# Patient Record
Sex: MALE | Race: WHITE | NOT HISPANIC OR LATINO
[De-identification: names, ages, dates, MRNs, and addresses within clinical notes are randomized per-mention and may not be internally consistent; named-entity substitution may affect disease eponyms.]

---

## 2017-01-11 ENCOUNTER — RX RENEWAL (OUTPATIENT)
Age: 67
End: 2017-01-11

## 2017-02-27 ENCOUNTER — RX RENEWAL (OUTPATIENT)
Age: 67
End: 2017-02-27

## 2017-03-13 ENCOUNTER — RX RENEWAL (OUTPATIENT)
Age: 67
End: 2017-03-13

## 2017-03-16 ENCOUNTER — RX RENEWAL (OUTPATIENT)
Age: 67
End: 2017-03-16

## 2017-03-17 ENCOUNTER — RX RENEWAL (OUTPATIENT)
Age: 67
End: 2017-03-17

## 2017-04-18 ENCOUNTER — RX RENEWAL (OUTPATIENT)
Age: 67
End: 2017-04-18

## 2017-04-21 ENCOUNTER — RX RENEWAL (OUTPATIENT)
Age: 67
End: 2017-04-21

## 2017-05-04 ENCOUNTER — RX RENEWAL (OUTPATIENT)
Age: 67
End: 2017-05-04

## 2017-05-23 ENCOUNTER — RX RENEWAL (OUTPATIENT)
Age: 67
End: 2017-05-23

## 2017-06-02 ENCOUNTER — RX RENEWAL (OUTPATIENT)
Age: 67
End: 2017-06-02

## 2017-06-05 ENCOUNTER — MEDICATION RENEWAL (OUTPATIENT)
Age: 67
End: 2017-06-05

## 2017-06-05 ENCOUNTER — RX RENEWAL (OUTPATIENT)
Age: 67
End: 2017-06-05

## 2017-06-26 ENCOUNTER — APPOINTMENT (OUTPATIENT)
Dept: FAMILY MEDICINE | Facility: CLINIC | Age: 67
End: 2017-06-26

## 2017-06-26 VITALS
HEIGHT: 72 IN | SYSTOLIC BLOOD PRESSURE: 160 MMHG | WEIGHT: 224 LBS | BODY MASS INDEX: 30.34 KG/M2 | DIASTOLIC BLOOD PRESSURE: 90 MMHG

## 2017-06-27 LAB
ALBUMIN SERPL ELPH-MCNC: 4.1 G/DL
ALP BLD-CCNC: 85 U/L
ALT SERPL-CCNC: 14 U/L
ANION GAP SERPL CALC-SCNC: 17 MMOL/L
AST SERPL-CCNC: 15 U/L
BASOPHILS # BLD AUTO: 0.02 K/UL
BASOPHILS NFR BLD AUTO: 0.2 %
BILIRUB SERPL-MCNC: 0.4 MG/DL
BUN SERPL-MCNC: 48 MG/DL
CALCIUM SERPL-MCNC: 9.4 MG/DL
CHLORIDE SERPL-SCNC: 99 MMOL/L
CHOLEST SERPL-MCNC: 231 MG/DL
CHOLEST/HDLC SERPL: 5.4 RATIO
CO2 SERPL-SCNC: 24 MMOL/L
CREAT SERPL-MCNC: 1.61 MG/DL
EOSINOPHIL # BLD AUTO: 0.34 K/UL
EOSINOPHIL NFR BLD AUTO: 3.7 %
GLUCOSE SERPL-MCNC: 217 MG/DL
HBA1C MFR BLD HPLC: 8.5 %
HCT VFR BLD CALC: 33.3 %
HDLC SERPL-MCNC: 43 MG/DL
HGB BLD-MCNC: 11 G/DL
IMM GRANULOCYTES NFR BLD AUTO: 0.3 %
LDLC SERPL CALC-MCNC: 161 MG/DL
LYMPHOCYTES # BLD AUTO: 1.37 K/UL
LYMPHOCYTES NFR BLD AUTO: 14.8 %
MAN DIFF?: NORMAL
MCHC RBC-ENTMCNC: 26.6 PG
MCHC RBC-ENTMCNC: 33 GM/DL
MCV RBC AUTO: 80.6 FL
MONOCYTES # BLD AUTO: 0.77 K/UL
MONOCYTES NFR BLD AUTO: 8.3 %
NEUTROPHILS # BLD AUTO: 6.72 K/UL
NEUTROPHILS NFR BLD AUTO: 72.7 %
PLATELET # BLD AUTO: 257 K/UL
POTASSIUM SERPL-SCNC: 4.4 MMOL/L
PROT SERPL-MCNC: 6.9 G/DL
RBC # BLD: 4.13 M/UL
RBC # FLD: 14 %
SODIUM SERPL-SCNC: 140 MMOL/L
TRIGL SERPL-MCNC: 136 MG/DL
TSH SERPL-ACNC: 3.76 UIU/ML
WBC # FLD AUTO: 9.25 K/UL

## 2017-08-28 ENCOUNTER — TRANSCRIPTION ENCOUNTER (OUTPATIENT)
Age: 67
End: 2017-08-28

## 2017-09-03 ENCOUNTER — MEDICATION RENEWAL (OUTPATIENT)
Age: 67
End: 2017-09-03

## 2017-09-07 ENCOUNTER — APPOINTMENT (OUTPATIENT)
Dept: FAMILY MEDICINE | Facility: CLINIC | Age: 67
End: 2017-09-07
Payer: MEDICARE

## 2017-09-07 VITALS
BODY MASS INDEX: 28.17 KG/M2 | WEIGHT: 208 LBS | DIASTOLIC BLOOD PRESSURE: 88 MMHG | SYSTOLIC BLOOD PRESSURE: 138 MMHG | HEIGHT: 72 IN

## 2017-09-07 PROCEDURE — 99215 OFFICE O/P EST HI 40 MIN: CPT

## 2018-05-30 ENCOUNTER — APPOINTMENT (OUTPATIENT)
Dept: FAMILY MEDICINE | Facility: CLINIC | Age: 68
End: 2018-05-30
Payer: MEDICARE

## 2018-05-30 VITALS
HEART RATE: 101 BPM | HEIGHT: 72 IN | BODY MASS INDEX: 30.48 KG/M2 | SYSTOLIC BLOOD PRESSURE: 140 MMHG | WEIGHT: 225 LBS | TEMPERATURE: 98 F | OXYGEN SATURATION: 97 % | DIASTOLIC BLOOD PRESSURE: 92 MMHG

## 2018-05-30 DIAGNOSIS — Z23 ENCOUNTER FOR IMMUNIZATION: ICD-10-CM

## 2018-05-30 PROCEDURE — 99214 OFFICE O/P EST MOD 30 MIN: CPT | Mod: 25

## 2018-05-30 PROCEDURE — 90670 PCV13 VACCINE IM: CPT | Mod: GY

## 2018-05-30 PROCEDURE — 36415 COLL VENOUS BLD VENIPUNCTURE: CPT

## 2018-05-30 PROCEDURE — G0009: CPT

## 2018-05-30 NOTE — HISTORY OF PRESENT ILLNESS
[de-identified] : 67 year old male is here for a followup visit. Patient is here for medication renewals and for blood work discussion. Medications and allergies were reviewed and assessed.  There has been no new medications since the last visit. Patient is feeling well with no active changes or issues since His last visit.\par

## 2018-05-30 NOTE — ASSESSMENT
[FreeTextEntry1] : The patient has a diagnosis of hypertension. Blood work was drawn in office and will be followed.  The diagnosis was discussed with patient and need for medication compliance and possible side affects and risks of noncompliance. Patient was told to adhere to a low salt diet and try to incorporate exercise daily.\par \par The diagnosis of diabetes is established with this patient.  Blood work was drawn in office and results will be reviewed and followed. The patient was counseled on using a low sugar and carbohydrate diet.  Patient was advised to eat small meals and exercise regularly. Patient as advised to take all medications as prescribed and to follow up with yearly podiatry and opthalmology visits. Patient was advised to call office  or go to the ER immediately if experiences any nausea, lightheadedness or for any other issues.\par usually follows with endo, however has not been going\par \par The diagnosis of high cholesterol is established and patient is currently taking medications. Blood work was drawn in office and results will be reviewed and followed. The patient was counseled on a low cholesterol diet and on medication compliance. Patient was advised to generally limit foods fried in oil, high in saturated fat, cheese, eggs and red meat. Patient was advised to continue on current medications and to followup in office for necessary blood work in three months.\par

## 2018-05-30 NOTE — HEALTH RISK ASSESSMENT
[] : No [No falls in past year] : Patient reported no falls in the past year [0] : 2) Feeling down, depressed, or hopeless: Not at all (0) [[id number]] : 0

## 2018-05-30 NOTE — PHYSICAL EXAM
[Well Nourished] : well nourished [Clear to Auscultation] : lungs were clear to auscultation bilaterally [Regular Rhythm] : with a regular rhythm [Normal S1, S2] : normal S1 and S2 [Soft] : abdomen soft [No Joint Swelling] : no joint swelling [Normal Insight/Judgement] : insight and judgment were intact [de-identified] : uses straight cane

## 2018-05-31 LAB
ALBUMIN SERPL ELPH-MCNC: 4.2 G/DL
ALP BLD-CCNC: 63 U/L
ALT SERPL-CCNC: 14 U/L
ANION GAP SERPL CALC-SCNC: 13 MMOL/L
AST SERPL-CCNC: 18 U/L
BASOPHILS # BLD AUTO: 0.04 K/UL
BASOPHILS NFR BLD AUTO: 0.4 %
BILIRUB SERPL-MCNC: 0.5 MG/DL
BUN SERPL-MCNC: 53 MG/DL
CALCIUM SERPL-MCNC: 9.8 MG/DL
CHLORIDE SERPL-SCNC: 101 MMOL/L
CHOLEST SERPL-MCNC: 351 MG/DL
CHOLEST/HDLC SERPL: 8.4 RATIO
CO2 SERPL-SCNC: 24 MMOL/L
CREAT SERPL-MCNC: 1.78 MG/DL
EOSINOPHIL # BLD AUTO: 0.31 K/UL
EOSINOPHIL NFR BLD AUTO: 3 %
GLUCOSE SERPL-MCNC: 188 MG/DL
HBA1C MFR BLD HPLC: 8.8 %
HCT VFR BLD CALC: 39.9 %
HDLC SERPL-MCNC: 42 MG/DL
HGB BLD-MCNC: 12.8 G/DL
IMM GRANULOCYTES NFR BLD AUTO: 0.3 %
LDLC SERPL CALC-MCNC: 261 MG/DL
LYMPHOCYTES # BLD AUTO: 1.5 K/UL
LYMPHOCYTES NFR BLD AUTO: 14.4 %
MAN DIFF?: NORMAL
MCHC RBC-ENTMCNC: 27.5 PG
MCHC RBC-ENTMCNC: 32.1 GM/DL
MCV RBC AUTO: 85.8 FL
MONOCYTES # BLD AUTO: 0.9 K/UL
MONOCYTES NFR BLD AUTO: 8.6 %
NEUTROPHILS # BLD AUTO: 7.65 K/UL
NEUTROPHILS NFR BLD AUTO: 73.3 %
PLATELET # BLD AUTO: 233 K/UL
POTASSIUM SERPL-SCNC: 4.3 MMOL/L
PROT SERPL-MCNC: 7.3 G/DL
RBC # BLD: 4.65 M/UL
RBC # FLD: 14.7 %
SODIUM SERPL-SCNC: 138 MMOL/L
TRIGL SERPL-MCNC: 242 MG/DL
WBC # FLD AUTO: 10.43 K/UL

## 2018-08-06 ENCOUNTER — MEDICATION RENEWAL (OUTPATIENT)
Age: 68
End: 2018-08-06

## 2018-08-06 ENCOUNTER — RX RENEWAL (OUTPATIENT)
Age: 68
End: 2018-08-06

## 2018-10-03 ENCOUNTER — RX RENEWAL (OUTPATIENT)
Age: 68
End: 2018-10-03

## 2018-10-24 ENCOUNTER — APPOINTMENT (OUTPATIENT)
Dept: FAMILY MEDICINE | Facility: CLINIC | Age: 68
End: 2018-10-24
Payer: MEDICARE

## 2018-10-24 ENCOUNTER — NON-APPOINTMENT (OUTPATIENT)
Age: 68
End: 2018-10-24

## 2018-10-24 VITALS
OXYGEN SATURATION: 96 % | SYSTOLIC BLOOD PRESSURE: 164 MMHG | HEIGHT: 72 IN | WEIGHT: 235 LBS | RESPIRATION RATE: 18 BRPM | HEART RATE: 99 BPM | DIASTOLIC BLOOD PRESSURE: 84 MMHG | BODY MASS INDEX: 31.83 KG/M2

## 2018-10-24 PROCEDURE — 99215 OFFICE O/P EST HI 40 MIN: CPT | Mod: 25

## 2018-10-24 PROCEDURE — 36415 COLL VENOUS BLD VENIPUNCTURE: CPT

## 2018-10-24 PROCEDURE — 93000 ELECTROCARDIOGRAM COMPLETE: CPT

## 2018-10-24 RX ORDER — DAPAGLIFLOZIN 10 MG/1
10 TABLET, FILM COATED ORAL DAILY
Qty: 90 | Refills: 0 | Status: DISCONTINUED | COMMUNITY
Start: 2018-05-31 | End: 2018-10-24

## 2018-10-24 NOTE — COUNSELING
[Behavioral health counseling provided] : behavioral health  [Patient Non-adherent to care plan] : Patient non-adherent to care plan [Needs reinforcement, provided] : Patient needs reinforcement on understanding lifestyle changes and  the steps needed to achieve self management goals and reinforcement was provided [de-identified] : p

## 2018-10-24 NOTE — ASSESSMENT
[Patient NOT optimized for Surgery at this time] : Patient not optimized for surgery at this time [As per surgery] : as per surgery [FreeTextEntry4] : patient with uncontrolled blood pressure and diabetes\par will add on third agent and reassess in 2 weeks\par uncontrolled diabetic - never took medications - will assess blood work\par patient will return for blood pressure check

## 2018-10-24 NOTE — PHYSICAL EXAM
[Well Nourished] : well nourished [Normal Rate] : normal rate  [Soft] : abdomen soft [No Rash] : no rash [Normal Gait] : normal gait [Normal Insight/Judgement] : insight and judgment were intact

## 2018-10-24 NOTE — HISTORY OF PRESENT ILLNESS
[Sleep Apnea] : no sleep apnea [Smoker] : not a smoker [No Adverse Anesthesia Reaction] : no adverse anesthesia reaction in self or family member [Chronic Kidney Disease] : chronic kidney disease [Diabetes] : diabetes [(Patient denies any chest pain, claudication, dyspnea on exertion, orthopnea, palpitations or syncope)] : Patient denies any chest pain, claudication, dyspnea on exertion, orthopnea, palpitations or syncope [FreeTextEntry1] : left cataract [FreeTextEntry2] : 11/20/2018 [FreeTextEntry4] : 68 year old male  is here for medical clearance for an upcoming surgery in left cataracts.  All medical problems and medicines were documented and reviewed with the patient. \par Patient was counseled to stop any advil/alieve/aspirin 7 days prior to surgery and was advised to have nothing by mouth from 11 pm the night prior to surgery. \par Medications were reviewed with patient and patient was directed on which medications to be taken and not to be taken prior to surgery.\par Labs were reviewed with the patient.\par

## 2018-10-25 LAB
ALBUMIN SERPL ELPH-MCNC: 4.2 G/DL
ALP BLD-CCNC: 67 U/L
ALT SERPL-CCNC: 24 U/L
ANION GAP SERPL CALC-SCNC: 15 MMOL/L
AST SERPL-CCNC: 24 U/L
BASOPHILS # BLD AUTO: 0.02 K/UL
BASOPHILS NFR BLD AUTO: 0.2 %
BILIRUB SERPL-MCNC: 0.3 MG/DL
BUN SERPL-MCNC: 56 MG/DL
CALCIUM SERPL-MCNC: 9.4 MG/DL
CHLORIDE SERPL-SCNC: 101 MMOL/L
CHOLEST SERPL-MCNC: 251 MG/DL
CHOLEST/HDLC SERPL: 6 RATIO
CO2 SERPL-SCNC: 24 MMOL/L
CREAT SERPL-MCNC: 2.02 MG/DL
EOSINOPHIL # BLD AUTO: 0.26 K/UL
EOSINOPHIL NFR BLD AUTO: 2.6 %
GLUCOSE SERPL-MCNC: 198 MG/DL
HBA1C MFR BLD HPLC: 9.5 %
HCT VFR BLD CALC: 33.9 %
HDLC SERPL-MCNC: 42 MG/DL
HGB BLD-MCNC: 11.4 G/DL
IMM GRANULOCYTES NFR BLD AUTO: 0.4 %
LDLC SERPL CALC-MCNC: 166 MG/DL
LYMPHOCYTES # BLD AUTO: 1.41 K/UL
LYMPHOCYTES NFR BLD AUTO: 14 %
MAN DIFF?: NORMAL
MCHC RBC-ENTMCNC: 28.4 PG
MCHC RBC-ENTMCNC: 33.6 GM/DL
MCV RBC AUTO: 84.5 FL
MONOCYTES # BLD AUTO: 0.62 K/UL
MONOCYTES NFR BLD AUTO: 6.2 %
NEUTROPHILS # BLD AUTO: 7.7 K/UL
NEUTROPHILS NFR BLD AUTO: 76.6 %
PLATELET # BLD AUTO: 221 K/UL
POTASSIUM SERPL-SCNC: 4.5 MMOL/L
PROT SERPL-MCNC: 6.9 G/DL
RBC # BLD: 4.01 M/UL
RBC # FLD: 13.7 %
SODIUM SERPL-SCNC: 140 MMOL/L
TRIGL SERPL-MCNC: 217 MG/DL
WBC # FLD AUTO: 10.05 K/UL

## 2018-11-05 ENCOUNTER — MEDICATION RENEWAL (OUTPATIENT)
Age: 68
End: 2018-11-05

## 2018-11-05 ENCOUNTER — APPOINTMENT (OUTPATIENT)
Dept: FAMILY MEDICINE | Facility: CLINIC | Age: 68
End: 2018-11-05
Payer: MEDICARE

## 2018-11-05 VITALS
DIASTOLIC BLOOD PRESSURE: 80 MMHG | HEIGHT: 72 IN | OXYGEN SATURATION: 96 % | RESPIRATION RATE: 18 BRPM | WEIGHT: 229 LBS | SYSTOLIC BLOOD PRESSURE: 146 MMHG | HEART RATE: 62 BPM | BODY MASS INDEX: 31.02 KG/M2

## 2018-11-05 DIAGNOSIS — Z23 ENCOUNTER FOR IMMUNIZATION: ICD-10-CM

## 2018-11-05 DIAGNOSIS — Z01.818 ENCOUNTER FOR OTHER PREPROCEDURAL EXAMINATION: ICD-10-CM

## 2018-11-05 PROCEDURE — 99214 OFFICE O/P EST MOD 30 MIN: CPT | Mod: 25

## 2018-11-05 PROCEDURE — 90686 IIV4 VACC NO PRSV 0.5 ML IM: CPT

## 2018-11-05 PROCEDURE — G0008: CPT

## 2018-11-05 RX ORDER — HYDROCHLOROTHIAZIDE 25 MG/1
25 TABLET ORAL
Qty: 90 | Refills: 1 | Status: ACTIVE | COMMUNITY
Start: 2018-08-06 | End: 1900-01-01

## 2018-11-05 NOTE — COUNSELING
[Behavioral health counseling provided] : behavioral health  [Patient Non-adherent to care plan] : Patient non-adherent to care plan [Needs reinforcement, provided] : Patient needs reinforcement on understanding lifestyle changes and  the steps needed to achieve self management goals and reinforcement was provided

## 2018-11-05 NOTE — ASSESSMENT
[Patient Optimized for Surgery] : Patient optimized for surgery [As per surgery] : as per surgery [FreeTextEntry4] : patient better controlled with blood pressure\par uncontrolled diabetic - never took medications - patient now taking medications for diabetes\par will check in 2 months

## 2018-11-08 ENCOUNTER — MEDICATION RENEWAL (OUTPATIENT)
Age: 68
End: 2018-11-08

## 2019-01-07 ENCOUNTER — APPOINTMENT (OUTPATIENT)
Dept: FAMILY MEDICINE | Facility: CLINIC | Age: 69
End: 2019-01-07

## 2019-03-12 ENCOUNTER — MEDICATION RENEWAL (OUTPATIENT)
Age: 69
End: 2019-03-12

## 2019-05-06 ENCOUNTER — APPOINTMENT (OUTPATIENT)
Dept: FAMILY MEDICINE | Facility: CLINIC | Age: 69
End: 2019-05-06
Payer: MEDICARE

## 2019-05-06 VITALS
HEIGHT: 72 IN | BODY MASS INDEX: 31.29 KG/M2 | WEIGHT: 231 LBS | DIASTOLIC BLOOD PRESSURE: 80 MMHG | SYSTOLIC BLOOD PRESSURE: 140 MMHG

## 2019-05-06 LAB
ALBUMIN SERPL ELPH-MCNC: 4.1 G/DL
ALP BLD-CCNC: 66 U/L
ALT SERPL-CCNC: 22 U/L
ANION GAP SERPL CALC-SCNC: 14 MMOL/L
AST SERPL-CCNC: 19 U/L
BASOPHILS # BLD AUTO: 0.06 K/UL
BASOPHILS NFR BLD AUTO: 0.6 %
BILIRUB SERPL-MCNC: 0.3 MG/DL
BUN SERPL-MCNC: 53 MG/DL
CALCIUM SERPL-MCNC: 9.6 MG/DL
CHLORIDE SERPL-SCNC: 102 MMOL/L
CHOLEST SERPL-MCNC: 296 MG/DL
CHOLEST/HDLC SERPL: 7.2 RATIO
CO2 SERPL-SCNC: 23 MMOL/L
CREAT SERPL-MCNC: 1.81 MG/DL
EOSINOPHIL # BLD AUTO: 0.31 K/UL
EOSINOPHIL NFR BLD AUTO: 3 %
ESTIMATED AVERAGE GLUCOSE: 255 MG/DL
GLUCOSE SERPL-MCNC: 225 MG/DL
HBA1C MFR BLD HPLC: 10.5 %
HCT VFR BLD CALC: 35.4 %
HDLC SERPL-MCNC: 41 MG/DL
HGB BLD-MCNC: 11.6 G/DL
IMM GRANULOCYTES NFR BLD AUTO: 0.6 %
LDLC SERPL CALC-MCNC: 222 MG/DL
LYMPHOCYTES # BLD AUTO: 1.44 K/UL
LYMPHOCYTES NFR BLD AUTO: 14 %
MAN DIFF?: NORMAL
MCHC RBC-ENTMCNC: 27.5 PG
MCHC RBC-ENTMCNC: 32.8 GM/DL
MCV RBC AUTO: 83.9 FL
MONOCYTES # BLD AUTO: 0.72 K/UL
MONOCYTES NFR BLD AUTO: 7 %
NEUTROPHILS # BLD AUTO: 7.69 K/UL
NEUTROPHILS NFR BLD AUTO: 74.8 %
PLATELET # BLD AUTO: 257 K/UL
POTASSIUM SERPL-SCNC: 4.5 MMOL/L
PROT SERPL-MCNC: 6.9 G/DL
RBC # BLD: 4.22 M/UL
RBC # FLD: 13.5 %
SODIUM SERPL-SCNC: 139 MMOL/L
TRIGL SERPL-MCNC: 165 MG/DL
WBC # FLD AUTO: 10.28 K/UL

## 2019-05-06 PROCEDURE — 99214 OFFICE O/P EST MOD 30 MIN: CPT | Mod: 25

## 2019-05-06 PROCEDURE — 36415 COLL VENOUS BLD VENIPUNCTURE: CPT

## 2019-05-06 RX ORDER — PREDNISOLONE ACETATE 10 MG/ML
1 SUSPENSION/ DROPS OPHTHALMIC
Qty: 5 | Refills: 0 | Status: COMPLETED | COMMUNITY
Start: 2018-11-15

## 2019-05-06 RX ORDER — BROMFENAC 1.03 MG/ML
0.09 SOLUTION/ DROPS OPHTHALMIC
Qty: 2 | Refills: 0 | Status: COMPLETED | COMMUNITY
Start: 2018-11-15

## 2019-05-06 RX ORDER — DILTIAZEM HYDROCHLORIDE 240 MG/1
240 CAPSULE, EXTENDED RELEASE ORAL
Qty: 180 | Refills: 0 | Status: COMPLETED | COMMUNITY
Start: 2018-10-30

## 2019-05-06 RX ORDER — ERGOCALCIFEROL 1.25 MG/1
1.25 MG CAPSULE, LIQUID FILLED ORAL
Qty: 12 | Refills: 0 | Status: COMPLETED | COMMUNITY
Start: 2019-01-29

## 2019-05-06 RX ORDER — MOXIFLOXACIN OPHTHALMIC 5 MG/ML
0.5 SOLUTION/ DROPS OPHTHALMIC
Qty: 3 | Refills: 0 | Status: COMPLETED | COMMUNITY
Start: 2018-11-15

## 2019-05-06 RX ORDER — CANAGLIFLOZIN 300 MG/1
300 TABLET, FILM COATED ORAL DAILY
Qty: 90 | Refills: 1 | Status: ACTIVE | COMMUNITY
Start: 2018-10-24 | End: 1900-01-01

## 2019-05-06 RX ORDER — CHLORTHALIDONE 25 MG/1
25 TABLET ORAL
Qty: 90 | Refills: 0 | Status: COMPLETED | COMMUNITY
Start: 2019-01-29

## 2019-05-06 NOTE — HEALTH RISK ASSESSMENT
[] : No [No falls in past year] : Patient reported no falls in the past year [0] : 2) Feeling down, depressed, or hopeless: Not at all (0) [[id number]] : 0

## 2019-05-06 NOTE — HISTORY OF PRESENT ILLNESS
[de-identified] : 67 year old male is here for a followup visit. Patient is here for medication renewals and for blood work discussion. Medications and allergies were reviewed and assessed.  There has been no new medications since the last visit. \par \par patient finally wants to lose weight and increase his endurance\par

## 2019-05-06 NOTE — PHYSICAL EXAM
[Well Nourished] : well nourished [Clear to Auscultation] : lungs were clear to auscultation bilaterally [Regular Rhythm] : with a regular rhythm [Normal S1, S2] : normal S1 and S2 [No Joint Swelling] : no joint swelling [Soft] : abdomen soft [Normal Insight/Judgement] : insight and judgment were intact [de-identified] : uses straight cane

## 2019-05-06 NOTE — ASSESSMENT
[FreeTextEntry1] : The patient has a diagnosis of hypertension. Blood work was drawn in office and will be followed.  The diagnosis was discussed with patient and need for medication compliance and possible side affects and risks of noncompliance. Patient was told to adhere to a low salt diet and try to incorporate exercise daily.\par \par The diagnosis of diabetes is established with this patient.  Blood work was drawn in office and results will be reviewed and followed. The patient was counseled on using a low sugar and carbohydrate diet.  Patient was advised to eat small meals and exercise regularly. Patient as advised to take all medications as prescribed and to follow up with yearly podiatry and opthalmology visits. Patient was advised to call office  or go to the ER immediately if experiences any nausea, lightheadedness or for any other issues.\par usually follows with endo, however has not been going\par \par The diagnosis of high cholesterol is established and patient is currently taking medications. Blood work was drawn in office and results will be reviewed and followed. The patient was counseled on a low cholesterol diet and on medication compliance. Patient was advised to generally limit foods fried in oil, high in saturated fat, cheese, eggs and red meat. Patient was advised to continue on current medications and to followup in office for necessary blood work in three months.\par \par will return for bw and full PE and pneumonia shot in 3 months\par

## 2019-05-19 ENCOUNTER — RX RENEWAL (OUTPATIENT)
Age: 69
End: 2019-05-19

## 2019-08-08 ENCOUNTER — APPOINTMENT (OUTPATIENT)
Dept: FAMILY MEDICINE | Facility: CLINIC | Age: 69
End: 2019-08-08
Payer: MEDICARE

## 2019-08-08 VITALS
BODY MASS INDEX: 31.15 KG/M2 | SYSTOLIC BLOOD PRESSURE: 130 MMHG | DIASTOLIC BLOOD PRESSURE: 82 MMHG | WEIGHT: 230 LBS | HEIGHT: 72 IN

## 2019-08-08 DIAGNOSIS — M54.9 DORSALGIA, UNSPECIFIED: ICD-10-CM

## 2019-08-08 DIAGNOSIS — E11.9 TYPE 2 DIABETES MELLITUS W/OUT COMPLICATIONS: ICD-10-CM

## 2019-08-08 DIAGNOSIS — Z00.00 ENCOUNTER FOR GENERAL ADULT MEDICAL EXAMINATION W/OUT ABNORMAL FINDINGS: ICD-10-CM

## 2019-08-08 DIAGNOSIS — I48.91 UNSPECIFIED ATRIAL FIBRILLATION: ICD-10-CM

## 2019-08-08 DIAGNOSIS — E78.00 PURE HYPERCHOLESTEROLEMIA, UNSPECIFIED: ICD-10-CM

## 2019-08-08 PROCEDURE — G0438: CPT

## 2019-11-12 ENCOUNTER — RX RENEWAL (OUTPATIENT)
Age: 69
End: 2019-11-12

## 2019-11-12 RX ORDER — GLIMEPIRIDE 4 MG/1
4 TABLET ORAL DAILY
Qty: 90 | Refills: 0 | Status: ACTIVE | COMMUNITY
Start: 2019-05-06 | End: 1900-01-01

## 2019-12-30 ENCOUNTER — RX RENEWAL (OUTPATIENT)
Age: 69
End: 2019-12-30

## 2019-12-30 RX ORDER — AMLODIPINE BESYLATE AND BENAZEPRIL HYDROCHLORIDE 5; 40 MG/1; MG/1
5-40 CAPSULE ORAL
Qty: 90 | Refills: 0 | Status: ACTIVE | COMMUNITY
Start: 2018-10-24 | End: 1900-01-01

## 2020-01-07 ENCOUNTER — TRANSCRIPTION ENCOUNTER (OUTPATIENT)
Age: 70
End: 2020-01-07

## 2020-01-08 ENCOUNTER — INPATIENT (INPATIENT)
Facility: HOSPITAL | Age: 70
LOS: 13 days | Discharge: NURSING, REHAB OR LONG-TERM CARE | End: 2020-01-22
Attending: INTERNAL MEDICINE | Admitting: INTERNAL MEDICINE
Payer: MEDICARE

## 2020-01-08 ENCOUNTER — RESULT REVIEW (OUTPATIENT)
Age: 70
End: 2020-01-08

## 2020-01-08 VITALS
RESPIRATION RATE: 19 BRPM | OXYGEN SATURATION: 96 % | TEMPERATURE: 99 F | HEART RATE: 100 BPM | WEIGHT: 250 LBS | SYSTOLIC BLOOD PRESSURE: 155 MMHG | DIASTOLIC BLOOD PRESSURE: 78 MMHG

## 2020-01-08 DIAGNOSIS — A48.0 GAS GANGRENE: ICD-10-CM

## 2020-01-08 DIAGNOSIS — I10 ESSENTIAL (PRIMARY) HYPERTENSION: ICD-10-CM

## 2020-01-08 DIAGNOSIS — E11.49 TYPE 2 DIABETES MELLITUS WITH OTHER DIABETIC NEUROLOGICAL COMPLICATION: ICD-10-CM

## 2020-01-08 DIAGNOSIS — N18.4 CHRONIC KIDNEY DISEASE, STAGE 4 (SEVERE): ICD-10-CM

## 2020-01-08 DIAGNOSIS — B97.4 RESPIRATORY SYNCYTIAL VIRUS AS THE CAUSE OF DISEASES CLASSIFIED ELSEWHERE: ICD-10-CM

## 2020-01-08 DIAGNOSIS — I48.91 UNSPECIFIED ATRIAL FIBRILLATION: ICD-10-CM

## 2020-01-08 DIAGNOSIS — M72.6 NECROTIZING FASCIITIS: ICD-10-CM

## 2020-01-08 LAB
ALBUMIN SERPL ELPH-MCNC: 2.2 G/DL — LOW (ref 3.3–5)
ALP SERPL-CCNC: 104 U/L — SIGNIFICANT CHANGE UP (ref 40–120)
ALT FLD-CCNC: 43 U/L — SIGNIFICANT CHANGE UP (ref 12–78)
ANION GAP SERPL CALC-SCNC: 10 MMOL/L — SIGNIFICANT CHANGE UP (ref 5–17)
ANION GAP SERPL CALC-SCNC: 14 MMOL/L — SIGNIFICANT CHANGE UP (ref 5–17)
APPEARANCE UR: ABNORMAL
APTT BLD: 35.7 SEC — SIGNIFICANT CHANGE UP (ref 28.5–37)
AST SERPL-CCNC: 26 U/L — SIGNIFICANT CHANGE UP (ref 15–37)
BASOPHILS # BLD AUTO: 0 K/UL — SIGNIFICANT CHANGE UP (ref 0–0.2)
BASOPHILS NFR BLD AUTO: 0 % — SIGNIFICANT CHANGE UP (ref 0–2)
BILIRUB SERPL-MCNC: 0.6 MG/DL — SIGNIFICANT CHANGE UP (ref 0.2–1.2)
BILIRUB UR-MCNC: NEGATIVE — SIGNIFICANT CHANGE UP
BUN SERPL-MCNC: 61 MG/DL — HIGH (ref 7–23)
BUN SERPL-MCNC: 62 MG/DL — HIGH (ref 7–23)
CALCIUM SERPL-MCNC: 8.7 MG/DL — SIGNIFICANT CHANGE UP (ref 8.5–10.1)
CALCIUM SERPL-MCNC: 9 MG/DL — SIGNIFICANT CHANGE UP (ref 8.5–10.1)
CHLORIDE SERPL-SCNC: 92 MMOL/L — LOW (ref 96–108)
CHLORIDE SERPL-SCNC: 96 MMOL/L — SIGNIFICANT CHANGE UP (ref 96–108)
CO2 SERPL-SCNC: 21 MMOL/L — LOW (ref 22–31)
CO2 SERPL-SCNC: 23 MMOL/L — SIGNIFICANT CHANGE UP (ref 22–31)
COLOR SPEC: YELLOW — SIGNIFICANT CHANGE UP
CREAT SERPL-MCNC: 2.53 MG/DL — HIGH (ref 0.5–1.3)
CREAT SERPL-MCNC: 2.87 MG/DL — HIGH (ref 0.5–1.3)
DIFF PNL FLD: ABNORMAL
EOSINOPHIL # BLD AUTO: 0 K/UL — SIGNIFICANT CHANGE UP (ref 0–0.5)
EOSINOPHIL NFR BLD AUTO: 0 % — SIGNIFICANT CHANGE UP (ref 0–6)
ERYTHROCYTE [SEDIMENTATION RATE] IN BLOOD: 101 MM/HR — HIGH (ref 0–20)
FLU A RESULT: SIGNIFICANT CHANGE UP
FLU A RESULT: SIGNIFICANT CHANGE UP
FLUAV AG NPH QL: SIGNIFICANT CHANGE UP
FLUBV AG NPH QL: SIGNIFICANT CHANGE UP
GLUCOSE SERPL-MCNC: 201 MG/DL — HIGH (ref 70–99)
GLUCOSE SERPL-MCNC: 261 MG/DL — HIGH (ref 70–99)
GLUCOSE UR QL: 1000 MG/DL
HCT VFR BLD CALC: 27.7 % — LOW (ref 39–50)
HCT VFR BLD CALC: 29.6 % — LOW (ref 39–50)
HGB BLD-MCNC: 9.3 G/DL — LOW (ref 13–17)
HGB BLD-MCNC: 9.8 G/DL — LOW (ref 13–17)
INR BLD: 1.57 RATIO — HIGH (ref 0.88–1.16)
KETONES UR-MCNC: ABNORMAL
LACTATE SERPL-SCNC: 1.4 MMOL/L — SIGNIFICANT CHANGE UP (ref 0.7–2)
LEUKOCYTE ESTERASE UR-ACNC: NEGATIVE — SIGNIFICANT CHANGE UP
LG PLATELETS BLD QL AUTO: SLIGHT — SIGNIFICANT CHANGE UP
LYMPHOCYTES # BLD AUTO: 0.45 K/UL — LOW (ref 1–3.3)
LYMPHOCYTES # BLD AUTO: 1 % — LOW (ref 13–44)
MANUAL SMEAR VERIFICATION: SIGNIFICANT CHANGE UP
MCHC RBC-ENTMCNC: 27.9 PG — SIGNIFICANT CHANGE UP (ref 27–34)
MCHC RBC-ENTMCNC: 28.4 PG — SIGNIFICANT CHANGE UP (ref 27–34)
MCHC RBC-ENTMCNC: 33.1 GM/DL — SIGNIFICANT CHANGE UP (ref 32–36)
MCHC RBC-ENTMCNC: 33.6 GM/DL — SIGNIFICANT CHANGE UP (ref 32–36)
MCV RBC AUTO: 84.3 FL — SIGNIFICANT CHANGE UP (ref 80–100)
MCV RBC AUTO: 84.5 FL — SIGNIFICANT CHANGE UP (ref 80–100)
MICROCYTES BLD QL: SLIGHT — SIGNIFICANT CHANGE UP
MONOCYTES # BLD AUTO: 2.71 K/UL — HIGH (ref 0–0.9)
MONOCYTES NFR BLD AUTO: 6 % — SIGNIFICANT CHANGE UP (ref 2–14)
NEUTROPHILS # BLD AUTO: 42.05 K/UL — HIGH (ref 1.8–7.4)
NEUTROPHILS NFR BLD AUTO: 93 % — HIGH (ref 43–77)
NITRITE UR-MCNC: NEGATIVE — SIGNIFICANT CHANGE UP
NRBC # BLD: 0 /100 WBCS — SIGNIFICANT CHANGE UP (ref 0–0)
NRBC # BLD: 0 /100 — SIGNIFICANT CHANGE UP (ref 0–0)
NRBC # BLD: SIGNIFICANT CHANGE UP /100 WBCS (ref 0–0)
PH UR: 5 — SIGNIFICANT CHANGE UP (ref 5–8)
PLAT MORPH BLD: ABNORMAL
PLATELET # BLD AUTO: 436 K/UL — HIGH (ref 150–400)
PLATELET # BLD AUTO: 455 K/UL — HIGH (ref 150–400)
PLATELET COUNT - ESTIMATE: NORMAL — SIGNIFICANT CHANGE UP
POLYCHROMASIA BLD QL SMEAR: SLIGHT — SIGNIFICANT CHANGE UP
POTASSIUM SERPL-MCNC: 3.9 MMOL/L — SIGNIFICANT CHANGE UP (ref 3.5–5.3)
POTASSIUM SERPL-MCNC: 4.1 MMOL/L — SIGNIFICANT CHANGE UP (ref 3.5–5.3)
POTASSIUM SERPL-SCNC: 3.9 MMOL/L — SIGNIFICANT CHANGE UP (ref 3.5–5.3)
POTASSIUM SERPL-SCNC: 4.1 MMOL/L — SIGNIFICANT CHANGE UP (ref 3.5–5.3)
PROT SERPL-MCNC: 8.1 GM/DL — SIGNIFICANT CHANGE UP (ref 6–8.3)
PROT UR-MCNC: 100 MG/DL
PROTHROM AB SERPL-ACNC: 17.8 SEC — HIGH (ref 10–12.9)
RBC # BLD: 3.28 M/UL — LOW (ref 4.2–5.8)
RBC # BLD: 3.51 M/UL — LOW (ref 4.2–5.8)
RBC # FLD: 13.7 % — SIGNIFICANT CHANGE UP (ref 10.3–14.5)
RBC # FLD: 13.8 % — SIGNIFICANT CHANGE UP (ref 10.3–14.5)
RBC BLD AUTO: ABNORMAL
RSV RESULT: DETECTED
RSV RNA RESP QL NAA+PROBE: DETECTED
SMUDGE CELLS # BLD: PRESENT — SIGNIFICANT CHANGE UP
SODIUM SERPL-SCNC: 127 MMOL/L — LOW (ref 135–145)
SODIUM SERPL-SCNC: 129 MMOL/L — LOW (ref 135–145)
SP GR SPEC: 1.01 — SIGNIFICANT CHANGE UP (ref 1.01–1.02)
UROBILINOGEN FLD QL: NEGATIVE MG/DL — SIGNIFICANT CHANGE UP
WBC # BLD: 43.15 K/UL — CRITICAL HIGH (ref 3.8–10.5)
WBC # BLD: 45.21 K/UL — CRITICAL HIGH (ref 3.8–10.5)
WBC # FLD AUTO: 43.15 K/UL — CRITICAL HIGH (ref 3.8–10.5)
WBC # FLD AUTO: 45.21 K/UL — CRITICAL HIGH (ref 3.8–10.5)

## 2020-01-08 PROCEDURE — 88305 TISSUE EXAM BY PATHOLOGIST: CPT | Mod: 26

## 2020-01-08 PROCEDURE — 73600 X-RAY EXAM OF ANKLE: CPT | Mod: 26,LT

## 2020-01-08 PROCEDURE — 93010 ELECTROCARDIOGRAM REPORT: CPT

## 2020-01-08 PROCEDURE — 99285 EMERGENCY DEPT VISIT HI MDM: CPT

## 2020-01-08 PROCEDURE — 99223 1ST HOSP IP/OBS HIGH 75: CPT | Mod: AI

## 2020-01-08 PROCEDURE — 88307 TISSUE EXAM BY PATHOLOGIST: CPT | Mod: 26

## 2020-01-08 PROCEDURE — 88311 DECALCIFY TISSUE: CPT | Mod: 26

## 2020-01-08 PROCEDURE — 73700 CT LOWER EXTREMITY W/O DYE: CPT | Mod: 26,LT

## 2020-01-08 PROCEDURE — 71045 X-RAY EXAM CHEST 1 VIEW: CPT | Mod: 26

## 2020-01-08 RX ORDER — SIMVASTATIN 20 MG/1
10 TABLET, FILM COATED ORAL AT BEDTIME
Refills: 0 | Status: DISCONTINUED | OUTPATIENT
Start: 2020-01-08 | End: 2020-01-08

## 2020-01-08 RX ORDER — SODIUM CHLORIDE 9 MG/ML
1000 INJECTION, SOLUTION INTRAVENOUS
Refills: 0 | Status: DISCONTINUED | OUTPATIENT
Start: 2020-01-08 | End: 2020-01-08

## 2020-01-08 RX ORDER — OXYCODONE AND ACETAMINOPHEN 5; 325 MG/1; MG/1
1 TABLET ORAL EVERY 4 HOURS
Refills: 0 | Status: DISCONTINUED | OUTPATIENT
Start: 2020-01-08 | End: 2020-01-10

## 2020-01-08 RX ORDER — PIPERACILLIN AND TAZOBACTAM 4; .5 G/20ML; G/20ML
3.38 INJECTION, POWDER, LYOPHILIZED, FOR SOLUTION INTRAVENOUS ONCE
Refills: 0 | Status: COMPLETED | OUTPATIENT
Start: 2020-01-08 | End: 2020-01-08

## 2020-01-08 RX ORDER — SODIUM CHLORIDE 9 MG/ML
3500 INJECTION INTRAMUSCULAR; INTRAVENOUS; SUBCUTANEOUS ONCE
Refills: 0 | Status: COMPLETED | OUTPATIENT
Start: 2020-01-08 | End: 2020-01-08

## 2020-01-08 RX ORDER — HYDROMORPHONE HYDROCHLORIDE 2 MG/ML
0.5 INJECTION INTRAMUSCULAR; INTRAVENOUS; SUBCUTANEOUS EVERY 4 HOURS
Refills: 0 | Status: DISCONTINUED | OUTPATIENT
Start: 2020-01-08 | End: 2020-01-10

## 2020-01-08 RX ORDER — PIPERACILLIN AND TAZOBACTAM 4; .5 G/20ML; G/20ML
3.38 INJECTION, POWDER, LYOPHILIZED, FOR SOLUTION INTRAVENOUS ONCE
Refills: 0 | Status: DISCONTINUED | OUTPATIENT
Start: 2020-01-08 | End: 2020-01-08

## 2020-01-08 RX ORDER — DILTIAZEM HCL 120 MG
240 CAPSULE, EXT RELEASE 24 HR ORAL DAILY
Refills: 0 | Status: DISCONTINUED | OUTPATIENT
Start: 2020-01-08 | End: 2020-01-10

## 2020-01-08 RX ORDER — HYDROMORPHONE HYDROCHLORIDE 2 MG/ML
0.5 INJECTION INTRAMUSCULAR; INTRAVENOUS; SUBCUTANEOUS
Refills: 0 | Status: DISCONTINUED | OUTPATIENT
Start: 2020-01-08 | End: 2020-01-08

## 2020-01-08 RX ORDER — LISINOPRIL 2.5 MG/1
40 TABLET ORAL DAILY
Refills: 0 | Status: DISCONTINUED | OUTPATIENT
Start: 2020-01-08 | End: 2020-01-08

## 2020-01-08 RX ORDER — DILTIAZEM HCL 120 MG
240 CAPSULE, EXT RELEASE 24 HR ORAL DAILY
Refills: 0 | Status: DISCONTINUED | OUTPATIENT
Start: 2020-01-08 | End: 2020-01-08

## 2020-01-08 RX ORDER — VANCOMYCIN HCL 1 G
1000 VIAL (EA) INTRAVENOUS ONCE
Refills: 0 | Status: DISCONTINUED | OUTPATIENT
Start: 2020-01-08 | End: 2020-01-08

## 2020-01-08 RX ORDER — ACETAMINOPHEN 500 MG
650 TABLET ORAL EVERY 6 HOURS
Refills: 0 | Status: DISCONTINUED | OUTPATIENT
Start: 2020-01-08 | End: 2020-01-10

## 2020-01-08 RX ORDER — VANCOMYCIN HCL 1 G
1000 VIAL (EA) INTRAVENOUS ONCE
Refills: 0 | Status: COMPLETED | OUTPATIENT
Start: 2020-01-08 | End: 2020-01-08

## 2020-01-08 RX ORDER — SODIUM CHLORIDE 9 MG/ML
1000 INJECTION, SOLUTION INTRAVENOUS
Refills: 0 | Status: DISCONTINUED | OUTPATIENT
Start: 2020-01-08 | End: 2020-01-10

## 2020-01-08 RX ORDER — ACETAMINOPHEN 500 MG
1000 TABLET ORAL ONCE
Refills: 0 | Status: DISCONTINUED | OUTPATIENT
Start: 2020-01-08 | End: 2020-01-08

## 2020-01-08 RX ORDER — SIMVASTATIN 20 MG/1
10 TABLET, FILM COATED ORAL AT BEDTIME
Refills: 0 | Status: DISCONTINUED | OUTPATIENT
Start: 2020-01-08 | End: 2020-01-10

## 2020-01-08 RX ADMIN — SIMVASTATIN 10 MILLIGRAM(S): 20 TABLET, FILM COATED ORAL at 23:45

## 2020-01-08 RX ADMIN — PIPERACILLIN AND TAZOBACTAM 200 GRAM(S): 4; .5 INJECTION, POWDER, LYOPHILIZED, FOR SOLUTION INTRAVENOUS at 14:36

## 2020-01-08 RX ADMIN — Medication 100 MILLIGRAM(S): at 21:10

## 2020-01-08 RX ADMIN — SODIUM CHLORIDE 3500 MILLILITER(S): 9 INJECTION INTRAMUSCULAR; INTRAVENOUS; SUBCUTANEOUS at 12:40

## 2020-01-08 RX ADMIN — Medication 250 MILLIGRAM(S): at 23:46

## 2020-01-08 RX ADMIN — SODIUM CHLORIDE 75 MILLILITER(S): 9 INJECTION, SOLUTION INTRAVENOUS at 20:47

## 2020-01-08 NOTE — H&P ADULT - NSHPREVIEWOFSYSTEMS_GEN_ALL_CORE

## 2020-01-08 NOTE — ED ADULT NURSE NOTE - INTERVENTIONS DEFINITIONS
Stretcher in lowest position, wheels locked, appropriate side rails in place/Monitor gait and stability/Instruct patient to call for assistance/Non-slip footwear when patient is off stretcher/Physically safe environment: no spills, clutter or unnecessary equipment

## 2020-01-08 NOTE — BRIEF OPERATIVE NOTE - OPERATION/FINDINGS
Soft medial malleolar bone density with necrotic appearance  10cc dishwater pus  Necrotic soft tissue, ligaments, skin and bone  Strong malodor  No tracking towards lateral of ankle

## 2020-01-08 NOTE — ED ADULT NURSE NOTE - OBJECTIVE STATEMENT
pt from home with c/o productive cough and "flu like symptoms." pt reports slip and fall at home yesterday with neg loc

## 2020-01-08 NOTE — H&P ADULT - ASSESSMENT
this is a 69 years old male  with possible necrotizing fascitis and ckd with very elevated wbc       IMPROVE VTE Individual Risk Assessment        RISK                                                          Points  [  ] Previous VTE                                                3  [  ] Thrombophilia                                             2  [  ] Lower limb paralysis                                   2        (unable to hold up >15 seconds)    [  ] Current Cancer                                            2         (within 6 months)  [  ] Immobilization > 24 hrs                              1  [  ] ICU/CCU stay > 24 hours                            1  [  ] Age > 60                                                    1  IMPROVE VTE Score _____2____

## 2020-01-08 NOTE — H&P ADULT - NSHPPHYSICALEXAM_GEN_ALL_CORE
ICU Vital Signs Last 24 Hrs  T(C): 38 (08 Jan 2020 16:26), Max: 38 (08 Jan 2020 16:26)  T(F): 100.4 (08 Jan 2020 16:26), Max: 100.4 (08 Jan 2020 16:26)  HR: 91 (08 Jan 2020 16:26) (91 - 100)  BP: 144/76 (08 Jan 2020 16:26) (144/76 - 155/78)  BP(mean): --  ABP: --  ABP(mean): --  RR: 20 (08 Jan 2020 16:26) (19 - 20)  SpO2: 95% (08 Jan 2020 16:26) (95% - 96%)  GENERAL: NAD well-developed  HEAD:  Atraumatic, Normocephalic  EYES: EOMI, PERRLA, conjunctiva and sclera clear  ENMT: No tonsillar erythema, exudates, or enlargement; Moist mucous membranes, Good dentition, No lesions  NECK: Supple, No JVD, Normal thyroid  NERVOUS SYSTEM:  Alert & Oriented X3, Good concentration; Motor Strength 5/5 B/L upper and lower extremities; DTRs 2+ intact and symmetric  CHEST/LUNG: Clear to percussion bilaterally; No rales, rhonchi, wheezing, or rubs  HEART: Regular rate and rhythm; No murmurs, rubs, or gallops  ABDOMEN: Soft, Nontender, Nondistended; Bowel sounds present  EXTREMITIES:  2+ Peripheral Pulses, No clubbing, cyanosis, or edema  LYMPH: No lymphadenopathy   SKIN: No rashes or lesions ICU Vital Signs Last 24 Hrs  T(C): 38 (08 Jan 2020 16:26), Max: 38 (08 Jan 2020 16:26)  T(F): 100.4 (08 Jan 2020 16:26), Max: 100.4 (08 Jan 2020 16:26)  HR: 91 (08 Jan 2020 16:26) (91 - 100)  BP: 144/76 (08 Jan 2020 16:26) (144/76 - 155/78)  BP(mean): --  ABP: --  ABP(mean): --  RR: 20 (08 Jan 2020 16:26) (19 - 20)  SpO2: 95% (08 Jan 2020 16:26) (95% - 96%)  GENERAL: NAD well-developed  HEAD:  Atraumatic, Normocephalic  EYES: EOMI, PERRLA, conjunctiva and sclera clear  ENMT: No tonsillar erythema, exudates, or enlargement; Moist mucous membranes, Good dentition, No lesions  NECK: Supple, No JVD, Normal thyroid  NERVOUS SYSTEM:  Alert & Oriented X3, Good concentration; Motor Strength 5/5 B/L upper and lower extremities; DTRs 2+ intact and symmetric  CHEST/LUNG: Clear to percussion bilaterally; No rales, rhonchi, wheezing, or rubs  HEART: Regular rate and rhythm; No murmurs, rubs, or gallops  ABDOMEN: Soft, Nontender, Nondistended; Bowel sounds present  EXTREMITIES: right   LYMPH: No lymphadenopathy   SKIN: No rashes or lesions

## 2020-01-08 NOTE — H&P ADULT - HISTORY OF PRESENT ILLNESS
Pt is a 70 yo gentleman with a pmhx of HTN, HL, NIDM, Afib on eliquis who presents to the ED with leg redness and fever. Has had chronic ulcer on L. medial leg for months, but over past couple days has gotten worse, and yesterday wife noted that his L. leg was red. Pt has not been on antibiotics, no chest pain. Has had a cough and uri symptoms last couple days as well. No abdominal pain, no n/v/d.    PAST MEDICAL/SURGICAL/FAMILY/SOCIAL HISTORY: Pt is a 68 yo gentleman with a pmhx of HTN, HL, NIDM, Afib on eliquis who presents to the ED with leg redness and fever. Has had chronic ulcer on L. medial leg for months, but over past couple days has gotten worse, and yesterday wife noted that his L. leg was red. Pt has not been on antibiotics, no chest pain. Has had a cough and uri symptoms last couple days as well. No abdominal pain, no nausea vomiting or diarrhea.

## 2020-01-08 NOTE — CONSULT NOTE ADULT - SUBJECTIVE AND OBJECTIVE BOX
[facility] NEPHROLOGY SERVICES, [facility]  NEPHROLOGY AND HYPERTENSION  [address]  [address]  [place] [address]  [phone]    MD YISEL WEEKS MD ANDREY GONCHARUK, MD MADHU KORRAPATI, MD YELENA ROSENBERG, MD BINNY KOSHY, MD CHRISTOPHER CAPUTO, MD EDWARD BOVER, MD      Information from chart:  "Patient is a 69y old  Male who presents with a chief complaint of left  foot ulcer (2020 18:25)    HPI:  Pt is a 70 yo gentleman with a pmhx of HTN, HL, NIDM, Afib on eliquis who presents to the ED with leg redness and fever. Has had chronic ulcer on L. medial leg for months, but over past couple days has gotten worse, and yesterday wife noted that his L. leg was red. Pt has not been on antibiotics, no chest pain. Has had a cough and uri symptoms last couple days as well. No abdominal pain, no nausea vomiting or diarrhea. (2020 17:04)   "    Patient with hxof CKD 3; Cr 1.8 in May 2019  He is on Metformin;       PAST MEDICAL & SURGICAL HISTORY:  Type 2 diabetes mellitus with other neurologic complication, without long-term current use of insulin  Hypertension, unspecified type    FAMILY HISTORY:    Allergies    No Known Allergies    Intolerances      Home Medications:  amLODIPine 5 mg oral tablet: 1 tab(s) orally once a day (2020 17:41)  benazepril 40 mg oral tablet: 1 tab(s) orally once a day (2020 17:41)  chlorthalidone 25 mg oral tablet: 1 tab(s) orally once a day (2020 17:40)  dilTIAZem 240 mg/24 hours oral capsule, extended release: 1 cap(s) orally once a day (2020 17:45)  Eliquis 5 mg oral tablet: 1 tab(s) orally 2 times a day (2020 17:42)  glimepiride 4 mg oral tablet: 1 tab(s) orally once a day (2020 17:46)  Invokana 300 mg oral tablet: 1 tab(s) orally once a day (2020 17:42)  metFORMIN 500 mg oral tablet: 1 tab(s) orally 2 times a day (2020 17:40)  simvastatin 20 mg oral tablet: 1 tab(s) orally once a day (at bedtime) (2020 17:41)    MEDICATIONS  (STANDING):  clindamycin IVPB      clindamycin IVPB 600 milliGRAM(s) IV Intermittent once  diltiazem    milliGRAM(s) Oral daily  lisinopril 40 milliGRAM(s) Oral daily  simvastatin 10 milliGRAM(s) Oral at bedtime  vancomycin  IVPB 1000 milliGRAM(s) IV Intermittent once    MEDICATIONS  (PRN):    Vital Signs Last 24 Hrs  T(C): 38 (2020 16:), Max: 38 (2020 16:)  T(F): 100.4 (:), Max: 100.4 (:)  HR: 91 (:) (91 - 100)  BP: 144/76 (2020 16:26) (144/76 - 155/78)  BP(mean): --  RR: 20 (2020 16:) (19 - 20)  SpO2: 95% (2020 16:) (95% - 96%)    Daily     Daily     CAPILLARY BLOOD GLUCOSE        PHYSICAL EXAM:      T(C): 38 (20 @ 16:26), Max: 38 (20 @ 16:26)  HR: 91 (20 @ 16:26) (91 - 100)  BP: 144/76 (20 @ 16:26) (144/76 - 155/78)  RR: 20 (20 @ 16:26) (19 - 20)  SpO2: 95% (20 @ 16:26) (95% - 96%)  Wt(kg): --  Respiratory: clear anteriorly, decreased BS at bases  Cardiovascular: S1 S2  Gastrointestinal: soft NT ND +BS  Extremities:  LLE erythema 1 + edema                  127<L>  |  92<L>  |  62<H>  ----------------------------<  261<H>  4.1   |  21<L>  |  2.87<H>    Ca    9.0      2020 13:30    TPro  8.1  /  Alb  2.2<L>  /  TBili  0.6  /  DBili  x   /  AST  26  /  ALT  43  /  AlkPhos  104                            9.8    45.21 )-----------( 455      ( 2020 13:30 )             29.6     Creatinine Trend: 2.87<--  Urinalysis Basic - ( 2020 13:22 )    Color: Yellow / Appearance: Slightly Turbid / S.010 / pH: x  Gluc: x / Ketone: Trace  / Bili: Negative / Urobili: Negative mg/dL   Blood: x / Protein: 100 mg/dL / Nitrite: Negative   Leuk Esterase: Negative / RBC: x / WBC x   Sq Epi: x / Non Sq Epi: x / Bacteria: x            Assessment   ELMER CKD 3; HTN; DM risk;   Pre renal azotemia; infectious GN;     Plan  IV abx, IVF;   Hold Metformin and ACE;   Discussed with patient and wife; agree to follow with me at discharge.    Augusto Santoyo MD

## 2020-01-08 NOTE — ED PROVIDER NOTE - CARE PLAN
Principal Discharge DX:	Cellulitis of left lower extremity Principal Discharge DX:	Cellulitis of left lower extremity  Secondary Diagnosis:	RSV infection

## 2020-01-08 NOTE — CHART NOTE - NSCHARTNOTEFT_GEN_A_CORE
Pt s/p foot debridement by podiatry service who ordered Stat MRI of extremity to further evaluate pt for necrotizing fasciitis. On-call MRI tech was paged who stated per protocol stat MRI id only obtained to r/o spinal cord compression or acute AP. RN supervisor spoke with  in radiology who also stated that pt's diagnosis is not deemed emergent for on-call tech to come in. MRI was ordered urgent by Podiatry, if test is deferred until AM discussion should be had with ordering provider. I discussed with RN who will page podiatry resident.

## 2020-01-08 NOTE — ED PROVIDER NOTE - CLINICAL SUMMARY MEDICAL DECISION MAKING FREE TEXT BOX
Ddx: cellulitis/ sepsis/ Viral uri/ flu/ PNA/ unlikely osteomyelitis given superficial appearance of wound  Plan: Cbc, cmp, lactate, blood cx, fluids, abx, flu swab, cxr, ankle xray, admit

## 2020-01-08 NOTE — ED PROVIDER NOTE - OBJECTIVE STATEMENT
Pt is a 70 yo gentleman with a pmhx of HTN, HL, NIDM, Afib on eliquis who presents to the ED with leg redness and fever. Has had chronic ulcer on L. medial leg for months, but over past couple days has gotten worse, and yesterday wife noted that his L. leg was red. Pt has not been on antibiotics, no chest pain. Has had a cough and uri symptoms last couple days as well. No abdominal pain, no n/v/d.

## 2020-01-08 NOTE — ED ADULT TRIAGE NOTE - CHIEF COMPLAINT QUOTE
patient BIBA c/o of difficulty breathing , fever cough started 3 weeks ago , patient  has an open wound L foot , denied chest pain denied abdominal pain at the time of triage

## 2020-01-08 NOTE — CONSULT NOTE ADULT - ASSESSMENT
70 yo male patient with left lower extremity necrotizing fasciitis   - Pt seen and evaluate in ED   - Febrile 100.4, Tachy HR 91, WBC 45  - Left medial malleolar wound to bone with hyperpigmented lesion and purulent drainage from wound with distal tracking along tarsal tunnel 5cm.  - Xrays of foot showing tracking gas localized to medial mall/ankle/rearfoot  - Ordered tibfib Xray for post op  - NPO since 6:30am  - Pt going to OR for emergent I&D and washout for necrotizing fasciitis with Dr. Collins Bustamante in the next hour  - Consent to be taken  - Discussed with attending 70 yo male patient with left lower extremity necrotizing fasciitis   - Pt seen and evaluate in ED   - Febrile 100.4, Tachy HR 91, WBC 45  - Left medial malleolar wound to bone with hyperpigmented lesion and purulent drainage from wound with distal tracking along tarsal tunnel 5cm.  - Xrays of foot showing tracking gas localized to medial mall/ankle/rearfoot  - Ordered tibfib Xray for post op  - NPO since 6:30am  - Pt added on to OR for emergent I&D and washout for necrotizing fasciitis with Dr. Collins Bustamante in the next hour  - Consented for procedure  - Type and screen ordered STAT  - Discussed with attending

## 2020-01-08 NOTE — BRIEF OPERATIVE NOTE - SPECIMENS
1. Left medial malleolus bone biopsy for micro and pathology 2. Left foot soft tissue culture for micro (3 total specimen)

## 2020-01-08 NOTE — CONSULT NOTE ADULT - SUBJECTIVE AND OBJECTIVE BOX
Podiatry pager #: [phone]/ [phone]    Patient is a 69y old  Male who presents with a chief complaint of left  foot ulcer (08 Jan 2020 17:04)      HPI:  Pt is a 68 yo gentleman with a pmhx of HTN, HL, NIDM, Afib on eliquis who presents to the ED with leg redness and fever. Has had chronic ulcer on L. medial leg for months, but over past couple days has gotten worse, and yesterday wife noted that his L. leg was red. Pt has not been on antibiotics, no chest pain. Has had a cough and uri symptoms last couple days as well. No abdominal pain, no nausea vomiting or diarrhea. (08 Jan 2020 17:04)    Pt last ate breakfast at 6:30am and states having drank small bottle of water in ED.      PAST MEDICAL & SURGICAL HISTORY:  Type 2 diabetes mellitus with other neurologic complication, without long-term current use of insulin  Hypertension, unspecified type      MEDICATIONS  (STANDING):  clindamycin IVPB      clindamycin IVPB 600 milliGRAM(s) IV Intermittent once  diltiazem    milliGRAM(s) Oral daily  lisinopril 40 milliGRAM(s) Oral daily  simvastatin 10 milliGRAM(s) Oral at bedtime  vancomycin  IVPB 1000 milliGRAM(s) IV Intermittent once    MEDICATIONS  (PRN):      Allergies    No Known Allergies    Intolerances        VITALS:    Vital Signs Last 24 Hrs  T(C): 38 (08 Jan 2020 16:26), Max: 38 (08 Jan 2020 16:26)  T(F): 100.4 (08 Jan 2020 16:26), Max: 100.4 (08 Jan 2020 16:26)  HR: 91 (08 Jan 2020 16:26) (91 - 100)  BP: 144/76 (08 Jan 2020 16:26) (144/76 - 155/78)  BP(mean): --  RR: 20 (08 Jan 2020 16:26) (19 - 20)  SpO2: 95% (08 Jan 2020 16:26) (95% - 96%)    LABS:                          9.8    45.21 )-----------( 455      ( 08 Jan 2020 13:30 )             29.6       01-08    127<L>  |  92<L>  |  62<H>  ----------------------------<  261<H>  4.1   |  21<L>  |  2.87<H>    Ca    9.0      08 Jan 2020 13:30    TPro  8.1  /  Alb  2.2<L>  /  TBili  0.6  /  DBili  x   /  AST  26  /  ALT  43  /  AlkPhos  104  01-08      CAPILLARY BLOOD GLUCOSE          PT/INR - ( 08 Jan 2020 13:30 )   PT: 17.8 sec;   INR: 1.57 ratio         PTT - ( 08 Jan 2020 13:30 )  PTT:35.7 sec    LOWER EXTREMITY PHYSICAL EXAM:    Vasular: DP/PT non palpable 2/2 edema, B/L, CFT delayed seconds B/L, Temperature gradient warm to hot LLE.   Neuro: Epicritic sensation absent to the level of ankle B/L.  Musculoskeletal/Ortho: edema of LLE to midleg  Skin: LLE cellulitis from foot to midtibia   Left medial malleolar ulcer with purulent drainage and hyperpigmented lesion suggesting necrotizing fasciitis. Wound tracking distally 5cm along tarsal tunnel      RADIOLOGY & ADDITIONAL STUDIES:    < from: Xray Ankle 2 Views, Left (01.08.20 @ 15:07) >    EXAM:  ANKLE AP _ LAT LT                            PROCEDURE DATE:  01/08/2020          INTERPRETATION:  LEFT ANKLE: AP, lateral, and oblique views    CLINICAL HISTORY: Left ankle swelling/infection.    COMPARISON: None Available    FINDINGS:    There is air/gas tracking within the medial soft tissues of the hindfoot.  Recommend further clinical correlation for penetrating ulcer.    No focal osteolysis is noted.    The ankle mortise appears intact.    There is plantar calcaneal enthesopathy.    Impression:    Findings as discussed above.  If there is a clinical suspicion for an active osteomyelitis, recommend further evaluation with MRI if there are no clinical contraindications.                MICKY SALDAÑA M.D., ATTENDING RADIOLOGIST  This document has been electronically signed. Jan 8 2020  3:24PM        < end of copied text >

## 2020-01-09 ENCOUNTER — TRANSCRIPTION ENCOUNTER (OUTPATIENT)
Age: 70
End: 2020-01-09

## 2020-01-09 DIAGNOSIS — L03.116 CELLULITIS OF LEFT LOWER LIMB: ICD-10-CM

## 2020-01-09 LAB
ANION GAP SERPL CALC-SCNC: 12 MMOL/L — SIGNIFICANT CHANGE UP (ref 5–17)
APPEARANCE UR: CLEAR — SIGNIFICANT CHANGE UP
ASO AB SER QL: 206 IU/ML — HIGH (ref 0–199)
BACTERIA # UR AUTO: ABNORMAL
BASOPHILS # BLD AUTO: 0.08 K/UL — SIGNIFICANT CHANGE UP (ref 0–0.2)
BASOPHILS NFR BLD AUTO: 0.2 % — SIGNIFICANT CHANGE UP (ref 0–2)
BILIRUB UR-MCNC: NEGATIVE — SIGNIFICANT CHANGE UP
BUN SERPL-MCNC: 61 MG/DL — HIGH (ref 7–23)
C3 SERPL-MCNC: 104 MG/DL — SIGNIFICANT CHANGE UP (ref 81–157)
C4 SERPL-MCNC: 17 MG/DL — SIGNIFICANT CHANGE UP (ref 13–39)
CALCIUM SERPL-MCNC: 8.7 MG/DL — SIGNIFICANT CHANGE UP (ref 8.5–10.1)
CHLORIDE SERPL-SCNC: 98 MMOL/L — SIGNIFICANT CHANGE UP (ref 96–108)
CK SERPL-CCNC: 240 U/L — SIGNIFICANT CHANGE UP (ref 26–308)
CO2 SERPL-SCNC: 22 MMOL/L — SIGNIFICANT CHANGE UP (ref 22–31)
COLOR SPEC: YELLOW — SIGNIFICANT CHANGE UP
CREAT SERPL-MCNC: 2.5 MG/DL — HIGH (ref 0.5–1.3)
CRP SERPL-MCNC: 48.59 MG/DL — HIGH (ref 0–0.4)
CULTURE RESULTS: NO GROWTH — SIGNIFICANT CHANGE UP
DIFF PNL FLD: ABNORMAL
EOSINOPHIL # BLD AUTO: 0.03 K/UL — SIGNIFICANT CHANGE UP (ref 0–0.5)
EOSINOPHIL NFR BLD AUTO: 0.1 % — SIGNIFICANT CHANGE UP (ref 0–6)
EPI CELLS # UR: SIGNIFICANT CHANGE UP
GLUCOSE BLDC GLUCOMTR-MCNC: 261 MG/DL — HIGH (ref 70–99)
GLUCOSE SERPL-MCNC: 250 MG/DL — HIGH (ref 70–99)
GLUCOSE UR QL: 1000 MG/DL
GRAM STN FLD: SIGNIFICANT CHANGE UP
GRAM STN FLD: SIGNIFICANT CHANGE UP
GRAN CASTS # UR COMP ASSIST: ABNORMAL /LPF
HCT VFR BLD CALC: 27.2 % — LOW (ref 39–50)
HCV AB S/CO SERPL IA: 0.17 S/CO — SIGNIFICANT CHANGE UP (ref 0–0.99)
HCV AB SERPL-IMP: SIGNIFICANT CHANGE UP
HGB BLD-MCNC: 8.8 G/DL — LOW (ref 13–17)
IMM GRANULOCYTES NFR BLD AUTO: 3.1 % — HIGH (ref 0–1.5)
KETONES UR-MCNC: NEGATIVE — SIGNIFICANT CHANGE UP
LEUKOCYTE ESTERASE UR-ACNC: NEGATIVE — SIGNIFICANT CHANGE UP
LYMPHOCYTES # BLD AUTO: 0.79 K/UL — LOW (ref 1–3.3)
LYMPHOCYTES # BLD AUTO: 2.1 % — LOW (ref 13–44)
MCHC RBC-ENTMCNC: 27.5 PG — SIGNIFICANT CHANGE UP (ref 27–34)
MCHC RBC-ENTMCNC: 32.4 GM/DL — SIGNIFICANT CHANGE UP (ref 32–36)
MCV RBC AUTO: 85 FL — SIGNIFICANT CHANGE UP (ref 80–100)
MONOCYTES # BLD AUTO: 2.2 K/UL — HIGH (ref 0–0.9)
MONOCYTES NFR BLD AUTO: 5.7 % — SIGNIFICANT CHANGE UP (ref 2–14)
NEUTROPHILS # BLD AUTO: 34 K/UL — HIGH (ref 1.8–7.4)
NEUTROPHILS NFR BLD AUTO: 88.8 % — HIGH (ref 43–77)
NITRITE UR-MCNC: NEGATIVE — SIGNIFICANT CHANGE UP
NRBC # BLD: 0 /100 WBCS — SIGNIFICANT CHANGE UP (ref 0–0)
PH UR: 5 — SIGNIFICANT CHANGE UP (ref 5–8)
PLATELET # BLD AUTO: 439 K/UL — HIGH (ref 150–400)
POTASSIUM SERPL-MCNC: 3.6 MMOL/L — SIGNIFICANT CHANGE UP (ref 3.5–5.3)
POTASSIUM SERPL-SCNC: 3.6 MMOL/L — SIGNIFICANT CHANGE UP (ref 3.5–5.3)
PROT UR-MCNC: 100 MG/DL
RBC # BLD: 3.2 M/UL — LOW (ref 4.2–5.8)
RBC # FLD: 13.8 % — SIGNIFICANT CHANGE UP (ref 10.3–14.5)
RBC CASTS # UR COMP ASSIST: ABNORMAL /HPF (ref 0–4)
SODIUM SERPL-SCNC: 132 MMOL/L — LOW (ref 135–145)
SP GR SPEC: 1.01 — SIGNIFICANT CHANGE UP (ref 1.01–1.02)
SPECIMEN SOURCE: SIGNIFICANT CHANGE UP
UROBILINOGEN FLD QL: NEGATIVE MG/DL — SIGNIFICANT CHANGE UP
WBC # BLD: 38.3 K/UL — HIGH (ref 3.8–10.5)
WBC # FLD AUTO: 38.3 K/UL — HIGH (ref 3.8–10.5)
WBC UR QL: SIGNIFICANT CHANGE UP

## 2020-01-09 PROCEDURE — 99221 1ST HOSP IP/OBS SF/LOW 40: CPT

## 2020-01-09 PROCEDURE — 76770 US EXAM ABDO BACK WALL COMP: CPT | Mod: 26

## 2020-01-09 PROCEDURE — 99232 SBSQ HOSP IP/OBS MODERATE 35: CPT

## 2020-01-09 PROCEDURE — 73721 MRI JNT OF LWR EXTRE W/O DYE: CPT | Mod: 26,LT

## 2020-01-09 PROCEDURE — 76775 US EXAM ABDO BACK WALL LIM: CPT | Mod: 26

## 2020-01-09 RX ORDER — INSULIN LISPRO 100/ML
VIAL (ML) SUBCUTANEOUS AT BEDTIME
Refills: 0 | Status: DISCONTINUED | OUTPATIENT
Start: 2020-01-09 | End: 2020-01-10

## 2020-01-09 RX ORDER — DEXTROSE 50 % IN WATER 50 %
15 SYRINGE (ML) INTRAVENOUS ONCE
Refills: 0 | Status: DISCONTINUED | OUTPATIENT
Start: 2020-01-09 | End: 2020-01-10

## 2020-01-09 RX ORDER — GLUCAGON INJECTION, SOLUTION 0.5 MG/.1ML
1 INJECTION, SOLUTION SUBCUTANEOUS ONCE
Refills: 0 | Status: DISCONTINUED | OUTPATIENT
Start: 2020-01-09 | End: 2020-01-10

## 2020-01-09 RX ORDER — INSULIN LISPRO 100/ML
VIAL (ML) SUBCUTANEOUS
Refills: 0 | Status: DISCONTINUED | OUTPATIENT
Start: 2020-01-09 | End: 2020-01-10

## 2020-01-09 RX ORDER — MEROPENEM 1 G/30ML
1000 INJECTION INTRAVENOUS EVERY 12 HOURS
Refills: 0 | Status: DISCONTINUED | OUTPATIENT
Start: 2020-01-09 | End: 2020-01-10

## 2020-01-09 RX ORDER — SODIUM CHLORIDE 9 MG/ML
1000 INJECTION, SOLUTION INTRAVENOUS
Refills: 0 | Status: DISCONTINUED | OUTPATIENT
Start: 2020-01-09 | End: 2020-01-10

## 2020-01-09 RX ORDER — PIPERACILLIN AND TAZOBACTAM 4; .5 G/20ML; G/20ML
3.38 INJECTION, POWDER, LYOPHILIZED, FOR SOLUTION INTRAVENOUS ONCE
Refills: 0 | Status: COMPLETED | OUTPATIENT
Start: 2020-01-09 | End: 2020-01-09

## 2020-01-09 RX ORDER — VANCOMYCIN HCL 1 G
1000 VIAL (EA) INTRAVENOUS ONCE
Refills: 0 | Status: DISCONTINUED | OUTPATIENT
Start: 2020-01-09 | End: 2020-01-09

## 2020-01-09 RX ORDER — DEXTROSE 50 % IN WATER 50 %
25 SYRINGE (ML) INTRAVENOUS ONCE
Refills: 0 | Status: DISCONTINUED | OUTPATIENT
Start: 2020-01-09 | End: 2020-01-10

## 2020-01-09 RX ORDER — DEXTROSE 50 % IN WATER 50 %
12.5 SYRINGE (ML) INTRAVENOUS ONCE
Refills: 0 | Status: DISCONTINUED | OUTPATIENT
Start: 2020-01-09 | End: 2020-01-10

## 2020-01-09 RX ORDER — ACETAMINOPHEN 500 MG
650 TABLET ORAL EVERY 6 HOURS
Refills: 0 | Status: DISCONTINUED | OUTPATIENT
Start: 2020-01-09 | End: 2020-01-10

## 2020-01-09 RX ORDER — INSULIN GLARGINE 100 [IU]/ML
10 INJECTION, SOLUTION SUBCUTANEOUS AT BEDTIME
Refills: 0 | Status: DISCONTINUED | OUTPATIENT
Start: 2020-01-09 | End: 2020-01-10

## 2020-01-09 RX ORDER — HEPARIN SODIUM 5000 [USP'U]/ML
5000 INJECTION INTRAVENOUS; SUBCUTANEOUS EVERY 12 HOURS
Refills: 0 | Status: DISCONTINUED | OUTPATIENT
Start: 2020-01-09 | End: 2020-01-10

## 2020-01-09 RX ADMIN — MEROPENEM 100 MILLIGRAM(S): 1 INJECTION INTRAVENOUS at 17:48

## 2020-01-09 RX ADMIN — INSULIN GLARGINE 10 UNIT(S): 100 INJECTION, SOLUTION SUBCUTANEOUS at 21:19

## 2020-01-09 RX ADMIN — Medication 6: at 09:11

## 2020-01-09 RX ADMIN — OXYCODONE AND ACETAMINOPHEN 1 TABLET(S): 5; 325 TABLET ORAL at 06:44

## 2020-01-09 RX ADMIN — Medication 650 MILLIGRAM(S): at 01:51

## 2020-01-09 RX ADMIN — Medication 650 MILLIGRAM(S): at 02:21

## 2020-01-09 RX ADMIN — Medication 650 MILLIGRAM(S): at 13:00

## 2020-01-09 RX ADMIN — Medication 100 MILLIGRAM(S): at 21:19

## 2020-01-09 RX ADMIN — OXYCODONE AND ACETAMINOPHEN 1 TABLET(S): 5; 325 TABLET ORAL at 07:20

## 2020-01-09 RX ADMIN — Medication 650 MILLIGRAM(S): at 12:20

## 2020-01-09 RX ADMIN — HEPARIN SODIUM 5000 UNIT(S): 5000 INJECTION INTRAVENOUS; SUBCUTANEOUS at 17:47

## 2020-01-09 RX ADMIN — Medication 8: at 16:56

## 2020-01-09 RX ADMIN — Medication 240 MILLIGRAM(S): at 05:24

## 2020-01-09 RX ADMIN — Medication 2: at 21:20

## 2020-01-09 RX ADMIN — Medication 100 MILLIGRAM(S): at 15:12

## 2020-01-09 RX ADMIN — SODIUM CHLORIDE 75 MILLILITER(S): 9 INJECTION, SOLUTION INTRAVENOUS at 06:44

## 2020-01-09 RX ADMIN — SIMVASTATIN 10 MILLIGRAM(S): 20 TABLET, FILM COATED ORAL at 21:19

## 2020-01-09 RX ADMIN — PIPERACILLIN AND TAZOBACTAM 200 GRAM(S): 4; .5 INJECTION, POWDER, LYOPHILIZED, FOR SOLUTION INTRAVENOUS at 09:11

## 2020-01-09 RX ADMIN — Medication 6: at 12:23

## 2020-01-09 NOTE — CONSULT NOTE ADULT - SUBJECTIVE AND OBJECTIVE BOX
69 YEAR OLD MALE POD 1 LEFT LOWER EXTREMITY DEBRIDMENT FOR GAS GANGRENE  HEMODYNAMICALLY STABLE    DIABETES  C K D  PAROXYSMAL ATRIAL FIBRILLATION  NORMAL LVEF   NO ASHD  DJD  STATUS  POST LEFT THR  CHRONIC SCIATICA  OBESITY  HTN    ALL LABS/RADIOLOGY/MEDICATIONS REVIEWED;  ECG NORMAL SINUS RHYTHM RBBB OLD INFERIOR Q WAVES ; NO ISCHEMIC CHANGES NO NEW CHANGES    ALERT   NO JVD  BOUNDING RADIAL PULSE, REGULAR  REGULAR RATE_&_RHYTHM;NORMAL_S1&S2_NO_SIGNIFICANT_MURMURS,RUBS,OR_GALLOPS.   ABDOMEN SOFT, NONTENDER, NO DISTENSION   LEFT FOOT DRESSED    IMPRESSION:    GAS GANGRENE  ELMER   C K D  PAROXYSMAL ATRIAL FIBRILLATION   ON DILTIAZEM ; HOLDING ACE INHIBITOR   STABLE CARDIOVASCULAR STATUS; CONTINUING WITH PRESENT MANAGEMENT.   NO CV CONTRAINDICATION FOR RECURRENT NECCESSART SURGERY WITH ANESTHESIA AS INDICATED. PATIENT'S CARDIAC STATUS APPEARS STABLE/OPTIMAL.   DR ESPINOZA'S INPUT GREATLY APPRECIATED  FAMILY PRESENT AT BEDSIDE     LIBAN ROBERTS MD, FACC

## 2020-01-09 NOTE — CONSULT NOTE ADULT - SUBJECTIVE AND OBJECTIVE BOX
Chief Complaint:  Patient is a 69y old Male who presents with a chief complaint of left foot ulcer (2020 09:06)      HPI:  Pt is a 70 yo gentleman with a pmhx of HTN, HL, NIDM, Afib on eliquis who presents to the ED with leg redness and fever. Has had chronic ulcer on L. medial leg for months, but over past couple days has gotten worse, and yesterday wife noted that his L. leg was red. Pt has not been on antibiotics, no chest pain. Has had a cough and uri symptoms last couple days as well. No abdominal pain, no nausea vomiting or diarrhea. (2020 17:04)    Pt seen and examined with Dr. Quintero.  Pt is s/p I&D of left foot abscess yesterday with podiatry.    Pt reports first noticing left foot ulcer "a few months ago."  Pt believes he was seen at [facility] wound care before.  Denies pain to the site.      Denies numbness, tingling, fever, nausea, vomiting.      Temp of 100.6 recorded at 01:00 this AM.          PMH/PSH:PAST MEDICAL & SURGICAL HISTORY:  Type 2 diabetes mellitus with other neurologic complication, without long-term current use of insulin  Hypertension, unspecified type      Allergies:  No Known Allergies      Medications:  acetaminophen   Tablet .. 650 milliGRAM(s) Oral every 6 hours PRN  acetaminophen   Tablet .. 650 milliGRAM(s) Oral every 6 hours PRN  dextrose 40% Gel 15 Gram(s) Oral once PRN  dextrose 5% + sodium chloride 0.45%. 1000 milliLiter(s) IV Continuous <Continuous>  dextrose 5%. 1000 milliLiter(s) IV Continuous <Continuous>  dextrose 50% Injectable 12.5 Gram(s) IV Push once  dextrose 50% Injectable 25 Gram(s) IV Push once  dextrose 50% Injectable 25 Gram(s) IV Push once  diltiazem    milliGRAM(s) Oral daily  glucagon  Injectable 1 milliGRAM(s) IntraMuscular once PRN  HYDROmorphone  Injectable 0.5 milliGRAM(s) IV Push every 4 hours PRN  insulin lispro (HumaLOG) corrective regimen sliding scale   SubCutaneous three times a day before meals  insulin lispro (HumaLOG) corrective regimen sliding scale   SubCutaneous at bedtime  oxycodone    5 mG/acetaminophen 325 mG 1 Tablet(s) Oral every 4 hours PRN  simvastatin 10 milliGRAM(s) Oral at bedtime  vancomycin  IVPB 1000 milliGRAM(s) IV Intermittent once      REVIEW OF SYSTEMS:  All other review of systems is negative unless indicated above.    Relevant Family History:   FAMILY HISTORY:      Relevant Social History:  Denies ETOH or tobacco history    Physical Exam:    Vital Signs:  Vital Signs Last 24 Hrs  T(C): 37.2 (2020 09:00), Max: 38.1 (2020 01:30)  T(F): 98.9 (2020 09:00), Max: 100.6 (2020 01:30)  HR: 83 (2020 09:00) (83 - 100)  BP: 140/77 (2020 09:00) (135/75 - 168/70)  RR: 18 (2020 09:00) (18 - 20)  SpO2: 95% (2020 09:00) (95% - 96%)  Daily Height in cm: 182.88 (2020 22:00)    Daily Weight in k.5 (2020 05:00)    Constitutional: WDWN resting comfortably in bed; NAD  HEENT: NC/AT  Neck: supple; no JVD or thyromegaly  Respiratory: nonlabored respirations   Cardiac: RRR  Gastrointestinal: soft, NT/ND; no rebound or guarding; +BS   Extremities: no clubbing or cyanosis; no peripheral edema  MSK/Vascular: 10 cm x 6 cm ulceration over left medial malleolus with exposed ankle joint, +erythema from midtibia extending down to the foot  Skin: warm, dry and intact; no rashes, wounds, or scars  Neurologic: AAOx3; CNS grossly intact; no focal deficits no asterixis, no tremor, no encephalopathy    Laboratory:                          8.8    38.30 )-----------( 439      ( 2020 08:26 )             27.2     01-09    132<L>  |  98  |  61<H>  ----------------------------<  250<H>  3.6   |  22  |  2.50<H>    Ca    8.7      2020 08:26    TPro  8.1  /  Alb  2.2<L>  /  TBili  0.6  /  DBili  x   /  AST  26  /  ALT  43  /  AlkPhos  104  08    LIVER FUNCTIONS - ( 2020 13:30 )  Alb: 2.2 g/dL / Pro: 8.1 gm/dL / ALK PHOS: 104 U/L / ALT: 43 U/L / AST: 26 U/L / GGT: x           PT/INR - ( 2020 13:30 )   PT: 17.8 sec;   INR: 1.57 ratio         PTT - ( 2020 13:30 )  PTT:35.7 sec  Urinalysis Basic - ( 2020 13:22 )    Color: Yellow / Appearance: Slightly Turbid / S.010 / pH: x  Gluc: x / Ketone: Trace  / Bili: Negative / Urobili: Negative mg/dL   Blood: x / Protein: 100 mg/dL / Nitrite: Negative   Leuk Esterase: Negative / RBC: x / WBC x   Sq Epi: x / Non Sq Epi: x / Bacteria: x        Intake and Output    20 @ 07:01  -  20 @ 07:00  --------------------------------------------------------  IN: 125 mL / OUT: 350 mL / NET: -225 mL        Imaging:    < from: CT Lower Extremity No Cont, Left (20 @ 22:48) >  EXAM:  CT LWR EXT LT                            PROCEDURE DATE:  2020          INTERPRETATION:  CT LOWER EXTREMITY LEFT dated 2020 10:48 PM     INDICATION: Pain and swelling. Recent I&D for necrotizing fasciitis.    COMPARISON: Ankle radiographs of earlier the same date    TECHNIQUE: CT imaging of the left lower extremity from the distal femur through the foot was performed. The data was reformatted in the axial, coronal, and sagittal planes. Additionally, 3-D reformatted imaging was created at a separate workstation.    FINDINGS: Motion artifact degrades this exam.    OSSEOUS STRUCTURES: Detailed evaluation of the osseous structures is limited by moderate motion artifact at the level of the tibia.: There is no fracture. Mild erosive changes seen along the medial malleolus superficially. 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 Mild spurring at the first TMT. Spurring at the first MTP. No displaced fracture is noted. There are degenerative changes of the knee including moderate lateral and mild to moderate medial knee joint space narrowing with chondrocalcinosis moderate marginal spurring noted.  SYNOVIUM/ JOINT FLUID: No large joint effusion. There is a small ankle joint effusion containing several foci of gas. No knee joint effusion. Small popliteal cyst.  TENDONS: Tendons appear intact. Small amount of gas is seen at the insertion of the posterior tibialis tendon. Small gas tracking within the flexor hallucis tendon sheath.  MUSCLES: Moderate atrophy of the calf musculature. Mild to moderate atrophy of intrinsic musculature the foot with edema.  NEUROVASCULAR STRUCTURES: Scattered vascular calcifications.  SUBCUTANEOUS SOFT TISSUES: There is a skin defect with packing material over the medial malleolus measuring approximately 4 x 5.6 cm. Deep to the skin defect, there are several foci of gas tracking towards the forefoot and proximally into the ankle. Several foci of gas are seen plantar to the first metatarsal head There is no drainable fluid collection.    3-D reformatted imaging confirms these findings.    IMPRESSION:    1.  Skin wound over the medial malleolus with underlying soft tissue edema and gas some of which tracks to the medial forefoot and some of which tracks into the proximal ankle. Findings are concerning for cellulitis. Given recent I&D, the several foci of gas may be related to infection and or surgical change.  2.  Several foci of gas deep to the posterior tibialis tendon insertion may represent infection or recent surgical change.  3.  Several foci of gas and fluid within the tibiotalar joint may be of infectious or postsurgical change. If there is concern for septic arthritis, correlate with aspiration.  4.  Erosive change of the medial malleolus concerning for osteomyelitis given the overlying skin wound. 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  5.  Moderate degenerative changes of the knee with chondrocalcinosis.    Findings were discussed with Dr. Mendoza on 2020 9:19 AM  with read back.          VENESSA KUO M.D., ATTENDING RADIOLOGIST  This document has been electronically signed. 2020  9:20AM    < from: MR Ankle No Cont, Left (20 @ 01:22) >  EXAM:  MR ANKLE LT                            PROCEDURE DATE:  2020          INTERPRETATION:  MR ANKLE LEFT dated 2020 1:22 AM     INDICATION: Status post left ankle I&D (for necrotizing fasciitis/gas gangrene. Pain and swelling.    COMPARISON: Ankle radiographs dated 2020    TECHNIQUE: Multi-sequential, multiplanar MRI imaging of the left ankle and hindfoot was performed per standard protocol. Please note partial short axis imaging of the midfoot and forefoot was performed and STIR and T1 imaging.    FINDINGS:    BONE MARROW: There is hypointense T1 marrow signal with associated marrow edema within the medial malleolus and along the medial aspect of the talus. There is linear T1 signal hypointensity with edema within the anterior process of the calcaneus. Suspect a nonosseous coalition at the anterior process of the calcaneus with the navicula. There is a small plantar calcaneal spur.  SYNOVIUM/ JOINT FLUID: There is no joint effusion.  TENDONS: The tendons are intact. Nofull-thickness tendon tear or retraction is appreciated.  MUSCLES: There is moderate atrophy of the intrinsic musculature the foot with associated edema.  PLANTAR FASCIA: Mild chronic appearing thickening of the proximal central cord of the plantar fascia.  NEUROVASCULAR STRUCTURES: Preserved  SUBCUTANEOUS SOFT TISSUES: There is mild soft tissue edema surrounding the ankle and hindfoot. There is a moderate size skin defect along the medial ankle with overlying bandaging. The skin defect measures approximately 4.2 x 4.8 cm. There is no drainable fluid collection.    IMPRESSION:    1.  Skin defect along the medial ankle with associated circumferential ankle soft tissue swelling of findings are concerning for cellulitis. No drainable fluid collection.  2.  Patchy marrow edema and decreased T1 signal in the medial malleolus and along the medial talus. Given the overlying skin wound, findings are concerning for osteomyelitis.  3.  Mild edema within the anterior process of the calcaneus with somewhat linear hypointense T1 signal associated concerning for small nondisplaced fracture. Differential considerations include an additional focus of infection versus contusion and reactive change from nonosseous coalition at the anterior process of the calcaneus with the navicula.    < end of copied text >

## 2020-01-09 NOTE — CONSULT NOTE ADULT - ASSESSMENT
Impression: Pt is a 68 y/o male with a pmhx of HTN, HL, NIDM, Afib on eliquis presented to ED with c/o left lower leg redness and worsening ulcer.  CT and MRI revealing evidence concerning for cellulitis, osteomyelitis, with foci of gas either from infection vs post surgical changes.   No clinical signs concerning for necrotizing fascitis.  Ulceration with an open left ankle joint.     Plan  -Left ankle disarticulation tomorrow.  Pt will also most likely require BKA at a further date.  Risks and benefits of surgical intervention discussed at length with both patient and patient's wife at bedside  Both are in agreement.    -OR booked.  F/u preop labs.    -NPO after midnight.    -Obtain consent.   -Continue abx. Recommend ID consult.    -Continue close monitoring.  If patient clinically worsens, will require more emergent OR.    -Discussed with Dr. Quintero.

## 2020-01-09 NOTE — PROGRESS NOTE ADULT - SUBJECTIVE AND OBJECTIVE BOX
Subjective: complaining of fatigue.       MEDICATIONS  (STANDING):  dextrose 5% + sodium chloride 0.45%. 1000 milliLiter(s) (75 mL/Hr) IV Continuous <Continuous>  dextrose 5%. 1000 milliLiter(s) (50 mL/Hr) IV Continuous <Continuous>  dextrose 50% Injectable 12.5 Gram(s) IV Push once  dextrose 50% Injectable 25 Gram(s) IV Push once  dextrose 50% Injectable 25 Gram(s) IV Push once  diltiazem    milliGRAM(s) Oral daily  insulin lispro (HumaLOG) corrective regimen sliding scale   SubCutaneous three times a day before meals  insulin lispro (HumaLOG) corrective regimen sliding scale   SubCutaneous at bedtime  piperacillin/tazobactam IVPB. 3.375 Gram(s) IV Intermittent once  simvastatin 10 milliGRAM(s) Oral at bedtime  vancomycin  IVPB 1000 milliGRAM(s) IV Intermittent once    MEDICATIONS  (PRN):  acetaminophen   Tablet .. 650 milliGRAM(s) Oral every 6 hours PRN Mild Pain (1 - 3)  acetaminophen   Tablet .. 650 milliGRAM(s) Oral every 6 hours PRN Temp greater or equal to 38C (100.4F)  dextrose 40% Gel 15 Gram(s) Oral once PRN Blood Glucose LESS THAN 70 milliGRAM(s)/deciliter  glucagon  Injectable 1 milliGRAM(s) IntraMuscular once PRN Glucose LESS THAN 70 milligrams/deciliter  HYDROmorphone  Injectable 0.5 milliGRAM(s) IV Push every 4 hours PRN Severe Pain (7 - 10)  oxycodone    5 mG/acetaminophen 325 mG 1 Tablet(s) Oral every 4 hours PRN Moderate Pain (4 - 6)          T(C): 37.2 (20 @ 09:00), Max: 38.1 (20 @ 01:30)  HR: 83 (20 @ 09:00) (83 - 100)  BP: 140/77 (20 @ 09:00) (135/75 - 168/70)  RR: 18 (20 @ 09:00) (18 - 20)  SpO2: 95% (20 @ 09:00) (95% - 96%)  Wt(kg): --        I&O's Detail    2020 07: 07:00  --------------------------------------------------------  IN:    lactated ringers.: 75 mL    Solution: 50 mL  Total IN: 125 mL    OUT:    Voided: 350 mL  Total OUT: 350 mL    Total NET: -225 mL               PHYSICAL EXAM:    GENERAL: NAD  EYES: EOMI, PERRLA, conjunctiva and sclera clear  NECK: Supple, no inc in JVP  CHEST/LUNG: Clear  HEART: S1S2  ABDOMEN: Soft, Nontender, Nondistended; Bowel sounds present  EXTREMITIES:  no edema. L foot dressed.   NEURO: no asterixis      LABS:  CBC Full  -  ( 2020 08:26 )  WBC Count : 38.30 K/uL  RBC Count : 3.20 M/uL  Hemoglobin : 8.8 g/dL  Hematocrit : 27.2 %  Platelet Count - Automated : 439 K/uL  Mean Cell Volume : 85.0 fl  Mean Cell Hemoglobin : 27.5 pg  Mean Cell Hemoglobin Concentration : 32.4 gm/dL  Auto Neutrophil # : 34.00 K/uL  Auto Lymphocyte # : 0.79 K/uL  Auto Monocyte # : 2.20 K/uL  Auto Eosinophil # : 0.03 K/uL  Auto Basophil # : 0.08 K/uL  Auto Neutrophil % : 88.8 %  Auto Lymphocyte % : 2.1 %  Auto Monocyte % : 5.7 %  Auto Eosinophil % : 0.1 %  Auto Basophil % : 0.2 %        132<L>  |  98  |  61<H>  ----------------------------<  250<H>  3.6   |  22  |  2.50<H>    Ca    8.7      2020 08:26    TPro  8.1  /  Alb  2.2<L>  /  TBili  0.6  /  DBili  x   /  AST  26  /  ALT  43  /  AlkPhos  104  01-08    PT/INR - ( 2020 13:30 )   PT: 17.8 sec;   INR: 1.57 ratio         PTT - ( 2020 13:30 )  PTT:35.7 sec  Urinalysis Basic - ( 2020 13:22 )    Color: Yellow / Appearance: Slightly Turbid / S.010 / pH: x  Gluc: x / Ketone: Trace  / Bili: Negative / Urobili: Negative mg/dL   Blood: x / Protein: 100 mg/dL / Nitrite: Negative   Leuk Esterase: Negative / RBC: x / WBC x   Sq Epi: x / Non Sq Epi: x / Bacteria: x          Impression:  ELMER -- septic ATN vs infectious GN, AIN   L foot gas ganrene, s/p I&D  CKD 3; HTN; DM       Recommendations:  Gentle hypotonic IVFs   Hold Metformin and ACE-I  UA, CPK, Renal US

## 2020-01-09 NOTE — PROGRESS NOTE ADULT - PROBLEM SELECTOR PLAN 1
podiatry following, s/p left medial malleolus biopsy  vascular surgery following, plan for left ankle disarticulation tomorrow.   ID eval noted, IV merrem and clindamycin podiatry following, s/p left medial malleolus biopsy  vascular surgery following, plan for left ankle disarticulation tomorrow.   cardiac clearance noted.   ID eval noted, IV merrem and clindamycin

## 2020-01-09 NOTE — PROGRESS NOTE ADULT - PROBLEM SELECTOR PLAN 6
Eliquis ON HOLD for OR  cardizem for rate control.  d/w Cardiology > okay to hold AC   will start sq heparin for dvt ppx, will resume eliquis and d/c sq heparin once okay per vascular surgery after OR.

## 2020-01-09 NOTE — CONSULT NOTE ADULT - ATTENDING COMMENTS
I have seen and examined the patient and have discussed with the patient and the wife at the bedside. The viability and the salvage of the left foot and the leg is doubtful as the left ankle joint is open and has pus and the soft tissue does not appear to be viable around the left medial malleolus.   plan-- brian keep the pt NPO and pre op for tomorrow for possible left ankle disarticulation and guillotine amp of the left leg if needed urgently, if pt or the leg deteriorates and pt becomes septic. I believe eventually pt will need BKA
Agree with above  Extensive necrotizing soft tissue infection extending from medial aspect of left midfoot to posterior aspect of the medial malleolus with exposed necrotic medial malleolar bone along the course of posterior tibial tendon and medial aspect of calcaneus.  Subcutaneous gas noted on Xray correlating with clinical finding  Pt septic, high risk for limb loss  To OR for emergent I&D of LLE for source control  Plan discussed with pt and wife

## 2020-01-09 NOTE — CONSULT NOTE ADULT - SUBJECTIVE AND OBJECTIVE BOX
Infectious Diseases - Attending at Dr. Rodriguez    HPI:  Pt is a 70 yo gentleman with a pmhx of HTN, HL, NIDM, Afib on eliquis who presents to the ED with leg redness and fever. Has had chronic ulcer on L. medial leg for months, but over past couple days has gotten worse, and yesterday wife noted that his L. Leg was red. Pt has not been on antibiotics, no chest pain. Has had a cough and uri symptoms last couple days as well. No abdominal pain, no nausea vomiting or diarrhea. (2020 17:04)  Pt was found to have necrotizing fascitis /gas gangrene of foot and underwent debridement.   He is also positive for RSV .    PAST MEDICAL & SURGICAL HISTORY:  Type 2 diabetes mellitus with other neurologic complication, without long-term current use of insulin  Hypertension, unspecified type      Allergies    No Known Allergies    Intolerances        FAMILY HISTORY: no DM,HTN in mother ,father      SOCIAL HISTORY: non smoker    REVIEW OF SYSTEMS:    Constitutional: + fever,  fatigue  Eyes: No eye pain, visual disturbances, or discharge  ENT:  No difficulty hearing, tinnitus, vertigo; No sinus or throat pain  Neck: No pain or stiffness  Respiratory: + cough, no wheezing, chills or hemoptysis  Cardiovascular: No chest pain, palpitations, shortness of breath, dizziness or leg swelling  Gastrointestinal: No abdominal or epigastric pain. No nausea, vomiting or hematemesis; No diarrhea or constipation. No melena or hematochezia.  Genitourinary: No dysuria, frequency, hematuria or incontinence  Neurological: No headaches, memory loss, loss of strength, numbness or tremors  Skin: left foot wound +    MEDICATIONS  (STANDING):  clindamycin IVPB      clindamycin IVPB 900 milliGRAM(s) IV Intermittent every 8 hours  dextrose 5% + sodium chloride 0.45%. 1000 milliLiter(s) (75 mL/Hr) IV Continuous <Continuous>  dextrose 5%. 1000 milliLiter(s) (50 mL/Hr) IV Continuous <Continuous>  dextrose 50% Injectable 12.5 Gram(s) IV Push once  dextrose 50% Injectable 25 Gram(s) IV Push once  dextrose 50% Injectable 25 Gram(s) IV Push once  diltiazem    milliGRAM(s) Oral daily  heparin  Injectable 5000 Unit(s) SubCutaneous every 12 hours  insulin glargine Injectable (LANTUS) 10 Unit(s) SubCutaneous at bedtime  insulin lispro (HumaLOG) corrective regimen sliding scale   SubCutaneous three times a day before meals  insulin lispro (HumaLOG) corrective regimen sliding scale   SubCutaneous at bedtime  meropenem  IVPB 1000 milliGRAM(s) IV Intermittent every 12 hours  simvastatin 10 milliGRAM(s) Oral at bedtime    MEDICATIONS  (PRN):  acetaminophen   Tablet .. 650 milliGRAM(s) Oral every 6 hours PRN Mild Pain (1 - 3)  acetaminophen   Tablet .. 650 milliGRAM(s) Oral every 6 hours PRN Temp greater or equal to 38C (100.4F)  dextrose 40% Gel 15 Gram(s) Oral once PRN Blood Glucose LESS THAN 70 milliGRAM(s)/deciliter  glucagon  Injectable 1 milliGRAM(s) IntraMuscular once PRN Glucose LESS THAN 70 milligrams/deciliter  HYDROmorphone  Injectable 0.5 milliGRAM(s) IV Push every 4 hours PRN Severe Pain (7 - 10)  oxycodone    5 mG/acetaminophen 325 mG 1 Tablet(s) Oral every 4 hours PRN Moderate Pain (4 - 6)      Vital Signs Last 24 Hrs:  T(C): 37.9 (2020 13:00), Max: 38.3 (2020 11:14)  T(F): 100.3 (2020 13:00), Max: 100.9 (2020 11:14)  HR: 83 (2020 13:00) (83 - 95)  BP: 139/75 (2020 13:00) (135/75 - 168/70)  BP(mean): --  ABP: --  ABP(mean): --  RR: 18 (2020 13:00) (18 - 20)  SpO2: 95% (2020 13:00) (95% - 96%)  PHYSICAL EXAM:    Constitutional: NAD, well-groomed, well-developed  HEENT: PERRLA, EOMI, Normal Hearing, MMM  Neck: No LAD, No JVD  Back: Normal spine flexure, No CVA tenderness  Respiratory: CTAB/L  Cardiovascular: S1 and S2, RRR, no M/G/R  Gastrointestinal: BS+, soft, NT/ND  Extremities: No peripheral edema  Vascular: 2+ peripheral pulses  Neurological: A/O x 3, no focal deficits  Skin: left foot surgical dressing +      LABS:                          8.8    38.30 )-----------( 439      ( 2020 08:26 )             27.2         132<L>  |  98  |  61<H>  ----------------------------<  250<H>  3.6   |  22  |  2.50<H>    Ca    8.7      2020 08:26    TPro  8.1  /  Alb  2.2<L>  /  TBili  0.6  /  DBili  x   /  AST  26  /  ALT  43  /  AlkPhos  104  08    PT/INR - ( 2020 13:30 )   PT: 17.8 sec;   INR: 1.57 ratio         PTT - ( 2020 13:30 )  PTT:35.7 sec         Urinalysis Basic - ( 2020 13:04 )    Color: Yellow / Appearance: Clear / S.010 / pH: x  Gluc: x / Ketone: Negative  / Bili: Negative / Urobili: Negative mg/dL   Blood: x / Protein: 100 mg/dL / Nitrite: Negative   Leuk Esterase: Negative / RBC: 3-5 /HPF / WBC 3-5   Sq Epi: x / Non Sq Epi: Few / Bacteria: Moderate    RVP + RSV    MICROBIOLOGY:  RECENT CULTURES:   .Tissue left medial maleollus bone XXXX   No polymorphonuclear leukocytes seen per low power field  Few Gram Positive Cocci in Pairs and Chains seen per oil power field XXXX     .Tissue left foot soft tissue XXXX   No polymorphonuclear leukocytes seen per low power field  Numerous Gram Positive Cocci in Pairs and Chains seen per oil power field XXXX     .Urine Clean Catch (Midstream) XXXX XXXX   No growth     .Blood Blood-Peripheral XXXX XXXX   No growth to date.     .Blood Blood-Peripheral XXXX XXXX   No growth to date.          RADIOLOGY & ADDITIONAL STUDIES:    CT foot     IMPRESSION:    1.  Skin wound over the medial malleolus with underlying soft tissue edema and gas some of which tracks to the medial forefoot and some of which tracks into the proximal ankle. Findings are concerning for cellulitis. Given recent I&D, the several foci of gas may be related to infection and or surgical change.  2.  Several foci of gas deep to the posterior tibialis tendon insertion may represent infection or recent surgical change.  3.  Several foci of gas and fluid within the tibiotalar joint may be of infectious or postsurgical change. If there is concern for septic arthritis, correlate with aspiration.  4.  Erosive change of the medial malleolus concerning for osteomyelitis given the overlying skin wound. 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  5.  Moderate degenerative changes of the knee with chondrocalcinosis.      MRI    IMPRESSION:    1.  Skin defect along the medial ankle with associated circumferential ankle soft tissue swelling of findings are concerning for cellulitis. No drainable fluid collection.  2.  Patchy marrow edema and decreased T1 signal in the medial malleolus and along the medial talus. Given the overlying skin wound, findings are concerning for osteomyelitis.  3.  Mild edema within the anterior process of the calcaneus with somewhat linear hypointense T1 signal associated concerning for small nondisplaced fracture. Differential considerations include an additional focus of infection versus contusion and reactive change from nonosseous coalition at the anterior process of the calcaneus with the navicula.

## 2020-01-09 NOTE — CONSULT NOTE ADULT - ASSESSMENT
Pt is a 70 yo gentleman with a pmhx of HTN, HL, NIDM, Afib on eliquis who presents to the ED with leg redness and fever and cough . Has had chronic ulcer on L. medial leg for months, but over past couple days has gotten worse, and yesterday wife noted that his L. Leg was red. Seen  with   1.Acute gas gangrene of left foot with probable necrotizing fascitis and  osteomyelitis  of ankle : s/p Incision and drainage of abscess of left foot on 1/8. Planned for left ankle disarticulation tomorrow   seen by Vascular.  change antibiotics to vanco (by level ) received one dose today   meropenem ,add clindamycin for two days   f/u on OR c/s   f/u on blood c/s   wound care  2.Sepsis: sec to above   3.RSV+ URI : supportive care  4.Uncontrolled DM :check Hb A I C   tight glucose control  5.ELMER vs ELMER on CKD  antibiotics renally dosed  monitor cr

## 2020-01-09 NOTE — PROGRESS NOTE ADULT - SUBJECTIVE AND OBJECTIVE BOX
CHIEF COMPLAINT/INTERVAL HISTORY:    Patient is a 69y old  Male who presents with a chief complaint of left  foot ulcer (2020 12:17)      HPI:  Pt is a 68 yo gentleman with a pmhx of HTN, HL, NIDM, Afib on eliquis who presents to the ED with leg redness and fever. Has had chronic ulcer on L. medial leg for months, but over past couple days has gotten worse, and yesterday wife noted that his L. leg was red. Pt has not been on antibiotics, no chest pain. Has had a cough and uri symptoms last couple days as well. No abdominal pain, no nausea vomiting or diarrhea. (2020 17:04)      SUBJECTIVE & OBJECTIVE: Pt seen and examined at bedside.   offers no complaints.  Denies chest pain/SOB, nausea/vomiting/diarrhea, No cough, dizziness, HA or blurry vision, all other ROS negative.  + fever    Vital Signs Last 24 Hrs  T(C): 37.9 (2020 13:00), Max: 38.3 (2020 11:14)  T(F): 100.3 (2020 13:00), Max: 100.9 (2020 11:14)  HR: 83 (2020 13:00) (83 - 95)  BP: 139/75 (2020 13:00) (135/75 - 168/70)  BP(mean): --  ABP: --  ABP(mean): --  RR: 18 (2020 13:00) (18 - 20)  SpO2: 95% (2020 13:00) (95% - 96%)        MEDICATIONS  (STANDING):  clindamycin IVPB      clindamycin IVPB 900 milliGRAM(s) IV Intermittent every 8 hours  dextrose 5% + sodium chloride 0.45%. 1000 milliLiter(s) (75 mL/Hr) IV Continuous <Continuous>  dextrose 5%. 1000 milliLiter(s) (50 mL/Hr) IV Continuous <Continuous>  dextrose 50% Injectable 12.5 Gram(s) IV Push once  dextrose 50% Injectable 25 Gram(s) IV Push once  dextrose 50% Injectable 25 Gram(s) IV Push once  diltiazem    milliGRAM(s) Oral daily  insulin lispro (HumaLOG) corrective regimen sliding scale   SubCutaneous three times a day before meals  insulin lispro (HumaLOG) corrective regimen sliding scale   SubCutaneous at bedtime  meropenem  IVPB 1000 milliGRAM(s) IV Intermittent every 12 hours  simvastatin 10 milliGRAM(s) Oral at bedtime    MEDICATIONS  (PRN):  acetaminophen   Tablet .. 650 milliGRAM(s) Oral every 6 hours PRN Mild Pain (1 - 3)  acetaminophen   Tablet .. 650 milliGRAM(s) Oral every 6 hours PRN Temp greater or equal to 38C (100.4F)  dextrose 40% Gel 15 Gram(s) Oral once PRN Blood Glucose LESS THAN 70 milliGRAM(s)/deciliter  glucagon  Injectable 1 milliGRAM(s) IntraMuscular once PRN Glucose LESS THAN 70 milligrams/deciliter  HYDROmorphone  Injectable 0.5 milliGRAM(s) IV Push every 4 hours PRN Severe Pain (7 - 10)  oxycodone    5 mG/acetaminophen 325 mG 1 Tablet(s) Oral every 4 hours PRN Moderate Pain (4 - 6)        PHYSICAL EXAM:    GENERAL: NAD,  well-developed, obese  HEAD:  Atraumatic, Normocephalic  EYES: EOMI, PERRLA, conjunctiva and sclera clear  ENMT: Moist mucous membranes  NECK: Supple, No JVD  NERVOUS SYSTEM:  Alert & Oriented X3, normal speech  CHEST/LUNG: Clear to auscultation bilaterally; No rales, rhonchi, wheezing, or rubs  HEART: S1, S2  ABDOMEN: Soft, Nontender, Nondistended; Bowel sounds present  EXTREMITIES:  left foot dressing c/d/i      LABS:                        8.8    38.30 )-----------( 439      ( 2020 08:26 )             27.2     01-    132<L>  |  98  |  61<H>  ----------------------------<  250<H>  3.6   |  22  |  2.50<H>    Ca    8.7      2020 08:26    TPro  8.1  /  Alb  2.2<L>  /  TBili  0.6  /  DBili  x   /  AST  26  /  ALT  43  /  AlkPhos  104  01-08    PT/INR - ( 2020 13:30 )   PT: 17.8 sec;   INR: 1.57 ratio         PTT - ( 2020 13:30 )  PTT:35.7 sec  Urinalysis Basic - ( 2020 13:04 )    Color: Yellow / Appearance: Clear / S.010 / pH: x  Gluc: x / Ketone: Negative  / Bili: Negative / Urobili: Negative mg/dL   Blood: x / Protein: 100 mg/dL / Nitrite: Negative   Leuk Esterase: Negative / RBC: 3-5 /HPF / WBC 3-5   Sq Epi: x / Non Sq Epi: Few / Bacteria: Moderate        CAPILLARY BLOOD GLUCOSE      POCT Blood Glucose.: 300 mg/dL (2020 12:13)  POCT Blood Glucose.: 261 mg/dL (2020 08:37)

## 2020-01-09 NOTE — PROGRESS NOTE ADULT - ASSESSMENT
68 y/o M with PMH of HTN, HLD, a. fib on eliquis p/w LLE redness, fever, had chronic ulcer on LLE, pt admitted for cellulitis , possible OM, also with Ckd and leukemoid reaction.

## 2020-01-10 ENCOUNTER — RESULT REVIEW (OUTPATIENT)
Age: 70
End: 2020-01-10

## 2020-01-10 LAB
ANION GAP SERPL CALC-SCNC: 12 MMOL/L — SIGNIFICANT CHANGE UP (ref 5–17)
ANION GAP SERPL CALC-SCNC: 9 MMOL/L — SIGNIFICANT CHANGE UP (ref 5–17)
APTT BLD: 35.4 SEC — SIGNIFICANT CHANGE UP (ref 28.5–37)
BLD GP AB SCN SERPL QL: SIGNIFICANT CHANGE UP
BUN SERPL-MCNC: 63 MG/DL — HIGH (ref 7–23)
BUN SERPL-MCNC: 69 MG/DL — HIGH (ref 7–23)
CALCIUM SERPL-MCNC: 8.3 MG/DL — LOW (ref 8.5–10.1)
CALCIUM SERPL-MCNC: 8.7 MG/DL — SIGNIFICANT CHANGE UP (ref 8.5–10.1)
CHLORIDE SERPL-SCNC: 97 MMOL/L — SIGNIFICANT CHANGE UP (ref 96–108)
CHLORIDE SERPL-SCNC: 98 MMOL/L — SIGNIFICANT CHANGE UP (ref 96–108)
CO2 SERPL-SCNC: 23 MMOL/L — SIGNIFICANT CHANGE UP (ref 22–31)
CO2 SERPL-SCNC: 24 MMOL/L — SIGNIFICANT CHANGE UP (ref 22–31)
CREAT SERPL-MCNC: 2.7 MG/DL — HIGH (ref 0.5–1.3)
CREAT SERPL-MCNC: 2.83 MG/DL — HIGH (ref 0.5–1.3)
GLUCOSE SERPL-MCNC: 271 MG/DL — HIGH (ref 70–99)
GLUCOSE SERPL-MCNC: 309 MG/DL — HIGH (ref 70–99)
HBA1C BLD-MCNC: 8 % — HIGH (ref 4–5.6)
HCT VFR BLD CALC: 23.2 % — LOW (ref 39–50)
HCT VFR BLD CALC: 24.5 % — LOW (ref 39–50)
HGB BLD-MCNC: 7.7 G/DL — LOW (ref 13–17)
HGB BLD-MCNC: 8.1 G/DL — LOW (ref 13–17)
INR BLD: 1.31 RATIO — HIGH (ref 0.88–1.16)
MCHC RBC-ENTMCNC: 28.1 PG — SIGNIFICANT CHANGE UP (ref 27–34)
MCHC RBC-ENTMCNC: 28.2 PG — SIGNIFICANT CHANGE UP (ref 27–34)
MCHC RBC-ENTMCNC: 33.1 GM/DL — SIGNIFICANT CHANGE UP (ref 32–36)
MCHC RBC-ENTMCNC: 33.2 GM/DL — SIGNIFICANT CHANGE UP (ref 32–36)
MCV RBC AUTO: 85 FL — SIGNIFICANT CHANGE UP (ref 80–100)
MCV RBC AUTO: 85.1 FL — SIGNIFICANT CHANGE UP (ref 80–100)
NRBC # BLD: 0 /100 WBCS — SIGNIFICANT CHANGE UP (ref 0–0)
NRBC # BLD: 0 /100 WBCS — SIGNIFICANT CHANGE UP (ref 0–0)
PLATELET # BLD AUTO: 413 K/UL — HIGH (ref 150–400)
PLATELET # BLD AUTO: 415 K/UL — HIGH (ref 150–400)
POTASSIUM SERPL-MCNC: 3.5 MMOL/L — SIGNIFICANT CHANGE UP (ref 3.5–5.3)
POTASSIUM SERPL-MCNC: 3.6 MMOL/L — SIGNIFICANT CHANGE UP (ref 3.5–5.3)
POTASSIUM SERPL-SCNC: 3.5 MMOL/L — SIGNIFICANT CHANGE UP (ref 3.5–5.3)
POTASSIUM SERPL-SCNC: 3.6 MMOL/L — SIGNIFICANT CHANGE UP (ref 3.5–5.3)
PROTHROM AB SERPL-ACNC: 14.8 SEC — HIGH (ref 10–12.9)
RBC # BLD: 2.73 M/UL — LOW (ref 4.2–5.8)
RBC # BLD: 2.88 M/UL — LOW (ref 4.2–5.8)
RBC # FLD: 14.1 % — SIGNIFICANT CHANGE UP (ref 10.3–14.5)
RBC # FLD: 14.4 % — SIGNIFICANT CHANGE UP (ref 10.3–14.5)
SODIUM SERPL-SCNC: 130 MMOL/L — LOW (ref 135–145)
SODIUM SERPL-SCNC: 133 MMOL/L — LOW (ref 135–145)
VANCOMYCIN FLD-MCNC: 4.4 UG/ML — SIGNIFICANT CHANGE UP
WBC # BLD: 38.52 K/UL — HIGH (ref 3.8–10.5)
WBC # BLD: 41.18 K/UL — CRITICAL HIGH (ref 3.8–10.5)
WBC # FLD AUTO: 38.52 K/UL — HIGH (ref 3.8–10.5)
WBC # FLD AUTO: 41.18 K/UL — CRITICAL HIGH (ref 3.8–10.5)

## 2020-01-10 PROCEDURE — 99232 SBSQ HOSP IP/OBS MODERATE 35: CPT

## 2020-01-10 PROCEDURE — 99222 1ST HOSP IP/OBS MODERATE 55: CPT

## 2020-01-10 PROCEDURE — 99233 SBSQ HOSP IP/OBS HIGH 50: CPT

## 2020-01-10 RX ORDER — OXYCODONE AND ACETAMINOPHEN 5; 325 MG/1; MG/1
1 TABLET ORAL EVERY 4 HOURS
Refills: 0 | Status: DISCONTINUED | OUTPATIENT
Start: 2020-01-10 | End: 2020-01-17

## 2020-01-10 RX ORDER — HEPARIN SODIUM 5000 [USP'U]/ML
5000 INJECTION INTRAVENOUS; SUBCUTANEOUS EVERY 12 HOURS
Refills: 0 | Status: DISCONTINUED | OUTPATIENT
Start: 2020-01-10 | End: 2020-01-13

## 2020-01-10 RX ORDER — MORPHINE SULFATE 50 MG/1
2 CAPSULE, EXTENDED RELEASE ORAL EVERY 4 HOURS
Refills: 0 | Status: DISCONTINUED | OUTPATIENT
Start: 2020-01-10 | End: 2020-01-17

## 2020-01-10 RX ORDER — SODIUM CHLORIDE 9 MG/ML
1000 INJECTION INTRAMUSCULAR; INTRAVENOUS; SUBCUTANEOUS
Refills: 0 | Status: DISCONTINUED | OUTPATIENT
Start: 2020-01-10 | End: 2020-01-14

## 2020-01-10 RX ORDER — INSULIN LISPRO 100/ML
VIAL (ML) SUBCUTANEOUS
Refills: 0 | Status: DISCONTINUED | OUTPATIENT
Start: 2020-01-10 | End: 2020-01-17

## 2020-01-10 RX ORDER — VANCOMYCIN HCL 1 G
1000 VIAL (EA) INTRAVENOUS ONCE
Refills: 0 | Status: COMPLETED | OUTPATIENT
Start: 2020-01-10 | End: 2020-01-10

## 2020-01-10 RX ORDER — INSULIN GLARGINE 100 [IU]/ML
10 INJECTION, SOLUTION SUBCUTANEOUS AT BEDTIME
Refills: 0 | Status: DISCONTINUED | OUTPATIENT
Start: 2020-01-10 | End: 2020-01-14

## 2020-01-10 RX ORDER — INSULIN LISPRO 100/ML
VIAL (ML) SUBCUTANEOUS AT BEDTIME
Refills: 0 | Status: DISCONTINUED | OUTPATIENT
Start: 2020-01-10 | End: 2020-01-17

## 2020-01-10 RX ORDER — GLUCAGON INJECTION, SOLUTION 0.5 MG/.1ML
1 INJECTION, SOLUTION SUBCUTANEOUS ONCE
Refills: 0 | Status: DISCONTINUED | OUTPATIENT
Start: 2020-01-10 | End: 2020-01-17

## 2020-01-10 RX ORDER — ACETAMINOPHEN 500 MG
1000 TABLET ORAL ONCE
Refills: 0 | Status: COMPLETED | OUTPATIENT
Start: 2020-01-10 | End: 2020-01-10

## 2020-01-10 RX ORDER — MORPHINE SULFATE 50 MG/1
2 CAPSULE, EXTENDED RELEASE ORAL EVERY 4 HOURS
Refills: 0 | Status: DISCONTINUED | OUTPATIENT
Start: 2020-01-10 | End: 2020-01-10

## 2020-01-10 RX ORDER — DEXTROSE 50 % IN WATER 50 %
25 SYRINGE (ML) INTRAVENOUS ONCE
Refills: 0 | Status: DISCONTINUED | OUTPATIENT
Start: 2020-01-10 | End: 2020-01-17

## 2020-01-10 RX ORDER — VANCOMYCIN HCL 1 G
1000 VIAL (EA) INTRAVENOUS ONCE
Refills: 0 | Status: DISCONTINUED | OUTPATIENT
Start: 2020-01-10 | End: 2020-01-10

## 2020-01-10 RX ORDER — ACETAMINOPHEN 500 MG
650 TABLET ORAL EVERY 6 HOURS
Refills: 0 | Status: DISCONTINUED | OUTPATIENT
Start: 2020-01-10 | End: 2020-01-17

## 2020-01-10 RX ORDER — SODIUM CHLORIDE 9 MG/ML
1000 INJECTION INTRAMUSCULAR; INTRAVENOUS; SUBCUTANEOUS
Refills: 0 | Status: DISCONTINUED | OUTPATIENT
Start: 2020-01-10 | End: 2020-01-10

## 2020-01-10 RX ORDER — HYDROMORPHONE HYDROCHLORIDE 2 MG/ML
1 INJECTION INTRAMUSCULAR; INTRAVENOUS; SUBCUTANEOUS EVERY 4 HOURS
Refills: 0 | Status: DISCONTINUED | OUTPATIENT
Start: 2020-01-10 | End: 2020-01-10

## 2020-01-10 RX ORDER — FENTANYL CITRATE 50 UG/ML
25 INJECTION INTRAVENOUS
Refills: 0 | Status: DISCONTINUED | OUTPATIENT
Start: 2020-01-10 | End: 2020-01-10

## 2020-01-10 RX ORDER — DEXTROSE 50 % IN WATER 50 %
12.5 SYRINGE (ML) INTRAVENOUS ONCE
Refills: 0 | Status: DISCONTINUED | OUTPATIENT
Start: 2020-01-10 | End: 2020-01-17

## 2020-01-10 RX ORDER — DEXTROSE 50 % IN WATER 50 %
15 SYRINGE (ML) INTRAVENOUS ONCE
Refills: 0 | Status: DISCONTINUED | OUTPATIENT
Start: 2020-01-10 | End: 2020-01-17

## 2020-01-10 RX ORDER — FENTANYL CITRATE 50 UG/ML
50 INJECTION INTRAVENOUS
Refills: 0 | Status: DISCONTINUED | OUTPATIENT
Start: 2020-01-10 | End: 2020-01-10

## 2020-01-10 RX ORDER — SODIUM CHLORIDE 9 MG/ML
1000 INJECTION, SOLUTION INTRAVENOUS
Refills: 0 | Status: DISCONTINUED | OUTPATIENT
Start: 2020-01-10 | End: 2020-01-10

## 2020-01-10 RX ORDER — MEROPENEM 1 G/30ML
500 INJECTION INTRAVENOUS EVERY 12 HOURS
Refills: 0 | Status: DISCONTINUED | OUTPATIENT
Start: 2020-01-10 | End: 2020-01-13

## 2020-01-10 RX ADMIN — Medication 100 MILLIGRAM(S): at 05:59

## 2020-01-10 RX ADMIN — SODIUM CHLORIDE 100 MILLILITER(S): 9 INJECTION, SOLUTION INTRAVENOUS at 17:38

## 2020-01-10 RX ADMIN — INSULIN GLARGINE 10 UNIT(S): 100 INJECTION, SOLUTION SUBCUTANEOUS at 21:42

## 2020-01-10 RX ADMIN — FENTANYL CITRATE 50 MICROGRAM(S): 50 INJECTION INTRAVENOUS at 17:58

## 2020-01-10 RX ADMIN — OXYCODONE AND ACETAMINOPHEN 1 TABLET(S): 5; 325 TABLET ORAL at 01:31

## 2020-01-10 RX ADMIN — OXYCODONE AND ACETAMINOPHEN 1 TABLET(S): 5; 325 TABLET ORAL at 23:35

## 2020-01-10 RX ADMIN — Medication 240 MILLIGRAM(S): at 06:00

## 2020-01-10 RX ADMIN — HEPARIN SODIUM 5000 UNIT(S): 5000 INJECTION INTRAVENOUS; SUBCUTANEOUS at 06:00

## 2020-01-10 RX ADMIN — OXYCODONE AND ACETAMINOPHEN 1 TABLET(S): 5; 325 TABLET ORAL at 02:31

## 2020-01-10 RX ADMIN — Medication 4: at 11:08

## 2020-01-10 RX ADMIN — MEROPENEM 100 MILLIGRAM(S): 1 INJECTION INTRAVENOUS at 21:40

## 2020-01-10 RX ADMIN — SODIUM CHLORIDE 100 MILLILITER(S): 9 INJECTION INTRAMUSCULAR; INTRAVENOUS; SUBCUTANEOUS at 15:36

## 2020-01-10 RX ADMIN — Medication 400 MILLIGRAM(S): at 17:20

## 2020-01-10 RX ADMIN — Medication 100 MILLIGRAM(S): at 22:36

## 2020-01-10 RX ADMIN — MORPHINE SULFATE 2 MILLIGRAM(S): 50 CAPSULE, EXTENDED RELEASE ORAL at 19:43

## 2020-01-10 RX ADMIN — Medication 100 MILLIGRAM(S): at 21:40

## 2020-01-10 RX ADMIN — HYDROMORPHONE HYDROCHLORIDE 0.5 MILLIGRAM(S): 2 INJECTION INTRAMUSCULAR; INTRAVENOUS; SUBCUTANEOUS at 03:40

## 2020-01-10 RX ADMIN — Medication 100 MILLIGRAM(S): at 13:53

## 2020-01-10 RX ADMIN — FENTANYL CITRATE 50 MICROGRAM(S): 50 INJECTION INTRAVENOUS at 17:20

## 2020-01-10 RX ADMIN — Medication 8: at 07:37

## 2020-01-10 RX ADMIN — HYDROMORPHONE HYDROCHLORIDE 0.5 MILLIGRAM(S): 2 INJECTION INTRAMUSCULAR; INTRAVENOUS; SUBCUTANEOUS at 03:25

## 2020-01-10 RX ADMIN — Medication 4: at 21:42

## 2020-01-10 RX ADMIN — MORPHINE SULFATE 2 MILLIGRAM(S): 50 CAPSULE, EXTENDED RELEASE ORAL at 20:00

## 2020-01-10 RX ADMIN — MEROPENEM 100 MILLIGRAM(S): 1 INJECTION INTRAVENOUS at 05:59

## 2020-01-10 RX ADMIN — Medication 1000 MILLIGRAM(S): at 17:47

## 2020-01-10 RX ADMIN — OXYCODONE AND ACETAMINOPHEN 1 TABLET(S): 5; 325 TABLET ORAL at 22:35

## 2020-01-10 RX ADMIN — Medication 250 MILLIGRAM(S): at 17:47

## 2020-01-10 NOTE — BRIEF OPERATIVE NOTE - COMMENTS
Stage procedure for source control  High concern for residual soft tissue infection, left medial malleolar osteomyelitis and viability  Guarded prognosis for LLE viability - requiring and requesting Vasc and ID consult
Evaluated by ICU in PACU.  Per Dr. Dickerson, patient is hemodynamically stable and not a candidate for ICU.

## 2020-01-10 NOTE — PROGRESS NOTE ADULT - SUBJECTIVE AND OBJECTIVE BOX
CHIEF COMPLAINT/INTERVAL HISTORY:    Patient is a 69y old  Male who presents with a chief complaint of left  foot ulcer (10 Yann 2020 08:53)      HPI:  Pt is a 70 yo gentleman with a pmhx of HTN, HL, NIDM, Afib on eliquis who presents to the ED with leg redness and fever. Has had chronic ulcer on L. medial leg for months, but over past couple days has gotten worse, and yesterday wife noted that his L. leg was red. Pt has not been on antibiotics, no chest pain. Has had a cough and uri symptoms last couple days as well. No abdominal pain, no nausea vomiting or diarrhea. (2020 17:04)      SUBJECTIVE & OBJECTIVE: Pt seen and examined at bedside.   denies any pain.  Denies chest pain/SOB, nausea/vomiting/diarrhea, No cough, dizziness, HA or blurry vision, all other ROS negative.  NPO for OR today    Vital Signs Last 24 Hrs  T(C): 36.7 (10 Yann 2020 05:12), Max: 38.3 (2020 11:14)  T(F): 98 (10 Yann 2020 05:12), Max: 100.9 (2020 11:14)  HR: 82 (10 Yann 2020 05:12) (82 - 87)  BP: 129/66 (10 Yann 2020 05:12) (129/66 - 180/84)  BP(mean): --  ABP: --  ABP(mean): --  RR: 20 (10 Yann 2020 05:12) (18 - 20)  SpO2: 95% (10 Yann 2020 05:12) (95% - 98%)        MEDICATIONS  (STANDING):  clindamycin IVPB      clindamycin IVPB 900 milliGRAM(s) IV Intermittent every 8 hours  dextrose 5% + sodium chloride 0.45%. 1000 milliLiter(s) (75 mL/Hr) IV Continuous <Continuous>  dextrose 5%. 1000 milliLiter(s) (50 mL/Hr) IV Continuous <Continuous>  dextrose 50% Injectable 12.5 Gram(s) IV Push once  dextrose 50% Injectable 25 Gram(s) IV Push once  dextrose 50% Injectable 25 Gram(s) IV Push once  diltiazem    milliGRAM(s) Oral daily  heparin  Injectable 5000 Unit(s) SubCutaneous every 12 hours  insulin glargine Injectable (LANTUS) 10 Unit(s) SubCutaneous at bedtime  insulin lispro (HumaLOG) corrective regimen sliding scale   SubCutaneous three times a day before meals  insulin lispro (HumaLOG) corrective regimen sliding scale   SubCutaneous at bedtime  meropenem  IVPB 1000 milliGRAM(s) IV Intermittent every 12 hours  simvastatin 10 milliGRAM(s) Oral at bedtime    MEDICATIONS  (PRN):  acetaminophen   Tablet .. 650 milliGRAM(s) Oral every 6 hours PRN Mild Pain (1 - 3)  acetaminophen   Tablet .. 650 milliGRAM(s) Oral every 6 hours PRN Temp greater or equal to 38C (100.4F)  dextrose 40% Gel 15 Gram(s) Oral once PRN Blood Glucose LESS THAN 70 milliGRAM(s)/deciliter  glucagon  Injectable 1 milliGRAM(s) IntraMuscular once PRN Glucose LESS THAN 70 milligrams/deciliter  HYDROmorphone  Injectable 0.5 milliGRAM(s) IV Push every 4 hours PRN Severe Pain (7 - 10)  oxycodone    5 mG/acetaminophen 325 mG 1 Tablet(s) Oral every 4 hours PRN Moderate Pain (4 - 6)        PHYSICAL EXAM:      GENERAL: NAD,  well-developed, obese  HEAD:  Atraumatic, Normocephalic  EYES: EOMI, PERRLA, conjunctiva and sclera clear  ENMT: Moist mucous membranes  NECK: Supple, No JVD  NERVOUS SYSTEM:  Alert & Oriented X3, normal speech  CHEST/LUNG: Clear to auscultation bilaterally; No rales, rhonchi, wheezing, or rubs  HEART: S1, S2  ABDOMEN: Soft, Nontender, Nondistended; Bowel sounds present  EXTREMITIES:  left ankle dressing c/d/i      LABS:                        8.1    41.18 )-----------( 415      ( 10 Yann 2020 08:35 )             24.5     01-10    130<L>  |  97  |  63<H>  ----------------------------<  271<H>  3.5   |  24  |  2.70<H>    Ca    8.7      10 Yann 2020 08:35    TPro  8.1  /  Alb  2.2<L>  /  TBili  0.6  /  DBili  x   /  AST  26  /  ALT  43  /  AlkPhos  104  01-08    PT/INR - ( 10 Yann 2020 08:35 )   PT: 14.8 sec;   INR: 1.31 ratio         PTT - ( 10 Yann 2020 08:35 )  PTT:35.4 sec  Urinalysis Basic - ( 2020 13:04 )    Color: Yellow / Appearance: Clear / S.010 / pH: x  Gluc: x / Ketone: Negative  / Bili: Negative / Urobili: Negative mg/dL   Blood: x / Protein: 100 mg/dL / Nitrite: Negative   Leuk Esterase: Negative / RBC: 3-5 /HPF / WBC 3-5   Sq Epi: x / Non Sq Epi: Few / Bacteria: Moderate        CAPILLARY BLOOD GLUCOSE      POCT Blood Glucose.: 301 mg/dL (10 Yann 2020 07:32)  POCT Blood Glucose.: 291 mg/dL (2020 21:18)  POCT Blood Glucose.: 306 mg/dL (2020 16:32)  POCT Blood Glucose.: 300 mg/dL (2020 12:13)    < from: MR Ankle No Cont, Left (20 @ 01:22) >  IMPRESSION:    1.  Skin defect along the medial ankle with associated circumferential ankle soft tissue swelling of findings are concerning for cellulitis. No drainable fluid collection.  2.  Patchy marrow edema and decreased T1 signal in the medial malleolus and along the medial talus. Given the overlying skin wound, findings are concerning for osteomyelitis.  3.  Mild edema within the anterior process of the calcaneus with somewhat linear hypointense T1 signal associated concerning for small nondisplaced fracture. Differential considerations include an additional focus of infection versus contusion and reactive change from nonosseous coalition at the anterior process of the calcaneus with the navicula.                VENESSA KUO M.D., ATTENDING RADIOLOGIST  This document has been electronically signed. 2020  8:59AM    < end of copied text >      < from: CT Lower Extremity No Cont, Left (20 @ 22:48) >  IMPRESSION:  1.  Skin wound over the medial malleolus with underlying soft tissue edema and gas some of which tracks to the medial forefoot and some of which tracks into the proximal ankle. Findings are concerning for cellulitis. Given recent I&D, the several foci of gas may be related to infection and or surgical change.  2.  Several foci of gas deep to the posterior tibialis tendon insertion may represent infection or recent surgical change.  3.  Several foci of gas and fluid within the tibiotalar joint may be of infectious or postsurgical change. If there is concern for septic arthritis, correlate with aspiration.  4.  Erosive change of the medial malleolus concerning for osteomyelitis given the overlying skin wound. 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  5.  Moderate degenerative changes of the knee with chondrocalcinosis.    Findings were discussed with Dr. Mendoza on 2020 9:19 AM  with read back.            VENESSA KUO M.D., ATTENDING RADIOLOGIST  This document has been electronically signed. 2020  9:20AM        < end of copied text >

## 2020-01-10 NOTE — CONSULT NOTE ADULT - ASSESSMENT
68 y/o M w/DM admitted for LLE cellulitis and osteomyelitis taken to OR today for amputation of L foot. Patient now post-op, tolerated surgery well. Extubated without issue. No intraoperative or postoperative hypotension.    - No need for ICU level of care at this time. Please recall with questions/concerns  - Postoperative care as per surgical team  - Continue antibiotics

## 2020-01-10 NOTE — PROGRESS NOTE ADULT - ASSESSMENT
70 y/o M with PMH of HTN, HLD, a. fib on eliquis p/w LLE redness, fever, had chronic ulcer on LLE, pt admitted for cellulitis , possible OM, also with Ckd and leukemoid reaction/sepsis.

## 2020-01-10 NOTE — PROGRESS NOTE ADULT - SUBJECTIVE AND OBJECTIVE BOX
Pre-operative Note    - Pre-operative Diagnosis:  left foot gangrene    - Procedure: left ankle disarticulation     - Labs:                        8.1    x     )-----------( 415      ( 10 Yann 2020 08:35 )             24.5     01-09    132<L>  |  98  |  61<H>  ----------------------------<  250<H>  3.6   |  22  |  2.50<H>    Ca    8.7      09 Jan 2020 08:26    TPro  8.1  /  Alb  2.2<L>  /  TBili  0.6  /  DBili  x   /  AST  26  /  ALT  43  /  AlkPhos  104  01-08    PT/INR - ( 10 Yann 2020 08:35 )   PT: 14.8 sec;   INR: 1.31 ratio         PTT - ( 10 Yann 2020 08:35 )  PTT:35.4 sec  Type & Screen #1:  ordered    - EKG: completed 1/8/20    - Blood: 2uprbc on hold for OR.      - Orders:  > NPO at midnight  > Perioperative antibiotics (Clindamycin, Meropenem).  > Morning Labs: CBC, BMP, coags, type & screen    - Permits:  > Consent to be obtained.    > Case scheduled with OR.

## 2020-01-10 NOTE — PROGRESS NOTE ADULT - ASSESSMENT
1-10-20 i saw th ept today and the foot is worse, more blisters, odor, prt was confused in night, The foot is not viable and the pt is getting septic, WBC is 44k.  patient should have urgent left leg guillotine amputation. i have discussed with the patient and the wife at the bedside. This can be followed by BKA and may need AKA depending on the clinical course.

## 2020-01-10 NOTE — PROGRESS NOTE ADULT - SUBJECTIVE AND OBJECTIVE BOX
[facility] NEPHROLOGY SERVICES, [facility]  NEPHROLOGY AND HYPERTENSION  [address]  [address]  [place] [address]  [phone]    MD YISEL WEEKS MD ANDREY GONCHARUK, MD MADHU KORRAPATI, MD YELENA ROSENBERG, MD BINNY KOSHY, MD CHRISTOPHER CAPUTO, MD EDWARD BOVER, MD          Patient events noted  For OR today;     MEDICATIONS  (STANDING):  clindamycin IVPB      clindamycin IVPB 900 milliGRAM(s) IV Intermittent every 8 hours  dextrose 5% + sodium chloride 0.45%. 1000 milliLiter(s) (75 mL/Hr) IV Continuous <Continuous>  dextrose 5%. 1000 milliLiter(s) (50 mL/Hr) IV Continuous <Continuous>  dextrose 50% Injectable 12.5 Gram(s) IV Push once  dextrose 50% Injectable 25 Gram(s) IV Push once  dextrose 50% Injectable 25 Gram(s) IV Push once  diltiazem    milliGRAM(s) Oral daily  heparin  Injectable 5000 Unit(s) SubCutaneous every 12 hours  insulin glargine Injectable (LANTUS) 10 Unit(s) SubCutaneous at bedtime  insulin lispro (HumaLOG) corrective regimen sliding scale   SubCutaneous three times a day before meals  insulin lispro (HumaLOG) corrective regimen sliding scale   SubCutaneous at bedtime  meropenem  IVPB 1000 milliGRAM(s) IV Intermittent every 12 hours  simvastatin 10 milliGRAM(s) Oral at bedtime  vancomycin  IVPB 1000 milliGRAM(s) IV Intermittent once    MEDICATIONS  (PRN):  acetaminophen   Tablet .. 650 milliGRAM(s) Oral every 6 hours PRN Mild Pain (1 - 3)  acetaminophen   Tablet .. 650 milliGRAM(s) Oral every 6 hours PRN Temp greater or equal to 38C (100.4F)  dextrose 40% Gel 15 Gram(s) Oral once PRN Blood Glucose LESS THAN 70 milliGRAM(s)/deciliter  glucagon  Injectable 1 milliGRAM(s) IntraMuscular once PRN Glucose LESS THAN 70 milligrams/deciliter  HYDROmorphone  Injectable 0.5 milliGRAM(s) IV Push every 4 hours PRN Severe Pain (7 - 10)  oxycodone    5 mG/acetaminophen 325 mG 1 Tablet(s) Oral every 4 hours PRN Moderate Pain (4 - 6)      01-09-20 @ 07:01  -  01-10-20 @ 07:00  --------------------------------------------------------  IN: 1290 mL / OUT: 300 mL / NET: 990 mL      PHYSICAL EXAM:      T(C): 37.6 (01-10-20 @ 12:47), Max: 37.7 (01-10-20 @ 00:40)  HR: 83 (01-10-20 @ 12:47) (82 - 87)  BP: 152/78 (01-10-20 @ 12:47) (129/66 - 180/84)  RR: 18 (01-10-20 @ 12:47) (18 - 20)  SpO2: 93% (01-10-20 @ 12:47) (93% - 98%)  Wt(kg): --  Respiratory: clear anteriorly, decreased BS at bases  Cardiovascular: S1 S2  Gastrointestinal: soft NT ND +BS  Extremities:  LLE dressed edema                                    8.1    41.18 )-----------( 415      ( 10 Yann 2020 08:35 )             24.5     01-10    130<L>  |  97  |  63<H>  ----------------------------<  271<H>  3.5   |  24  |  2.70<H>    Ca    8.7      10 Yann 2020 08:35      Antistreptolysin O Screen: 206 IU/mL (01.09.20 @ 10:18)    C3 Complement, Serum: 104 mg/dL (01.09.20 @ 10:49)        Creatinine Trend: 2.70<--, 2.50<--, 2.53<--, 2.87<--    Assessment   ELMER CKD 3; HTN; DM risk;   Pre renal azotemia; infectious GN;   Hyponatremia; NPO, hypotonic IVF    Plan  IV abx, IVF NS;   Hold Metformin and ACE;   Discussed with patient and wife;   Will follow course.    Augusto Santoyo MD

## 2020-01-10 NOTE — PROGRESS NOTE ADULT - SUBJECTIVE AND OBJECTIVE BOX
IN PACU  STATUS  POST AMPUTATION  HEMODYNAMICALLY STABLE  NORMAL SINUS RHYTHM; NO DYSRHYTHMIA  SUPPORTIVE CARE

## 2020-01-10 NOTE — PROGRESS NOTE ADULT - ASSESSMENT
Pt is a 68 yo gentleman with a pmhx of HTN, HL, NIDM, Afib on eliquis who presents to the ED with leg redness and fever and cough . Has had chronic ulcer on L. medial leg for months, but over past couple days has gotten worse, and yesterday wife noted that his L. Leg was red. Seen  with   1.Acute gas gangrene of left foot with probable necrotizing fascitis and  osteomyelitis  of ankle : s/p Incision and drainage of abscess of left foot on 1/8. Planned for left ankle disarticulation today  seen by Vascular.  vanco /clinda/meropem  f/u on OR c/s so far grp B strept  f/u on blood c/s so far neg  wound care  2.Sepsis: sec to above   leukocytosis worseing  awaiintg surgery  3.RSV+ URI : supportive care  4.Uncontrolled DM :check Hb A I C   tight glucose control  5.ELMER vs ELMER on CKD  antibiotics renally dosed  monitor cr

## 2020-01-10 NOTE — CONSULT NOTE ADULT - SUBJECTIVE AND OBJECTIVE BOX
CHIEF COMPLAINT: L foot ulcer    HPI: 70 y/o M w/DM admitted for LLE cellulits and osteomyelitis taken to OR today for amputation of L foot. Patient now post-op, tolerated surgery well. Extubated without issue. No intraoperative or postoperative hypotension. Currently reports sever back pain, otherwise feels ok. No chest pain or dyspnea. No dizziness, nausea, emesis, or diarrhea.     PAST MEDICAL & SURGICAL HISTORY:  Type 2 diabetes mellitus with other neurologic complication, without long-term current use of insulin  Hypertension, unspecified type      FAMILY HISTORY:  No significant family history.    SOCIAL HISTORY:  Unknown    Allergies    No Known Allergies    Intolerances        HOME MEDICATIONS:  Home Medications:  amLODIPine 5 mg oral tablet: 1 tab(s) orally once a day (2020 17:41)  benazepril 40 mg oral tablet: 1 tab(s) orally once a day (2020 17:41)  chlorthalidone 25 mg oral tablet: 1 tab(s) orally once a day (2020 17:40)  dilTIAZem 240 mg/24 hours oral capsule, extended release: 1 cap(s) orally once a day (2020 17:45)  Eliquis 5 mg oral tablet: 1 tab(s) orally 2 times a day (2020 17:42)  glimepiride 4 mg oral tablet: 1 tab(s) orally once a day (2020 17:46)  Invokana 300 mg oral tablet: 1 tab(s) orally once a day (2020 17:42)  metFORMIN 500 mg oral tablet: 1 tab(s) orally 2 times a day (2020 17:40)  simvastatin 20 mg oral tablet: 1 tab(s) orally once a day (at bedtime) (2020 17:41)      REVIEW OF SYSTEMS:  See above. ROS otherwise negative.    OBJECTIVE:  ICU Vital Signs Last 24 Hrs  T(C): 36.9 (10 Yann 2020 17:53), Max: 37.7 (10 Yann 2020 00:40)  T(F): 98.4 (10 Yann 2020 17:53), Max: 99.9 (10 Yann 2020 00:40)  HR: 79 (10 Yann 2020 17:53) (74 - 87)  BP: 129/57 (10 Yann 2020 17:53) (128/74 - 158/78)  BP(mean): --  ABP: --  ABP(mean): --  RR: 19 (10 Yann 2020 17:53) (15 - 22)  SpO2: 99% (10 Yann 2020 17:53) (93% - 99%)         @ 07:01  -  01-10 @ 07:00  --------------------------------------------------------  IN: 1290 mL / OUT: 300 mL / NET: 990 mL    01-10 @ 07:01  -  01-10 @ 18:23  --------------------------------------------------------  IN: 150 mL / OUT: 0 mL / NET: 150 mL      CAPILLARY BLOOD GLUCOSE      POCT Blood Glucose.: 246 mg/dL (10 Yann 2020 17:16)      PHYSICAL EXAM:  General: Adult male lying in bed, uncomfortable, NAD  HEENT: NC/AT sclerae anicteric  Neck: Supple  Respiratory: No increased WOB, CTAB  Cardiovascular: S1, S2  Abdomen: Soft, + BS  Extremities: WWP, L post operative wound dressed  Neurological: Awake, alert, follows commands    HOSPITAL MEDICATIONS:  Standing Meds:  clindamycin IVPB 900 milliGRAM(s) IV Intermittent every 8 hours  dextrose 5% + sodium chloride 0.45%. 1000 milliLiter(s) IV Continuous <Continuous>  dextrose 50% Injectable 12.5 Gram(s) IV Push once  dextrose 50% Injectable 25 Gram(s) IV Push once  dextrose 50% Injectable 25 Gram(s) IV Push once  heparin  Injectable 5000 Unit(s) SubCutaneous every 12 hours  insulin glargine Injectable (LANTUS) 10 Unit(s) SubCutaneous at bedtime  insulin lispro (HumaLOG) corrective regimen sliding scale   SubCutaneous three times a day before meals  insulin lispro (HumaLOG) corrective regimen sliding scale   SubCutaneous at bedtime  lactated ringers. 1000 milliLiter(s) IV Continuous <Continuous>  meropenem  IVPB 500 milliGRAM(s) IV Intermittent every 12 hours      PRN Meds:  acetaminophen   Tablet .. 650 milliGRAM(s) Oral every 6 hours PRN  dextrose 40% Gel 15 Gram(s) Oral once PRN  fentaNYL    Injectable 25 MICROGram(s) IV Push every 5 minutes PRN  fentaNYL    Injectable 50 MICROGram(s) IV Push every 5 minutes PRN  glucagon  Injectable 1 milliGRAM(s) IntraMuscular once PRN  morphine  - Injectable 2 milliGRAM(s) IV Push every 4 hours PRN  oxycodone    5 mG/acetaminophen 325 mG 1 Tablet(s) Oral every 4 hours PRN      LABS:                        8.1    41.18 )-----------( 415      ( 10 Yann 2020 08:35 )             24.5     Hgb Trend: 8.1<--, 8.8<--, 9.3<--, 9.8<--  01-10    130<L>  |  97  |  63<H>  ----------------------------<  271<H>  3.5   |  24  |  2.70<H>    Ca    8.7      10 Yann 2020 08:35      Creatinine Trend: 2.70<--, 2.50<--, 2.53<--, 2.87<--  PT/INR - ( 10 Yann 2020 08:35 )   PT: 14.8 sec;   INR: 1.31 ratio         PTT - ( 10 Yann 2020 08:35 )  PTT:35.4 sec  Urinalysis Basic - ( 2020 13:04 )    Color: Yellow / Appearance: Clear / S.010 / pH: x  Gluc: x / Ketone: Negative  / Bili: Negative / Urobili: Negative mg/dL   Blood: x / Protein: 100 mg/dL / Nitrite: Negative   Leuk Esterase: Negative / RBC: 3-5 /HPF / WBC 3-5   Sq Epi: x / Non Sq Epi: Few / Bacteria: Moderate            MICROBIOLOGY:     Culture - Tissue with Gram Stain (collected 2020 10:13)  Source: .Tissue left medial maledus bone  Gram Stain (prelim) (10 Yann 2020 12:16):    No polymorphonuclear leukocytes seen per low power field    Few Gram Positive Cocci in Pairs and Chains seen per oil power field  Preliminary Report (10 Yann 2020 12:16):    Numerous Streptococcus agalactiae (Group B)    Streptococcus agalactiae (Group B) isolated    Group B streptococci are susceptible to ampicillin,    penicillin and cefazolin, but may be resistant to    erythromycin and clindamycin.    Recommendations for intrapartum prophylaxis for Group B    streptococci are penicillin or ampicillin.    Culture - Tissue with Gram Stain (collected 2020 10:12)  Source: .Tissue left foot soft tissue  Gram Stain (prelim) (10 Yann 2020 12:17):    No polymorphonuclear leukocytes seen per low power field    Numerous Gram Positive Cocci in Pairs and Chains seen per oil power field  Preliminary Report (10 Yann 2020 12:17):    Numerous Streptococcus agalactiae (Group B)    Streptococcus agalactiae (Group B) isolated    Group B streptococci are susceptible to ampicillin,    penicillin and cefazolin, but may be resistant to    erythromycin and clindamycin.    Recommendations for intrapartum prophylaxis for Group B    streptococci are penicillin or ampicillin.    Culture - Urine (collected 2020 18:07)  Source: .Urine Clean Catch (Midstream)  Final Report (2020 16:24):    No growth    Culture - Blood (collected 2020 18:05)  Source: .Blood Blood-Peripheral  Preliminary Report (2020 19:02):    No growth to date.    Culture - Blood (collected 2020 17:59)  Source: .Blood Blood-Peripheral  Preliminary Report (2020 18:02):    No growth to date.        RADIOLOGY:  [ ] Reviewed and interpreted by me    PULMONARY FUNCTION TESTS:    EKG:

## 2020-01-10 NOTE — PROGRESS NOTE ADULT - PROBLEM SELECTOR PLAN 1
podiatry following, s/p left medial malleolus biopsy on 1/8  wbc uptrending.  vascular surgery following, plan for left ankle disarticulation today as pt need to have foot amputated to control infection.   cardiac clearance noted.   Pt. optimized for ankle disarticulation with moderate to elevated risk, per vascular pt need to have foot amputated to control infection and to prevent further worsening of clinical condition.   ID following,  IV merrem and clindamycin podiatry following, s/p left medial malleolus biopsy on 1/8  wbc uptrending.  vascular surgery following, plan for left ankle disarticulation today as pt need to have foot amputated to control infection.   cardiac clearance noted.   Pt. optimized for ankle disarticulation with moderate to elevated risk, per vascular pt need to have foot amputated to control infection and to prevent further worsening of clinical condition.   ID following,  IV merrem and clindamycin  spoke to icu attending as pt need post op icu care per Dr. Quintero. per ICU they don;'t have any bed at this time and will evaluate the pt in PACU post operatively for need for icu care.

## 2020-01-10 NOTE — PROGRESS NOTE ADULT - SUBJECTIVE AND OBJECTIVE BOX
Patient is a 69y old  Male who presents with a chief complaint of left  foot ulcer (12 Jan 2020 11:17)      INTERVAL HPI / OVERNIGHT EVENTS: doing ok ,fever+,cough +     MEDICATIONS  (STANDING):  clindamycin IVPB 900 milliGRAM(s) IV Intermittent every 8 hours  dextrose 50% Injectable 12.5 Gram(s) IV Push once  dextrose 50% Injectable 25 Gram(s) IV Push once  dextrose 50% Injectable 25 Gram(s) IV Push once  diltiazem    milliGRAM(s) Oral daily  heparin  Injectable 5000 Unit(s) SubCutaneous every 12 hours  insulin glargine Injectable (LANTUS) 10 Unit(s) SubCutaneous at bedtime  insulin lispro (HumaLOG) corrective regimen sliding scale   SubCutaneous three times a day before meals  insulin lispro (HumaLOG) corrective regimen sliding scale   SubCutaneous at bedtime  lidocaine   Patch 1 Patch Transdermal every 24 hours  meropenem  IVPB 500 milliGRAM(s) IV Intermittent every 12 hours  sodium chloride 0.9%. 1000 milliLiter(s) (100 mL/Hr) IV Continuous <Continuous>    MEDICATIONS  (PRN):  acetaminophen   Tablet .. 650 milliGRAM(s) Oral every 6 hours PRN Temp greater or equal to 38C (100.4F), Mild Pain (1 - 3)  ALBUTerol   0.042% 1.25 milliGRAM(s) Nebulizer four times a day PRN Wheezing  dextrose 40% Gel 15 Gram(s) Oral once PRN Blood Glucose LESS THAN 70 milliGRAM(s)/deciliter  glucagon  Injectable 1 milliGRAM(s) IntraMuscular once PRN Glucose LESS THAN 70 milligrams/deciliter  guaiFENesin   Syrup  (Sugar-Free) 100 milliGRAM(s) Oral every 6 hours PRN Cough  morphine  - Injectable 2 milliGRAM(s) IV Push every 4 hours PRN Severe Pain (7 - 10)  oxycodone    5 mG/acetaminophen 325 mG 1 Tablet(s) Oral every 4 hours PRN Moderate Pain (4 - 6)  sodium chloride 0.65% Nasal 1 Spray(s) Both Nostrils three times a day PRN Nasal Congestion      Vital Signs Last 24 Hrs  Tmax 100.2    Review of systems:  General :+ fever /chills,fatigue  CVS : no chest pain, palpitations  Lungs : no shortness of breath,+ cough  GI : no abdominal pain,vomiting, diarrhea   : no dysuria,hematuria        PHYSICAL EXAM:  General :NAD  Constitutional:  well-groomed, well-developed  Respiratory: CTAB/L  Cardiovascular: S1 S2 +  GI : NT,ND ,BS+  ext : left /ankleleg wound +      WBC 44    ASO 21) elevated        MICROBIOLOGY:  RECENT CULTURES:    01-09 .Tissue left medial maledus bone XXXX   No polymorphonuclear leukocytes seen per low power field  Few Gram Positive Cocci in Pairs and Chains seen per oil power field   Numerous Streptococcus agalactiae (Group B)  Streptococcus agalactiae (Group B) isolated  Group B streptococci are susceptible to ampicillin,  penicillin and cefazolin, but may be resistant to  erythromycin and clindamycin.  Recommendations for intrapartum prophylaxis for Group B  streptococci are penicillin or ampicillin.    01-09 .Tissue left foot soft tissue XXXX   No polymorphonuclear leukocytes seen per low power field  Numerous Gram Positive Cocci in Pairs and Chains seen per oil power field   Numerous Streptococcus agalactiae (Group B)  Streptococcus agalactiae (Group B) isolated  Group B streptococci are susceptible to ampicillin,  penicillin and cefazolin, but may be resistant to  erythromycin and clindamycin.  Recommendations for intrapartum prophylaxis for Group B  streptococci are penicillin or ampicillin.    01-08 .Urine Clean Catch (Midstream) XXXX XXXX   No growth    01-08 .Blood Blood-Peripheral XXXX XXXX   No growth to date.    .          RADIOLOGY & ADDITIONAL STUDIES:

## 2020-01-11 DIAGNOSIS — M25.552 PAIN IN LEFT HIP: ICD-10-CM

## 2020-01-11 DIAGNOSIS — Z29.9 ENCOUNTER FOR PROPHYLACTIC MEASURES, UNSPECIFIED: ICD-10-CM

## 2020-01-11 LAB
ANION GAP SERPL CALC-SCNC: 11 MMOL/L — SIGNIFICANT CHANGE UP (ref 5–17)
BUN SERPL-MCNC: 69 MG/DL — HIGH (ref 7–23)
CALCIUM SERPL-MCNC: 8.4 MG/DL — LOW (ref 8.5–10.1)
CHLORIDE SERPL-SCNC: 99 MMOL/L — SIGNIFICANT CHANGE UP (ref 96–108)
CO2 SERPL-SCNC: 24 MMOL/L — SIGNIFICANT CHANGE UP (ref 22–31)
CREAT SERPL-MCNC: 2.79 MG/DL — HIGH (ref 0.5–1.3)
GLUCOSE BLDC GLUCOMTR-MCNC: 170 MG/DL — HIGH (ref 70–99)
GLUCOSE BLDC GLUCOMTR-MCNC: 191 MG/DL — HIGH (ref 70–99)
GLUCOSE BLDC GLUCOMTR-MCNC: 235 MG/DL — HIGH (ref 70–99)
GLUCOSE SERPL-MCNC: 266 MG/DL — HIGH (ref 70–99)
GP B STREP DNA BLD POS QL NAA+NON-PROBE: SIGNIFICANT CHANGE UP
HCT VFR BLD CALC: 23.6 % — LOW (ref 39–50)
HGB BLD-MCNC: 7.7 G/DL — LOW (ref 13–17)
MCHC RBC-ENTMCNC: 27.9 PG — SIGNIFICANT CHANGE UP (ref 27–34)
MCHC RBC-ENTMCNC: 32.6 GM/DL — SIGNIFICANT CHANGE UP (ref 32–36)
MCV RBC AUTO: 85.5 FL — SIGNIFICANT CHANGE UP (ref 80–100)
METHOD TYPE: SIGNIFICANT CHANGE UP
NRBC # BLD: 0 /100 WBCS — SIGNIFICANT CHANGE UP (ref 0–0)
PLATELET # BLD AUTO: 450 K/UL — HIGH (ref 150–400)
POTASSIUM SERPL-MCNC: 3.3 MMOL/L — LOW (ref 3.5–5.3)
POTASSIUM SERPL-SCNC: 3.3 MMOL/L — LOW (ref 3.5–5.3)
RBC # BLD: 2.76 M/UL — LOW (ref 4.2–5.8)
RBC # FLD: 14.4 % — SIGNIFICANT CHANGE UP (ref 10.3–14.5)
SODIUM SERPL-SCNC: 134 MMOL/L — LOW (ref 135–145)
WBC # BLD: 36.64 K/UL — HIGH (ref 3.8–10.5)
WBC # FLD AUTO: 36.64 K/UL — HIGH (ref 3.8–10.5)

## 2020-01-11 PROCEDURE — 99232 SBSQ HOSP IP/OBS MODERATE 35: CPT

## 2020-01-11 RX ORDER — DILTIAZEM HCL 120 MG
120 CAPSULE, EXT RELEASE 24 HR ORAL DAILY
Refills: 0 | Status: DISCONTINUED | OUTPATIENT
Start: 2020-01-11 | End: 2020-01-12

## 2020-01-11 RX ORDER — LIDOCAINE 4 G/100G
1 CREAM TOPICAL EVERY 24 HOURS
Refills: 0 | Status: DISCONTINUED | OUTPATIENT
Start: 2020-01-11 | End: 2020-01-17

## 2020-01-11 RX ORDER — POTASSIUM CHLORIDE 20 MEQ
40 PACKET (EA) ORAL EVERY 4 HOURS
Refills: 0 | Status: DISCONTINUED | OUTPATIENT
Start: 2020-01-11 | End: 2020-01-11

## 2020-01-11 RX ORDER — SODIUM CHLORIDE 0.65 %
1 AEROSOL, SPRAY (ML) NASAL THREE TIMES A DAY
Refills: 0 | Status: DISCONTINUED | OUTPATIENT
Start: 2020-01-11 | End: 2020-01-17

## 2020-01-11 RX ORDER — POTASSIUM CHLORIDE 20 MEQ
40 PACKET (EA) ORAL EVERY 4 HOURS
Refills: 0 | Status: COMPLETED | OUTPATIENT
Start: 2020-01-11 | End: 2020-01-11

## 2020-01-11 RX ADMIN — MORPHINE SULFATE 2 MILLIGRAM(S): 50 CAPSULE, EXTENDED RELEASE ORAL at 20:40

## 2020-01-11 RX ADMIN — OXYCODONE AND ACETAMINOPHEN 1 TABLET(S): 5; 325 TABLET ORAL at 16:57

## 2020-01-11 RX ADMIN — MORPHINE SULFATE 2 MILLIGRAM(S): 50 CAPSULE, EXTENDED RELEASE ORAL at 02:25

## 2020-01-11 RX ADMIN — Medication 100 MILLIGRAM(S): at 05:40

## 2020-01-11 RX ADMIN — INSULIN GLARGINE 10 UNIT(S): 100 INJECTION, SOLUTION SUBCUTANEOUS at 22:13

## 2020-01-11 RX ADMIN — MEROPENEM 100 MILLIGRAM(S): 1 INJECTION INTRAVENOUS at 11:04

## 2020-01-11 RX ADMIN — Medication 1 SPRAY(S): at 17:09

## 2020-01-11 RX ADMIN — MEROPENEM 100 MILLIGRAM(S): 1 INJECTION INTRAVENOUS at 17:09

## 2020-01-11 RX ADMIN — Medication 4: at 11:03

## 2020-01-11 RX ADMIN — LIDOCAINE 1 PATCH: 4 CREAM TOPICAL at 17:08

## 2020-01-11 RX ADMIN — Medication 40 MILLIEQUIVALENT(S): at 17:09

## 2020-01-11 RX ADMIN — Medication 40 MILLIEQUIVALENT(S): at 12:13

## 2020-01-11 RX ADMIN — OXYCODONE AND ACETAMINOPHEN 1 TABLET(S): 5; 325 TABLET ORAL at 06:40

## 2020-01-11 RX ADMIN — HEPARIN SODIUM 5000 UNIT(S): 5000 INJECTION INTRAVENOUS; SUBCUTANEOUS at 05:39

## 2020-01-11 RX ADMIN — HEPARIN SODIUM 5000 UNIT(S): 5000 INJECTION INTRAVENOUS; SUBCUTANEOUS at 17:09

## 2020-01-11 RX ADMIN — OXYCODONE AND ACETAMINOPHEN 1 TABLET(S): 5; 325 TABLET ORAL at 16:02

## 2020-01-11 RX ADMIN — Medication 100 MILLIGRAM(S): at 13:02

## 2020-01-11 RX ADMIN — Medication 2: at 16:02

## 2020-01-11 RX ADMIN — Medication 100 MILLIGRAM(S): at 12:13

## 2020-01-11 RX ADMIN — OXYCODONE AND ACETAMINOPHEN 1 TABLET(S): 5; 325 TABLET ORAL at 05:40

## 2020-01-11 RX ADMIN — MORPHINE SULFATE 2 MILLIGRAM(S): 50 CAPSULE, EXTENDED RELEASE ORAL at 02:10

## 2020-01-11 RX ADMIN — Medication 100 MILLIGRAM(S): at 21:31

## 2020-01-11 RX ADMIN — MORPHINE SULFATE 2 MILLIGRAM(S): 50 CAPSULE, EXTENDED RELEASE ORAL at 20:20

## 2020-01-11 RX ADMIN — Medication 40 MILLIEQUIVALENT(S): at 15:07

## 2020-01-11 RX ADMIN — Medication 120 MILLIGRAM(S): at 17:09

## 2020-01-11 RX ADMIN — Medication 6: at 07:56

## 2020-01-11 NOTE — PROGRESS NOTE ADULT - PROBLEM SELECTOR PLAN 8
Unclear if related to hip prior hip replacement vs sciatica  States he has hx of spinal stenosis and had been doing a lot of sitting  Check xrays  Lidoderm  Trial of lidocaine patch

## 2020-01-11 NOTE — PROGRESS NOTE ADULT - SUBJECTIVE AND OBJECTIVE BOX
Pt is a 70 yo gentleman with a pmhx of HTN, HL, NIDDM, Afib on eliquis who presents to the ED with leg redness and fever. Has had chronic ulcer on L. medial leg for months, but over past couple days has gotten worse, and yesterday wife noted that his L. leg was red. Pt has not been on antibiotics, no chest pain. Has had a cough and uri symptoms last couple days as well. No abdominal pain, no nausea vomiting or diarrhea. (08 Jan 2020 17:04)      INTERVAL HPI/OVERNIGHT EVENTS:  1/11:     MEDICATIONS  (STANDING):  clindamycin IVPB 900 milliGRAM(s) IV Intermittent every 8 hours  dextrose 50% Injectable 12.5 Gram(s) IV Push once  dextrose 50% Injectable 25 Gram(s) IV Push once  dextrose 50% Injectable 25 Gram(s) IV Push once  heparin  Injectable 5000 Unit(s) SubCutaneous every 12 hours  insulin glargine Injectable (LANTUS) 10 Unit(s) SubCutaneous at bedtime  insulin lispro (HumaLOG) corrective regimen sliding scale   SubCutaneous three times a day before meals  insulin lispro (HumaLOG) corrective regimen sliding scale   SubCutaneous at bedtime  meropenem  IVPB 500 milliGRAM(s) IV Intermittent every 12 hours  potassium chloride    Tablet ER 40 milliEquivalent(s) Oral every 4 hours  sodium chloride 0.9%. 1000 milliLiter(s) (100 mL/Hr) IV Continuous <Continuous>    MEDICATIONS  (PRN):  acetaminophen   Tablet .. 650 milliGRAM(s) Oral every 6 hours PRN Temp greater or equal to 38C (100.4F), Mild Pain (1 - 3)  dextrose 40% Gel 15 Gram(s) Oral once PRN Blood Glucose LESS THAN 70 milliGRAM(s)/deciliter  glucagon  Injectable 1 milliGRAM(s) IntraMuscular once PRN Glucose LESS THAN 70 milligrams/deciliter  guaiFENesin   Syrup  (Sugar-Free) 100 milliGRAM(s) Oral every 6 hours PRN Cough  morphine  - Injectable 2 milliGRAM(s) IV Push every 4 hours PRN Severe Pain (7 - 10)  oxycodone    5 mG/acetaminophen 325 mG 1 Tablet(s) Oral every 4 hours PRN Moderate Pain (4 - 6)      Allergies    No Known Allergies    Intolerances        REVIEW OF SYSTEMS:  CONSTITUTIONAL: No fever, weight loss, or fatigue  EYES: No eye pain, visual disturbances, or discharge  ENMT:  No difficulty hearing, tinnitus, vertigo; No sinus or throat pain  NECK: No pain or stiffness  BREASTS: No pain, masses, or nipple discharge  RESPIRATORY: No cough, wheezing, chills or hemoptysis; No shortness of breath  CARDIOVASCULAR: No chest pain, palpitations, dizziness, or leg swelling  GASTROINTESTINAL: No abdominal or epigastric pain. No nausea, vomiting, or hematemesis; No diarrhea or constipation. No melena or hematochezia.  GENITOURINARY: No dysuria, frequency, hematuria, or incontinence  NEUROLOGICAL: No headaches, memory loss, loss of strength, numbness, or tremors  SKIN: No itching, burning, rashes, or lesions   LYMPH NODES: No enlarged glands  ENDOCRINE: No heat or cold intolerance; No hair loss  MUSCULOSKELETAL: No joint pain or swelling; No muscle, back, or extremity pain  PSYCHIATRIC: No depression, anxiety, mood swings, or difficulty sleeping  HEME/LYMPH: No easy bruising, or bleeding gums  ALLERGY AND IMMUNOLOGIC: No hives or eczema    Vital Signs Last 24 Hrs  T(C): 36.1 (11 Jan 2020 08:15), Max: 37.4 (10 Yann 2020 17:03)  T(F): 97 (11 Jan 2020 08:15), Max: 99.3 (10 Yann 2020 17:03)  HR: 74 (11 Jan 2020 08:15) (71 - 87)  BP: 155/83 (11 Jan 2020 08:15) (128/74 - 158/78)  BP(mean): --  RR: 16 (11 Jan 2020 08:15) (15 - 22)  SpO2: 98% (11 Jan 2020 08:15) (92% - 99%)    PHYSICAL EXAM:  GENERAL: NAD, well-groomed, well-developed  HEAD:  Atraumatic, Normocephalic  EYES: EOMI, PERRLA, conjunctiva and sclera clear  ENMT: No tonsillar erythema, exudates, or enlargement; Moist mucous membranes, Good dentition, No lesions  NECK: Supple, No JVD, Normal thyroid  NERVOUS SYSTEM:  Alert & Oriented X3, Good concentration; Motor Strength 5/5 B/L upper and lower extremities; DTRs 2+ intact and symmetric  CHEST/LUNG: Clear to percussion bilaterally; No rales, rhonchi, wheezing, or rubs  HEART: Regular rate and rhythm; No murmurs, rubs, or gallops  ABDOMEN: Soft, Nontender, Nondistended; Bowel sounds present  EXTREMITIES:  2+ Peripheral Pulses, No clubbing, cyanosis, or edema  LYMPH: No lymphadenopathy noted  SKIN: No rashes or lesions    LABS:                        7.7    36.64 )-----------( 450      ( 11 Jan 2020 07:22 )             23.6     01-11    134<L>  |  99  |  69<H>  ----------------------------<  266<H>  3.3<L>   |  24  |  2.79<H>    Ca    8.4<L>      11 Jan 2020 07:22      PT/INR - ( 10 Yann 2020 08:35 )   PT: 14.8 sec;   INR: 1.31 ratio         PTT - ( 10 Yann 2020 08:35 )  PTT:35.4 sec    CAPILLARY BLOOD GLUCOSE      POCT Blood Glucose.: 235 mg/dL (11 Jan 2020 10:48)  POCT Blood Glucose.: 296 mg/dL (11 Jan 2020 07:53)  POCT Blood Glucose.: 331 mg/dL (10 Yann 2020 21:39)  POCT Blood Glucose.: 246 mg/dL (10 Yann 2020 17:16)      RADIOLOGY & ADDITIONAL TESTS:    Imaging Personally Reviewed:  [ ] YES  [ ] NO    Consultant(s) Notes Reviewed:  [ ] YES  [ ] NO    Care Discussed with Consultants/Other Providers [ ] YES  [ ] NO Pt is a 68 yo gentleman with a pmhx of HTN, HL, NIDDM, Afib on eliquis who presents to the ED with leg redness and fever. Has had chronic ulcer on L. medial leg for months, but over past couple days has gotten worse, and yesterday wife noted that his L. leg was red. Pt has not been on antibiotics, no chest pain. Has had a cough and uri symptoms last couple days as well. No abdominal pain, no nausea vomiting or diarrhea. (08 Jan 2020 17:04)      INTERVAL HPI/OVERNIGHT EVENTS:  1/11: Appetite OK; pain manageable, did reasonably well w/ PT but PT, pt and wife all agree that Rehab is appropriate. C/o persistent achey pain at L buttock for the last month; was doing a lot of sitting and his "spinal stenosis" flared up. Requesting lidoderm.    MEDICATIONS  (STANDING):  clindamycin IVPB 900 milliGRAM(s) IV Intermittent every 8 hours  dextrose 50% Injectable 12.5 Gram(s) IV Push once  dextrose 50% Injectable 25 Gram(s) IV Push once  dextrose 50% Injectable 25 Gram(s) IV Push once  heparin  Injectable 5000 Unit(s) SubCutaneous every 12 hours  insulin glargine Injectable (LANTUS) 10 Unit(s) SubCutaneous at bedtime  insulin lispro (HumaLOG) corrective regimen sliding scale   SubCutaneous three times a day before meals  insulin lispro (HumaLOG) corrective regimen sliding scale   SubCutaneous at bedtime  meropenem  IVPB 500 milliGRAM(s) IV Intermittent every 12 hours  potassium chloride    Tablet ER 40 milliEquivalent(s) Oral every 4 hours  sodium chloride 0.9%. 1000 milliLiter(s) (100 mL/Hr) IV Continuous <Continuous>    MEDICATIONS  (PRN):  acetaminophen   Tablet .. 650 milliGRAM(s) Oral every 6 hours PRN Temp greater or equal to 38C (100.4F), Mild Pain (1 - 3)  dextrose 40% Gel 15 Gram(s) Oral once PRN Blood Glucose LESS THAN 70 milliGRAM(s)/deciliter  glucagon  Injectable 1 milliGRAM(s) IntraMuscular once PRN Glucose LESS THAN 70 milligrams/deciliter  guaiFENesin   Syrup  (Sugar-Free) 100 milliGRAM(s) Oral every 6 hours PRN Cough  morphine  - Injectable 2 milliGRAM(s) IV Push every 4 hours PRN Severe Pain (7 - 10)  oxycodone    5 mG/acetaminophen 325 mG 1 Tablet(s) Oral every 4 hours PRN Moderate Pain (4 - 6)      Allergies    No Known Allergies    Intolerances        REVIEW OF SYSTEMS:  CONSTITUTIONAL: No fever, weight loss, or fatigue  EYES: No eye pain, visual disturbances, or discharge  ENMT:  No difficulty hearing, tinnitus, vertigo; No sinus or throat pain  NECK: No pain or stiffness  BREASTS: No pain, masses, or nipple discharge  RESPIRATORY: No cough, wheezing, chills or hemoptysis; No shortness of breath  CARDIOVASCULAR: No chest pain, palpitations, dizziness, or leg swelling  GASTROINTESTINAL: No abdominal or epigastric pain. No nausea, vomiting, or hematemesis; No diarrhea or constipation. No melena or hematochezia.  GENITOURINARY: No dysuria, frequency, hematuria, or incontinence  NEUROLOGICAL: No headaches, memory loss, loss of strength, numbness, or tremors  SKIN: No itching, burning, rashes, or lesions   LYMPH NODES: No enlarged glands  ENDOCRINE: No heat or cold intolerance; No hair loss  MUSCULOSKELETAL: No joint pain or swelling; No muscle, back, or extremity pain  PSYCHIATRIC: No depression, anxiety, mood swings, or difficulty sleeping  HEME/LYMPH: No easy bruising, or bleeding gums  ALLERGY AND IMMUNOLOGIC: No hives or eczema    Vital Signs Last 24 Hrs  T(C): 36.1 (11 Jan 2020 08:15), Max: 37.4 (10 Yann 2020 17:03)  T(F): 97 (11 Jan 2020 08:15), Max: 99.3 (10 Yann 2020 17:03)  HR: 74 (11 Jan 2020 08:15) (71 - 87)  BP: 155/83 (11 Jan 2020 08:15) (128/74 - 158/78)  BP(mean): --  RR: 16 (11 Jan 2020 08:15) (15 - 22)  SpO2: 98% (11 Jan 2020 08:15) (92% - 99%)    PHYSICAL EXAM:  GENERAL: NAD, well-groomed, well-developed  HEAD:  Atraumatic, Normocephalic  EYES: EOMI, PERRLA, conjunctiva and sclera clear  ENMT: No tonsillar erythema, exudates, or enlargement; Moist mucous membranes, Good dentition, No lesions  NECK: Supple, No JVD, Normal thyroid  NERVOUS SYSTEM:  Alert & Oriented X3, Good concentration; Motor Strength 5/5 B/L upper and lower extremities; DTRs 2+ intact and symmetric  CHEST/LUNG: Clear to percussion bilaterally; No rales, rhonchi, wheezing, or rubs  HEART: Regular rate and rhythm; No murmurs, rubs, or gallops  ABDOMEN: Soft, Nontender, Nondistended; Bowel sounds present  EXTREMITIES:  2+ Peripheral Pulses, No clubbing, cyanosis, or edema  LYMPH: No lymphadenopathy noted  SKIN: No rashes or lesions    LABS:                        7.7    36.64 )-----------( 450      ( 11 Jan 2020 07:22 )             23.6     01-11    134<L>  |  99  |  69<H>  ----------------------------<  266<H>  3.3<L>   |  24  |  2.79<H>    Ca    8.4<L>      11 Jan 2020 07:22      PT/INR - ( 10 Yann 2020 08:35 )   PT: 14.8 sec;   INR: 1.31 ratio         PTT - ( 10 Yann 2020 08:35 )  PTT:35.4 sec    CAPILLARY BLOOD GLUCOSE      POCT Blood Glucose.: 235 mg/dL (11 Jan 2020 10:48)  POCT Blood Glucose.: 296 mg/dL (11 Jan 2020 07:53)  POCT Blood Glucose.: 331 mg/dL (10 Yann 2020 21:39)  POCT Blood Glucose.: 246 mg/dL (10 Yann 2020 17:16)      RADIOLOGY & ADDITIONAL TESTS:    Imaging Personally Reviewed:  [ ] YES  [ ] NO    Consultant(s) Notes Reviewed:  [ ] YES  [ ] NO    Care Discussed with Consultants/Other Providers [ ] YES  [ ] NO

## 2020-01-11 NOTE — PHYSICAL THERAPY INITIAL EVALUATION ADULT - STRENGTHENING, PT EVAL
Pt will increase Overall strength to [4+/5] to improve bed mobility, transfers, ambulation by 3 weeks

## 2020-01-11 NOTE — PROGRESS NOTE ADULT - ASSESSMENT
68 y/o M with PMH of HTN, HLD, a. fib on eliquis p/w LLE redness, fever, had chronic ulcer on LLE, pt admitted for cellulitis , possible OM, also with Ckd and leukemoid reaction/sepsis. 68 y/o M with PMH of HTN, HLD, a. fib on eliquis p/w LLE redness, fever, had chronic ulcer on LLE, pt admitted for cellulitis , possible OM, also with Ckd and leukemoid reaction/sepsis.       Disp: per PT eval, will need rehab for approx. 3 weeks.

## 2020-01-11 NOTE — PROGRESS NOTE ADULT - ASSESSMENT
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, right foot multiple rigid deformities  - Pt seen and examined  - Pt appears comfortable and in better mood  - L side dressing CDI, no strike through  - RLE no acute findings however has multiple rigid lower extremity deformities which puts pt at high risk for developing DFUs  - Discussed with pt and wife bedside importance of diabetic foot care, stop using Crocs, obtain DM shoes with custom inserts for proper offloading of high pressure areas  - Discussed long road to recovery with aggressive rehab and PT as an amputee and the need for stable contralateral foot to regain strength and balance  - Offload R foot with pillow behind R calf at all time  - F/u outpt, call [phone] for appointment

## 2020-01-11 NOTE — PHYSICAL THERAPY INITIAL EVALUATION ADULT - BALANCE TRAINING, PT EVAL
Pt will increase static/dynamic sitting balance to good to perform UE manipulation without LOB, by 3 weeks. Pt will increase static/dynamic standing balance to fair+ to perform all functional mobility without LOB, by 3 weeks

## 2020-01-11 NOTE — PROVIDER CONTACT NOTE (CRITICAL VALUE NOTIFICATION) - SITUATION
Blood Culture Prelim Result collected 1/8/2020  Anaerobic bottle result gram positive cocci and clusters    Pt. currently receiving IV antibiotics. House PA aware, no new order received.

## 2020-01-11 NOTE — PROGRESS NOTE ADULT - PROBLEM SELECTOR PLAN 6
Eliquis ON HOLD for OR  Normally takes Cardizem for rate control.  d/w Cardiology > okay to hold AC   Restart Eliquis when OK w/ surgery  Cardizem stopped post-op; restart at 120mg and titrate back up to prior 240mg  will start sq heparin for dvt ppx, will resume eliquis and d/c sq heparin once okay per vascular surgery after OR. Eliquis ON HOLD fauzia-op  Restart Eliquis when OK w/ surgery  Normally takes Cardizem for rate control.  Cardizem stopped post-op; restart at 120mg and titrate back up to prior 240mg  On SQ heparin for dvt ppx, change back to Eliquis when ok per surgery.

## 2020-01-11 NOTE — PHYSICAL THERAPY INITIAL EVALUATION ADULT - ADDITIONAL COMMENTS
Pt lives in private house with 2 steps to enter. Pt lives with wife and mother. Pt lives on main floor.

## 2020-01-11 NOTE — PROGRESS NOTE ADULT - SUBJECTIVE AND OBJECTIVE BOX
[facility] NEPHROLOGY SERVICES, [facility]  NEPHROLOGY AND HYPERTENSION  [address]  [address]  [place] [address]  [phone]    MD YISEL WEEKS MD ANDREY GONCHARUK, MD MADHU KORRAPATI, MD YELENA ROSENBERG, MD BINNY KOSHY, MD CHRISTOPHER CAPUTO, MD EDWARD BOVER, MD          Patiaugustina post op     MEDICATIONS  (STANDING):  clindamycin IVPB 900 milliGRAM(s) IV Intermittent every 8 hours  dextrose 50% Injectable 12.5 Gram(s) IV Push once  dextrose 50% Injectable 25 Gram(s) IV Push once  dextrose 50% Injectable 25 Gram(s) IV Push once  diltiazem    milliGRAM(s) Oral daily  heparin  Injectable 5000 Unit(s) SubCutaneous every 12 hours  insulin glargine Injectable (LANTUS) 10 Unit(s) SubCutaneous at bedtime  insulin lispro (HumaLOG) corrective regimen sliding scale   SubCutaneous three times a day before meals  insulin lispro (HumaLOG) corrective regimen sliding scale   SubCutaneous at bedtime  lidocaine   Patch 1 Patch Transdermal every 24 hours  meropenem  IVPB 500 milliGRAM(s) IV Intermittent every 12 hours  sodium chloride 0.9%. 1000 milliLiter(s) (100 mL/Hr) IV Continuous <Continuous>    MEDICATIONS  (PRN):  acetaminophen   Tablet .. 650 milliGRAM(s) Oral every 6 hours PRN Temp greater or equal to 38C (100.4F), Mild Pain (1 - 3)  dextrose 40% Gel 15 Gram(s) Oral once PRN Blood Glucose LESS THAN 70 milliGRAM(s)/deciliter  glucagon  Injectable 1 milliGRAM(s) IntraMuscular once PRN Glucose LESS THAN 70 milligrams/deciliter  guaiFENesin   Syrup  (Sugar-Free) 100 milliGRAM(s) Oral every 6 hours PRN Cough  morphine  - Injectable 2 milliGRAM(s) IV Push every 4 hours PRN Severe Pain (7 - 10)  oxycodone    5 mG/acetaminophen 325 mG 1 Tablet(s) Oral every 4 hours PRN Moderate Pain (4 - 6)  sodium chloride 0.65% Nasal 1 Spray(s) Both Nostrils three times a day PRN Nasal Congestion      01-10-20 @ 07:01  -  01-11-20 @ 07:00  --------------------------------------------------------  IN: 1980 mL / OUT: 600 mL / NET: 1380 mL    01-11-20 @ 07:01  -  01-11-20 @ 17:26  --------------------------------------------------------  IN: 0 mL / OUT: 200 mL / NET: -200 mL      PHYSICAL EXAM:      T(C): 36.1 (01-11-20 @ 08:15), Max: 36.9 (01-10-20 @ 17:53)  HR: 74 (01-11-20 @ 08:15) (71 - 79)  BP: 155/83 (01-11-20 @ 08:15) (128/74 - 155/83)  RR: 16 (01-11-20 @ 08:15) (16 - 20)  SpO2: 98% (01-11-20 @ 08:15) (92% - 99%)  Wt(kg): --  Respiratory: clear anteriorly, decreased BS at bases  Cardiovascular: S1 S2  Gastrointestinal: soft NT ND +BS  Extremities:   LLE  ankle disarticulation                                   7.7    36.64 )-----------( 450      ( 11 Jan 2020 07:22 )             23.6     01-11    134<L>  |  99  |  69<H>  ----------------------------<  266<H>  3.3<L>   |  24  |  2.79<H>    Ca    8.4<L>      11 Jan 2020 07:22          Creatinine Trend: 2.79<--, 2.83<--, 2.70<--, 2.50<--, 2.53<--, 2.87<--        Assessment   ELMER CKD 3; HTN; DM risk;   Pre renal azotemia; infectious GN;   Hyponatremia; NPO,   Post op     Plan  IV abx, IVF NS;   Hold Metformin and ACE;   Discussed with patient and wife;   Will follow course.    Augusto Santoyo MD

## 2020-01-11 NOTE — PROGRESS NOTE ADULT - PROBLEM SELECTOR PLAN 1
Podiatry consulted (Dianna)  s/p left medial malleolus biopsy on 1/8  s/p L foot amputation on 1/10  wbc uptrending.  vascular surgery following, plan for left ankle disarticulation today as pt need to have foot amputated to control infection.   cardiac clearance noted.   Pt. optimized for ankle disarticulation with moderate to elevated risk, per vascular pt need to have foot amputated to control infection and to prevent further worsening of clinical condition.   ID following,  IV merrem and clindamycin  spoke to icu attending as pt need post op icu care per Dr. Quintero. per ICU they don;'t have any bed at this time and will evaluate the pt in PACU post operatively for need for icu care. Podiatry consulted (Dianna)  s/p left medial malleolus biopsy on 1/8  s/p L foot amputation on 1/10  wbc uptrending.  ID following,  IV merrem and clindamycin

## 2020-01-11 NOTE — PROGRESS NOTE ADULT - SUBJECTIVE AND OBJECTIVE BOX
Patient is a 69y old  Male who presents with a chief complaint of left  foot ulcer (10 Yann 2020 22:02)       INTERVAL HPI/OVERNIGHT EVENTS:  Patient seen and evaluated at bedside.  Pt is resting comfortable in NAD. Denies N/V/F/C.  Pain rated at X/10    Allergies    No Known Allergies    Intolerances        Vital Signs Last 24 Hrs  T(C): 36.1 (2020 08:15), Max: 37.6 (10 Yann 2020 12:47)  T(F): 97 (2020 08:15), Max: 99.6 (10 Yann 2020 12:47)  HR: 74 (2020 08:15) (71 - 87)  BP: 155/83 (2020 08:15) (128/74 - 158/78)  BP(mean): --  RR: 16 (2020 08:15) (15 - 22)  SpO2: 98% (2020 08:15) (92% - 99%)    LABS:                        7.7    36.64 )-----------( 450      ( 2020 07:22 )             23.6     01-11    134<L>  |  99  |  69<H>  ----------------------------<  266<H>  3.3<L>   |  24  |  2.79<H>    Ca    8.4<L>      2020 07:22      PT/INR - ( 10 Yann 2020 08:35 )   PT: 14.8 sec;   INR: 1.31 ratio         PTT - ( 10 Yann 2020 08:35 )  PTT:35.4 sec  Urinalysis Basic - ( 2020 13:04 )    Color: Yellow / Appearance: Clear / S.010 / pH: x  Gluc: x / Ketone: Negative  / Bili: Negative / Urobili: Negative mg/dL   Blood: x / Protein: 100 mg/dL / Nitrite: Negative   Leuk Esterase: Negative / RBC: 3-5 /HPF / WBC 3-5   Sq Epi: x / Non Sq Epi: Few / Bacteria: Moderate      CAPILLARY BLOOD GLUCOSE      POCT Blood Glucose.: 296 mg/dL (2020 07:53)  POCT Blood Glucose.: 331 mg/dL (10 Yann 2020 21:39)  POCT Blood Glucose.: 246 mg/dL (10 Yann 2020 17:16)  POCT Blood Glucose.: 247 mg/dL (10 Yann 2020 10:57)      Right Lower Extremity Physical Exam:  Palpable DP, weakly palpable PT, CFT normal to 5 digits, warm to touch, no acute signs of infection, hyperkeratotic lesion on superior medial aspect of posterior calcaneal tuber near the insertion of Achilles tendon without signs of infection, Achilles tendon intact with 5/5 strength, All muscle compartments 5/5 strength, Hallux rigidus deformity with HAV and dorsomedial bony prominence, Bony prominence also noted along medial column at the CNJ/TNJ and medial malleolus, rigid pronated forefoot on everted rearfoot, some STJ ROM present, likely CN bar which was also an incidental finding in L foot previously.    RADIOLOGY & ADDITIONAL TESTS:

## 2020-01-12 LAB
-  CEFTRIAXONE: SIGNIFICANT CHANGE UP
-  CLINDAMYCIN: SIGNIFICANT CHANGE UP
-  LEVOFLOXACIN: SIGNIFICANT CHANGE UP
-  PENICILLIN: SIGNIFICANT CHANGE UP
-  PENICILLIN: SIGNIFICANT CHANGE UP
-  VANCOMYCIN: SIGNIFICANT CHANGE UP
ANION GAP SERPL CALC-SCNC: 7 MMOL/L — SIGNIFICANT CHANGE UP (ref 5–17)
BUN SERPL-MCNC: 55 MG/DL — HIGH (ref 7–23)
CALCIUM SERPL-MCNC: 8.4 MG/DL — LOW (ref 8.5–10.1)
CHLORIDE SERPL-SCNC: 107 MMOL/L — SIGNIFICANT CHANGE UP (ref 96–108)
CO2 SERPL-SCNC: 22 MMOL/L — SIGNIFICANT CHANGE UP (ref 22–31)
CREAT SERPL-MCNC: 2.25 MG/DL — HIGH (ref 0.5–1.3)
CULTURE RESULTS: SIGNIFICANT CHANGE UP
GLUCOSE BLDC GLUCOMTR-MCNC: 160 MG/DL — HIGH (ref 70–99)
GLUCOSE BLDC GLUCOMTR-MCNC: 164 MG/DL — HIGH (ref 70–99)
GLUCOSE BLDC GLUCOMTR-MCNC: 201 MG/DL — HIGH (ref 70–99)
GLUCOSE BLDC GLUCOMTR-MCNC: 292 MG/DL — HIGH (ref 70–99)
GLUCOSE SERPL-MCNC: 169 MG/DL — HIGH (ref 70–99)
GRAM STN FLD: SIGNIFICANT CHANGE UP
HCT VFR BLD CALC: 24.6 % — LOW (ref 39–50)
HGB BLD-MCNC: 7.8 G/DL — LOW (ref 13–17)
MAGNESIUM SERPL-MCNC: 2 MG/DL — SIGNIFICANT CHANGE UP (ref 1.6–2.6)
MCHC RBC-ENTMCNC: 27.7 PG — SIGNIFICANT CHANGE UP (ref 27–34)
MCHC RBC-ENTMCNC: 31.7 GM/DL — LOW (ref 32–36)
MCV RBC AUTO: 87.2 FL — SIGNIFICANT CHANGE UP (ref 80–100)
METHOD TYPE: SIGNIFICANT CHANGE UP
METHOD TYPE: SIGNIFICANT CHANGE UP
NRBC # BLD: 0 /100 WBCS — SIGNIFICANT CHANGE UP (ref 0–0)
ORGANISM # SPEC MICROSCOPIC CNT: SIGNIFICANT CHANGE UP
PLATELET # BLD AUTO: 495 K/UL — HIGH (ref 150–400)
POTASSIUM SERPL-MCNC: 4.5 MMOL/L — SIGNIFICANT CHANGE UP (ref 3.5–5.3)
POTASSIUM SERPL-SCNC: 4.5 MMOL/L — SIGNIFICANT CHANGE UP (ref 3.5–5.3)
RBC # BLD: 2.82 M/UL — LOW (ref 4.2–5.8)
RBC # FLD: 14.5 % — SIGNIFICANT CHANGE UP (ref 10.3–14.5)
SODIUM SERPL-SCNC: 136 MMOL/L — SIGNIFICANT CHANGE UP (ref 135–145)
SPECIMEN SOURCE: SIGNIFICANT CHANGE UP
WBC # BLD: 24.46 K/UL — HIGH (ref 3.8–10.5)
WBC # FLD AUTO: 24.46 K/UL — HIGH (ref 3.8–10.5)

## 2020-01-12 PROCEDURE — 99232 SBSQ HOSP IP/OBS MODERATE 35: CPT

## 2020-01-12 RX ORDER — LANOLIN ALCOHOL/MO/W.PET/CERES
3 CREAM (GRAM) TOPICAL ONCE
Refills: 0 | Status: COMPLETED | OUTPATIENT
Start: 2020-01-12 | End: 2020-01-12

## 2020-01-12 RX ORDER — AMLODIPINE BESYLATE 2.5 MG/1
5 TABLET ORAL DAILY
Refills: 0 | Status: DISCONTINUED | OUTPATIENT
Start: 2020-01-13 | End: 2020-01-13

## 2020-01-12 RX ORDER — AMLODIPINE BESYLATE 2.5 MG/1
5 TABLET ORAL DAILY
Refills: 0 | Status: DISCONTINUED | OUTPATIENT
Start: 2020-01-12 | End: 2020-01-12

## 2020-01-12 RX ORDER — DILTIAZEM HCL 120 MG
240 CAPSULE, EXT RELEASE 24 HR ORAL DAILY
Refills: 0 | Status: DISCONTINUED | OUTPATIENT
Start: 2020-01-12 | End: 2020-01-17

## 2020-01-12 RX ORDER — ALBUTEROL 90 UG/1
1.25 AEROSOL, METERED ORAL
Refills: 0 | Status: DISCONTINUED | OUTPATIENT
Start: 2020-01-12 | End: 2020-01-17

## 2020-01-12 RX ORDER — DILTIAZEM HCL 120 MG
120 CAPSULE, EXT RELEASE 24 HR ORAL ONCE
Refills: 0 | Status: COMPLETED | OUTPATIENT
Start: 2020-01-12 | End: 2020-01-12

## 2020-01-12 RX ADMIN — LIDOCAINE 1 PATCH: 4 CREAM TOPICAL at 03:16

## 2020-01-12 RX ADMIN — ALBUTEROL 1.25 MILLIGRAM(S): 90 AEROSOL, METERED ORAL at 17:49

## 2020-01-12 RX ADMIN — Medication 2: at 07:42

## 2020-01-12 RX ADMIN — SODIUM CHLORIDE 100 MILLILITER(S): 9 INJECTION INTRAMUSCULAR; INTRAVENOUS; SUBCUTANEOUS at 00:23

## 2020-01-12 RX ADMIN — HEPARIN SODIUM 5000 UNIT(S): 5000 INJECTION INTRAVENOUS; SUBCUTANEOUS at 17:01

## 2020-01-12 RX ADMIN — Medication 2: at 21:07

## 2020-01-12 RX ADMIN — SODIUM CHLORIDE 100 MILLILITER(S): 9 INJECTION INTRAMUSCULAR; INTRAVENOUS; SUBCUTANEOUS at 21:06

## 2020-01-12 RX ADMIN — MORPHINE SULFATE 2 MILLIGRAM(S): 50 CAPSULE, EXTENDED RELEASE ORAL at 00:40

## 2020-01-12 RX ADMIN — LIDOCAINE 1 PATCH: 4 CREAM TOPICAL at 11:48

## 2020-01-12 RX ADMIN — Medication 4: at 16:00

## 2020-01-12 RX ADMIN — MEROPENEM 100 MILLIGRAM(S): 1 INJECTION INTRAVENOUS at 06:03

## 2020-01-12 RX ADMIN — Medication 3 MILLIGRAM(S): at 21:08

## 2020-01-12 RX ADMIN — Medication 120 MILLIGRAM(S): at 11:48

## 2020-01-12 RX ADMIN — LIDOCAINE 1 PATCH: 4 CREAM TOPICAL at 23:12

## 2020-01-12 RX ADMIN — Medication 100 MILLIGRAM(S): at 13:05

## 2020-01-12 RX ADMIN — AMLODIPINE BESYLATE 5 MILLIGRAM(S): 2.5 TABLET ORAL at 00:22

## 2020-01-12 RX ADMIN — MORPHINE SULFATE 2 MILLIGRAM(S): 50 CAPSULE, EXTENDED RELEASE ORAL at 00:23

## 2020-01-12 RX ADMIN — Medication 100 MILLIGRAM(S): at 05:14

## 2020-01-12 RX ADMIN — Medication 120 MILLIGRAM(S): at 05:15

## 2020-01-12 RX ADMIN — Medication 100 MILLIGRAM(S): at 21:06

## 2020-01-12 RX ADMIN — INSULIN GLARGINE 10 UNIT(S): 100 INJECTION, SOLUTION SUBCUTANEOUS at 21:07

## 2020-01-12 RX ADMIN — Medication 2: at 11:02

## 2020-01-12 RX ADMIN — Medication 100 MILLIGRAM(S): at 16:00

## 2020-01-12 RX ADMIN — MORPHINE SULFATE 2 MILLIGRAM(S): 50 CAPSULE, EXTENDED RELEASE ORAL at 05:30

## 2020-01-12 RX ADMIN — HEPARIN SODIUM 5000 UNIT(S): 5000 INJECTION INTRAVENOUS; SUBCUTANEOUS at 05:15

## 2020-01-12 RX ADMIN — MEROPENEM 100 MILLIGRAM(S): 1 INJECTION INTRAVENOUS at 17:01

## 2020-01-12 RX ADMIN — LIDOCAINE 1 PATCH: 4 CREAM TOPICAL at 05:17

## 2020-01-12 RX ADMIN — MORPHINE SULFATE 2 MILLIGRAM(S): 50 CAPSULE, EXTENDED RELEASE ORAL at 05:15

## 2020-01-12 NOTE — PROGRESS NOTE ADULT - ASSESSMENT
68 y/o M with PMH of HTN, HLD, a. fib on eliquis p/w LLE redness, fever, had chronic ulcer on LLE, pt admitted for cellulitis , possible OM, also with Ckd and leukemoid reaction/sepsis.     Disp: per PT eval, will need rehab for approx. 3 weeks.

## 2020-01-12 NOTE — DIETITIAN INITIAL EVALUATION ADULT. - PERTINENT LABORATORY DATA
01-12 Na 136 mmol/L Glu 169 mg/dL<H> K+ 4.5 mmol/L Cr 2.25 mg/dL<H> BUN 55 mg/dL<H> Phos n/a   Alb 2.2(1/8)   PAB n/a   Hgb 7.8 g/dL<L> Hct 24.6 %<L> HgA1C 8%(1/10)    Glucose, Serum: 169 mg/dL <H>, GFR=28, WBC=24.46(1/12)   24Hr FS:160 mg/dL  191 mg/dL  170 mg/dL  235 mg/dL

## 2020-01-12 NOTE — PROGRESS NOTE ADULT - ASSESSMENT
Patient is seen and examined, improved, WBC is improving, no fevers, improving cellulitis of the leftleg, less edema,  plan-- will change the dressing tomorrow, ,local care, left BKA when stable

## 2020-01-12 NOTE — PROGRESS NOTE ADULT - PROBLEM SELECTOR PLAN 8
Unclear if related to hip prior hip replacement vs sciatica  States he has hx of spinal stenosis and had been doing a lot of sitting  Check xrays  Lidoderm  Trial of lidocaine patch Unclear if related to hip prior hip replacement vs sciatica  States he has hx of spinal stenosis and had been doing a lot of sitting  xrays ordered/pending  Trying lidoderm  Lidoderm  Trial of lidocaine patch

## 2020-01-12 NOTE — DIETITIAN INITIAL EVALUATION ADULT. - DIET TYPE
supplement (specify)/Higinio active 2 x day/Glucerna shake daily(220kcal & 10gm protein)/consistent carbohydrate (evening snack)

## 2020-01-12 NOTE — PROVIDER CONTACT NOTE (CRITICAL VALUE NOTIFICATION) - TEST AND RESULT REPORTED:
Tissue culture result-Numerous  streptococcus agalactiae{group B}streptococcusgalactie [group B} isolated

## 2020-01-12 NOTE — DIETITIAN INITIAL EVALUATION ADULT. - PERTINENT MEDS FT
acetaminophen   Tablet .. PRN  amLODIPine   Tablet  clindamycin IVPB  dextrose 40% Gel PRN  dextrose 50% Injectable  dextrose 50% Injectable  dextrose 50% Injectable  diltiazem   CD  glucagon  Injectable PRN  guaiFENesin   Syrup  (Sugar-Free) PRN  heparin  Injectable  insulin glargine Injectable (LANTUS)  insulin lispro (HumaLOG) corrective regimen sliding scale  insulin lispro (HumaLOG) corrective regimen sliding scale  lidocaine   Patch  meropenem  IVPB  morphine  - Injectable PRN  oxycodone    5 mG/acetaminophen 325 mG PRN  sodium chloride 0.65% Nasal PRN  sodium chloride 0.9%.

## 2020-01-12 NOTE — PROGRESS NOTE ADULT - SUBJECTIVE AND OBJECTIVE BOX
Patient is a 69y old  Male who presents with a chief complaint of left  foot ulcer (11 Jan 2020 17:25)    clindamycin IVPB 900  meropenem  IVPB 500  amLODIPine   Tablet 5  clindamycin IVPB 900  diltiazem     heparin  Injectable 5000  meropenem  IVPB 500    Allergies    No Known Allergies    Intolerances        Vital Signs Last 24 Hrs  T(C): 36.1 (12 Jan 2020 05:27), Max: 36.7 (11 Jan 2020 17:33)  T(F): 97 (12 Jan 2020 05:27), Max: 98 (11 Jan 2020 17:33)  HR: 81 (12 Jan 2020 05:27) (80 - 82)  BP: 183/98 (12 Jan 2020 05:27) (164/82 - 195/98)  BP(mean): --  RR: 18 (12 Jan 2020 05:27) (18 - 19)  SpO2: 97% (12 Jan 2020 05:27) (97% - 99%)  I&O's Detail    11 Jan 2020 07:01  -  12 Jan 2020 07:00  --------------------------------------------------------  IN:    sodium chloride 0.9%.: 1200 mL    Solution: 100 mL  Total IN: 1300 mL    OUT:    Voided: 800 mL  Total OUT: 800 mL    Total NET: 500 mL          Physical Exam:  General: NAD, resting comfortably in bed  Pulmonary: Nonlabored breathing, no respiratory distress  Cardiovascular: NSR  Abdominal: soft, NT/ND  Extremities: WWP  Pulses:   Right:                                                                          Left:  FEM [ ]2+ [ ]1+ [ ]doppler                                             FEM [ ]2+ [ ]1+ [ ]doppler    POP [ ]2+ [ ]1+ [ ]doppler                                             POP [ ]2+ [ ]1+ [ ]doppler    DP [ ]2+ [ ]1+ [ ]doppler                                                DP [ ]2+ [ ]1+ [ ]doppler  PT[ ]2+ [ ]1+ [ ]doppler                                                  PT [ ]2+ [ ]1+ [ ]doppler      LABS:                        7.8    24.46 )-----------( 495      ( 12 Jan 2020 08:13 )             24.6     01-12    136  |  107  |  55<H>  ----------------------------<  169<H>  4.5   |  22  |  2.25<H>    Ca    8.4<L>      12 Jan 2020 08:13  Mg     2.0     01-12        CAPILLARY BLOOD GLUCOSE      POCT Blood Glucose.: 160 mg/dL (12 Jan 2020 07:15)  POCT Blood Glucose.: 191 mg/dL (11 Jan 2020 22:12)  POCT Blood Glucose.: 170 mg/dL (11 Jan 2020 15:36)  POCT Blood Glucose.: 235 mg/dL (11 Jan 2020 10:48)    Radiology and Additional Studies:    Assessment and Plan: 69yMaleCELLULITIS OF LOWER LEFT EXTREMITY, RSV INFECTION  FLU    Type 2 diabetes mellitus with other neurologic complication, without long-term current use of insulin  Hypertension, unspecified type  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

## 2020-01-12 NOTE — PROGRESS NOTE ADULT - SUBJECTIVE AND OBJECTIVE BOX
INTERVAL HPI/OVERNIGHT EVENTS:  1/11: Appetite OK; pain manageable, did reasonably well w/ PT but PT, pt and wife all agree that Rehab is appropriate. C/o persistent achey pain at L buttock for the last month; was doing a lot of sitting and his "spinal stenosis" flared up. Requesting lidoderm.    Patient is a 69y old  Male who presents with a chief complaint of left  foot ulcer (12 Jan 2020 09:15)      INTERVAL HPI/OVERNIGHT EVENTS:      REVIEW OF SYSTEMS:   Remaining ROS negative    MEDICATIONS  (STANDING):  amLODIPine   Tablet 5 milliGRAM(s) Oral daily  clindamycin IVPB 900 milliGRAM(s) IV Intermittent every 8 hours  dextrose 50% Injectable 12.5 Gram(s) IV Push once  dextrose 50% Injectable 25 Gram(s) IV Push once  dextrose 50% Injectable 25 Gram(s) IV Push once  diltiazem    milliGRAM(s) Oral daily  heparin  Injectable 5000 Unit(s) SubCutaneous every 12 hours  insulin glargine Injectable (LANTUS) 10 Unit(s) SubCutaneous at bedtime  insulin lispro (HumaLOG) corrective regimen sliding scale   SubCutaneous three times a day before meals  insulin lispro (HumaLOG) corrective regimen sliding scale   SubCutaneous at bedtime  lidocaine   Patch 1 Patch Transdermal every 24 hours  meropenem  IVPB 500 milliGRAM(s) IV Intermittent every 12 hours  sodium chloride 0.9%. 1000 milliLiter(s) (100 mL/Hr) IV Continuous <Continuous>    MEDICATIONS  (PRN):  acetaminophen   Tablet .. 650 milliGRAM(s) Oral every 6 hours PRN Temp greater or equal to 38C (100.4F), Mild Pain (1 - 3)  dextrose 40% Gel 15 Gram(s) Oral once PRN Blood Glucose LESS THAN 70 milliGRAM(s)/deciliter  glucagon  Injectable 1 milliGRAM(s) IntraMuscular once PRN Glucose LESS THAN 70 milligrams/deciliter  guaiFENesin   Syrup  (Sugar-Free) 100 milliGRAM(s) Oral every 6 hours PRN Cough  morphine  - Injectable 2 milliGRAM(s) IV Push every 4 hours PRN Severe Pain (7 - 10)  oxycodone    5 mG/acetaminophen 325 mG 1 Tablet(s) Oral every 4 hours PRN Moderate Pain (4 - 6)  sodium chloride 0.65% Nasal 1 Spray(s) Both Nostrils three times a day PRN Nasal Congestion      Allergies    No Known Allergies    Intolerances        Vital Signs Last 24 Hrs  T(C): 36.1 (12 Jan 2020 05:27), Max: 36.7 (11 Jan 2020 17:33)  T(F): 97 (12 Jan 2020 05:27), Max: 98 (11 Jan 2020 17:33)  HR: 81 (12 Jan 2020 05:27) (80 - 82)  BP: 183/98 (12 Jan 2020 05:27) (164/82 - 195/98)  BP(mean): --  RR: 18 (12 Jan 2020 05:27) (18 - 19)  SpO2: 97% (12 Jan 2020 05:27) (97% - 99%)    PHYSICAL EXAM:  GENERAL: NAD  HEAD:  Atraumatic, Normocephalic  EYES: EOMI, PERRLA, conjunctiva and sclera clear  ENT: O/P Clear  NECK: Supple, No JVD  NERVOUS SYSTEM:  No focal deficits  CHEST/LUNG: Clear to percussion bilaterally; No rales, rhonchi, wheezing  HEART: Regular rate and rhythm; No murmurs, rubs, or gallops  ABDOMEN: Soft, Nontender, Nondistended; Bowel sounds present  EXTREMITIES:  L foot heavily dressed.  SKIN: No rashes or lesions    LABS:                        7.8    24.46 )-----------( 495      ( 12 Jan 2020 08:13 )             24.6     01-12    136  |  107  |  55<H>  ----------------------------<  169<H>  4.5   |  22  |  2.25<H>    Ca    8.4<L>      12 Jan 2020 08:13  Mg     2.0     01-12          CAPILLARY BLOOD GLUCOSE      POCT Blood Glucose.: 164 mg/dL (12 Jan 2020 10:52)  POCT Blood Glucose.: 160 mg/dL (12 Jan 2020 07:15)  POCT Blood Glucose.: 191 mg/dL (11 Jan 2020 22:12)  POCT Blood Glucose.: 170 mg/dL (11 Jan 2020 15:36)      RADIOLOGY & ADDITIONAL TESTS:    Imaging Personally Reviewed:  [ ] YES  [ ] NO    Consultant(s) Notes Reviewed:  [ ] YES  [ ] NO    Care Discussed with Consultants/Other Providers [ ] YES  [ ] NO INTERVAL HPI/OVERNIGHT EVENTS:    Appetite OK; pain present but at low level; c/o some mild dypsnea/wheeze last night. Hx of asthma when younger; non-smoker.    REVIEW OF SYSTEMS:   Remaining ROS negative    MEDICATIONS  (STANDING):  amLODIPine   Tablet 5 milliGRAM(s) Oral daily  clindamycin IVPB 900 milliGRAM(s) IV Intermittent every 8 hours  dextrose 50% Injectable 12.5 Gram(s) IV Push once  dextrose 50% Injectable 25 Gram(s) IV Push once  dextrose 50% Injectable 25 Gram(s) IV Push once  diltiazem    milliGRAM(s) Oral daily  heparin  Injectable 5000 Unit(s) SubCutaneous every 12 hours  insulin glargine Injectable (LANTUS) 10 Unit(s) SubCutaneous at bedtime  insulin lispro (HumaLOG) corrective regimen sliding scale   SubCutaneous three times a day before meals  insulin lispro (HumaLOG) corrective regimen sliding scale   SubCutaneous at bedtime  lidocaine   Patch 1 Patch Transdermal every 24 hours  meropenem  IVPB 500 milliGRAM(s) IV Intermittent every 12 hours  sodium chloride 0.9%. 1000 milliLiter(s) (100 mL/Hr) IV Continuous <Continuous>    MEDICATIONS  (PRN):  acetaminophen   Tablet .. 650 milliGRAM(s) Oral every 6 hours PRN Temp greater or equal to 38C (100.4F), Mild Pain (1 - 3)  dextrose 40% Gel 15 Gram(s) Oral once PRN Blood Glucose LESS THAN 70 milliGRAM(s)/deciliter  glucagon  Injectable 1 milliGRAM(s) IntraMuscular once PRN Glucose LESS THAN 70 milligrams/deciliter  guaiFENesin   Syrup  (Sugar-Free) 100 milliGRAM(s) Oral every 6 hours PRN Cough  morphine  - Injectable 2 milliGRAM(s) IV Push every 4 hours PRN Severe Pain (7 - 10)  oxycodone    5 mG/acetaminophen 325 mG 1 Tablet(s) Oral every 4 hours PRN Moderate Pain (4 - 6)  sodium chloride 0.65% Nasal 1 Spray(s) Both Nostrils three times a day PRN Nasal Congestion      Allergies    No Known Allergies    Intolerances        Vital Signs Last 24 Hrs  T(C): 36.1 (12 Jan 2020 05:27), Max: 36.7 (11 Jan 2020 17:33)  T(F): 97 (12 Jan 2020 05:27), Max: 98 (11 Jan 2020 17:33)  HR: 81 (12 Jan 2020 05:27) (80 - 82)  BP: 183/98 (12 Jan 2020 05:27) (164/82 - 195/98)  BP(mean): --  RR: 18 (12 Jan 2020 05:27) (18 - 19)  SpO2: 97% (12 Jan 2020 05:27) (97% - 99%)    PHYSICAL EXAM:  GENERAL: NAD  HEAD:  Atraumatic, Normocephalic  EYES: EOMI, PERRLA, conjunctiva and sclera clear  ENT: O/P Clear  NECK: Supple, No JVD  NERVOUS SYSTEM:  No focal deficits  CHEST/LUNG: Clear to percussion bilaterally; No rales, rhonchi, wheezing  HEART: Regular rate and rhythm; No murmurs, rubs, or gallops  ABDOMEN: Soft, Nontender, Nondistended; Bowel sounds present  EXTREMITIES:  L foot heavily dressed.  SKIN: No rashes or lesions    LABS:                        7.8    24.46 )-----------( 495      ( 12 Jan 2020 08:13 )             24.6     01-12    136  |  107  |  55<H>  ----------------------------<  169<H>  4.5   |  22  |  2.25<H>    Ca    8.4<L>      12 Jan 2020 08:13  Mg     2.0     01-12          CAPILLARY BLOOD GLUCOSE      POCT Blood Glucose.: 164 mg/dL (12 Jan 2020 10:52)  POCT Blood Glucose.: 160 mg/dL (12 Jan 2020 07:15)  POCT Blood Glucose.: 191 mg/dL (11 Jan 2020 22:12)  POCT Blood Glucose.: 170 mg/dL (11 Jan 2020 15:36)      RADIOLOGY & ADDITIONAL TESTS:    Imaging Personally Reviewed:  [ ] YES  [ ] NO    Consultant(s) Notes Reviewed:  [ ] YES  [ ] NO    Care Discussed with Consultants/Other Providers [ ] YES  [ ] NO

## 2020-01-12 NOTE — DIETITIAN INITIAL EVALUATION ADULT. - OTHER INFO
Pt lives with wife & mother in law; pt's family does cooking & food shopping. Pt follows unrestricted diet PTA & manages DM type 2 with Metformin 500mg 2 x day & Glimepiride 4mg daily &Invokana 300mg daily. Pt does not SMBG levels PTA.  Pt consuming 50% meals during hospitalization; observed pt consumed HB egg x 1 & turkey milian breakfast this am. Pt requesting lite yogurt 2 x day.   Diet hx; Breakfast; oatmeal, yogurt  Lunch & Dinner; broccoli & cheddar frozen dinner & steak, potato.     Pt on Percocet & Morphine reports no BM x several days; RN notified & would consider laxative. S/p Lt foot ampuation1/10.   Pt provided UBW. No sign wt changes noted.    Provided further diabetic diet instruction & meal planning with the plate method handout material

## 2020-01-12 NOTE — PROGRESS NOTE ADULT - PROBLEM SELECTOR PLAN 6
Eliquis ON HOLD fauzia-op  Restart Eliquis when OK w/ surgery  Normally takes Cardizem for rate control.  Cardizem stopped post-op; restart at 120mg and titrate back up to prior 240mg  On SQ heparin for dvt ppx, change back to Eliquis when ok per surgery. Eliquis ON HOLD fauzia-op  Restart Eliquis when OK w/ surgery  Normally takes Cardizem for rate control; now back at full dose  Cardizem stopped post-op; restart at 120mg and titrate back up to prior 240mg  On SQ heparin for dvt ppx, change back to Eliquis when ok per surgery.

## 2020-01-12 NOTE — PROGRESS NOTE ADULT - SUBJECTIVE AND OBJECTIVE BOX
[facility] NEPHROLOGY SERVICES, [facility]  NEPHROLOGY AND HYPERTENSION  [address]  [address]  [place] [address]  [phone]    MD YISEL WEEKS MD ANDREY GONCHARUK, MD MADHU KORRAPATI, MD YELENA ROSENBERG, MD BINNY KOSHY, MD CHRISTOPHER CAPUTO, MD EDWARD BOVER, MD          Patient feels ok no distress       MEDICATIONS  (STANDING):  clindamycin IVPB 900 milliGRAM(s) IV Intermittent every 8 hours  dextrose 50% Injectable 12.5 Gram(s) IV Push once  dextrose 50% Injectable 25 Gram(s) IV Push once  dextrose 50% Injectable 25 Gram(s) IV Push once  diltiazem    milliGRAM(s) Oral daily  heparin  Injectable 5000 Unit(s) SubCutaneous every 12 hours  insulin glargine Injectable (LANTUS) 10 Unit(s) SubCutaneous at bedtime  insulin lispro (HumaLOG) corrective regimen sliding scale   SubCutaneous three times a day before meals  insulin lispro (HumaLOG) corrective regimen sliding scale   SubCutaneous at bedtime  lidocaine   Patch 1 Patch Transdermal every 24 hours  meropenem  IVPB 500 milliGRAM(s) IV Intermittent every 12 hours  sodium chloride 0.9%. 1000 milliLiter(s) (100 mL/Hr) IV Continuous <Continuous>    MEDICATIONS  (PRN):  acetaminophen   Tablet .. 650 milliGRAM(s) Oral every 6 hours PRN Temp greater or equal to 38C (100.4F), Mild Pain (1 - 3)  ALBUTerol   0.042% 1.25 milliGRAM(s) Nebulizer four times a day PRN Wheezing  dextrose 40% Gel 15 Gram(s) Oral once PRN Blood Glucose LESS THAN 70 milliGRAM(s)/deciliter  glucagon  Injectable 1 milliGRAM(s) IntraMuscular once PRN Glucose LESS THAN 70 milligrams/deciliter  guaiFENesin   Syrup  (Sugar-Free) 100 milliGRAM(s) Oral every 6 hours PRN Cough  morphine  - Injectable 2 milliGRAM(s) IV Push every 4 hours PRN Severe Pain (7 - 10)  oxycodone    5 mG/acetaminophen 325 mG 1 Tablet(s) Oral every 4 hours PRN Moderate Pain (4 - 6)  sodium chloride 0.65% Nasal 1 Spray(s) Both Nostrils three times a day PRN Nasal Congestion      01-11-20 @ 07:01  -  01-12-20 @ 07:00  --------------------------------------------------------  IN: 1300 mL / OUT: 800 mL / NET: 500 mL    01-12-20 @ 07:01  -  01-12-20 @ 19:23  --------------------------------------------------------  IN: 1300 mL / OUT: 1200 mL / NET: 100 mL      PHYSICAL EXAM:      T(C): 36.7 (01-12-20 @ 16:49), Max: 36.7 (01-11-20 @ 23:52)  HR: 71 (01-12-20 @ 16:49) (71 - 82)  BP: 124/48 (01-12-20 @ 16:49) (124/48 - 195/98)  RR: 17 (01-12-20 @ 16:49) (16 - 18)  SpO2: 98% (01-12-20 @ 16:49) (97% - 99%)  Wt(kg): --  Respiratory: clear anteriorly, decreased BS at bases  Cardiovascular: S1 S2  Gastrointestinal: soft NT ND +BS  Extremities:  LLE ankle disarticulation   edema                                    7.8    24.46 )-----------( 495      ( 12 Jan 2020 08:13 )             24.6     01-12    136  |  107  |  55<H>  ----------------------------<  169<H>  4.5   |  22  |  2.25<H>    Ca    8.4<L>      12 Jan 2020 08:13  Mg     2.0     01-12          Creatinine Trend: 2.25<--, 2.79<--, 2.83<--, 2.70<--, 2.50<--, 2.53<--          Assessment   ELMER CKD 3; HTN; DM risk;   Pre renal azotemia; infectious GN;   Hyponatremia; NPO,   Post op   Indices slowly improving     Plan  IV abx, IVF NS;   Holding Metformin and ACE;   Discussed with patient and wife;   Will follow course.    Augusto Santoyo MD

## 2020-01-12 NOTE — PROGRESS NOTE ADULT - PROBLEM SELECTOR PLAN 1
Podiatry consulted (Dianna)  s/p left medial malleolus biopsy on 1/8  s/p L foot amputation on 1/10  wbc uptrending.  ID following,  IV merrem and clindamycin Podiatry consulted (Dianna)  s/p left medial malleolus biopsy on 1/8  s/p L foot amputation on 1/10  wbc resolving now  ID following,  IV merrem and clindamycin

## 2020-01-13 LAB
ANION GAP SERPL CALC-SCNC: 7 MMOL/L — SIGNIFICANT CHANGE UP (ref 5–17)
BUN SERPL-MCNC: 44 MG/DL — HIGH (ref 7–23)
CALCIUM SERPL-MCNC: 8.7 MG/DL — SIGNIFICANT CHANGE UP (ref 8.5–10.1)
CHLORIDE SERPL-SCNC: 107 MMOL/L — SIGNIFICANT CHANGE UP (ref 96–108)
CO2 SERPL-SCNC: 24 MMOL/L — SIGNIFICANT CHANGE UP (ref 22–31)
CREAT SERPL-MCNC: 1.82 MG/DL — HIGH (ref 0.5–1.3)
CULTURE RESULTS: SIGNIFICANT CHANGE UP
GLUCOSE BLDC GLUCOMTR-MCNC: 175 MG/DL — HIGH (ref 70–99)
GLUCOSE BLDC GLUCOMTR-MCNC: 223 MG/DL — HIGH (ref 70–99)
GLUCOSE BLDC GLUCOMTR-MCNC: 253 MG/DL — HIGH (ref 70–99)
GLUCOSE BLDC GLUCOMTR-MCNC: 261 MG/DL — HIGH (ref 70–99)
GLUCOSE SERPL-MCNC: 169 MG/DL — HIGH (ref 70–99)
HCT VFR BLD CALC: 26.8 % — LOW (ref 39–50)
HGB BLD-MCNC: 8.3 G/DL — LOW (ref 13–17)
MCHC RBC-ENTMCNC: 27.3 PG — SIGNIFICANT CHANGE UP (ref 27–34)
MCHC RBC-ENTMCNC: 31 GM/DL — LOW (ref 32–36)
MCV RBC AUTO: 88.2 FL — SIGNIFICANT CHANGE UP (ref 80–100)
NRBC # BLD: 0 /100 WBCS — SIGNIFICANT CHANGE UP (ref 0–0)
PLATELET # BLD AUTO: 554 K/UL — HIGH (ref 150–400)
POTASSIUM SERPL-MCNC: 4.5 MMOL/L — SIGNIFICANT CHANGE UP (ref 3.5–5.3)
POTASSIUM SERPL-SCNC: 4.5 MMOL/L — SIGNIFICANT CHANGE UP (ref 3.5–5.3)
RBC # BLD: 3.04 M/UL — LOW (ref 4.2–5.8)
RBC # FLD: 14.6 % — HIGH (ref 10.3–14.5)
SODIUM SERPL-SCNC: 138 MMOL/L — SIGNIFICANT CHANGE UP (ref 135–145)
SPECIMEN SOURCE: SIGNIFICANT CHANGE UP
WBC # BLD: 21.74 K/UL — HIGH (ref 3.8–10.5)
WBC # FLD AUTO: 21.74 K/UL — HIGH (ref 3.8–10.5)

## 2020-01-13 PROCEDURE — 99232 SBSQ HOSP IP/OBS MODERATE 35: CPT

## 2020-01-13 PROCEDURE — 72100 X-RAY EXAM L-S SPINE 2/3 VWS: CPT | Mod: 26

## 2020-01-13 PROCEDURE — 73502 X-RAY EXAM HIP UNI 2-3 VIEWS: CPT | Mod: 26,LT

## 2020-01-13 RX ORDER — APIXABAN 2.5 MG/1
5 TABLET, FILM COATED ORAL
Refills: 0 | Status: DISCONTINUED | OUTPATIENT
Start: 2020-01-13 | End: 2020-01-15

## 2020-01-13 RX ORDER — INSULIN LISPRO 100/ML
3 VIAL (ML) SUBCUTANEOUS
Refills: 0 | Status: DISCONTINUED | OUTPATIENT
Start: 2020-01-13 | End: 2020-01-15

## 2020-01-13 RX ORDER — APIXABAN 2.5 MG/1
5 TABLET, FILM COATED ORAL
Refills: 0 | Status: DISCONTINUED | OUTPATIENT
Start: 2020-01-13 | End: 2020-01-13

## 2020-01-13 RX ORDER — AMLODIPINE BESYLATE 2.5 MG/1
10 TABLET ORAL DAILY
Refills: 0 | Status: DISCONTINUED | OUTPATIENT
Start: 2020-01-13 | End: 2020-01-13

## 2020-01-13 RX ORDER — LISINOPRIL 2.5 MG/1
5 TABLET ORAL DAILY
Refills: 0 | Status: DISCONTINUED | OUTPATIENT
Start: 2020-01-13 | End: 2020-01-14

## 2020-01-13 RX ORDER — NYSTATIN CREAM 100000 [USP'U]/G
1 CREAM TOPICAL EVERY 12 HOURS
Refills: 0 | Status: DISCONTINUED | OUTPATIENT
Start: 2020-01-13 | End: 2020-01-17

## 2020-01-13 RX ORDER — HYDRALAZINE HCL 50 MG
10 TABLET ORAL EVERY 6 HOURS
Refills: 0 | Status: DISCONTINUED | OUTPATIENT
Start: 2020-01-13 | End: 2020-01-17

## 2020-01-13 RX ORDER — PENICILLIN G POTASSIUM 5000000 [IU]/1
4 POWDER, FOR SOLUTION INTRAMUSCULAR; INTRAPLEURAL; INTRATHECAL; INTRAVENOUS EVERY 4 HOURS
Refills: 0 | Status: DISCONTINUED | OUTPATIENT
Start: 2020-01-13 | End: 2020-01-17

## 2020-01-13 RX ORDER — LANOLIN ALCOHOL/MO/W.PET/CERES
3 CREAM (GRAM) TOPICAL AT BEDTIME
Refills: 0 | Status: DISCONTINUED | OUTPATIENT
Start: 2020-01-13 | End: 2020-01-17

## 2020-01-13 RX ADMIN — LISINOPRIL 5 MILLIGRAM(S): 2.5 TABLET ORAL at 12:42

## 2020-01-13 RX ADMIN — Medication 100 MILLIGRAM(S): at 05:08

## 2020-01-13 RX ADMIN — MEROPENEM 100 MILLIGRAM(S): 1 INJECTION INTRAVENOUS at 05:08

## 2020-01-13 RX ADMIN — Medication 6: at 12:10

## 2020-01-13 RX ADMIN — Medication 240 MILLIGRAM(S): at 05:07

## 2020-01-13 RX ADMIN — Medication 3 MILLIGRAM(S): at 23:48

## 2020-01-13 RX ADMIN — MORPHINE SULFATE 2 MILLIGRAM(S): 50 CAPSULE, EXTENDED RELEASE ORAL at 19:53

## 2020-01-13 RX ADMIN — OXYCODONE AND ACETAMINOPHEN 1 TABLET(S): 5; 325 TABLET ORAL at 22:12

## 2020-01-13 RX ADMIN — MORPHINE SULFATE 2 MILLIGRAM(S): 50 CAPSULE, EXTENDED RELEASE ORAL at 05:08

## 2020-01-13 RX ADMIN — AMLODIPINE BESYLATE 5 MILLIGRAM(S): 2.5 TABLET ORAL at 05:07

## 2020-01-13 RX ADMIN — Medication 2: at 22:21

## 2020-01-13 RX ADMIN — PENICILLIN G POTASSIUM 100 MILLION UNIT(S): 5000000 POWDER, FOR SOLUTION INTRAMUSCULAR; INTRAPLEURAL; INTRATHECAL; INTRAVENOUS at 22:14

## 2020-01-13 RX ADMIN — Medication 4: at 16:23

## 2020-01-13 RX ADMIN — LIDOCAINE 1 PATCH: 4 CREAM TOPICAL at 18:04

## 2020-01-13 RX ADMIN — INSULIN GLARGINE 10 UNIT(S): 100 INJECTION, SOLUTION SUBCUTANEOUS at 22:21

## 2020-01-13 RX ADMIN — Medication 2: at 08:04

## 2020-01-13 RX ADMIN — Medication 100 MILLIGRAM(S): at 13:08

## 2020-01-13 RX ADMIN — Medication 100 MILLIGRAM(S): at 22:13

## 2020-01-13 RX ADMIN — MORPHINE SULFATE 2 MILLIGRAM(S): 50 CAPSULE, EXTENDED RELEASE ORAL at 20:10

## 2020-01-13 RX ADMIN — OXYCODONE AND ACETAMINOPHEN 1 TABLET(S): 5; 325 TABLET ORAL at 23:00

## 2020-01-13 RX ADMIN — MORPHINE SULFATE 2 MILLIGRAM(S): 50 CAPSULE, EXTENDED RELEASE ORAL at 05:13

## 2020-01-13 RX ADMIN — Medication 3 UNIT(S): at 12:43

## 2020-01-13 RX ADMIN — Medication 3 UNIT(S): at 16:23

## 2020-01-13 RX ADMIN — HEPARIN SODIUM 5000 UNIT(S): 5000 INJECTION INTRAVENOUS; SUBCUTANEOUS at 05:07

## 2020-01-13 RX ADMIN — APIXABAN 5 MILLIGRAM(S): 2.5 TABLET, FILM COATED ORAL at 17:23

## 2020-01-13 RX ADMIN — PENICILLIN G POTASSIUM 100 MILLION UNIT(S): 5000000 POWDER, FOR SOLUTION INTRAMUSCULAR; INTRAPLEURAL; INTRATHECAL; INTRAVENOUS at 17:23

## 2020-01-13 RX ADMIN — SODIUM CHLORIDE 100 MILLILITER(S): 9 INJECTION INTRAMUSCULAR; INTRAVENOUS; SUBCUTANEOUS at 22:13

## 2020-01-13 RX ADMIN — ALBUTEROL 1.25 MILLIGRAM(S): 90 AEROSOL, METERED ORAL at 08:58

## 2020-01-13 RX ADMIN — Medication 10 MILLIGRAM(S): at 16:52

## 2020-01-13 RX ADMIN — LIDOCAINE 1 PATCH: 4 CREAM TOPICAL at 16:22

## 2020-01-13 NOTE — PROGRESS NOTE ADULT - PROBLEM SELECTOR PLAN 8
Unclear if related to hip prior hip replacement vs sciatica  States he has hx of spinal stenosis.  follow X ray lumbar spine and hip. cont PT.     Trial of lidocaine patch

## 2020-01-13 NOTE — PROGRESS NOTE ADULT - PROBLEM SELECTOR PLAN 6
Eliquis ON HOLD fauzia-op  Restart Eliquis when OK w/ surgery  Normally takes Cardizem for rate control; now back at full dose  Cardizem stopped post-op; restart at 120mg and titrate back up to prior 240mg  On SQ heparin for dvt ppx, change back to Eliquis when ok per surgery.

## 2020-01-13 NOTE — PROGRESS NOTE ADULT - ASSESSMENT
Pt is a 70 yo gentleman with a pmhx of HTN, HL, NIDM, Afib on eliquis who presents to the ED with leg redness and fever and cough . Has had chronic ulcer on L. medial leg for months, but over past couple days has gotten worse, and yesterday wife noted that his L. Leg was red. Seen  with   1.Acute gas gangrene of left foot with probable necrotizing fascitis and osteomyelitis of ankle : s/p Incision and drainage of abscess of left foot on 1/8. s/p BKA  change to penicillin   cont clindamycin  all c/s including abscess, OR and blood c/s Grp B strept   repeat blood c/s today   anticipate another 10days of antibiotics    2.Sepsis: resolving leukocytosis  fever resolved      3.RSV+ URI : supportive care      4.Uncontrolled DM :check Hb A I C   tight glucose control    5.ELMER vs ELMER on CKD  cr resolving  antibiotics renally dosed  monitor cr

## 2020-01-13 NOTE — PROGRESS NOTE ADULT - SUBJECTIVE AND OBJECTIVE BOX
Interval summary/Hospital course:  Pt is a 70 yo gentleman with a pmhx of HTN, HL, NIDM, Afib on eliquis who presented to the ED with leg redness and fever and cough . Has had chronic ulcer on L. medial leg for months, but has gotten worse. Patient was admitted and diagnosed with sepsis with  acute gas gangrene left foot with possible OM and necrotizing fascitis. ID and Podiatrist were consulted. patient was placed on broad spectrum antbiotic. patient under I and D left leg on  and left foot amputation on 1/10.  Tissue culture grew strep agalactiae.   also patient was placed on contact isolation for RSV URI.         CHIEF COMPLAINT: left hip pain. s/p X ray low back and left hip. pain meds helping. not much cough. no fever.       PHYSICAL EXAM:  GENERAL: well built, well nourished,   CHEST/LUNG: Clear to ausculation bilaterally, no wheezing, no crackles   HEART: Regular rate and rhythm; No murmurs, rubs  ABDOMEN: Soft, Nontender, Nondistended; Bowel sounds present  EXTREMITIES:  s/p left foot amputation. dressing +. no cyanosis.   NERVOUS SYSTEM:  Grossly non focal.  Psychiatry: AAO x 3, mood is appropriate     -----------------------------------------------------------------------------------------------------------------------------------------------------------------  OBJECTIVE DATA:     Vital Signs Last 24 Hrs  T(C): 36.4 (2020 11:48), Max: 36.7 (2020 16:49)  T(F): 97.6 (2020 11:48), Max: 98 (2020 16:49)  HR: 78 (2020 11:48) (71 - 78)  BP: 192/91 (2020 11:48) (124/48 - 198/99)  BP(mean): --  RR: 16 (2020 11:48) (16 - 17)  SpO2: 98% (2020 11:48) (96% - 100%)           Daily     Daily Weight in k.2 (2020 05:09)      LABS:                        8.3    21.74 )-----------( 554      ( 2020 06:34 )             26.8                 138  |  107  |  44<H>  ----------------------------<  169<H>  4.5   |  24  |  1.82<H>    Ca    8.7      2020 06:34  Mg     2.0                             Culture - Surgical Swab  Preliminary Report (20 @ 22:45):    Rare Streptococcus agalactiae (Group B) isolated    Group B streptococci are susceptible to ampicillin,    penicillin and cefazolin, but may be resistant to    erythromycin and clindamycin.    Recommendations for intrapartum prophylaxis for Group B    streptococci are penicillin or ampicillin.    Culture - Tissue with Gram Stain  Gram Stain (prelim) (01-10-20 @ 12:16):    No polymorphonuclear leukocytes seen per low power field    Few Gram Positive Cocci in Pairs and Chains seen per oil power field  Preliminary Report (01-10-20 @ 12:16):    Numerous Streptococcus agalactiae (Group B)    Streptococcus agalactiae (Group B) isolated    Group B streptococci are susceptible to ampicillin,    penicillin and cefazolin, but may be resistant to    erythromycin and clindamycin.    Recommendations for intrapartum prophylaxis for Group B    streptococci are penicillin or ampicillin.    Culture - Tissue with Gram Stain  Gram Stain (prelim) (01-10-20 @ 12:17):    No polymorphonuclear leukocytes seen per low power field    Numerous Gram Positive Cocci in Pairs and Chains seen per oil power field  Preliminary Report (01-10-20 @ 12:17):    Numerous Streptococcus agalactiae (Group B)    Streptococcus agalactiae (Group B) isolated    Group B streptococci are susceptible to ampicillin,    penicillin and cefazolin, but may be resistant to    erythromycin and clindamycin.    Recommendations for intrapartum prophylaxis for Group B    streptococci are penicillin or ampicillin.    Culture - Urine  Final Report (20 @ 16:24):    No growth    Culture - Blood  Preliminary Report (20 @ 19:02):    No growth to date.    Culture - Blood  Gram Stain (20 @ 16:42):    Growth in anaerobic bottle: Gram Positive Cocci in Pairs and Chains  Final Report (20 @ 16:42):    Growth in anaerobic bottle: Streptococcus agalactiae (Group B)    ***Blood Panel PCR results on this specimen are available    approximately 3 hours after the Gram stain result.***    Gram stain, PCR, and/or culture results may not always    correspond dueto difference in methodologies.    ************************************************************    This PCR assay was performed using [employer].    The following targets are tested for: Enterococcus,    vancomycin resistant enterococci, Listeria monocytogenes,    coagulase negative staphylococci, S. aureus,    methicillin resistant S. aureus, Streptococcus agalactiae    (Group B), S. pneumoniae, S. pyogenes (Group A),    Acinetobacter baumannii, Enterobacter cloacae, E. coli,    Klebsiella oxytoca, K. pneumoniae, Proteus sp.,    Serratia marcescens, Haemophilus influenzae,    Neisseria meningitidis, Pseudomonas aeruginosa, Candida    albicans, C. glabrata, C krusei, C parapsilosis,    C. tropicalis and the KPC resistance gene.  Organism: Blood Culture PCR  Streptococcus agalactiae (Group B) (20 @ 16:42)  Organism: Streptococcus agalactiae (Group B) (20 @ 16:42)    Sensitivities:      -  Ceftriaxone: S 0.047      -  Penicillin: S 0.047 Predicts results for ampicillin, amoxicillin, amoxicillin/clavulanate, ampicillin/sulbactam, 1st, 2nd and 3rd generation cephalosporins and carbapenems.      Method Type: ETEST  Organism: Streptococcus agalactiae (Group B) (20 @ 16:42)    Sensitivities:      -  Clindamycin: S      -  Levofloxacin: S      -  Penicillin: S Predicts results for ampicillin, amoxicillin, amoxicillin/clavulanate, ampicillin/sulbactam, 1st, 2nd and 3rd generation cephalosporins and carbapenems.      -  Vancomycin: S      Method Type: KB  Organism: Blood Culture PCR (20 @ 16:42)    Sensitivities:      -  Streptococcus agalactiae (Group B): Detec      Method Type: PCR      MEDICATIONS  (STANDING):  amLODIPine   Tablet 10 milliGRAM(s) Oral daily  clindamycin IVPB 900 milliGRAM(s) IV Intermittent every 8 hours  dextrose 50% Injectable 12.5 Gram(s) IV Push once  dextrose 50% Injectable 25 Gram(s) IV Push once  dextrose 50% Injectable 25 Gram(s) IV Push once  diltiazem    milliGRAM(s) Oral daily  heparin  Injectable 5000 Unit(s) SubCutaneous every 12 hours  insulin glargine Injectable (LANTUS) 10 Unit(s) SubCutaneous at bedtime  insulin lispro (HumaLOG) corrective regimen sliding scale   SubCutaneous three times a day before meals  insulin lispro (HumaLOG) corrective regimen sliding scale   SubCutaneous at bedtime  lidocaine   Patch 1 Patch Transdermal every 24 hours  meropenem  IVPB 500 milliGRAM(s) IV Intermittent every 12 hours  sodium chloride 0.9%. 1000 milliLiter(s) (100 mL/Hr) IV Continuous <Continuous>    MEDICATIONS  (PRN):  acetaminophen   Tablet .. 650 milliGRAM(s) Oral every 6 hours PRN Temp greater or equal to 38C (100.4F), Mild Pain (1 - 3)  ALBUTerol   0.042% 1.25 milliGRAM(s) Nebulizer four times a day PRN Wheezing  dextrose 40% Gel 15 Gram(s) Oral once PRN Blood Glucose LESS THAN 70 milliGRAM(s)/deciliter  glucagon  Injectable 1 milliGRAM(s) IntraMuscular once PRN Glucose LESS THAN 70 milligrams/deciliter  guaiFENesin   Syrup  (Sugar-Free) 100 milliGRAM(s) Oral every 6 hours PRN Cough  morphine  - Injectable 2 milliGRAM(s) IV Push every 4 hours PRN Severe Pain (7 - 10)  oxycodone    5 mG/acetaminophen 325 mG 1 Tablet(s) Oral every 4 hours PRN Moderate Pain (4 - 6)  sodium chloride 0.65% Nasal 1 Spray(s) Both Nostrils three times a day PRN Nasal Congestion      RADIOLOGY & ADDITIONAL STUDIES:     ------------------------------------------------------------------------------------------------------------------------------------------------------------------------  DVT Prophylaxis: Anticoagulation  Compression devices Interval summary/Hospital course:  Pt is a 68 yo gentleman with a pmhx of HTN, HL, NIDM, Afib on eliquis who presented to the ED with leg redness and fever and cough . Has had chronic ulcer on L. medial leg for months, but has gotten worse. Patient was admitted and diagnosed with sepsis with  acute gas gangrene left foot with possible OM and necrotizing fascitis. ID and Podiatrist were consulted. patient was placed on broad spectrum antbiotic. patient under I and D left leg on  and left foot amputation on 1/10.  Tissue culture grew strep agalactiae.   also patient was placed on contact isolation for RSV URI.         CHIEF COMPLAINT: left hip pain. s/p X ray low back and left hip. pain meds helping. not much cough. no fever.       PHYSICAL EXAM:   GENERAL: well built, well nourished, no acute distress   CHEST/LUNG: Clear to ausculation bilaterally, no wheezing, no crackles   HEART: Regular rate and rhythm; No murmurs, rubs  ABDOMEN: Soft, Nontender, Nondistended; Bowel sounds present  EXTREMITIES:  s/p left foot amputation. dressing +. no cyanosis.   NERVOUS SYSTEM:  Grossly non focal.  Psychiatry: AAO x 3, mood is appropriate     -----------------------------------------------------------------------------------------------------------------------------------------------------------------  OBJECTIVE DATA:     Vital Signs Last 24 Hrs  T(C): 36.4 (2020 11:48), Max: 36.7 (2020 16:49)  T(F): 97.6 (2020 11:48), Max: 98 (2020 16:49)  HR: 78 (2020 11:48) (71 - 78)  BP: 192/91 (2020 11:48) (124/48 - 198/99)  BP(mean): --  RR: 16 (2020 11:48) (16 - 17)  SpO2: 98% (2020 11:48) (96% - 100%)           Daily     Daily Weight in k.2 (2020 05:09)      LABS:                        8.3    21.74 )-----------( 554      ( 2020 06:34 )             26.8                 138  |  107  |  44<H>  ----------------------------<  169<H>  4.5   |  24  |  1.82<H>    Ca    8.7      2020 06:34  Mg     2.0                             Culture - Surgical Swab  Preliminary Report (20 @ 22:45):    Rare Streptococcus agalactiae (Group B) isolated    Group B streptococci are susceptible to ampicillin,    penicillin and cefazolin, but may be resistant to    erythromycin and clindamycin.    Recommendations for intrapartum prophylaxis for Group B    streptococci are penicillin or ampicillin.    Culture - Tissue with Gram Stain  Gram Stain (prelim) (01-10-20 @ 12:16):    No polymorphonuclear leukocytes seen per low power field    Few Gram Positive Cocci in Pairs and Chains seen per oil power field  Preliminary Report (01-10-20 @ 12:16):    Numerous Streptococcus agalactiae (Group B)    Streptococcus agalactiae (Group B) isolated    Group B streptococci are susceptible to ampicillin,    penicillin and cefazolin, but may be resistant to    erythromycin and clindamycin.    Recommendations for intrapartum prophylaxis for Group B    streptococci are penicillin or ampicillin.    Culture - Tissue with Gram Stain  Gram Stain (prelim) (01-10-20 @ 12:17):    No polymorphonuclear leukocytes seen per low power field    Numerous Gram Positive Cocci in Pairs and Chains seen per oil power field  Preliminary Report (01-10-20 @ 12:17):    Numerous Streptococcus agalactiae (Group B)    Streptococcus agalactiae (Group B) isolated    Group B streptococci are susceptible to ampicillin,    penicillin and cefazolin, but may be resistant to    erythromycin and clindamycin.    Recommendations for intrapartum prophylaxis for Group B    streptococci are penicillin or ampicillin.    Culture - Urine  Final Report (20 @ 16:24):    No growth    Culture - Blood  Preliminary Report (20 @ 19:02):    No growth to date.    Culture - Blood  Gram Stain (20 @ 16:42):    Growth in anaerobic bottle: Gram Positive Cocci in Pairs and Chains  Final Report (20 @ 16:42):    Growth in anaerobic bottle: Streptococcus agalactiae (Group B)    ***Blood Panel PCR results on this specimen are available    approximately 3 hours after the Gram stain result.***    Gram stain, PCR, and/or culture results may not always    correspond dueto difference in methodologies.    ************************************************************    This PCR assay was performed using [employer].    The following targets are tested for: Enterococcus,    vancomycin resistant enterococci, Listeria monocytogenes,    coagulase negative staphylococci, S. aureus,    methicillin resistant S. aureus, Streptococcus agalactiae    (Group B), S. pneumoniae, S. pyogenes (Group A),    Acinetobacter baumannii, Enterobacter cloacae, E. coli,    Klebsiella oxytoca, K. pneumoniae, Proteus sp.,    Serratia marcescens, Haemophilus influenzae,    Neisseria meningitidis, Pseudomonas aeruginosa, Candida    albicans, C. glabrata, C krusei, C parapsilosis,    C. tropicalis and the KPC resistance gene.  Organism: Blood Culture PCR  Streptococcus agalactiae (Group B) (20 @ 16:42)  Organism: Streptococcus agalactiae (Group B) (20 @ 16:42)    Sensitivities:      -  Ceftriaxone: S 0.047      -  Penicillin: S 0.047 Predicts results for ampicillin, amoxicillin, amoxicillin/clavulanate, ampicillin/sulbactam, 1st, 2nd and 3rd generation cephalosporins and carbapenems.      Method Type: ETEST  Organism: Streptococcus agalactiae (Group B) (20 @ 16:42)    Sensitivities:      -  Clindamycin: S      -  Levofloxacin: S      -  Penicillin: S Predicts results for ampicillin, amoxicillin, amoxicillin/clavulanate, ampicillin/sulbactam, 1st, 2nd and 3rd generation cephalosporins and carbapenems.      -  Vancomycin: S      Method Type: KB  Organism: Blood Culture PCR (20 @ 16:42)    Sensitivities:      -  Streptococcus agalactiae (Group B): Detec      Method Type: PCR      MEDICATIONS  (STANDING):  amLODIPine   Tablet 10 milliGRAM(s) Oral daily  clindamycin IVPB 900 milliGRAM(s) IV Intermittent every 8 hours  dextrose 50% Injectable 12.5 Gram(s) IV Push once  dextrose 50% Injectable 25 Gram(s) IV Push once  dextrose 50% Injectable 25 Gram(s) IV Push once  diltiazem    milliGRAM(s) Oral daily  heparin  Injectable 5000 Unit(s) SubCutaneous every 12 hours  insulin glargine Injectable (LANTUS) 10 Unit(s) SubCutaneous at bedtime  insulin lispro (HumaLOG) corrective regimen sliding scale   SubCutaneous three times a day before meals  insulin lispro (HumaLOG) corrective regimen sliding scale   SubCutaneous at bedtime  lidocaine   Patch 1 Patch Transdermal every 24 hours  meropenem  IVPB 500 milliGRAM(s) IV Intermittent every 12 hours  sodium chloride 0.9%. 1000 milliLiter(s) (100 mL/Hr) IV Continuous <Continuous>    MEDICATIONS  (PRN):  acetaminophen   Tablet .. 650 milliGRAM(s) Oral every 6 hours PRN Temp greater or equal to 38C (100.4F), Mild Pain (1 - 3)  ALBUTerol   0.042% 1.25 milliGRAM(s) Nebulizer four times a day PRN Wheezing  dextrose 40% Gel 15 Gram(s) Oral once PRN Blood Glucose LESS THAN 70 milliGRAM(s)/deciliter  glucagon  Injectable 1 milliGRAM(s) IntraMuscular once PRN Glucose LESS THAN 70 milligrams/deciliter  guaiFENesin   Syrup  (Sugar-Free) 100 milliGRAM(s) Oral every 6 hours PRN Cough  morphine  - Injectable 2 milliGRAM(s) IV Push every 4 hours PRN Severe Pain (7 - 10)  oxycodone    5 mG/acetaminophen 325 mG 1 Tablet(s) Oral every 4 hours PRN Moderate Pain (4 - 6)  sodium chloride 0.65% Nasal 1 Spray(s) Both Nostrils three times a day PRN Nasal Congestion      RADIOLOGY & ADDITIONAL STUDIES:     ------------------------------------------------------------------------------------------------------------------------------------------------------------------------  DVT Prophylaxis: Anticoagulation  Compression devices

## 2020-01-13 NOTE — PROGRESS NOTE ADULT - PROBLEM SELECTOR PLAN 4
BG better controlled. not a candidate for metformin in setting of elevated creatinine. cont correctional insulin. follow finger sticks.   Note that home med list is incomplete; pt states he normally takes 4 meds: Metformin, Invokana, Glimepiride, Onglyza  Lantus , ISS while in-house

## 2020-01-13 NOTE — PROGRESS NOTE ADULT - SUBJECTIVE AND OBJECTIVE BOX
Patient is a 69y old  Male who presents with a chief complaint of left  foot ulcer (13 Jan 2020 14:43)      INTERVAL HPI / OVERNIGHT EVENTS: doing ok     MEDICATIONS  (STANDING):  apixaban 5 milliGRAM(s) Oral two times a day  clindamycin IVPB 900 milliGRAM(s) IV Intermittent every 8 hours  dextrose 50% Injectable 12.5 Gram(s) IV Push once  dextrose 50% Injectable 25 Gram(s) IV Push once  dextrose 50% Injectable 25 Gram(s) IV Push once  diltiazem    milliGRAM(s) Oral daily  insulin glargine Injectable (LANTUS) 10 Unit(s) SubCutaneous at bedtime  insulin lispro (HumaLOG) corrective regimen sliding scale   SubCutaneous three times a day before meals  insulin lispro (HumaLOG) corrective regimen sliding scale   SubCutaneous at bedtime  insulin lispro Injectable (HumaLOG) 3 Unit(s) SubCutaneous before breakfast  insulin lispro Injectable (HumaLOG) 3 Unit(s) SubCutaneous before lunch  insulin lispro Injectable (HumaLOG) 3 Unit(s) SubCutaneous before dinner  lidocaine   Patch 1 Patch Transdermal every 24 hours  lisinopril 5 milliGRAM(s) Oral daily  penicillin   G  potassium  IVPB 4 Million Unit(s) IV Intermittent every 4 hours  sodium chloride 0.9%. 1000 milliLiter(s) (100 mL/Hr) IV Continuous <Continuous>    MEDICATIONS  (PRN):  acetaminophen   Tablet .. 650 milliGRAM(s) Oral every 6 hours PRN Temp greater or equal to 38C (100.4F), Mild Pain (1 - 3)  ALBUTerol   0.042% 1.25 milliGRAM(s) Nebulizer four times a day PRN Wheezing  dextrose 40% Gel 15 Gram(s) Oral once PRN Blood Glucose LESS THAN 70 milliGRAM(s)/deciliter  glucagon  Injectable 1 milliGRAM(s) IntraMuscular once PRN Glucose LESS THAN 70 milligrams/deciliter  guaiFENesin   Syrup  (Sugar-Free) 100 milliGRAM(s) Oral every 6 hours PRN Cough  morphine  - Injectable 2 milliGRAM(s) IV Push every 4 hours PRN Severe Pain (7 - 10)  oxycodone    5 mG/acetaminophen 325 mG 1 Tablet(s) Oral every 4 hours PRN Moderate Pain (4 - 6)  sodium chloride 0.65% Nasal 1 Spray(s) Both Nostrils three times a day PRN Nasal Congestion      Vital Signs Last 24 Hrs  T(C): 36.5 (13 Jan 2020 13:01), Max: 36.7 (12 Jan 2020 16:49)  T(F): 97.7 (13 Jan 2020 13:01), Max: 98 (12 Jan 2020 16:49)  HR: 77 (13 Jan 2020 15:25) (71 - 78)  BP: 186/96 (13 Jan 2020 15:25) (124/48 - 198/99)  BP(mean): --  RR: 17 (13 Jan 2020 13:01) (16 - 17)  SpO2: 98% (13 Jan 2020 15:25) (96% - 100%)    Review of systems:  General : no fever /chills, fatigue  CVS : no chest pain, palpitations  Lungs : no shortness of breath, cough  GI : no abdominal pain, vomiting, diarrhea   : no dysuria ,hematuria        PHYSICAL EXAM:  General :NAD  Constitutional:  well-groomed, well-developed  Respiratory: CTAB/L  Cardiovascular: S1 and S2, RRR, no M/G/R  Gastrointestinal: BS+, soft, NT/ND  Extremities: No peripheral edema  Vascular: 2+ peripheral pulses  Skin: s/p left BKA+      LABS:                        8.3    21.74 )-----------( 554      ( 13 Jan 2020 06:34 )             26.8     01-13    138  |  107  |  44<H>  ----------------------------<  169<H>  4.5   |  24  |  1.82<H>    Ca    8.7      13 Jan 2020 06:34  Mg     2.0     01-12            MICROBIOLOGY:  RECENT CULTURES:  01-10 .Surgical Swab LEFT LEG C/S XXXX XXXX   Rare Streptococcus agalactiae (Group B) isolated  Group B streptococci are susceptible to ampicillin,  penicillin and cefazolin, but may be resistant to  erythromycin and clindamycin.  Recommendations for intrapartum prophylaxis for Group B  streptococci are penicillin or ampicillin.    01-09 .Tissue left medial maledus bone XXXX   No polymorphonuclear leukocytes seen per low power field  Few Gram Positive Cocci in Pairs and Chains seen per oil power field   Numerous Streptococcus agalactiae (Group B)  Streptococcus agalactiae (Group B) isolated  Group B streptococci are susceptible to ampicillin,  penicillin and cefazolin, but may be resistant to  erythromycin and clindamycin.  Recommendations for intrapartum prophylaxis for Group B  streptococci are penicillin or ampicillin.    01-09 .Tissue left foot soft tissue XXXX   No polymorphonuclear leukocytes seen per low power field  Numerous Gram Positive Cocci in Pairs and Chains seen per oil power field   Numerous Streptococcus agalactiae (Group B)  Streptococcus agalactiae (Group B) isolated  Group B streptococci are susceptible to ampicillin,  penicillin and cefazolin, but may be resistant to  erythromycin and clindamycin.  Recommendations for intrapartum prophylaxis for Group B  streptococci are penicillin or ampicillin.    01-08 .Urine Clean Catch (Midstream) XXXX XXXX   No growth    01-08 .Blood Blood-Peripheral XXXX XXXX   No growth to date.    01-08 .Blood Blood-Peripheral Blood Culture PCR  Streptococcus agalactiae (Group B)   Growth in anaerobic bottle: Gram Positive Cocci in Pairs and Chains   Growth in anaerobic bottle: Streptococcus agalactiae (Group B)  ***Blood Panel PCR results on this specimen are available  approximately 3 hours after the Gram stain result.***  Gram stain, PCR, and/or culture results may not always  correspond dueto difference in methodologies.  ************************************************************  This PCR assay was performed using [employer].  The following targets are tested for: Enterococcus,  vancomycin resistant enterococci, Listeria monocytogenes,  coagulase negative staphylococci, S. aureus,  methicillin resistant S. aureus, Streptococcus agalactiae  (Group B), S. pneumoniae, S. pyogenes (Group A),  Acinetobacter baumannii, Enterobacter cloacae, E. coli,  Klebsiella oxytoca, K. pneumoniae, Proteus sp.,  Serratia marcescens, Haemophilus influenzae,  Neisseria meningitidis, Pseudomonas aeruginosa, Candida  albicans, C. glabrata, C krusei, C parapsilosis,  C. tropicalis and the KPC resistance gene.          RADIOLOGY & ADDITIONAL STUDIES:

## 2020-01-13 NOTE — PROGRESS NOTE ADULT - SUBJECTIVE AND OBJECTIVE BOX
[facility] NEPHROLOGY SERVICES, [facility]  NEPHROLOGY AND HYPERTENSION  [address]  [address]  [place] [address]  [phone]    MD YISEL WEEKS MD ANDREY GONCHARUK, MD MADHU KORRAPATI, MD YELENA ROSENBERG, MD BINNY KOSHY, MD CHRISTOPHER CAPUTO, MD EDWARD BOVER, MD          Patient feels well no complaints today.    MEDICATIONS  (STANDING):  apixaban 5 milliGRAM(s) Oral two times a day  clindamycin IVPB 900 milliGRAM(s) IV Intermittent every 8 hours  dextrose 50% Injectable 12.5 Gram(s) IV Push once  dextrose 50% Injectable 25 Gram(s) IV Push once  dextrose 50% Injectable 25 Gram(s) IV Push once  diltiazem    milliGRAM(s) Oral daily  insulin glargine Injectable (LANTUS) 10 Unit(s) SubCutaneous at bedtime  insulin lispro (HumaLOG) corrective regimen sliding scale   SubCutaneous three times a day before meals  insulin lispro (HumaLOG) corrective regimen sliding scale   SubCutaneous at bedtime  insulin lispro Injectable (HumaLOG) 3 Unit(s) SubCutaneous before breakfast  insulin lispro Injectable (HumaLOG) 3 Unit(s) SubCutaneous before lunch  insulin lispro Injectable (HumaLOG) 3 Unit(s) SubCutaneous before dinner  lidocaine   Patch 1 Patch Transdermal every 24 hours  lisinopril 5 milliGRAM(s) Oral daily  meropenem  IVPB 500 milliGRAM(s) IV Intermittent every 12 hours  sodium chloride 0.9%. 1000 milliLiter(s) (100 mL/Hr) IV Continuous <Continuous>    MEDICATIONS  (PRN):  acetaminophen   Tablet .. 650 milliGRAM(s) Oral every 6 hours PRN Temp greater or equal to 38C (100.4F), Mild Pain (1 - 3)  ALBUTerol   0.042% 1.25 milliGRAM(s) Nebulizer four times a day PRN Wheezing  dextrose 40% Gel 15 Gram(s) Oral once PRN Blood Glucose LESS THAN 70 milliGRAM(s)/deciliter  glucagon  Injectable 1 milliGRAM(s) IntraMuscular once PRN Glucose LESS THAN 70 milligrams/deciliter  guaiFENesin   Syrup  (Sugar-Free) 100 milliGRAM(s) Oral every 6 hours PRN Cough  morphine  - Injectable 2 milliGRAM(s) IV Push every 4 hours PRN Severe Pain (7 - 10)  oxycodone    5 mG/acetaminophen 325 mG 1 Tablet(s) Oral every 4 hours PRN Moderate Pain (4 - 6)  sodium chloride 0.65% Nasal 1 Spray(s) Both Nostrils three times a day PRN Nasal Congestion      01-12-20 @ 07:01  -  01-13-20 @ 07:00  --------------------------------------------------------  IN: 2650 mL / OUT: 1930 mL / NET: 720 mL    01-13-20 @ 07:01  -  01-13-20 @ 14:43  --------------------------------------------------------  IN: 0 mL / OUT: 100 mL / NET: -100 mL      PHYSICAL EXAM:      T(C): 36.5 (01-13-20 @ 13:01), Max: 36.7 (01-12-20 @ 16:49)  HR: 76 (01-13-20 @ 13:01) (71 - 78)  BP: 168/88 (01-13-20 @ 13:01) (124/48 - 198/99)  RR: 17 (01-13-20 @ 13:01) (16 - 17)  SpO2: 98% (01-13-20 @ 13:01) (96% - 100%)  Wt(kg): --  Respiratory: clear anteriorly, decreased BS at bases  Cardiovascular: S1 S2  Gastrointestinal: soft NT ND +BS  Extremities:   LLE ankle amputation                            8.3    21.74 )-----------( 554      ( 13 Jan 2020 06:34 )             26.8     01-13    138  |  107  |  44<H>  ----------------------------<  169<H>  4.5   |  24  |  1.82<H>    Ca    8.7      13 Jan 2020 06:34  Mg     2.0     01-12          Creatinine Trend: 1.82<--, 2.25<--, 2.79<--, 2.83<--, 2.70<--, 2.50<--      Assessment   ELMER CKD 3; HTN; DM risk;   Pre renal azotemia; infectious GN;   Post op   Indices slowly improving     Plan  IV abx, IVF NS;   Holding Metformin and ACE;   Discussed with patient  Will follow course.      Augusto Santoyo MD.

## 2020-01-13 NOTE — PROGRESS NOTE ADULT - PROBLEM SELECTOR PLAN 8
Unclear if related to hip prior hip replacement vs sciatica  States he has hx of spinal stenosis and had been doing a lot of sitting  xrays ordered/pending  Trying lidoderm  Lidoderm  Trial of lidocaine patch

## 2020-01-13 NOTE — PROGRESS NOTE ADULT - PROBLEM SELECTOR PLAN 1
Podiatry consulted (Dianna)  s/p left medial malleolus biopsy on 1/8  s/p L foot amputation on 1/10  local wound care.   cont antibiotic per ID.   wbc resolving now  ID following,  IV merrem and clindamycin

## 2020-01-13 NOTE — PROGRESS NOTE ADULT - SUBJECTIVE AND OBJECTIVE BOX
INTERVAL HPI/OVERNIGHT EVENTS:  Pt. seen and examined at bedside with Dr. Quintero. Pain well controlled. Patient c/o SOB and weakness.   Tolerating diet, denies N/V.   Denies fever/chills, chest pain, abdominal pain, N/V.     Vital Signs Last 24 Hrs  T(C): 36.4 (13 Jan 2020 11:48), Max: 36.7 (12 Jan 2020 16:49)  T(F): 97.6 (13 Jan 2020 11:48), Max: 98 (12 Jan 2020 16:49)  HR: 78 (13 Jan 2020 11:48) (71 - 78)  BP: 192/91 (13 Jan 2020 11:48) (124/48 - 198/99)  RR: 16 (13 Jan 2020 11:48) (16 - 17)  SpO2: 98% (13 Jan 2020 11:48) (96% - 100%)    PHYSICAL EXAM:    GENERAL: NAD  CHEST/LUNG: Clear to ausculation, bilaterally   HEART: S1S2  ABDOMEN: Soft, ND/NT.   EXTREMITIES: LLE dressing CDI, removed to find exposed tibia and fibula, no drainage or bleeding from wound, +some granulation tissue. Erythema to LLE improving, less edema. Xeroform, ABD pads and cling wrap placed, patient tolerated well.     I&O's Detail    12 Jan 2020 07:01  -  13 Jan 2020 07:00  --------------------------------------------------------  IN:    sodium chloride 0.9%.: 2400 mL    Solution: 100 mL    Solution: 150 mL  Total IN: 2650 mL    OUT:    Voided: 1930 mL  Total OUT: 1930 mL    Total NET: 720 mL      13 Jan 2020 07:01  -  13 Jan 2020 11:51  --------------------------------------------------------  IN:  Total IN: 0 mL    OUT:    Voided: 100 mL  Total OUT: 100 mL    Total NET: -100 mL    LABS:                        8.3    21.74 )-----------( 554      ( 13 Jan 2020 06:34 )             26.8     01-13    138  |  107  |  44<H>  ----------------------------<  169<H>  4.5   |  24  |  1.82<H>    Ca    8.7      13 Jan 2020 06:34  Mg     2.0     01-12      Culture Results:   Rare Streptococcus agalactiae (Group B) isolated  Group B streptococci are susceptible to ampicillin,  penicillin and cefazolin, but may be resistant to  erythromycin and clindamycin.  Recommendations for intrapartum prophylaxis for Group B  streptococci are penicillin or ampicillin. (01-10 @ 21:50)    Impression: 69y Male with PMH DM admitted with sepsis 2/2 gas gangrene of left foot now POD #3 s/p guillotine amputation of LLE through tibia and fibula above the ankle  WBC improving, cellulitis improving. Hemodynamically stable.       Plan:  As per discussion with Dr. Quintero -- patient will need formal Left BKA later this week as discussed with patient  local wound care -- irrigate wound with NS and cover with xeroform, gauze and cling daily  continue IV ABX per ID, and current medical management and supportive care  daily labs, trend WBC  postop care, incentive spirometry, activity with PT, NWB to LLE

## 2020-01-13 NOTE — PROGRESS NOTE ADULT - PROBLEM SELECTOR PLAN 4
BG elevated, metformin on hold while in-house  Note that home med list is incomplete; pt states he normally takes 4 meds: Metformin, Invokana, Glimepiride, Onglyza  Lantus , ISS while in-house

## 2020-01-14 LAB
CULTURE RESULTS: SIGNIFICANT CHANGE UP
GLUCOSE BLDC GLUCOMTR-MCNC: 209 MG/DL — HIGH (ref 70–99)
GLUCOSE BLDC GLUCOMTR-MCNC: 221 MG/DL — HIGH (ref 70–99)
GLUCOSE BLDC GLUCOMTR-MCNC: 221 MG/DL — HIGH (ref 70–99)
GLUCOSE BLDC GLUCOMTR-MCNC: 255 MG/DL — HIGH (ref 70–99)
GRAM STN FLD: SIGNIFICANT CHANGE UP
HCT VFR BLD CALC: 26.3 % — LOW (ref 39–50)
HGB BLD-MCNC: 8.3 G/DL — LOW (ref 13–17)
MCHC RBC-ENTMCNC: 27.6 PG — SIGNIFICANT CHANGE UP (ref 27–34)
MCHC RBC-ENTMCNC: 31.6 GM/DL — LOW (ref 32–36)
MCV RBC AUTO: 87.4 FL — SIGNIFICANT CHANGE UP (ref 80–100)
NRBC # BLD: 0 /100 WBCS — SIGNIFICANT CHANGE UP (ref 0–0)
PLATELET # BLD AUTO: 542 K/UL — HIGH (ref 150–400)
RBC # BLD: 3.01 M/UL — LOW (ref 4.2–5.8)
RBC # FLD: 14.6 % — HIGH (ref 10.3–14.5)
SPECIMEN SOURCE: SIGNIFICANT CHANGE UP
WBC # BLD: 19.48 K/UL — HIGH (ref 3.8–10.5)
WBC # FLD AUTO: 19.48 K/UL — HIGH (ref 3.8–10.5)

## 2020-01-14 PROCEDURE — 99232 SBSQ HOSP IP/OBS MODERATE 35: CPT

## 2020-01-14 RX ORDER — HYDRALAZINE HCL 50 MG
25 TABLET ORAL THREE TIMES A DAY
Refills: 0 | Status: DISCONTINUED | OUTPATIENT
Start: 2020-01-14 | End: 2020-01-15

## 2020-01-14 RX ORDER — ACETAMINOPHEN 500 MG
325 TABLET ORAL
Qty: 30 | Refills: 0
Start: 2020-01-14

## 2020-01-14 RX ORDER — ACETAMINOPHEN 500 MG
325 TABLET ORAL
Qty: 20 | Refills: 0
Start: 2020-01-14

## 2020-01-14 RX ORDER — DOXAZOSIN MESYLATE 4 MG
2 TABLET ORAL AT BEDTIME
Refills: 0 | Status: DISCONTINUED | OUTPATIENT
Start: 2020-01-14 | End: 2020-01-17

## 2020-01-14 RX ORDER — INSULIN GLARGINE 100 [IU]/ML
14 INJECTION, SOLUTION SUBCUTANEOUS AT BEDTIME
Refills: 0 | Status: DISCONTINUED | OUTPATIENT
Start: 2020-01-14 | End: 2020-01-15

## 2020-01-14 RX ORDER — HYDRALAZINE HCL 50 MG
25 TABLET ORAL ONCE
Refills: 0 | Status: COMPLETED | OUTPATIENT
Start: 2020-01-14 | End: 2020-01-14

## 2020-01-14 RX ADMIN — Medication 25 MILLIGRAM(S): at 14:23

## 2020-01-14 RX ADMIN — PENICILLIN G POTASSIUM 100 MILLION UNIT(S): 5000000 POWDER, FOR SOLUTION INTRAMUSCULAR; INTRAPLEURAL; INTRATHECAL; INTRAVENOUS at 06:25

## 2020-01-14 RX ADMIN — PENICILLIN G POTASSIUM 100 MILLION UNIT(S): 5000000 POWDER, FOR SOLUTION INTRAMUSCULAR; INTRAPLEURAL; INTRATHECAL; INTRAVENOUS at 21:07

## 2020-01-14 RX ADMIN — Medication 25 MILLIGRAM(S): at 09:24

## 2020-01-14 RX ADMIN — MORPHINE SULFATE 2 MILLIGRAM(S): 50 CAPSULE, EXTENDED RELEASE ORAL at 20:52

## 2020-01-14 RX ADMIN — Medication 240 MILLIGRAM(S): at 06:25

## 2020-01-14 RX ADMIN — NYSTATIN CREAM 1 APPLICATION(S): 100000 CREAM TOPICAL at 17:11

## 2020-01-14 RX ADMIN — APIXABAN 5 MILLIGRAM(S): 2.5 TABLET, FILM COATED ORAL at 17:27

## 2020-01-14 RX ADMIN — PENICILLIN G POTASSIUM 100 MILLION UNIT(S): 5000000 POWDER, FOR SOLUTION INTRAMUSCULAR; INTRAPLEURAL; INTRATHECAL; INTRAVENOUS at 14:23

## 2020-01-14 RX ADMIN — SODIUM CHLORIDE 100 MILLILITER(S): 9 INJECTION INTRAMUSCULAR; INTRAVENOUS; SUBCUTANEOUS at 06:31

## 2020-01-14 RX ADMIN — APIXABAN 5 MILLIGRAM(S): 2.5 TABLET, FILM COATED ORAL at 06:25

## 2020-01-14 RX ADMIN — NYSTATIN CREAM 1 APPLICATION(S): 100000 CREAM TOPICAL at 06:26

## 2020-01-14 RX ADMIN — PENICILLIN G POTASSIUM 100 MILLION UNIT(S): 5000000 POWDER, FOR SOLUTION INTRAMUSCULAR; INTRAPLEURAL; INTRATHECAL; INTRAVENOUS at 17:27

## 2020-01-14 RX ADMIN — Medication 6: at 11:31

## 2020-01-14 RX ADMIN — Medication 4: at 17:12

## 2020-01-14 RX ADMIN — Medication 3 UNIT(S): at 11:31

## 2020-01-14 RX ADMIN — Medication 2 MILLIGRAM(S): at 21:02

## 2020-01-14 RX ADMIN — MORPHINE SULFATE 2 MILLIGRAM(S): 50 CAPSULE, EXTENDED RELEASE ORAL at 15:23

## 2020-01-14 RX ADMIN — Medication 3 UNIT(S): at 17:12

## 2020-01-14 RX ADMIN — PENICILLIN G POTASSIUM 100 MILLION UNIT(S): 5000000 POWDER, FOR SOLUTION INTRAMUSCULAR; INTRAPLEURAL; INTRATHECAL; INTRAVENOUS at 21:13

## 2020-01-14 RX ADMIN — MORPHINE SULFATE 2 MILLIGRAM(S): 50 CAPSULE, EXTENDED RELEASE ORAL at 06:26

## 2020-01-14 RX ADMIN — Medication 10 MILLIGRAM(S): at 08:12

## 2020-01-14 RX ADMIN — Medication 3 UNIT(S): at 08:12

## 2020-01-14 RX ADMIN — MORPHINE SULFATE 2 MILLIGRAM(S): 50 CAPSULE, EXTENDED RELEASE ORAL at 21:15

## 2020-01-14 RX ADMIN — Medication 100 MILLIGRAM(S): at 21:01

## 2020-01-14 RX ADMIN — MORPHINE SULFATE 2 MILLIGRAM(S): 50 CAPSULE, EXTENDED RELEASE ORAL at 14:23

## 2020-01-14 RX ADMIN — Medication 4: at 08:12

## 2020-01-14 RX ADMIN — PENICILLIN G POTASSIUM 100 MILLION UNIT(S): 5000000 POWDER, FOR SOLUTION INTRAMUSCULAR; INTRAPLEURAL; INTRATHECAL; INTRAVENOUS at 02:33

## 2020-01-14 RX ADMIN — LIDOCAINE 1 PATCH: 4 CREAM TOPICAL at 17:27

## 2020-01-14 RX ADMIN — Medication 10 MILLIGRAM(S): at 19:46

## 2020-01-14 RX ADMIN — Medication 3 MILLIGRAM(S): at 21:14

## 2020-01-14 RX ADMIN — LISINOPRIL 5 MILLIGRAM(S): 2.5 TABLET ORAL at 06:25

## 2020-01-14 RX ADMIN — MORPHINE SULFATE 2 MILLIGRAM(S): 50 CAPSULE, EXTENDED RELEASE ORAL at 06:50

## 2020-01-14 RX ADMIN — Medication 100 MILLIGRAM(S): at 14:23

## 2020-01-14 RX ADMIN — INSULIN GLARGINE 14 UNIT(S): 100 INJECTION, SOLUTION SUBCUTANEOUS at 21:03

## 2020-01-14 RX ADMIN — PENICILLIN G POTASSIUM 100 MILLION UNIT(S): 5000000 POWDER, FOR SOLUTION INTRAMUSCULAR; INTRAPLEURAL; INTRATHECAL; INTRAVENOUS at 11:32

## 2020-01-14 RX ADMIN — Medication 100 MILLIGRAM(S): at 06:25

## 2020-01-14 RX ADMIN — Medication 25 MILLIGRAM(S): at 21:02

## 2020-01-14 NOTE — PROGRESS NOTE ADULT - PROBLEM SELECTOR PROBLEM 4
Type 2 diabetes mellitus with other neurologic complication, without long-term current use of insulin

## 2020-01-14 NOTE — PROGRESS NOTE ADULT - ASSESSMENT
Pt is a 68 yo gentleman with a pmhx of HTN, HL, NIDM, Afib on eliquis who presents to the ED with leg redness and fever and cough . Has had chronic ulcer on L. medial leg for months, but over past couple days has gotten worse, and yesterday wife noted that his L. Leg was red. Seen  with   1.Acute gas gangrene of left foot with probable necrotizing fascitis and osteomyelitis of ankle : s/p Incision and drainage of abscess of left foot on 1/8. s/p guillotine two days ago and planned for BKA soon  cont clinda and penicillin   all c/s including abscess, OR and blood c/s Grp B strept   repeat blood c/s today   anticipate another 10days of antibiotics    2.Sepsis: resolving leukocytosis  fever resolved      3.RSV+ URI : supportive care      4.Uncontrolled DM :check Hb A I C   tight glucose control    5.ELMER vs ELMER on CKD  cr resolving  antibiotics renally dosed  monitor cr

## 2020-01-14 NOTE — PROGRESS NOTE ADULT - PROBLEM SELECTOR PLAN 4
still uncontrolled with hyperglycemia. increase lantus. cont other  meds. monitor.  Note that home med list is incomplete; pt states he normally takes 4 meds: Metformin, Invokana, Glimepiride, Onglyza

## 2020-01-14 NOTE — PROGRESS NOTE ADULT - NSHPATTENDINGPLANDISCUSS_GEN_ALL_CORE
patient and wife by bed side.
patient
patient
patient and wife by bed side.

## 2020-01-14 NOTE — PROGRESS NOTE ADULT - SUBJECTIVE AND OBJECTIVE BOX
Postoperative Day #4   s/p salvador MORRIS of left Leg    Patient seen and examined bedside resting comfortably.  Denies nausea and vomiting. Tolerating diet.  Flatus/BM. +  Denies chest pain, dyspnea, cough.    T(F): 97.1 (01-14-20 @ 10:50), Max: 97.1 (01-14-20 @ 10:50)  HR: 77 (01-14-20 @ 10:50) (71 - 87)  BP: 161/87 (01-14-20 @ 10:50) (161/87 - 194/105)  RR: 18 (01-14-20 @ 10:50) (18 - 20)  SpO2: 99% (01-14-20 @ 10:50) (98% - 99%)    CAPILLARY BLOOD GLUCOSE  POCT Blood Glucose.: 255 mg/dL (14 Jan 2020 10:38)  POCT Blood Glucose.: 209 mg/dL (14 Jan 2020 07:25)  POCT Blood Glucose.: 253 mg/dL (13 Jan 2020 22:11)  POCT Blood Glucose.: 223 mg/dL (13 Jan 2020 16:15)      PHYSICAL EXAM:  General: NAD  Neuro:  Alert & oriented x 3  Abdomen: obese, soft, NTND. Normactive BS  Left Lower Extremity: dressing "came off" last night. No purulence or bleeding. Tib/fib exposed.  Xeroform & DSD reapplied.    LABS:                        8.3    19.48 )-----------( 542      ( 14 Jan 2020 08:04 )             26.3     01-13    138  |  107  |  44<H>  ----------------------------<  169<H>  4.5   |  24  |  1.82<H>    Ca    8.7      13 Jan 2020 06:34        I&O's Detail    13 Jan 2020 07:01  -  14 Jan 2020 07:00  --------------------------------------------------------  IN:    Oral Fluid: 200 mL    sodium chloride 0.9%.: 1200 mL    Solution: 100 mL    Solution: 50 mL  Total IN: 1550 mL    OUT:    Voided: 700 mL  Total OUT: 700 mL    Total NET: 850 mL          Impression:   Postoperative Day #4   s/p salvador BKA of left Leg  mildly decreased WBC    Plan:  -continue VTE prophylaxis   - continue IVAB per ID changed to PenG & Clindamycin  - continue medical f/u  -continue local wound care  -f/u AM labs  - await scheduling by Fernando Tan for BKA

## 2020-01-14 NOTE — PROGRESS NOTE ADULT - PROBLEM SELECTOR PLAN 2
uncontrolled still.   cont cardizem. DC norvasc. add lisinopril low dose. monitor.
uncontrolled still.   cont cardizem. DC norvasc. add lisinopril low dose. monitor.
uncontrolled still.   cont cardizem. cont lisinopril. added hydralalzine iv and po. monitor.
Normally takes Cardizem  Restart as tolerated
Normally takes Cardizem 240mg, Norvasc 5mg, Benazepril 40mg  Restarted Cardizem at 120mg yesterday  BP notably higher overnight, so HO restarted Norvasc 5mg  BP still high; titriate back up to his usual Cardizem 240mg  Will stop the Norvasc in favor of uptitration of the Diltiazem
cardizem
jimbo

## 2020-01-14 NOTE — PROGRESS NOTE ADULT - PROBLEM/PLAN-1
DISPLAY PLAN FREE TEXT
MR

## 2020-01-14 NOTE — PROGRESS NOTE ADULT - SUBJECTIVE AND OBJECTIVE BOX
[facility] NEPHROLOGY SERVICES, [facility]  NEPHROLOGY AND HYPERTENSION  [address]  [address]  [place] [address]  [phone]    MD YISEL WEEKS MD ANDREY GONCHARUK, MD MADHU KORRAPATI, MD YELENA ROSENBERG, MD BINNY KOSHY, MD CHRISTOPHER CAPUTO, MD EDWARD BOVER, MD          Patient feels well no complaints today.    MEDICATIONS  (STANDING):  apixaban 5 milliGRAM(s) Oral two times a day  clindamycin IVPB 900 milliGRAM(s) IV Intermittent every 8 hours  dextrose 50% Injectable 12.5 Gram(s) IV Push once  dextrose 50% Injectable 25 Gram(s) IV Push once  dextrose 50% Injectable 25 Gram(s) IV Push once  diltiazem    milliGRAM(s) Oral daily  hydrALAZINE 25 milliGRAM(s) Oral three times a day  insulin glargine Injectable (LANTUS) 14 Unit(s) SubCutaneous at bedtime  insulin lispro (HumaLOG) corrective regimen sliding scale   SubCutaneous three times a day before meals  insulin lispro (HumaLOG) corrective regimen sliding scale   SubCutaneous at bedtime  insulin lispro Injectable (HumaLOG) 3 Unit(s) SubCutaneous before breakfast  insulin lispro Injectable (HumaLOG) 3 Unit(s) SubCutaneous before lunch  insulin lispro Injectable (HumaLOG) 3 Unit(s) SubCutaneous before dinner  lidocaine   Patch 1 Patch Transdermal every 24 hours  lisinopril 5 milliGRAM(s) Oral daily  melatonin 3 milliGRAM(s) Oral at bedtime  nystatin Cream 1 Application(s) Topical every 12 hours  penicillin   G  potassium  IVPB 4 Million Unit(s) IV Intermittent every 4 hours    MEDICATIONS  (PRN):  acetaminophen   Tablet .. 650 milliGRAM(s) Oral every 6 hours PRN Temp greater or equal to 38C (100.4F), Mild Pain (1 - 3)  ALBUTerol   0.042% 1.25 milliGRAM(s) Nebulizer four times a day PRN Wheezing  dextrose 40% Gel 15 Gram(s) Oral once PRN Blood Glucose LESS THAN 70 milliGRAM(s)/deciliter  glucagon  Injectable 1 milliGRAM(s) IntraMuscular once PRN Glucose LESS THAN 70 milligrams/deciliter  guaiFENesin   Syrup  (Sugar-Free) 100 milliGRAM(s) Oral every 6 hours PRN Cough  hydrALAZINE Injectable 10 milliGRAM(s) IV Push every 6 hours PRN SUSTAINED SBP >180 OR DBP >100  morphine  - Injectable 2 milliGRAM(s) IV Push every 4 hours PRN Severe Pain (7 - 10)  oxycodone    5 mG/acetaminophen 325 mG 1 Tablet(s) Oral every 4 hours PRN Moderate Pain (4 - 6)  sodium chloride 0.65% Nasal 1 Spray(s) Both Nostrils three times a day PRN Nasal Congestion      01-13-20 @ 07:01  -  01-14-20 @ 07:00  --------------------------------------------------------  IN: 1550 mL / OUT: 700 mL / NET: 850 mL      PHYSICAL EXAM:      T(C): 36.2 (01-14-20 @ 10:50), Max: 36.2 (01-14-20 @ 10:50)  HR: 77 (01-14-20 @ 10:50) (71 - 87)  BP: 190/90 (01-14-20 @ 18:30) (161/87 - 194/105)  RR: 18 (01-14-20 @ 10:50) (18 - 18)  SpO2: 99% (01-14-20 @ 18:30) (98% - 99%)  Wt(kg): --  Respiratory: clear anteriorly, decreased BS at bases  Cardiovascular: S1 S2  Gastrointestinal: soft NT ND +BS  Extremities:    LLE BAA; no edema                                    8.3    19.48 )-----------( 542      ( 14 Jan 2020 08:04 )             26.3     01-13    138  |  107  |  44<H>  ----------------------------<  169<H>  4.5   |  24  |  1.82<H>    Ca    8.7      13 Jan 2020 06:34          Creatinine Trend: 1.82<--, 2.25<--, 2.79<--, 2.83<--, 2.70<--, 2.50<--    Assessment   ELMER CKD 3; HTN; DM risk;   Pre renal azotemia; infectious GN;   HTN urgency  Indices slowly improving       Plan  D/C IVF   Holding Metformin and ACE;   BP medication adjustments  Discussed with patient  Will follow course.        Augusto Santoyo MD [facility] NEPHROLOGY SERVICES, [facility]  NEPHROLOGY AND HYPERTENSION  [address]  [address]  [place] [address]  [phone]    MD YISEL WEEKS MD ANDREY GONCHARUK, MD MADHU KORRAPATI, MD YELENA ROSENBERG, MD BINNY KOSHY, MD CHRISTOPHER CAPUTO, MD EDWARD BOVER, MD          Patient feels well no complaints today.    MEDICATIONS  (STANDING):  apixaban 5 milliGRAM(s) Oral two times a day  clindamycin IVPB 900 milliGRAM(s) IV Intermittent every 8 hours  dextrose 50% Injectable 12.5 Gram(s) IV Push once  dextrose 50% Injectable 25 Gram(s) IV Push once  dextrose 50% Injectable 25 Gram(s) IV Push once  diltiazem    milliGRAM(s) Oral daily  hydrALAZINE 25 milliGRAM(s) Oral three times a day  insulin glargine Injectable (LANTUS) 14 Unit(s) SubCutaneous at bedtime  insulin lispro (HumaLOG) corrective regimen sliding scale   SubCutaneous three times a day before meals  insulin lispro (HumaLOG) corrective regimen sliding scale   SubCutaneous at bedtime  insulin lispro Injectable (HumaLOG) 3 Unit(s) SubCutaneous before breakfast  insulin lispro Injectable (HumaLOG) 3 Unit(s) SubCutaneous before lunch  insulin lispro Injectable (HumaLOG) 3 Unit(s) SubCutaneous before dinner  lidocaine   Patch 1 Patch Transdermal every 24 hours  lisinopril 5 milliGRAM(s) Oral daily  melatonin 3 milliGRAM(s) Oral at bedtime  nystatin Cream 1 Application(s) Topical every 12 hours  penicillin   G  potassium  IVPB 4 Million Unit(s) IV Intermittent every 4 hours    MEDICATIONS  (PRN):  acetaminophen   Tablet .. 650 milliGRAM(s) Oral every 6 hours PRN Temp greater or equal to 38C (100.4F), Mild Pain (1 - 3)  ALBUTerol   0.042% 1.25 milliGRAM(s) Nebulizer four times a day PRN Wheezing  dextrose 40% Gel 15 Gram(s) Oral once PRN Blood Glucose LESS THAN 70 milliGRAM(s)/deciliter  glucagon  Injectable 1 milliGRAM(s) IntraMuscular once PRN Glucose LESS THAN 70 milligrams/deciliter  guaiFENesin   Syrup  (Sugar-Free) 100 milliGRAM(s) Oral every 6 hours PRN Cough  hydrALAZINE Injectable 10 milliGRAM(s) IV Push every 6 hours PRN SUSTAINED SBP >180 OR DBP >100  morphine  - Injectable 2 milliGRAM(s) IV Push every 4 hours PRN Severe Pain (7 - 10)  oxycodone    5 mG/acetaminophen 325 mG 1 Tablet(s) Oral every 4 hours PRN Moderate Pain (4 - 6)  sodium chloride 0.65% Nasal 1 Spray(s) Both Nostrils three times a day PRN Nasal Congestion      01-13-20 @ 07:01  -  01-14-20 @ 07:00  --------------------------------------------------------  IN: 1550 mL / OUT: 700 mL / NET: 850 mL      PHYSICAL EXAM:      T(C): 36.2 (01-14-20 @ 10:50), Max: 36.2 (01-14-20 @ 10:50)  HR: 77 (01-14-20 @ 10:50) (71 - 87)  BP: 190/90 (01-14-20 @ 18:30) (161/87 - 194/105)  RR: 18 (01-14-20 @ 10:50) (18 - 18)  SpO2: 99% (01-14-20 @ 18:30) (98% - 99%)  Wt(kg): --  Respiratory: clear anteriorly, decreased BS at bases  Cardiovascular: S1 S2  Gastrointestinal: soft NT ND +BS  Extremities:    LLE BAA; no edema                                    8.3    19.48 )-----------( 542      ( 14 Jan 2020 08:04 )             26.3     01-13    138  |  107  |  44<H>  ----------------------------<  169<H>  4.5   |  24  |  1.82<H>    Ca    8.7      13 Jan 2020 06:34          Creatinine Trend: 1.82<--, 2.25<--, 2.79<--, 2.83<--, 2.70<--, 2.50<--    Assessment   ELMER CKD 3; HTN; DM risk;   Pre renal azotemia; infectious GN;   HTN urgency  Indices slowly improving       Plan  D/C IVF   Holding Metformin and ACE;   BP medication adjustments, add qhs Doxazosin to current regimen  Discussed with patient  Will follow course.        Augusto Santoyo MD

## 2020-01-14 NOTE — PROGRESS NOTE ADULT - PROBLEM SELECTOR PLAN 1
Podiatry consulted (Dianna)  s/p left medial malleolus biopsy on 1/8  s/p L foot amputation on 1/10  meropenem was changed to PCN by ID. cont clindamycin. follow repeat blood cultures

## 2020-01-14 NOTE — PROGRESS NOTE ADULT - SUBJECTIVE AND OBJECTIVE BOX
Patient is a 69y old  Male who presents with a chief complaint of left  foot ulcer (14 Jan 2020 12:24)      INTERVAL HPI / OVERNIGHT EVENTS: doing ok     MEDICATIONS  (STANDING):  apixaban 5 milliGRAM(s) Oral two times a day  clindamycin IVPB 900 milliGRAM(s) IV Intermittent every 8 hours  dextrose 50% Injectable 12.5 Gram(s) IV Push once  dextrose 50% Injectable 25 Gram(s) IV Push once  dextrose 50% Injectable 25 Gram(s) IV Push once  diltiazem    milliGRAM(s) Oral daily  hydrALAZINE 25 milliGRAM(s) Oral three times a day  insulin glargine Injectable (LANTUS) 14 Unit(s) SubCutaneous at bedtime  insulin lispro (HumaLOG) corrective regimen sliding scale   SubCutaneous three times a day before meals  insulin lispro (HumaLOG) corrective regimen sliding scale   SubCutaneous at bedtime  insulin lispro Injectable (HumaLOG) 3 Unit(s) SubCutaneous before breakfast  insulin lispro Injectable (HumaLOG) 3 Unit(s) SubCutaneous before lunch  insulin lispro Injectable (HumaLOG) 3 Unit(s) SubCutaneous before dinner  lidocaine   Patch 1 Patch Transdermal every 24 hours  lisinopril 5 milliGRAM(s) Oral daily  melatonin 3 milliGRAM(s) Oral at bedtime  nystatin Cream 1 Application(s) Topical every 12 hours  penicillin   G  potassium  IVPB 4 Million Unit(s) IV Intermittent every 4 hours  sodium chloride 0.9%. 1000 milliLiter(s) (100 mL/Hr) IV Continuous <Continuous>    MEDICATIONS  (PRN):  acetaminophen   Tablet .. 650 milliGRAM(s) Oral every 6 hours PRN Temp greater or equal to 38C (100.4F), Mild Pain (1 - 3)  ALBUTerol   0.042% 1.25 milliGRAM(s) Nebulizer four times a day PRN Wheezing  dextrose 40% Gel 15 Gram(s) Oral once PRN Blood Glucose LESS THAN 70 milliGRAM(s)/deciliter  glucagon  Injectable 1 milliGRAM(s) IntraMuscular once PRN Glucose LESS THAN 70 milligrams/deciliter  guaiFENesin   Syrup  (Sugar-Free) 100 milliGRAM(s) Oral every 6 hours PRN Cough  hydrALAZINE Injectable 10 milliGRAM(s) IV Push every 6 hours PRN SUSTAINED SBP >180 OR DBP >100  morphine  - Injectable 2 milliGRAM(s) IV Push every 4 hours PRN Severe Pain (7 - 10)  oxycodone    5 mG/acetaminophen 325 mG 1 Tablet(s) Oral every 4 hours PRN Moderate Pain (4 - 6)  sodium chloride 0.65% Nasal 1 Spray(s) Both Nostrils three times a day PRN Nasal Congestion      Vital Signs Last 24 Hrs  T(C): 36.2 (14 Jan 2020 10:50), Max: 36.2 (14 Jan 2020 10:50)  T(F): 97.1 (14 Jan 2020 10:50), Max: 97.1 (14 Jan 2020 10:50)  HR: 77 (14 Jan 2020 10:50) (71 - 87)  BP: 161/87 (14 Jan 2020 10:50) (161/87 - 194/105)  BP(mean): --  RR: 18 (14 Jan 2020 10:50) (18 - 20)  SpO2: 99% (14 Jan 2020 10:50) (98% - 99%)    Review of systems:  General : no fever /chills,fatigue  CVS : no chest pain, palpitations  Lungs : no shortness of breath, cough  GI : no abdominal pain,vomiting, diarrhea   : no dysuria,hematuria        PHYSICAL EXAM:  General :NAD  Constitutional:  well-groomed, well-developed  Respiratory: CTAB/L  Cardiovascular: S1 and S2, RRR, no M/G/R  Gastrointestinal: BS+, soft, NT/ND  Extremities: No peripheral edema  Vascular: 2+ peripheral pulses  Skin: left LE gulliton amputation      LABS:                        8.3    19.48 )-----------( 542      ( 14 Jan 2020 08:04 )             26.3     01-13    138  |  107  |  44<H>  ----------------------------<  169<H>  4.5   |  24  |  1.82<H>    Ca    8.7      13 Jan 2020 06:34            MICROBIOLOGY:  RECENT CULTURES:  01-10 .Surgical Swab LEFT LEG C/S XXXX XXXX   Rare Streptococcus agalactiae (Group B) isolated  Group B streptococci are susceptible to ampicillin,  penicillin and cefazolin, but may be resistant to  erythromycin and clindamycin.  Recommendations for intrapartum prophylaxis for Group B  streptococci are penicillin or ampicillin.    01-09 .Tissue left medial maledus bone XXXX   No polymorphonuclear leukocytes seen per low power field  Few Gram Positive Cocci in Pairs and Chains seen per oil power field   Numerous Streptococcus agalactiae (Group B)  Streptococcus agalactiae (Group B) isolated  Group B streptococci are susceptible to ampicillin,  penicillin and cefazolin, but may be resistant to  erythromycin and clindamycin.  Recommendations for intrapartum prophylaxis for Group B  streptococci are penicillin or ampicillin.    01-09 .Tissue left foot soft tissue XXXX   No polymorphonuclear leukocytes seen per low power field  Numerous Gram Positive Cocci in Pairs and Chains seen per oil power field   Numerous Streptococcus agalactiae (Group B)  Streptococcus agalactiae (Group B) isolated  Group B streptococci are susceptible to ampicillin,  penicillin and cefazolin, but may be resistant to  erythromycin and clindamycin.  Recommendations for intrapartum prophylaxis for Group B  streptococci are penicillin or ampicillin.    01-08 .Urine Clean Catch (Midstream) XXXX XXXX   No growth    01-08 .Blood Blood-Peripheral XXXX XXXX   No growth at 5 days.    01-08 .Blood Blood-Peripheral Blood Culture PCR  Streptococcus agalactiae (Group B)   Growth in anaerobic bottle: Gram Positive Cocci in Pairs and Chains   Growth in anaerobic bottle: Streptococcus agalactiae (Group B)  ***Blood Panel PCR results on this specimen are available  approximately 3 hours after the Gram stain result.***  Gram stain, PCR, and/or culture results may not always  correspond dueto difference in methodologies.  ************************************************************  This PCR assay was performed using [employer].  The following targets are tested for: Enterococcus,  vancomycin resistant enterococci, Listeria monocytogenes,  coagulase negative staphylococci, S. aureus,  methicillin resistant S. aureus, Streptococcus agalactiae  (Group B), S. pneumoniae, S. pyogenes (Group A),  Acinetobacter baumannii, Enterobacter cloacae, E. coli,  Klebsiella oxytoca, K. pneumoniae, Proteus sp.,  Serratia marcescens, Haemophilus influenzae,  Neisseria meningitidis, Pseudomonas aeruginosa, Candida  albicans, C. glabrata, C krusei, C parapsilosis,  C. tropicalis and the KPC resistance gene.          RADIOLOGY & ADDITIONAL STUDIES:

## 2020-01-14 NOTE — PROGRESS NOTE ADULT - PROBLEM SELECTOR PLAN 6
cont eliquis and cardizem.   left message for vascular to see if there is any plan for further surgery/revision during this hospitalization.

## 2020-01-14 NOTE — PROGRESS NOTE ADULT - PROBLEM SELECTOR PROBLEM 5
Stage 4 chronic kidney disease

## 2020-01-14 NOTE — PROGRESS NOTE ADULT - PROBLEM SELECTOR PLAN 5
renal following (Yarelis)  monitor BUN, Cr
creatinine stable. avoid nephrotoxic agents.   nephrology following.   monitor on low dose lisinopril.
creatinine stable. avoid nephrotoxic agents.   nephrology following.   monitor on low dose lisinopril.
renal following
renal following (Yarelis)  monitor BUN, Cr
renal following (Yarelis)  monitor BUN, Cr
renal following, monitor creat trend.

## 2020-01-14 NOTE — PROGRESS NOTE ADULT - SUBJECTIVE AND OBJECTIVE BOX
Interval summary/Hospital course:  Pt is a 70 yo gentleman with a pmhx of HTN, HL, NIDM, Afib on eliquis who presented to the ED with leg redness and fever and cough . Has had chronic ulcer on L. medial leg for months, but has gotten worse. Patient was admitted and diagnosed with sepsis with  acute gas gangrene left foot with possible OM and necrotizing fascitis. ID and Podiatrist were consulted. patient was placed on broad spectrum antbiotic. patient under I and D left leg on  and left foot amputation on 1/10.  Tissue culture grew strep agalactiae.   also patient was placed on contact isolation for RSV URI.         CHIEF COMPLAINT: left hip pain better. uncontrolled blood pressure still. no chest pain no headache.       PHYSICAL EXAM:  GENERAL: well built, well nourished,   CHEST/LUNG: Clear to ausculation bilaterally, no wheezing, no crackles   HEART: Regular rate and rhythm; No murmurs, rubs  ABDOMEN: Soft, Nontender, Nondistended; Bowel sounds present  EXTREMITIES:  s/p left foot amputation. dressing +. no cyanosis.   NERVOUS SYSTEM:  Grossly non focal.  Psychiatry: AAO x 3, mood is appropriate     -----------------------------------------------------------------------------------------------------------------------------------------------------------------  OBJECTIVE DATA:                     Vital Signs Last 24 Hrs  T(C): 35.9 (2020 05:13), Max: 36.5 (2020 13:01)  T(F): 96.7 (2020 05:13), Max: 97.7 (2020 13:01)  HR: 77 (2020 10:50) (71 - 87)  BP: 161/87 (2020 10:50) (161/87 - 194/105)  BP(mean): --  RR: 18 (2020 10:50) (17 - 20)  SpO2: 99% (2020 10:50) (98% - 99%)  Daily     Daily Weight in k.9 (2020 05:13)    MEDICATIONS  (STANDING):  apixaban 5 milliGRAM(s) Oral two times a day  clindamycin IVPB 900 milliGRAM(s) IV Intermittent every 8 hours  dextrose 50% Injectable 12.5 Gram(s) IV Push once  dextrose 50% Injectable 25 Gram(s) IV Push once  dextrose 50% Injectable 25 Gram(s) IV Push once  diltiazem    milliGRAM(s) Oral daily  hydrALAZINE 25 milliGRAM(s) Oral three times a day  insulin glargine Injectable (LANTUS) 14 Unit(s) SubCutaneous at bedtime  insulin lispro (HumaLOG) corrective regimen sliding scale   SubCutaneous three times a day before meals  insulin lispro (HumaLOG) corrective regimen sliding scale   SubCutaneous at bedtime  insulin lispro Injectable (HumaLOG) 3 Unit(s) SubCutaneous before breakfast  insulin lispro Injectable (HumaLOG) 3 Unit(s) SubCutaneous before lunch  insulin lispro Injectable (HumaLOG) 3 Unit(s) SubCutaneous before dinner  lidocaine   Patch 1 Patch Transdermal every 24 hours  lisinopril 5 milliGRAM(s) Oral daily  melatonin 3 milliGRAM(s) Oral at bedtime  nystatin Cream 1 Application(s) Topical every 12 hours  penicillin   G  potassium  IVPB 4 Million Unit(s) IV Intermittent every 4 hours  sodium chloride 0.9%. 1000 milliLiter(s) (100 mL/Hr) IV Continuous <Continuous>    MEDICATIONS  (PRN):  acetaminophen   Tablet .. 650 milliGRAM(s) Oral every 6 hours PRN Temp greater or equal to 38C (100.4F), Mild Pain (1 - 3)  ALBUTerol   0.042% 1.25 milliGRAM(s) Nebulizer four times a day PRN Wheezing  dextrose 40% Gel 15 Gram(s) Oral once PRN Blood Glucose LESS THAN 70 milliGRAM(s)/deciliter  glucagon  Injectable 1 milliGRAM(s) IntraMuscular once PRN Glucose LESS THAN 70 milligrams/deciliter  guaiFENesin   Syrup  (Sugar-Free) 100 milliGRAM(s) Oral every 6 hours PRN Cough  hydrALAZINE Injectable 10 milliGRAM(s) IV Push every 6 hours PRN SUSTAINED SBP >180 OR DBP >100  morphine  - Injectable 2 milliGRAM(s) IV Push every 4 hours PRN Severe Pain (7 - 10)  oxycodone    5 mG/acetaminophen 325 mG 1 Tablet(s) Oral every 4 hours PRN Moderate Pain (4 - 6)  sodium chloride 0.65% Nasal 1 Spray(s) Both Nostrils three times a day PRN Nasal Congestion      LABS:                        8.3    19.48 )-----------( 542      ( 2020 08:04 )             26.3     01-13    138  |  107  |  44<H>  ----------------------------<  169<H>  4.5   |  24  |  1.82<H>    Ca    8.7      2020 06:34              RADIOLOGY & ADDITIONAL STUDIES:

## 2020-01-15 LAB
ANION GAP SERPL CALC-SCNC: 6 MMOL/L — SIGNIFICANT CHANGE UP (ref 5–17)
BUN SERPL-MCNC: 33 MG/DL — HIGH (ref 7–23)
CALCIUM SERPL-MCNC: 8.7 MG/DL — SIGNIFICANT CHANGE UP (ref 8.5–10.1)
CHLORIDE SERPL-SCNC: 105 MMOL/L — SIGNIFICANT CHANGE UP (ref 96–108)
CO2 SERPL-SCNC: 25 MMOL/L — SIGNIFICANT CHANGE UP (ref 22–31)
CREAT SERPL-MCNC: 1.7 MG/DL — HIGH (ref 0.5–1.3)
CULTURE RESULTS: SIGNIFICANT CHANGE UP
GLUCOSE BLDC GLUCOMTR-MCNC: 171 MG/DL — HIGH (ref 70–99)
GLUCOSE BLDC GLUCOMTR-MCNC: 228 MG/DL — HIGH (ref 70–99)
GLUCOSE BLDC GLUCOMTR-MCNC: 257 MG/DL — HIGH (ref 70–99)
GLUCOSE BLDC GLUCOMTR-MCNC: 274 MG/DL — HIGH (ref 70–99)
GLUCOSE SERPL-MCNC: 229 MG/DL — HIGH (ref 70–99)
GRAM STN FLD: SIGNIFICANT CHANGE UP
HCT VFR BLD CALC: 25.5 % — LOW (ref 39–50)
HGB BLD-MCNC: 8.2 G/DL — LOW (ref 13–17)
MCHC RBC-ENTMCNC: 27.8 PG — SIGNIFICANT CHANGE UP (ref 27–34)
MCHC RBC-ENTMCNC: 32.2 GM/DL — SIGNIFICANT CHANGE UP (ref 32–36)
MCV RBC AUTO: 86.4 FL — SIGNIFICANT CHANGE UP (ref 80–100)
NRBC # BLD: 0 /100 WBCS — SIGNIFICANT CHANGE UP (ref 0–0)
PLATELET # BLD AUTO: 533 K/UL — HIGH (ref 150–400)
POTASSIUM SERPL-MCNC: 4.6 MMOL/L — SIGNIFICANT CHANGE UP (ref 3.5–5.3)
POTASSIUM SERPL-SCNC: 4.6 MMOL/L — SIGNIFICANT CHANGE UP (ref 3.5–5.3)
RBC # BLD: 2.95 M/UL — LOW (ref 4.2–5.8)
RBC # FLD: 15 % — HIGH (ref 10.3–14.5)
SODIUM SERPL-SCNC: 136 MMOL/L — SIGNIFICANT CHANGE UP (ref 135–145)
SPECIMEN SOURCE: SIGNIFICANT CHANGE UP
SURGICAL PATHOLOGY STUDY: SIGNIFICANT CHANGE UP
WBC # BLD: 20.05 K/UL — HIGH (ref 3.8–10.5)
WBC # FLD AUTO: 20.05 K/UL — HIGH (ref 3.8–10.5)

## 2020-01-15 PROCEDURE — 99232 SBSQ HOSP IP/OBS MODERATE 35: CPT

## 2020-01-15 RX ORDER — HYDRALAZINE HCL 50 MG
50 TABLET ORAL THREE TIMES A DAY
Refills: 0 | Status: DISCONTINUED | OUTPATIENT
Start: 2020-01-15 | End: 2020-01-16

## 2020-01-15 RX ORDER — INSULIN LISPRO 100/ML
5 VIAL (ML) SUBCUTANEOUS
Refills: 0 | Status: DISCONTINUED | OUTPATIENT
Start: 2020-01-15 | End: 2020-01-17

## 2020-01-15 RX ORDER — SACCHAROMYCES BOULARDII 250 MG
250 POWDER IN PACKET (EA) ORAL
Refills: 0 | Status: DISCONTINUED | OUTPATIENT
Start: 2020-01-15 | End: 2020-01-17

## 2020-01-15 RX ORDER — INSULIN GLARGINE 100 [IU]/ML
17 INJECTION, SOLUTION SUBCUTANEOUS AT BEDTIME
Refills: 0 | Status: DISCONTINUED | OUTPATIENT
Start: 2020-01-15 | End: 2020-01-16

## 2020-01-15 RX ORDER — SODIUM CHLORIDE 9 MG/ML
1000 INJECTION, SOLUTION INTRAVENOUS
Refills: 0 | Status: DISCONTINUED | OUTPATIENT
Start: 2020-01-15 | End: 2020-01-17

## 2020-01-15 RX ADMIN — Medication 3 UNIT(S): at 08:11

## 2020-01-15 RX ADMIN — Medication 240 MILLIGRAM(S): at 05:45

## 2020-01-15 RX ADMIN — Medication 2: at 15:44

## 2020-01-15 RX ADMIN — OXYCODONE AND ACETAMINOPHEN 1 TABLET(S): 5; 325 TABLET ORAL at 08:12

## 2020-01-15 RX ADMIN — OXYCODONE AND ACETAMINOPHEN 1 TABLET(S): 5; 325 TABLET ORAL at 19:41

## 2020-01-15 RX ADMIN — MORPHINE SULFATE 2 MILLIGRAM(S): 50 CAPSULE, EXTENDED RELEASE ORAL at 23:19

## 2020-01-15 RX ADMIN — OXYCODONE AND ACETAMINOPHEN 1 TABLET(S): 5; 325 TABLET ORAL at 08:40

## 2020-01-15 RX ADMIN — Medication 100 MILLIGRAM(S): at 14:16

## 2020-01-15 RX ADMIN — Medication 25 MILLIGRAM(S): at 05:43

## 2020-01-15 RX ADMIN — PENICILLIN G POTASSIUM 100 MILLION UNIT(S): 5000000 POWDER, FOR SOLUTION INTRAMUSCULAR; INTRAPLEURAL; INTRATHECAL; INTRAVENOUS at 22:15

## 2020-01-15 RX ADMIN — Medication 100 MILLIGRAM(S): at 05:41

## 2020-01-15 RX ADMIN — OXYCODONE AND ACETAMINOPHEN 1 TABLET(S): 5; 325 TABLET ORAL at 15:39

## 2020-01-15 RX ADMIN — NYSTATIN CREAM 1 APPLICATION(S): 100000 CREAM TOPICAL at 17:48

## 2020-01-15 RX ADMIN — PENICILLIN G POTASSIUM 100 MILLION UNIT(S): 5000000 POWDER, FOR SOLUTION INTRAMUSCULAR; INTRAPLEURAL; INTRATHECAL; INTRAVENOUS at 01:00

## 2020-01-15 RX ADMIN — Medication 250 MILLIGRAM(S): at 17:47

## 2020-01-15 RX ADMIN — APIXABAN 5 MILLIGRAM(S): 2.5 TABLET, FILM COATED ORAL at 05:45

## 2020-01-15 RX ADMIN — Medication 6: at 08:11

## 2020-01-15 RX ADMIN — INSULIN GLARGINE 17 UNIT(S): 100 INJECTION, SOLUTION SUBCUTANEOUS at 21:25

## 2020-01-15 RX ADMIN — Medication 50 MILLIGRAM(S): at 21:24

## 2020-01-15 RX ADMIN — Medication 5 UNIT(S): at 11:33

## 2020-01-15 RX ADMIN — MORPHINE SULFATE 2 MILLIGRAM(S): 50 CAPSULE, EXTENDED RELEASE ORAL at 23:34

## 2020-01-15 RX ADMIN — OXYCODONE AND ACETAMINOPHEN 1 TABLET(S): 5; 325 TABLET ORAL at 16:05

## 2020-01-15 RX ADMIN — PENICILLIN G POTASSIUM 100 MILLION UNIT(S): 5000000 POWDER, FOR SOLUTION INTRAMUSCULAR; INTRAPLEURAL; INTRATHECAL; INTRAVENOUS at 05:42

## 2020-01-15 RX ADMIN — Medication 5 UNIT(S): at 15:45

## 2020-01-15 RX ADMIN — LIDOCAINE 1 PATCH: 4 CREAM TOPICAL at 05:49

## 2020-01-15 RX ADMIN — Medication 2 MILLIGRAM(S): at 21:24

## 2020-01-15 RX ADMIN — PENICILLIN G POTASSIUM 100 MILLION UNIT(S): 5000000 POWDER, FOR SOLUTION INTRAMUSCULAR; INTRAPLEURAL; INTRATHECAL; INTRAVENOUS at 14:16

## 2020-01-15 RX ADMIN — Medication 50 MILLIGRAM(S): at 14:16

## 2020-01-15 RX ADMIN — LIDOCAINE 1 PATCH: 4 CREAM TOPICAL at 20:03

## 2020-01-15 RX ADMIN — NYSTATIN CREAM 1 APPLICATION(S): 100000 CREAM TOPICAL at 05:45

## 2020-01-15 RX ADMIN — PENICILLIN G POTASSIUM 100 MILLION UNIT(S): 5000000 POWDER, FOR SOLUTION INTRAMUSCULAR; INTRAPLEURAL; INTRATHECAL; INTRAVENOUS at 17:47

## 2020-01-15 RX ADMIN — Medication 3 MILLIGRAM(S): at 21:38

## 2020-01-15 RX ADMIN — LIDOCAINE 1 PATCH: 4 CREAM TOPICAL at 16:10

## 2020-01-15 RX ADMIN — PENICILLIN G POTASSIUM 100 MILLION UNIT(S): 5000000 POWDER, FOR SOLUTION INTRAMUSCULAR; INTRAPLEURAL; INTRATHECAL; INTRAVENOUS at 11:34

## 2020-01-15 RX ADMIN — Medication 6: at 11:33

## 2020-01-15 RX ADMIN — OXYCODONE AND ACETAMINOPHEN 1 TABLET(S): 5; 325 TABLET ORAL at 20:40

## 2020-01-15 RX ADMIN — Medication 100 MILLIGRAM(S): at 21:23

## 2020-01-15 NOTE — PROGRESS NOTE ADULT - SUBJECTIVE AND OBJECTIVE BOX
[facility] NEPHROLOGY SERVICES, [facility]  NEPHROLOGY AND HYPERTENSION  [address]  [address]  [place] [address]  [phone]    MD YISEL WEEKS MD ANDREY GONCHARUK, MD MADHU KORRAPATI, MD YELENA ROSENBERG, MD BINNY KOSHY, MD CHRISTOPHER CAPUTO, MD EDWARD BOVER, MD          Patient feels well no complaints today.    MEDICATIONS  (STANDING):  clindamycin IVPB 900 milliGRAM(s) IV Intermittent every 8 hours  dextrose 5%. 1000 milliLiter(s) (50 mL/Hr) IV Continuous <Continuous>  dextrose 50% Injectable 12.5 Gram(s) IV Push once  dextrose 50% Injectable 25 Gram(s) IV Push once  dextrose 50% Injectable 25 Gram(s) IV Push once  diltiazem    milliGRAM(s) Oral daily  doxazosin 2 milliGRAM(s) Oral at bedtime  hydrALAZINE 50 milliGRAM(s) Oral three times a day  insulin glargine Injectable (LANTUS) 17 Unit(s) SubCutaneous at bedtime  insulin lispro (HumaLOG) corrective regimen sliding scale   SubCutaneous three times a day before meals  insulin lispro (HumaLOG) corrective regimen sliding scale   SubCutaneous at bedtime  insulin lispro Injectable (HumaLOG) 5 Unit(s) SubCutaneous three times a day before meals  lidocaine   Patch 1 Patch Transdermal every 24 hours  melatonin 3 milliGRAM(s) Oral at bedtime  nystatin Cream 1 Application(s) Topical every 12 hours  penicillin   G  potassium  IVPB 4 Million Unit(s) IV Intermittent every 4 hours  saccharomyces boulardii 250 milliGRAM(s) Oral two times a day    MEDICATIONS  (PRN):  acetaminophen   Tablet .. 650 milliGRAM(s) Oral every 6 hours PRN Temp greater or equal to 38C (100.4F), Mild Pain (1 - 3)  ALBUTerol   0.042% 1.25 milliGRAM(s) Nebulizer four times a day PRN Wheezing  dextrose 40% Gel 15 Gram(s) Oral once PRN Blood Glucose LESS THAN 70 milliGRAM(s)/deciliter  glucagon  Injectable 1 milliGRAM(s) IntraMuscular once PRN Glucose LESS THAN 70 milligrams/deciliter  guaiFENesin   Syrup  (Sugar-Free) 100 milliGRAM(s) Oral every 6 hours PRN Cough  hydrALAZINE Injectable 10 milliGRAM(s) IV Push every 6 hours PRN SUSTAINED SBP >180 OR DBP >100  morphine  - Injectable 2 milliGRAM(s) IV Push every 4 hours PRN Severe Pain (7 - 10)  oxycodone    5 mG/acetaminophen 325 mG 1 Tablet(s) Oral every 4 hours PRN Moderate Pain (4 - 6)  sodium chloride 0.65% Nasal 1 Spray(s) Both Nostrils three times a day PRN Nasal Congestion      01-14-20 @ 07:01  -  01-15-20 @ 07:00  --------------------------------------------------------  IN: 900 mL / OUT: 850 mL / NET: 50 mL    01-15-20 @ 07:01  -  01-15-20 @ 17:56  --------------------------------------------------------  IN: 0 mL / OUT: 400 mL / NET: -400 mL      PHYSICAL EXAM:      T(C): 36.4 (01-15-20 @ 16:40), Max: 36.6 (01-15-20 @ 11:50)  HR: 97 (01-15-20 @ 16:40) (69 - 97)  BP: 142/76 (01-15-20 @ 16:40) (142/76 - 190/90)  RR: 19 (01-15-20 @ 16:40) (16 - 19)  SpO2: 97% (01-15-20 @ 16:40) (97% - 99%)  Wt(kg): --  Respiratory: clear anteriorly, decreased BS at bases  Cardiovascular: S1 S2  Gastrointestinal: soft NT ND +BS  Extremities:  LLE BAA edema                                    8.2    20.05 )-----------( 533      ( 15 Yann 2020 07:59 )             25.5     01-15    136  |  105  |  33<H>  ----------------------------<  229<H>  4.6   |  25  |  1.70<H>    Ca    8.7      15 Yann 2020 07:59          Creatinine Trend: 1.70<--, 1.82<--, 2.25<--, 2.79<--, 2.83<--, 2.70<--      Assessment   ELMER CKD 3; HTN; DM risk;   Pre renal azotemia; infectious GN;   HTN urgency, improved  Indices slowly improving       Plan    Holding Metformin and ACE;   BP medication adjustments, add qhs Doxazosin to current regimen  Discussed with patient  Will follow course.    Augusto Santoyo MD

## 2020-01-15 NOTE — PROGRESS NOTE ADULT - SUBJECTIVE AND OBJECTIVE BOX
Patient is a 69y old  Male who presents with a chief complaint of left  foot ulcer (15 Yann 2020 12:13)      INTERVAL HPI / OVERNIGHT EVENTS:    MEDICATIONS  (STANDING):  clindamycin IVPB 900 milliGRAM(s) IV Intermittent every 8 hours  dextrose 5%. 1000 milliLiter(s) (50 mL/Hr) IV Continuous <Continuous>  dextrose 50% Injectable 12.5 Gram(s) IV Push once  dextrose 50% Injectable 25 Gram(s) IV Push once  dextrose 50% Injectable 25 Gram(s) IV Push once  diltiazem    milliGRAM(s) Oral daily  doxazosin 2 milliGRAM(s) Oral at bedtime  hydrALAZINE 50 milliGRAM(s) Oral three times a day  insulin glargine Injectable (LANTUS) 17 Unit(s) SubCutaneous at bedtime  insulin lispro (HumaLOG) corrective regimen sliding scale   SubCutaneous three times a day before meals  insulin lispro (HumaLOG) corrective regimen sliding scale   SubCutaneous at bedtime  insulin lispro Injectable (HumaLOG) 5 Unit(s) SubCutaneous three times a day before meals  lidocaine   Patch 1 Patch Transdermal every 24 hours  melatonin 3 milliGRAM(s) Oral at bedtime  nystatin Cream 1 Application(s) Topical every 12 hours  penicillin   G  potassium  IVPB 4 Million Unit(s) IV Intermittent every 4 hours  saccharomyces boulardii 250 milliGRAM(s) Oral two times a day    MEDICATIONS  (PRN):  acetaminophen   Tablet .. 650 milliGRAM(s) Oral every 6 hours PRN Temp greater or equal to 38C (100.4F), Mild Pain (1 - 3)  ALBUTerol   0.042% 1.25 milliGRAM(s) Nebulizer four times a day PRN Wheezing  dextrose 40% Gel 15 Gram(s) Oral once PRN Blood Glucose LESS THAN 70 milliGRAM(s)/deciliter  glucagon  Injectable 1 milliGRAM(s) IntraMuscular once PRN Glucose LESS THAN 70 milligrams/deciliter  guaiFENesin   Syrup  (Sugar-Free) 100 milliGRAM(s) Oral every 6 hours PRN Cough  hydrALAZINE Injectable 10 milliGRAM(s) IV Push every 6 hours PRN SUSTAINED SBP >180 OR DBP >100  morphine  - Injectable 2 milliGRAM(s) IV Push every 4 hours PRN Severe Pain (7 - 10)  oxycodone    5 mG/acetaminophen 325 mG 1 Tablet(s) Oral every 4 hours PRN Moderate Pain (4 - 6)  sodium chloride 0.65% Nasal 1 Spray(s) Both Nostrils three times a day PRN Nasal Congestion      Vital Signs Last 24 Hrs  T(C): 36.6 (15 Yann 2020 11:50), Max: 36.6 (15 Yann 2020 11:50)  T(F): 97.9 (15 Yann 2020 11:50), Max: 97.9 (15 Yann 2020 11:50)  HR: 69 (15 Yann 2020 11:50) (69 - 95)  BP: 148/79 (15 Yann 2020 11:50) (148/79 - 190/90)  BP(mean): --  RR: 16 (15 Yann 2020 11:50) (16 - 18)  SpO2: 98% (15 Yann 2020 04:52) (98% - 99%)    Review of systems:  General : no fever /chills,fatigue  CVS : no chest pain, palpitations  Lungs : no shortness of breath, cough  GI : no abdominal pain,vomiting, diarrhea   : no dysuria,hematuria        PHYSICAL EXAM:  General :NAD  Constitutional:  well-groomed, well-developed  Respiratory: CTAB/L  Cardiovascular: S1 and S2, RRR, no M/G/R  Gastrointestinal: BS+, soft, NT/ND  Extremities: No peripheral edema  Vascular: 2+ peripheral pulses  Skin: No rashes      LABS:                        8.2    20.05 )-----------( 533      ( 15 Yann 2020 07:59 )             25.5     01-15    136  |  105  |  33<H>  ----------------------------<  229<H>  4.6   |  25  |  1.70<H>    Ca    8.7      15 Yann 2020 07:59            MICROBIOLOGY:  RECENT CULTURES:  01-10 .Surgical Swab LEFT LEG C/S XXXX XXXX   Rare Streptococcus agalactiae (Group B) isolated  Group B streptococci are susceptible to ampicillin,  penicillin and cefazolin, but may be resistant to  erythromycin and clindamycin.  Recommendations for intrapartum prophylaxis for Group B  streptococci are penicillin or ampicillin.    01-09 .Tissue left medial maledus bone XXXX   No polymorphonuclear leukocytes seen per low power field  Few Gram Positive Cocci in Pairs and Chains seen per oil power field   Numerous Streptococcus agalactiae (Group B)  Streptococcus agalactiae (Group B) isolated  Group B streptococci are susceptible to ampicillin,  penicillin and cefazolin, but may be resistant to  erythromycin and clindamycin.  Recommendations for intrapartum prophylaxis for Group B  streptococci are penicillin or ampicillin.    01-09 .Tissue left foot soft tissue XXXX   No polymorphonuclear leukocytes seen per low power field  Numerous Gram Positive Cocci in Pairs and Chains seen per oil power field   Numerous Streptococcus agalactiae (Group B) Streptococcus agalactiae  (Group B) isolated  Group B streptococci are susceptible to ampicillin,  penicillin and cefazolin, but may be resistant to  erythromycin and clindamycin.  Recommendations for intrapartum prophylaxis for Group B  streptococci are penicillin or ampicillin.  Moderate Finegoldia magna  Streptococcus agalactiae (Group B) isolated  Group B streptococci are susceptible to ampicillin,  penicillin and cefazolin, but may be resistant to  erythromycin and clindamycin.  Recommendations for intrapartum prophylaxis for Group B  streptococci are penicillin or ampicillin.    01-08 .Urine Clean Catch (Midstream) XXXX XXXX   No growth    01-08 .Blood Blood-Peripheral XXXX XXXX   No growth at 5 days.    01-08 .Blood Blood-Peripheral Blood Culture PCR  Streptococcus agalactiae (Group B)   Growth in anaerobic bottle: Gram Positive Cocci in Pairs and Chains   Growth in anaerobic bottle: Streptococcus agalactiae (Group B)  ***Blood Panel PCR results on this specimen are available  approximately 3 hours after the Gram stain result.***  Gram stain, PCR, and/or culture results may not always  correspond dueto difference in methodologies.  ************************************************************  This PCR assay was performed using [employer].  The following targets are tested for: Enterococcus,  vancomycin resistant enterococci, Listeria monocytogenes,  coagulase negative staphylococci, S. aureus,  methicillin resistant S. aureus, Streptococcus agalactiae  (Group B), S. pneumoniae, S. pyogenes (Group A),  Acinetobacter baumannii, Enterobacter cloacae, E. coli,  Klebsiella oxytoca, K. pneumoniae, Proteus sp.,  Serratia marcescens, Haemophilus influenzae,  Neisseria meningitidis, Pseudomonas aeruginosa, Candida  albicans, C. glabrata, C krusei, C parapsilosis,  C. tropicalis and the KPC resistance gene.          RADIOLOGY & ADDITIONAL STUDIES: Patient is a 69y old  Male who presents with a chief complaint of left  foot ulcer (15 Yann 2020 12:13)      INTERVAL HPI / OVERNIGHT EVENTS: doing ok     MEDICATIONS  (STANDING):  clindamycin IVPB 900 milliGRAM(s) IV Intermittent every 8 hours  dextrose 5%. 1000 milliLiter(s) (50 mL/Hr) IV Continuous <Continuous>  dextrose 50% Injectable 12.5 Gram(s) IV Push once  dextrose 50% Injectable 25 Gram(s) IV Push once  dextrose 50% Injectable 25 Gram(s) IV Push once  diltiazem    milliGRAM(s) Oral daily  doxazosin 2 milliGRAM(s) Oral at bedtime  hydrALAZINE 50 milliGRAM(s) Oral three times a day  insulin glargine Injectable (LANTUS) 17 Unit(s) SubCutaneous at bedtime  insulin lispro (HumaLOG) corrective regimen sliding scale   SubCutaneous three times a day before meals  insulin lispro (HumaLOG) corrective regimen sliding scale   SubCutaneous at bedtime  insulin lispro Injectable (HumaLOG) 5 Unit(s) SubCutaneous three times a day before meals  lidocaine   Patch 1 Patch Transdermal every 24 hours  melatonin 3 milliGRAM(s) Oral at bedtime  nystatin Cream 1 Application(s) Topical every 12 hours  penicillin   G  potassium  IVPB 4 Million Unit(s) IV Intermittent every 4 hours  saccharomyces boulardii 250 milliGRAM(s) Oral two times a day    MEDICATIONS  (PRN):  acetaminophen   Tablet .. 650 milliGRAM(s) Oral every 6 hours PRN Temp greater or equal to 38C (100.4F), Mild Pain (1 - 3)  ALBUTerol   0.042% 1.25 milliGRAM(s) Nebulizer four times a day PRN Wheezing  dextrose 40% Gel 15 Gram(s) Oral once PRN Blood Glucose LESS THAN 70 milliGRAM(s)/deciliter  glucagon  Injectable 1 milliGRAM(s) IntraMuscular once PRN Glucose LESS THAN 70 milligrams/deciliter  guaiFENesin   Syrup  (Sugar-Free) 100 milliGRAM(s) Oral every 6 hours PRN Cough  hydrALAZINE Injectable 10 milliGRAM(s) IV Push every 6 hours PRN SUSTAINED SBP >180 OR DBP >100  morphine  - Injectable 2 milliGRAM(s) IV Push every 4 hours PRN Severe Pain (7 - 10)  oxycodone    5 mG/acetaminophen 325 mG 1 Tablet(s) Oral every 4 hours PRN Moderate Pain (4 - 6)  sodium chloride 0.65% Nasal 1 Spray(s) Both Nostrils three times a day PRN Nasal Congestion      Vital Signs Last 24 Hrs  T(C): 36.6 (15 Yann 2020 11:50), Max: 36.6 (15 Yann 2020 11:50)  T(F): 97.9 (15 Yann 2020 11:50), Max: 97.9 (15 Yann 2020 11:50)  HR: 69 (15 Yann 2020 11:50) (69 - 95)  BP: 148/79 (15 Yann 2020 11:50) (148/79 - 190/90)  BP(mean): --  RR: 16 (15 Yann 2020 11:50) (16 - 18)  SpO2: 98% (15 Yann 2020 04:52) (98% - 99%)    Review of systems:  General : no fever /chills,fatigue  CVS : no chest pain, palpitations  Lungs : no shortness of breath, cough  GI : no abdominal pain,vomiting, diarrhea   : no dysuria,hematuria        PHYSICAL EXAM:  General :NAD  Constitutional:  well-groomed, well-developed  Respiratory: CTAB/L  Cardiovascular: S1 and S2, RRR, no M/G/R  Gastrointestinal: BS+, soft, NT/ND  Extremities: No peripheral edema  Vascular: 2+ peripheral pulses  Skin: left foot stump       LABS:                        8.2    20.05 )-----------( 533      ( 15 Yann 2020 07:59 )             25.5     01-15    136  |  105  |  33<H>  ----------------------------<  229<H>  4.6   |  25  |  1.70<H>    Ca    8.7      15 Yann 2020 07:59            MICROBIOLOGY:  RECENT CULTURES:  01-10 .Surgical Swab LEFT LEG C/S XXXX XXXX   Rare Streptococcus agalactiae (Group B) isolated  Group B streptococci are susceptible to ampicillin,  penicillin and cefazolin, but may be resistant to  erythromycin and clindamycin.  Recommendations for intrapartum prophylaxis for Group B  streptococci are penicillin or ampicillin.    01-09 .Tissue left medial maledus bone XXXX   No polymorphonuclear leukocytes seen per low power field  Few Gram Positive Cocci in Pairs and Chains seen per oil power field   Numerous Streptococcus agalactiae (Group B)  Streptococcus agalactiae (Group B) isolated  Group B streptococci are susceptible to ampicillin,  penicillin and cefazolin, but may be resistant to  erythromycin and clindamycin.  Recommendations for intrapartum prophylaxis for Group B  streptococci are penicillin or ampicillin.    01-09 .Tissue left foot soft tissue XXXX   No polymorphonuclear leukocytes seen per low power field  Numerous Gram Positive Cocci in Pairs and Chains seen per oil power field   Numerous Streptococcus agalactiae (Group B) Streptococcus agalactiae  (Group B) isolated  Group B streptococci are susceptible to ampicillin,  penicillin and cefazolin, but may be resistant to  erythromycin and clindamycin.  Recommendations for intrapartum prophylaxis for Group B  streptococci are penicillin or ampicillin.  Moderate Finegoldia magna  Streptococcus agalactiae (Group B) isolated  Group B streptococci are susceptible to ampicillin,  penicillin and cefazolin, but may be resistant to  erythromycin and clindamycin.  Recommendations for intrapartum prophylaxis for Group B  streptococci are penicillin or ampicillin.    01-08 .Urine Clean Catch (Midstream) XXXX XXXX   No growth    01-08 .Blood Blood-Peripheral XXXX XXXX   No growth at 5 days.    01-08 .Blood Blood-Peripheral Blood Culture PCR  Streptococcus agalactiae (Group B)   Growth in anaerobic bottle: Gram Positive Cocci in Pairs and Chains   Growth in anaerobic bottle: Streptococcus agalactiae (Group B)  ***Blood Panel PCR results on this specimen are available  approximately 3 hours after the Gram stain result.***  Gram stain, PCR, and/or culture results may not always  correspond dueto difference in methodologies.  ************************************************************  This PCR assay was performed using [employer].  The following targets are tested for: Enterococcus,  vancomycin resistant enterococci, Listeria monocytogenes,  coagulase negative staphylococci, S. aureus,  methicillin resistant S. aureus, Streptococcus agalactiae  (Group B), S. pneumoniae, S. pyogenes (Group A),  Acinetobacter baumannii, Enterobacter cloacae, E. coli,  Klebsiella oxytoca, K. pneumoniae, Proteus sp.,  Serratia marcescens, Haemophilus influenzae,  Neisseria meningitidis, Pseudomonas aeruginosa, Candida  albicans, C. glabrata, C krusei, C parapsilosis,  C. tropicalis and the KPC resistance gene.          RADIOLOGY & ADDITIONAL STUDIES:

## 2020-01-15 NOTE — PROGRESS NOTE ADULT - ASSESSMENT
Pt is a 68 yo gentleman with a pmhx of HTN, HL, NIDM, Afib on eliquis who presented to the ED with leg redness and fever and cough . Has had chronic ulcer on L. medial leg for months, but has gotten worse. Patient was admitted and diagnosed with sepsis with  acute gas gangrene left foot with possible OM and necrotizing fascitis. ID and Podiatrist were consulted. patient was placed on broad spectrum antbiotics. patient under I and D left leg on 1/8 and left foot amputation on 1/10.  Tissue culture and blood culture grew strep agalactiae. antibiotics were de-escalated to PCN and clindamycin. vascular surgery recommended left BKA.   Also patient was placed on contact isolation for RSV URI.    Problem/Plan - 1:  ·  Problem: Cellulitis of left lower extremity.   s/p left medial malleolus biopsy on 1/8  s/p L foot amputation on 1/10  cont PCN and clindamycin for now. ID and vascular following. discussed with vascular surgeon on phone>>> left BKA on Friday. hold eliquis.     Problem/Plan - 2:  ·  Problem: Hypertension, unspecified type.  Plan: uncontrolled still.   cont cardizem. Increased hydralazine and monitor.      Problem/Plan - 3:  ·  Problem: RSV infection.  Plan: droplet isolation, supportive care.     Problem/Plan - 4:  ·  Problem: Type 2 diabetes mellitus with other neurologic complication, without long-term current use of insulin.  Plan: still uncontrolled with hyperglycemia. increase lantus again. cont other  meds. monitor.  Note that home med list is incomplete; pt states he normally takes 4 meds: Metformin, Invokana, Glimepiride, Onglyza.     Problem/Plan - 5:  ·  Problem: ELMER superimposed on Stage 4 chronic kidney disease.  Plan: creatinine stable. follow BMP. off lisinopril. renal following.     Problem/Plan - 6:  Problem: Atrial fibrillation, unspecified type. Plan: cont cardizem. Dced eliquis for surgery on Friday.       Problem/Plan - 7:  ·  Problem: Prophylactic measure.  Plan: DVT: will start heparin on Friday.     Problem/Plan - 8:  ·  Problem: Pain of left hip joint.  Plan: DJD lumbar spine. cont conservative management.     Full Code.

## 2020-01-15 NOTE — PROGRESS NOTE ADULT - ASSESSMENT
Pt is a 70 yo gentleman with a pmhx of HTN, HL, NIDM, Afib on eliquis who presents to the ED with leg redness and fever and cough . Has had chronic ulcer on L. medial leg for months, but over past couple days has gotten worse, and yesterday wife noted that his L. Leg was red. Seen  with   1.Acute gas gangrene of left foot with probable necrotizing fascitis and osteomyelitis of ankle : s/p Incision and drainage of abscess of left foot on 1/8. s/p guillotine two days ago and planned for BKA soon  cont clinda and penicillin   all c/s including abscess, OR and blood c/s Grp B strept   repeat blood c/s today   anticipate another 10days of antibiotics    2.Sepsis: resolving leukocytosis  fever resolved      3.RSV+ URI : supportive care      4.Uncontrolled DM :check Hb A I C   tight glucose control    5.ELMER vs ELMER on CKD  cr resolving  antibiotics renally dosed  monitor cr

## 2020-01-16 ENCOUNTER — TRANSCRIPTION ENCOUNTER (OUTPATIENT)
Age: 70
End: 2020-01-16

## 2020-01-16 LAB
ANION GAP SERPL CALC-SCNC: 6 MMOL/L — SIGNIFICANT CHANGE UP (ref 5–17)
BLD GP AB SCN SERPL QL: SIGNIFICANT CHANGE UP
BUN SERPL-MCNC: 28 MG/DL — HIGH (ref 7–23)
CALCIUM SERPL-MCNC: 8.4 MG/DL — LOW (ref 8.5–10.1)
CHLORIDE SERPL-SCNC: 105 MMOL/L — SIGNIFICANT CHANGE UP (ref 96–108)
CO2 SERPL-SCNC: 27 MMOL/L — SIGNIFICANT CHANGE UP (ref 22–31)
CREAT SERPL-MCNC: 1.77 MG/DL — HIGH (ref 0.5–1.3)
GLUCOSE BLDC GLUCOMTR-MCNC: 194 MG/DL — HIGH (ref 70–99)
GLUCOSE BLDC GLUCOMTR-MCNC: 198 MG/DL — HIGH (ref 70–99)
GLUCOSE BLDC GLUCOMTR-MCNC: 225 MG/DL — HIGH (ref 70–99)
GLUCOSE BLDC GLUCOMTR-MCNC: 237 MG/DL — HIGH (ref 70–99)
GLUCOSE SERPL-MCNC: 182 MG/DL — HIGH (ref 70–99)
HCT VFR BLD CALC: 22.8 % — LOW (ref 39–50)
HCT VFR BLD CALC: 26.1 % — LOW (ref 39–50)
HGB BLD-MCNC: 7.2 G/DL — LOW (ref 13–17)
HGB BLD-MCNC: 8.1 G/DL — LOW (ref 13–17)
MCHC RBC-ENTMCNC: 27.6 PG — SIGNIFICANT CHANGE UP (ref 27–34)
MCHC RBC-ENTMCNC: 27.8 PG — SIGNIFICANT CHANGE UP (ref 27–34)
MCHC RBC-ENTMCNC: 31 GM/DL — LOW (ref 32–36)
MCHC RBC-ENTMCNC: 31.6 GM/DL — LOW (ref 32–36)
MCV RBC AUTO: 88 FL — SIGNIFICANT CHANGE UP (ref 80–100)
MCV RBC AUTO: 88.8 FL — SIGNIFICANT CHANGE UP (ref 80–100)
NRBC # BLD: 0 /100 WBCS — SIGNIFICANT CHANGE UP (ref 0–0)
NRBC # BLD: 0 /100 WBCS — SIGNIFICANT CHANGE UP (ref 0–0)
PLATELET # BLD AUTO: 494 K/UL — HIGH (ref 150–400)
PLATELET # BLD AUTO: 508 K/UL — HIGH (ref 150–400)
POTASSIUM SERPL-MCNC: 4.6 MMOL/L — SIGNIFICANT CHANGE UP (ref 3.5–5.3)
POTASSIUM SERPL-SCNC: 4.6 MMOL/L — SIGNIFICANT CHANGE UP (ref 3.5–5.3)
RBC # BLD: 2.59 M/UL — LOW (ref 4.2–5.8)
RBC # BLD: 2.94 M/UL — LOW (ref 4.2–5.8)
RBC # FLD: 15 % — HIGH (ref 10.3–14.5)
RBC # FLD: 15.2 % — HIGH (ref 10.3–14.5)
SODIUM SERPL-SCNC: 138 MMOL/L — SIGNIFICANT CHANGE UP (ref 135–145)
WBC # BLD: 15.61 K/UL — HIGH (ref 3.8–10.5)
WBC # BLD: 16.27 K/UL — HIGH (ref 3.8–10.5)
WBC # FLD AUTO: 15.61 K/UL — HIGH (ref 3.8–10.5)
WBC # FLD AUTO: 16.27 K/UL — HIGH (ref 3.8–10.5)

## 2020-01-16 PROCEDURE — 99232 SBSQ HOSP IP/OBS MODERATE 35: CPT

## 2020-01-16 RX ORDER — INSULIN GLARGINE 100 [IU]/ML
10 INJECTION, SOLUTION SUBCUTANEOUS AT BEDTIME
Refills: 0 | Status: DISCONTINUED | OUTPATIENT
Start: 2020-01-16 | End: 2020-01-17

## 2020-01-16 RX ORDER — HYDRALAZINE HCL 50 MG
75 TABLET ORAL THREE TIMES A DAY
Refills: 0 | Status: DISCONTINUED | OUTPATIENT
Start: 2020-01-16 | End: 2020-01-17

## 2020-01-16 RX ADMIN — PENICILLIN G POTASSIUM 100 MILLION UNIT(S): 5000000 POWDER, FOR SOLUTION INTRAMUSCULAR; INTRAPLEURAL; INTRATHECAL; INTRAVENOUS at 22:01

## 2020-01-16 RX ADMIN — OXYCODONE AND ACETAMINOPHEN 1 TABLET(S): 5; 325 TABLET ORAL at 07:01

## 2020-01-16 RX ADMIN — Medication 5 UNIT(S): at 07:47

## 2020-01-16 RX ADMIN — Medication 75 MILLIGRAM(S): at 22:01

## 2020-01-16 RX ADMIN — Medication 240 MILLIGRAM(S): at 06:00

## 2020-01-16 RX ADMIN — Medication 2: at 07:47

## 2020-01-16 RX ADMIN — Medication 10 MILLIGRAM(S): at 18:02

## 2020-01-16 RX ADMIN — OXYCODONE AND ACETAMINOPHEN 1 TABLET(S): 5; 325 TABLET ORAL at 06:01

## 2020-01-16 RX ADMIN — Medication 100 MILLIGRAM(S): at 22:00

## 2020-01-16 RX ADMIN — PENICILLIN G POTASSIUM 100 MILLION UNIT(S): 5000000 POWDER, FOR SOLUTION INTRAMUSCULAR; INTRAPLEURAL; INTRATHECAL; INTRAVENOUS at 06:01

## 2020-01-16 RX ADMIN — Medication 650 MILLIGRAM(S): at 23:37

## 2020-01-16 RX ADMIN — Medication 650 MILLIGRAM(S): at 22:37

## 2020-01-16 RX ADMIN — OXYCODONE AND ACETAMINOPHEN 1 TABLET(S): 5; 325 TABLET ORAL at 17:04

## 2020-01-16 RX ADMIN — INSULIN GLARGINE 10 UNIT(S): 100 INJECTION, SOLUTION SUBCUTANEOUS at 22:02

## 2020-01-16 RX ADMIN — OXYCODONE AND ACETAMINOPHEN 1 TABLET(S): 5; 325 TABLET ORAL at 12:56

## 2020-01-16 RX ADMIN — PENICILLIN G POTASSIUM 100 MILLION UNIT(S): 5000000 POWDER, FOR SOLUTION INTRAMUSCULAR; INTRAPLEURAL; INTRATHECAL; INTRAVENOUS at 02:03

## 2020-01-16 RX ADMIN — Medication 2: at 11:09

## 2020-01-16 RX ADMIN — OXYCODONE AND ACETAMINOPHEN 1 TABLET(S): 5; 325 TABLET ORAL at 13:45

## 2020-01-16 RX ADMIN — Medication 250 MILLIGRAM(S): at 17:04

## 2020-01-16 RX ADMIN — Medication 75 MILLIGRAM(S): at 14:21

## 2020-01-16 RX ADMIN — NYSTATIN CREAM 1 APPLICATION(S): 100000 CREAM TOPICAL at 06:01

## 2020-01-16 RX ADMIN — Medication 2 MILLIGRAM(S): at 22:01

## 2020-01-16 RX ADMIN — Medication 50 MILLIGRAM(S): at 06:00

## 2020-01-16 RX ADMIN — LIDOCAINE 1 PATCH: 4 CREAM TOPICAL at 04:00

## 2020-01-16 RX ADMIN — Medication 5 UNIT(S): at 11:09

## 2020-01-16 RX ADMIN — PENICILLIN G POTASSIUM 100 MILLION UNIT(S): 5000000 POWDER, FOR SOLUTION INTRAMUSCULAR; INTRAPLEURAL; INTRATHECAL; INTRAVENOUS at 18:28

## 2020-01-16 RX ADMIN — PENICILLIN G POTASSIUM 100 MILLION UNIT(S): 5000000 POWDER, FOR SOLUTION INTRAMUSCULAR; INTRAPLEURAL; INTRATHECAL; INTRAVENOUS at 10:41

## 2020-01-16 RX ADMIN — PENICILLIN G POTASSIUM 100 MILLION UNIT(S): 5000000 POWDER, FOR SOLUTION INTRAMUSCULAR; INTRAPLEURAL; INTRATHECAL; INTRAVENOUS at 14:21

## 2020-01-16 RX ADMIN — Medication 100 MILLIGRAM(S): at 14:21

## 2020-01-16 RX ADMIN — Medication 250 MILLIGRAM(S): at 06:55

## 2020-01-16 RX ADMIN — Medication 100 MILLIGRAM(S): at 05:59

## 2020-01-16 RX ADMIN — Medication 4: at 17:04

## 2020-01-16 RX ADMIN — Medication 5 UNIT(S): at 17:04

## 2020-01-16 RX ADMIN — Medication 3 MILLIGRAM(S): at 22:02

## 2020-01-16 RX ADMIN — OXYCODONE AND ACETAMINOPHEN 1 TABLET(S): 5; 325 TABLET ORAL at 07:45

## 2020-01-16 RX ADMIN — NYSTATIN CREAM 1 APPLICATION(S): 100000 CREAM TOPICAL at 17:04

## 2020-01-16 NOTE — CHART NOTE - NSCHARTNOTEFT_GEN_A_CORE
Per discussion with Latoya Kaminski & Leon, pt does not need to be transfused today for a HGB of 8.1.  Dr. Quintero said he will transfuse intraoperatively, if needed.

## 2020-01-16 NOTE — PROGRESS NOTE ADULT - ASSESSMENT
Pt is a 70 yo gentleman with a pmhx of HTN, HL, NIDM, Afib on eliquis who presented to the ED with leg redness and fever and cough . Has had chronic ulcer on L. medial leg for months, but has gotten worse. Patient was admitted and diagnosed with sepsis with  acute gas gangrene left foot with possible OM and necrotizing fascitis. ID and Podiatrist were consulted. patient was placed on broad spectrum antbiotics. patient under I and D left leg on 1/8 and left foot amputation on 1/10.  Tissue culture and blood culture grew strep agalactiae. antibiotics were de-escalated to PCN and clindamycin. vascular surgery recommended left BKA.   Also patient was placed on contact isolation for RSV URI.  one unit of blood transfusion was given for worsening anemia on 1/16/20    Problem/Plan - 1:  ·  Problem: Cellulitis of left lower extremity.   s/p left medial malleolus biopsy on 1/8  s/p L foot amputation on 1/10  cont PCN and clindamycin for now. ID and vascular following. disussed with vascular surgery PA>>> scheduled for left BKA tomorrow. follow post procedure recs. keep NPO after MN. decrease lantus to 10 units to avoid hypoglycemia. hold eliquis.     Problem/Plan - 2:  ·  Problem: Hypertension, unspecified type.  Plan: uncontrolled   cont cardizem. Increased hydralazine to 75 mg TID. cont to hold ACEI per renal. can restart once creatinine stabilizes and ok with renal.      Problem/Plan - 3:  ·  Problem: RSV infection.  Plan: droplet isolation, supportive care.     Problem/Plan - 4:  ·  Problem: Type 2 diabetes mellitus with other neurologic complication, without long-term current use of insulin.  Plan: still not well controlled. decrease lantus to 10 units tonight due to surgery tomorrow. cont other management. follow finger sticks.   Note that home med list is incomplete; pt states he normally takes 4 meds: Metformin, Invokana, Glimepiride, Onglyza.     Problem/Plan - 5:  ·  Problem: ELMER superimposed on Stage 4 chronic kidney disease.  Plan: creatinine slightly worse today. renal following.     Problem/Plan - 6:  Problem: Atrial fibrillation, unspecified type. Plan: cont cardizem. eliquis on hold for surgery tomorrow.        Problem/Plan - 7:  ·  Problem: Prophylactic measure.  Plan: DVT: received eliquis day before yesterday.     Problem/Plan - 8:  ·  Problem: Pain of left hip joint.  Plan: DJD lumbar spine. cont conservative management.       ·	Anemia. worse this morning 7.2. no active gross bleeding. give one unit of blood transfusion as patient going for left BKA tomorrow. follow CBC in am. consent obtained and discussed with nurse.     Full Code.

## 2020-01-16 NOTE — PROGRESS NOTE ADULT - SUBJECTIVE AND OBJECTIVE BOX
CHIEF COMPLAINT: still uncontrolled blood sugar and hypertension.   no chest pain no sob at rest. no fever no n/v/d      PHYSICAL EXAM:  GENERAL: well built, well nourished, no acute distress   CHEST/LUNG: Clear to ausculation bilaterally, no wheezing, no crackles   HEART: Regular rate and rhythm; No murmurs, rubs  ABDOMEN: Soft, Nontender, Nondistended; Bowel sounds present  EXTREMITIES:  s/p left foot amputation. dressing +. no cyanosis.   NERVOUS SYSTEM:  Grossly non focal.  Psychiatry: AAO x 3, mood is appropriate     -----------------------------------------------------------------------------------------------------------------------------------------------------------------  OBJECTIVE DATA:       Vital Signs Last 24 Hrs  T(C): 36.1 (2020 04:35), Max: 36.6 (15 Yann 2020 11:50)  T(F): 97 (2020 04:35), Max: 97.9 (15 Yann 2020 11:50)  HR: 79 (2020 04:35) (69 - 97)  BP: 178/94 (2020 04:35) (142/76 - 178/94)  BP(mean): --  RR: 18 (2020 04:35) (16 - 19)  SpO2: 99% (2020 04:35) (95% - 99%)           Daily     Daily Weight in k.5 (2020 04:35)      LABS:                        8.1    16.27 )-----------( 508      ( 2020 10:22 )             26.1             01-16    138  |  105  |  28<H>  ----------------------------<  182<H>  4.6   |  27  |  1.77<H>    Ca    8.4<L>      2020 08:04                        Interval Radiology studies: reviewed     MEDICATIONS  (STANDING):  clindamycin IVPB 900 milliGRAM(s) IV Intermittent every 8 hours  dextrose 5%. 1000 milliLiter(s) (50 mL/Hr) IV Continuous <Continuous>  dextrose 50% Injectable 12.5 Gram(s) IV Push once  dextrose 50% Injectable 25 Gram(s) IV Push once  dextrose 50% Injectable 25 Gram(s) IV Push once  diltiazem    milliGRAM(s) Oral daily  doxazosin 2 milliGRAM(s) Oral at bedtime  hydrALAZINE 75 milliGRAM(s) Oral three times a day  insulin glargine Injectable (LANTUS) 10 Unit(s) SubCutaneous at bedtime  insulin lispro (HumaLOG) corrective regimen sliding scale   SubCutaneous three times a day before meals  insulin lispro (HumaLOG) corrective regimen sliding scale   SubCutaneous at bedtime  insulin lispro Injectable (HumaLOG) 5 Unit(s) SubCutaneous three times a day before meals  lidocaine   Patch 1 Patch Transdermal every 24 hours  melatonin 3 milliGRAM(s) Oral at bedtime  nystatin Cream 1 Application(s) Topical every 12 hours  penicillin   G  potassium  IVPB 4 Million Unit(s) IV Intermittent every 4 hours  saccharomyces boulardii 250 milliGRAM(s) Oral two times a day    MEDICATIONS  (PRN):  acetaminophen   Tablet .. 650 milliGRAM(s) Oral every 6 hours PRN Temp greater or equal to 38C (100.4F), Mild Pain (1 - 3)  ALBUTerol   0.042% 1.25 milliGRAM(s) Nebulizer four times a day PRN Wheezing  dextrose 40% Gel 15 Gram(s) Oral once PRN Blood Glucose LESS THAN 70 milliGRAM(s)/deciliter  glucagon  Injectable 1 milliGRAM(s) IntraMuscular once PRN Glucose LESS THAN 70 milligrams/deciliter  guaiFENesin   Syrup  (Sugar-Free) 100 milliGRAM(s) Oral every 6 hours PRN Cough  hydrALAZINE Injectable 10 milliGRAM(s) IV Push every 6 hours PRN SUSTAINED SBP >180 OR DBP >100  morphine  - Injectable 2 milliGRAM(s) IV Push every 4 hours PRN Severe Pain (7 - 10)	  oxycodone    5 mG/acetaminophen 325 mG 1 Tablet(s) Oral every 4 hours PRN Moderate Pain (4 - 6)  sodium chloride 0.65% Nasal 1 Spray(s) Both Nostrils three times a day PRN Nasal Congestion

## 2020-01-16 NOTE — PROGRESS NOTE ADULT - SUBJECTIVE AND OBJECTIVE BOX
Patient seen and examined bedside resting comfortably.  Denies nausea and vomiting. Tolerating diet.  Flatus/BM. +  Denies chest pain, dyspnea, cough.    T(F): 97 (01-16-20 @ 04:35), Max: 97.9 (01-15-20 @ 11:50)  HR: 79 (01-16-20 @ 04:35) (69 - 97)  BP: 178/94 (01-16-20 @ 04:35) (142/76 - 178/94)  RR: 18 (01-16-20 @ 04:35) (16 - 19)  SpO2: 99% (01-16-20 @ 04:35) (95% - 99%)    CAPILLARY BLOOD GLUCOSE  POCT Blood Glucose.: 194 mg/dL (16 Jan 2020 10:34)  POCT Blood Glucose.: 198 mg/dL (16 Jan 2020 07:13)  POCT Blood Glucose.: 228 mg/dL (15 Yann 2020 21:20)  POCT Blood Glucose.: 171 mg/dL (15 Yann 2020 15:42)      PHYSICAL EXAM:  General: NAD  Neuro:  Alert & oriented x 3  Abdomen: soft, NTND. Normactive BS  Extremity: DSD intact Lt LE. No further increase in cellulitis    LABS:                        8.1    16.27 )-----------( 508      ( 16 Jan 2020 10:22 )             26.1     01-16    138  |  105  |  28<H>  ----------------------------<  182<H>  4.6   |  27  |  1.77<H>    Ca    8.4<L>      16 Jan 2020 08:04    Culture - Blood (01.14.20 @ 23:39)    Specimen Source: .Blood Blood-Peripheral    Culture Results:   No growth to date.        I&O's Detail    15 Yann 2020 07:01  -  16 Jan 2020 07:00  --------------------------------------------------------  IN:    Solution: 500 mL    Solution: 150 mL  Total IN: 650 mL    OUT:    Voided: 400 mL  Total OUT: 400 mL    Total NET: 250 mL          Impression: 69y Male POD # 6 s/p guillotine amputation of left LE  HTN  RSV - resolved  DMII  ELMER  atrial fib  anemia  leukocytosis      Plan:  -continue VTE prophylaxis   - continue IVAB per ID  - continue medical f/u  -Increase activity with PT, OOB, Ambulate  -continue local wound care  -f/u AM labs  - pt is scheduled for Lt BKA Friday: Dr Kaminski, hospitalist,  says pt is medically stable.

## 2020-01-17 ENCOUNTER — RESULT REVIEW (OUTPATIENT)
Age: 70
End: 2020-01-17

## 2020-01-17 LAB
ANION GAP SERPL CALC-SCNC: 4 MMOL/L — LOW (ref 5–17)
APTT BLD: 39.4 SEC — HIGH (ref 27.5–36.3)
BUN SERPL-MCNC: 25 MG/DL — HIGH (ref 7–23)
CALCIUM SERPL-MCNC: 8.6 MG/DL — SIGNIFICANT CHANGE UP (ref 8.5–10.1)
CHLORIDE SERPL-SCNC: 103 MMOL/L — SIGNIFICANT CHANGE UP (ref 96–108)
CO2 SERPL-SCNC: 30 MMOL/L — SIGNIFICANT CHANGE UP (ref 22–31)
CREAT SERPL-MCNC: 1.75 MG/DL — HIGH (ref 0.5–1.3)
GLUCOSE BLDC GLUCOMTR-MCNC: 214 MG/DL — HIGH (ref 70–99)
GLUCOSE BLDC GLUCOMTR-MCNC: 221 MG/DL — HIGH (ref 70–99)
GLUCOSE BLDC GLUCOMTR-MCNC: 255 MG/DL — HIGH (ref 70–99)
GLUCOSE BLDC GLUCOMTR-MCNC: 414 MG/DL — HIGH (ref 70–99)
GLUCOSE BLDC GLUCOMTR-MCNC: 422 MG/DL — HIGH (ref 70–99)
GLUCOSE SERPL-MCNC: 196 MG/DL — HIGH (ref 70–99)
HCT VFR BLD CALC: 24 % — LOW (ref 39–50)
HCT VFR BLD CALC: 25.2 % — LOW (ref 39–50)
HGB BLD-MCNC: 7.7 G/DL — LOW (ref 13–17)
HGB BLD-MCNC: 7.9 G/DL — LOW (ref 13–17)
INR BLD: 1.18 RATIO — HIGH (ref 0.88–1.16)
MCHC RBC-ENTMCNC: 27.2 PG — SIGNIFICANT CHANGE UP (ref 27–34)
MCHC RBC-ENTMCNC: 28.5 PG — SIGNIFICANT CHANGE UP (ref 27–34)
MCHC RBC-ENTMCNC: 31.3 GM/DL — LOW (ref 32–36)
MCHC RBC-ENTMCNC: 32.1 GM/DL — SIGNIFICANT CHANGE UP (ref 32–36)
MCV RBC AUTO: 86.9 FL — SIGNIFICANT CHANGE UP (ref 80–100)
MCV RBC AUTO: 88.9 FL — SIGNIFICANT CHANGE UP (ref 80–100)
NRBC # BLD: 0 /100 WBCS — SIGNIFICANT CHANGE UP (ref 0–0)
NRBC # BLD: 0 /100 WBCS — SIGNIFICANT CHANGE UP (ref 0–0)
PLATELET # BLD AUTO: 490 K/UL — HIGH (ref 150–400)
PLATELET # BLD AUTO: 544 K/UL — HIGH (ref 150–400)
POTASSIUM SERPL-MCNC: 5.1 MMOL/L — SIGNIFICANT CHANGE UP (ref 3.5–5.3)
POTASSIUM SERPL-SCNC: 5.1 MMOL/L — SIGNIFICANT CHANGE UP (ref 3.5–5.3)
PROTHROM AB SERPL-ACNC: 13.3 SEC — HIGH (ref 10–12.9)
RBC # BLD: 2.7 M/UL — LOW (ref 4.2–5.8)
RBC # BLD: 2.9 M/UL — LOW (ref 4.2–5.8)
RBC # FLD: 15.2 % — HIGH (ref 10.3–14.5)
RBC # FLD: 15.2 % — HIGH (ref 10.3–14.5)
SODIUM SERPL-SCNC: 137 MMOL/L — SIGNIFICANT CHANGE UP (ref 135–145)
WBC # BLD: 13.99 K/UL — HIGH (ref 3.8–10.5)
WBC # BLD: 28.46 K/UL — HIGH (ref 3.8–10.5)
WBC # FLD AUTO: 13.99 K/UL — HIGH (ref 3.8–10.5)
WBC # FLD AUTO: 28.46 K/UL — HIGH (ref 3.8–10.5)

## 2020-01-17 PROCEDURE — 88311 DECALCIFY TISSUE: CPT | Mod: 26

## 2020-01-17 PROCEDURE — 88307 TISSUE EXAM BY PATHOLOGIST: CPT | Mod: 26

## 2020-01-17 PROCEDURE — 99233 SBSQ HOSP IP/OBS HIGH 50: CPT

## 2020-01-17 RX ORDER — HYDROMORPHONE HYDROCHLORIDE 2 MG/ML
0.5 INJECTION INTRAMUSCULAR; INTRAVENOUS; SUBCUTANEOUS
Refills: 0 | Status: DISCONTINUED | OUTPATIENT
Start: 2020-01-17 | End: 2020-01-17

## 2020-01-17 RX ORDER — LIDOCAINE 4 G/100G
1 CREAM TOPICAL EVERY 24 HOURS
Refills: 0 | Status: DISCONTINUED | OUTPATIENT
Start: 2020-01-17 | End: 2020-01-22

## 2020-01-17 RX ORDER — MORPHINE SULFATE 50 MG/1
2 CAPSULE, EXTENDED RELEASE ORAL EVERY 4 HOURS
Refills: 0 | Status: DISCONTINUED | OUTPATIENT
Start: 2020-01-17 | End: 2020-01-22

## 2020-01-17 RX ORDER — DILTIAZEM HCL 120 MG
240 CAPSULE, EXT RELEASE 24 HR ORAL DAILY
Refills: 0 | Status: DISCONTINUED | OUTPATIENT
Start: 2020-01-17 | End: 2020-01-22

## 2020-01-17 RX ORDER — PENICILLIN G POTASSIUM 5000000 [IU]/1
4 POWDER, FOR SOLUTION INTRAMUSCULAR; INTRAPLEURAL; INTRATHECAL; INTRAVENOUS EVERY 4 HOURS
Refills: 0 | Status: DISCONTINUED | OUTPATIENT
Start: 2020-01-17 | End: 2020-01-20

## 2020-01-17 RX ORDER — SODIUM CHLORIDE 9 MG/ML
1000 INJECTION INTRAMUSCULAR; INTRAVENOUS; SUBCUTANEOUS
Refills: 0 | Status: DISCONTINUED | OUTPATIENT
Start: 2020-01-17 | End: 2020-01-17

## 2020-01-17 RX ORDER — OXYCODONE AND ACETAMINOPHEN 5; 325 MG/1; MG/1
1 TABLET ORAL EVERY 4 HOURS
Refills: 0 | Status: DISCONTINUED | OUTPATIENT
Start: 2020-01-17 | End: 2020-01-22

## 2020-01-17 RX ORDER — ALBUTEROL 90 UG/1
1.25 AEROSOL, METERED ORAL
Refills: 0 | Status: DISCONTINUED | OUTPATIENT
Start: 2020-01-17 | End: 2020-01-22

## 2020-01-17 RX ORDER — ONDANSETRON 8 MG/1
4 TABLET, FILM COATED ORAL ONCE
Refills: 0 | Status: DISCONTINUED | OUTPATIENT
Start: 2020-01-17 | End: 2020-01-17

## 2020-01-17 RX ORDER — DOXAZOSIN MESYLATE 4 MG
4 TABLET ORAL AT BEDTIME
Refills: 0 | Status: DISCONTINUED | OUTPATIENT
Start: 2020-01-17 | End: 2020-01-17

## 2020-01-17 RX ORDER — INSULIN GLARGINE 100 [IU]/ML
18 INJECTION, SOLUTION SUBCUTANEOUS AT BEDTIME
Refills: 0 | Status: DISCONTINUED | OUTPATIENT
Start: 2020-01-17 | End: 2020-01-17

## 2020-01-17 RX ORDER — ACETAMINOPHEN 500 MG
1000 TABLET ORAL ONCE
Refills: 0 | Status: COMPLETED | OUTPATIENT
Start: 2020-01-17 | End: 2020-01-17

## 2020-01-17 RX ORDER — DOXAZOSIN MESYLATE 4 MG
4 TABLET ORAL AT BEDTIME
Refills: 0 | Status: DISCONTINUED | OUTPATIENT
Start: 2020-01-17 | End: 2020-01-22

## 2020-01-17 RX ORDER — HYDROMORPHONE HYDROCHLORIDE 2 MG/ML
1 INJECTION INTRAMUSCULAR; INTRAVENOUS; SUBCUTANEOUS
Refills: 0 | Status: DISCONTINUED | OUTPATIENT
Start: 2020-01-17 | End: 2020-01-17

## 2020-01-17 RX ORDER — SODIUM CHLORIDE 9 MG/ML
1000 INJECTION INTRAMUSCULAR; INTRAVENOUS; SUBCUTANEOUS
Refills: 0 | Status: DISCONTINUED | OUTPATIENT
Start: 2020-01-17 | End: 2020-01-19

## 2020-01-17 RX ORDER — HEPARIN SODIUM 5000 [USP'U]/ML
5000 INJECTION INTRAVENOUS; SUBCUTANEOUS EVERY 12 HOURS
Refills: 0 | Status: DISCONTINUED | OUTPATIENT
Start: 2020-01-18 | End: 2020-01-21

## 2020-01-17 RX ORDER — HYDRALAZINE HCL 50 MG
75 TABLET ORAL THREE TIMES A DAY
Refills: 0 | Status: DISCONTINUED | OUTPATIENT
Start: 2020-01-17 | End: 2020-01-21

## 2020-01-17 RX ORDER — ACETAMINOPHEN 500 MG
650 TABLET ORAL EVERY 6 HOURS
Refills: 0 | Status: DISCONTINUED | OUTPATIENT
Start: 2020-01-17 | End: 2020-01-22

## 2020-01-17 RX ORDER — FENTANYL CITRATE 50 UG/ML
50 INJECTION INTRAVENOUS
Refills: 0 | Status: DISCONTINUED | OUTPATIENT
Start: 2020-01-17 | End: 2020-01-17

## 2020-01-17 RX ORDER — INSULIN GLARGINE 100 [IU]/ML
18 INJECTION, SOLUTION SUBCUTANEOUS AT BEDTIME
Refills: 0 | Status: DISCONTINUED | OUTPATIENT
Start: 2020-01-17 | End: 2020-01-22

## 2020-01-17 RX ORDER — INSULIN LISPRO 100/ML
5 VIAL (ML) SUBCUTANEOUS
Refills: 0 | Status: DISCONTINUED | OUTPATIENT
Start: 2020-01-17 | End: 2020-01-18

## 2020-01-17 RX ORDER — FENTANYL CITRATE 50 UG/ML
25 INJECTION INTRAVENOUS
Refills: 0 | Status: DISCONTINUED | OUTPATIENT
Start: 2020-01-17 | End: 2020-01-17

## 2020-01-17 RX ORDER — SODIUM CHLORIDE 9 MG/ML
1000 INJECTION, SOLUTION INTRAVENOUS
Refills: 0 | Status: DISCONTINUED | OUTPATIENT
Start: 2020-01-17 | End: 2020-01-22

## 2020-01-17 RX ORDER — SODIUM CHLORIDE 9 MG/ML
1000 INJECTION, SOLUTION INTRAVENOUS
Refills: 0 | Status: DISCONTINUED | OUTPATIENT
Start: 2020-01-17 | End: 2020-01-17

## 2020-01-17 RX ORDER — ACETAMINOPHEN 500 MG
1000 TABLET ORAL ONCE
Refills: 0 | Status: DISCONTINUED | OUTPATIENT
Start: 2020-01-17 | End: 2020-01-17

## 2020-01-17 RX ORDER — SACCHAROMYCES BOULARDII 250 MG
250 POWDER IN PACKET (EA) ORAL
Refills: 0 | Status: DISCONTINUED | OUTPATIENT
Start: 2020-01-17 | End: 2020-01-22

## 2020-01-17 RX ADMIN — PENICILLIN G POTASSIUM 100 MILLION UNIT(S): 5000000 POWDER, FOR SOLUTION INTRAMUSCULAR; INTRAPLEURAL; INTRATHECAL; INTRAVENOUS at 02:00

## 2020-01-17 RX ADMIN — SODIUM CHLORIDE 100 MILLILITER(S): 9 INJECTION, SOLUTION INTRAVENOUS at 16:31

## 2020-01-17 RX ADMIN — LIDOCAINE 1 PATCH: 4 CREAM TOPICAL at 23:45

## 2020-01-17 RX ADMIN — HYDROMORPHONE HYDROCHLORIDE 1 MILLIGRAM(S): 2 INJECTION INTRAMUSCULAR; INTRAVENOUS; SUBCUTANEOUS at 15:57

## 2020-01-17 RX ADMIN — Medication 75 MILLIGRAM(S): at 22:24

## 2020-01-17 RX ADMIN — PENICILLIN G POTASSIUM 100 MILLION UNIT(S): 5000000 POWDER, FOR SOLUTION INTRAMUSCULAR; INTRAPLEURAL; INTRATHECAL; INTRAVENOUS at 06:18

## 2020-01-17 RX ADMIN — OXYCODONE AND ACETAMINOPHEN 1 TABLET(S): 5; 325 TABLET ORAL at 10:24

## 2020-01-17 RX ADMIN — INSULIN GLARGINE 18 UNIT(S): 100 INJECTION, SOLUTION SUBCUTANEOUS at 22:26

## 2020-01-17 RX ADMIN — Medication 100 MILLIGRAM(S): at 22:27

## 2020-01-17 RX ADMIN — Medication 75 MILLIGRAM(S): at 06:19

## 2020-01-17 RX ADMIN — OXYCODONE AND ACETAMINOPHEN 1 TABLET(S): 5; 325 TABLET ORAL at 22:25

## 2020-01-17 RX ADMIN — OXYCODONE AND ACETAMINOPHEN 1 TABLET(S): 5; 325 TABLET ORAL at 04:43

## 2020-01-17 RX ADMIN — Medication 4 MILLIGRAM(S): at 22:24

## 2020-01-17 RX ADMIN — HYDROMORPHONE HYDROCHLORIDE 1 MILLIGRAM(S): 2 INJECTION INTRAMUSCULAR; INTRAVENOUS; SUBCUTANEOUS at 16:12

## 2020-01-17 RX ADMIN — PENICILLIN G POTASSIUM 100 MILLION UNIT(S): 5000000 POWDER, FOR SOLUTION INTRAMUSCULAR; INTRAPLEURAL; INTRATHECAL; INTRAVENOUS at 22:46

## 2020-01-17 RX ADMIN — PENICILLIN G POTASSIUM 100 MILLION UNIT(S): 5000000 POWDER, FOR SOLUTION INTRAMUSCULAR; INTRAPLEURAL; INTRATHECAL; INTRAVENOUS at 17:57

## 2020-01-17 RX ADMIN — Medication 250 MILLIGRAM(S): at 17:57

## 2020-01-17 RX ADMIN — Medication 4: at 11:34

## 2020-01-17 RX ADMIN — Medication 400 MILLIGRAM(S): at 15:59

## 2020-01-17 RX ADMIN — Medication 240 MILLIGRAM(S): at 06:19

## 2020-01-17 RX ADMIN — OXYCODONE AND ACETAMINOPHEN 1 TABLET(S): 5; 325 TABLET ORAL at 22:50

## 2020-01-17 RX ADMIN — MORPHINE SULFATE 2 MILLIGRAM(S): 50 CAPSULE, EXTENDED RELEASE ORAL at 20:45

## 2020-01-17 RX ADMIN — Medication 100 MILLIGRAM(S): at 06:18

## 2020-01-17 RX ADMIN — LIDOCAINE 1 PATCH: 4 CREAM TOPICAL at 07:06

## 2020-01-17 RX ADMIN — OXYCODONE AND ACETAMINOPHEN 1 TABLET(S): 5; 325 TABLET ORAL at 11:15

## 2020-01-17 RX ADMIN — OXYCODONE AND ACETAMINOPHEN 1 TABLET(S): 5; 325 TABLET ORAL at 03:43

## 2020-01-17 RX ADMIN — Medication 250 MILLIGRAM(S): at 06:19

## 2020-01-17 RX ADMIN — NYSTATIN CREAM 1 APPLICATION(S): 100000 CREAM TOPICAL at 06:20

## 2020-01-17 RX ADMIN — PENICILLIN G POTASSIUM 100 MILLION UNIT(S): 5000000 POWDER, FOR SOLUTION INTRAMUSCULAR; INTRAPLEURAL; INTRATHECAL; INTRAVENOUS at 10:13

## 2020-01-17 RX ADMIN — Medication 1000 MILLIGRAM(S): at 16:31

## 2020-01-17 RX ADMIN — LIDOCAINE 1 PATCH: 4 CREAM TOPICAL at 19:30

## 2020-01-17 RX ADMIN — MORPHINE SULFATE 2 MILLIGRAM(S): 50 CAPSULE, EXTENDED RELEASE ORAL at 20:29

## 2020-01-17 NOTE — PROGRESS NOTE ADULT - SUBJECTIVE AND OBJECTIVE BOX
Pre-operative Note    - Pre-operative Diagnosis: gas gangrene of left foot    - Procedure: Below Knee Amputation, LEFT     - Labs:                        7.9    13.99 )-----------( 544      ( 17 Jan 2020 06:54 )             25.2     01-17    137  |  103  |  25<H>  ----------------------------<  196<H>  5.1   |  30  |  1.75<H>    Ca    8.6      17 Jan 2020 06:54      PT/INR - ( 17 Jan 2020 06:54 )   PT: 13.3 sec;   INR: 1.18 ratio         PTT - ( 17 Jan 2020 06:54 )  PTT:39.4 sec  Type & Screen #1: completed       - EKG: completed 1/8/2020    - Blood: 2 UPRBC on hold.      - Orders:  > NPO at midnight  > Perioperative antibiotics.  > Morning Labs: CBC, BMP, coags, type & screen    - Permits:  > Consent in chart.  > Case scheduled with OR.

## 2020-01-17 NOTE — BRIEF OPERATIVE NOTE - NSICDXBRIEFPOSTOP_GEN_ALL_CORE_FT
POST-OP DIAGNOSIS:  Gas gangrene of foot 08-Jan-2020 21:03:23 Left foot gas gangrene Tomasz Brown

## 2020-01-17 NOTE — PROGRESS NOTE ADULT - SUBJECTIVE AND OBJECTIVE BOX
CHIEF COMPLAINT: NPO for left stump revision. + pain leg.   anxious about the surgery and pain control. + low back pain. no fever       PHYSICAL EXAM:  GENERAL: well built, well nourished, no acute distress   CHEST/LUNG: Clear to ausculation bilaterally, no wheezing, no crackles   HEART: Regular rate and rhythm; No murmurs, rubs  ABDOMEN: Soft, Nontender, Nondistended; Bowel sounds present  EXTREMITIES:  s/p left foot amputation. dressing +. no cyanosis.   NERVOUS SYSTEM:  Grossly non focal.  Psychiatry: AAO x 3, anxious      -----------------------------------------------------------------------------------------------------------------------------------------------------------------  OBJECTIVE DATA:         Vital Signs Last 24 Hrs  T(C): 35.7 (2020 05:49), Max: 36.3 (2020 11:38)  T(F): 96.2 (2020 05:49), Max: 97.4 (2020 11:38)  HR: 83 (2020 05:49) (78 - 86)  BP: 158/74 (2020 05:49) (158/74 - 199/111)  BP(mean): --  RR: 18 (2020 05:49) (17 - 18)  SpO2: 100% (2020 05:49) (97% - 100%)           Daily     Daily Weight in k.4 (2020 05:49)      LABS:                        7.9    13.99 )-----------( 544      ( 2020 06:54 )             25.2             01-17    137  |  103  |  25<H>  ----------------------------<  196<H>  5.1   |  30  |  1.75<H>    Ca    8.6      2020 06:54                PT/INR - ( 2020 06:54 )   PT: 13.3 sec;   INR: 1.18 ratio         PTT - ( 2020 06:54 )  PTT:39.4 sec        Interval Radiology studies: reviewed     MEDICATIONS  (STANDING):  clindamycin IVPB 900 milliGRAM(s) IV Intermittent every 8 hours  dextrose 5%. 1000 milliLiter(s) (50 mL/Hr) IV Continuous <Continuous>  dextrose 50% Injectable 12.5 Gram(s) IV Push once  dextrose 50% Injectable 25 Gram(s) IV Push once  dextrose 50% Injectable 25 Gram(s) IV Push once  diltiazem    milliGRAM(s) Oral daily  doxazosin 2 milliGRAM(s) Oral at bedtime  hydrALAZINE 75 milliGRAM(s) Oral three times a day  insulin glargine Injectable (LANTUS) 18 Unit(s) SubCutaneous at bedtime  insulin lispro (HumaLOG) corrective regimen sliding scale   SubCutaneous three times a day before meals  insulin lispro (HumaLOG) corrective regimen sliding scale   SubCutaneous at bedtime  insulin lispro Injectable (HumaLOG) 5 Unit(s) SubCutaneous three times a day before meals  lidocaine   Patch 1 Patch Transdermal every 24 hours  melatonin 3 milliGRAM(s) Oral at bedtime  nystatin Cream 1 Application(s) Topical every 12 hours  penicillin   G  potassium  IVPB 4 Million Unit(s) IV Intermittent every 4 hours  saccharomyces boulardii 250 milliGRAM(s) Oral two times a day    MEDICATIONS  (PRN):  acetaminophen   Tablet .. 650 milliGRAM(s) Oral every 6 hours PRN Temp greater or equal to 38C (100.4F), Mild Pain (1 - 3)  ALBUTerol   0.042% 1.25 milliGRAM(s) Nebulizer four times a day PRN Wheezing  dextrose 40% Gel 15 Gram(s) Oral once PRN Blood Glucose LESS THAN 70 milliGRAM(s)/deciliter  glucagon  Injectable 1 milliGRAM(s) IntraMuscular once PRN Glucose LESS THAN 70 milligrams/deciliter  guaiFENesin   Syrup  (Sugar-Free) 100 milliGRAM(s) Oral every 6 hours PRN Cough  hydrALAZINE Injectable 10 milliGRAM(s) IV Push every 6 hours PRN SUSTAINED SBP >180 OR DBP >100  morphine  - Injectable 2 milliGRAM(s) IV Push every 4 hours PRN Severe Pain (7 - 10)  oxycodone    5 mG/acetaminophen 325 mG 1 Tablet(s) Oral every 4 hours PRN Moderate Pain (4 - 6)  sodium chloride 0.65% Nasal 1 Spray(s) Both Nostrils three times a day PRN Nasal Congestion      Culture - Blood (20 @ 23:39)    Specimen Source: .Blood Blood-Peripheral    Culture Results:   No growth to date.

## 2020-01-17 NOTE — PROGRESS NOTE ADULT - SUBJECTIVE AND OBJECTIVE BOX
[facility] NEPHROLOGY SERVICES, [facility]  NEPHROLOGY AND HYPERTENSION  [address]  [address]  [place] [address]  [phone]    MD YISEL WEEKS MD ANDREY GONCHARUK, MD MADHU KORRAPATI, MD YELENA ROSENBERG, MD BINNY KOSHY, MD CHRISTOPHER CAPUTO, MD EDWARD BOVER, MD          Patient feels well no complaints today.    MEDICATIONS  (STANDING):  clindamycin IVPB 900 milliGRAM(s) IV Intermittent every 8 hours  dextrose 5%. 1000 milliLiter(s) (50 mL/Hr) IV Continuous <Continuous>  dextrose 50% Injectable 12.5 Gram(s) IV Push once  dextrose 50% Injectable 25 Gram(s) IV Push once  dextrose 50% Injectable 25 Gram(s) IV Push once  diltiazem    milliGRAM(s) Oral daily  doxazosin 2 milliGRAM(s) Oral at bedtime  hydrALAZINE 75 milliGRAM(s) Oral three times a day  insulin glargine Injectable (LANTUS) 18 Unit(s) SubCutaneous at bedtime  insulin lispro (HumaLOG) corrective regimen sliding scale   SubCutaneous three times a day before meals  insulin lispro (HumaLOG) corrective regimen sliding scale   SubCutaneous at bedtime  insulin lispro Injectable (HumaLOG) 5 Unit(s) SubCutaneous three times a day before meals  lidocaine   Patch 1 Patch Transdermal every 24 hours  melatonin 3 milliGRAM(s) Oral at bedtime  nystatin Cream 1 Application(s) Topical every 12 hours  penicillin   G  potassium  IVPB 4 Million Unit(s) IV Intermittent every 4 hours  saccharomyces boulardii 250 milliGRAM(s) Oral two times a day  sodium chloride 0.9%. 1000 milliLiter(s) (75 mL/Hr) IV Continuous <Continuous>    MEDICATIONS  (PRN):  acetaminophen   Tablet .. 650 milliGRAM(s) Oral every 6 hours PRN Temp greater or equal to 38C (100.4F), Mild Pain (1 - 3)  ALBUTerol   0.042% 1.25 milliGRAM(s) Nebulizer four times a day PRN Wheezing  dextrose 40% Gel 15 Gram(s) Oral once PRN Blood Glucose LESS THAN 70 milliGRAM(s)/deciliter  glucagon  Injectable 1 milliGRAM(s) IntraMuscular once PRN Glucose LESS THAN 70 milligrams/deciliter  guaiFENesin   Syrup  (Sugar-Free) 100 milliGRAM(s) Oral every 6 hours PRN Cough  hydrALAZINE Injectable 10 milliGRAM(s) IV Push every 6 hours PRN SUSTAINED SBP >180 OR DBP >100  morphine  - Injectable 2 milliGRAM(s) IV Push every 4 hours PRN Severe Pain (7 - 10)  oxycodone    5 mG/acetaminophen 325 mG 1 Tablet(s) Oral every 4 hours PRN Moderate Pain (4 - 6)  sodium chloride 0.65% Nasal 1 Spray(s) Both Nostrils three times a day PRN Nasal Congestion      01-16-20 @ 07:01  -  01-17-20 @ 07:00  --------------------------------------------------------  IN: 400 mL / OUT: 1600 mL / NET: -1200 mL    01-17-20 @ 07:01  -  01-17-20 @ 11:56  --------------------------------------------------------  IN: 0 mL / OUT: 200 mL / NET: -200 mL      PHYSICAL EXAM:      T(C): 35.3 (01-17-20 @ 11:07), Max: 36.3 (01-16-20 @ 18:04)  HR: 73 (01-17-20 @ 11:07) (73 - 86)  BP: 174/83 (01-17-20 @ 11:07) (158/74 - 199/111)  RR: 18 (01-17-20 @ 11:07) (17 - 18)  SpO2: 100% (01-17-20 @ 11:07) (97% - 100%)  Wt(kg): --  Respiratory: clear anteriorly, decreased BS at bases  Cardiovascular: S1 S2  Gastrointestinal: soft NT ND +BS  Extremities:   LLE amputation tr edema                                    7.9    13.99 )-----------( 544      ( 17 Jan 2020 06:54 )             25.2     01-17    137  |  103  |  25<H>  ----------------------------<  196<H>  5.1   |  30  |  1.75<H>    Ca    8.6      17 Jan 2020 06:54          Creatinine Trend: 1.75<--, 1.77<--, 1.70<--, 1.82<--, 2.25<--, 2.79<--    Assessment   ELMER CKD 3; HTN; DM risk;   Pre renal azotemia; infectious GN;   HTN urgency, improved  Indices slowly improving   For BKA today      Plan    Holding Metformin and ACE;   BP medication adjustments,   Discussed with patient  Will follow course.    Augusto Santoyo MD

## 2020-01-17 NOTE — BRIEF OPERATIVE NOTE - NSICDXBRIEFPREOP_GEN_ALL_CORE_FT
PRE-OP DIAGNOSIS:  Gas gangrene of foot 08-Jan-2020 21:03:09 Left foot gas gangrene Tomasz Brown

## 2020-01-17 NOTE — PROGRESS NOTE ADULT - ASSESSMENT
Pt is a 70 yo gentleman with a pmhx of HTN, HL, NIDM, Afib on eliquis who presented to the ED with leg redness and fever and cough . Has had chronic ulcer on L. medial leg for months, but has gotten worse. Patient was admitted and diagnosed with sepsis with  acute gas gangrene left foot with possible OM and necrotizing fascitis. ID and Podiatrist were consulted. patient was placed on broad spectrum antbiotics. patient under I and D left leg on 1/8 and left foot amputation on 1/10.  Tissue culture and blood culture grew strep agalactiae. antibiotics were de-escalated to PCN and clindamycin. vascular surgery recommended left BKA.   Also patient was placed on contact isolation for RSV URI.    Problem/Plan - 1:  ·  Problem: Cellulitis of left lower extremity.   s/p left medial malleolus biopsy on 1/8  s/p L foot amputation on 1/10  cont PCN and clindamycin. NPO for left BKA. follow post procedure recs. cont iv morphine and percocet prn for pain control. received less lantus last night. increase it back to 18 units.     Problem/Plan - 2:  ·  Problem: Hypertension, unspecified type.  better controlled. cont current meds. monitor    Problem/Plan - 3:  ·  Problem: RSV infection.  resolved. no cough.     Problem/Plan - 4:  ·  Problem: Type 2 diabetes mellitus with other neurologic complication, without long-term current use of insulin.  still hyperglycemic. restart 18 units of lantus tonight. cont premeal humalog. will add oral after procedure today. cont correctional insulin. follow finger sticks.   Note that home med list is incomplete; pt states he normally takes 4 meds: Metformin, Invokana, Glimepiride, Onglyza.     Problem/Plan - 5:  ·  Problem: ELMER superimposed on Stage 4 chronic kidney disease. stable creatinine. might need gentle hydration fauzia-procedure.     Problem/Plan - 6:  Problem: Atrial fibrillation, unspecified type. Plan: cont cardizem.  Restart eliquis when ok with vascular surgery         Problem/Plan - 7:  ·  Problem: Prophylactic measure.  Plan: DVT: will need either eliquis or HSQ today. follow vascular surgery recs.      Problem/Plan - 8:  ·  Problem: Pain of left hip joint.  Plan: DJD lumbar spine. cont conservative management. cont lidocaine patch.       ·	Anemia.  no active bleeding. Hb stable. did not receive any blood transfusion.     Full Code.

## 2020-01-17 NOTE — BRIEF OPERATIVE NOTE - NSICDXBRIEFPROCEDURE_GEN_ALL_CORE_FT
PROCEDURES:  Below knee amputation 17-Jan-2020 16:14:55  Elli Baldwin
PROCEDURES:  Incision and drainage of abscess of left foot 08-Jan-2020 21:03:01  Tomasz Brown
PROCEDURES:  Marieline amputation of lower extremity through tibia and fibula 10-Yann-2020 17:26:19  Elli Baldwin

## 2020-01-17 NOTE — PRE-OP CHECKLIST - SELECT TESTS ORDERED
Type and Screen/BMP/PT/PTT/CBC/INR
PT/PTT/Results in MD note/BMP/HCG/Urinalysis/CBC/Hepatic Function/Type and Cross/INR/Type and Screen/EKG

## 2020-01-18 LAB
ANION GAP SERPL CALC-SCNC: 8 MMOL/L — SIGNIFICANT CHANGE UP (ref 5–17)
ANION GAP SERPL CALC-SCNC: 8 MMOL/L — SIGNIFICANT CHANGE UP (ref 5–17)
APPEARANCE UR: CLEAR — SIGNIFICANT CHANGE UP
BACTERIA # UR AUTO: ABNORMAL
BILIRUB UR-MCNC: NEGATIVE — SIGNIFICANT CHANGE UP
BUN SERPL-MCNC: 36 MG/DL — HIGH (ref 7–23)
BUN SERPL-MCNC: 39 MG/DL — HIGH (ref 7–23)
CALCIUM SERPL-MCNC: 7.8 MG/DL — LOW (ref 8.5–10.1)
CALCIUM SERPL-MCNC: 7.8 MG/DL — LOW (ref 8.5–10.1)
CHLORIDE SERPL-SCNC: 94 MMOL/L — LOW (ref 96–108)
CHLORIDE SERPL-SCNC: 98 MMOL/L — SIGNIFICANT CHANGE UP (ref 96–108)
CO2 SERPL-SCNC: 25 MMOL/L — SIGNIFICANT CHANGE UP (ref 22–31)
CO2 SERPL-SCNC: 27 MMOL/L — SIGNIFICANT CHANGE UP (ref 22–31)
COLOR SPEC: YELLOW — SIGNIFICANT CHANGE UP
COMMENT - URINE: SIGNIFICANT CHANGE UP
CREAT SERPL-MCNC: 2.6 MG/DL — HIGH (ref 0.5–1.3)
CREAT SERPL-MCNC: 2.84 MG/DL — HIGH (ref 0.5–1.3)
DIFF PNL FLD: ABNORMAL
EPI CELLS # UR: SIGNIFICANT CHANGE UP
GLUCOSE BLDC GLUCOMTR-MCNC: 165 MG/DL — HIGH (ref 70–99)
GLUCOSE BLDC GLUCOMTR-MCNC: 192 MG/DL — HIGH (ref 70–99)
GLUCOSE BLDC GLUCOMTR-MCNC: 303 MG/DL — HIGH (ref 70–99)
GLUCOSE BLDC GLUCOMTR-MCNC: 344 MG/DL — HIGH (ref 70–99)
GLUCOSE SERPL-MCNC: 200 MG/DL — HIGH (ref 70–99)
GLUCOSE SERPL-MCNC: 315 MG/DL — HIGH (ref 70–99)
GLUCOSE UR QL: 100 MG/DL
HCT VFR BLD CALC: 20 % — CRITICAL LOW (ref 39–50)
HCT VFR BLD CALC: 21.5 % — LOW (ref 39–50)
HGB BLD-MCNC: 6.5 G/DL — CRITICAL LOW (ref 13–17)
HGB BLD-MCNC: 6.8 G/DL — CRITICAL LOW (ref 13–17)
KETONES UR-MCNC: ABNORMAL
LEUKOCYTE ESTERASE UR-ACNC: NEGATIVE — SIGNIFICANT CHANGE UP
MCHC RBC-ENTMCNC: 28 PG — SIGNIFICANT CHANGE UP (ref 27–34)
MCHC RBC-ENTMCNC: 28 PG — SIGNIFICANT CHANGE UP (ref 27–34)
MCHC RBC-ENTMCNC: 31.6 GM/DL — LOW (ref 32–36)
MCHC RBC-ENTMCNC: 32.5 GM/DL — SIGNIFICANT CHANGE UP (ref 32–36)
MCV RBC AUTO: 86.2 FL — SIGNIFICANT CHANGE UP (ref 80–100)
MCV RBC AUTO: 88.5 FL — SIGNIFICANT CHANGE UP (ref 80–100)
NITRITE UR-MCNC: NEGATIVE — SIGNIFICANT CHANGE UP
NRBC # BLD: 0 /100 WBCS — SIGNIFICANT CHANGE UP (ref 0–0)
NRBC # BLD: 0 /100 WBCS — SIGNIFICANT CHANGE UP (ref 0–0)
PH UR: 5 — SIGNIFICANT CHANGE UP (ref 5–8)
PLATELET # BLD AUTO: 435 K/UL — HIGH (ref 150–400)
PLATELET # BLD AUTO: 469 K/UL — HIGH (ref 150–400)
POTASSIUM SERPL-MCNC: 5.3 MMOL/L — SIGNIFICANT CHANGE UP (ref 3.5–5.3)
POTASSIUM SERPL-MCNC: 5.6 MMOL/L — HIGH (ref 3.5–5.3)
POTASSIUM SERPL-SCNC: 5.3 MMOL/L — SIGNIFICANT CHANGE UP (ref 3.5–5.3)
POTASSIUM SERPL-SCNC: 5.6 MMOL/L — HIGH (ref 3.5–5.3)
PROT UR-MCNC: 100 MG/DL
RBC # BLD: 2.32 M/UL — LOW (ref 4.2–5.8)
RBC # BLD: 2.43 M/UL — LOW (ref 4.2–5.8)
RBC # FLD: 15.5 % — HIGH (ref 10.3–14.5)
RBC # FLD: 15.5 % — HIGH (ref 10.3–14.5)
RBC CASTS # UR COMP ASSIST: ABNORMAL /HPF (ref 0–4)
SODIUM SERPL-SCNC: 129 MMOL/L — LOW (ref 135–145)
SODIUM SERPL-SCNC: 131 MMOL/L — LOW (ref 135–145)
SP GR SPEC: 1.02 — SIGNIFICANT CHANGE UP (ref 1.01–1.02)
UROBILINOGEN FLD QL: NEGATIVE MG/DL — SIGNIFICANT CHANGE UP
WBC # BLD: 23.13 K/UL — HIGH (ref 3.8–10.5)
WBC # BLD: 24.3 K/UL — HIGH (ref 3.8–10.5)
WBC # FLD AUTO: 23.13 K/UL — HIGH (ref 3.8–10.5)
WBC # FLD AUTO: 24.3 K/UL — HIGH (ref 3.8–10.5)
WBC UR QL: ABNORMAL

## 2020-01-18 PROCEDURE — 99233 SBSQ HOSP IP/OBS HIGH 50: CPT

## 2020-01-18 RX ORDER — INSULIN LISPRO 100/ML
8 VIAL (ML) SUBCUTANEOUS
Refills: 0 | Status: DISCONTINUED | OUTPATIENT
Start: 2020-01-18 | End: 2020-01-22

## 2020-01-18 RX ORDER — SODIUM POLYSTYRENE SULFONATE 4.1 MEQ/G
30 POWDER, FOR SUSPENSION ORAL ONCE
Refills: 0 | Status: DISCONTINUED | OUTPATIENT
Start: 2020-01-18 | End: 2020-01-18

## 2020-01-18 RX ORDER — LIDOCAINE HCL 20 MG/ML
10 VIAL (ML) INJECTION ONCE
Refills: 0 | Status: COMPLETED | OUTPATIENT
Start: 2020-01-18 | End: 2020-01-18

## 2020-01-18 RX ORDER — PANTOPRAZOLE SODIUM 20 MG/1
40 TABLET, DELAYED RELEASE ORAL
Refills: 0 | Status: DISCONTINUED | OUTPATIENT
Start: 2020-01-18 | End: 2020-01-22

## 2020-01-18 RX ADMIN — OXYCODONE AND ACETAMINOPHEN 1 TABLET(S): 5; 325 TABLET ORAL at 16:30

## 2020-01-18 RX ADMIN — Medication 8 UNIT(S): at 15:47

## 2020-01-18 RX ADMIN — OXYCODONE AND ACETAMINOPHEN 1 TABLET(S): 5; 325 TABLET ORAL at 22:53

## 2020-01-18 RX ADMIN — Medication 8 UNIT(S): at 11:39

## 2020-01-18 RX ADMIN — HEPARIN SODIUM 5000 UNIT(S): 5000 INJECTION INTRAVENOUS; SUBCUTANEOUS at 17:35

## 2020-01-18 RX ADMIN — Medication 100 MILLIGRAM(S): at 05:21

## 2020-01-18 RX ADMIN — LIDOCAINE 1 PATCH: 4 CREAM TOPICAL at 05:21

## 2020-01-18 RX ADMIN — PENICILLIN G POTASSIUM 100 MILLION UNIT(S): 5000000 POWDER, FOR SOLUTION INTRAMUSCULAR; INTRAPLEURAL; INTRATHECAL; INTRAVENOUS at 03:03

## 2020-01-18 RX ADMIN — OXYCODONE AND ACETAMINOPHEN 1 TABLET(S): 5; 325 TABLET ORAL at 09:35

## 2020-01-18 RX ADMIN — OXYCODONE AND ACETAMINOPHEN 1 TABLET(S): 5; 325 TABLET ORAL at 21:53

## 2020-01-18 RX ADMIN — OXYCODONE AND ACETAMINOPHEN 1 TABLET(S): 5; 325 TABLET ORAL at 10:00

## 2020-01-18 RX ADMIN — Medication 4 MILLIGRAM(S): at 22:03

## 2020-01-18 RX ADMIN — Medication 75 MILLIGRAM(S): at 22:02

## 2020-01-18 RX ADMIN — Medication 250 MILLIGRAM(S): at 18:33

## 2020-01-18 RX ADMIN — PENICILLIN G POTASSIUM 100 MILLION UNIT(S): 5000000 POWDER, FOR SOLUTION INTRAMUSCULAR; INTRAPLEURAL; INTRATHECAL; INTRAVENOUS at 22:02

## 2020-01-18 RX ADMIN — PENICILLIN G POTASSIUM 100 MILLION UNIT(S): 5000000 POWDER, FOR SOLUTION INTRAMUSCULAR; INTRAPLEURAL; INTRATHECAL; INTRAVENOUS at 14:35

## 2020-01-18 RX ADMIN — Medication 100 MILLIGRAM(S): at 14:13

## 2020-01-18 RX ADMIN — OXYCODONE AND ACETAMINOPHEN 1 TABLET(S): 5; 325 TABLET ORAL at 15:57

## 2020-01-18 RX ADMIN — MORPHINE SULFATE 2 MILLIGRAM(S): 50 CAPSULE, EXTENDED RELEASE ORAL at 19:00

## 2020-01-18 RX ADMIN — Medication 5 UNIT(S): at 07:50

## 2020-01-18 RX ADMIN — Medication 75 MILLIGRAM(S): at 14:13

## 2020-01-18 RX ADMIN — PANTOPRAZOLE SODIUM 40 MILLIGRAM(S): 20 TABLET, DELAYED RELEASE ORAL at 17:36

## 2020-01-18 RX ADMIN — Medication 240 MILLIGRAM(S): at 05:21

## 2020-01-18 RX ADMIN — MORPHINE SULFATE 2 MILLIGRAM(S): 50 CAPSULE, EXTENDED RELEASE ORAL at 00:53

## 2020-01-18 RX ADMIN — MORPHINE SULFATE 2 MILLIGRAM(S): 50 CAPSULE, EXTENDED RELEASE ORAL at 18:43

## 2020-01-18 RX ADMIN — MORPHINE SULFATE 2 MILLIGRAM(S): 50 CAPSULE, EXTENDED RELEASE ORAL at 05:21

## 2020-01-18 RX ADMIN — HEPARIN SODIUM 5000 UNIT(S): 5000 INJECTION INTRAVENOUS; SUBCUTANEOUS at 05:21

## 2020-01-18 RX ADMIN — Medication 250 MILLIGRAM(S): at 05:21

## 2020-01-18 RX ADMIN — Medication 100 MILLIGRAM(S): at 22:02

## 2020-01-18 RX ADMIN — PENICILLIN G POTASSIUM 100 MILLION UNIT(S): 5000000 POWDER, FOR SOLUTION INTRAMUSCULAR; INTRAPLEURAL; INTRATHECAL; INTRAVENOUS at 18:31

## 2020-01-18 RX ADMIN — PENICILLIN G POTASSIUM 100 MILLION UNIT(S): 5000000 POWDER, FOR SOLUTION INTRAMUSCULAR; INTRAPLEURAL; INTRATHECAL; INTRAVENOUS at 06:22

## 2020-01-18 RX ADMIN — Medication 75 MILLIGRAM(S): at 05:30

## 2020-01-18 RX ADMIN — Medication 10 MILLILITER(S): at 15:15

## 2020-01-18 RX ADMIN — MORPHINE SULFATE 2 MILLIGRAM(S): 50 CAPSULE, EXTENDED RELEASE ORAL at 01:10

## 2020-01-18 RX ADMIN — INSULIN GLARGINE 18 UNIT(S): 100 INJECTION, SOLUTION SUBCUTANEOUS at 22:03

## 2020-01-18 RX ADMIN — MORPHINE SULFATE 2 MILLIGRAM(S): 50 CAPSULE, EXTENDED RELEASE ORAL at 05:40

## 2020-01-18 RX ADMIN — PENICILLIN G POTASSIUM 100 MILLION UNIT(S): 5000000 POWDER, FOR SOLUTION INTRAMUSCULAR; INTRAPLEURAL; INTRATHECAL; INTRAVENOUS at 10:00

## 2020-01-18 NOTE — CONSULT NOTE ADULT - SUBJECTIVE AND OBJECTIVE BOX
69y old Male with chief complaint of left foot ulcer a/w CELLULITIS OF LOWER LEFT EXTREMITY, RSV INFECTION.  Pt s/p left BKA.      Urology consulted for ?urinary retention.    Pt c/o frequency and the sensation of a full bladder after urinating.   Denies hematuria, dysuria, fever, chills.       Patient seen and examined at bedside with no complaints.   Admits to flatus/BM.   Denies pain, nausea/ vomiting.   Tolerating diet.    Vital Signs Last 24 Hrs  T(F): 97.8 (01-18-20 @ 16:37), Max: 97.9 (01-18-20 @ 16:15)  HR: 74 (01-18-20 @ 16:37)  BP: 138/66 (01-18-20 @ 16:37)  RR: 18 (01-18-20 @ 16:37)  SpO2: 98% (01-18-20 @ 16:37)      POCT Blood Glucose.: 192 mg/dL (18 Jan 2020 15:37)      GENERAL: Alert, NAD  CHEST/LUNG: Respirations nonlabored  HEART: S1S2, Regular rate and rhythm;   ABDOMEN: +Bowel sounds, soft, Nontender, Nondistended  : +palpable bladder, uncircumcised phallus, no swelling or discharge   EXTREMITIES:  left BKA, left knee immobilizer in place, ace bandage c/d/i     I&O's Detail    17 Jan 2020 07:01  -  18 Jan 2020 07:00  --------------------------------------------------------  IN:    lactated ringers.: 100 mL    sodium chloride 0.9%.: 780 mL    Solution: 400 mL    Solution: 100 mL    Solution: 100 mL  Total IN: 1480 mL    OUT:    Voided: 200 mL  Total OUT: 200 mL    Total NET: 1280 mL      18 Jan 2020 07:01  -  18 Jan 2020 16:59  --------------------------------------------------------  IN:    Oral Fluid: 630 mL  Total IN: 630 mL    OUT:    Voided: 400 mL  Total OUT: 400 mL    Total NET: 230 mL          LABS:                        6.5    23.13 )-----------( 435      ( 18 Jan 2020 15:26 )             20.0     01-18    129<L>  |  94<L>  |  39<H>  ----------------------------<  200<H>  5.3   |  27  |  2.84<H>    Ca    7.8<L>      18 Jan 2020 15:26      PT/INR - ( 17 Jan 2020 06:54 )   PT: 13.3 sec;   INR: 1.18 ratio         PTT - ( 17 Jan 2020 06:54 )  PTT:39.4 sec      Impression: 69y old male w/ PMH DM Type 2 s/p left BKA (1/17) with urinary rentention.  Bladder scan showing PVR ~300 cc.  Olsen catheter inserted without difficulty.      Plan  -Continue olsen catheter.  Monitor output.   -Trend renal function.   -Continue medical management and supportive care.    -Discussed with Dr. Ornelas. 69y old Male with chief complaint of left foot ulcer a/w CELLULITIS OF LOWER LEFT EXTREMITY, RSV INFECTION.  Pt s/p left BKA.      Urology consulted for urinary retention.    Pt c/o frequency and the sensation of a full bladder after urinating.   Denies hematuria, dysuria, fever, chills.       Patient seen and examined at bedside with no complaints.   Admits to flatus/BM.   Denies pain, nausea/ vomiting.   Tolerating diet.    Vital Signs Last 24 Hrs  T(F): 97.8 (01-18-20 @ 16:37), Max: 97.9 (01-18-20 @ 16:15)  HR: 74 (01-18-20 @ 16:37)  BP: 138/66 (01-18-20 @ 16:37)  RR: 18 (01-18-20 @ 16:37)  SpO2: 98% (01-18-20 @ 16:37)      POCT Blood Glucose.: 192 mg/dL (18 Jan 2020 15:37)      GENERAL: Alert, NAD  CHEST/LUNG: Respirations nonlabored  HEART: S1S2, Regular rate and rhythm;   ABDOMEN: +Bowel sounds, soft, Nontender, Nondistended  : +palpable bladder, uncircumcised phallus, no swelling or discharge   EXTREMITIES:  left BKA, left knee immobilizer in place, ace bandage c/d/i     I&O's Detail    17 Jan 2020 07:01  -  18 Jan 2020 07:00  --------------------------------------------------------  IN:    lactated ringers.: 100 mL    sodium chloride 0.9%.: 780 mL    Solution: 400 mL    Solution: 100 mL    Solution: 100 mL  Total IN: 1480 mL    OUT:    Voided: 200 mL  Total OUT: 200 mL    Total NET: 1280 mL      18 Jan 2020 07:01  -  18 Jan 2020 16:59  --------------------------------------------------------  IN:    Oral Fluid: 630 mL  Total IN: 630 mL    OUT:    Voided: 400 mL  Total OUT: 400 mL    Total NET: 230 mL          LABS:                        6.5    23.13 )-----------( 435      ( 18 Jan 2020 15:26 )             20.0     01-18    129<L>  |  94<L>  |  39<H>  ----------------------------<  200<H>  5.3   |  27  |  2.84<H>    Ca    7.8<L>      18 Jan 2020 15:26      PT/INR - ( 17 Jan 2020 06:54 )   PT: 13.3 sec;   INR: 1.18 ratio         PTT - ( 17 Jan 2020 06:54 )  PTT:39.4 sec      Impression: 69y old male w/ PMH DM Type 2 s/p left BKA (1/17) with urinary rentention.  Bladder scan showing PVR ~300 cc.  Olsen catheter inserted without difficulty.      Plan  -Continue olsen catheter.  Monitor output.   -Continue pt's home med Cardura.    -Trend renal function.   -Continue medical management and supportive care.    -Discussed with Dr. Ornelas.

## 2020-01-18 NOTE — PROGRESS NOTE ADULT - SUBJECTIVE AND OBJECTIVE BOX
Subjective: c/o abd distension, urinary urgency, frequency. Cr, K are up today.       MEDICATIONS  (STANDING):  clindamycin IVPB 900 milliGRAM(s) IV Intermittent every 8 hours  dextrose 5%. 1000 milliLiter(s) (50 mL/Hr) IV Continuous <Continuous>  diltiazem    milliGRAM(s) Oral daily  doxazosin 4 milliGRAM(s) Oral at bedtime  heparin  Injectable 5000 Unit(s) SubCutaneous every 12 hours  hydrALAZINE 75 milliGRAM(s) Oral three times a day  insulin glargine Injectable (LANTUS) 18 Unit(s) SubCutaneous at bedtime  insulin lispro Injectable (HumaLOG) 8 Unit(s) SubCutaneous three times a day before meals  lidocaine   Patch 1 Patch Transdermal every 24 hours  penicillin   G  potassium  IVPB 4 Million Unit(s) IV Intermittent every 4 hours  saccharomyces boulardii 250 milliGRAM(s) Oral two times a day  sodium chloride 0.9%. 1000 milliLiter(s) (65 mL/Hr) IV Continuous <Continuous>    MEDICATIONS  (PRN):  acetaminophen   Tablet .. 650 milliGRAM(s) Oral every 6 hours PRN Temp greater or equal to 38C (100.4F), Mild Pain (1 - 3)  ALBUTerol   0.042% 1.25 milliGRAM(s) Nebulizer four times a day PRN Wheezing  guaiFENesin   Syrup  (Sugar-Free) 100 milliGRAM(s) Oral every 6 hours PRN Cough  morphine  - Injectable 2 milliGRAM(s) IV Push every 4 hours PRN Severe Pain (7 - 10)  oxycodone    5 mG/acetaminophen 325 mG 1 Tablet(s) Oral every 4 hours PRN Moderate Pain (4 - 6)          T(C): 36.2 (01-18-20 @ 08:30), Max: 36.6 (01-17-20 @ 16:43)  HR: 77 (01-18-20 @ 08:30) (68 - 96)  BP: 129/58 (01-18-20 @ 08:30) (129/58 - 198/97)  RR: 17 (01-18-20 @ 08:30) (12 - 19)  SpO2: 95% (01-18-20 @ 08:30) (94% - 100%)  Wt(kg): --        I&O's Detail    17 Jan 2020 07:01  -  18 Jan 2020 07:00  --------------------------------------------------------  IN:    lactated ringers.: 100 mL    sodium chloride 0.9%.: 780 mL    Solution: 400 mL    Solution: 100 mL    Solution: 100 mL  Total IN: 1480 mL    OUT:    Voided: 200 mL  Total OUT: 200 mL    Total NET: 1280 mL               PHYSICAL EXAM:    GENERAL: anxious  EYES: EOMI, PERRLA, conjunctiva and sclera clear  NECK: Supple, no inc in JVP  CHEST/LUNG: Clear  HEART: S1S2  ABDOMEN: Soft, Nontender, Nondistended; Bowel sounds present  EXTREMITIES:  L BKA  NEURO: no asterixis      LABS:  CBC Full  -  ( 17 Jan 2020 23:33 )  WBC Count : 28.46 K/uL  RBC Count : 2.70 M/uL  Hemoglobin : 7.7 g/dL  Hematocrit : 24.0 %  Platelet Count - Automated : 490 K/uL  Mean Cell Volume : 88.9 fl  Mean Cell Hemoglobin : 28.5 pg  Mean Cell Hemoglobin Concentration : 32.1 gm/dL  Auto Neutrophil # : x  Auto Lymphocyte # : x  Auto Monocyte # : x  Auto Eosinophil # : x  Auto Basophil # : x  Auto Neutrophil % : x  Auto Lymphocyte % : x  Auto Monocyte % : x  Auto Eosinophil % : x  Auto Basophil % : x    01-18    131<L>  |  98  |  36<H>  ----------------------------<  315<H>  5.6<H>   |  25  |  2.60<H>    Ca    7.8<L>      18 Jan 2020 08:21      PT/INR - ( 17 Jan 2020 06:54 )   PT: 13.3 sec;   INR: 1.18 ratio         PTT - ( 17 Jan 2020 06:54 )  PTT:39.4 sec        Impression:  * ELMER -- suspect post-renal  * HyperK due to above  * CKD3, HTN, DM  * S/p L BKA    Recommendations:   * Cont to hold ACE-I, Metformin  * Straight cath and maintain Frazier for PVR > 300cc  * Expect K to improve with resolution of retention  * Kayexalate PRN K > 5.5  * Low K diet

## 2020-01-18 NOTE — PROGRESS NOTE ADULT - SUBJECTIVE AND OBJECTIVE BOX
CHIEF COMPLAINT: s/p left BKA on 20.   pain stump under control.   no report of any fever or active gross bleeding.   + urinary frequency. no hematuria.  per nurse + incontinent.   unsuccessful olsen placement attempt       PHYSICAL EXAM:  GENERAL: well built, well nourished, no acute distress   CHEST/LUNG: Clear to ausculation bilaterally, no wheezing, no crackles   HEART: Regular rate and rhythm; No murmurs, rubs  ABDOMEN: Soft, Nontender, Nondistended; Bowel sounds present  EXTREMITIES:  s/p left BKA and immobilizer. no active gross bleeding noted.   NERVOUS SYSTEM:  Grossly non focal.  Psychiatry: AAO x 3, anxious      -----------------------------------------------------------------------------------------------------------------------------------------------------------------  OBJECTIVE DATA:         Vital Signs Last 24 Hrs  T(C): 35.7 (2020 05:49), Max: 36.3 (2020 11:38)  T(F): 96.2 (2020 05:49), Max: 97.4 (2020 11:38)  HR: 83 (2020 05:49) (78 - 86)  BP: 158/74 (2020 05:49) (158/74 - 199/111)  BP(mean): --  RR: 18 (2020 05:49) (17 - 18)  SpO2: 100% (2020 05:49) (97% - 100%)           Daily     Daily Weight in k.4 (2020 05:49)      LABS:                        7.9    13.99 )-----------( 544      ( 2020 06:54 )             25.2             01-17    137  |  103  |  25<H>  ----------------------------<  196<H>  5.1   |  30  |  1.75<H>    Ca    8.6      2020 06:54                PT/INR - ( 2020 06:54 )   PT: 13.3 sec;   INR: 1.18 ratio         PTT - ( 2020 06:54 )  PTT:39.4 sec        Interval Radiology studies: reviewed     MEDICATIONS  (STANDING):  clindamycin IVPB 900 milliGRAM(s) IV Intermittent every 8 hours  dextrose 5%. 1000 milliLiter(s) (50 mL/Hr) IV Continuous <Continuous>  dextrose 50% Injectable 12.5 Gram(s) IV Push once  dextrose 50% Injectable 25 Gram(s) IV Push once  dextrose 50% Injectable 25 Gram(s) IV Push once  diltiazem    milliGRAM(s) Oral daily  doxazosin 2 milliGRAM(s) Oral at bedtime  hydrALAZINE 75 milliGRAM(s) Oral three times a day  insulin glargine Injectable (LANTUS) 18 Unit(s) SubCutaneous at bedtime  insulin lispro (HumaLOG) corrective regimen sliding scale   SubCutaneous three times a day before meals  insulin lispro (HumaLOG) corrective regimen sliding scale   SubCutaneous at bedtime  insulin lispro Injectable (HumaLOG) 5 Unit(s) SubCutaneous three times a day before meals  lidocaine   Patch 1 Patch Transdermal every 24 hours  melatonin 3 milliGRAM(s) Oral at bedtime  nystatin Cream 1 Application(s) Topical every 12 hours  penicillin   G  potassium  IVPB 4 Million Unit(s) IV Intermittent every 4 hours  saccharomyces boulardii 250 milliGRAM(s) Oral two times a day    MEDICATIONS  (PRN):  acetaminophen   Tablet .. 650 milliGRAM(s) Oral every 6 hours PRN Temp greater or equal to 38C (100.4F), Mild Pain (1 - 3)  ALBUTerol   0.042% 1.25 milliGRAM(s) Nebulizer four times a day PRN Wheezing  dextrose 40% Gel 15 Gram(s) Oral once PRN Blood Glucose LESS THAN 70 milliGRAM(s)/deciliter  glucagon  Injectable 1 milliGRAM(s) IntraMuscular once PRN Glucose LESS THAN 70 milligrams/deciliter  guaiFENesin   Syrup  (Sugar-Free) 100 milliGRAM(s) Oral every 6 hours PRN Cough  hydrALAZINE Injectable 10 milliGRAM(s) IV Push every 6 hours PRN SUSTAINED SBP >180 OR DBP >100  morphine  - Injectable 2 milliGRAM(s) IV Push every 4 hours PRN Severe Pain (7 - 10)  oxycodone    5 mG/acetaminophen 325 mG 1 Tablet(s) Oral every 4 hours PRN Moderate Pain (4 - 6)  sodium chloride 0.65% Nasal 1 Spray(s) Both Nostrils three times a day PRN Nasal Congestion      Culture - Blood (20 @ 23:39)	    Specimen Source: .Blood Blood-Peripheral    Culture Results:   No growth to date.

## 2020-01-18 NOTE — CONSULT NOTE ADULT - REASON FOR ADMISSION
left  foot ulcer

## 2020-01-18 NOTE — PROVIDER CONTACT NOTE (CRITICAL VALUE NOTIFICATION) - ASSESSMENT
Pt A & O x 4, resps unlabored.  Pt denies SOB, CP or other discomfort.  Pt sitting up in bed eating lunch.

## 2020-01-18 NOTE — PROGRESS NOTE ADULT - ASSESSMENT
Pt is a 70 yo gentleman with a pmhx of HTN, HL, NIDM, Afib on eliquis who presented to the ED with leg redness and fever and cough . Has had chronic ulcer on L. medial leg for months, but has gotten worse. Patient was admitted and diagnosed with sepsis with  acute gas gangrene left foot with possible OM and necrotizing fascitis. ID and Podiatrist were consulted. patient was placed on broad spectrum antbiotics. patient under I and D left leg on 1/8 and left foot amputation on 1/10.  Tissue culture and blood culture grew strep agalactiae. antibiotics were de-escalated to PCN and clindamycin. vascular surgery performed successful left BKA on 1/17/20.   Also patient was placed on contact isolation for RSV URI.    Problem/Plan - 1:  ·  Problem: Cellulitis of left lower extremity.   s/p left medial malleolus biopsy on 1/8  s/p L foot amputation on 1/10 and left BKA on 1/17/20  cont PCN and clindamycin. cont current pain control. wound care per vascular surgery.      Problem/Plan - 2:  ·  Problem: Hypertension, unspecified type.  controlled on current meds.     Problem/Plan - 3:  ·  Problem: RSV infection.  resolved. no cough.     Problem/Plan - 4:  ·  Problem: Type 2 diabetes mellitus with other neurologic complication, without long-term current use of insulin.  uncontrolled with hyperglycemia. cont lantus and increase premeal humalog. cont correctional insulin. follow finger sticks.   Note that home med list is incomplete; pt states he normally takes 4 meds: Metformin, Invokana, Glimepiride, Onglyza.     Problem/Plan - 5:  ·  Problem: ELMER superimposed on Stage 4 chronic kidney disease. creatinine worse. ? etiology. ? urinary retention. unsuccessful olsen attempt. consulted urologist.discussed with renal. follow BMP later today and again in am. avoid nephrotoxic agents.     Problem/Plan - 6:  Problem: Atrial fibrillation, unspecified type. Plan: cont cardizem. on HSQ per vascular. will follow for initiation of eliquis in 24-48 hours and once hb stable.       Problem/Plan - 7:  ·  Problem: Prophylactic measure.  Plan: DVT: on HSQ    Problem/Plan - 8:  ·  Problem: Pain of left hip joint.  Plan: DJD lumbar spine. cont conservative management. cont lidocaine patch.       ·	acute blood loss Anemia (expected).  no active bleeding. Give blood transfusion and follow H and H.     ·	Leukocytosis and urinary frequency. check UA and urine culture. follow CBC. already on antibiotic. if any fever spike, might need to broaden the antibiotics.     ·	Hyperkalemia. no chest pain no dizziness. recheck it. its likely in setting of worsening creatinine.     Full Code.

## 2020-01-18 NOTE — PROGRESS NOTE ADULT - SUBJECTIVE AND OBJECTIVE BOX
69y year old Male POD#0 s/p L BKA    Patient is seen and examined at bedside.   He is concerned his BGM FS was in 400s  Otherwise without complaint, post op discomfort well controlled.    Vital Signs Last 24 Hrs  T(F): 97 (01-17-20 @ 21:20), Max: 97.9 (01-17-20 @ 16:43)  HR: 82 (01-17-20 @ 21:20)  BP: 151/70 (01-17-20 @ 21:20)  RR: 19 (01-17-20 @ 21:20)  SpO2: 97% (01-17-20 @ 21:20)  Wt(kg): --   CAPILLARY BLOOD GLUCOSE      POCT Blood Glucose.: 422 mg/dL (17 Jan 2020 22:24)      GENERAL: Alert, NAD  CHEST/LUNG: respirations nonlabored  HEART: +S1S2  ABDOMEN: soft, nondistended.  EXTREMITIES: left lower extremitiy knee immobilizer intact. ACE dressing CDI, no staining. RLE warm, well perfused. NVI distally. Skin intact, no wounds.    A/P: 69y old  Male s/p Left BKA PMH T2DM  continue present post op care, analgesia prn, local wound care per surgical team.   continue medical management and glucose control, patient received lantus 18u tonight, had been given 10u last night as he was NPO for OR. Follow up per primary team, optimize glucose control in post op patient   c/w abx  post op leukocytosis likely reactive, patient is afebrile and hemodynamically stable, Follow up am CBC/WBC, trend H/h and transfuse as needed.  consistent carb diet as tolerated  will discuss with Dr Quintero.

## 2020-01-19 LAB
ANION GAP SERPL CALC-SCNC: 10 MMOL/L — SIGNIFICANT CHANGE UP (ref 5–17)
BASOPHILS # BLD AUTO: 0.03 K/UL — SIGNIFICANT CHANGE UP (ref 0–0.2)
BASOPHILS NFR BLD AUTO: 0.2 % — SIGNIFICANT CHANGE UP (ref 0–2)
BUN SERPL-MCNC: 39 MG/DL — HIGH (ref 7–23)
CALCIUM SERPL-MCNC: 8.1 MG/DL — LOW (ref 8.5–10.1)
CHLORIDE SERPL-SCNC: 103 MMOL/L — SIGNIFICANT CHANGE UP (ref 96–108)
CO2 SERPL-SCNC: 23 MMOL/L — SIGNIFICANT CHANGE UP (ref 22–31)
CREAT SERPL-MCNC: 2.82 MG/DL — HIGH (ref 0.5–1.3)
CULTURE RESULTS: NO GROWTH — SIGNIFICANT CHANGE UP
EOSINOPHIL # BLD AUTO: 0.61 K/UL — HIGH (ref 0–0.5)
EOSINOPHIL NFR BLD AUTO: 4.1 % — SIGNIFICANT CHANGE UP (ref 0–6)
GLUCOSE BLDC GLUCOMTR-MCNC: 122 MG/DL — HIGH (ref 70–99)
GLUCOSE BLDC GLUCOMTR-MCNC: 122 MG/DL — HIGH (ref 70–99)
GLUCOSE BLDC GLUCOMTR-MCNC: 126 MG/DL — HIGH (ref 70–99)
GLUCOSE BLDC GLUCOMTR-MCNC: 147 MG/DL — HIGH (ref 70–99)
GLUCOSE SERPL-MCNC: 128 MG/DL — HIGH (ref 70–99)
HCT VFR BLD CALC: 22.5 % — LOW (ref 39–50)
HGB BLD-MCNC: 7.2 G/DL — LOW (ref 13–17)
IMM GRANULOCYTES NFR BLD AUTO: 1.1 % — SIGNIFICANT CHANGE UP (ref 0–1.5)
LYMPHOCYTES # BLD AUTO: 1.45 K/UL — SIGNIFICANT CHANGE UP (ref 1–3.3)
LYMPHOCYTES # BLD AUTO: 9.7 % — LOW (ref 13–44)
MCHC RBC-ENTMCNC: 27.7 PG — SIGNIFICANT CHANGE UP (ref 27–34)
MCHC RBC-ENTMCNC: 32 GM/DL — SIGNIFICANT CHANGE UP (ref 32–36)
MCV RBC AUTO: 86.5 FL — SIGNIFICANT CHANGE UP (ref 80–100)
MONOCYTES # BLD AUTO: 1.11 K/UL — HIGH (ref 0–0.9)
MONOCYTES NFR BLD AUTO: 7.4 % — SIGNIFICANT CHANGE UP (ref 2–14)
NEUTROPHILS # BLD AUTO: 11.61 K/UL — HIGH (ref 1.8–7.4)
NEUTROPHILS NFR BLD AUTO: 77.5 % — HIGH (ref 43–77)
NRBC # BLD: 0 /100 WBCS — SIGNIFICANT CHANGE UP (ref 0–0)
PLATELET # BLD AUTO: 420 K/UL — HIGH (ref 150–400)
POTASSIUM SERPL-MCNC: 5.1 MMOL/L — SIGNIFICANT CHANGE UP (ref 3.5–5.3)
POTASSIUM SERPL-SCNC: 5.1 MMOL/L — SIGNIFICANT CHANGE UP (ref 3.5–5.3)
RBC # BLD: 2.6 M/UL — LOW (ref 4.2–5.8)
RBC # FLD: 15.4 % — HIGH (ref 10.3–14.5)
SODIUM SERPL-SCNC: 136 MMOL/L — SIGNIFICANT CHANGE UP (ref 135–145)
SPECIMEN SOURCE: SIGNIFICANT CHANGE UP
WBC # BLD: 14.98 K/UL — HIGH (ref 3.8–10.5)
WBC # FLD AUTO: 14.98 K/UL — HIGH (ref 3.8–10.5)

## 2020-01-19 PROCEDURE — 99233 SBSQ HOSP IP/OBS HIGH 50: CPT

## 2020-01-19 PROCEDURE — 99222 1ST HOSP IP/OBS MODERATE 55: CPT

## 2020-01-19 PROCEDURE — 71045 X-RAY EXAM CHEST 1 VIEW: CPT | Mod: 26

## 2020-01-19 RX ORDER — DIPHENHYDRAMINE HCL 50 MG
25 CAPSULE ORAL ONCE
Refills: 0 | Status: COMPLETED | OUTPATIENT
Start: 2020-01-19 | End: 2020-01-20

## 2020-01-19 RX ORDER — POLYETHYLENE GLYCOL 3350 17 G/17G
17 POWDER, FOR SOLUTION ORAL DAILY
Refills: 0 | Status: DISCONTINUED | OUTPATIENT
Start: 2020-01-19 | End: 2020-01-22

## 2020-01-19 RX ORDER — SENNA PLUS 8.6 MG/1
2 TABLET ORAL AT BEDTIME
Refills: 0 | Status: DISCONTINUED | OUTPATIENT
Start: 2020-01-19 | End: 2020-01-22

## 2020-01-19 RX ORDER — TAMSULOSIN HYDROCHLORIDE 0.4 MG/1
0.8 CAPSULE ORAL AT BEDTIME
Refills: 0 | Status: DISCONTINUED | OUTPATIENT
Start: 2020-01-19 | End: 2020-01-20

## 2020-01-19 RX ADMIN — Medication 75 MILLIGRAM(S): at 13:05

## 2020-01-19 RX ADMIN — OXYCODONE AND ACETAMINOPHEN 1 TABLET(S): 5; 325 TABLET ORAL at 12:30

## 2020-01-19 RX ADMIN — Medication 8 UNIT(S): at 07:40

## 2020-01-19 RX ADMIN — Medication 4 MILLIGRAM(S): at 21:41

## 2020-01-19 RX ADMIN — Medication 8 UNIT(S): at 17:25

## 2020-01-19 RX ADMIN — PENICILLIN G POTASSIUM 100 MILLION UNIT(S): 5000000 POWDER, FOR SOLUTION INTRAMUSCULAR; INTRAPLEURAL; INTRATHECAL; INTRAVENOUS at 02:00

## 2020-01-19 RX ADMIN — OXYCODONE AND ACETAMINOPHEN 1 TABLET(S): 5; 325 TABLET ORAL at 22:30

## 2020-01-19 RX ADMIN — PENICILLIN G POTASSIUM 100 MILLION UNIT(S): 5000000 POWDER, FOR SOLUTION INTRAMUSCULAR; INTRAPLEURAL; INTRATHECAL; INTRAVENOUS at 21:40

## 2020-01-19 RX ADMIN — MORPHINE SULFATE 2 MILLIGRAM(S): 50 CAPSULE, EXTENDED RELEASE ORAL at 08:25

## 2020-01-19 RX ADMIN — MORPHINE SULFATE 2 MILLIGRAM(S): 50 CAPSULE, EXTENDED RELEASE ORAL at 14:44

## 2020-01-19 RX ADMIN — PENICILLIN G POTASSIUM 100 MILLION UNIT(S): 5000000 POWDER, FOR SOLUTION INTRAMUSCULAR; INTRAPLEURAL; INTRATHECAL; INTRAVENOUS at 13:01

## 2020-01-19 RX ADMIN — HEPARIN SODIUM 5000 UNIT(S): 5000 INJECTION INTRAVENOUS; SUBCUTANEOUS at 17:26

## 2020-01-19 RX ADMIN — OXYCODONE AND ACETAMINOPHEN 1 TABLET(S): 5; 325 TABLET ORAL at 21:48

## 2020-01-19 RX ADMIN — HEPARIN SODIUM 5000 UNIT(S): 5000 INJECTION INTRAVENOUS; SUBCUTANEOUS at 05:58

## 2020-01-19 RX ADMIN — MORPHINE SULFATE 2 MILLIGRAM(S): 50 CAPSULE, EXTENDED RELEASE ORAL at 08:10

## 2020-01-19 RX ADMIN — PENICILLIN G POTASSIUM 100 MILLION UNIT(S): 5000000 POWDER, FOR SOLUTION INTRAMUSCULAR; INTRAPLEURAL; INTRATHECAL; INTRAVENOUS at 06:01

## 2020-01-19 RX ADMIN — SENNA PLUS 2 TABLET(S): 8.6 TABLET ORAL at 21:41

## 2020-01-19 RX ADMIN — PANTOPRAZOLE SODIUM 40 MILLIGRAM(S): 20 TABLET, DELAYED RELEASE ORAL at 07:41

## 2020-01-19 RX ADMIN — LIDOCAINE 1 PATCH: 4 CREAM TOPICAL at 19:30

## 2020-01-19 RX ADMIN — PENICILLIN G POTASSIUM 100 MILLION UNIT(S): 5000000 POWDER, FOR SOLUTION INTRAMUSCULAR; INTRAPLEURAL; INTRATHECAL; INTRAVENOUS at 10:36

## 2020-01-19 RX ADMIN — Medication 100 MILLIGRAM(S): at 05:59

## 2020-01-19 RX ADMIN — Medication 240 MILLIGRAM(S): at 05:58

## 2020-01-19 RX ADMIN — PENICILLIN G POTASSIUM 100 MILLION UNIT(S): 5000000 POWDER, FOR SOLUTION INTRAMUSCULAR; INTRAPLEURAL; INTRATHECAL; INTRAVENOUS at 17:26

## 2020-01-19 RX ADMIN — Medication 250 MILLIGRAM(S): at 05:58

## 2020-01-19 RX ADMIN — MORPHINE SULFATE 2 MILLIGRAM(S): 50 CAPSULE, EXTENDED RELEASE ORAL at 02:14

## 2020-01-19 RX ADMIN — POLYETHYLENE GLYCOL 3350 17 GRAM(S): 17 POWDER, FOR SOLUTION ORAL at 11:50

## 2020-01-19 RX ADMIN — Medication 250 MILLIGRAM(S): at 17:26

## 2020-01-19 RX ADMIN — Medication 100 MILLIGRAM(S): at 21:40

## 2020-01-19 RX ADMIN — MORPHINE SULFATE 2 MILLIGRAM(S): 50 CAPSULE, EXTENDED RELEASE ORAL at 01:59

## 2020-01-19 RX ADMIN — Medication 75 MILLIGRAM(S): at 21:40

## 2020-01-19 RX ADMIN — Medication 100 MILLIGRAM(S): at 13:02

## 2020-01-19 RX ADMIN — MORPHINE SULFATE 2 MILLIGRAM(S): 50 CAPSULE, EXTENDED RELEASE ORAL at 15:00

## 2020-01-19 RX ADMIN — LIDOCAINE 1 PATCH: 4 CREAM TOPICAL at 05:13

## 2020-01-19 RX ADMIN — SODIUM CHLORIDE 65 MILLILITER(S): 9 INJECTION INTRAMUSCULAR; INTRAVENOUS; SUBCUTANEOUS at 05:58

## 2020-01-19 RX ADMIN — TAMSULOSIN HYDROCHLORIDE 0.8 MILLIGRAM(S): 0.4 CAPSULE ORAL at 21:41

## 2020-01-19 RX ADMIN — Medication 8 UNIT(S): at 11:49

## 2020-01-19 RX ADMIN — OXYCODONE AND ACETAMINOPHEN 1 TABLET(S): 5; 325 TABLET ORAL at 12:01

## 2020-01-19 NOTE — PROGRESS NOTE ADULT - SUBJECTIVE AND OBJECTIVE BOX
CHIEF COMPLAINT: s/p left BKA on 20.   + pain left stump. no BM so far.   some congestion yesterday and difficulty swallowing but better today. no witnessed aspiration or vomiting.   no report of any fever or active gross bleeding.   s/p olsen with clear urine.       PHYSICAL EXAM:  GENERAL: well built, well nourished, no acute distress   CHEST/LUNG: Clear to ausculation bilaterally, no wheezing, no crackles   HEART: Regular rate and rhythm; No murmurs, rubs  ABDOMEN: Soft, Nontender, Nondistended; Bowel sounds present. + Olsen  EXTREMITIES:  s/p left BKA and immobilizer. no active gross bleeding noted.   NERVOUS SYSTEM:  Grossly non focal.  Psychiatry: AAO x 3, better mood 	    -----------------------------------------------------------------------------------------------------------------------------------------------------------------  OBJECTIVE DATA:       Vital Signs Last 24 Hrs  T(C): 36.4 (2020 05:00), Max: 36.8 (2020 23:15)  T(F): 97.6 (2020 05:00), Max: 98.2 (2020 23:15)  HR: 80 (2020 05:00) (74 - 80)  BP: 112/70 (2020 05:00) (112/70 - 138/66)  BP(mean): --  RR: 18 (2020 05:00) (18 - 18)  SpO2: 96% (2020 05:00) (95% - 98%)           Daily     Daily Weight in k.5 (2020 05:00)      LABS:                        7.2    14.98 )-----------( 420      ( 2020 07:06 )             22.5                 136  |  103  |  39<H>  ----------------------------<  128<H>  5.1   |  23  |  2.82<H>    Ca    8.1<L>      2020 07:06                  Urinalysis Basic - ( 2020 13:26 )    Color: Yellow / Appearance: Clear / S.020 / pH: x  Gluc: x / Ketone: Trace  / Bili: Negative / Urobili: Negative mg/dL   Blood: x / Protein: 100 mg/dL / Nitrite: Negative   Leuk Esterase: Negative / RBC: 3-5 /HPF / WBC 6-10   Sq Epi: x / Non Sq Epi: Few / Bacteria: Many           CAPILLARY BLOOD GLUCOSE      POCT Blood Glucose.: 122 mg/dL (2020 10:48)      Culture - Blood (collected )  Source: .Blood Blood-Peripheral  Preliminary Report ():    No growth to date.            Interval Radiology studies: reviewed     MEDICATIONS  (STANDING):  clindamycin IVPB 900 milliGRAM(s) IV Intermittent every 8 hours  dextrose 5%. 1000 milliLiter(s) (50 mL/Hr) IV Continuous <Continuous>  diltiazem    milliGRAM(s) Oral daily  doxazosin 4 milliGRAM(s) Oral at bedtime  heparin  Injectable 5000 Unit(s) SubCutaneous every 12 hours  hydrALAZINE 75 milliGRAM(s) Oral three times a day  insulin glargine Injectable (LANTUS) 18 Unit(s) SubCutaneous at bedtime  insulin lispro Injectable (HumaLOG) 8 Unit(s) SubCutaneous three times a day before meals  lidocaine   Patch 1 Patch Transdermal every 24 hours  pantoprazole    Tablet 40 milliGRAM(s) Oral before breakfast  penicillin   G  potassium  IVPB 4 Million Unit(s) IV Intermittent every 4 hours  polyethylene glycol 3350 17 Gram(s) Oral daily  saccharomyces boulardii 250 milliGRAM(s) Oral two times a day  senna 2 Tablet(s) Oral at bedtime    MEDICATIONS  (PRN):  acetaminophen   Tablet .. 650 milliGRAM(s) Oral every 6 hours PRN Temp greater or equal to 38C (100.4F), Mild Pain (1 - 3)  ALBUTerol   0.042% 1.25 milliGRAM(s) Nebulizer four times a day PRN Wheezing  aluminum hydroxide/magnesium hydroxide/simethicone Suspension 30 milliLiter(s) Oral every 6 hours PRN Dyspepsia  guaiFENesin   Syrup  (Sugar-Free) 100 milliGRAM(s) Oral every 6 hours PRN Cough  morphine  - Injectable 2 milliGRAM(s) IV Push every 4 hours PRN Severe Pain (7 - 10)  oxycodone    5 mG/acetaminophen 325 mG 1 Tablet(s) Oral every 4 hours PRN Moderate Pain (4 - 6)

## 2020-01-19 NOTE — PROGRESS NOTE ADULT - SUBJECTIVE AND OBJECTIVE BOX
Subjective: olsen inserted with difficulty yest. PVR >300cc. UO 2400 cc since.       MEDICATIONS  (STANDING):  clindamycin IVPB 900 milliGRAM(s) IV Intermittent every 8 hours  dextrose 5%. 1000 milliLiter(s) (50 mL/Hr) IV Continuous <Continuous>  diltiazem    milliGRAM(s) Oral daily  doxazosin 4 milliGRAM(s) Oral at bedtime  heparin  Injectable 5000 Unit(s) SubCutaneous every 12 hours  hydrALAZINE 75 milliGRAM(s) Oral three times a day  insulin glargine Injectable (LANTUS) 18 Unit(s) SubCutaneous at bedtime  insulin lispro Injectable (HumaLOG) 8 Unit(s) SubCutaneous three times a day before meals  lidocaine   Patch 1 Patch Transdermal every 24 hours  pantoprazole    Tablet 40 milliGRAM(s) Oral before breakfast  penicillin   G  potassium  IVPB 4 Million Unit(s) IV Intermittent every 4 hours  saccharomyces boulardii 250 milliGRAM(s) Oral two times a day  sodium chloride 0.9%. 1000 milliLiter(s) (65 mL/Hr) IV Continuous <Continuous>    MEDICATIONS  (PRN):  acetaminophen   Tablet .. 650 milliGRAM(s) Oral every 6 hours PRN Temp greater or equal to 38C (100.4F), Mild Pain (1 - 3)  ALBUTerol   0.042% 1.25 milliGRAM(s) Nebulizer four times a day PRN Wheezing  aluminum hydroxide/magnesium hydroxide/simethicone Suspension 30 milliLiter(s) Oral every 6 hours PRN Dyspepsia  guaiFENesin   Syrup  (Sugar-Free) 100 milliGRAM(s) Oral every 6 hours PRN Cough  morphine  - Injectable 2 milliGRAM(s) IV Push every 4 hours PRN Severe Pain (7 - 10)  oxycodone    5 mG/acetaminophen 325 mG 1 Tablet(s) Oral every 4 hours PRN Moderate Pain (4 - 6)          T(C): 36.4 (20 @ 05:00), Max: 36.8 (20 @ 23:15)  HR: 80 (20 @ 05:00) (74 - 80)  BP: 112/70 (20 @ 05:00) (112/70 - 138/66)  RR: 18 (20 @ 05:00) (17 - 18)  SpO2: 96% (20 @ 05:00) (95% - 98%)  Wt(kg): --        I&O's Detail    2020 07:01  -  2020 07:00  --------------------------------------------------------  IN:    Oral Fluid: 630 mL    Packed Red Blood Cells: 310 mL    sodium chloride 0.9%.: 1430 mL    Solution: 600 mL    Solution: 150 mL  Total IN: 3120 mL    OUT:    Indwelling Catheter - Urethral: 2800 mL    Voided: 400 mL  Total OUT: 3200 mL    Total NET: -80 mL               PHYSICAL EXAM:    GENERAL: anxious  EYES: EOMI, PERRLA, conjunctiva and sclera clear  NECK: Supple, no inc in JVP  CHEST/LUNG: Clear  HEART: S1S2  ABDOMEN: Soft, Nontender, Nondistended; Bowel sounds present  EXTREMITIES:  L BKA  NEURO: no asterixis        LABS:  CBC Full  -  ( 2020 07:06 )  WBC Count : 14.98 K/uL  RBC Count : 2.60 M/uL  Hemoglobin : 7.2 g/dL  Hematocrit : 22.5 %  Platelet Count - Automated : 420 K/uL  Mean Cell Volume : 86.5 fl  Mean Cell Hemoglobin : 27.7 pg  Mean Cell Hemoglobin Concentration : 32.0 gm/dL  Auto Neutrophil # : 11.61 K/uL  Auto Lymphocyte # : 1.45 K/uL  Auto Monocyte # : 1.11 K/uL  Auto Eosinophil # : 0.61 K/uL  Auto Basophil # : 0.03 K/uL  Auto Neutrophil % : 77.5 %  Auto Lymphocyte % : 9.7 %  Auto Monocyte % : 7.4 %  Auto Eosinophil % : 4.1 %  Auto Basophil % : 0.2 %        129<L>  |  94<L>  |  39<H>  ----------------------------<  200<H>  5.3   |  27  |  2.84<H>    Ca    7.8<L>      2020 15:26        Urinalysis Basic - ( 2020 13:26 )    Color: Yellow / Appearance: Clear / S.020 / pH: x  Gluc: x / Ketone: Trace  / Bili: Negative / Urobili: Negative mg/dL   Blood: x / Protein: 100 mg/dL / Nitrite: Negative   Leuk Esterase: Negative / RBC: 3-5 /HPF / WBC 6-10   Sq Epi: x / Non Sq Epi: Few / Bacteria: Many        Impression:  * ELMER -- suspect post-renal  * HyperK due to above  * CKD3, HTN, DM  * S/p L BKA    Recommendations:   * Cont to hold ACE-I, Metformin  * Follow repeat BMP/Cr/K this am  * Expect K to improve with resolution of retention  * Kayexalate PRN K > 5.5  * Low K diet

## 2020-01-19 NOTE — PROGRESS NOTE ADULT - ASSESSMENT
Pt is a 68 yo gentleman with a pmhx of HTN, HL, NIDM, Afib on eliquis who presented to the ED with leg redness and fever and cough . Has had chronic ulcer on L. medial leg for months, but has gotten worse. Patient was admitted and diagnosed with sepsis with  acute gas gangrene left foot with possible OM and necrotizing fascitis. ID and Podiatrist were consulted. patient was placed on broad spectrum antbiotics. patient under I and D left leg on 1/8 and left foot amputation on 1/10.  Tissue culture and blood culture grew strep agalactiae. antibiotics were de-escalated to PCN and clindamycin. vascular surgery performed successful left BKA on 1/17/20. Post op patient developed urinary retention, blood loss anemia. 1 unit of blood was transfused and urology was consulted. olsen was placed. also ELMER got worse.   Also patient was placed on contact isolation for RSV URI.    Problem/Plan - 1:  ·  Problem: Cellulitis of left lower extremity.   s/p left medial malleolus biopsy on 1/8  s/p L foot amputation on 1/10 and left BKA on 1/17/20  cont PCN and clindamycin. cont current pain control. Local wound care.     Problem/Plan - 2:  ·  Problem: Hypertension, unspecified type.  controlled on current meds.     Problem/Plan - 3:  ·  Problem: RSV infection.  resolved. no cough.     Problem/Plan - 4:  ·  Problem: Type 2 diabetes mellitus with other neurologic complication, without long-term current use of insulin.  better controlled. cont lantus and increase premeal humalog. cont correctional insulin. follow finger sticks.   Note that home med list is incomplete; pt states he normally takes 4 meds: Metformin, Invokana, Glimepiride, Onglyza.     Problem/Plan - 5:  ·  Problem: ELMER superimposed on Stage 4 chronic kidney disease. creatinine worse. ? etiology. ? urinary retention. s/p olsen and good urine output. so no dehydration. stop IVF. encourage oral intake. renal following. avoid nephrotoxic agents. follow BMP in am.     Problem/Plan - 6:  Problem: Atrial fibrillation, unspecified type. Plan: cont cardizem. on HSQ per vascular. ELiquis once ok with vascular surgery.       Problem/Plan - 7:  ·  Problem: Prophylactic measure.  Plan: DVT: on HSQ    Problem/Plan - 8:  ·  Problem: Pain of left hip joint.  Plan: DJD lumbar spine. cont conservative management. cont lidocaine patch.       ·	acute blood loss Anemia (expected).  s/p blood transfusion and stable H and H. no active gross bleeding. follow CBC.     ·	Leukocytosis and urinary frequency. no fever. UA negative. leukocytosis likely reactive. no antibiotic. follow CBC.     ·	Hyperkalemia. improved. low K diet.   ·	Hyponatremia. improved.   ·	constipation. start senna and miralax.   ·	congestion chest. follow CXR.     Full Code.

## 2020-01-20 LAB
ANION GAP SERPL CALC-SCNC: 8 MMOL/L — SIGNIFICANT CHANGE UP (ref 5–17)
BUN SERPL-MCNC: 34 MG/DL — HIGH (ref 7–23)
CALCIUM SERPL-MCNC: 8.5 MG/DL — SIGNIFICANT CHANGE UP (ref 8.5–10.1)
CHLORIDE SERPL-SCNC: 103 MMOL/L — SIGNIFICANT CHANGE UP (ref 96–108)
CO2 SERPL-SCNC: 26 MMOL/L — SIGNIFICANT CHANGE UP (ref 22–31)
CREAT SERPL-MCNC: 2.52 MG/DL — HIGH (ref 0.5–1.3)
CULTURE RESULTS: SIGNIFICANT CHANGE UP
GLUCOSE BLDC GLUCOMTR-MCNC: 175 MG/DL — HIGH (ref 70–99)
GLUCOSE BLDC GLUCOMTR-MCNC: 206 MG/DL — HIGH (ref 70–99)
GLUCOSE BLDC GLUCOMTR-MCNC: 245 MG/DL — HIGH (ref 70–99)
GLUCOSE BLDC GLUCOMTR-MCNC: 281 MG/DL — HIGH (ref 70–99)
GLUCOSE SERPL-MCNC: 151 MG/DL — HIGH (ref 70–99)
HCT VFR BLD CALC: 24.1 % — LOW (ref 39–50)
HGB BLD-MCNC: 7.7 G/DL — LOW (ref 13–17)
MCHC RBC-ENTMCNC: 27.9 PG — SIGNIFICANT CHANGE UP (ref 27–34)
MCHC RBC-ENTMCNC: 32 GM/DL — SIGNIFICANT CHANGE UP (ref 32–36)
MCV RBC AUTO: 87.3 FL — SIGNIFICANT CHANGE UP (ref 80–100)
NRBC # BLD: 0 /100 WBCS — SIGNIFICANT CHANGE UP (ref 0–0)
PLATELET # BLD AUTO: 404 K/UL — HIGH (ref 150–400)
POTASSIUM SERPL-MCNC: 4.6 MMOL/L — SIGNIFICANT CHANGE UP (ref 3.5–5.3)
POTASSIUM SERPL-SCNC: 4.6 MMOL/L — SIGNIFICANT CHANGE UP (ref 3.5–5.3)
RBC # BLD: 2.76 M/UL — LOW (ref 4.2–5.8)
RBC # FLD: 15.7 % — HIGH (ref 10.3–14.5)
SODIUM SERPL-SCNC: 137 MMOL/L — SIGNIFICANT CHANGE UP (ref 135–145)
SPECIMEN SOURCE: SIGNIFICANT CHANGE UP
WBC # BLD: 13.31 K/UL — HIGH (ref 3.8–10.5)
WBC # FLD AUTO: 13.31 K/UL — HIGH (ref 3.8–10.5)

## 2020-01-20 PROCEDURE — 99233 SBSQ HOSP IP/OBS HIGH 50: CPT

## 2020-01-20 PROCEDURE — 99231 SBSQ HOSP IP/OBS SF/LOW 25: CPT

## 2020-01-20 PROCEDURE — 99232 SBSQ HOSP IP/OBS MODERATE 35: CPT

## 2020-01-20 RX ORDER — CEFTRIAXONE 500 MG/1
2000 INJECTION, POWDER, FOR SOLUTION INTRAMUSCULAR; INTRAVENOUS EVERY 24 HOURS
Refills: 0 | Status: DISCONTINUED | OUTPATIENT
Start: 2020-01-20 | End: 2020-01-22

## 2020-01-20 RX ORDER — LORATADINE 10 MG/1
10 TABLET ORAL ONCE
Refills: 0 | Status: COMPLETED | OUTPATIENT
Start: 2020-01-20 | End: 2020-01-20

## 2020-01-20 RX ORDER — INSULIN LISPRO 100/ML
VIAL (ML) SUBCUTANEOUS
Refills: 0 | Status: DISCONTINUED | OUTPATIENT
Start: 2020-01-20 | End: 2020-01-22

## 2020-01-20 RX ORDER — INSULIN LISPRO 100/ML
VIAL (ML) SUBCUTANEOUS AT BEDTIME
Refills: 0 | Status: DISCONTINUED | OUTPATIENT
Start: 2020-01-20 | End: 2020-01-22

## 2020-01-20 RX ORDER — HYDROCORTISONE 1 %
1 OINTMENT (GRAM) TOPICAL
Refills: 0 | Status: DISCONTINUED | OUTPATIENT
Start: 2020-01-20 | End: 2020-01-22

## 2020-01-20 RX ADMIN — PENICILLIN G POTASSIUM 100 MILLION UNIT(S): 5000000 POWDER, FOR SOLUTION INTRAMUSCULAR; INTRAPLEURAL; INTRATHECAL; INTRAVENOUS at 10:24

## 2020-01-20 RX ADMIN — Medication 8 UNIT(S): at 11:13

## 2020-01-20 RX ADMIN — CEFTRIAXONE 100 MILLIGRAM(S): 500 INJECTION, POWDER, FOR SOLUTION INTRAMUSCULAR; INTRAVENOUS at 13:32

## 2020-01-20 RX ADMIN — OXYCODONE AND ACETAMINOPHEN 1 TABLET(S): 5; 325 TABLET ORAL at 15:15

## 2020-01-20 RX ADMIN — Medication 250 MILLIGRAM(S): at 05:13

## 2020-01-20 RX ADMIN — LIDOCAINE 1 PATCH: 4 CREAM TOPICAL at 07:29

## 2020-01-20 RX ADMIN — LIDOCAINE 1 PATCH: 4 CREAM TOPICAL at 17:37

## 2020-01-20 RX ADMIN — HEPARIN SODIUM 5000 UNIT(S): 5000 INJECTION INTRAVENOUS; SUBCUTANEOUS at 17:25

## 2020-01-20 RX ADMIN — Medication 250 MILLIGRAM(S): at 17:25

## 2020-01-20 RX ADMIN — SENNA PLUS 2 TABLET(S): 8.6 TABLET ORAL at 22:34

## 2020-01-20 RX ADMIN — Medication 75 MILLIGRAM(S): at 05:13

## 2020-01-20 RX ADMIN — PENICILLIN G POTASSIUM 100 MILLION UNIT(S): 5000000 POWDER, FOR SOLUTION INTRAMUSCULAR; INTRAPLEURAL; INTRATHECAL; INTRAVENOUS at 02:07

## 2020-01-20 RX ADMIN — HEPARIN SODIUM 5000 UNIT(S): 5000 INJECTION INTRAVENOUS; SUBCUTANEOUS at 05:12

## 2020-01-20 RX ADMIN — INSULIN GLARGINE 18 UNIT(S): 100 INJECTION, SOLUTION SUBCUTANEOUS at 22:34

## 2020-01-20 RX ADMIN — Medication 75 MILLIGRAM(S): at 13:32

## 2020-01-20 RX ADMIN — Medication 3: at 16:22

## 2020-01-20 RX ADMIN — Medication 75 MILLIGRAM(S): at 22:34

## 2020-01-20 RX ADMIN — Medication 4 MILLIGRAM(S): at 22:34

## 2020-01-20 RX ADMIN — Medication 25 MILLIGRAM(S): at 00:06

## 2020-01-20 RX ADMIN — OXYCODONE AND ACETAMINOPHEN 1 TABLET(S): 5; 325 TABLET ORAL at 14:28

## 2020-01-20 RX ADMIN — Medication 240 MILLIGRAM(S): at 05:13

## 2020-01-20 RX ADMIN — Medication 2: at 11:13

## 2020-01-20 RX ADMIN — Medication 8 UNIT(S): at 16:22

## 2020-01-20 RX ADMIN — Medication 1 APPLICATION(S): at 06:30

## 2020-01-20 RX ADMIN — Medication 100 MILLIGRAM(S): at 05:12

## 2020-01-20 RX ADMIN — PENICILLIN G POTASSIUM 100 MILLION UNIT(S): 5000000 POWDER, FOR SOLUTION INTRAMUSCULAR; INTRAPLEURAL; INTRATHECAL; INTRAVENOUS at 06:18

## 2020-01-20 RX ADMIN — Medication 8 UNIT(S): at 07:30

## 2020-01-20 RX ADMIN — LIDOCAINE 1 PATCH: 4 CREAM TOPICAL at 05:14

## 2020-01-20 RX ADMIN — PANTOPRAZOLE SODIUM 40 MILLIGRAM(S): 20 TABLET, DELAYED RELEASE ORAL at 07:30

## 2020-01-20 NOTE — PROGRESS NOTE ADULT - SUBJECTIVE AND OBJECTIVE BOX
Patient is a 69y old  Male who presents with a chief complaint of left  foot ulcer (20 Jan 2020 15:40)      INTERVAL HPI / OVERNIGHT EVENTS: doing ok ,now s/P BKS POD 3,upper resp symptoms cough etc resolving    MEDICATIONS  (STANDING):  cefTRIAXone   IVPB 2000 milliGRAM(s) IV Intermittent every 24 hours  dextrose 5%. 1000 milliLiter(s) (50 mL/Hr) IV Continuous <Continuous>  diltiazem    milliGRAM(s) Oral daily  doxazosin 4 milliGRAM(s) Oral at bedtime  heparin  Injectable 5000 Unit(s) SubCutaneous every 12 hours  hydrALAZINE 75 milliGRAM(s) Oral three times a day  hydrocortisone 1% Cream 1 Application(s) Topical two times a day  insulin glargine Injectable (LANTUS) 18 Unit(s) SubCutaneous at bedtime  insulin lispro (HumaLOG) corrective regimen sliding scale   SubCutaneous three times a day before meals  insulin lispro (HumaLOG) corrective regimen sliding scale   SubCutaneous at bedtime  insulin lispro Injectable (HumaLOG) 8 Unit(s) SubCutaneous three times a day before meals  lidocaine   Patch 1 Patch Transdermal every 24 hours  pantoprazole    Tablet 40 milliGRAM(s) Oral before breakfast  polyethylene glycol 3350 17 Gram(s) Oral daily  saccharomyces boulardii 250 milliGRAM(s) Oral two times a day  senna 2 Tablet(s) Oral at bedtime    MEDICATIONS  (PRN):  acetaminophen   Tablet .. 650 milliGRAM(s) Oral every 6 hours PRN Temp greater or equal to 38C (100.4F), Mild Pain (1 - 3)  ALBUTerol   0.042% 1.25 milliGRAM(s) Nebulizer four times a day PRN Wheezing  aluminum hydroxide/magnesium hydroxide/simethicone Suspension 30 milliLiter(s) Oral every 6 hours PRN Dyspepsia  guaiFENesin   Syrup  (Sugar-Free) 100 milliGRAM(s) Oral every 6 hours PRN Cough  morphine  - Injectable 2 milliGRAM(s) IV Push every 4 hours PRN Severe Pain (7 - 10)  oxycodone    5 mG/acetaminophen 325 mG 1 Tablet(s) Oral every 4 hours PRN Moderate Pain (4 - 6)      Vital Signs Last 24 Hrs  T(C): 36.7 (20 Jan 2020 16:35), Max: 36.9 (19 Jan 2020 23:10)  T(F): 98 (20 Jan 2020 16:35), Max: 98.5 (19 Jan 2020 23:10)  HR: 89 (20 Jan 2020 16:35) (78 - 96)  BP: 186/86 (20 Jan 2020 16:35) (153/67 - 186/86)  BP(mean): --  RR: 19 (20 Jan 2020 16:35) (18 - 20)  SpO2: 95% (20 Jan 2020 16:35) (94% - 98%)    Review of systems:  General : no fever /chills,fatigue  CVS : no chest pain, palpitations  Lungs : no shortness of breath, cough  GI : no abdominal pain,vomiting, diarrhea   : no dysuria,hematuria        PHYSICAL EXAM:  General :NAD  Constitutional:  well-groomed, well-developed  Respiratory: CTAB/L  Cardiovascular: S1 and S2, RRR, no M/G/R  Gastrointestinal: BS+, soft, NT/ND  Extremities: No peripheral edema  Vascular: 2+ peripheral pulses  Skin: left BKA      LABS:                        7.7    13.31 )-----------( 404      ( 20 Jan 2020 07:06 )             24.1     01-20    137  |  103  |  34<H>  ----------------------------<  151<H>  4.6   |  26  |  2.52<H>    Ca    8.5      20 Jan 2020 07:06            MICROBIOLOGY:  RECENT CULTURES:  01-18 .Urine Catheterized XXXX XXXX   No growth    01-14 .Blood Blood-Peripheral XXXX XXXX   No growth at 5 days.          RADIOLOGY & ADDITIONAL STUDIES:

## 2020-01-20 NOTE — PROGRESS NOTE ADULT - SUBJECTIVE AND OBJECTIVE BOX
[facility] NEPHROLOGY SERVICES, [facility]  NEPHROLOGY AND HYPERTENSION  [address]  [address]  [place] [address]  [phone]    MD YISEL WEEKS MD ANDREY GONCHARUK, MD MADHU KORRAPATI, MD YELENA ROSENBERG, MD BINNY KOSHY, MD CHRISTOPHER CAPUTO, MD EDWARD BOVER, MD          Patient sleeping wife at bedside;     MEDICATIONS  (STANDING):  cefTRIAXone   IVPB 2000 milliGRAM(s) IV Intermittent every 24 hours  dextrose 5%. 1000 milliLiter(s) (50 mL/Hr) IV Continuous <Continuous>  diltiazem    milliGRAM(s) Oral daily  doxazosin 4 milliGRAM(s) Oral at bedtime  heparin  Injectable 5000 Unit(s) SubCutaneous every 12 hours  hydrALAZINE 75 milliGRAM(s) Oral three times a day  hydrocortisone 1% Cream 1 Application(s) Topical two times a day  insulin glargine Injectable (LANTUS) 18 Unit(s) SubCutaneous at bedtime  insulin lispro (HumaLOG) corrective regimen sliding scale   SubCutaneous three times a day before meals  insulin lispro (HumaLOG) corrective regimen sliding scale   SubCutaneous at bedtime  insulin lispro Injectable (HumaLOG) 8 Unit(s) SubCutaneous three times a day before meals  lidocaine   Patch 1 Patch Transdermal every 24 hours  pantoprazole    Tablet 40 milliGRAM(s) Oral before breakfast  polyethylene glycol 3350 17 Gram(s) Oral daily  saccharomyces boulardii 250 milliGRAM(s) Oral two times a day  senna 2 Tablet(s) Oral at bedtime    MEDICATIONS  (PRN):  acetaminophen   Tablet .. 650 milliGRAM(s) Oral every 6 hours PRN Temp greater or equal to 38C (100.4F), Mild Pain (1 - 3)  ALBUTerol   0.042% 1.25 milliGRAM(s) Nebulizer four times a day PRN Wheezing  aluminum hydroxide/magnesium hydroxide/simethicone Suspension 30 milliLiter(s) Oral every 6 hours PRN Dyspepsia  guaiFENesin   Syrup  (Sugar-Free) 100 milliGRAM(s) Oral every 6 hours PRN Cough  morphine  - Injectable 2 milliGRAM(s) IV Push every 4 hours PRN Severe Pain (7 - 10)  oxycodone    5 mG/acetaminophen 325 mG 1 Tablet(s) Oral every 4 hours PRN Moderate Pain (4 - 6)      01-19-20 @ 07:01  -  01-20-20 @ 07:00  --------------------------------------------------------  IN: 700 mL / OUT: 6900 mL / NET: -6200 mL    01-20-20 @ 07:01  -  01-20-20 @ 15:40  --------------------------------------------------------  IN: 0 mL / OUT: 1700 mL / NET: -1700 mL      PHYSICAL EXAM:      T(C): 36.9 (01-20-20 @ 13:49), Max: 36.9 (01-19-20 @ 23:10)  HR: 96 (01-20-20 @ 14:10) (78 - 96)  BP: 174/79 (01-20-20 @ 14:10) (143/68 - 176/90)  RR: 20 (01-20-20 @ 13:49) (18 - 20)  SpO2: 96% (01-20-20 @ 14:10) (94% - 98%)  Wt(kg): --  Respiratory: clear anteriorly, decreased BS at bases  Cardiovascular: S1 S2  Gastrointestinal: soft NT ND +BS  Extremities:  LLE BKA edema                                    7.7    13.31 )-----------( 404      ( 20 Jan 2020 07:06 )             24.1     01-20    137  |  103  |  34<H>  ----------------------------<  151<H>  4.6   |  26  |  2.52<H>    Ca    8.5      20 Jan 2020 07:06          Creatinine Trend: 2.52<--, 2.82<--, 2.84<--, 2.60<--, 1.75<--, 1.77<--      Assessment   ELMER CKD 3; HTN; DM risk;   Bladder outlet obstruction ;   HTN urgency, improved  Post olsen      Plan    Holding Metformin and ACE;   BP medication adjustments,   Continue olsen catheter;   Maintain Doxazosin 4 mg q hs;       Augusto Santoyo MD

## 2020-01-20 NOTE — CHART NOTE - NSCHARTNOTEFT_GEN_A_CORE
Assessment: Pt s/p L BKA 1/17/20. Pt with PMHx of HTN, HL, NIDM, Afib on Eliquis, presented with cellulitis of L leg (with chronic wound on L leg x months), RSV infection. Dx with sepsis with acute gas gangrene left foot with possible OM and necrotizing fascitis. Pt s/p left medial malleolus biopsy on 1/8, s/p L foot amputation on 1/10, and left BKA on 1/17/20. Post op urinary retention (olsen placed), blood loss anemia, s/p blood transfusion. Also with ELEMR. Pt reported he was having some difficulty swallowing, however reported much improved - refused soft diet.    Factors impacting intake: [ ] none [ ] nausea  [ ] vomiting [ ] diarrhea [ ] constipation [ ]chewing problems [ ] swallowing issues  [x] other: reduction in appetite (improving)    Diet Prescription: Diet, Consistent Carbohydrate w/Evening Snack:   Supplement Feeding Modality:  Oral  Glucerna Shake Cans or Servings Per Day:  1       Frequency:  Daily  DanActive Cans or Servings Per Day:  1       Frequency:  Two Times a day (01-17-20 @ 16:04)    Intake: Po intake %; Pt reported enjoys and drinks Glucerna, requested Glucerna 2x daily; Pt reported drinking DanActive; Pt with independent feeding skill, assisted with tray set-up.    Current Weight: 105.5 kg (1/20), 105.9 kg (1/14)  % Weight Change: 0.4% (0.4 kg) wt loss x 6 days during hospitalization; Edema 1+ L leg  Last BM: BM (x2) today 1/20    Pertinent Medications: MEDICATIONS  (STANDING):  cefTRIAXone   IVPB 2000 milliGRAM(s) IV Intermittent every 24 hours  dextrose 5%. 1000 milliLiter(s) (50 mL/Hr) IV Continuous <Continuous>  diltiazem    milliGRAM(s) Oral daily  doxazosin 4 milliGRAM(s) Oral at bedtime  heparin  Injectable 5000 Unit(s) SubCutaneous every 12 hours  hydrALAZINE 75 milliGRAM(s) Oral three times a day  hydrocortisone 1% Cream 1 Application(s) Topical two times a day  insulin glargine Injectable (LANTUS) 18 Unit(s) SubCutaneous at bedtime  insulin lispro (HumaLOG) corrective regimen sliding scale   SubCutaneous three times a day before meals  insulin lispro (HumaLOG) corrective regimen sliding scale   SubCutaneous at bedtime  insulin lispro Injectable (HumaLOG) 8 Unit(s) SubCutaneous three times a day before meals  lidocaine   Patch 1 Patch Transdermal every 24 hours  pantoprazole    Tablet 40 milliGRAM(s) Oral before breakfast  polyethylene glycol 3350 17 Gram(s) Oral daily  saccharomyces boulardii 250 milliGRAM(s) Oral two times a day  senna 2 Tablet(s) Oral at bedtime    MEDICATIONS  (PRN):  acetaminophen   Tablet .. 650 milliGRAM(s) Oral every 6 hours PRN Temp greater or equal to 38C (100.4F), Mild Pain (1 - 3)  ALBUTerol   0.042% 1.25 milliGRAM(s) Nebulizer four times a day PRN Wheezing  aluminum hydroxide/magnesium hydroxide/simethicone Suspension 30 milliLiter(s) Oral every 6 hours PRN Dyspepsia  guaiFENesin   Syrup  (Sugar-Free) 100 milliGRAM(s) Oral every 6 hours PRN Cough  morphine  - Injectable 2 milliGRAM(s) IV Push every 4 hours PRN Severe Pain (7 - 10)  oxycodone    5 mG/acetaminophen 325 mG 1 Tablet(s) Oral every 4 hours PRN Moderate Pain (4 - 6)    Pertinent Labs: 01-20 Na137 mmol/L Glu 151 mg/dL<H> K+ 4.6 mmol/L Cr  2.52 mg/dL<H> BUN 34 mg/dL<H> 01-10 [id number] 8.0 %<H>    CAPILLARY BLOOD GLUCOSE    POCT Blood Glucose.: 245 mg/dL (20 Jan 2020 10:41)  POCT Blood Glucose.: 175 mg/dL (20 Jan 2020 07:26)  POCT Blood Glucose.: 122 mg/dL (19 Jan 2020 21:51)  POCT Blood Glucose.: 126 mg/dL (19 Jan 2020 16:19)    Skin: No pressure ulcers; Surgical incision (BKA)    Estimated Needs:   [ ] no change since previous assessment  [x] recalculated: Using wt of 76 kg (IBW - 6% due to BKA)  25-30 kcal/kg = 8798-0570 kcal  1.0-1.2 gm/kg = 76-91 gm protein  25-30 ml/kg =0904-6387    Previous Nutrition Diagnosis: 1/20/20  Nutrition Diagnostic Terminology #1 Inadequate Oral Intake.     Etiology +pain & constipation.     Signs/Symptoms Po intake 50% meals consumed.    Nutrition Diagnosis is [ ] ongoing  [x] resolved (pt consuming % for meals) [ ] not applicable    Goal/Expected Outcome Pt to consume >50% meals/supplements.    New Nutrition Diagnosis: [x] not applicable    Interventions:   Recommend  [x] Change Diet To: Low Sodium, Consistent Carbohydrate (with evening snack) + Glucerna 2x daily (440 kcals & 20 gm protein) + DanActive 2x daily  [ ] Nutrition Supplement  [ ] Nutrition Support  [ ] Other:     Monitoring and Evaluation:   [ x ] PO intake [ x ] Tolerance to diet prescription [ x ] weights [ x ] labs[ x ] follow up per protocol  [ ] other: Assessment: Pt with PMHx of HTN, HL, NIDM, Afib on Eliquis, presented with cellulitis of L leg (pt with chronic wound on L leg x months), RSV infection. Dx sepsis with acute gas gangrene left foot with possible OM and necrotizing fascitis. Pt s/p left medial malleolus biopsy on 1/8, s/p L foot amputation on 1/10, and left BKA on 1/17. Post op urinary retention (olsen placed), blood loss anemia, s/p blood transfusion. Also with ELMER. Pt reported he was having some difficulty swallowing x several days ago, however reported much improved - refused soft diet.    Factors impacting intake: [ ] none [ ] nausea  [ ] vomiting [ ] diarrhea [x] constipation (improved) [ ]chewing problems [ ] swallowing issues  [x] other: reduction in appetite (improved)    Diet Prescription: Diet, Consistent Carbohydrate w/Evening Snack:   Supplement Feeding Modality:  Oral  Glucerna Shake Cans or Servings Per Day:  1       Frequency:  Daily  DanActive Cans or Servings Per Day:  1       Frequency:  Two Times a day (01-17-20 @ 16:04)    Intake: Po intake %; Pt reported enjoys and drinks Glucerna, requested Glucerna 2x daily; Pt reported drinking DanActive; Pt with independent feeding skill, assisted with tray set-up.    Current Weight: 105.5 kg (1/20), 105.9 kg (1/14)  % Weight Change: 0.4% (0.4 kg) wt loss x 6 days during hospitalization; Edema 1+ L leg  Last BM: BM (x2) today 1/20    Pertinent Medications: MEDICATIONS  (STANDING):  cefTRIAXone   IVPB 2000 milliGRAM(s) IV Intermittent every 24 hours  dextrose 5%. 1000 milliLiter(s) (50 mL/Hr) IV Continuous <Continuous>  diltiazem    milliGRAM(s) Oral daily  doxazosin 4 milliGRAM(s) Oral at bedtime  heparin  Injectable 5000 Unit(s) SubCutaneous every 12 hours  hydrALAZINE 75 milliGRAM(s) Oral three times a day  hydrocortisone 1% Cream 1 Application(s) Topical two times a day  insulin glargine Injectable (LANTUS) 18 Unit(s) SubCutaneous at bedtime  insulin lispro (HumaLOG) corrective regimen sliding scale   SubCutaneous three times a day before meals  insulin lispro (HumaLOG) corrective regimen sliding scale   SubCutaneous at bedtime  insulin lispro Injectable (HumaLOG) 8 Unit(s) SubCutaneous three times a day before meals  lidocaine   Patch 1 Patch Transdermal every 24 hours  pantoprazole    Tablet 40 milliGRAM(s) Oral before breakfast  polyethylene glycol 3350 17 Gram(s) Oral daily  saccharomyces boulardii 250 milliGRAM(s) Oral two times a day  senna 2 Tablet(s) Oral at bedtime    MEDICATIONS  (PRN):  acetaminophen   Tablet .. 650 milliGRAM(s) Oral every 6 hours PRN Temp greater or equal to 38C (100.4F), Mild Pain (1 - 3)  ALBUTerol   0.042% 1.25 milliGRAM(s) Nebulizer four times a day PRN Wheezing  aluminum hydroxide/magnesium hydroxide/simethicone Suspension 30 milliLiter(s) Oral every 6 hours PRN Dyspepsia  guaiFENesin   Syrup  (Sugar-Free) 100 milliGRAM(s) Oral every 6 hours PRN Cough  morphine  - Injectable 2 milliGRAM(s) IV Push every 4 hours PRN Severe Pain (7 - 10)  oxycodone    5 mG/acetaminophen 325 mG 1 Tablet(s) Oral every 4 hours PRN Moderate Pain (4 - 6)    Pertinent Labs: 01-20 Na137 mmol/L Glu 151 mg/dL<H> K+ 4.6 mmol/L Cr  2.52 mg/dL<H> BUN 34 mg/dL<H> 01-10 [id number] 8.0 %<H>    CAPILLARY BLOOD GLUCOSE    POCT Blood Glucose.: 245 mg/dL (20 Jan 2020 10:41)  POCT Blood Glucose.: 175 mg/dL (20 Jan 2020 07:26)  POCT Blood Glucose.: 122 mg/dL (19 Jan 2020 21:51)  POCT Blood Glucose.: 126 mg/dL (19 Jan 2020 16:19)    Skin: No pressure ulcers; Surgical incision (BKA)    Estimated Needs:   [ ] no change since previous assessment  [x] recalculated: Using wt of 76 kg (IBW - 6% due to BKA)  25-30 kcal/kg = 3281-3076 kcal  1.0-1.2 gm/kg = 76-91 gm protein  25-30 ml/kg =7299-2429    Previous Nutrition Diagnosis: 1/20/20  Nutrition Diagnostic Terminology #1 Inadequate Oral Intake.     Etiology +pain & constipation.     Signs/Symptoms Po intake 50% meals consumed.    Nutrition Diagnosis is [ ] ongoing  [x] resolved (pt consuming % for meals) [ ] not applicable    Goal/Expected Outcome Pt to consume >50% meals/supplements (goal met)    New Nutrition Diagnosis: [x] not applicable    Interventions:   Recommend  [x] Change Diet To: Low Sodium, Consistent Carbohydrate (with evening snack) + Glucerna 2x daily (440 kcals & 20 gm protein) + DanActive 2x daily  [ ] Nutrition Supplement  [ ] Nutrition Support  [ ] Other:     Monitoring and Evaluation:   [ x ] PO intake [ x ] Tolerance to diet prescription [ x ] weights [ x ] labs[ x ] follow up per protocol  [ ] other:

## 2020-01-20 NOTE — PROGRESS NOTE ADULT - SUBJECTIVE AND OBJECTIVE BOX
Postoperative Day # 3  s/p left BKA    Patient seen and examined bedside resting comfortably.  C/O intermittent pain in left leg. He says the dressing "loosened up " because of his moving around al ot in bed.  Denies nausea and vomiting. Tolerating diet.  Flatus/BM. +  Denies chest pain, dyspnea, cough.    T(F): 98.1 (01-20-20 @ 05:00), Max: 98.5 (01-19-20 @ 23:10)  HR: 82 (01-20-20 @ 05:00) (77 - 88)  BP: 176/86 (01-20-20 @ 05:00) (138/70 - 176/90)  RR: 18 (01-20-20 @ 05:00) (17 - 18)  SpO2: 95% (01-20-20 @ 05:00) (94% - 98%)    CAPILLARY BLOOD GLUCOSE  POCT Blood Glucose.: 175 mg/dL (20 Jan 2020 07:26)  POCT Blood Glucose.: 122 mg/dL (19 Jan 2020 21:51)  POCT Blood Glucose.: 126 mg/dL (19 Jan 2020 16:19)  POCT Blood Glucose.: 122 mg/dL (19 Jan 2020 10:48)      PHYSICAL EXAM:  General: NAD  Neuro:  Alert & oriented x 3  Abdomen: obese, soft, NTND. Normactive BS  Gu: Frazier draining clear urine to BSD.  left LE: dressing disheveled. Still intact for te most part. Pt states nursing team has had to change dressing yesterday because "it fell off". Staple ine is clean & dry. Unable to express any drainage from wound. There are 2 large areas of blister that arise at the staple in & go up the leg approximately 3cm. The run parallel to each other. There is a violaceous area between these 2 blisters that extends more superior than the blisters.   Stump cleansed with betadine. DSD with Quyen & Ace over- wrap applied.        LABS:                        7.7    13.31 )-----------( 404      ( 20 Jan 2020 07:06 )             24.1     01-20    137  |  103  |  34<H>  ----------------------------<  151<H>  4.6   |  26  |  2.52<H>    Ca    8.5      20 Jan 2020 07:06        I&O's Detail    19 Jan 2020 07:01  -  20 Jan 2020 07:00  --------------------------------------------------------  IN:    Solution: 500 mL    Solution: 200 mL  Total IN: 700 mL    OUT:    Indwelling Catheter - Urethral: 6900 mL  Total OUT: 6900 mL    Total NET: -6200 mL          Impression: Postoperative Day # 3  s/p left BKA  elevated BUN/Cr due to retention - improving; continue Frazier cath. Will continue till Cr is nl.    Plan:  -continue VTE prophylaxis   - continue IVAB per ID  - continue medical f/u  -PT  -continue local wound care  -f/u AM labs  -discussed with Dr. Quintero.

## 2020-01-20 NOTE — PROGRESS NOTE ADULT - ASSESSMENT
Pt is a 70 yo gentleman with a pmhx of HTN, HL, NIDM, Afib on eliquis who presented to the ED with leg redness and fever and cough . Has had chronic ulcer on L. medial leg for months, but has gotten worse. Patient was admitted and diagnosed with sepsis with  acute gas gangrene left foot with possible OM and necrotizing fascitis. ID and Podiatrist were consulted. patient was placed on broad spectrum antbiotics. patient under I and D left leg on 1/8 and left foot amputation on 1/10.  Tissue culture and blood culture grew strep agalactiae. antibiotics were de-escalated to PCN and clindamycin. vascular surgery performed successful left BKA on 1/17/20. Post op patient developed urinary retention, blood loss anemia. 1 unit of blood was transfused and urology was consulted. olsen was placed. also ELMER got worse.   Also patient was placed on contact isolation for RSV URI.    Problem/Plan - 1:  ·  Problem: Cellulitis of left lower extremity.   s/p left medial malleolus biopsy on 1/8  s/p L foot amputation on 1/10 and left BKA on 1/17/20. leukocytosis improving. follow CBC.   cont PCN and clindamycin. cont current pain control with iv morphine and percocet. ambulation is encouraged with PT. Local wound care.     Problem/Plan - 2:  ·  Problem: Hypertension, unspecified type.  controlled on current meds.     Problem/Plan - 3:  ·  Problem: RSV infection.  resolved. no cough.     Problem/Plan - 4:  ·  Problem: Type 2 diabetes mellitus with other neurologic complication, without long-term current use of insulin. controlled. cont current management and restart correctional insulin. follow finger sticks.   Note that home med list is incomplete; pt states he normally takes 4 meds: Metformin, Invokana, Glimepiride, Onglyza.     Problem/Plan - 5:  ·  Problem: ELMER superimposed on Stage 4 chronic kidney disease. creatinine still not at baseline. ? etiology.  s/p olsen and good urine output. follow BMP daily. Urology and renal following.     Problem/Plan - 6:  Problem: Atrial fibrillation, unspecified type. Plan: cont cardizem. on HSQ per vascular. ELiquis once ok with vascular surgery.       Problem/Plan - 7:  ·  Problem: Prophylactic measure.  Plan: DVT: on HSQ    Problem/Plan - 8:  ·  Problem: Pain of left hip joint.  Plan: DJD lumbar spine. cont conservative management. cont lidocaine patch.       ·	acute blood loss Anemia (expected).  s/p blood transfusion and stable H and H. no active gross bleeding.     ·	Leukocytosis and urinary frequency. negative infectious work. CXR reviewed by me showed no acute infiltrates. U culture no growth so far.     ·	Hyperkalemia. improved. low K diet.   ·	Hyponatremia. improved.   ·	constipation. Cont senna and miralax.       Full Code.

## 2020-01-20 NOTE — PROGRESS NOTE ADULT - ASSESSMENT
Pt is a 70 yo gentleman with a pmhx of HTN, HL, NIDM, Afib on eliquis who presents to the ED with leg redness and fever and cough . Has had chronic ulcer on L. medial leg for months, but over past couple days has gotten worse, and yesterday wife noted that his L. Leg was red. Seen  with   1.Acute gas gangrene of left foot with probable necrotizing fascitis and  osteomyelitis  of ankle : s/p Incision and drainage of abscess of left foot on 1/8 and gulliton amputation followed by BKA on 1/17  all c/s grew Grp B strept including blood and OR c/s   was on penicillin and clindamycin  will change to rocephin   midline for another 2 weeks of iv antibiotics   wound care per surgery recommendations  2.Sepsis: sec to above   resolved  3.RSV+ URI : supportive care  resolving cough  4.Uncontrolled DM :  tight glucose control  5.ELMER vs ELMER on CKD  antibiotics renally dosed  monitor cr

## 2020-01-20 NOTE — PROGRESS NOTE ADULT - SUBJECTIVE AND OBJECTIVE BOX
CHIEF COMPLAINT: s/p left BKA on 20.   + pain left stump. + BM  no sob or fever or hematuria.   no abd pain.       PHYSICAL EXAM:  GENERAL: well built, well nourished, no acute distress   CHEST/LUNG: Clear to ausculation bilaterally, no wheezing, no crackles   HEART: Regular rate and rhythm; No murmurs, rubs  ABDOMEN: Soft, Nontender, Nondistended; Bowel sounds present. + Frazier  EXTREMITIES:  s/p left BKA and immobilizer. no active gross bleeding noted.   NERVOUS SYSTEM:  Grossly non focal.  Psychiatry: AAO x 3, better mood 	    -----------------------------------------------------------------------------------------------------------------------------------------------------------------  OBJECTIVE DATA:     Vital Signs Last 24 Hrs  T(C): 36.7 (2020 05:00), Max: 36.9 (2020 23:10)  T(F): 98.1 (2020 05:00), Max: 98.5 (2020 23:10)  HR: 82 (2020 05:00) (77 - 88)  BP: 176/86 (2020 05:00) (138/70 - 176/90)  BP(mean): --  RR: 18 (2020 05:00) (17 - 18)  SpO2: 95% (2020 05:00) (94% - 98%)           Daily     Daily Weight in k.5 (2020 05:00)      LABS:                        7.7    13.31 )-----------( 404      ( 2020 07:06 )             24.1             01-20    137  |  103  |  34<H>  ----------------------------<  151<H>  4.6   |  26  |  2.52<H>    Ca    8.5      2020 07:06                  Urinalysis Basic - ( 2020 13:26 )    Color: Yellow / Appearance: Clear / S.020 / pH: x  Gluc: x / Ketone: Trace  / Bili: Negative / Urobili: Negative mg/dL   Blood: x / Protein: 100 mg/dL / Nitrite: Negative   Leuk Esterase: Negative / RBC: 3-5 /HPF / WBC 6-10   Sq Epi: x / Non Sq Epi: Few / Bacteria: Many           CAPILLARY BLOOD GLUCOSE      POCT Blood Glucose.: 245 mg/dL (2020 10:41)      Culture - Urine (collected )  Source: .Urine Catheterized  Final Report ():    No growth            Interval Radiology studies: reviewed     MEDICATIONS  (STANDING):  cefTRIAXone   IVPB 2000 milliGRAM(s) IV Intermittent every 24 hours  dextrose 5%. 1000 milliLiter(s) (50 mL/Hr) IV Continuous <Continuous>  diltiazem    milliGRAM(s) Oral daily  doxazosin 4 milliGRAM(s) Oral at bedtime  heparin  Injectable 5000 Unit(s) SubCutaneous every 12 hours  hydrALAZINE 75 milliGRAM(s) Oral three times a day  hydrocortisone 1% Cream 1 Application(s) Topical two times a day  insulin glargine Injectable (LANTUS) 18 Unit(s) SubCutaneous at bedtime  insulin lispro (HumaLOG) corrective regimen sliding scale   SubCutaneous three times a day before meals  insulin lispro (HumaLOG) corrective regimen sliding scale   SubCutaneous at bedtime  insulin lispro Injectable (HumaLOG) 8 Unit(s) SubCutaneous three times a day before meals  lidocaine   Patch 1 Patch Transdermal every 24 hours  pantoprazole    Tablet 40 milliGRAM(s) Oral before breakfast  polyethylene glycol 3350 17 Gram(s) Oral daily  saccharomyces boulardii 250 milliGRAM(s) Oral two times a day  senna 2 Tablet(s) Oral at bedtime    MEDICATIONS  (PRN):  acetaminophen   Tablet .. 650 milliGRAM(s) Oral every 6 hours PRN Temp greater or equal to 38C (100.4F), Mild Pain (1 - 3)  ALBUTerol   0.042% 1.25 milliGRAM(s) Nebulizer four times a day PRN Wheezing  aluminum hydroxide/magnesium hydroxide/simethicone Suspension 30 milliLiter(s) Oral every 6 hours PRN Dyspepsia  guaiFENesin   Syrup  (Sugar-Free) 100 milliGRAM(s) Oral every 6 hours PRN Cough  morphine  - Injectable 2 milliGRAM(s) IV Push every 4 hours PRN Severe Pain (7 - 10)  oxycodone    5 mG/acetaminophen 325 mG 1 Tablet(s) Oral every 4 hours PRN Moderate Pain (4 - 6)

## 2020-01-21 ENCOUNTER — TRANSCRIPTION ENCOUNTER (OUTPATIENT)
Age: 70
End: 2020-01-21

## 2020-01-21 LAB
ANION GAP SERPL CALC-SCNC: 8 MMOL/L — SIGNIFICANT CHANGE UP (ref 5–17)
BUN SERPL-MCNC: 29 MG/DL — HIGH (ref 7–23)
CALCIUM SERPL-MCNC: 8.4 MG/DL — LOW (ref 8.5–10.1)
CHLORIDE SERPL-SCNC: 102 MMOL/L — SIGNIFICANT CHANGE UP (ref 96–108)
CO2 SERPL-SCNC: 26 MMOL/L — SIGNIFICANT CHANGE UP (ref 22–31)
CREAT SERPL-MCNC: 2.27 MG/DL — HIGH (ref 0.5–1.3)
GLUCOSE BLDC GLUCOMTR-MCNC: 178 MG/DL — HIGH (ref 70–99)
GLUCOSE BLDC GLUCOMTR-MCNC: 240 MG/DL — HIGH (ref 70–99)
GLUCOSE BLDC GLUCOMTR-MCNC: 240 MG/DL — HIGH (ref 70–99)
GLUCOSE BLDC GLUCOMTR-MCNC: 246 MG/DL — HIGH (ref 70–99)
GLUCOSE SERPL-MCNC: 162 MG/DL — HIGH (ref 70–99)
HCT VFR BLD CALC: 25.1 % — LOW (ref 39–50)
HGB BLD-MCNC: 8 G/DL — LOW (ref 13–17)
MCHC RBC-ENTMCNC: 28.1 PG — SIGNIFICANT CHANGE UP (ref 27–34)
MCHC RBC-ENTMCNC: 31.9 GM/DL — LOW (ref 32–36)
MCV RBC AUTO: 88.1 FL — SIGNIFICANT CHANGE UP (ref 80–100)
NRBC # BLD: 0 /100 WBCS — SIGNIFICANT CHANGE UP (ref 0–0)
PLATELET # BLD AUTO: 421 K/UL — HIGH (ref 150–400)
POTASSIUM SERPL-MCNC: 4.2 MMOL/L — SIGNIFICANT CHANGE UP (ref 3.5–5.3)
POTASSIUM SERPL-SCNC: 4.2 MMOL/L — SIGNIFICANT CHANGE UP (ref 3.5–5.3)
RBC # BLD: 2.85 M/UL — LOW (ref 4.2–5.8)
RBC # FLD: 15.5 % — HIGH (ref 10.3–14.5)
SODIUM SERPL-SCNC: 136 MMOL/L — SIGNIFICANT CHANGE UP (ref 135–145)
WBC # BLD: 12.91 K/UL — HIGH (ref 3.8–10.5)
WBC # FLD AUTO: 12.91 K/UL — HIGH (ref 3.8–10.5)

## 2020-01-21 PROCEDURE — 99233 SBSQ HOSP IP/OBS HIGH 50: CPT

## 2020-01-21 PROCEDURE — 36573 INSJ PICC RS&I 5 YR+: CPT

## 2020-01-21 RX ORDER — APIXABAN 2.5 MG/1
5 TABLET, FILM COATED ORAL
Refills: 0 | Status: DISCONTINUED | OUTPATIENT
Start: 2020-01-21 | End: 2020-01-22

## 2020-01-21 RX ORDER — BENAZEPRIL HYDROCHLORIDE 40 MG/1
1 TABLET ORAL
Qty: 0 | Refills: 0 | DISCHARGE

## 2020-01-21 RX ORDER — EPINEPHRINE 0.3 MG/.3ML
0 INJECTION INTRAMUSCULAR; SUBCUTANEOUS
Qty: 0 | Refills: 0 | DISCHARGE
Start: 2020-01-21

## 2020-01-21 RX ORDER — ACETAMINOPHEN 500 MG
2 TABLET ORAL
Qty: 0 | Refills: 0 | DISCHARGE
Start: 2020-01-21

## 2020-01-21 RX ORDER — SENNA PLUS 8.6 MG/1
2 TABLET ORAL
Qty: 0 | Refills: 0 | DISCHARGE
Start: 2020-01-21

## 2020-01-21 RX ORDER — LORATADINE 10 MG/1
10 TABLET ORAL ONCE
Refills: 0 | Status: COMPLETED | OUTPATIENT
Start: 2020-01-21 | End: 2020-01-21

## 2020-01-21 RX ORDER — HYDRALAZINE HCL 50 MG
100 TABLET ORAL THREE TIMES A DAY
Refills: 0 | Status: DISCONTINUED | OUTPATIENT
Start: 2020-01-21 | End: 2020-01-22

## 2020-01-21 RX ORDER — LIDOCAINE 4 G/100G
0 CREAM TOPICAL
Qty: 0 | Refills: 0 | DISCHARGE
Start: 2020-01-21

## 2020-01-21 RX ORDER — HYDROCORTISONE 1 %
1 OINTMENT (GRAM) TOPICAL
Qty: 0 | Refills: 0 | DISCHARGE
Start: 2020-01-21

## 2020-01-21 RX ORDER — CHLORTHALIDONE 50 MG
1 TABLET ORAL
Qty: 0 | Refills: 0 | DISCHARGE

## 2020-01-21 RX ORDER — CEFTRIAXONE 500 MG/1
2 INJECTION, POWDER, FOR SOLUTION INTRAMUSCULAR; INTRAVENOUS
Qty: 0 | Refills: 0 | DISCHARGE
Start: 2020-01-21

## 2020-01-21 RX ORDER — DIAZEPAM 5 MG
10 TABLET ORAL ONCE
Refills: 0 | Status: DISCONTINUED | OUTPATIENT
Start: 2020-01-21 | End: 2020-01-21

## 2020-01-21 RX ORDER — METOPROLOL TARTRATE 50 MG
5 TABLET ORAL ONCE
Refills: 0 | Status: COMPLETED | OUTPATIENT
Start: 2020-01-21 | End: 2020-01-21

## 2020-01-21 RX ORDER — DOXAZOSIN MESYLATE 4 MG
1 TABLET ORAL
Qty: 0 | Refills: 0 | DISCHARGE
Start: 2020-01-21

## 2020-01-21 RX ORDER — METFORMIN HYDROCHLORIDE 850 MG/1
1 TABLET ORAL
Qty: 0 | Refills: 0 | DISCHARGE

## 2020-01-21 RX ORDER — AMLODIPINE BESYLATE 2.5 MG/1
1 TABLET ORAL
Qty: 0 | Refills: 0 | DISCHARGE

## 2020-01-21 RX ORDER — SACCHAROMYCES BOULARDII 250 MG
1 POWDER IN PACKET (EA) ORAL
Qty: 0 | Refills: 0 | DISCHARGE
Start: 2020-01-21

## 2020-01-21 RX ORDER — PANTOPRAZOLE SODIUM 20 MG/1
1 TABLET, DELAYED RELEASE ORAL
Qty: 0 | Refills: 0 | DISCHARGE
Start: 2020-01-21

## 2020-01-21 RX ORDER — POLYETHYLENE GLYCOL 3350 17 G/17G
17 POWDER, FOR SOLUTION ORAL
Qty: 0 | Refills: 0 | DISCHARGE
Start: 2020-01-21

## 2020-01-21 RX ORDER — HYDRALAZINE HCL 50 MG
3 TABLET ORAL
Qty: 0 | Refills: 0 | DISCHARGE
Start: 2020-01-21

## 2020-01-21 RX ORDER — HYDRALAZINE HCL 50 MG
20 TABLET ORAL ONCE
Refills: 0 | Status: COMPLETED | OUTPATIENT
Start: 2020-01-21 | End: 2020-01-21

## 2020-01-21 RX ADMIN — Medication 2: at 16:22

## 2020-01-21 RX ADMIN — LORATADINE 10 MILLIGRAM(S): 10 TABLET ORAL at 22:49

## 2020-01-21 RX ADMIN — Medication 1: at 07:43

## 2020-01-21 RX ADMIN — LIDOCAINE 1 PATCH: 4 CREAM TOPICAL at 05:48

## 2020-01-21 RX ADMIN — Medication 4 MILLIGRAM(S): at 21:50

## 2020-01-21 RX ADMIN — Medication 8 UNIT(S): at 07:43

## 2020-01-21 RX ADMIN — LIDOCAINE 1 PATCH: 4 CREAM TOPICAL at 07:34

## 2020-01-21 RX ADMIN — CEFTRIAXONE 100 MILLIGRAM(S): 500 INJECTION, POWDER, FOR SOLUTION INTRAMUSCULAR; INTRAVENOUS at 13:57

## 2020-01-21 RX ADMIN — Medication 2: at 11:03

## 2020-01-21 RX ADMIN — Medication 5 MILLIGRAM(S): at 18:48

## 2020-01-21 RX ADMIN — Medication 8 UNIT(S): at 11:03

## 2020-01-21 RX ADMIN — Medication 20 MILLIGRAM(S): at 20:07

## 2020-01-21 RX ADMIN — Medication 8 UNIT(S): at 16:22

## 2020-01-21 RX ADMIN — Medication 250 MILLIGRAM(S): at 06:04

## 2020-01-21 RX ADMIN — LORATADINE 10 MILLIGRAM(S): 10 TABLET ORAL at 00:35

## 2020-01-21 RX ADMIN — OXYCODONE AND ACETAMINOPHEN 1 TABLET(S): 5; 325 TABLET ORAL at 21:48

## 2020-01-21 RX ADMIN — OXYCODONE AND ACETAMINOPHEN 1 TABLET(S): 5; 325 TABLET ORAL at 22:48

## 2020-01-21 RX ADMIN — PANTOPRAZOLE SODIUM 40 MILLIGRAM(S): 20 TABLET, DELAYED RELEASE ORAL at 07:43

## 2020-01-21 RX ADMIN — SENNA PLUS 2 TABLET(S): 8.6 TABLET ORAL at 21:09

## 2020-01-21 RX ADMIN — INSULIN GLARGINE 18 UNIT(S): 100 INJECTION, SOLUTION SUBCUTANEOUS at 21:31

## 2020-01-21 RX ADMIN — Medication 10 MILLIGRAM(S): at 19:43

## 2020-01-21 RX ADMIN — LIDOCAINE 1 PATCH: 4 CREAM TOPICAL at 17:14

## 2020-01-21 RX ADMIN — Medication 100 MILLIGRAM(S): at 21:11

## 2020-01-21 RX ADMIN — Medication 75 MILLIGRAM(S): at 13:59

## 2020-01-21 RX ADMIN — Medication 1 APPLICATION(S): at 06:05

## 2020-01-21 RX ADMIN — HEPARIN SODIUM 5000 UNIT(S): 5000 INJECTION INTRAVENOUS; SUBCUTANEOUS at 06:04

## 2020-01-21 RX ADMIN — Medication 75 MILLIGRAM(S): at 06:05

## 2020-01-21 RX ADMIN — Medication 250 MILLIGRAM(S): at 17:13

## 2020-01-21 RX ADMIN — APIXABAN 5 MILLIGRAM(S): 2.5 TABLET, FILM COATED ORAL at 17:13

## 2020-01-21 RX ADMIN — Medication 240 MILLIGRAM(S): at 06:05

## 2020-01-21 NOTE — PROGRESS NOTE ADULT - SUBJECTIVE AND OBJECTIVE BOX
Patient seen and examined at bedside with no complaints. States pain is improving. POD#4 s/p BKA  Denies chest pain, dyspnea, cough.    Vital Signs Last 24 Hrs  T(F): 98 (01-21-20 @ 04:42), Max: 98.5 (01-20-20 @ 13:49)  HR: 88 (01-21-20 @ 04:42)  BP: 180/89 (01-21-20 @ 04:42)  RR: 18 (01-21-20 @ 04:42)  SpO2: 96% (01-21-20 @ 04:42)    CAPILLARY BLOOD GLUCOSE      POCT Blood Glucose.: 178 mg/dL (21 Jan 2020 07:13)      GENERAL: Alert, NAD  CHEST/LUNG: Clear to auscultation bilaterally, respirations nonlabored  HEART: S1S2, Regular rate and rhythm;   ABDOMEN: + Bowel sounds, soft, Nontender, Nondistended  : Frazier draining clear urine to BSD.  EXTREMITIES:  S/P L BKA, dominguez intact, clean and dry. No drainage from wound. Blister on anterior portion of leg, coming from staple line.        LABS:                        8.0    12.91 )-----------( 421      ( 21 Jan 2020 05:50 )             25.1     01-20    137  |  103  |  34<H>  ----------------------------<  151<H>  4.6   |  26  |  2.52<H>    Ca    8.5      20 Jan 2020 07:06    Impression: Postoperative Day # 4  s/p left BKA  elevated BUN/Cr due to retention - improving; continue Frazier cath. Will continue till Cr is nl.    Plan:  -continue VTE prophylaxis   - continue IVAB per ID  - continue medical f/u  - PT  - continue local wound care  - Continue to trend creatinine

## 2020-01-21 NOTE — DISCHARGE NOTE PROVIDER - NSDCFUSCHEDAPPT_GEN_ALL_CORE_FT
ROCCO JC ; 04/07/2020 ; [facility] ROCCO JC ; 04/07/2020 ; [facility] Urology [facility] ROCCO JC ; 04/07/2020 ; [facility] Urology [facility] ROCCO JC ; 04/07/2020 ; [facility] Urology [address] [facility] ROCCO JC ; 04/07/2020 ; [facility] Urology [address] [facility] ROCCO JC ; 04/07/2020 ; [facility] Urology [facility]

## 2020-01-21 NOTE — PROCEDURE NOTE - ADDITIONAL PROCEDURE DETAILS
pt s/p midline placement inserted into the left brachial vein under uS guidance.  4fr x 20cm SL.  Pt tolerated procedure well  -midline can be accessed

## 2020-01-21 NOTE — PROGRESS NOTE ADULT - SUBJECTIVE AND OBJECTIVE BOX
ANXIOUS  AWAITING DC  ELEVATED BLOOD PRESSURE  NO JVD  CLEAR LUNGS  REGULAR RATE_&_RHYTHM;NORMAL_S1&S2_NO_SIGNIFICANT_MURMURS,RUBS,OR_GALLOPS.  LEFT AKA DRESSED    WILL ORDER VALIUM 10 PO X1 & HYDRALAZINE 20 IVP  X1

## 2020-01-21 NOTE — PROGRESS NOTE ADULT - SUBJECTIVE AND OBJECTIVE BOX
[facility] NEPHROLOGY SERVICES, [facility]  NEPHROLOGY AND HYPERTENSION  [address]  [address]  [place] [address]  [phone]    MD YISEL WEEKS MD ANDREY GONCHARUK, MD MADHU KORRAPATI, MD YELENA ROSENBERG, MD BINNY KOSHY, MD CHRISTOPHER CAPUTO, MD EDWARD BOVER, MD          Patient feel comfortable no distress;       MEDICATIONS  (STANDING):  apixaban 5 milliGRAM(s) Oral two times a day  cefTRIAXone   IVPB 2000 milliGRAM(s) IV Intermittent every 24 hours  dextrose 5%. 1000 milliLiter(s) (50 mL/Hr) IV Continuous <Continuous>  diltiazem    milliGRAM(s) Oral daily  doxazosin 4 milliGRAM(s) Oral at bedtime  hydrALAZINE 75 milliGRAM(s) Oral three times a day  hydrocortisone 1% Cream 1 Application(s) Topical two times a day  insulin glargine Injectable (LANTUS) 18 Unit(s) SubCutaneous at bedtime  insulin lispro (HumaLOG) corrective regimen sliding scale   SubCutaneous three times a day before meals  insulin lispro (HumaLOG) corrective regimen sliding scale   SubCutaneous at bedtime  insulin lispro Injectable (HumaLOG) 8 Unit(s) SubCutaneous three times a day before meals  lidocaine   Patch 1 Patch Transdermal every 24 hours  pantoprazole    Tablet 40 milliGRAM(s) Oral before breakfast  polyethylene glycol 3350 17 Gram(s) Oral daily  saccharomyces boulardii 250 milliGRAM(s) Oral two times a day  senna 2 Tablet(s) Oral at bedtime    MEDICATIONS  (PRN):  acetaminophen   Tablet .. 650 milliGRAM(s) Oral every 6 hours PRN Temp greater or equal to 38C (100.4F), Mild Pain (1 - 3)  ALBUTerol   0.042% 1.25 milliGRAM(s) Nebulizer four times a day PRN Wheezing  aluminum hydroxide/magnesium hydroxide/simethicone Suspension 30 milliLiter(s) Oral every 6 hours PRN Dyspepsia  guaiFENesin   Syrup  (Sugar-Free) 100 milliGRAM(s) Oral every 6 hours PRN Cough  morphine  - Injectable 2 milliGRAM(s) IV Push every 4 hours PRN Severe Pain (7 - 10)  oxycodone    5 mG/acetaminophen 325 mG 1 Tablet(s) Oral every 4 hours PRN Moderate Pain (4 - 6)      01-20-20 @ 07:01  -  01-21-20 @ 07:00  --------------------------------------------------------  IN: 100 mL / OUT: 4200 mL / NET: -4100 mL    01-21-20 @ 07:01  -  01-21-20 @ 16:08  --------------------------------------------------------  IN: 400 mL / OUT: 0 mL / NET: 400 mL      PHYSICAL EXAM:      T(C): 36.8 (01-21-20 @ 11:30), Max: 36.9 (01-20-20 @ 23:59)  HR: 91 (01-21-20 @ 11:30) (85 - 91)  BP: 156/78 (01-21-20 @ 11:30) (156/78 - 186/86)  RR: 18 (01-21-20 @ 11:30) (18 - 19)  SpO2: 96% (01-21-20 @ 11:30) (95% - 96%)  Wt(kg): --  Respiratory: clear anteriorly, decreased BS at bases  Cardiovascular: S1 S2  Gastrointestinal: soft NT ND +BS  Extremities:  BKA L edema    Urinalysis (01.18.20 @ 13:26)    Glucose Qualitative, Urine: 100 mg/dL    Blood, Urine: Trace    pH Urine: 5.0    Color: Yellow    Urine Appearance: Clear    Bilirubin: Negative    Ketone - Urine: Trace    Specific Gravity: 1.020    Protein, Urine: 100 mg/dL    Urobilinogen: Negative mg/dL    Nitrite: Negative    Leukocyte Esterase Concentration: Negative          Urine Microscopic-Add On (NC) (01.18.20 @ 13:26)    Epithelial Cells: Few    Comment - Urine: many amorphous urates noted    Red Blood Cell - Urine: 3-5 /HPF    White Blood Cell - Urine: 6-10    Bacteria: Many                              8.0    12.91 )-----------( 421      ( 21 Jan 2020 05:50 )             25.1     01-21    136  |  102  |  29<H>  ----------------------------<  162<H>  4.2   |  26  |  2.27<H>    Ca    8.4<L>      21 Jan 2020 08:35          Creatinine Trend: 2.27<--, 2.52<--, 2.82<--, 2.84<--, 2.60<--, 1.75<--    Assessment   ELMER CKD 3; HTN; DM risk;   Bladder outlet obstruction ;   HTN urgency, improved  Post olsen      Plan    Holding Metformin and ACE;   BP medication adjustments,   Continue olsen catheter;   Maintain Doxazosin 4 mg q hs;         Augusto Santoyo MD

## 2020-01-21 NOTE — DISCHARGE NOTE PROVIDER - CARE PROVIDER_API CALL
Your PCP 1 week post rehab discharge,   Phone: (   )    -  Fax: (   )    -  Follow Up Time:     Eric Travis)  Orthopaedic Surgery  [address] [place] [address]  Phone: [phone]  Fax: [phone]  Follow Up Time: 1 week

## 2020-01-21 NOTE — PROGRESS NOTE ADULT - ASSESSMENT
Pt is a 70 yo gentleman with a pmhx of HTN, HL, NIDM, Afib on eliquis who presented to the ED with leg redness and fever and cough . Has had chronic ulcer on L. medial leg for months, but has gotten worse. Patient was admitted and diagnosed with sepsis with  acute gas gangrene left foot with possible OM and necrotizing fascitis. ID and Podiatrist were consulted. patient was placed on broad spectrum antbiotics. patient under I and D left leg on 1/8 and left foot amputation on 1/10.  Tissue culture and blood culture grew strep agalactiae. antibiotics were de-escalated to PCN and clindamycin. vascular surgery performed successful left BKA on 1/17/20. Post op patient developed urinary retention, blood loss anemia. 1 unit of blood was transfused and urology was consulted. olsen was placed. also ELMER got worse but with gentle hydration and olsen placement, creatinine started improving slowly.   Also patient was placed on contact isolation for RSV URI.    Problem/Plan - 1:  ·  Problem: Cellulitis of left lower extremity/ gas gangrene left foot.   s/p left medial malleolus biopsy on 1/8  s/p L foot amputation on 1/10 and left BKA on 1/17/20.   ID has changed the antibiotic to ceftriaxone and recommending 2 more weeks. Midline placement pending.     Problem/Plan - 2:  ·  Problem: Hypertension, unspecified type. intermittently elevated (uncontrolled). cont current meds. monitor and if persistently elevated, then will increase hydralazine.     Problem/Plan - 3:  ·  Problem: RSV infection.  resolved.    Problem/Plan - 4:  ·  Problem: Type 2 diabetes mellitus with other neurologic complication, without long-term current use of insulin. labile. cont current management and restart correctional insulin. follow finger sticks.   Note that home med list is incomplete; pt states he normally takes 4 meds: Metformin, Invokana, Glimepiride, Onglyza.     Problem/Plan - 5:  ·  Problem: ELMER superimposed on Stage 4 chronic kidney disease. improving creatinine. renal following. Remove olsen and start voiding trial if ok with urology and renal.      Problem/Plan - 6:  Problem: Atrial fibrillation, unspecified type. Plan: cont cardizem. Start back eliquis (left BKA 4 days ago and stable H and H). watch for any active bleeding.       Problem/Plan - 7:  ·  Problem: Prophylactic measure.  Plan: DVT: Eliquis    Problem/Plan - 8:  ·  Problem: Pain of left hip joint.  Plan: DJD lumbar spine. cont conservative management. cont lidocaine patch.       ·	acute blood loss Anemia (expected).  s/p blood transfusion and stable H and H. follow on eliquis      ·	Leukocytosis and urinary frequency. UTI ruled out.     ·	Hyperkalemia. improved. low K diet.   ·	Hyponatremia. improved.   ·	constipation. Cont senna and miralax.       Full Code. Plan is [facility]RI[facility]NE when medically ready and rehab place available. (needs midline, olsen removal prior to discharge hopefully)

## 2020-01-21 NOTE — DISCHARGE NOTE PROVIDER - NSDCMRMEDTOKEN_GEN_ALL_CORE_FT
acetaminophen 325 mg oral tablet: 2 tab(s) orally every 6 hours, As needed, Temp greater or equal to 38C (100.4F), Mild Pain (1 - 3)  albuterol 1.25 mg/3 mL (0.042%) inhalation solution:  inhaled   aluminum hydroxide-magnesium hydroxide 200 mg-200 mg/5 mL oral suspension: 30 milliliter(s) orally every 6 hours, As needed, Dyspepsia  cefTRIAXone 2 g intravenous injection: 2 gram(s) intravenous every 24 hours x 2 weeks   dilTIAZem 240 mg/24 hours oral capsule, extended release: 1 cap(s) orally once a day  doxazosin 4 mg oral tablet: 1 tab(s) orally once a day (at bedtime)  Eliquis 5 mg oral tablet: 1 tab(s) orally 2 times a day  glimepiride 4 mg oral tablet: 1 tab(s) orally once a day  hydrALAZINE 25 mg oral tablet: 3 tab(s) orally 3 times a day  hydrocortisone 1% topical cream: 1 application topically 2 times a day  Invokana 300 mg oral tablet: 1 tab(s) orally once a day  lidocaine 5% topical film:  topically   oxycodone-acetaminophen 5 mg-325 mg oral tablet: 1 tab(s) orally every 4 hours, As needed, Moderate Pain (4 - 6)  pantoprazole 40 mg oral delayed release tablet: 1 tab(s) orally once a day (before a meal)  polyethylene glycol 3350 oral powder for reconstitution: 17 gram(s) orally once a day  saccharomyces boulardii lyo 250 mg oral capsule: 1 cap(s) orally 2 times a day  senna oral tablet: 2 tab(s) orally once a day (at bedtime)  simvastatin 20 mg oral tablet: 1 tab(s) orally once a day (at bedtime) acetaminophen 325 mg oral tablet: 2 tab(s) orally every 6 hours, As needed, Temp greater or equal to 38C (100.4F), Mild Pain (1 - 3)  albuterol 1.25 mg/3 mL (0.042%) inhalation solution:  inhaled   aluminum hydroxide-magnesium hydroxide 200 mg-200 mg/5 mL oral suspension: 30 milliliter(s) orally every 6 hours, As needed, Dyspepsia  cefTRIAXone 2 g intravenous injection: 2 gram(s) intravenous every 24 hours x 2 weeks   dilTIAZem 240 mg/24 hours oral capsule, extended release: 1 cap(s) orally once a day  doxazosin 4 mg oral tablet: 1 tab(s) orally once a day (at bedtime)  Eliquis 5 mg oral tablet: 1 tab(s) orally 2 times a day  glimepiride 4 mg oral tablet: 1 tab(s) orally once a day  hydrALAZINE 100 mg oral tablet: 1 tab(s) orally 3 times a day  hydrocortisone 1% topical cream: 1 application topically 2 times a day  Invokana 300 mg oral tablet: 1 tab(s) orally once a day  lidocaine 5% topical film:  topically   oxycodone-acetaminophen 5 mg-325 mg oral tablet: 1 tab(s) orally every 4 hours, As needed, Moderate Pain (4 - 6)  pantoprazole 40 mg oral delayed release tablet: 1 tab(s) orally once a day (before a meal)  polyethylene glycol 3350 oral powder for reconstitution: 17 gram(s) orally once a day  saccharomyces boulardii lyo 250 mg oral capsule: 1 cap(s) orally 2 times a day  senna oral tablet: 2 tab(s) orally once a day (at bedtime)  simvastatin 20 mg oral tablet: 1 tab(s) orally once a day (at bedtime)

## 2020-01-21 NOTE — DISCHARGE NOTE PROVIDER - NSDCCPCAREPLAN_GEN_ALL_CORE_FT
PRINCIPAL DISCHARGE DIAGNOSIS  Diagnosis: Cellulitis of left lower extremity  Assessment and Plan of Treatment: s/p left NKA      SECONDARY DISCHARGE DIAGNOSES  Diagnosis: RSV infection  Assessment and Plan of Treatment: resolved

## 2020-01-21 NOTE — DISCHARGE NOTE NURSING/CASE MANAGEMENT/SOCIAL WORK - PATIENT PORTAL LINK FT
You can access the FollowMyHealth Patient Portal offered by [facility] by registering at the following website: [email or web address]. By joining [facility]’s FollowMyHealth portal, you will also be able to view your health information using other applications (apps) compatible with our system.

## 2020-01-21 NOTE — DISCHARGE NOTE PROVIDER - PROVIDER TOKENS
FREE:[LAST:[Your PCP 1 week post rehab discharge],PHONE:[(   )    -],FAX:[(   )    -]],PROVIDER:[TOKEN:[[id number]],FOLLOWUP:[1 week]]

## 2020-01-21 NOTE — DISCHARGE NOTE PROVIDER - HOSPITAL COURSE
Pt is a 68 yo gentleman with a pmhx of HTN, HL, NIDM, Afib on eliquis who presented to the ED with leg redness and fever and cough . Has had chronic ulcer on L. medial leg for months, but has gotten worse. Patient was admitted and diagnosed with sepsis with  acute gas gangrene left foot with possible OM and necrotizing fascitis. ID and Podiatrist were consulted. patient was placed on broad spectrum antbiotics. patient under I and D left leg on 1/8 and left foot amputation on 1/10.  Tissue culture and blood culture grew strep agalactiae. antibiotics were de-escalated to PCN and clindamycin. vascular surgery performed successful left BKA on 1/17/20. Post op patient developed urinary retention, blood loss anemia. 1 unit of blood was transfused and urology was consulted. olsen was placed. also ELMER got worse but with gentle hydration and olsen placement, creatinine started improving slowly.     During hospital stay, patient was also placed on contact isolation for RSV URI.        Problem/Plan - 1:    ·  Problem: Cellulitis of left lower extremity/ gas gangrene left foot.     s/p left medial malleolus biopsy on 1/8    s/p L foot amputation on 1/10 and left BKA on 1/17/20.     Iv ceftriaxone x 2 more weeks starting today. Midline placement pending.         Problem/Plan - 2:    ·  Problem: Hypertension, unspecified type. intermittently elevated (uncontrolled). cont current meds. monitor and if persistently elevated, then will increase hydralazine.         Problem/Plan - 3:    ·  Problem: RSV infection.  resolved.        Problem/Plan - 4:    ·  Problem: Type 2 diabetes mellitus with other neurologic complication, without long-term current use of insulin. labile. cont current management and  correctional insulin. follow finger sticks.         Note that home med list is incomplete; pt states he normally takes 4 meds: Metformin, Invokana, Glimepiride, Onglyza.         Problem/Plan - 5:    ·  Problem: ELMER superimposed on Stage 4 chronic kidney disease. improving creatinine. renal following. Remove olsen prior to discharge and voiding trial [facility])         Problem/Plan - 6:    Problem: Atrial fibrillation, unspecified type. Plan: cont cardizem. back on eliquis.             Problem/Plan - 8:    ·  Problem: Pain of left hip joint.  Plan: DJD lumbar spine. cont conservative management. cont lidocaine patch.             acute blood loss Anemia (expected).  s/p blood transfusion and stable H and H.         Leukocytosis and urinary frequency. UTI ruled out.         Hyperkalemia. improved. low K diet.     Hyponatremia. improved.     constipation. Cont senna and miralax.             PHYSICAL EXAM:        GENERAL: well built, well nourished    CHEST/LUNG: Clear to ausculation bilaterally, no wheezing, no crackles     HEART: Regular rate and rhythm; No murmurs, rubs    ABDOMEN: Soft, Nontender, Nondistended; Bowel sounds present    EXTREMITIES:  s/p left BKA and immolizer. right leg no cyanosis or edema.     NERVOUS SYSTEM:  Grossly non focal.    Psychiatry: AAO x 3, mood is appropriate         OBJECTIVE DATA:     Vital Signs Last 24 Hrs    T(C): 36.8 (21 Jan 2020 11:30), Max: 36.9 (20 Jan 2020 13:49)    T(F): 98.3 (21 Jan 2020 11:30), Max: 98.5 (20 Jan 2020 13:49)    HR: 91 (21 Jan 2020 11:30) (78 - 96)    BP: 156/78 (21 Jan 2020 11:30) (156/78 - 186/86)    BP(mean): --    RR: 18 (21 Jan 2020 11:30) (18 - 20)    SpO2: 96% (21 Jan 2020 11:30) (95% - 97%)             LABS:                            8.0      12.91 )-----------( 421      ( 21 Jan 2020 05:50 )               25.1               01-21        136  |  102  |  29<H>    ----------------------------<  162<H>    4.2   |  26  |  2.27<H>        Ca    8.4<L>      21 Jan 2020 08:30            POCT Blood Glucose.: 240 mg/dL (21 Jan 2020 10:41)            DC time spent excluding billable procedures 39 mins         (If patient does not leave the patient today, further addendum and discharge medication reconciliation review will be done by following attending doc) Pt is a 70 yo gentleman with a pmhx of HTN, HL, NIDM, Afib on eliquis who presented to the ED with leg redness and fever and cough . Has had chronic ulcer on L. medial leg for months, but has gotten worse. Patient was admitted and diagnosed with sepsis with  acute gas gangrene left foot with possible OM and necrotizing fascitis. ID and Podiatrist were consulted. patient was placed on broad spectrum antbiotics. patient under I and D left leg on 1/8 and left foot amputation on 1/10.  Tissue culture and blood culture grew strep agalactiae. antibiotics were de-escalated to PCN and clindamycin. vascular surgery performed successful left BKA on 1/17/20. Post op patient developed, blood loss anemia. 1 unit of blood was transfused.  also ELMER got worse but with gentle hydration and olsen placement, creatinine started improving slowly.     During hospital stay, patient was also placed on contact isolation for RSV URI.        Problem/Plan - 1:    ·  Problem: Cellulitis of left lower extremity/ gas gangrene left foot.     s/p left medial malleolus biopsy on 1/8    s/p L foot amputation on 1/10 and left BKA on 1/17/20.     Iv ceftriaxone x 2 more weeks starting today. Midline placement pending.         Problem/Plan - 2:    ·  Problem: Hypertension, unspecified type. intermittently elevated (uncontrolled). cont current meds. monitor and if persistently elevated, then will increase hydralazine.         Problem/Plan - 3:    ·  Problem: RSV infection.  resolved.        Problem/Plan - 4:    ·  Problem: Type 2 diabetes mellitus with other neurologic complication, without long-term current use of insulin. labile. cont current management and  correctional insulin. follow finger sticks.         Note that home med list is incomplete; pt states he normally takes 4 meds: Metformin, Invokana, Glimepiride, Onglyza.         Problem/Plan - 5:    ·  Problem: ELMER superimposed on Stage 4 chronic kidney disease. improving creatinine. renal following.        Problem/Plan - 6:    Problem: Atrial fibrillation, unspecified type. Plan: cont cardizem. back on eliquis.             Problem/Plan - 8:    ·  Problem: Pain of left hip joint.  Plan: DJD lumbar spine. cont conservative management. cont lidocaine patch.             acute blood loss Anemia (expected).  s/p blood transfusion and stable H and H.         Leukocytosis and urinary frequency. UTI ruled out.         Hyperkalemia. improved. low K diet.     Hyponatremia. improved.     constipation. Cont senna and miralax.             PHYSICAL EXAM:        GENERAL: well built, well nourished    CHEST/LUNG: Clear to ausculation bilaterally, no wheezing, no crackles     HEART: Regular rate and rhythm; No murmurs, rubs    ABDOMEN: Soft, Nontender, Nondistended; Bowel sounds present    EXTREMITIES:  s/p left BKA and immolizer. right leg no cyanosis or edema.     NERVOUS SYSTEM:  Grossly non focal.    Psychiatry: AAO x 3, mood is appropriate         OBJECTIVE DATA:     Vital Signs Last 24 Hrs    T(C): 36.8 (21 Jan 2020 11:30), Max: 36.9 (20 Jan 2020 13:49)    T(F): 98.3 (21 Jan 2020 11:30), Max: 98.5 (20 Jan 2020 13:49)    HR: 91 (21 Jan 2020 11:30) (78 - 96)    BP: 156/78 (21 Jan 2020 11:30) (156/78 - 186/86)    BP(mean): --    RR: 18 (21 Jan 2020 11:30) (18 - 20)    SpO2: 96% (21 Jan 2020 11:30) (95% - 97%)             LABS:                            8.0      12.91 )-----------( 421      ( 21 Jan 2020 05:50 )               25.1               01-21        136  |  102  |  29<H>    ----------------------------<  162<H>    4.2   |  26  |  2.27<H>        Ca    8.4<L>      21 Jan 2020 08:30            POCT Blood Glucose.: 240 mg/dL (21 Jan 2020 10:41)            DC time spent excluding billable procedures 39 mins Pt is a 68 yo gentleman with a pmhx of HTN, HL, NIDM, Afib on eliquis who presented to the ED with leg redness and fever and cough . Has had chronic ulcer on L. medial leg for months, but has gotten worse. Patient was admitted and diagnosed with sepsis with  acute gas gangrene left foot with possible OM and necrotizing fascitis. ID and Podiatrist were consulted. patient was placed on broad spectrum antbiotics. patient under I and D left leg on 1/8 and left foot amputation on 1/10.  Tissue culture and blood culture grew strep agalactiae. antibiotics were de-escalated to PCN and clindamycin. vascular surgery performed successful left BKA on 1/17/20. Post op patient developed, blood loss anemia. 1 unit of blood was transfused.  also ELMER got worse but with gentle hydration, creatinine started improving slowly.     During hospital stay, patient was also placed on contact isolation for RSV URI.        Problem/Plan - 1:    ·  Problem: Cellulitis of left lower extremity/ gas gangrene left foot.     s/p left medial malleolus biopsy on 1/8    s/p L foot amputation on 1/10 and left BKA on 1/17/20.     Iv ceftriaxone x 2 more weeks starting today. Midline placement pending.         Problem/Plan - 2:    ·  Problem: Hypertension, unspecified type. intermittently elevated (uncontrolled). cont current meds. monitor and if persistently elevated, then will increase hydralazine.         Problem/Plan - 3:    ·  Problem: RSV infection.  resolved.        Problem/Plan - 4:    ·  Problem: Type 2 diabetes mellitus with other neurologic complication, without long-term current use of insulin. labile. cont current management and  correctional insulin. follow finger sticks.         Note that home med list is incomplete; pt states he normally takes 4 meds: Metformin, Invokana, Glimepiride, Onglyza.         Problem/Plan - 5:    ·  Problem: ELMER superimposed on Stage 4 chronic kidney disease. improving creatinine. renal following.    Frazier had been placed, was removed, patient voided.        Problem/Plan - 6:    Problem: Atrial fibrillation, unspecified type. Plan: cont cardizem. back on eliquis.             Problem/Plan - 8:    ·  Problem: Pain of left hip joint.  Plan: DJD lumbar spine. cont conservative management. cont lidocaine patch.             acute blood loss Anemia (expected).  s/p blood transfusion and stable H and H.         Leukocytosis and urinary frequency. UTI ruled out.         Hyperkalemia. improved. low K diet.     Hyponatremia. improved.     constipation. Cont senna and miralax.             PHYSICAL EXAM:        GENERAL: well built, well nourished    CHEST/LUNG: Clear to ausculation bilaterally, no wheezing, no crackles     HEART: Regular rate and rhythm; No murmurs, rubs    ABDOMEN: Soft, Nontender, Nondistended; Bowel sounds present    EXTREMITIES:  s/p left BKA and immolizer. right leg no cyanosis or edema.     NERVOUS SYSTEM:  Grossly non focal.    Psychiatry: AAO x 3, mood is appropriate         OBJECTIVE DATA:     Vital Signs Last 24 Hrs    T(C): 36.8 (21 Jan 2020 11:30), Max: 36.9 (20 Jan 2020 13:49)    T(F): 98.3 (21 Jan 2020 11:30), Max: 98.5 (20 Jan 2020 13:49)    HR: 91 (21 Jan 2020 11:30) (78 - 96)    BP: 156/78 (21 Jan 2020 11:30) (156/78 - 186/86)    BP(mean): --    RR: 18 (21 Jan 2020 11:30) (18 - 20)    SpO2: 96% (21 Jan 2020 11:30) (95% - 97%)             LABS:                            8.0      12.91 )-----------( 421      ( 21 Jan 2020 05:50 )               25.1               01-21        136  |  102  |  29<H>    ----------------------------<  162<H>    4.2   |  26  |  2.27<H>        Ca    8.4<L>      21 Jan 2020 08:30            POCT Blood Glucose.: 240 mg/dL (21 Jan 2020 10:41)            DC time spent excluding billable procedures 39 mins

## 2020-01-21 NOTE — PROGRESS NOTE ADULT - SUBJECTIVE AND OBJECTIVE BOX
CHIEF COMPLAINT: s/p left BKA on 20.   + pain left stump. no fever   no diarrhea. no active gross bleeding       PHYSICAL EXAM:  GENERAL: well built, well nourished, no acute distress   CHEST/LUNG: Clear to ausculation bilaterally, no wheezing, no crackles   HEART: Regular rate and rhythm; No murmurs, rubs  ABDOMEN: Soft, Nontender, Nondistended; Bowel sounds present. + Frazier  EXTREMITIES:  s/p left BKA and immobilizer. no active gross bleeding noted.   NERVOUS SYSTEM:  Grossly non focal.  Psychiatry: AAO x 3, better mood 	    -----------------------------------------------------------------------------------------------------------------------------------------------------------------  OBJECTIVE DATA:     Vital Signs Last 24 Hrs  T(C): 36.8 (2020 11:30), Max: 36.9 (2020 13:49)  T(F): 98.3 (2020 11:30), Max: 98.5 (2020 13:49)  HR: 91 (2020 11:30) (78 - 96)  BP: 156/78 (2020 11:30) (156/78 - 186/86)  BP(mean): --  RR: 18 (2020 11:30) (18 - 20)  SpO2: 96% (2020 11:30) (95% - 97%)           Daily     Daily Weight in k.9 (2020 04:42)  LABS:                        8.0    12.91 )-----------( 421      ( 2020 05:50 )             25.1             01-21    136  |  102  |  29<H>  ----------------------------<  162<H>  4.2   |  26  |  2.27<H>    Ca    8.4<L>      2020 08:35                         CAPILLARY BLOOD GLUCOSE      POCT Blood Glucose.: 240 mg/dL (2020 10:41)      Culture - Urine (collected )  Source: .Urine Catheterized  Final Report ():    No growth          Interval Radiology studies: reviewed   MEDICATIONS  (STANDING):  cefTRIAXone   IVPB 2000 milliGRAM(s) IV Intermittent every 24 hours  dextrose 5%. 1000 milliLiter(s) (50 mL/Hr) IV Continuous <Continuous>  diltiazem    milliGRAM(s) Oral daily  doxazosin 4 milliGRAM(s) Oral at bedtime  heparin  Injectable 5000 Unit(s) SubCutaneous every 12 hours	  hydrALAZINE 75 milliGRAM(s) Oral three times a day  hydrocortisone 1% Cream 1 Application(s) Topical two times a day  insulin glargine Injectable (LANTUS) 18 Unit(s) SubCutaneous at bedtime  insulin lispro (HumaLOG) corrective regimen sliding scale   SubCutaneous three times a day before meals  insulin lispro (HumaLOG) corrective regimen sliding scale   SubCutaneous at bedtime  insulin lispro Injectable (HumaLOG) 8 Unit(s) SubCutaneous three times a day before meals  lidocaine   Patch 1 Patch Transdermal every 24 hours  pantoprazole    Tablet 40 milliGRAM(s) Oral before breakfast  polyethylene glycol 3350 17 Gram(s) Oral daily  saccharomyces boulardii 250 milliGRAM(s) Oral two times a day  senna 2 Tablet(s) Oral at bedtime    MEDICATIONS  (PRN):  acetaminophen   Tablet .. 650 milliGRAM(s) Oral every 6 hours PRN Temp greater or equal to 38C (100.4F), Mild Pain (1 - 3)  ALBUTerol   0.042% 1.25 milliGRAM(s) Nebulizer four times a day PRN Wheezing  aluminum hydroxide/magnesium hydroxide/simethicone Suspension 30 milliLiter(s) Oral every 6 hours PRN Dyspepsia  guaiFENesin   Syrup  (Sugar-Free) 100 milliGRAM(s) Oral every 6 hours PRN Cough  morphine  - Injectable 2 milliGRAM(s) IV Push every 4 hours PRN Severe Pain (7 - 10)  oxycodone    5 mG/acetaminophen 325 mG 1 Tablet(s) Oral every 4 hours PRN Moderate Pain (4 - 6)

## 2020-01-21 NOTE — PROGRESS NOTE ADULT - ATTENDING COMMENTS
Reviewed chart and saw and examined patient on 1/19/20. Spoke with he and his wife at length, 35 minutes regarding DM and neurogenic bladder. He had ELMER on CRI: admitted with Cr of 2.2 and had a GERALD on 1/09/20 for rising Cr to 2.5 and at that time there was no hydro, no stones.  We were called when it hit 2.8, but stayed at 2.8, likely medical causes. Now 2.5 and when catheter inserted was for approx 320 ml. He takes Cardura for BP but we added tamsulosin in prep for a voiding trial at some point.  Typically at home he was voiding 12-15x's/day with urgency and due to poor mobility, urge incontinene so opted for pull ups "just in case", but no real incontinence and denies any prior gross hematuria, stones or [facility] or [facility].    His olsen is draining clear yellow urine and is clean. WBC had been elevated but likely due to ulcer which led to his procedures and now ultimate LLE BKA. Today is 15    Exlained that I would leave olsen at least 4-5 d more and/or simply until he has rehab'ed a bit upon discharge as they said he may leave in 2 or 3 days.    Exam showed normal scotum and nl palpable testes b/l with no masses. There was no edema, erythema and he is uncircumcised and foreskin normal.    Plan: olsen until cr marissa  Elevate scrotum  Be sure preputial foreskin in reduced (covering glans ) while olsen is in.  Will follow and please call with questions.
Cath was in for 4 days prior to discharge to rehab.  He just had Amp and is now on tamsulosin and tolerating in addition to Cardura for BP, which also has some effect on outlet obstruction. retention was post op, but very likely neurogenic bladder from DM.    Should rehab a bit and become more ambulatory for a voiding trial. He is apparently being discharged and should therefore go with olsen with VT in approx 1-2 wks.    F/u out patient.    Thank you.
I have seen and examined the  patient
I have seen and examined the patient

## 2020-01-22 VITALS
DIASTOLIC BLOOD PRESSURE: 83 MMHG | SYSTOLIC BLOOD PRESSURE: 147 MMHG | TEMPERATURE: 97 F | OXYGEN SATURATION: 98 % | HEART RATE: 92 BPM | RESPIRATION RATE: 17 BRPM

## 2020-01-22 LAB
ANION GAP SERPL CALC-SCNC: 9 MMOL/L — SIGNIFICANT CHANGE UP (ref 5–17)
BUN SERPL-MCNC: 29 MG/DL — HIGH (ref 7–23)
CALCIUM SERPL-MCNC: 9.1 MG/DL — SIGNIFICANT CHANGE UP (ref 8.5–10.1)
CHLORIDE SERPL-SCNC: 101 MMOL/L — SIGNIFICANT CHANGE UP (ref 96–108)
CO2 SERPL-SCNC: 25 MMOL/L — SIGNIFICANT CHANGE UP (ref 22–31)
CREAT SERPL-MCNC: 2.29 MG/DL — HIGH (ref 0.5–1.3)
GLUCOSE BLDC GLUCOMTR-MCNC: 197 MG/DL — HIGH (ref 70–99)
GLUCOSE BLDC GLUCOMTR-MCNC: 291 MG/DL — HIGH (ref 70–99)
GLUCOSE SERPL-MCNC: 183 MG/DL — HIGH (ref 70–99)
HCT VFR BLD CALC: 26.4 % — LOW (ref 39–50)
HGB BLD-MCNC: 8.5 G/DL — LOW (ref 13–17)
MCHC RBC-ENTMCNC: 28.4 PG — SIGNIFICANT CHANGE UP (ref 27–34)
MCHC RBC-ENTMCNC: 32.2 GM/DL — SIGNIFICANT CHANGE UP (ref 32–36)
MCV RBC AUTO: 88.3 FL — SIGNIFICANT CHANGE UP (ref 80–100)
NRBC # BLD: 0 /100 WBCS — SIGNIFICANT CHANGE UP (ref 0–0)
PLATELET # BLD AUTO: 436 K/UL — HIGH (ref 150–400)
POTASSIUM SERPL-MCNC: 3.9 MMOL/L — SIGNIFICANT CHANGE UP (ref 3.5–5.3)
POTASSIUM SERPL-SCNC: 3.9 MMOL/L — SIGNIFICANT CHANGE UP (ref 3.5–5.3)
RBC # BLD: 2.99 M/UL — LOW (ref 4.2–5.8)
RBC # FLD: 15.4 % — HIGH (ref 10.3–14.5)
SODIUM SERPL-SCNC: 135 MMOL/L — SIGNIFICANT CHANGE UP (ref 135–145)
WBC # BLD: 10.69 K/UL — HIGH (ref 3.8–10.5)
WBC # FLD AUTO: 10.69 K/UL — HIGH (ref 3.8–10.5)

## 2020-01-22 PROCEDURE — 99239 HOSP IP/OBS DSCHRG MGMT >30: CPT

## 2020-01-22 RX ORDER — DIAZEPAM 5 MG
10 TABLET ORAL ONCE
Refills: 0 | Status: DISCONTINUED | OUTPATIENT
Start: 2020-01-22 | End: 2020-01-22

## 2020-01-22 RX ADMIN — PANTOPRAZOLE SODIUM 40 MILLIGRAM(S): 20 TABLET, DELAYED RELEASE ORAL at 07:45

## 2020-01-22 RX ADMIN — Medication 250 MILLIGRAM(S): at 05:29

## 2020-01-22 RX ADMIN — APIXABAN 5 MILLIGRAM(S): 2.5 TABLET, FILM COATED ORAL at 05:29

## 2020-01-22 RX ADMIN — Medication 1 APPLICATION(S): at 05:30

## 2020-01-22 RX ADMIN — Medication 240 MILLIGRAM(S): at 05:29

## 2020-01-22 RX ADMIN — Medication 8 UNIT(S): at 11:22

## 2020-01-22 RX ADMIN — LIDOCAINE 1 PATCH: 4 CREAM TOPICAL at 04:58

## 2020-01-22 RX ADMIN — Medication 100 MILLIGRAM(S): at 05:29

## 2020-01-22 RX ADMIN — Medication 3: at 11:21

## 2020-01-22 RX ADMIN — Medication 1: at 07:44

## 2020-01-22 RX ADMIN — Medication 8 UNIT(S): at 07:44

## 2020-01-22 NOTE — PROGRESS NOTE ADULT - REASON FOR ADMISSION
left  foot ulcer

## 2020-01-22 NOTE — PROGRESS NOTE ADULT - SUBJECTIVE AND OBJECTIVE BOX
ROCCO JC  69y  MRN-[id number]    VASCULAR [facility]/ DR. ROBBINS    OFFERS NO COMPLAINTS  NO FEVER, CHILLS     MEDICATIONS  (STANDING):  apixaban 5 milliGRAM(s) Oral two times a day  cefTRIAXone   IVPB 2000 milliGRAM(s) IV Intermittent every 24 hours  dextrose 5%. 1000 milliLiter(s) (50 mL/Hr) IV Continuous <Continuous>  diazepam    Tablet 10 milliGRAM(s) Oral once  diltiazem    milliGRAM(s) Oral daily  doxazosin 4 milliGRAM(s) Oral at bedtime  hydrALAZINE 100 milliGRAM(s) Oral three times a day  hydrocortisone 1% Cream 1 Application(s) Topical two times a day  insulin glargine Injectable (LANTUS) 18 Unit(s) SubCutaneous at bedtime  insulin lispro (HumaLOG) corrective regimen sliding scale   SubCutaneous three times a day before meals  insulin lispro (HumaLOG) corrective regimen sliding scale   SubCutaneous at bedtime  insulin lispro Injectable (HumaLOG) 8 Unit(s) SubCutaneous three times a day before meals  lidocaine   Patch 1 Patch Transdermal every 24 hours  pantoprazole    Tablet 40 milliGRAM(s) Oral before breakfast  polyethylene glycol 3350 17 Gram(s) Oral daily  saccharomyces boulardii 250 milliGRAM(s) Oral two times a day  senna 2 Tablet(s) Oral at bedtime    MEDICATIONS  (PRN):  acetaminophen   Tablet .. 650 milliGRAM(s) Oral every 6 hours PRN Temp greater or equal to 38C (100.4F), Mild Pain (1 - 3)  ALBUTerol   0.042% 1.25 milliGRAM(s) Nebulizer four times a day PRN Wheezing  aluminum hydroxide/magnesium hydroxide/simethicone Suspension 30 milliLiter(s) Oral every 6 hours PRN Dyspepsia  guaiFENesin   Syrup  (Sugar-Free) 100 milliGRAM(s) Oral every 6 hours PRN Cough  morphine  - Injectable 2 milliGRAM(s) IV Push every 4 hours PRN Severe Pain (7 - 10)  oxycodone    5 mG/acetaminophen 325 mG 1 Tablet(s) Oral every 4 hours PRN Moderate Pain (4 - 6)      POD # 5    Vital Signs Last 24 Hrs  T(C): 36.4 (22 Jan 2020 04:22), Max: 36.8 (21 Jan 2020 11:30)  T(F): 97.6 (22 Jan 2020 04:22), Max: 98.3 (21 Jan 2020 11:30)  HR: 98 (22 Jan 2020 08:00) (88 - 98)  BP: 171/90 (22 Jan 2020 08:00) (148/83 - 187/98)  BP(mean): --  RR: 16 (22 Jan 2020 08:00) (16 - 19)  SpO2: 98% (22 Jan 2020 08:00) (95% - 99%)     CAPILLARY BLOOD GLUCOSE  POCT Blood Glucose.: 197 mg/dL (22 Jan 2020 07:30)  POCT Blood Glucose.: 246 mg/dL (21 Jan 2020 21:28)  POCT Blood Glucose.: 240 mg/dL (21 Jan 2020 15:29)  POCT Blood Glucose.: 240 mg/dL (21 Jan 2020 10:41)      LEFT BKA STUMP    KNEE IMMOBILIZER IN PLACE   WOUND DRY AND INTACT WITH STAPLES IN PLACE WITH GOOD APPROXIMATION  VIABLE INTACT FLAPS, APPROXIMATELY 1CM  BLISTER OVER ANTERIOR FLAP WITH SOME ECCHYMOSIS    GOOD ROM AT THE KNEE JOINT                         8.5    10.69 )-----------( 436      ( 22 Jan 2020 06:49 )             26.4     01-22    135  |  101  |  29<H>  ----------------------------<  183<H>  3.9   |  25  |  2.29<H>    Ca    9.1      22 Jan 2020 06:49    ASSESSMENT AND PLAN:  POD # 5 S/P LEFT BKA    NEW DRESSING APPLIED  MAINTAIN KNEE IMMOBILIZER  PT, OOB TO CHAIR   MEDICAL, RENAL, CARDIOLOGY F/U NOTED

## 2020-01-23 LAB — SURGICAL PATHOLOGY STUDY: SIGNIFICANT CHANGE UP

## 2020-01-24 DIAGNOSIS — E11.65 TYPE 2 DIABETES MELLITUS WITH HYPERGLYCEMIA: ICD-10-CM

## 2020-01-24 DIAGNOSIS — E11.621 TYPE 2 DIABETES MELLITUS WITH FOOT ULCER: ICD-10-CM

## 2020-01-24 DIAGNOSIS — L03.116 CELLULITIS OF LEFT LOWER LIMB: ICD-10-CM

## 2020-01-24 DIAGNOSIS — E11.69 TYPE 2 DIABETES MELLITUS WITH OTHER SPECIFIED COMPLICATION: ICD-10-CM

## 2020-01-24 DIAGNOSIS — D62 ACUTE POSTHEMORRHAGIC ANEMIA: ICD-10-CM

## 2020-01-24 DIAGNOSIS — J06.9 ACUTE UPPER RESPIRATORY INFECTION, UNSPECIFIED: ICD-10-CM

## 2020-01-24 DIAGNOSIS — L97.424 NON-PRESSURE CHRONIC ULCER OF LEFT HEEL AND MIDFOOT WITH NECROSIS OF BONE: ICD-10-CM

## 2020-01-24 DIAGNOSIS — B97.4 RESPIRATORY SYNCYTIAL VIRUS AS THE CAUSE OF DISEASES CLASSIFIED ELSEWHERE: ICD-10-CM

## 2020-01-24 DIAGNOSIS — N18.3 CHRONIC KIDNEY DISEASE, STAGE 3 (MODERATE): ICD-10-CM

## 2020-01-24 DIAGNOSIS — Z79.01 LONG TERM (CURRENT) USE OF ANTICOAGULANTS: ICD-10-CM

## 2020-01-24 DIAGNOSIS — I12.9 HYPERTENSIVE CHRONIC KIDNEY DISEASE WITH STAGE 1 THROUGH STAGE 4 CHRONIC KIDNEY DISEASE, OR UNSPECIFIED CHRONIC KIDNEY DISEASE: ICD-10-CM

## 2020-01-24 DIAGNOSIS — K59.00 CONSTIPATION, UNSPECIFIED: ICD-10-CM

## 2020-01-24 DIAGNOSIS — I48.0 PAROXYSMAL ATRIAL FIBRILLATION: ICD-10-CM

## 2020-01-24 DIAGNOSIS — M72.6 NECROTIZING FASCIITIS: ICD-10-CM

## 2020-01-24 DIAGNOSIS — L97.429 NON-PRESSURE CHRONIC ULCER OF LEFT HEEL AND MIDFOOT WITH UNSPECIFIED SEVERITY: ICD-10-CM

## 2020-01-24 DIAGNOSIS — A41.9 SEPSIS, UNSPECIFIED ORGANISM: ICD-10-CM

## 2020-01-24 DIAGNOSIS — M47.816 SPONDYLOSIS WITHOUT MYELOPATHY OR RADICULOPATHY, LUMBAR REGION: ICD-10-CM

## 2020-01-24 DIAGNOSIS — N17.0 ACUTE KIDNEY FAILURE WITH TUBULAR NECROSIS: ICD-10-CM

## 2020-01-24 DIAGNOSIS — E11.49 TYPE 2 DIABETES MELLITUS WITH OTHER DIABETIC NEUROLOGICAL COMPLICATION: ICD-10-CM

## 2020-01-24 DIAGNOSIS — E87.1 HYPO-OSMOLALITY AND HYPONATREMIA: ICD-10-CM

## 2020-01-24 DIAGNOSIS — E11.22 TYPE 2 DIABETES MELLITUS WITH DIABETIC CHRONIC KIDNEY DISEASE: ICD-10-CM

## 2020-01-24 DIAGNOSIS — E11.52 TYPE 2 DIABETES MELLITUS WITH DIABETIC PERIPHERAL ANGIOPATHY WITH GANGRENE: ICD-10-CM

## 2020-01-24 DIAGNOSIS — E87.5 HYPERKALEMIA: ICD-10-CM

## 2020-01-24 DIAGNOSIS — M86.9 OSTEOMYELITIS, UNSPECIFIED: ICD-10-CM

## 2020-01-24 DIAGNOSIS — R33.9 RETENTION OF URINE, UNSPECIFIED: ICD-10-CM

## 2020-03-09 ENCOUNTER — INPATIENT (INPATIENT)
Facility: HOSPITAL | Age: 70
LOS: 1 days | Discharge: NURSING, REHAB OR LONG-TERM CARE | End: 2020-03-11
Attending: INTERNAL MEDICINE | Admitting: INTERNAL MEDICINE
Payer: MEDICARE

## 2020-03-09 VITALS
WEIGHT: 240.08 LBS | OXYGEN SATURATION: 94 % | RESPIRATION RATE: 18 BRPM | HEART RATE: 63 BPM | SYSTOLIC BLOOD PRESSURE: 129 MMHG | TEMPERATURE: 97 F | DIASTOLIC BLOOD PRESSURE: 58 MMHG

## 2020-03-09 DIAGNOSIS — E87.5 HYPERKALEMIA: ICD-10-CM

## 2020-03-09 DIAGNOSIS — E11.49 TYPE 2 DIABETES MELLITUS WITH OTHER DIABETIC NEUROLOGICAL COMPLICATION: ICD-10-CM

## 2020-03-09 DIAGNOSIS — N17.9 ACUTE KIDNEY FAILURE, UNSPECIFIED: ICD-10-CM

## 2020-03-09 DIAGNOSIS — I10 ESSENTIAL (PRIMARY) HYPERTENSION: ICD-10-CM

## 2020-03-09 LAB
ALBUMIN SERPL ELPH-MCNC: 3.3 G/DL — SIGNIFICANT CHANGE UP (ref 3.3–5)
ALBUMIN SERPL ELPH-MCNC: 3.4 G/DL — SIGNIFICANT CHANGE UP (ref 3.3–5)
ALP SERPL-CCNC: 61 U/L — SIGNIFICANT CHANGE UP (ref 40–120)
ALP SERPL-CCNC: 64 U/L — SIGNIFICANT CHANGE UP (ref 40–120)
ALT FLD-CCNC: 24 U/L — SIGNIFICANT CHANGE UP (ref 12–78)
ALT FLD-CCNC: 24 U/L — SIGNIFICANT CHANGE UP (ref 12–78)
ANION GAP SERPL CALC-SCNC: 6 MMOL/L — SIGNIFICANT CHANGE UP (ref 5–17)
ANION GAP SERPL CALC-SCNC: 6 MMOL/L — SIGNIFICANT CHANGE UP (ref 5–17)
APTT BLD: 40.4 SEC — HIGH (ref 27.5–36.3)
AST SERPL-CCNC: 11 U/L — LOW (ref 15–37)
AST SERPL-CCNC: 12 U/L — LOW (ref 15–37)
BASOPHILS # BLD AUTO: 0.03 K/UL — SIGNIFICANT CHANGE UP (ref 0–0.2)
BASOPHILS NFR BLD AUTO: 0.3 % — SIGNIFICANT CHANGE UP (ref 0–2)
BILIRUB SERPL-MCNC: 0.3 MG/DL — SIGNIFICANT CHANGE UP (ref 0.2–1.2)
BILIRUB SERPL-MCNC: 0.3 MG/DL — SIGNIFICANT CHANGE UP (ref 0.2–1.2)
BUN SERPL-MCNC: 96 MG/DL — HIGH (ref 7–23)
BUN SERPL-MCNC: 97 MG/DL — HIGH (ref 7–23)
CALCIUM SERPL-MCNC: 8.8 MG/DL — SIGNIFICANT CHANGE UP (ref 8.5–10.1)
CALCIUM SERPL-MCNC: 9.3 MG/DL — SIGNIFICANT CHANGE UP (ref 8.5–10.1)
CHLORIDE SERPL-SCNC: 105 MMOL/L — SIGNIFICANT CHANGE UP (ref 96–108)
CHLORIDE SERPL-SCNC: 105 MMOL/L — SIGNIFICANT CHANGE UP (ref 96–108)
CO2 SERPL-SCNC: 24 MMOL/L — SIGNIFICANT CHANGE UP (ref 22–31)
CO2 SERPL-SCNC: 25 MMOL/L — SIGNIFICANT CHANGE UP (ref 22–31)
CREAT SERPL-MCNC: 3.66 MG/DL — HIGH (ref 0.5–1.3)
CREAT SERPL-MCNC: 3.76 MG/DL — HIGH (ref 0.5–1.3)
EOSINOPHIL # BLD AUTO: 0.33 K/UL — SIGNIFICANT CHANGE UP (ref 0–0.5)
EOSINOPHIL NFR BLD AUTO: 3.8 % — SIGNIFICANT CHANGE UP (ref 0–6)
GLUCOSE BLDC GLUCOMTR-MCNC: 95 MG/DL — SIGNIFICANT CHANGE UP (ref 70–99)
GLUCOSE SERPL-MCNC: 85 MG/DL — SIGNIFICANT CHANGE UP (ref 70–99)
GLUCOSE SERPL-MCNC: 87 MG/DL — SIGNIFICANT CHANGE UP (ref 70–99)
HCT VFR BLD CALC: 26.3 % — LOW (ref 39–50)
HGB BLD-MCNC: 8.1 G/DL — LOW (ref 13–17)
IMM GRANULOCYTES NFR BLD AUTO: 0.3 % — SIGNIFICANT CHANGE UP (ref 0–1.5)
INR BLD: 1.59 RATIO — HIGH (ref 0.88–1.16)
LYMPHOCYTES # BLD AUTO: 0.52 K/UL — LOW (ref 1–3.3)
LYMPHOCYTES # BLD AUTO: 5.9 % — LOW (ref 13–44)
MCHC RBC-ENTMCNC: 27.2 PG — SIGNIFICANT CHANGE UP (ref 27–34)
MCHC RBC-ENTMCNC: 30.8 GM/DL — LOW (ref 32–36)
MCV RBC AUTO: 88.3 FL — SIGNIFICANT CHANGE UP (ref 80–100)
MONOCYTES # BLD AUTO: 0.65 K/UL — SIGNIFICANT CHANGE UP (ref 0–0.9)
MONOCYTES NFR BLD AUTO: 7.4 % — SIGNIFICANT CHANGE UP (ref 2–14)
NEUTROPHILS # BLD AUTO: 7.18 K/UL — SIGNIFICANT CHANGE UP (ref 1.8–7.4)
NEUTROPHILS NFR BLD AUTO: 82.3 % — HIGH (ref 43–77)
NRBC # BLD: 0 /100 WBCS — SIGNIFICANT CHANGE UP (ref 0–0)
PLATELET # BLD AUTO: 204 K/UL — SIGNIFICANT CHANGE UP (ref 150–400)
POTASSIUM SERPL-MCNC: 6.5 MMOL/L — CRITICAL HIGH (ref 3.5–5.3)
POTASSIUM SERPL-MCNC: 6.6 MMOL/L — CRITICAL HIGH (ref 3.5–5.3)
POTASSIUM SERPL-SCNC: 6.5 MMOL/L — CRITICAL HIGH (ref 3.5–5.3)
POTASSIUM SERPL-SCNC: 6.6 MMOL/L — CRITICAL HIGH (ref 3.5–5.3)
PROT SERPL-MCNC: 7.1 GM/DL — SIGNIFICANT CHANGE UP (ref 6–8.3)
PROT SERPL-MCNC: 7.1 GM/DL — SIGNIFICANT CHANGE UP (ref 6–8.3)
PROTHROM AB SERPL-ACNC: 18.1 SEC — HIGH (ref 10–12.9)
RBC # BLD: 2.98 M/UL — LOW (ref 4.2–5.8)
RBC # FLD: 15.9 % — HIGH (ref 10.3–14.5)
SODIUM SERPL-SCNC: 135 MMOL/L — SIGNIFICANT CHANGE UP (ref 135–145)
SODIUM SERPL-SCNC: 136 MMOL/L — SIGNIFICANT CHANGE UP (ref 135–145)
WBC # BLD: 8.74 K/UL — SIGNIFICANT CHANGE UP (ref 3.8–10.5)
WBC # FLD AUTO: 8.74 K/UL — SIGNIFICANT CHANGE UP (ref 3.8–10.5)

## 2020-03-09 PROCEDURE — 93010 ELECTROCARDIOGRAM REPORT: CPT

## 2020-03-09 PROCEDURE — 71045 X-RAY EXAM CHEST 1 VIEW: CPT | Mod: 26

## 2020-03-09 PROCEDURE — 99285 EMERGENCY DEPT VISIT HI MDM: CPT

## 2020-03-09 PROCEDURE — 99223 1ST HOSP IP/OBS HIGH 75: CPT

## 2020-03-09 RX ORDER — GLUCAGON INJECTION, SOLUTION 0.5 MG/.1ML
1 INJECTION, SOLUTION SUBCUTANEOUS ONCE
Refills: 0 | Status: DISCONTINUED | OUTPATIENT
Start: 2020-03-09 | End: 2020-03-11

## 2020-03-09 RX ORDER — DEXTROSE 50 % IN WATER 50 %
25 SYRINGE (ML) INTRAVENOUS ONCE
Refills: 0 | Status: DISCONTINUED | OUTPATIENT
Start: 2020-03-09 | End: 2020-03-11

## 2020-03-09 RX ORDER — TAMSULOSIN HYDROCHLORIDE 0.4 MG/1
0.4 CAPSULE ORAL AT BEDTIME
Refills: 0 | Status: DISCONTINUED | OUTPATIENT
Start: 2020-03-09 | End: 2020-03-11

## 2020-03-09 RX ORDER — APIXABAN 2.5 MG/1
5 TABLET, FILM COATED ORAL
Refills: 0 | Status: DISCONTINUED | OUTPATIENT
Start: 2020-03-09 | End: 2020-03-11

## 2020-03-09 RX ORDER — SENNA PLUS 8.6 MG/1
2 TABLET ORAL AT BEDTIME
Refills: 0 | Status: DISCONTINUED | OUTPATIENT
Start: 2020-03-09 | End: 2020-03-11

## 2020-03-09 RX ORDER — ERYTHROPOIETIN 10000 [IU]/ML
4000 INJECTION, SOLUTION INTRAVENOUS; SUBCUTANEOUS ONCE
Refills: 0 | Status: COMPLETED | OUTPATIENT
Start: 2020-03-09 | End: 2020-03-10

## 2020-03-09 RX ORDER — HYDRALAZINE HCL 50 MG
100 TABLET ORAL THREE TIMES A DAY
Refills: 0 | Status: DISCONTINUED | OUTPATIENT
Start: 2020-03-09 | End: 2020-03-11

## 2020-03-09 RX ORDER — SODIUM BICARBONATE 1 MEQ/ML
50 SYRINGE (ML) INTRAVENOUS ONCE
Refills: 0 | Status: COMPLETED | OUTPATIENT
Start: 2020-03-09 | End: 2020-03-09

## 2020-03-09 RX ORDER — CALCIUM GLUCONATE 100 MG/ML
1 VIAL (ML) INTRAVENOUS ONCE
Refills: 0 | Status: COMPLETED | OUTPATIENT
Start: 2020-03-09 | End: 2020-03-09

## 2020-03-09 RX ORDER — SIMVASTATIN 20 MG/1
20 TABLET, FILM COATED ORAL AT BEDTIME
Refills: 0 | Status: DISCONTINUED | OUTPATIENT
Start: 2020-03-09 | End: 2020-03-11

## 2020-03-09 RX ORDER — DILTIAZEM HCL 120 MG
240 CAPSULE, EXT RELEASE 24 HR ORAL DAILY
Refills: 0 | Status: DISCONTINUED | OUTPATIENT
Start: 2020-03-09 | End: 2020-03-11

## 2020-03-09 RX ORDER — IPRATROPIUM/ALBUTEROL SULFATE 18-103MCG
3 AEROSOL WITH ADAPTER (GRAM) INHALATION ONCE
Refills: 0 | Status: COMPLETED | OUTPATIENT
Start: 2020-03-09 | End: 2020-03-09

## 2020-03-09 RX ORDER — SODIUM CHLORIDE 9 MG/ML
1000 INJECTION, SOLUTION INTRAVENOUS
Refills: 0 | Status: DISCONTINUED | OUTPATIENT
Start: 2020-03-09 | End: 2020-03-11

## 2020-03-09 RX ORDER — SODIUM POLYSTYRENE SULFONATE 4.1 MEQ/G
30 POWDER, FOR SUSPENSION ORAL ONCE
Refills: 0 | Status: COMPLETED | OUTPATIENT
Start: 2020-03-09 | End: 2020-03-09

## 2020-03-09 RX ORDER — SODIUM ZIRCONIUM CYCLOSILICATE 10 G/10G
10 POWDER, FOR SUSPENSION ORAL EVERY 8 HOURS
Refills: 0 | Status: COMPLETED | OUTPATIENT
Start: 2020-03-09 | End: 2020-03-11

## 2020-03-09 RX ORDER — HYDROCORTISONE 1 %
1 OINTMENT (GRAM) TOPICAL
Refills: 0 | Status: DISCONTINUED | OUTPATIENT
Start: 2020-03-09 | End: 2020-03-11

## 2020-03-09 RX ORDER — SODIUM POLYSTYRENE SULFONATE 4.1 MEQ/G
30 POWDER, FOR SUSPENSION ORAL ONCE
Refills: 0 | Status: DISCONTINUED | OUTPATIENT
Start: 2020-03-09 | End: 2020-03-09

## 2020-03-09 RX ORDER — DEXTROSE 50 % IN WATER 50 %
12.5 SYRINGE (ML) INTRAVENOUS ONCE
Refills: 0 | Status: DISCONTINUED | OUTPATIENT
Start: 2020-03-09 | End: 2020-03-11

## 2020-03-09 RX ORDER — INSULIN LISPRO 100/ML
VIAL (ML) SUBCUTANEOUS
Refills: 0 | Status: DISCONTINUED | OUTPATIENT
Start: 2020-03-09 | End: 2020-03-11

## 2020-03-09 RX ORDER — ACETAMINOPHEN 500 MG
650 TABLET ORAL EVERY 6 HOURS
Refills: 0 | Status: DISCONTINUED | OUTPATIENT
Start: 2020-03-09 | End: 2020-03-11

## 2020-03-09 RX ORDER — DEXTROSE 50 % IN WATER 50 %
50 SYRINGE (ML) INTRAVENOUS ONCE
Refills: 0 | Status: COMPLETED | OUTPATIENT
Start: 2020-03-09 | End: 2020-03-09

## 2020-03-09 RX ORDER — DEXTROSE 50 % IN WATER 50 %
15 SYRINGE (ML) INTRAVENOUS ONCE
Refills: 0 | Status: DISCONTINUED | OUTPATIENT
Start: 2020-03-09 | End: 2020-03-11

## 2020-03-09 RX ORDER — INSULIN HUMAN 100 [IU]/ML
10 INJECTION, SOLUTION SUBCUTANEOUS ONCE
Refills: 0 | Status: COMPLETED | OUTPATIENT
Start: 2020-03-09 | End: 2020-03-09

## 2020-03-09 RX ORDER — FUROSEMIDE 40 MG
40 TABLET ORAL ONCE
Refills: 0 | Status: COMPLETED | OUTPATIENT
Start: 2020-03-09 | End: 2020-03-09

## 2020-03-09 RX ADMIN — Medication 100 MILLIGRAM(S): at 23:52

## 2020-03-09 RX ADMIN — Medication 50 MILLIEQUIVALENT(S): at 19:33

## 2020-03-09 RX ADMIN — SENNA PLUS 2 TABLET(S): 8.6 TABLET ORAL at 22:24

## 2020-03-09 RX ADMIN — Medication 3 MILLILITER(S): at 16:28

## 2020-03-09 RX ADMIN — SIMVASTATIN 20 MILLIGRAM(S): 20 TABLET, FILM COATED ORAL at 22:24

## 2020-03-09 RX ADMIN — Medication 100 GRAM(S): at 16:28

## 2020-03-09 RX ADMIN — Medication 1 GRAM(S): at 18:33

## 2020-03-09 RX ADMIN — TAMSULOSIN HYDROCHLORIDE 0.4 MILLIGRAM(S): 0.4 CAPSULE ORAL at 22:24

## 2020-03-09 RX ADMIN — SODIUM POLYSTYRENE SULFONATE 30 GRAM(S): 4.1 POWDER, FOR SUSPENSION ORAL at 16:28

## 2020-03-09 RX ADMIN — Medication 40 MILLIGRAM(S): at 19:31

## 2020-03-09 RX ADMIN — Medication 50 MILLILITER(S): at 19:31

## 2020-03-09 RX ADMIN — SODIUM ZIRCONIUM CYCLOSILICATE 10 GRAM(S): 10 POWDER, FOR SUSPENSION ORAL at 22:23

## 2020-03-09 RX ADMIN — Medication 50 MILLIEQUIVALENT(S): at 16:28

## 2020-03-09 RX ADMIN — INSULIN HUMAN 10 UNIT(S): 100 INJECTION, SOLUTION SUBCUTANEOUS at 19:45

## 2020-03-09 NOTE — H&P ADULT - ASSESSMENT
68 y/o male PMH HTN, HL, DM2, Afib on Eliquis who presented to the ED because of  abnormally high potassium and worsening renal failure, asked to come in by PMD. He denied any fever or chills, any prostate issues but states he has had trouble urinating in the past and has not been able to urinate well he has both hesitancy and increasing abdominal girth. He is otherwise a poor historian and history is partially obtained from ED and prior records, he states he has had red right leg for a long time and the red right shin is not new, he denies any tenderness or fever , denied any discharge from the red areas of the right shin, He is already seen by the nephrologist and recommendations have been made , RN at bedside is doing repeat Blood glucose finger stick to repeat insulin and dextrose. Pt treated for hyperkalemia, urinary catheter ordered, seen by Dr Santoyo from nephrology. 70 y/o male PMH HTN, HL, DM2, Afib on Eliquis who presented to the ED because of  abnormally high potassium and worsening renal failure, asked to come in by PMD. He denied any fever or chills, any prostate issues but states he has had trouble urinating in the past and has not been able to urinate well he has both hesitancy and increasing abdominal girth. He is otherwise a poor historian and history is partially obtained from ED and prior records, he states he has had red right leg for a long time and the red right shin is not new, he denies any tenderness or fever , denied any discharge from the red areas of the right shin, He is already seen by the nephrologist and recommendations have been made , RN at bedside is doing repeat Blood glucose finger stick to repeat insulin and dextrose. Pt treated for hyperkalemia, urinary catheter ordered, seen by Dr Santoyo from nephrology. With history urinary retention and acute on chronic renal failure, urinary catheter ordered, and D50/insulin, kayexalate for hyperkalemia.  IMPROVE VTE Individual Risk Assessment          RISK                                                          Points    [  ] Previous VTE                                                3    [  ] Thrombophilia                                             2    [  ] Lower limb paralysis                                    2        (unable to hold up >15 seconds)      [  ] Current Cancer                                             2         (within 6 months)    [  ] Immobilization > 24 hrs                              1    [  ] ICU/CCU stay > 24 hours                            1    [x  ] Age > 60                                                    1    IMPROVE VTE Score _____1____

## 2020-03-09 NOTE — CONSULT NOTE ADULT - ASSESSMENT
69 yr old male with known hx of HTN, HL, NIDM, Afib on Eliquis who presented to the ED with abnormally high potassium and worsening renal failures- NEEDS BLADDER SONO AND Frazier 69 yr old male with known hx of HTN, HL, NIDM, Afib on Eliquis who presented to the ED with abnormally high potassium and worsening   ARF on CRF - likely post renal- NEEDS BLADDER SONO AND Frazier, empirically start Flomax , may need to add Proscar or increase Flomax, hold metformin ACEI , Protonix , avoid nephrotoxins    IDDM_ FS with HISS  Htn- cont hydralazine   Afib- cont Eliquis - rate controlled on Cardizem- cont 69 yr old male with known hx of HTN, HL, NIDM, Afib on Eliquis who presented to the ED with abnormally high potassium and worsening   ARF on CRF - likely post renal- NEEDS BLADDER SONO AND Frazier, empirically start Flomax , may need to add Proscar or increase Flomax, hold metformin ACEI , Protonix , avoid nephrotoxins    Hyperkalemia- Kayexalate and repeat dextrose and insulin   Anemia- chronic- asymptomatic - epogen if OK with renal   IDDM_ FS with HISS  Htn- cont hydralazine   Afib- cont Eliquis - rate controlled on Cardizem- cont

## 2020-03-09 NOTE — H&P ADULT - NSHPREVIEWOFSYSTEMS_GEN_ALL_CORE
REVIEW OF SYSTEMS:  CONSTITUTIONAL: No fever, weight loss, or fatigue  EYES: No eye pain, visual disturbances, or discharge  ENMT:  No difficulty hearing, tinnitus, vertigo; No sinus or throat pain  NECK: No pain or stiffness  RESPIRATORY: No cough, wheezing, chills or hemoptysis; No shortness of breath  CARDIOVASCULAR: No chest pain, palpitations, dizziness, or leg swelling  GASTROINTESTINAL: No abdominal or epigastric pain. No nausea, vomiting, or hematemesis; No diarrhea or constipation. No melena or hematochezia.  GENITOURINARY: No dysuria, + decreased urination, no hematuria, or incontinence  NEUROLOGICAL: No headaches, memory loss, loss of strength, numbness, or tremors  SKIN: No itching, burning, rashes, or lesions   LYMPH NODES: No enlarged glands  ENDOCRINE: No heat or cold intolerance; No hair loss  MUSCULOSKELETAL: No joint pain or swelling; No muscle, back, or extremity pain  PSYCHIATRIC: No depression, anxiety, mood swings, or difficulty sleeping  HEME/LYMPH: No easy bruising, or bleeding gums  ALLERGY AND IMMUNOLOGIC: No hives or eczema

## 2020-03-09 NOTE — ED ADULT NURSE NOTE - INTERVENTIONS DEFINITIONS
Provide visual cue, wrist band, yellow gown, etc./Instruct patient to call for assistance/Physically safe environment: no spills, clutter or unnecessary equipment/Reinforce activity limits and safety measures with patient and family

## 2020-03-09 NOTE — ED PROVIDER NOTE - PROGRESS NOTE DETAILS
dr berg called about the consult dr berg called again to inform of the repeat K, dr morales returned the called, orders were placed by dr berg, he states that pt is okay to go to the floor with the standing orders placed by dr berg

## 2020-03-09 NOTE — H&P ADULT - NSHPPHYSICALEXAM_GEN_ALL_CORE
T(C): 36.3 (09 Mar 2020 19:28), Max: 36.3 (09 Mar 2020 14:53)  T(F): 97.4 (09 Mar 2020 19:28), Max: 97.4 (09 Mar 2020 14:53)  HR: 58 (09 Mar 2020 19:28) (58 - 63)  BP: 136/54 (09 Mar 2020 19:28) (129/58 - 136/54)  BP(mean): --  RR: 17 (09 Mar 2020 19:28) (17 - 18)  SpO2: 98% (09 Mar 2020 19:28) (94% - 98%)    PHYSICAL EXAM:  GENERAL: NAD, well-groomed, well-developed  HEAD:  Atraumatic, Normocephalic  EYES: EOMI, PERRLA, conjunctiva and sclera clear  ENMT: No tonsillar erythema, exudates, or enlargement; Moist mucous membranes,  No lesions  NECK: Supple, No JVD, Normal thyroid  NERVOUS SYSTEM:  Alert & Oriented X3, CN 2-12 intact, no focal deficits  CHEST/LUNG: Clear to percussion bilaterally; No rales, rhonchi, wheezing, or rubs  HEART: irregular rate and rhythm; No murmurs, rubs, or gallops  ABDOMEN: Soft, Nontender, Nondistended; Bowel sounds present  EXTREMITIES:  + Peripheral Pulses, No clubbing, cyanosis, + edema, L BKA  LYMPH: No lymphadenopathy noted  SKIN:  rash R LE

## 2020-03-09 NOTE — ED PROVIDER NOTE - OBJECTIVE STATEMENT
69 year old male presents today sent in from [facility] for acute renal failure and hyperkalema and abdominal distention (-) chest pain (-) sob  (-) nausea or vomiting +decrease urine output

## 2020-03-09 NOTE — ED PROVIDER NOTE - CLINICAL SUMMARY MEDICAL DECISION MAKING FREE TEXT BOX
pt sent in for worsening renal failure and abdominal distention, olsen placed, pt medicated for his hyperkalemia, admitted for further workup

## 2020-03-09 NOTE — H&P ADULT - NSHPLABSRESULTS_GEN_ALL_CORE
LABS:                        8.1    8.74  )-----------( 204      ( 09 Mar 2020 15:21 )             26.3     03-09    136  |  105  |  97<H>  ----------------------------<  87  6.5<HH>   |  25  |  3.76<H>    Ca    9.3      09 Mar 2020 18:00    TPro  7.1  /  Alb  3.4  /  TBili  0.3  /  DBili  x   /  AST  11<L>  /  ALT  24  /  AlkPhos  64  03-09    PT/INR - ( 09 Mar 2020 16:19 )   PT: 18.1 sec;   INR: 1.59 ratio         PTT - ( 09 Mar 2020 16:19 )  PTT:40.4 sec    CAPILLARY BLOOD GLUCOSE      POCT Blood Glucose.: 95 mg/dL (09 Mar 2020 19:36)            RADIOLOGY & ADDITIONAL TESTS:    Imaging Personally Reviewed:  [ ] YES  [ ] NO    Consultant(s) Notes Reviewed:  [ ] YES  [ ] NO LABS:                        8.1    8.74  )-----------( 204      ( 09 Mar 2020 15:21 )             26.3     03-09    136  |  105  |  97<H>  ----------------------------<  87  6.5<HH>   |  25  |  3.76<H>    Ca    9.3      09 Mar 2020 18:00    TPro  7.1  /  Alb  3.4  /  TBili  0.3  /  DBili  x   /  AST  11<L>  /  ALT  24  /  AlkPhos  64  03-09    PT/INR - ( 09 Mar 2020 16:19 )   PT: 18.1 sec;   INR: 1.59 ratio         PTT - ( 09 Mar 2020 16:19 )  PTT:40.4 sec    CAPILLARY BLOOD GLUCOSE  CXR: pending      Consultant(s) Notes Reviewed:  [x ] YES  [ ] NO

## 2020-03-09 NOTE — CONSULT NOTE ADULT - SUBJECTIVE AND OBJECTIVE BOX
[facility] NEPHROLOGY SERVICES, [facility]  NEPHROLOGY AND HYPERTENSION  [address]  [address]  [place] [address]  [phone]    MD YISEL WEEKS MD ANDREY GONCHARUK, MD MADHU KORRAPATI, MD YELENA ROSENBERG, MD BINNY KOSHY, MD CHRISTOPHER CAPUTO, MD MANJINDER GOMEZ MD      "Patient is a 69y old  Male who presents with a chief complaint of abnormal labs  HPI: Patient sent in to ER by Dr. Carrillo for ELMER CKD; noted increasing BUN, Cr and potassium trend despite IVF;   Increasing abd girth;   Hx of urinary retention last admission.   "    PAST MEDICAL & SURGICAL HISTORY:  Type 2 diabetes mellitus with other neurologic complication, without long-term current use of insulin  Hypertension, unspecified type    FAMILY HISTORY:    Allergies    No Known Allergies    Intolerances      Home Medications:  acetaminophen 325 mg oral tablet: 2 tab(s) orally every 6 hours, As needed, Temp greater or equal to 38C (100.4F), Mild Pain (1 - 3) (21 Jan 2020 13:48)  albuterol 1.25 mg/3 mL (0.042%) inhalation solution:  inhaled  (21 Jan 2020 13:48)  aluminum hydroxide-magnesium hydroxide 200 mg-200 mg/5 mL oral suspension: 30 milliliter(s) orally every 6 hours, As needed, Dyspepsia (21 Jan 2020 13:48)  cefTRIAXone 2 g intravenous injection: 2 gram(s) intravenous every 24 hours x 2 weeks  (21 Jan 2020 13:48)  dilTIAZem 240 mg/24 hours oral capsule, extended release: 1 cap(s) orally once a day (08 Jan 2020 17:45)  doxazosin 4 mg oral tablet: 1 tab(s) orally once a day (at bedtime) (21 Jan 2020 13:48)  Eliquis 5 mg oral tablet: 1 tab(s) orally 2 times a day (08 Jan 2020 17:42)  glimepiride 4 mg oral tablet: 1 tab(s) orally once a day (08 Jan 2020 17:46)  hydrALAZINE 100 mg oral tablet: 1 tab(s) orally 3 times a day (21 Jan 2020 18:45)  hydrocortisone 1% topical cream: 1 application topically 2 times a day (21 Jan 2020 13:48)  Invokana 300 mg oral tablet: 1 tab(s) orally once a day (08 Jan 2020 17:42)  lidocaine 5% topical film:  topically  (21 Jan 2020 13:48)  oxycodone-acetaminophen 5 mg-325 mg oral tablet: 1 tab(s) orally every 4 hours, As needed, Moderate Pain (4 - 6) (21 Jan 2020 13:48)  pantoprazole 40 mg oral delayed release tablet: 1 tab(s) orally once a day (before a meal) (21 Jan 2020 13:48)  polyethylene glycol 3350 oral powder for reconstitution: 17 gram(s) orally once a day (21 Jan 2020 13:48)  saccharomyces boulardii lyo 250 mg oral capsule: 1 cap(s) orally 2 times a day (21 Jan 2020 13:48)  senna oral tablet: 2 tab(s) orally once a day (at bedtime) (21 Jan 2020 13:48)  simvastatin 20 mg oral tablet: 1 tab(s) orally once a day (at bedtime) (08 Jan 2020 17:41)    MEDICATIONS  (STANDING):    MEDICATIONS  (PRN):    Vital Signs Last 24 Hrs  T(C): 36.3 (09 Mar 2020 14:53), Max: 36.3 (09 Mar 2020 14:53)  T(F): 97.4 (09 Mar 2020 14:53), Max: 97.4 (09 Mar 2020 14:53)  HR: 63 (09 Mar 2020 14:53) (63 - 63)  BP: 129/58 (09 Mar 2020 14:53) (129/58 - 129/58)  BP(mean): --  RR: 18 (09 Mar 2020 14:53) (18 - 18)  SpO2: 94% (09 Mar 2020 14:53) (94% - 94%)    Daily     Daily     CAPILLARY BLOOD GLUCOSE        PHYSICAL EXAM:      T(C): 36.3 (03-09-20 @ 14:53), Max: 36.3 (03-09-20 @ 14:53)  HR: 63 (03-09-20 @ 14:53) (63 - 63)  BP: 129/58 (03-09-20 @ 14:53) (129/58 - 129/58)  RR: 18 (03-09-20 @ 14:53) (18 - 18)  SpO2: 94% (03-09-20 @ 14:53) (94% - 94%)  Wt(kg): --  Respiratory: clear anteriorly, decreased BS at bases  Cardiovascular: S1 S2  Gastrointestinal: soft NT ND +BS  Extremities:  1-2 edema  BKA              03-09    135  |  105  |  96<H>  ----------------------------<  85  6.6<HH>   |  24  |  3.66<H>    Ca    8.8      09 Mar 2020 15:21    TPro  7.1  /  Alb  3.3  /  TBili  0.3  /  DBili  x   /  AST  12<L>  /  ALT  24  /  AlkPhos  61  03-09                          8.1    8.74  )-----------( 204      ( 09 Mar 2020 15:21 )             26.3     Creatinine Trend: 3.66<--          Assessment   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    Augusto Santoyo MD

## 2020-03-09 NOTE — ED ADULT NURSE REASSESSMENT NOTE - NS ED NURSE REASSESS COMMENT FT1
Pt resting comfortably, no distress noted, resp even and unlabored, pt updated about the plan of care, pt states no pain, no discomfort and does not appear to be in any distress. Pt proficient in the plan of care as evidenced by successful patient teach back.
Assisted tele hospitalist with pt, medicated as per orders and repositioned for comfort. Pt aware of admission. will continue to monitor awaiting placement on unit.

## 2020-03-09 NOTE — ED PROVIDER NOTE - SKIN, MLM
Skin normal color for race, warm, dry and intact. +right lower extremity rash which pt states has been there

## 2020-03-09 NOTE — CONSULT NOTE ADULT - SUBJECTIVE AND OBJECTIVE BOX
REASON FOR Tele-evaluation    SUBJECTIVE: abnormal labs    HPI: 69 yr old male with known hx of HTN, HL, NIDM, Afib on Eliquis who presented to the ED with abnormally high potassium and worsening renal failures send in. He denied any fever or chills denied any prostate issues but states he has had trouble urinating in the past and has not been able to urinate well he has both hesitancy and increasing abdominal girth. He is otherwise a poor historian and history is partially obtained from ED and prior records, he states he has had red right leg for a long time and the red right shin is not new , denied any tenderness or fever , denied any discharge from the red areas of the right shin, He is already seen by the nephrologist and recommendations have been made , RN at bedside is doing repeat Blood glucose finger stick to repeat insulin and dextrose.    ROS: As per HPI    T(C): 36.3 (03-09-20 @ 19:28), Max: 36.3 (03-09-20 @ 14:53)  HR: 58 (03-09-20 @ 19:28) (58 - 63)  BP: 136/54 (03-09-20 @ 19:28) (129/58 - 136/54)  RR: 17 (03-09-20 @ 19:28) (17 - 18)  SpO2: 98% (03-09-20 @ 19:28) (94% - 98%)    PHYSICAL EXAM: evaluation precludes physical exam. Pertinent physical exam findings as per video conference with  nurse at bedside is as follow: AAo NAD but poor historian                           8.1    8.74  )-----------( 204      ( 09 Mar 2020 15:21 )             26.3   03-09    136  |  105  |  97<H>  ----------------------------<  87  6.5<HH>   |  25  |  3.76<H>    Ca    9.3      09 Mar 2020 18:00    TPro  7.1  /  Alb  3.4  /  TBili  0.3  /  DBili  x   /  AST  11<L>  /  ALT  24  /  AlkPhos  64  03-09          Radiology findings         Medication reconciliation done     Care plan discussed with ED RNA kandi RENDON         Survey offered to patient - declined

## 2020-03-09 NOTE — H&P ADULT - HISTORY OF PRESENT ILLNESS
68 y/o male PMH HTN, HL, DM2, Afib on Eliquis who presented to the ED because of  abnormally high potassium and worsening renal failure, asked to come in by PMD. He denied any fever or chills, any prostate issues but states he has had trouble urinating in the past and has not been able to urinate well he has both hesitancy and increasing abdominal girth. He is otherwise a poor historian and history is partially obtained from ED and prior records, he states he has had red right leg for a long time and the red right shin is not new, he denies any tenderness or fever , denied any discharge from the red areas of the right shin, He is already seen by the nephrologist and recommendations have been made , RN at bedside is doing repeat Blood glucose finger stick to repeat insulin and dextrose. Pt treated for hyperkalemia, urinary catheter ordered, seen by Dr Santoyo from nephrology.

## 2020-03-10 LAB
ALBUMIN SERPL ELPH-MCNC: 3 G/DL — LOW (ref 3.3–5)
ALP SERPL-CCNC: 62 U/L — SIGNIFICANT CHANGE UP (ref 40–120)
ALT FLD-CCNC: 22 U/L — SIGNIFICANT CHANGE UP (ref 12–78)
ANION GAP SERPL CALC-SCNC: 8 MMOL/L — SIGNIFICANT CHANGE UP (ref 5–17)
ANION GAP SERPL CALC-SCNC: 9 MMOL/L — SIGNIFICANT CHANGE UP (ref 5–17)
ANION GAP SERPL CALC-SCNC: 9 MMOL/L — SIGNIFICANT CHANGE UP (ref 5–17)
APPEARANCE UR: CLEAR — SIGNIFICANT CHANGE UP
AST SERPL-CCNC: 14 U/L — LOW (ref 15–37)
BACTERIA # UR AUTO: ABNORMAL
BASOPHILS # BLD AUTO: 0.04 K/UL — SIGNIFICANT CHANGE UP (ref 0–0.2)
BASOPHILS NFR BLD AUTO: 0.5 % — SIGNIFICANT CHANGE UP (ref 0–2)
BILIRUB SERPL-MCNC: 0.4 MG/DL — SIGNIFICANT CHANGE UP (ref 0.2–1.2)
BILIRUB UR-MCNC: NEGATIVE — SIGNIFICANT CHANGE UP
BUN SERPL-MCNC: 102 MG/DL — HIGH (ref 7–23)
BUN SERPL-MCNC: 98 MG/DL — HIGH (ref 7–23)
BUN SERPL-MCNC: 99 MG/DL — HIGH (ref 7–23)
CALCIUM SERPL-MCNC: 8.8 MG/DL — SIGNIFICANT CHANGE UP (ref 8.5–10.1)
CHLORIDE SERPL-SCNC: 104 MMOL/L — SIGNIFICANT CHANGE UP (ref 96–108)
CHLORIDE SERPL-SCNC: 104 MMOL/L — SIGNIFICANT CHANGE UP (ref 96–108)
CHLORIDE SERPL-SCNC: 106 MMOL/L — SIGNIFICANT CHANGE UP (ref 96–108)
CO2 SERPL-SCNC: 25 MMOL/L — SIGNIFICANT CHANGE UP (ref 22–31)
CO2 SERPL-SCNC: 26 MMOL/L — SIGNIFICANT CHANGE UP (ref 22–31)
CO2 SERPL-SCNC: 26 MMOL/L — SIGNIFICANT CHANGE UP (ref 22–31)
COLOR SPEC: YELLOW — SIGNIFICANT CHANGE UP
CREAT SERPL-MCNC: 3.49 MG/DL — HIGH (ref 0.5–1.3)
CREAT SERPL-MCNC: 3.62 MG/DL — HIGH (ref 0.5–1.3)
CREAT SERPL-MCNC: 3.72 MG/DL — HIGH (ref 0.5–1.3)
DIFF PNL FLD: NEGATIVE — SIGNIFICANT CHANGE UP
EOSINOPHIL # BLD AUTO: 0.33 K/UL — SIGNIFICANT CHANGE UP (ref 0–0.5)
EOSINOPHIL NFR BLD AUTO: 3.9 % — SIGNIFICANT CHANGE UP (ref 0–6)
EPI CELLS # UR: SIGNIFICANT CHANGE UP
FERRITIN SERPL-MCNC: 43 NG/ML — SIGNIFICANT CHANGE UP (ref 30–400)
GLUCOSE BLDC GLUCOMTR-MCNC: 112 MG/DL — HIGH (ref 70–99)
GLUCOSE BLDC GLUCOMTR-MCNC: 130 MG/DL — HIGH (ref 70–99)
GLUCOSE SERPL-MCNC: 103 MG/DL — HIGH (ref 70–99)
GLUCOSE SERPL-MCNC: 53 MG/DL — LOW (ref 70–99)
GLUCOSE SERPL-MCNC: 78 MG/DL — SIGNIFICANT CHANGE UP (ref 70–99)
GLUCOSE UR QL: 100 MG/DL
HCT VFR BLD CALC: 24.4 % — LOW (ref 39–50)
HGB BLD-MCNC: 7.5 G/DL — LOW (ref 13–17)
IMM GRANULOCYTES NFR BLD AUTO: 0.4 % — SIGNIFICANT CHANGE UP (ref 0–1.5)
IRON SATN MFR SERPL: 21 UG/DL — LOW (ref 45–165)
KETONES UR-MCNC: NEGATIVE — SIGNIFICANT CHANGE UP
LEUKOCYTE ESTERASE UR-ACNC: NEGATIVE — SIGNIFICANT CHANGE UP
LYMPHOCYTES # BLD AUTO: 0.6 K/UL — LOW (ref 1–3.3)
LYMPHOCYTES # BLD AUTO: 7.1 % — LOW (ref 13–44)
MCHC RBC-ENTMCNC: 26.9 PG — LOW (ref 27–34)
MCHC RBC-ENTMCNC: 30.7 GM/DL — LOW (ref 32–36)
MCV RBC AUTO: 87.5 FL — SIGNIFICANT CHANGE UP (ref 80–100)
MONOCYTES # BLD AUTO: 0.8 K/UL — SIGNIFICANT CHANGE UP (ref 0–0.9)
MONOCYTES NFR BLD AUTO: 9.5 % — SIGNIFICANT CHANGE UP (ref 2–14)
NEUTROPHILS # BLD AUTO: 6.62 K/UL — SIGNIFICANT CHANGE UP (ref 1.8–7.4)
NEUTROPHILS NFR BLD AUTO: 78.6 % — HIGH (ref 43–77)
NITRITE UR-MCNC: NEGATIVE — SIGNIFICANT CHANGE UP
NRBC # BLD: 0 /100 WBCS — SIGNIFICANT CHANGE UP (ref 0–0)
PH UR: 5 — SIGNIFICANT CHANGE UP (ref 5–8)
PLATELET # BLD AUTO: 204 K/UL — SIGNIFICANT CHANGE UP (ref 150–400)
POTASSIUM SERPL-MCNC: 5.4 MMOL/L — HIGH (ref 3.5–5.3)
POTASSIUM SERPL-MCNC: 5.6 MMOL/L — HIGH (ref 3.5–5.3)
POTASSIUM SERPL-MCNC: 5.6 MMOL/L — HIGH (ref 3.5–5.3)
POTASSIUM SERPL-SCNC: 5.4 MMOL/L — HIGH (ref 3.5–5.3)
POTASSIUM SERPL-SCNC: 5.6 MMOL/L — HIGH (ref 3.5–5.3)
POTASSIUM SERPL-SCNC: 5.6 MMOL/L — HIGH (ref 3.5–5.3)
PROT SERPL-MCNC: 6.7 GM/DL — SIGNIFICANT CHANGE UP (ref 6–8.3)
PROT UR-MCNC: 30 MG/DL
RBC # BLD: 2.79 M/UL — LOW (ref 4.2–5.8)
RBC # FLD: 15.9 % — HIGH (ref 10.3–14.5)
RBC CASTS # UR COMP ASSIST: SIGNIFICANT CHANGE UP /HPF (ref 0–4)
SODIUM SERPL-SCNC: 138 MMOL/L — SIGNIFICANT CHANGE UP (ref 135–145)
SODIUM SERPL-SCNC: 139 MMOL/L — SIGNIFICANT CHANGE UP (ref 135–145)
SODIUM SERPL-SCNC: 140 MMOL/L — SIGNIFICANT CHANGE UP (ref 135–145)
SP GR SPEC: 1.01 — SIGNIFICANT CHANGE UP (ref 1.01–1.02)
UROBILINOGEN FLD QL: NEGATIVE MG/DL — SIGNIFICANT CHANGE UP
WBC # BLD: 8.42 K/UL — SIGNIFICANT CHANGE UP (ref 3.8–10.5)
WBC # FLD AUTO: 8.42 K/UL — SIGNIFICANT CHANGE UP (ref 3.8–10.5)
WBC UR QL: SIGNIFICANT CHANGE UP

## 2020-03-10 PROCEDURE — 99233 SBSQ HOSP IP/OBS HIGH 50: CPT

## 2020-03-10 RX ORDER — BUMETANIDE 0.25 MG/ML
2 INJECTION INTRAMUSCULAR; INTRAVENOUS DAILY
Refills: 0 | Status: DISCONTINUED | OUTPATIENT
Start: 2020-03-10 | End: 2020-03-11

## 2020-03-10 RX ORDER — SODIUM CHLORIDE 9 MG/ML
1000 INJECTION, SOLUTION INTRAVENOUS
Refills: 0 | Status: DISCONTINUED | OUTPATIENT
Start: 2020-03-10 | End: 2020-03-10

## 2020-03-10 RX ADMIN — Medication 1 APPLICATION(S): at 06:12

## 2020-03-10 RX ADMIN — SIMVASTATIN 20 MILLIGRAM(S): 20 TABLET, FILM COATED ORAL at 21:26

## 2020-03-10 RX ADMIN — APIXABAN 5 MILLIGRAM(S): 2.5 TABLET, FILM COATED ORAL at 10:33

## 2020-03-10 RX ADMIN — SENNA PLUS 2 TABLET(S): 8.6 TABLET ORAL at 21:26

## 2020-03-10 RX ADMIN — Medication 100 MILLIGRAM(S): at 21:26

## 2020-03-10 RX ADMIN — SODIUM CHLORIDE 100 MILLILITER(S): 9 INJECTION, SOLUTION INTRAVENOUS at 10:33

## 2020-03-10 RX ADMIN — TAMSULOSIN HYDROCHLORIDE 0.4 MILLIGRAM(S): 0.4 CAPSULE ORAL at 21:26

## 2020-03-10 RX ADMIN — APIXABAN 5 MILLIGRAM(S): 2.5 TABLET, FILM COATED ORAL at 21:26

## 2020-03-10 RX ADMIN — Medication 100 MILLIGRAM(S): at 13:57

## 2020-03-10 RX ADMIN — SODIUM ZIRCONIUM CYCLOSILICATE 10 GRAM(S): 10 POWDER, FOR SUSPENSION ORAL at 17:21

## 2020-03-10 RX ADMIN — ERYTHROPOIETIN 4000 UNIT(S): 10000 INJECTION, SOLUTION INTRAVENOUS; SUBCUTANEOUS at 14:50

## 2020-03-10 RX ADMIN — SODIUM ZIRCONIUM CYCLOSILICATE 10 GRAM(S): 10 POWDER, FOR SUSPENSION ORAL at 06:09

## 2020-03-10 RX ADMIN — BUMETANIDE 2 MILLIGRAM(S): 0.25 INJECTION INTRAMUSCULAR; INTRAVENOUS at 20:47

## 2020-03-10 NOTE — PROGRESS NOTE ADULT - SUBJECTIVE AND OBJECTIVE BOX
[facility] NEPHROLOGY [facility], [facility]  NEPHROLOGY AND HYPERTENSION  [address]  [address]  [place] [address]  [phone]    MD YISEL WEEKS MD ANDREY GONCHARUK, MD MADHU KORRAPATI, MD YELENA ROSENBERG, MD MANISH SINGH, MD MANJINDER GOMZE MD          Patient feels ok no distress  Edema persists     MEDICATIONS  (STANDING):  apixaban 5 milliGRAM(s) Oral two times a day  dextrose 5%. 1000 milliLiter(s) (50 mL/Hr) IV Continuous <Continuous>  dextrose 50% Injectable 12.5 Gram(s) IV Push once  dextrose 50% Injectable 25 Gram(s) IV Push once  dextrose 50% Injectable 25 Gram(s) IV Push once  diltiazem    milliGRAM(s) Oral daily  epoetin malik Injectable 4000 Unit(s) SubCutaneous once  hydrALAZINE 100 milliGRAM(s) Oral three times a day  hydrocortisone 1% Topical Cream - Peds 1 Application(s) Topical two times a day  insulin lispro (HumaLOG) corrective regimen sliding scale   SubCutaneous three times a day before meals  senna 2 Tablet(s) Oral at bedtime  simvastatin 20 milliGRAM(s) Oral at bedtime  sodium zirconium cyclosilicate 10 Gram(s) Oral every 8 hours  tamsulosin 0.4 milliGRAM(s) Oral at bedtime    MEDICATIONS  (PRN):  acetaminophen   Tablet .. 650 milliGRAM(s) Oral every 6 hours PRN mild pain  dextrose 40% Gel 15 Gram(s) Oral once PRN Blood Glucose LESS THAN 70 milliGRAM(s)/deciliter  glucagon  Injectable 1 milliGRAM(s) IntraMuscular once PRN Glucose LESS THAN 70 milligrams/deciliter      03-09-20 @ 07:01  -  03-10-20 @ 07:00  --------------------------------------------------------  IN: 0 mL / OUT: 1225 mL / NET: -1225 mL      PHYSICAL EXAM:      T(C): 36.1 (03-10-20 @ 11:30), Max: 36.3 (03-09-20 @ 14:53)  HR: 62 (03-10-20 @ 13:54) (56 - 66)  BP: 152/62 (03-10-20 @ 13:54) (101/60 - 152/62)  RR: 18 (03-10-20 @ 11:30) (17 - 18)  SpO2: 99% (03-10-20 @ 11:30) (94% - 99%)  Wt(kg): --  Respiratory: clear anteriorly, decreased BS at bases  Cardiovascular: S1 S2  Gastrointestinal: soft moderate distention  Extremities:   2 edema  Left BKA                                    7.5    8.42  )-----------( 204      ( 10 Mar 2020 09:02 )             24.4     03-10    138  |  104  |  99<H>  ----------------------------<  78  5.4<H>   |  26  |  3.72<H>    Ca    8.8      10 Mar 2020 09:02    TPro  6.7  /  Alb  3.0<L>  /  TBili  0.4  /  DBili  x   /  AST  14<L>  /  ALT  22  /  AlkPhos  62  03-10      LIVER FUNCTIONS - ( 10 Mar 2020 09:02 )  Alb: 3.0 g/dL / Pro: 6.7 gm/dL / ALK PHOS: 62 U/L / ALT: 22 U/L / AST: 14 U/L / GGT: x           Creatinine Trend: 3.72<--, 3.62<--, 3.76<--, 3.66<--      Assessment   ELMER CKD 3-4; pre renal with warranted diuresis for edema; advancing CKD  Post renal factors; bladder scan PVR < 100  Anemia    Plan  Follow repeat bladder scan as abd distended;   Medical rx K;   Bladder scan; PVR; olsen if > 250 ml.  Resume diuresis as patient with increasing edema;   This may be his new baseline when taking edema/ anasarca into consideration.  Would recommend AV access once stable  Fe profile           Augusto Santoyo MD

## 2020-03-10 NOTE — PROGRESS NOTE ADULT - ASSESSMENT
70 y/o male PMH HTN, HLD, DM2, Afib on Eliquis who presented to the ED because of  abnormally high potassium and worsening renal failure, asked to come in by PMD. He denied any fever or chills, any prostate issues but states he has had trouble urinating in the past and has not been able to urinate well he has both hesitancy and increasing abdominal girth. He is otherwise a poor historian and history is partially obtained from ED and prior records, he states he has had red right leg for a long time and the red right shin is not new, he denies any tenderness or fever , denied any discharge from the red areas of the right shin, He is already seen by the nephrologist and recommendations have been made , RN at bedside is doing repeat Blood glucose finger stick to repeat insulin and dextrose. Pt treated for hyperkalemia, urinary catheter ordered, seen by Dr Santoyo from nephrology. With history urinary retention and acute on chronic renal failure, urinary catheter ordered, and D50/insulin, Kayexalate for hyperkalemia.    ELMER on CKD stage 4:  - With hyperkalemia  - Change diet to low K diet  - K improved  - Start diuretics  - Repeat bladder scan  - Dose meds per CrCl  - Avoid nephrotoxics, No NSAID  - Renal following    DM:  - Hb A1c 8 from 1/20, near goal  - On Insulin SS, glucose level ok now  - Change diet to carb consistent, monitor to adjust dose     HTN:  - BP acceptable  - Continue current meds.      Persistent long standing a fib:  - Rate controlled on Cardizem  - On Eliquis

## 2020-03-10 NOTE — PROGRESS NOTE ADULT - SUBJECTIVE AND OBJECTIVE BOX
Patient is a 69y old  Male who presents with a chief complaint of Abnormal lab results. (10 Mar 2020 14:04)      INTERVAL HPI/OVERNIGHT EVENTS:  Pt was seen and examined, no acute events.    MEDICATIONS  (STANDING):  apixaban 5 milliGRAM(s) Oral two times a day  dextrose 5%. 1000 milliLiter(s) (50 mL/Hr) IV Continuous <Continuous>  dextrose 50% Injectable 12.5 Gram(s) IV Push once  dextrose 50% Injectable 25 Gram(s) IV Push once  dextrose 50% Injectable 25 Gram(s) IV Push once  diltiazem    milliGRAM(s) Oral daily  hydrALAZINE 100 milliGRAM(s) Oral three times a day  hydrocortisone 1% Topical Cream - Peds 1 Application(s) Topical two times a day  insulin lispro (HumaLOG) corrective regimen sliding scale   SubCutaneous three times a day before meals  senna 2 Tablet(s) Oral at bedtime  simvastatin 20 milliGRAM(s) Oral at bedtime  sodium zirconium cyclosilicate 10 Gram(s) Oral every 8 hours  tamsulosin 0.4 milliGRAM(s) Oral at bedtime    MEDICATIONS  (PRN):  acetaminophen   Tablet .. 650 milliGRAM(s) Oral every 6 hours PRN mild pain  dextrose 40% Gel 15 Gram(s) Oral once PRN Blood Glucose LESS THAN 70 milliGRAM(s)/deciliter  glucagon  Injectable 1 milliGRAM(s) IntraMuscular once PRN Glucose LESS THAN 70 milligrams/deciliter      Allergies  No Known Allergies      Vital Signs Last 24 Hrs  T(C): 36.1 (10 Mar 2020 11:30), Max: 36.3 (09 Mar 2020 19:28)  T(F): 96.9 (10 Mar 2020 11:30), Max: 97.4 (09 Mar 2020 19:28)  HR: 62 (10 Mar 2020 13:54) (56 - 66)  BP: 152/62 (10 Mar 2020 13:54) (101/60 - 152/62)  BP(mean): --  RR: 18 (10 Mar 2020 11:30) (17 - 18)  SpO2: 99% (10 Mar 2020 11:30) (97% - 99%)      PHYSICAL EXAM:  GENERAL: NAD  HEAD:  Atraumatic  EYES: PERRLA  NERVOUS SYSTEM:  A, O x 3, non focal  CHEST/LUNG: Clear  HEART: RRR  ABDOMEN: Soft, non tender  EXTREMITIES:  left BKA, rt LE 2+ edema, red skin, chronic      LABS:                        7.5    8.42  )-----------( 204      ( 10 Mar 2020 09:02 )             24.4     03-10    139  |  104  |  98<H>  ----------------------------<  103<H>  5.6<H>   |  26  |  3.49<H>    Ca    8.8      10 Mar 2020 14:38    TPro  6.7  /  Alb  3.0<L>  /  TBili  0.4  /  DBili  x   /  AST  14<L>  /  ALT  22  /  AlkPhos  62  03-10    PT/INR - ( 09 Mar 2020 16:19 )   PT: 18.1 sec;   INR: 1.59 ratio         PTT - ( 09 Mar 2020 16:19 )  PTT:40.4 sec    CAPILLARY BLOOD GLUCOSE      POCT Blood Glucose.: 105 mg/dL (10 Mar 2020 16:46)  POCT Blood Glucose.: 130 mg/dL (10 Mar 2020 11:30)  POCT Blood Glucose.: 112 mg/dL (10 Mar 2020 07:51)  POCT Blood Glucose.: 95 mg/dL (09 Mar 2020 19:36)      RADIOLOGY & ADDITIONAL TESTS:    Imaging Personally Reviewed:  [ ] YES  [ ] NO    Consultant(s) Notes Reviewed:  [ ] YES  [ ] NO    Care Discussed with Consultants/Other Providers [ ] YES  [ ] NO

## 2020-03-11 ENCOUNTER — TRANSCRIPTION ENCOUNTER (OUTPATIENT)
Age: 70
End: 2020-03-11

## 2020-03-11 VITALS
HEART RATE: 74 BPM | SYSTOLIC BLOOD PRESSURE: 145 MMHG | OXYGEN SATURATION: 94 % | DIASTOLIC BLOOD PRESSURE: 59 MMHG | RESPIRATION RATE: 18 BRPM | TEMPERATURE: 98 F

## 2020-03-11 LAB
ALBUMIN SERPL ELPH-MCNC: 2.9 G/DL — LOW (ref 3.3–5)
ALP SERPL-CCNC: 59 U/L — SIGNIFICANT CHANGE UP (ref 40–120)
ALT FLD-CCNC: 20 U/L — SIGNIFICANT CHANGE UP (ref 12–78)
ANION GAP SERPL CALC-SCNC: 9 MMOL/L — SIGNIFICANT CHANGE UP (ref 5–17)
AST SERPL-CCNC: 10 U/L — LOW (ref 15–37)
BILIRUB SERPL-MCNC: 0.4 MG/DL — SIGNIFICANT CHANGE UP (ref 0.2–1.2)
BUN SERPL-MCNC: 89 MG/DL — HIGH (ref 7–23)
CALCIUM SERPL-MCNC: 8.7 MG/DL — SIGNIFICANT CHANGE UP (ref 8.5–10.1)
CHLORIDE SERPL-SCNC: 104 MMOL/L — SIGNIFICANT CHANGE UP (ref 96–108)
CO2 SERPL-SCNC: 25 MMOL/L — SIGNIFICANT CHANGE UP (ref 22–31)
CREAT SERPL-MCNC: 3.29 MG/DL — HIGH (ref 0.5–1.3)
EOSINOPHIL NFR URNS MANUAL: NEGATIVE — SIGNIFICANT CHANGE UP
GLUCOSE SERPL-MCNC: 99 MG/DL — SIGNIFICANT CHANGE UP (ref 70–99)
HAV IGM SER-ACNC: SIGNIFICANT CHANGE UP
HBV CORE IGM SER-ACNC: SIGNIFICANT CHANGE UP
HBV SURFACE AB SER-ACNC: SIGNIFICANT CHANGE UP
HBV SURFACE AG SER-ACNC: SIGNIFICANT CHANGE UP
HCT VFR BLD CALC: 25.2 % — LOW (ref 39–50)
HCV AB S/CO SERPL IA: 0.16 S/CO — SIGNIFICANT CHANGE UP (ref 0–0.99)
HCV AB SERPL-IMP: SIGNIFICANT CHANGE UP
HGB BLD-MCNC: 7.6 G/DL — LOW (ref 13–17)
MCHC RBC-ENTMCNC: 26.6 PG — LOW (ref 27–34)
MCHC RBC-ENTMCNC: 30.2 GM/DL — LOW (ref 32–36)
MCV RBC AUTO: 88.1 FL — SIGNIFICANT CHANGE UP (ref 80–100)
NRBC # BLD: 0 /100 WBCS — SIGNIFICANT CHANGE UP (ref 0–0)
PLATELET # BLD AUTO: 203 K/UL — SIGNIFICANT CHANGE UP (ref 150–400)
POTASSIUM SERPL-MCNC: 4.7 MMOL/L — SIGNIFICANT CHANGE UP (ref 3.5–5.3)
POTASSIUM SERPL-SCNC: 4.7 MMOL/L — SIGNIFICANT CHANGE UP (ref 3.5–5.3)
PROT SERPL-MCNC: 6.4 GM/DL — SIGNIFICANT CHANGE UP (ref 6–8.3)
RBC # BLD: 2.86 M/UL — LOW (ref 4.2–5.8)
RBC # FLD: 15.9 % — HIGH (ref 10.3–14.5)
SODIUM SERPL-SCNC: 138 MMOL/L — SIGNIFICANT CHANGE UP (ref 135–145)
WBC # BLD: 7.71 K/UL — SIGNIFICANT CHANGE UP (ref 3.8–10.5)
WBC # FLD AUTO: 7.71 K/UL — SIGNIFICANT CHANGE UP (ref 3.8–10.5)

## 2020-03-11 PROCEDURE — 99239 HOSP IP/OBS DSCHRG MGMT >30: CPT

## 2020-03-11 RX ORDER — ERYTHROPOIETIN 10000 [IU]/ML
10000 INJECTION, SOLUTION INTRAVENOUS; SUBCUTANEOUS ONCE
Refills: 0 | Status: COMPLETED | OUTPATIENT
Start: 2020-03-11 | End: 2020-03-11

## 2020-03-11 RX ORDER — IRON SUCROSE 20 MG/ML
200 INJECTION, SOLUTION INTRAVENOUS ONCE
Refills: 0 | Status: COMPLETED | OUTPATIENT
Start: 2020-03-11 | End: 2020-03-11

## 2020-03-11 RX ORDER — BUMETANIDE 0.25 MG/ML
1 INJECTION INTRAMUSCULAR; INTRAVENOUS
Qty: 0 | Refills: 0 | DISCHARGE
Start: 2020-03-11

## 2020-03-11 RX ORDER — HYDRALAZINE HCL 50 MG
10 TABLET ORAL ONCE
Refills: 0 | Status: COMPLETED | OUTPATIENT
Start: 2020-03-11 | End: 2020-03-11

## 2020-03-11 RX ORDER — SODIUM CHLORIDE 0.65 %
1 AEROSOL, SPRAY (ML) NASAL THREE TIMES A DAY
Refills: 0 | Status: DISCONTINUED | OUTPATIENT
Start: 2020-03-11 | End: 2020-03-11

## 2020-03-11 RX ADMIN — Medication 100 MILLIGRAM(S): at 14:02

## 2020-03-11 RX ADMIN — ERYTHROPOIETIN 10000 UNIT(S): 10000 INJECTION, SOLUTION INTRAVENOUS; SUBCUTANEOUS at 17:37

## 2020-03-11 RX ADMIN — Medication 100 MILLIGRAM(S): at 05:20

## 2020-03-11 RX ADMIN — Medication 1 SPRAY(S): at 05:20

## 2020-03-11 RX ADMIN — BUMETANIDE 2 MILLIGRAM(S): 0.25 INJECTION INTRAMUSCULAR; INTRAVENOUS at 05:19

## 2020-03-11 RX ADMIN — Medication 240 MILLIGRAM(S): at 05:20

## 2020-03-11 RX ADMIN — Medication 650 MILLIGRAM(S): at 04:03

## 2020-03-11 RX ADMIN — SODIUM ZIRCONIUM CYCLOSILICATE 10 GRAM(S): 10 POWDER, FOR SUSPENSION ORAL at 00:08

## 2020-03-11 RX ADMIN — SODIUM ZIRCONIUM CYCLOSILICATE 10 GRAM(S): 10 POWDER, FOR SUSPENSION ORAL at 08:31

## 2020-03-11 RX ADMIN — Medication 650 MILLIGRAM(S): at 05:15

## 2020-03-11 RX ADMIN — Medication 1 SPRAY(S): at 08:31

## 2020-03-11 RX ADMIN — IRON SUCROSE 110 MILLIGRAM(S): 20 INJECTION, SOLUTION INTRAVENOUS at 13:57

## 2020-03-11 RX ADMIN — APIXABAN 5 MILLIGRAM(S): 2.5 TABLET, FILM COATED ORAL at 05:18

## 2020-03-11 RX ADMIN — Medication 10 MILLIGRAM(S): at 17:34

## 2020-03-11 RX ADMIN — SODIUM ZIRCONIUM CYCLOSILICATE 10 GRAM(S): 10 POWDER, FOR SUSPENSION ORAL at 14:02

## 2020-03-11 NOTE — DISCHARGE NOTE NURSING/CASE MANAGEMENT/SOCIAL WORK - PATIENT PORTAL LINK FT
You can access the FollowMyHealth Patient Portal offered by [facility] by registering at the following website: [email or web address]. By joining [employer] [employer]’s FollowMyHealth portal, you will also be able to view your health information using other applications (apps) compatible with our system.

## 2020-03-11 NOTE — DISCHARGE NOTE PROVIDER - PROVIDER TOKENS
FREE:[LAST:[Your PCP in a week],PHONE:[(   )    -],FAX:[(   )    -]],FREE:[LAST:[nephrologist in 1-2 weeks],PHONE:[(   )    -],FAX:[(   )    -]]

## 2020-03-11 NOTE — DISCHARGE NOTE PROVIDER - CARE PROVIDER_API CALL
Your PCP in a week,   Phone: (   )    -  Fax: (   )    -  Follow Up Time:     nephrologist in 1-2 weeks,   Phone: (   )    -  Fax: (   )    -  Follow Up Time:

## 2020-03-11 NOTE — DISCHARGE NOTE PROVIDER - HOSPITAL COURSE
70 y/o male PMH HTN, HLD, DM2, Afib on Eliquis who presented to the ED because of  abnormally high potassium and worsening renal failure, asked to come in by PMD. He denied any fever or chills. Patient received dextrose with insulin, kayexalate for hyperkalemia. nephrotoxic agents were held for ELMER. renal was consulted. Patient was found to have urinary retention and straight cath was preformed. flomax was continued. Patient's hyperkalemia improved. diuretic was initiated again for worsening leg edema. PT was consulted and recommended [facility]. patient is medically ready to be transferred back to [facility].         PHYSICAL EXAM:        GENERAL: Obese, on room air.     CHEST/LUNG: Clear to ausculation bilaterally, no wheezing, no crackles     HEART: irregularly irregular, no rubs     ABDOMEN: Soft, Nontender, Nondistended; Bowel sounds present    EXTREMITIES:  2+ edema right leg. + left BKA. no cyanosis.     NERVOUS SYSTEM:  Grossly non focal.    Psychiatry: AAO x 3, mood is appropriate         OBJECTIVE DATA:     Vital Signs Last 24 Hrs    T(C): 36.4 (11 Mar 2020 12:26), Max: 36.6 (10 Mar 2020 17:00)    T(F): 97.6 (11 Mar 2020 12:26), Max: 97.9 (11 Mar 2020 00:42)    HR: 78 (11 Mar 2020 12:26) (62 - 85)    BP: 134/59 (11 Mar 2020 12:26) (134/59 - 152/62)    BP(mean): --    RR: 18 (11 Mar 2020 09:59) (18 - 18)    SpO2: 96% (11 Mar 2020 09:59) (96% - 99%)           Daily       Daily Weight in k.7 (11 Mar 2020 05:22)    LABS:                            7.6      7.71  )-----------( 203      ( 11 Mar 2020 06:32 )               25.2               03-11        138  |  104  |  89<H>    ----------------------------<  99    4.7   |  25  |  3.29<H>        Ca    8.7      11 Mar 2020 06:32        TPro  6.4  /  Alb  2.9<L>  /  TBili  0.4  /  DBili  x   /  AST  10<L>  /  ALT  20  /  AlkPhos  59  03-11                PT/INR - ( 09 Mar 2020 16:19 )   PT: 18.1 sec;   INR: 1.59 ratio               PTT - ( 09 Mar 2020 16:19 )  PTT:40.4 sec    Urinalysis Basic - ( 10 Mar 2020 20:50 )        Color: Yellow / Appearance: Clear / S.010 / pH: x    Gluc: x / Ketone: Negative  / Bili: Negative / Urobili: Negative mg/dL     Blood: x / Protein: 30 mg/dL / Nitrite: Negative     Leuk Esterase: Negative / RBC: 0-2 /HPF / WBC 0-2     Sq Epi: x / Non Sq Epi: Occasional / Bacteria: Occasional              CAPILLARY BLOOD GLUCOSE            POCT Blood Glucose.: 172 mg/dL (11 Mar 2020 11:35)            Culture - Blood (collected 03-10)    Source: .Blood Blood    Preliminary Report ():      No growth to date.                DC TIME SPENT BY ME EXCLUDING BILLABLE PROCEDURES 40 mins

## 2020-03-11 NOTE — PHYSICAL THERAPY INITIAL EVALUATION ADULT - ADDITIONAL COMMENTS
Pt states he was admitted to hospital from [facility] for abnormal lab values. Pt states he has been able to transfer OOB to wheelchair with 1 person assist (stand pivot) with nursing staff. Pt states he is able to work with PT (+ hopping with rolling walker). Pt states he was fit for prosthetic but has not received it yet. Goal of therapy: get prosthetic & improve functional independence. Prior to last admission (+ L BKA) pt states he lives with his family in a private home, 1 entry step (no rails), 1 level once inside. Prior to admission pt was independent with all functional mobility including community ambulation without a device. Pt states he is right hand dominant, was driving prior to amputation, & wears eye glasses.

## 2020-03-11 NOTE — DISCHARGE NOTE PROVIDER - NSDCCPCAREPLAN_GEN_ALL_CORE_FT
PRINCIPAL DISCHARGE DIAGNOSIS  Diagnosis: ESRD (end stage renal disease)  Assessment and Plan of Treatment:

## 2020-03-11 NOTE — PHYSICAL THERAPY INITIAL EVALUATION ADULT - GENERAL OBSERVATIONS, REHAB EVAL
Pt encountered in left side lying position + portable telemetry, IOV lock RUE, eye glasses donned,  sleeve to L BKA residual limb

## 2020-03-11 NOTE — PHYSICAL THERAPY INITIAL EVALUATION ADULT - BALANCE TRAINING, PT EVAL
GOAL: pt will be able to independently ambulate 150ft with rolling walker without loss of balance within 4 weeks

## 2020-03-11 NOTE — DISCHARGE NOTE PROVIDER - NSDCFUSCHEDAPPT_GEN_ALL_CORE_FT
ROCCO JC ; 04/07/2020 ; NPP Urology [facility] ROCCO JC ; 04/07/2020 ; NPP Urology [facility]

## 2020-03-11 NOTE — DISCHARGE NOTE PROVIDER - NSDCMRMEDTOKEN_GEN_ALL_CORE_FT
acetaminophen 325 mg oral tablet: 2 tab(s) orally every 6 hours, As needed, Temp greater or equal to 38C (100.4F), Mild Pain (1 - 3)  albuterol 1.25 mg/3 mL (0.042%) inhalation solution:  inhaled   aluminum hydroxide-magnesium hydroxide 200 mg-200 mg/5 mL oral suspension: 30 milliliter(s) orally every 6 hours, As needed, Dyspepsia  bumetanide 2 mg oral tablet: 1 tab(s) orally once a day  dilTIAZem 240 mg/24 hours oral capsule, extended release: 1 cap(s) orally once a day  doxazosin 4 mg oral tablet: 1 tab(s) orally once a day (at bedtime)  Eliquis 5 mg oral tablet: 1 tab(s) orally 2 times a day  glimepiride 4 mg oral tablet: 1 tab(s) orally once a day  hydrALAZINE 100 mg oral tablet: 1 tab(s) orally 3 times a day  hydrocortisone 1% topical cream: 1 application topically 2 times a day  Invokana 300 mg oral tablet: 1 tab(s) orally once a day  lidocaine 5% topical film:  topically   oxycodone-acetaminophen 5 mg-325 mg oral tablet: 1 tab(s) orally every 4 hours, As needed, Moderate Pain (4 - 6)  pantoprazole 40 mg oral delayed release tablet: 1 tab(s) orally once a day (before a meal)  polyethylene glycol 3350 oral powder for reconstitution: 17 gram(s) orally once a day  senna oral tablet: 2 tab(s) orally once a day (at bedtime)  simvastatin 20 mg oral tablet: 1 tab(s) orally once a day (at bedtime)

## 2020-03-15 LAB
CULTURE RESULTS: SIGNIFICANT CHANGE UP
SPECIMEN SOURCE: SIGNIFICANT CHANGE UP

## 2020-03-16 DIAGNOSIS — I48.11 LONGSTANDING PERSISTENT ATRIAL FIBRILLATION: ICD-10-CM

## 2020-03-16 DIAGNOSIS — E87.5 HYPERKALEMIA: ICD-10-CM

## 2020-03-16 DIAGNOSIS — N17.9 ACUTE KIDNEY FAILURE, UNSPECIFIED: ICD-10-CM

## 2020-03-16 DIAGNOSIS — E11.22 TYPE 2 DIABETES MELLITUS WITH DIABETIC CHRONIC KIDNEY DISEASE: ICD-10-CM

## 2020-03-16 DIAGNOSIS — E78.5 HYPERLIPIDEMIA, UNSPECIFIED: ICD-10-CM

## 2020-03-16 DIAGNOSIS — Z79.01 LONG TERM (CURRENT) USE OF ANTICOAGULANTS: ICD-10-CM

## 2020-03-16 DIAGNOSIS — Z79.84 LONG TERM (CURRENT) USE OF ORAL HYPOGLYCEMIC DRUGS: ICD-10-CM

## 2020-03-16 DIAGNOSIS — D64.9 ANEMIA, UNSPECIFIED: ICD-10-CM

## 2020-03-16 DIAGNOSIS — R33.9 RETENTION OF URINE, UNSPECIFIED: ICD-10-CM

## 2020-03-16 DIAGNOSIS — N18.4 CHRONIC KIDNEY DISEASE, STAGE 4 (SEVERE): ICD-10-CM

## 2020-03-16 DIAGNOSIS — I12.9 HYPERTENSIVE CHRONIC KIDNEY DISEASE WITH STAGE 1 THROUGH STAGE 4 CHRONIC KIDNEY DISEASE, OR UNSPECIFIED CHRONIC KIDNEY DISEASE: ICD-10-CM

## 2020-04-07 ENCOUNTER — APPOINTMENT (OUTPATIENT)
Dept: UROLOGY | Facility: CLINIC | Age: 70
End: 2020-04-07

## 2020-05-07 ENCOUNTER — APPOINTMENT (OUTPATIENT)
Dept: FAMILY MEDICINE | Facility: CLINIC | Age: 70
End: 2020-05-07
Payer: MEDICARE

## 2020-05-07 DIAGNOSIS — S81.802A UNSPECIFIED OPEN WOUND, LEFT LOWER LEG, INITIAL ENCOUNTER: ICD-10-CM

## 2020-05-07 DIAGNOSIS — M54.16 RADICULOPATHY, LUMBAR REGION: ICD-10-CM

## 2020-05-07 DIAGNOSIS — E11.40 TYPE 2 DIABETES MELLITUS WITH DIABETIC NEUROPATHY, UNSPECIFIED: ICD-10-CM

## 2020-05-07 DIAGNOSIS — I10 ESSENTIAL (PRIMARY) HYPERTENSION: ICD-10-CM

## 2020-05-07 PROCEDURE — 99214 OFFICE O/P EST MOD 30 MIN: CPT | Mod: 95

## 2020-05-07 RX ORDER — POVIDONE-IODINE 10 MG/ML
10 SOLUTION TOPICAL
Qty: 1 | Refills: 0 | Status: ACTIVE | COMMUNITY
Start: 2020-05-07 | End: 1900-01-01

## 2020-05-07 RX ORDER — BLOOD-GLUCOSE METER
W/DEVICE KIT MISCELLANEOUS
Qty: 1 | Refills: 0 | Status: ACTIVE | COMMUNITY
Start: 2020-05-07 | End: 1900-01-01

## 2020-12-28 NOTE — PROGRESS NOTE ADULT - SUBJECTIVE AND OBJECTIVE BOX
Interval summary/Hospital course:  Pt is a 68 yo gentleman with a pmhx of HTN, HL, NIDM, Afib on eliquis who presented to the ED with leg redness and fever and cough . Has had chronic ulcer on L. medial leg for months, but has gotten worse. Patient was admitted and diagnosed with sepsis with  acute gas gangrene left foot with possible OM and necrotizing fascitis. ID and Podiatrist were consulted. patient was placed on broad spectrum antbiotic. patient under I and D left leg on  and left foot amputation on 1/10.  Tissue culture grew strep agalactiae.   also patient was placed on contact isolation for RSV URI.         CHIEF COMPLAINT: left hip pain. s/p X ray low back and left hip. pain meds helping. not much cough. no fever.       PHYSICAL EXAM:  GENERAL: well built, well nourished,   CHEST/LUNG: Clear to ausculation bilaterally, no wheezing, no crackles   HEART: Regular rate and rhythm; No murmurs, rubs  ABDOMEN: Soft, Nontender, Nondistended; Bowel sounds present  EXTREMITIES:  s/p left foot amputation. dressing +. no cyanosis.   NERVOUS SYSTEM:  Grossly non focal.  Psychiatry: AAO x 3, mood is appropriate     -----------------------------------------------------------------------------------------------------------------------------------------------------------------  OBJECTIVE DATA:     Vital Signs Last 24 Hrs  T(C): 36.4 (2020 11:48), Max: 36.7 (2020 16:49)  T(F): 97.6 (2020 11:48), Max: 98 (2020 16:49)  HR: 78 (2020 11:48) (71 - 78)  BP: 192/91 (2020 11:48) (124/48 - 198/99)  BP(mean): --  RR: 16 (2020 11:48) (16 - 17)  SpO2: 98% (2020 11:48) (96% - 100%)           Daily     Daily Weight in k.2 (2020 05:09)      LABS:                        8.3    21.74 )-----------( 554      ( 2020 06:34 )             26.8                 138  |  107  |  44<H>  ----------------------------<  169<H>  4.5   |  24  |  1.82<H>    Ca    8.7      2020 06:34  Mg     2.0                             Culture - Surgical Swab  Preliminary Report (20 @ 22:45):    Rare Streptococcus agalactiae (Group B) isolated    Group B streptococci are susceptible to ampicillin,    penicillin and cefazolin, but may be resistant to    erythromycin and clindamycin.    Recommendations for intrapartum prophylaxis for Group B    streptococci are penicillin or ampicillin.    Culture - Tissue with Gram Stain  Gram Stain (prelim) (01-10-20 @ 12:16):    No polymorphonuclear leukocytes seen per low power field    Few Gram Positive Cocci in Pairs and Chains seen per oil power field  Preliminary Report (01-10-20 @ 12:16):    Numerous Streptococcus agalactiae (Group B)    Streptococcus agalactiae (Group B) isolated    Group B streptococci are susceptible to ampicillin,    penicillin and cefazolin, but may be resistant to    erythromycin and clindamycin.    Recommendations for intrapartum prophylaxis for Group B    streptococci are penicillin or ampicillin.    Culture - Tissue with Gram Stain  Gram Stain (prelim) (01-10-20 @ 12:17):    No polymorphonuclear leukocytes seen per low power field    Numerous Gram Positive Cocci in Pairs and Chains seen per oil power field  Preliminary Report (01-10-20 @ 12:17):    Numerous Streptococcus agalactiae (Group B)    Streptococcus agalactiae (Group B) isolated    Group B streptococci are susceptible to ampicillin,    penicillin and cefazolin, but may be resistant to    erythromycin and clindamycin.    Recommendations for intrapartum prophylaxis for Group B    streptococci are penicillin or ampicillin.    Culture - Urine  Final Report (20 @ 16:24):    No growth    Culture - Blood  Preliminary Report (20 @ 19:02):    No growth to date.    Culture - Blood  Gram Stain (20 @ 16:42):    Growth in anaerobic bottle: Gram Positive Cocci in Pairs and Chains  Final Report (20 @ 16:42):    Growth in anaerobic bottle: Streptococcus agalactiae (Group B)    ***Blood Panel PCR results on this specimen are available    approximately 3 hours after the Gram stain result.***    Gram stain, PCR, and/or culture results may not always    correspond dueto difference in methodologies.    ************************************************************    This PCR assay was performed using [employer].    The following targets are tested for: Enterococcus,    vancomycin resistant enterococci, Listeria monocytogenes,    coagulase negative staphylococci, S. aureus,    methicillin resistant S. aureus, Streptococcus agalactiae    (Group B), S. pneumoniae, S. pyogenes (Group A),    Acinetobacter baumannii, Enterobacter cloacae, E. coli,    Klebsiella oxytoca, K. pneumoniae, Proteus sp.,    Serratia marcescens, Haemophilus influenzae,    Neisseria meningitidis, Pseudomonas aeruginosa, Candida    albicans, C. glabrata, C krusei, C parapsilosis,    C. tropicalis and the KPC resistance gene.  Organism: Blood Culture PCR  Streptococcus agalactiae (Group B) (20 @ 16:42)  Organism: Streptococcus agalactiae (Group B) (20 @ 16:42)    Sensitivities:      -  Ceftriaxone: S 0.047      -  Penicillin: S 0.047 Predicts results for ampicillin, amoxicillin, amoxicillin/clavulanate, ampicillin/sulbactam, 1st, 2nd and 3rd generation cephalosporins and carbapenems.      Method Type: ETEST  Organism: Streptococcus agalactiae (Group B) (20 @ 16:42)    Sensitivities:      -  Clindamycin: S      -  Levofloxacin: S      -  Penicillin: S Predicts results for ampicillin, amoxicillin, amoxicillin/clavulanate, ampicillin/sulbactam, 1st, 2nd and 3rd generation cephalosporins and carbapenems.      -  Vancomycin: S      Method Type: KB  Organism: Blood Culture PCR (20 @ 16:42)    Sensitivities:      -  Streptococcus agalactiae (Group B): Detec      Method Type: PCR      MEDICATIONS  (STANDING):  amLODIPine   Tablet 10 milliGRAM(s) Oral daily  clindamycin IVPB 900 milliGRAM(s) IV Intermittent every 8 hours  dextrose 50% Injectable 12.5 Gram(s) IV Push once  dextrose 50% Injectable 25 Gram(s) IV Push once  dextrose 50% Injectable 25 Gram(s) IV Push once  diltiazem    milliGRAM(s) Oral daily  heparin  Injectable 5000 Unit(s) SubCutaneous every 12 hours  insulin glargine Injectable (LANTUS) 10 Unit(s) SubCutaneous at bedtime  insulin lispro (HumaLOG) corrective regimen sliding scale   SubCutaneous three times a day before meals  insulin lispro (HumaLOG) corrective regimen sliding scale   SubCutaneous at bedtime  lidocaine   Patch 1 Patch Transdermal every 24 hours  meropenem  IVPB 500 milliGRAM(s) IV Intermittent every 12 hours  sodium chloride 0.9%. 1000 milliLiter(s) (100 mL/Hr) IV Continuous <Continuous>    MEDICATIONS  (PRN):  acetaminophen   Tablet .. 650 milliGRAM(s) Oral every 6 hours PRN Temp greater or equal to 38C (100.4F), Mild Pain (1 - 3)  ALBUTerol   0.042% 1.25 milliGRAM(s) Nebulizer four times a day PRN Wheezing  dextrose 40% Gel 15 Gram(s) Oral once PRN Blood Glucose LESS THAN 70 milliGRAM(s)/deciliter  glucagon  Injectable 1 milliGRAM(s) IntraMuscular once PRN Glucose LESS THAN 70 milligrams/deciliter  guaiFENesin   Syrup  (Sugar-Free) 100 milliGRAM(s) Oral every 6 hours PRN Cough  morphine  - Injectable 2 milliGRAM(s) IV Push every 4 hours PRN Severe Pain (7 - 10)  oxycodone    5 mG/acetaminophen 325 mG 1 Tablet(s) Oral every 4 hours PRN Moderate Pain (4 - 6)  sodium chloride 0.65% Nasal 1 Spray(s) Both Nostrils three times a day PRN Nasal Congestion      RADIOLOGY & ADDITIONAL STUDIES:     ------------------------------------------------------------------------------------------------------------------------------------------------------------------------  DVT Prophylaxis: Anticoagulation  Compression devices 5-10

## 2021-04-05 NOTE — PROGRESS NOTE ADULT - PROBLEM SELECTOR PLAN 6
Detail Level: Zone Detail Level: Simple restart eliquis. watch for any active bleding. cont current rate control.

## 2021-04-23 ENCOUNTER — INPATIENT (INPATIENT)
Facility: HOSPITAL | Age: 71
LOS: 2 days | Discharge: HOME HEALTH | End: 2021-04-26
Attending: INTERNAL MEDICINE | Admitting: INTERNAL MEDICINE
Payer: MEDICARE

## 2021-04-23 VITALS
HEIGHT: 72 IN | SYSTOLIC BLOOD PRESSURE: 167 MMHG | RESPIRATION RATE: 17 BRPM | DIASTOLIC BLOOD PRESSURE: 71 MMHG | OXYGEN SATURATION: 95 % | TEMPERATURE: 99 F | WEIGHT: 250 LBS | HEART RATE: 59 BPM

## 2021-04-23 LAB
ALBUMIN SERPL ELPH-MCNC: 3.6 G/DL — SIGNIFICANT CHANGE UP (ref 3.3–5)
ALP SERPL-CCNC: 67 U/L — SIGNIFICANT CHANGE UP (ref 40–120)
ALT FLD-CCNC: 25 U/L — SIGNIFICANT CHANGE UP (ref 12–78)
ANION GAP SERPL CALC-SCNC: 7 MMOL/L — SIGNIFICANT CHANGE UP (ref 5–17)
APPEARANCE UR: CLEAR — SIGNIFICANT CHANGE UP
AST SERPL-CCNC: 14 U/L — LOW (ref 15–37)
BASOPHILS # BLD AUTO: 0.04 K/UL — SIGNIFICANT CHANGE UP (ref 0–0.2)
BASOPHILS NFR BLD AUTO: 0.4 % — SIGNIFICANT CHANGE UP (ref 0–2)
BILIRUB SERPL-MCNC: 0.3 MG/DL — SIGNIFICANT CHANGE UP (ref 0.2–1.2)
BILIRUB UR-MCNC: NEGATIVE — SIGNIFICANT CHANGE UP
BUN SERPL-MCNC: 86 MG/DL — HIGH (ref 7–23)
CALCIUM SERPL-MCNC: 8.6 MG/DL — SIGNIFICANT CHANGE UP (ref 8.5–10.1)
CHLORIDE SERPL-SCNC: 109 MMOL/L — HIGH (ref 96–108)
CO2 SERPL-SCNC: 24 MMOL/L — SIGNIFICANT CHANGE UP (ref 22–31)
COLOR SPEC: YELLOW — SIGNIFICANT CHANGE UP
CREAT SERPL-MCNC: 3.14 MG/DL — HIGH (ref 0.5–1.3)
DIFF PNL FLD: NEGATIVE — SIGNIFICANT CHANGE UP
EOSINOPHIL # BLD AUTO: 0.95 K/UL — HIGH (ref 0–0.5)
EOSINOPHIL NFR BLD AUTO: 9.3 % — HIGH (ref 0–6)
GLUCOSE SERPL-MCNC: 145 MG/DL — HIGH (ref 70–99)
GLUCOSE UR QL: NEGATIVE MG/DL — SIGNIFICANT CHANGE UP
HCT VFR BLD CALC: 27.4 % — LOW (ref 39–50)
HGB BLD-MCNC: 8.6 G/DL — LOW (ref 13–17)
IMM GRANULOCYTES NFR BLD AUTO: 0.4 % — SIGNIFICANT CHANGE UP (ref 0–1.5)
KETONES UR-MCNC: NEGATIVE — SIGNIFICANT CHANGE UP
LEUKOCYTE ESTERASE UR-ACNC: ABNORMAL
LIDOCAIN IGE QN: 130 U/L — SIGNIFICANT CHANGE UP (ref 73–393)
LYMPHOCYTES # BLD AUTO: 0.53 K/UL — LOW (ref 1–3.3)
LYMPHOCYTES # BLD AUTO: 5.2 % — LOW (ref 13–44)
MCHC RBC-ENTMCNC: 26.5 PG — LOW (ref 27–34)
MCHC RBC-ENTMCNC: 31.4 GM/DL — LOW (ref 32–36)
MCV RBC AUTO: 84.3 FL — SIGNIFICANT CHANGE UP (ref 80–100)
MONOCYTES # BLD AUTO: 0.86 K/UL — SIGNIFICANT CHANGE UP (ref 0–0.9)
MONOCYTES NFR BLD AUTO: 8.4 % — SIGNIFICANT CHANGE UP (ref 2–14)
NEUTROPHILS # BLD AUTO: 7.82 K/UL — HIGH (ref 1.8–7.4)
NEUTROPHILS NFR BLD AUTO: 76.3 % — SIGNIFICANT CHANGE UP (ref 43–77)
NITRITE UR-MCNC: NEGATIVE — SIGNIFICANT CHANGE UP
NRBC # BLD: 0 /100 WBCS — SIGNIFICANT CHANGE UP (ref 0–0)
NT-PROBNP SERPL-SCNC: 1464 PG/ML — HIGH (ref 0–125)
PH UR: 5 — SIGNIFICANT CHANGE UP (ref 5–8)
PLATELET # BLD AUTO: 192 K/UL — SIGNIFICANT CHANGE UP (ref 150–400)
POTASSIUM SERPL-MCNC: 4.9 MMOL/L — SIGNIFICANT CHANGE UP (ref 3.5–5.3)
POTASSIUM SERPL-SCNC: 4.9 MMOL/L — SIGNIFICANT CHANGE UP (ref 3.5–5.3)
PROT SERPL-MCNC: 7.4 GM/DL — SIGNIFICANT CHANGE UP (ref 6–8.3)
PROT UR-MCNC: 30 MG/DL
RBC # BLD: 3.25 M/UL — LOW (ref 4.2–5.8)
RBC # FLD: 15 % — HIGH (ref 10.3–14.5)
SODIUM SERPL-SCNC: 140 MMOL/L — SIGNIFICANT CHANGE UP (ref 135–145)
SP GR SPEC: 1.01 — SIGNIFICANT CHANGE UP (ref 1.01–1.02)
TROPONIN I SERPL-MCNC: 0.02 NG/ML — SIGNIFICANT CHANGE UP (ref 0.01–0.04)
UROBILINOGEN FLD QL: NEGATIVE MG/DL — SIGNIFICANT CHANGE UP
WBC # BLD: 10.24 K/UL — SIGNIFICANT CHANGE UP (ref 3.8–10.5)
WBC # FLD AUTO: 10.24 K/UL — SIGNIFICANT CHANGE UP (ref 3.8–10.5)

## 2021-04-23 PROCEDURE — 93010 ELECTROCARDIOGRAM REPORT: CPT

## 2021-04-23 PROCEDURE — 71045 X-RAY EXAM CHEST 1 VIEW: CPT | Mod: 26

## 2021-04-23 PROCEDURE — 99285 EMERGENCY DEPT VISIT HI MDM: CPT | Mod: CS

## 2021-04-23 RX ORDER — BUMETANIDE 0.25 MG/ML
1 INJECTION INTRAMUSCULAR; INTRAVENOUS ONCE
Refills: 0 | Status: COMPLETED | OUTPATIENT
Start: 2021-04-23 | End: 2021-04-23

## 2021-04-23 RX ADMIN — BUMETANIDE 1 MILLIGRAM(S): 0.25 INJECTION INTRAMUSCULAR; INTRAVENOUS at 21:14

## 2021-04-23 NOTE — ED PROVIDER NOTE - CLINICAL SUMMARY MEDICAL DECISION MAKING FREE TEXT BOX
Ddx: Fluid overload from stopping diuretics/ CKD/ no abdominal tenderness or guarding to suggest surgical abdomen.   Plan: Cbc, cmp, bnp, cxr, troponin, bumex, likely admit

## 2021-04-23 NOTE — ED ADULT NURSE NOTE - PMH
Hypertension, unspecified type    Type 2 diabetes mellitus with other neurologic complication, without long-term current use of insulin

## 2021-04-23 NOTE — ED PROVIDER NOTE - OBJECTIVE STATEMENT
Pt is a 71 yo gentleman with a pmhx of HTN, CKD, Afib on eliquis who presents to the ED with increased abdominal and leg swelling in the setting of being discontinued on his diuretic. He says his doctor had him discontinue the Bumex over the past couple weeks due to his kidney issues, and then he has been noticing the increasing swelling, as well as decreased exercise tolerance. His doctor saw him today and sent him to the ED for pleural effusion. No chest pain, no cough, no fevers, no sob, no hx of dvt/pe. Denies hx of alcohol or liver issues, and has had increased abd swelling from excess fluid in the past. Has L. leg amputation last year.

## 2021-04-23 NOTE — ED ADULT NURSE NOTE - OBJECTIVE STATEMENT
pt states he has sob since 2 weeks, pt also states his stomach has full of fluids. redness and wound noted on the left lower leg, hooked to cardiac monitor.

## 2021-04-24 DIAGNOSIS — S88.912D: Chronic | ICD-10-CM

## 2021-04-24 DIAGNOSIS — N40.0 BENIGN PROSTATIC HYPERPLASIA WITHOUT LOWER URINARY TRACT SYMPTOMS: ICD-10-CM

## 2021-04-24 DIAGNOSIS — I50.9 HEART FAILURE, UNSPECIFIED: ICD-10-CM

## 2021-04-24 DIAGNOSIS — Z29.9 ENCOUNTER FOR PROPHYLACTIC MEASURES, UNSPECIFIED: ICD-10-CM

## 2021-04-24 DIAGNOSIS — I48.20 CHRONIC ATRIAL FIBRILLATION, UNSPECIFIED: ICD-10-CM

## 2021-04-24 LAB
ANION GAP SERPL CALC-SCNC: 8 MMOL/L — SIGNIFICANT CHANGE UP (ref 5–17)
BACTERIA # UR AUTO: ABNORMAL
BUN SERPL-MCNC: 84 MG/DL — HIGH (ref 7–23)
CALCIUM SERPL-MCNC: 9 MG/DL — SIGNIFICANT CHANGE UP (ref 8.5–10.1)
CHLORIDE SERPL-SCNC: 110 MMOL/L — HIGH (ref 96–108)
CK MB BLD-MCNC: 3.1 % — SIGNIFICANT CHANGE UP (ref 0–3.5)
CK MB CFR SERPL CALC: 11 NG/ML — HIGH (ref 0.5–3.6)
CK SERPL-CCNC: 354 U/L — HIGH (ref 26–308)
CO2 SERPL-SCNC: 24 MMOL/L — SIGNIFICANT CHANGE UP (ref 22–31)
CREAT SERPL-MCNC: 2.96 MG/DL — HIGH (ref 0.5–1.3)
EPI CELLS # UR: SIGNIFICANT CHANGE UP
FLUAV AG NPH QL: SIGNIFICANT CHANGE UP
FLUBV AG NPH QL: SIGNIFICANT CHANGE UP
GLUCOSE BLDC GLUCOMTR-MCNC: 109 MG/DL — HIGH (ref 70–99)
GLUCOSE BLDC GLUCOMTR-MCNC: 212 MG/DL — HIGH (ref 70–99)
GLUCOSE BLDC GLUCOMTR-MCNC: 256 MG/DL — HIGH (ref 70–99)
GLUCOSE BLDC GLUCOMTR-MCNC: 78 MG/DL — SIGNIFICANT CHANGE UP (ref 70–99)
GLUCOSE SERPL-MCNC: 75 MG/DL — SIGNIFICANT CHANGE UP (ref 70–99)
HCT VFR BLD CALC: 28.9 % — LOW (ref 39–50)
HGB BLD-MCNC: 9 G/DL — LOW (ref 13–17)
MCHC RBC-ENTMCNC: 26 PG — LOW (ref 27–34)
MCHC RBC-ENTMCNC: 31.1 GM/DL — LOW (ref 32–36)
MCV RBC AUTO: 83.5 FL — SIGNIFICANT CHANGE UP (ref 80–100)
NRBC # BLD: 0 /100 WBCS — SIGNIFICANT CHANGE UP (ref 0–0)
PLATELET # BLD AUTO: 199 K/UL — SIGNIFICANT CHANGE UP (ref 150–400)
POTASSIUM SERPL-MCNC: 4.7 MMOL/L — SIGNIFICANT CHANGE UP (ref 3.5–5.3)
POTASSIUM SERPL-SCNC: 4.7 MMOL/L — SIGNIFICANT CHANGE UP (ref 3.5–5.3)
RBC # BLD: 3.46 M/UL — LOW (ref 4.2–5.8)
RBC # FLD: 15.1 % — HIGH (ref 10.3–14.5)
RBC CASTS # UR COMP ASSIST: SIGNIFICANT CHANGE UP /HPF (ref 0–4)
SARS-COV-2 RNA SPEC QL NAA+PROBE: SIGNIFICANT CHANGE UP
SODIUM SERPL-SCNC: 142 MMOL/L — SIGNIFICANT CHANGE UP (ref 135–145)
TROPONIN I SERPL-MCNC: 0.02 NG/ML — SIGNIFICANT CHANGE UP (ref 0.01–0.04)
WBC # BLD: 9.92 K/UL — SIGNIFICANT CHANGE UP (ref 3.8–10.5)
WBC # FLD AUTO: 9.92 K/UL — SIGNIFICANT CHANGE UP (ref 3.8–10.5)
WBC UR QL: ABNORMAL

## 2021-04-24 PROCEDURE — 99223 1ST HOSP IP/OBS HIGH 75: CPT

## 2021-04-24 PROCEDURE — 12345: CPT | Mod: NC

## 2021-04-24 PROCEDURE — 93925 LOWER EXTREMITY STUDY: CPT | Mod: 26

## 2021-04-24 PROCEDURE — 74176 CT ABD & PELVIS W/O CONTRAST: CPT | Mod: 26,MA

## 2021-04-24 RX ORDER — DOXAZOSIN MESYLATE 4 MG
4 TABLET ORAL AT BEDTIME
Refills: 0 | Status: DISCONTINUED | OUTPATIENT
Start: 2021-04-24 | End: 2021-04-26

## 2021-04-24 RX ORDER — SODIUM CHLORIDE 9 MG/ML
1000 INJECTION, SOLUTION INTRAVENOUS
Refills: 0 | Status: DISCONTINUED | OUTPATIENT
Start: 2021-04-24 | End: 2021-04-26

## 2021-04-24 RX ORDER — HYDRALAZINE HCL 50 MG
100 TABLET ORAL THREE TIMES A DAY
Refills: 0 | Status: DISCONTINUED | OUTPATIENT
Start: 2021-04-24 | End: 2021-04-26

## 2021-04-24 RX ORDER — PANTOPRAZOLE SODIUM 20 MG/1
40 TABLET, DELAYED RELEASE ORAL
Refills: 0 | Status: DISCONTINUED | OUTPATIENT
Start: 2021-04-24 | End: 2021-04-26

## 2021-04-24 RX ORDER — BUMETANIDE 0.25 MG/ML
2 INJECTION INTRAMUSCULAR; INTRAVENOUS EVERY 8 HOURS
Refills: 0 | Status: DISCONTINUED | OUTPATIENT
Start: 2021-04-24 | End: 2021-04-26

## 2021-04-24 RX ORDER — APIXABAN 2.5 MG/1
5 TABLET, FILM COATED ORAL
Refills: 0 | Status: DISCONTINUED | OUTPATIENT
Start: 2021-04-24 | End: 2021-04-26

## 2021-04-24 RX ORDER — FLUTICASONE PROPIONATE 50 MCG
1 SPRAY, SUSPENSION NASAL
Refills: 0 | Status: DISCONTINUED | OUTPATIENT
Start: 2021-04-24 | End: 2021-04-26

## 2021-04-24 RX ORDER — INSULIN LISPRO 100/ML
VIAL (ML) SUBCUTANEOUS AT BEDTIME
Refills: 0 | Status: DISCONTINUED | OUTPATIENT
Start: 2021-04-24 | End: 2021-04-26

## 2021-04-24 RX ORDER — ISOSORBIDE MONONITRATE 60 MG/1
30 TABLET, EXTENDED RELEASE ORAL DAILY
Refills: 0 | Status: DISCONTINUED | OUTPATIENT
Start: 2021-04-24 | End: 2021-04-26

## 2021-04-24 RX ORDER — INSULIN LISPRO 100/ML
VIAL (ML) SUBCUTANEOUS
Refills: 0 | Status: DISCONTINUED | OUTPATIENT
Start: 2021-04-24 | End: 2021-04-26

## 2021-04-24 RX ORDER — DEXTROSE 50 % IN WATER 50 %
12.5 SYRINGE (ML) INTRAVENOUS ONCE
Refills: 0 | Status: DISCONTINUED | OUTPATIENT
Start: 2021-04-24 | End: 2021-04-26

## 2021-04-24 RX ORDER — DILTIAZEM HCL 120 MG
240 CAPSULE, EXT RELEASE 24 HR ORAL DAILY
Refills: 0 | Status: DISCONTINUED | OUTPATIENT
Start: 2021-04-24 | End: 2021-04-26

## 2021-04-24 RX ORDER — DEXTROSE 50 % IN WATER 50 %
25 SYRINGE (ML) INTRAVENOUS ONCE
Refills: 0 | Status: DISCONTINUED | OUTPATIENT
Start: 2021-04-24 | End: 2021-04-26

## 2021-04-24 RX ORDER — DEXTROSE 50 % IN WATER 50 %
15 SYRINGE (ML) INTRAVENOUS ONCE
Refills: 0 | Status: DISCONTINUED | OUTPATIENT
Start: 2021-04-24 | End: 2021-04-26

## 2021-04-24 RX ORDER — SIMVASTATIN 20 MG/1
20 TABLET, FILM COATED ORAL AT BEDTIME
Refills: 0 | Status: DISCONTINUED | OUTPATIENT
Start: 2021-04-24 | End: 2021-04-26

## 2021-04-24 RX ORDER — BUMETANIDE 0.25 MG/ML
1 INJECTION INTRAMUSCULAR; INTRAVENOUS EVERY 8 HOURS
Refills: 0 | Status: DISCONTINUED | OUTPATIENT
Start: 2021-04-24 | End: 2021-04-24

## 2021-04-24 RX ORDER — GLUCAGON INJECTION, SOLUTION 0.5 MG/.1ML
1 INJECTION, SOLUTION SUBCUTANEOUS ONCE
Refills: 0 | Status: DISCONTINUED | OUTPATIENT
Start: 2021-04-24 | End: 2021-04-26

## 2021-04-24 RX ORDER — HYDRALAZINE HCL 50 MG
10 TABLET ORAL THREE TIMES A DAY
Refills: 0 | Status: DISCONTINUED | OUTPATIENT
Start: 2021-04-24 | End: 2021-04-26

## 2021-04-24 RX ADMIN — ISOSORBIDE MONONITRATE 30 MILLIGRAM(S): 60 TABLET, EXTENDED RELEASE ORAL at 09:52

## 2021-04-24 RX ADMIN — PANTOPRAZOLE SODIUM 40 MILLIGRAM(S): 20 TABLET, DELAYED RELEASE ORAL at 06:11

## 2021-04-24 RX ADMIN — Medication 100 MILLIGRAM(S): at 22:53

## 2021-04-24 RX ADMIN — APIXABAN 5 MILLIGRAM(S): 2.5 TABLET, FILM COATED ORAL at 18:49

## 2021-04-24 RX ADMIN — Medication 240 MILLIGRAM(S): at 09:53

## 2021-04-24 RX ADMIN — APIXABAN 5 MILLIGRAM(S): 2.5 TABLET, FILM COATED ORAL at 06:10

## 2021-04-24 RX ADMIN — Medication 100 MILLIGRAM(S): at 16:01

## 2021-04-24 RX ADMIN — Medication 1 SPRAY(S): at 18:49

## 2021-04-24 RX ADMIN — Medication 100 MILLIGRAM(S): at 22:52

## 2021-04-24 RX ADMIN — BUMETANIDE 2 MILLIGRAM(S): 0.25 INJECTION INTRAMUSCULAR; INTRAVENOUS at 22:52

## 2021-04-24 RX ADMIN — Medication 100 MILLIGRAM(S): at 08:30

## 2021-04-24 RX ADMIN — BUMETANIDE 1 MILLIGRAM(S): 0.25 INJECTION INTRAMUSCULAR; INTRAVENOUS at 16:03

## 2021-04-24 RX ADMIN — Medication 4 MILLIGRAM(S): at 22:53

## 2021-04-24 RX ADMIN — BUMETANIDE 1 MILLIGRAM(S): 0.25 INJECTION INTRAMUSCULAR; INTRAVENOUS at 05:01

## 2021-04-24 RX ADMIN — SIMVASTATIN 20 MILLIGRAM(S): 20 TABLET, FILM COATED ORAL at 22:52

## 2021-04-24 NOTE — CONSULT NOTE ADULT - SUBJECTIVE AND OBJECTIVE BOX
NEPHROLOGY CONSULTATION    CHIEF COMPLAINT:  CKD      HPI:    Main complaint is dyspnea.  Diuretics have been adjusted lower due to rising Cr and now he is gaining water weight.    His baseline Cr appears to be in 2's and now it is 3 mg/dL.    ROS:  10      PAST MEDICAL & SURGICAL HISTORY:  Hypertension, unspecified type    Type 2 diabetes mellitus with other neurologic complication, without long-term current use of insulin    Amputation of leg, traumatic, left, subsequent encounter        SOCIAL HISTORY:  NA    FAMILY HISTORY:  NA      MEDICATIONS  (STANDING):  apixaban 5 milliGRAM(s) Oral two times a day  buMETAnide Injectable 1 milliGRAM(s) IV Push every 8 hours  dextrose 40% Gel 15 Gram(s) Oral once  dextrose 5%. 1000 milliLiter(s) (50 mL/Hr) IV Continuous <Continuous>  dextrose 5%. 1000 milliLiter(s) (100 mL/Hr) IV Continuous <Continuous>  dextrose 50% Injectable 25 Gram(s) IV Push once  dextrose 50% Injectable 12.5 Gram(s) IV Push once  dextrose 50% Injectable 25 Gram(s) IV Push once  diltiazem    milliGRAM(s) Oral daily  doxazosin 4 milliGRAM(s) Oral at bedtime  fluticasone propionate 50 MICROgram(s)/spray Nasal Spray 1 Spray(s) Both Nostrils two times a day  glucagon  Injectable 1 milliGRAM(s) IntraMuscular once  hydrALAZINE 100 milliGRAM(s) Oral three times a day  insulin lispro (ADMELOG) corrective regimen sliding scale   SubCutaneous three times a day before meals  insulin lispro (ADMELOG) corrective regimen sliding scale   SubCutaneous at bedtime  isosorbide   mononitrate ER Tablet (IMDUR) 30 milliGRAM(s) Oral daily  pantoprazole    Tablet 40 milliGRAM(s) Oral before breakfast  simvastatin 20 milliGRAM(s) Oral at bedtime      PHYSICAL EXAMINATION:  T(F): 97.8 (21 @ 10:00)  HR: 102 (21 @ 16:00)  BP: 149/56 (21 @ 16:00)  RR: 18 (21 @ 10:00)  SpO2: 95% (21 @ 10:00)  Conversant, no apparent distress  PERRLA, pink conjunctivae, no ptosis  Good dentition, no pharyngeal erythema  Neck non tender, no mass, no thyromegaly or nodules  Normal respiratory effort, lungs clear to auscultation  Heart with RRR, no murmurs or rubs, 2+ peripheral edema  Abdomen soft, no masses, no organomegaly  Skin no rashes, ulcers or lesions, normal turgor and temperature  Appropriate affect, AO x 3    LABS:                        9.0    9.92  )-----------( 199      ( 2021 07:49 )             28.9     04-24    142  |  110<H>  |  84<H>  ----------------------------<  75  4.7   |  24  |  2.96<H>    Ca    9.0      2021 07:49    TPro  7.4  /  Alb  3.6  /  TBili  0.3  /  DBili  x   /  AST  14<L>  /  ALT  25  /  AlkPhos  67  04-23    Urinalysis Basic - ( 2021 22:53 )  Color: Yellow / Appearance: Clear / S.010 / pH: x  Gluc: x / Ketone: Negative  / Bili: Negative / Urobili: Negative mg/dL   Blood: x / Protein: 30 mg/dL / Nitrite: Negative   Leuk Esterase: Trace / RBC: 0-2 /HPF / WBC 6-10   Sq Epi: x / Non Sq Epi: Occasional / Bacteria: Few        RADIOLOGY:  Chest X-Ray personally reviewed and shows NAPD        ASSESSMENT:  1.  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  Fluid overload    PLAN:  Increase bumex to 2 mg IVP TID  Daily BMP

## 2021-04-24 NOTE — CHART NOTE - NSCHARTNOTEFT_GEN_A_CORE
69 y/o male w/PMH HTN, HLD, DM2, Afib on Eliquis , CHD and CKD comes w/complain of sob. Impression Acute CHF; please see H&P for more details. Pt was seen and examined at the bedside. Will continue with diuresis and monitor.
Medicine Hospitalist PA    Discussed with wife Therese about pt plan of care. Wife states pt has a home Cardiologist: Dr. Bernabe Triplett , who does home visits and advised them to come to ED for ?CHF. Notified wife pt was seen by nephrologist inhouse who increased Bumex dose to 2mg TID for adequate diuresis and ECHO is ordered for pt to evaluate heart. Family also concerned about wounds on pt R lower leg, notified them wound care consult is pending. Family content about communication. All questions answered.

## 2021-04-24 NOTE — PATIENT PROFILE ADULT - OVER THE PAST TWO WEEKS HAVE YOU FELT DOWN, DEPRESSED OR HOPELESS?
Quality 111:Pneumonia Vaccination Status For Older Adults: Pneumococcal Vaccination Previously Received Quality 130: Documentation Of Current Medications In The Medical Record: Current Medications Documented Detail Level: Detailed yes

## 2021-04-24 NOTE — H&P ADULT - HISTORY OF PRESENT ILLNESS
Pt is a 69 y/o male w/PMH HTN, HLD, DM2, Afib on Eliquis , CHD and CKD comes w/complain of sob.  Pt has been on bumex and was stopped after his creatnine bumped, and then pt started to reatin alot of fluid and pt asked to be restarted but was on qod and then stopped again.  has been of diuretics and has noticed increasing abdominal girth, RLE edema (has L bka bc of nec fasc) saw pmd and was told to come to ed.  pt has also noticed decreased exercise tolerance and koehler.  pt denies any fever, chills,cp, palpitations, n/v/d/c no travel or sick contacts.

## 2021-04-24 NOTE — H&P ADULT - NSICDXPASTMEDICALHX_GEN_ALL_CORE_FT
PAST MEDICAL HISTORY:  Hypertension, unspecified type     Type 2 diabetes mellitus with other neurologic complication, without long-term current use of insulin

## 2021-04-24 NOTE — H&P ADULT - NSHPLABSRESULTS_GEN_ALL_CORE
LABS:                        8.6    10.24 )-----------( 192      ( 2021 20:46 )             27.4     04-    140  |  109<H>  |  86<H>  ----------------------------<  145<H>  4.9   |  24  |  3.14<H>    Ca    8.6      2021 20:46    TPro  7.4  /  Alb  3.6  /  TBili  0.3  /  DBili  x   /  AST  14<L>  /  ALT  25  /  AlkPhos  67  04-      Urinalysis Basic - ( 2021 22:53 )    Color: Yellow / Appearance: Clear / S.010 / pH: x  Gluc: x / Ketone: Negative  / Bili: Negative / Urobili: Negative mg/dL   Blood: x / Protein: 30 mg/dL / Nitrite: Negative   Leuk Esterase: Trace / RBC: 0-2 /HPF / WBC 6-10   Sq Epi: x / Non Sq Epi: Occasional / Bacteria: Few      CAPILLARY BLOOD GLUCOSE            RADIOLOGY & ADDITIONAL TESTS:    Imaging Personally Reviewed:  [ X] YES  [ ] NO

## 2021-04-24 NOTE — H&P ADULT - NSHPPHYSICALEXAM_GEN_ALL_CORE
Vital Signs Last 24 Hrs  T(C): 36.6 (24 Apr 2021 03:19), Max: 37.1 (23 Apr 2021 19:55)  T(F): 97.9 (24 Apr 2021 03:19), Max: 98.8 (23 Apr 2021 19:55)  HR: 60 (24 Apr 2021 05:38) (55 - 61)  BP: 172/68 (24 Apr 2021 05:38) (161/65 - 172/68)  BP(mean): --  RR: 20 (24 Apr 2021 05:38) (16 - 20)  SpO2: 94% (24 Apr 2021 05:38) (94% - 100%)    PHYSICAL EXAM:    GENERAL: NAD, well-groomed, well-developed  HEAD:  Atraumatic, Normocephalic  EYES: EOMI, PERRLA, conjunctiva and sclera clear  ENMT: No tonsillar erythema, exudates, or enlargement; Moist mucous membranes, No lesions  NECK: Supple, No JVD, Normal thyroid  NERVOUS SYSTEM:  Alert & Oriented X3, Good concentration; Motor Strength 5/5 B/L upper and R lower extremities   CHEST/LUNG: +bibasilar crackles, no wheezing, or rubs  HEART: Regular rate and rhythm; No rubs, or gallops, +S1,S2  ABDOMEN: Soft, Nontender, Nondistended; Bowel sounds present  EXTREMITIES:  2+ Peripheral Pulses, No clubbing, cyanosis, 2+ edema of rle, +venous stasis, w/some small healing ulcers.  left bka  LYMPH: No cervical adenopathy  RECTAL: deferred  BREAST: No palpatble masses, skin no lesions   : deferred  SKIN: No rashes or lesions    IMPROVE VTE Individual Risk Assessment        RISK                                                          Points  [  ] Previous VTE                                                3  [  ] Thrombophilia                                             2  [  ] Lower limb paralysis                                    2        (unable to hold up >15 seconds)    [  ] Current Cancer                                             2         (within 6 months)  [  x] Immobilization > 24 hrs                              1  [  ] ICU/CCU stay > 24 hours                            1  [ x ] Age > 60                                                    1  IMPROVE VTE Score ___2______

## 2021-04-24 NOTE — H&P ADULT - ASSESSMENT
71 y/o male w/PMH HTN, HLD, DM2, Afib on Eliquis , CHD and CKD comes w/chf exacerbation after being off diuretic

## 2021-04-25 DIAGNOSIS — I73.9 PERIPHERAL VASCULAR DISEASE, UNSPECIFIED: ICD-10-CM

## 2021-04-25 DIAGNOSIS — L97.911 NON-PRESSURE CHRONIC ULCER OF UNSPECIFIED PART OF RIGHT LOWER LEG LIMITED TO BREAKDOWN OF SKIN: ICD-10-CM

## 2021-04-25 LAB
A1C WITH ESTIMATED AVERAGE GLUCOSE RESULT: 7.4 % — HIGH (ref 4–5.6)
ANION GAP SERPL CALC-SCNC: 6 MMOL/L — SIGNIFICANT CHANGE UP (ref 5–17)
BUN SERPL-MCNC: 82 MG/DL — HIGH (ref 7–23)
CALCIUM SERPL-MCNC: 8.8 MG/DL — SIGNIFICANT CHANGE UP (ref 8.5–10.1)
CHLORIDE SERPL-SCNC: 110 MMOL/L — HIGH (ref 96–108)
CO2 SERPL-SCNC: 28 MMOL/L — SIGNIFICANT CHANGE UP (ref 22–31)
COVID-19 SPIKE DOMAIN AB INTERP: POSITIVE
COVID-19 SPIKE DOMAIN ANTIBODY RESULT: 51.4 U/ML — HIGH
CREAT SERPL-MCNC: 2.89 MG/DL — HIGH (ref 0.5–1.3)
ESTIMATED AVERAGE GLUCOSE: 166 MG/DL — HIGH (ref 68–114)
GLUCOSE BLDC GLUCOMTR-MCNC: 106 MG/DL — HIGH (ref 70–99)
GLUCOSE BLDC GLUCOMTR-MCNC: 166 MG/DL — HIGH (ref 70–99)
GLUCOSE BLDC GLUCOMTR-MCNC: 177 MG/DL — HIGH (ref 70–99)
GLUCOSE BLDC GLUCOMTR-MCNC: 218 MG/DL — HIGH (ref 70–99)
GLUCOSE SERPL-MCNC: 89 MG/DL — SIGNIFICANT CHANGE UP (ref 70–99)
HCT VFR BLD CALC: 27.8 % — LOW (ref 39–50)
HGB BLD-MCNC: 8.8 G/DL — LOW (ref 13–17)
MAGNESIUM SERPL-MCNC: 2 MG/DL — SIGNIFICANT CHANGE UP (ref 1.6–2.6)
MCHC RBC-ENTMCNC: 26.3 PG — LOW (ref 27–34)
MCHC RBC-ENTMCNC: 31.7 GM/DL — LOW (ref 32–36)
MCV RBC AUTO: 83.2 FL — SIGNIFICANT CHANGE UP (ref 80–100)
NRBC # BLD: 0 /100 WBCS — SIGNIFICANT CHANGE UP (ref 0–0)
PHOSPHATE SERPL-MCNC: 4.1 MG/DL — SIGNIFICANT CHANGE UP (ref 2.5–4.5)
PLATELET # BLD AUTO: 187 K/UL — SIGNIFICANT CHANGE UP (ref 150–400)
POTASSIUM SERPL-MCNC: 4.7 MMOL/L — SIGNIFICANT CHANGE UP (ref 3.5–5.3)
POTASSIUM SERPL-SCNC: 4.7 MMOL/L — SIGNIFICANT CHANGE UP (ref 3.5–5.3)
RBC # BLD: 3.34 M/UL — LOW (ref 4.2–5.8)
RBC # FLD: 15.1 % — HIGH (ref 10.3–14.5)
SARS-COV-2 IGG+IGM SERPL QL IA: 51.4 U/ML — HIGH
SARS-COV-2 IGG+IGM SERPL QL IA: POSITIVE
SODIUM SERPL-SCNC: 144 MMOL/L — SIGNIFICANT CHANGE UP (ref 135–145)
WBC # BLD: 9.96 K/UL — SIGNIFICANT CHANGE UP (ref 3.8–10.5)
WBC # FLD AUTO: 9.96 K/UL — SIGNIFICANT CHANGE UP (ref 3.8–10.5)

## 2021-04-25 PROCEDURE — 99233 SBSQ HOSP IP/OBS HIGH 50: CPT

## 2021-04-25 PROCEDURE — 93306 TTE W/DOPPLER COMPLETE: CPT | Mod: 26

## 2021-04-25 RX ORDER — POVIDONE-IODINE 5 %
1 AEROSOL (ML) TOPICAL
Refills: 0 | Status: DISCONTINUED | OUTPATIENT
Start: 2021-04-25 | End: 2021-04-26

## 2021-04-25 RX ORDER — POVIDONE-IODINE 5 %
1 AEROSOL (ML) TOPICAL
Refills: 0 | Status: DISCONTINUED | OUTPATIENT
Start: 2021-04-25 | End: 2021-04-25

## 2021-04-25 RX ORDER — ASPIRIN/CALCIUM CARB/MAGNESIUM 324 MG
81 TABLET ORAL DAILY
Refills: 0 | Status: DISCONTINUED | OUTPATIENT
Start: 2021-04-25 | End: 2021-04-26

## 2021-04-25 RX ADMIN — BUMETANIDE 2 MILLIGRAM(S): 0.25 INJECTION INTRAMUSCULAR; INTRAVENOUS at 13:23

## 2021-04-25 RX ADMIN — APIXABAN 5 MILLIGRAM(S): 2.5 TABLET, FILM COATED ORAL at 18:57

## 2021-04-25 RX ADMIN — SIMVASTATIN 20 MILLIGRAM(S): 20 TABLET, FILM COATED ORAL at 22:14

## 2021-04-25 RX ADMIN — APIXABAN 5 MILLIGRAM(S): 2.5 TABLET, FILM COATED ORAL at 05:57

## 2021-04-25 RX ADMIN — BUMETANIDE 2 MILLIGRAM(S): 0.25 INJECTION INTRAMUSCULAR; INTRAVENOUS at 05:58

## 2021-04-25 RX ADMIN — Medication 4 MILLIGRAM(S): at 22:10

## 2021-04-25 RX ADMIN — Medication 100 MILLIGRAM(S): at 23:15

## 2021-04-25 RX ADMIN — Medication 100 MILLIGRAM(S): at 22:14

## 2021-04-25 RX ADMIN — ISOSORBIDE MONONITRATE 30 MILLIGRAM(S): 60 TABLET, EXTENDED RELEASE ORAL at 12:48

## 2021-04-25 RX ADMIN — Medication 2: at 13:08

## 2021-04-25 RX ADMIN — PANTOPRAZOLE SODIUM 40 MILLIGRAM(S): 20 TABLET, DELAYED RELEASE ORAL at 07:10

## 2021-04-25 RX ADMIN — Medication 1 SPRAY(S): at 18:57

## 2021-04-25 RX ADMIN — Medication 100 MILLIGRAM(S): at 05:57

## 2021-04-25 RX ADMIN — Medication 100 MILLIGRAM(S): at 12:48

## 2021-04-25 RX ADMIN — Medication 4: at 18:57

## 2021-04-25 RX ADMIN — BUMETANIDE 2 MILLIGRAM(S): 0.25 INJECTION INTRAMUSCULAR; INTRAVENOUS at 22:10

## 2021-04-25 RX ADMIN — Medication 240 MILLIGRAM(S): at 05:57

## 2021-04-25 RX ADMIN — Medication 1 SPRAY(S): at 05:58

## 2021-04-25 RX ADMIN — Medication 100 MILLIGRAM(S): at 13:23

## 2021-04-25 NOTE — CONSULT NOTE ADULT - ATTENDING COMMENTS
4-25-21 I have seen and exameind the patient and agree with the above plan. Th patient has palpable right DP pulse. patietn c/o SOB, has well healed left BKA, it was done by me .The right leg has edema and skin leisions, dry eschars. the right foot is warm, no CLI,. Elevated BUN and creatinine,  high risk of contrast nephropathy for CTA. Pt is on eliquis .  plan-- betadine to the right leg eschars. medical mangaement. No vascular intervention for right leg at this time, Renal f/u.

## 2021-04-25 NOTE — PHYSICAL THERAPY INITIAL EVALUATION ADULT - GAIT DEVIATIONS NOTED, PT EVAL
decreased tri/increased time in double stance/decreased velocity of limb motion/decreased step length/decreased stride length

## 2021-04-25 NOTE — PHYSICAL THERAPY INITIAL EVALUATION ADULT - GENERAL OBSERVATIONS, REHAB EVAL
Pt was seen in supine c cardiac monitor donned, and c L BKA and R lower leg swelling c multiple scatter wound, alert and Ox4. P.T. wound care initial evaluation performed with all wounds documented in flowsheet 2 3.4 A&I

## 2021-04-25 NOTE — PROGRESS NOTE ADULT - PROBLEM SELECTOR PLAN 2
multiple right leg ulcers, +PVD, +DM, hx of Left BKA due to infection   Wound Care and Vascular consult appreciated   continue with betadine and wound care

## 2021-04-25 NOTE — PHYSICAL THERAPY INITIAL EVALUATION ADULT - GAIT TRAINING, PT EVAL
Pt will independently ambulate 200 feet, with rolling walker and L BK prosthesis, without loss of balance, by 2 weeks.

## 2021-04-25 NOTE — PROGRESS NOTE ADULT - PROBLEM SELECTOR PLAN 3
Right leg arterial doppler positive for moderate disease, hx of Left BKA    Vascular consult appreciated; no surgical intervention required at this time   cont w/ ASA and plavix

## 2021-04-25 NOTE — CONSULT NOTE ADULT - SUBJECTIVE AND OBJECTIVE BOX
HPI:  Pt is a 71 y/o male w/PMH HTN, HLD, DM2, Afib on Eliquis , CHD and CKD comes w/complain of sob.  Pt has been on bumex and was stopped after his creatnine bumped, and then pt started to reatin alot of fluid and pt asked to be restarted but was on qod and then stopped again.  has been of diuretics and has noticed increasing abdominal girth, RLE edema (has L bka bc of nec fasc) saw pmd and was told to come to ed.  pt has also noticed decreased exercise tolerance and koehler.  pt denies any fever, chills,cp, palpitations, n/v/d/c no travel or sick contacts. (2021 05:49)    Patient seen and examined at bedside with Dr. Quintero. Known to our service for remote h/o L BKA by Dr. Quintero.  Patient presented to ER c/o RLE edema and KOEHLER.     PAST MEDICAL & SURGICAL HISTORY:  Hypertension, unspecified type  Type 2 diabetes mellitus with other neurologic complication, without long-term current use of insulin  Amputation of leg, traumatic, left, subsequent encounter      REVIEW OF SYSTEMS  Contained within HPI    MEDICATIONS  (STANDING):  apixaban 5 milliGRAM(s) Oral two times a day  buMETAnide Injectable 2 milliGRAM(s) IV Push every 8 hours  dextrose 40% Gel 15 Gram(s) Oral once  dextrose 5%. 1000 milliLiter(s) (50 mL/Hr) IV Continuous <Continuous>  dextrose 5%. 1000 milliLiter(s) (100 mL/Hr) IV Continuous <Continuous>  dextrose 50% Injectable 25 Gram(s) IV Push once  dextrose 50% Injectable 12.5 Gram(s) IV Push once  dextrose 50% Injectable 25 Gram(s) IV Push once  diltiazem    milliGRAM(s) Oral daily  doxazosin 4 milliGRAM(s) Oral at bedtime  fluticasone propionate 50 MICROgram(s)/spray Nasal Spray 1 Spray(s) Both Nostrils two times a day  glucagon  Injectable 1 milliGRAM(s) IntraMuscular once  hydrALAZINE 100 milliGRAM(s) Oral three times a day  insulin lispro (ADMELOG) corrective regimen sliding scale   SubCutaneous three times a day before meals  insulin lispro (ADMELOG) corrective regimen sliding scale   SubCutaneous at bedtime  isosorbide   mononitrate ER Tablet (IMDUR) 30 milliGRAM(s) Oral daily  pantoprazole    Tablet 40 milliGRAM(s) Oral before breakfast  simvastatin 20 milliGRAM(s) Oral at bedtime    MEDICATIONS  (PRN):  guaiFENesin   Syrup  (Sugar-Free) 100 milliGRAM(s) Oral every 6 hours PRN Cough  hydrALAZINE Injectable 10 milliGRAM(s) IV Push three times a day PRN SBP > 165      Allergies  No Known Allergies      Vital Signs Last 24 Hrs  T(C): 36.4 (2021 10:50), Max: 36.7 (2021 17:37)  T(F): 97.5 (2021 10:50), Max: 98 (2021 17:37)  HR: 66 (2021 12:05) (62 - 102)  BP: 150/64 (2021 12:05) (149/56 - 171/64)  RR: 18 (2021 12:05) (18 - 19)  SpO2: 95% (2021 12:05) (90% - 96%)    PHYSICAL EXAM:    Constitutional: Alert, oriented, NAD    Respiratory: Respirations nonlabored    Cardiovascular: S1S2 RRR    Gastrointestinal: Soft, NTND    Extremities: L BKA, WNL. R anterior LE erythematous with 2x1cm circumferential eschar. Foot warm. + DP pulse      LABS:                        8.8    9.96  )-----------( 187      ( 2021 07:14 )             27.8     04-    144  |  110<H>  |  82<H>  ----------------------------<  89  4.7   |  28  |  2.89<H>    Ca    8.8      2021 07:14  Phos  4.1     04-25  Mg     2.0     04-25    TPro  7.4  /  Alb  3.6  /  TBili  0.3  /  DBili  x   /  AST  14<L>  /  ALT  25  /  AlkPhos  67  04-23      Urinalysis Basic - ( 2021 22:53 )    Color: Yellow / Appearance: Clear / S.010 / pH: x  Gluc: x / Ketone: Negative  / Bili: Negative / Urobili: Negative mg/dL   Blood: x / Protein: 30 mg/dL / Nitrite: Negative   Leuk Esterase: Trace / RBC: 0-2 /HPF / WBC 6-10   Sq Epi: x / Non Sq Epi: Occasional / Bacteria: Few    RADIOLOGY & ADDITIONAL STUDIES  < from: US Duplex Arterial Lower Ext Compl, Bilateral (21 @ 16:56) >    FINDINGS: Biphasic waveforms are seen from the right external iliac artery through the popliteal artery and abnormal monophasic waveforms are seen in the infrapopliteal arteries.    Onthe left, abnormal monophasic waveforms are seen in the external iliac, common femoral, superficial femoral and popliteal arteries.    Scattered foci of plaque are noted throughout both legs. No significantly elevated peak systolic velocities were obtained.    Peak systolic velocities:    Right lower extremity arteries  External iliac artery 168 cm/s  Common femoral:   196  Superficial Femoral proximal: 115  Superficial Femoral mid:  129  Superficial Femoral distal: 162  Popliteal:  105  Anteriortibial:    85  Posterior tibial:   73  Peroneal:  28  Dorsalis pedis:  50    Left lower extremity arteries  External iliac artery 49 cm/s  Common femoral:  41  Femoral proximal: 33  Femoral mid:  54  Femoral distal: 55  Popliteal:  23  The patient isstatus post left below-the-knee amputation.    IMPRESSION: Abnormal monophasic waveforms in the right infrapopliteal and left femoral-popliteal arteries suggestive of at least moderate occlusive disease.    < end of copied text >      Impression and Plan: HPI:  Pt is a 71 y/o male w/PMH HTN, HLD, DM2, Afib on Eliquis , CHD and CKD comes w/complain of sob.  Pt has been on bumex and was stopped after his creatnine bumped, and then pt started to reatin alot of fluid and pt asked to be restarted but was on qod and then stopped again.  has been of diuretics and has noticed increasing abdominal girth, RLE edema (has L bka bc of nec fasc) saw pmd and was told to come to ed.  pt has also noticed decreased exercise tolerance and koehler.  pt denies any fever, chills,cp, palpitations, n/v/d/c no travel or sick contacts. (2021 05:49)    Patient seen and examined at bedside with Dr. Quintero. Known to our service for remote h/o L BKA by Dr. Quintero.  Patient presented to ER c/o RLE edema and KOEHLER.     PAST MEDICAL & SURGICAL HISTORY:  Hypertension, unspecified type  Type 2 diabetes mellitus with other neurologic complication, without long-term current use of insulin  Amputation of leg, traumatic, left, subsequent encounter      REVIEW OF SYSTEMS  Contained within HPI    MEDICATIONS  (STANDING):  apixaban 5 milliGRAM(s) Oral two times a day  buMETAnide Injectable 2 milliGRAM(s) IV Push every 8 hours  dextrose 40% Gel 15 Gram(s) Oral once  dextrose 5%. 1000 milliLiter(s) (50 mL/Hr) IV Continuous <Continuous>  dextrose 5%. 1000 milliLiter(s) (100 mL/Hr) IV Continuous <Continuous>  dextrose 50% Injectable 25 Gram(s) IV Push once  dextrose 50% Injectable 12.5 Gram(s) IV Push once  dextrose 50% Injectable 25 Gram(s) IV Push once  diltiazem    milliGRAM(s) Oral daily  doxazosin 4 milliGRAM(s) Oral at bedtime  fluticasone propionate 50 MICROgram(s)/spray Nasal Spray 1 Spray(s) Both Nostrils two times a day  glucagon  Injectable 1 milliGRAM(s) IntraMuscular once  hydrALAZINE 100 milliGRAM(s) Oral three times a day  insulin lispro (ADMELOG) corrective regimen sliding scale   SubCutaneous three times a day before meals  insulin lispro (ADMELOG) corrective regimen sliding scale   SubCutaneous at bedtime  isosorbide   mononitrate ER Tablet (IMDUR) 30 milliGRAM(s) Oral daily  pantoprazole    Tablet 40 milliGRAM(s) Oral before breakfast  simvastatin 20 milliGRAM(s) Oral at bedtime    MEDICATIONS  (PRN):  guaiFENesin   Syrup  (Sugar-Free) 100 milliGRAM(s) Oral every 6 hours PRN Cough  hydrALAZINE Injectable 10 milliGRAM(s) IV Push three times a day PRN SBP > 165      Allergies  No Known Allergies      Vital Signs Last 24 Hrs  T(C): 36.4 (2021 10:50), Max: 36.7 (2021 17:37)  T(F): 97.5 (2021 10:50), Max: 98 (2021 17:37)  HR: 66 (2021 12:05) (62 - 102)  BP: 150/64 (2021 12:05) (149/56 - 171/64)  RR: 18 (2021 12:05) (18 - 19)  SpO2: 95% (2021 12:05) (90% - 96%)    PHYSICAL EXAM:    Constitutional: Alert, oriented, NAD    Respiratory: Respirations labored    Cardiovascular: S1S2 RRR    Gastrointestinal: Soft, NTND    Extremities: L BKA, WNL. R anterior LE erythematous with 2x1cm circumferential eschar. Foot warm. + DP pulse      LABS:                        8.8    9.96  )-----------( 187      ( 2021 07:14 )             27.8     04-    144  |  110<H>  |  82<H>  ----------------------------<  89  4.7   |  28  |  2.89<H>    Ca    8.8      2021 07:14  Phos  4.1     04-25  Mg     2.0     04-25    TPro  7.4  /  Alb  3.6  /  TBili  0.3  /  DBili  x   /  AST  14<L>  /  ALT  25  /  AlkPhos  67  04-23      Urinalysis Basic - ( 2021 22:53 )    Color: Yellow / Appearance: Clear / S.010 / pH: x  Gluc: x / Ketone: Negative  / Bili: Negative / Urobili: Negative mg/dL   Blood: x / Protein: 30 mg/dL / Nitrite: Negative   Leuk Esterase: Trace / RBC: 0-2 /HPF / WBC 6-10   Sq Epi: x / Non Sq Epi: Occasional / Bacteria: Few    RADIOLOGY & ADDITIONAL STUDIES  < from: US Duplex Arterial Lower Ext Compl, Bilateral (21 @ 16:56) >    FINDINGS: Biphasic waveforms are seen from the right external iliac artery through the popliteal artery and abnormal monophasic waveforms are seen in the infrapopliteal arteries.    Onthe left, abnormal monophasic waveforms are seen in the external iliac, common femoral, superficial femoral and popliteal arteries.    Scattered foci of plaque are noted throughout both legs. No significantly elevated peak systolic velocities were obtained.    Peak systolic velocities:    Right lower extremity arteries  External iliac artery 168 cm/s  Common femoral:   196  Superficial Femoral proximal: 115  Superficial Femoral mid:  129  Superficial Femoral distal: 162  Popliteal:  105  Anteriortibial:    85  Posterior tibial:   73  Peroneal:  28  Dorsalis pedis:  50    Left lower extremity arteries  External iliac artery 49 cm/s  Common femoral:  41  Femoral proximal: 33  Femoral mid:  54  Femoral distal: 55  Popliteal:  23  The patient isstatus post left below-the-knee amputation.    IMPRESSION: Abnormal monophasic waveforms in the right infrapopliteal and left femoral-popliteal arteries suggestive of at least moderate occlusive disease.    < end of copied text >      Impression and Plan:

## 2021-04-25 NOTE — PHYSICAL THERAPY INITIAL EVALUATION ADULT - BALANCE TRAINING, PT EVAL
Pt will increase static/dynamic sitting balance to good and static/dynamic standing balance to good to perform all functional mobility,  with rolling walker and L BK prosthesis, without LOB, by 2weeks.

## 2021-04-25 NOTE — CONSULT NOTE ADULT - SUBJECTIVE AND OBJECTIVE BOX
70 MALE NOT SEEN BY ME FOR SOME TIME; WITH CONGESTIVE HEART FAILURE /ELMER; DIURETICS RECENTLY HELD    DIABETES  MELLITUS  AMPTUTATION  ASHD  HTN   RENAL INSUFFICIENCY     ALL LABS/RADIOLOGY/MEDICATIONS REVIEWED: PAD/NONOCCLUSIVE/ TROP NEG/ BNP 1000'S/ ECG NORMAL SINUS RHYTHM FIRST DEG AVB RBBB NO ACUTE CHANGES  NO  JVD   TUBULAR BREATH SOUND LEFT/ DIMINISHED BREATH SOUNDS RIGHT  REGULAR RATE_&_RHYTHM;NORMAL_S1&S2_NO_SIGNIFICANT_MURMURS,RUBS,OR_GALLOPS.   ABDOMEN SOFT, NONTENDER, NO DISTENSION   BKA  CHRONIC ERYTHEMA/MINIMAL EDEMA    IMPRESSION:     CONGESTIVE HEART FAILURE  PAROXYSMAL ATRIAL FIBRILLATION   CK D  DIABETES  MELLITUS  PAD    RENAL NOTED  ON BUMEX  NO A C S   HOLDING NORMAL SINUS RHYTHM ; ON ELIQUIS  ECHO  EXPECTANT MANAGEMENT

## 2021-04-26 ENCOUNTER — TRANSCRIPTION ENCOUNTER (OUTPATIENT)
Age: 71
End: 2021-04-26

## 2021-04-26 VITALS — WEIGHT: 258.38 LBS

## 2021-04-26 LAB
ANION GAP SERPL CALC-SCNC: 7 MMOL/L — SIGNIFICANT CHANGE UP (ref 5–17)
BUN SERPL-MCNC: 78 MG/DL — HIGH (ref 7–23)
CALCIUM SERPL-MCNC: 8.9 MG/DL — SIGNIFICANT CHANGE UP (ref 8.5–10.1)
CHLORIDE SERPL-SCNC: 106 MMOL/L — SIGNIFICANT CHANGE UP (ref 96–108)
CO2 SERPL-SCNC: 28 MMOL/L — SIGNIFICANT CHANGE UP (ref 22–31)
CREAT SERPL-MCNC: 2.84 MG/DL — HIGH (ref 0.5–1.3)
GLUCOSE BLDC GLUCOMTR-MCNC: 102 MG/DL — HIGH (ref 70–99)
GLUCOSE BLDC GLUCOMTR-MCNC: 149 MG/DL — HIGH (ref 70–99)
GLUCOSE BLDC GLUCOMTR-MCNC: 294 MG/DL — HIGH (ref 70–99)
GLUCOSE SERPL-MCNC: 98 MG/DL — SIGNIFICANT CHANGE UP (ref 70–99)
HCT VFR BLD CALC: 28.4 % — LOW (ref 39–50)
HGB BLD-MCNC: 9.1 G/DL — LOW (ref 13–17)
MAGNESIUM SERPL-MCNC: 1.7 MG/DL — SIGNIFICANT CHANGE UP (ref 1.6–2.6)
MCHC RBC-ENTMCNC: 26.4 PG — LOW (ref 27–34)
MCHC RBC-ENTMCNC: 32 GM/DL — SIGNIFICANT CHANGE UP (ref 32–36)
MCV RBC AUTO: 82.3 FL — SIGNIFICANT CHANGE UP (ref 80–100)
NRBC # BLD: 0 /100 WBCS — SIGNIFICANT CHANGE UP (ref 0–0)
PHOSPHATE SERPL-MCNC: 4.2 MG/DL — SIGNIFICANT CHANGE UP (ref 2.5–4.5)
PLATELET # BLD AUTO: 196 K/UL — SIGNIFICANT CHANGE UP (ref 150–400)
POTASSIUM SERPL-MCNC: 4.4 MMOL/L — SIGNIFICANT CHANGE UP (ref 3.5–5.3)
POTASSIUM SERPL-SCNC: 4.4 MMOL/L — SIGNIFICANT CHANGE UP (ref 3.5–5.3)
RBC # BLD: 3.45 M/UL — LOW (ref 4.2–5.8)
RBC # FLD: 14.9 % — HIGH (ref 10.3–14.5)
SODIUM SERPL-SCNC: 141 MMOL/L — SIGNIFICANT CHANGE UP (ref 135–145)
WBC # BLD: 10.66 K/UL — HIGH (ref 3.8–10.5)
WBC # FLD AUTO: 10.66 K/UL — HIGH (ref 3.8–10.5)

## 2021-04-26 PROCEDURE — 99239 HOSP IP/OBS DSCHRG MGMT >30: CPT

## 2021-04-26 RX ORDER — FLUTICASONE PROPIONATE 50 MCG
1 SPRAY, SUSPENSION NASAL
Qty: 1 | Refills: 0
Start: 2021-04-26 | End: 2021-05-25

## 2021-04-26 RX ORDER — POVIDONE-IODINE 5 %
1 AEROSOL (ML) TOPICAL
Qty: 1 | Refills: 0
Start: 2021-04-26 | End: 2021-05-25

## 2021-04-26 RX ORDER — ISOSORBIDE MONONITRATE 60 MG/1
1 TABLET, EXTENDED RELEASE ORAL
Qty: 30 | Refills: 0
Start: 2021-04-26 | End: 2021-05-25

## 2021-04-26 RX ORDER — ASPIRIN/CALCIUM CARB/MAGNESIUM 324 MG
1 TABLET ORAL
Qty: 30 | Refills: 0
Start: 2021-04-26 | End: 2021-05-25

## 2021-04-26 RX ORDER — BUMETANIDE 0.25 MG/ML
2 INJECTION INTRAMUSCULAR; INTRAVENOUS
Refills: 0 | Status: DISCONTINUED | OUTPATIENT
Start: 2021-04-26 | End: 2021-04-26

## 2021-04-26 RX ORDER — BUMETANIDE 0.25 MG/ML
1 INJECTION INTRAMUSCULAR; INTRAVENOUS
Qty: 60 | Refills: 0
Start: 2021-04-26 | End: 2021-05-25

## 2021-04-26 RX ADMIN — Medication 81 MILLIGRAM(S): at 11:49

## 2021-04-26 RX ADMIN — PANTOPRAZOLE SODIUM 40 MILLIGRAM(S): 20 TABLET, DELAYED RELEASE ORAL at 06:26

## 2021-04-26 RX ADMIN — APIXABAN 5 MILLIGRAM(S): 2.5 TABLET, FILM COATED ORAL at 05:40

## 2021-04-26 RX ADMIN — BUMETANIDE 2 MILLIGRAM(S): 0.25 INJECTION INTRAMUSCULAR; INTRAVENOUS at 17:55

## 2021-04-26 RX ADMIN — Medication 1 SPRAY(S): at 17:56

## 2021-04-26 RX ADMIN — Medication 100 MILLIGRAM(S): at 14:34

## 2021-04-26 RX ADMIN — BUMETANIDE 2 MILLIGRAM(S): 0.25 INJECTION INTRAMUSCULAR; INTRAVENOUS at 05:40

## 2021-04-26 RX ADMIN — Medication 240 MILLIGRAM(S): at 06:27

## 2021-04-26 RX ADMIN — APIXABAN 5 MILLIGRAM(S): 2.5 TABLET, FILM COATED ORAL at 17:55

## 2021-04-26 RX ADMIN — ISOSORBIDE MONONITRATE 30 MILLIGRAM(S): 60 TABLET, EXTENDED RELEASE ORAL at 11:49

## 2021-04-26 RX ADMIN — Medication 1 APPLICATION(S): at 06:44

## 2021-04-26 RX ADMIN — Medication 6: at 17:51

## 2021-04-26 RX ADMIN — Medication 100 MILLIGRAM(S): at 05:42

## 2021-04-26 RX ADMIN — Medication 1 SPRAY(S): at 05:42

## 2021-04-26 NOTE — PROGRESS NOTE ADULT - SUBJECTIVE AND OBJECTIVE BOX
NEPHROLOGY PROGRESS NOTE    CHIEF COMPLAINT:  CKD    HPI:  Diuresed 2.5 liters, less dyspneic, renal function stable.    ROS:  c/o post nasal drip    EXAM:  T(F): 97.5 (21 @ 10:50)  HR: 66 (21 @ 12:05)  BP: 150/64 (21 @ 12:05)  RR: 18 (21 @ 12:05)  SpO2: 95% (21 @ 12:05)    Conversant, in no apparent distress  Normal respiratory effort, lungs diminished but clear  Heart RRR with no murmur, less pretibial edema  Addomen very distended, non tender         LABS                             8.8    9.96  )-----------( 187      ( 2021 07:14 )             27.8              144  |  110<H>  |  82<H>  ----------------------------<  89  4.7   |  28  |  2.89<H>    Ca    8.8      2021 07:14  Phos  4.1       Mg     2.0         TPro  7.4  /  Alb  3.6  /  TBili  0.3  /  DBili  x   /  AST  14<L>  /  ALT  25  /  AlkPhos  67  04-    Urinalysis Basic - ( 2021 22:53 )  Color: Yellow / Appearance: Clear / S.010 / pH: x  Gluc: x / Ketone: Negative  / Bili: Negative / Urobili: Negative mg/dL   Blood: x / Protein: 30 mg/dL / Nitrite: Negative   Leuk Esterase: Trace / RBC: 0-2 /HPF / WBC 6-10   Sq Epi: x / Non Sq Epi: Occasional / Bacteria: Few      ASSESSMENT:  1.  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  Fluid overload    PLAN:  IV bumex as ordered another 1-2 days  Daily BMP  
NEPHROLOGY PROGRESS NOTE    CHIEF COMPLAINT:  CKD    HPI:  No further dyspnea.  Renal indices are stable to slightly better.    ROS:  no cough    EXAM:  T(F): 97.8 (04-26-21 @ 10:56)  HR: 83 (04-26-21 @ 10:56)  BP: 156/79 (04-26-21 @ 10:56)  RR: 18 (04-26-21 @ 10:56)  SpO2: 95% (04-26-21 @ 10:56)    Conversant, in no apparent distress  Normal respiratory effort, lungs clear bilaterally  Heart RRR with no murmur, no peripheral edema         LABS                             9.1    10.66 )-----------( 196      ( 26 Apr 2021 07:23 )             28.4          04-26    141  |  106  |  78<H>  ----------------------------<  98  4.4   |  28  |  2.84<H>    Ca    8.9      26 Apr 2021 07:23  Phos  4.2     04-26  Mg     1.7     04-26      ASSESSMENT:  1.  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  Fluid overload, better    PLAN:  Resume oral diuretic regimen  Stressed importance of low sodium, fluid restricted diet  Discussed probable need for hemodialysis in near to medium term if fluid balance and renal function cannot be balanced  Follow up with his nephrologist as soon as practical          
Patient is a 70y old  Male who presents with a chief complaint of sob (2021 13:28)      INTERVAL HPI/ OVERNIGHT EVENTS: Pt was seen and examined at bedside today, No significant overnight events, pt admits to feeling better, no SOB and LE swelling is improving, denies pain in LE      MEDICATIONS  (STANDING):  apixaban 5 milliGRAM(s) Oral two times a day  buMETAnide Injectable 2 milliGRAM(s) IV Push every 8 hours  dextrose 40% Gel 15 Gram(s) Oral once  dextrose 5%. 1000 milliLiter(s) (50 mL/Hr) IV Continuous <Continuous>  dextrose 5%. 1000 milliLiter(s) (100 mL/Hr) IV Continuous <Continuous>  dextrose 50% Injectable 25 Gram(s) IV Push once  dextrose 50% Injectable 12.5 Gram(s) IV Push once  dextrose 50% Injectable 25 Gram(s) IV Push once  diltiazem    milliGRAM(s) Oral daily  doxazosin 4 milliGRAM(s) Oral at bedtime  fluticasone propionate 50 MICROgram(s)/spray Nasal Spray 1 Spray(s) Both Nostrils two times a day  glucagon  Injectable 1 milliGRAM(s) IntraMuscular once  hydrALAZINE 100 milliGRAM(s) Oral three times a day  insulin lispro (ADMELOG) corrective regimen sliding scale   SubCutaneous three times a day before meals  insulin lispro (ADMELOG) corrective regimen sliding scale   SubCutaneous at bedtime  isosorbide   mononitrate ER Tablet (IMDUR) 30 milliGRAM(s) Oral daily  pantoprazole    Tablet 40 milliGRAM(s) Oral before breakfast  povidone iodine 10% Solution 1 Application(s) Topical <User Schedule>  simvastatin 20 milliGRAM(s) Oral at bedtime    MEDICATIONS  (PRN):  guaiFENesin   Syrup  (Sugar-Free) 100 milliGRAM(s) Oral every 6 hours PRN Cough  hydrALAZINE Injectable 10 milliGRAM(s) IV Push three times a day PRN SBP > 165      Allergies    No Known Allergies    Intolerances        REVIEW OF SYSTEMS:    Unable to examine due to [ ] Encephalopathy [ ] Advanced Dementia [ ] Expressive Aphasia [ ] Non-verbal patient    CONSTITUTIONAL: No fever, NO generalized weakness/Fatigue, No weight loss  EYES: No eye pain, visual disturbances, or discharge  ENMT:  No difficulty hearing, tinnitus, vertigo; No sinus or throat pain  NECK: No pain or stiffness  RESPIRATORY: No shortness of breath,  cough, wheezing, sputum or hemoptysis   CARDIOVASCULAR: leg swelling, No chest pain, palpitations  GASTROINTESTINAL: No abdominal pain. No nausea, vomiting, diarrhea or constipation. No melena or hematochezia.  GENITOURINARY: No dysuria, frequency, hematuria, or incontinence  NEUROLOGICAL: No headaches, Dizziness, memory loss, loss of strength, numbness, or tremors  SKIN: right leg ulcers  MUSCULOSKELETAL: No joint pain or swelling; No muscle, back, or extremity pain  PSYCHIATRIC: No depression, anxiety, mood swings, or difficulty sleeping  HEME/LYMPH: No easy bruising, or bleeding gums      Vital Signs Last 24 Hrs  T(C): 36.4 (2021 10:50), Max: 36.7 (2021 17:37)  T(F): 97.5 (2021 10:50), Max: 98 (2021 17:37)  HR: 66 (2021 12:05) (62 - 102)  BP: 150/64 (2021 12:05) (149/56 - 171/64)  BP(mean): --  RR: 18 (2021 12:05) (18 - 19)  SpO2: 95% (2021 12:05) (90% - 96%)    PHYSICAL EXAM:  GENERAL: NAD on RA, Obese   HEAD:  Atraumatic, Normocephalic  EYES: conjunctiva and sclera clear  ENMT: Moist mucous membranes  NECK: Supple, No JVD, Normal thyroid  CHEST/LUNG: Clear to Auscultation bilaterally; No rales, rhonchi, wheezing, or rubs  HEART: Regular rate and rhythm; No murmurs, rubs, or gallops  ABDOMEN: Soft, Nontender, Nondistended; Bowel sounds present  EXTREMITIES: Left BKA, Right leg trace edema,   2+ Peripheral Pulses, No clubbing, cyanosis  SKIN: multiple ulcers on right leg   NERVOUS SYSTEM:  Alert & Oriented X3, Good concentration; Motor Strength 5/5 B/L upper and lower extremities    LABS:                        8.8    9.96  )-----------( 187      ( 2021 07:14 )             27.8     04-    144  |  110<H>  |  82<H>  ----------------------------<  89  4.7   |  28  |  2.89<H>    Ca    8.8      2021 07:14  Phos  4.1       Mg     2.0         TPro  7.4  /  Alb  3.6  /  TBili  0.3  /  DBili  x   /  AST  14<L>  /  ALT  25  /  AlkPhos  67        Urinalysis Basic - ( 2021 22:53 )    Color: Yellow / Appearance: Clear / S.010 / pH: x  Gluc: x / Ketone: Negative  / Bili: Negative / Urobili: Negative mg/dL   Blood: x / Protein: 30 mg/dL / Nitrite: Negative   Leuk Esterase: Trace / RBC: 0-2 /HPF / WBC 6-10   Sq Epi: x / Non Sq Epi: Occasional / Bacteria: Few      CAPILLARY BLOOD GLUCOSE      POCT Blood Glucose.: 177 mg/dL (2021 13:00)  POCT Blood Glucose.: 106 mg/dL (2021 07:29)  POCT Blood Glucose.: 212 mg/dL (2021 22:51)  POCT Blood Glucose.: 256 mg/dL (2021 18:48)          RADIOLOGY & ADDITIONAL TESTS:          Imaging Personally Reviewed:  [ ] YES  [ ] NO    Consultant(s) Notes Reviewed:  [ x] YES  [ ] NO    Care Discussed with Consultants/Other Providers [x ] YES  [ ] NO

## 2021-04-26 NOTE — DISCHARGE NOTE NURSING/CASE MANAGEMENT/SOCIAL WORK - PATIENT PORTAL LINK FT
You can access the FollowMyHealth Patient Portal offered by [facility] by registering at the following website: [email or web address]. By joining [facility]’s FollowMyHealth portal, you will also be able to view your health information using other applications (apps) compatible with our system.

## 2021-04-26 NOTE — DISCHARGE NOTE PROVIDER - NSDCMRMEDTOKEN_GEN_ALL_CORE_FT
albuterol 1.25 mg/3 mL (0.042%) inhalation solution:  inhaled   bumetanide 2 mg oral tablet: 1 tab(s) orally once a day  dilTIAZem 240 mg/24 hours oral capsule, extended release: 1 cap(s) orally once a day  doxazosin 4 mg oral tablet: 1 tab(s) orally once a day (at bedtime)  Eliquis 5 mg oral tablet: 1 tab(s) orally 2 times a day  glimepiride 4 mg oral tablet: 1 tab(s) orally once a day  hydrALAZINE 100 mg oral tablet: 1 tab(s) orally 3 times a day  hydrocortisone 1% topical cream: 1 application topically 2 times a day  Invokana 300 mg oral tablet: 1 tab(s) orally once a day  lidocaine 5% topical film:  topically   pantoprazole 40 mg oral delayed release tablet: 1 tab(s) orally once a day (before a meal)  polyethylene glycol 3350 oral powder for reconstitution: 17 gram(s) orally once a day  senna oral tablet: 2 tab(s) orally once a day (at bedtime)  simvastatin 20 mg oral tablet: 1 tab(s) orally once a day (at bedtime)   albuterol 1.25 mg/3 mL (0.042%) inhalation solution:  inhaled   aspirin 81 mg oral tablet, chewable: 1 tab(s) orally once a day  bumetanide 2 mg oral tablet: 1 tab(s) orally 2 times a day  dilTIAZem 240 mg/24 hours oral capsule, extended release: 1 cap(s) orally once a day  doxazosin 4 mg oral tablet: 1 tab(s) orally once a day (at bedtime)  Eliquis 5 mg oral tablet: 1 tab(s) orally 2 times a day  fluticasone 50 mcg/inh nasal spray: 1 spray(s) nasal 2 times a day  glimepiride 4 mg oral tablet: 1 tab(s) orally once a day  hydrALAZINE 100 mg oral tablet: 1 tab(s) orally 3 times a day  Invokana 300 mg oral tablet: 1 tab(s) orally once a day  isosorbide mononitrate 60 mg oral tablet, extended release: 1 tab(s) orally once a day (in the morning)  lidocaine 5% topical film:  topically   pantoprazole 40 mg oral delayed release tablet: 1 tab(s) orally once a day (before a meal)  polyethylene glycol 3350 oral powder for reconstitution: 17 gram(s) orally once a day  povidone iodine 10% topical solution: Apply topically to affected area once a day   senna oral tablet: 2 tab(s) orally once a day (at bedtime)  simvastatin 20 mg oral tablet: 1 tab(s) orally once a day (at bedtime)

## 2021-04-26 NOTE — DISCHARGE NOTE PROVIDER - NSDCHHASSISTOTHER_GEN_ALL_CORE_FT
Wound Specialist Recommendations: R proximal, distal anterior lower leg and distal calf: To clean with saline and apply Providine-Iodine(Betadine) daily.

## 2021-04-26 NOTE — DISCHARGE NOTE PROVIDER - CARE PROVIDER_API CALL
Chris Hutton E  CARDIOLOGY  [address] [place] [address]  Phone: [phone]  Fax: [phone]  Follow Up Time:     River Quintero)  Surgery  [address], [address]  [place] [address]  Phone: [phone]  Fax: [phone]  Follow Up Time:

## 2021-04-26 NOTE — DIETITIAN INITIAL EVALUATION ADULT. - PERTINENT LABORATORY DATA
04-26 Na141 mmol/L Glu 98 mg/dL K+ 4.4 mmol/L Cr  2.84 mg/dL<H> BUN 78 mg/dL<H> 04-26 Phos 4.2 mg/dL 04-23 Alb 3.6 g/dL  04-25-21  A1C 7.4%; average glu 166

## 2021-04-26 NOTE — DISCHARGE NOTE PROVIDER - NSDCCPCAREPLAN_GEN_ALL_CORE_FT
PRINCIPAL DISCHARGE DIAGNOSIS  Diagnosis: Acute on chronic congestive heart failure, unspecified heart failure type  Assessment and Plan of Treatment: Acute on chronic Diastolic congestive heart failure, Acute CHF resolved    continue with Bumex 2mg Twice a day  Echocardiogram:    Follow up with Cardiologist Dr. Hutton in 1 week.      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    Diagnosis: Peripheral vascular disease  Assessment and Plan of Treatment: Right leg arterial doppler positive for moderate disease, hx of Left BKA    Vascular Surgery; no surgical intervention required at this time   continue with ASA and plavix.   Follow up with Vascular Surgery with Dr. Quintero    Diagnosis: Chronic atrial fibrillation  Assessment and Plan of Treatment: continue with Cardizem and eliquis.    Diagnosis: Benign prostatic hyperplasia without lower urinary tract symptoms  Assessment and Plan of Treatment: cont Cardura.    Diagnosis: Hypertension  Assessment and Plan of Treatment: Uncontrolled   continue with Hydralazine, Cardizem, will add Imdur to regimen.  check blood pressure daily and write readings in log book  follow up with PCP with blood pressure readings.    Diagnosis: Type 2 diabetes mellitus  Assessment and Plan of Treatment: HgA1c: 7.4%  continue with Glimepiride and Invokana   Glucose monitoring, write readings in log book   follow up with PCP with readings for management

## 2021-04-26 NOTE — DIETITIAN INITIAL EVALUATION ADULT. - OTHER CALCULATIONS
Ht (cm):    182.86   Wt (kg):  117.2 (4/26)     BMI:     36.1    IBW:  76 kg   %IBW:   154%   UBW: stable   %UBW: 100%

## 2021-04-26 NOTE — DIETITIAN INITIAL EVALUATION ADULT. - OTHER INFO
oral Pt lives c spouse & mother-in-law; is mostly independent but does require some assistance c ADLs due to left BKA. Pt takes Glimepiride to control BG at home; watch CHO intake & follows renal diet recommendations; pt knowledgeable re both diets; admits he doesn't always follow them the way he should. Denies any N/V/D; no chew/swallowing difficulty; some constipation experienced but refused prunes or prunes juice to assist; RN aware.  Reports wt stable & appetite is good.  Pt reports wife does shopping & cooking; they do consume convenience & take-out foods often. He gets [employer]  packaged meals delivered to cover his lunches (renal diet compatible); discussed options for breakfast & dinner meals. Reviewed renal & DM diet recommendations; stressed relationship between DM, Kidney & heart diseases.  Pt unable to weigh himself; discussed carefully observing fluid retention in LE, tightness in skin  &/or extremities before SOB occurs. Appropriate questions asked & answered; educational material provided.

## 2021-04-26 NOTE — DIETITIAN INITIAL EVALUATION ADULT. - PERTINENT MEDS FT
Eliquis, Admelog sliding scale, Bumex, Cardizem, Cardura, Robitussin SF, Hydralazine, Imdur, Protonix, Zocor

## 2021-04-27 ENCOUNTER — NON-APPOINTMENT (OUTPATIENT)
Age: 71
End: 2021-04-27

## 2021-04-30 DIAGNOSIS — L97.911 NON-PRESSURE CHRONIC ULCER OF UNSPECIFIED PART OF RIGHT LOWER LEG LIMITED TO BREAKDOWN OF SKIN: ICD-10-CM

## 2021-04-30 DIAGNOSIS — E11.622 TYPE 2 DIABETES MELLITUS WITH OTHER SKIN ULCER: ICD-10-CM

## 2021-04-30 DIAGNOSIS — E11.40 TYPE 2 DIABETES MELLITUS WITH DIABETIC NEUROPATHY, UNSPECIFIED: ICD-10-CM

## 2021-04-30 DIAGNOSIS — R00.1 BRADYCARDIA, UNSPECIFIED: ICD-10-CM

## 2021-04-30 DIAGNOSIS — I50.33 ACUTE ON CHRONIC DIASTOLIC (CONGESTIVE) HEART FAILURE: ICD-10-CM

## 2021-04-30 DIAGNOSIS — E11.51 TYPE 2 DIABETES MELLITUS WITH DIABETIC PERIPHERAL ANGIOPATHY WITHOUT GANGRENE: ICD-10-CM

## 2021-04-30 DIAGNOSIS — I50.9 HEART FAILURE, UNSPECIFIED: ICD-10-CM

## 2021-04-30 DIAGNOSIS — I48.20 CHRONIC ATRIAL FIBRILLATION, UNSPECIFIED: ICD-10-CM

## 2021-04-30 DIAGNOSIS — E11.49 TYPE 2 DIABETES MELLITUS WITH OTHER DIABETIC NEUROLOGICAL COMPLICATION: ICD-10-CM

## 2021-04-30 DIAGNOSIS — N18.4 CHRONIC KIDNEY DISEASE, STAGE 4 (SEVERE): ICD-10-CM

## 2021-04-30 DIAGNOSIS — N40.0 BENIGN PROSTATIC HYPERPLASIA WITHOUT LOWER URINARY TRACT SYMPTOMS: ICD-10-CM

## 2021-04-30 DIAGNOSIS — Z79.01 LONG TERM (CURRENT) USE OF ANTICOAGULANTS: ICD-10-CM

## 2021-04-30 DIAGNOSIS — I13.0 HYPERTENSIVE HEART AND CHRONIC KIDNEY DISEASE WITH HEART FAILURE AND STAGE 1 THROUGH STAGE 4 CHRONIC KIDNEY DISEASE, OR UNSPECIFIED CHRONIC KIDNEY DISEASE: ICD-10-CM

## 2021-04-30 DIAGNOSIS — E11.22 TYPE 2 DIABETES MELLITUS WITH DIABETIC CHRONIC KIDNEY DISEASE: ICD-10-CM

## 2021-04-30 DIAGNOSIS — E11.65 TYPE 2 DIABETES MELLITUS WITH HYPERGLYCEMIA: ICD-10-CM

## 2021-05-07 NOTE — PROVIDER CONTACT NOTE (CRITICAL VALUE NOTIFICATION) - NS PROVIDER READ BACK TO LAB
Case Management Discharge Instructions        Discharge Location: Home with Outpatient Services     Agency Name / Address / Phone:  [facility]         [address] [place] [address] [address], [place] [id number] [phone]     Outpatient Physical and Occupational Therapies       Medical Equipment Provider / Phone: [facility]  [phone]     Medical Equipment Ordered/Delivered: Front Wheel Walker, Shower Chair        Follow-up Information:     Mohsen Tamasaby, M.D.  Primary Care Provider   [address], [address]   [facility] [id number] [phone]   Please contact for follow-up appointment with Primary Care within 1-2 weeks of discharge.  Neurology appointment needed.     Asa Sanchez M.D.  Cardiology   [address]   [facility] [id number] [phone]   Tuesday 6/1/2021 appointment at 2:45pm.     Esvin Acuna M.D.  Gastroenterology   [address] [facility] [id number] [phone]  Thursday 7/15/2021 appointment at 9:30am.  Check-in at 9:00am.     Ophelia Acevedo D.O.  Physical Medicine & Rehab   [address] [facility]   [place] [id number] [phone]   Please call for an appointment.         yes

## 2021-06-20 ENCOUNTER — EMERGENCY (EMERGENCY)
Facility: HOSPITAL | Age: 71
LOS: 0 days | Discharge: ANOTHER HOSPITAL | End: 2021-06-20
Attending: EMERGENCY MEDICINE
Payer: MEDICARE

## 2021-06-20 ENCOUNTER — INPATIENT (INPATIENT)
Facility: HOSPITAL | Age: 71
LOS: 9 days | Discharge: HOME HEALTH | End: 2021-06-30
Attending: LEGAL MEDICINE | Admitting: LEGAL MEDICINE
Payer: MEDICARE

## 2021-06-20 VITALS
OXYGEN SATURATION: 95 % | SYSTOLIC BLOOD PRESSURE: 193 MMHG | HEART RATE: 57 BPM | RESPIRATION RATE: 18 BRPM | DIASTOLIC BLOOD PRESSURE: 78 MMHG | TEMPERATURE: 98 F

## 2021-06-20 VITALS
HEART RATE: 97 BPM | SYSTOLIC BLOOD PRESSURE: 145 MMHG | HEIGHT: 72 IN | OXYGEN SATURATION: 98 % | TEMPERATURE: 98 F | WEIGHT: 240.08 LBS | DIASTOLIC BLOOD PRESSURE: 92 MMHG

## 2021-06-20 DIAGNOSIS — I12.9 HYPERTENSIVE CHRONIC KIDNEY DISEASE WITH STAGE 1 THROUGH STAGE 4 CHRONIC KIDNEY DISEASE, OR UNSPECIFIED CHRONIC KIDNEY DISEASE: ICD-10-CM

## 2021-06-20 DIAGNOSIS — E11.65 TYPE 2 DIABETES MELLITUS WITH HYPERGLYCEMIA: ICD-10-CM

## 2021-06-20 DIAGNOSIS — E11.49 TYPE 2 DIABETES MELLITUS WITH OTHER DIABETIC NEUROLOGICAL COMPLICATION: ICD-10-CM

## 2021-06-20 DIAGNOSIS — S88.912D: Chronic | ICD-10-CM

## 2021-06-20 DIAGNOSIS — Z20.822 CONTACT WITH AND (SUSPECTED) EXPOSURE TO COVID-19: ICD-10-CM

## 2021-06-20 DIAGNOSIS — Z79.4 LONG TERM (CURRENT) USE OF INSULIN: ICD-10-CM

## 2021-06-20 DIAGNOSIS — E11.22 TYPE 2 DIABETES MELLITUS WITH DIABETIC CHRONIC KIDNEY DISEASE: ICD-10-CM

## 2021-06-20 DIAGNOSIS — H54.7 UNSPECIFIED VISUAL LOSS: ICD-10-CM

## 2021-06-20 DIAGNOSIS — H53.8 OTHER VISUAL DISTURBANCES: ICD-10-CM

## 2021-06-20 LAB
ALBUMIN SERPL ELPH-MCNC: 3.3 G/DL — SIGNIFICANT CHANGE UP (ref 3.3–5)
ALP SERPL-CCNC: 62 U/L — SIGNIFICANT CHANGE UP (ref 40–120)
ALT FLD-CCNC: 19 U/L — SIGNIFICANT CHANGE UP (ref 12–78)
ANION GAP SERPL CALC-SCNC: 9 MMOL/L — SIGNIFICANT CHANGE UP (ref 5–17)
APTT BLD: 33.2 SEC — SIGNIFICANT CHANGE UP (ref 27.5–35.5)
AST SERPL-CCNC: 15 U/L — SIGNIFICANT CHANGE UP (ref 15–37)
BASOPHILS # BLD AUTO: 0.03 K/UL — SIGNIFICANT CHANGE UP (ref 0–0.2)
BASOPHILS NFR BLD AUTO: 0.2 % — SIGNIFICANT CHANGE UP (ref 0–2)
BILIRUB SERPL-MCNC: 0.3 MG/DL — SIGNIFICANT CHANGE UP (ref 0.2–1.2)
BUN SERPL-MCNC: 102 MG/DL — HIGH (ref 7–23)
CALCIUM SERPL-MCNC: 8.6 MG/DL — SIGNIFICANT CHANGE UP (ref 8.5–10.1)
CHLORIDE SERPL-SCNC: 95 MMOL/L — LOW (ref 96–108)
CO2 SERPL-SCNC: 27 MMOL/L — SIGNIFICANT CHANGE UP (ref 22–31)
CREAT SERPL-MCNC: 3.44 MG/DL — HIGH (ref 0.5–1.3)
EOSINOPHIL # BLD AUTO: 0.11 K/UL — SIGNIFICANT CHANGE UP (ref 0–0.5)
EOSINOPHIL NFR BLD AUTO: 0.7 % — SIGNIFICANT CHANGE UP (ref 0–6)
FLUAV AG NPH QL: SIGNIFICANT CHANGE UP
FLUBV AG NPH QL: SIGNIFICANT CHANGE UP
GLUCOSE BLDC GLUCOMTR-MCNC: 373 MG/DL — HIGH (ref 70–99)
GLUCOSE BLDC GLUCOMTR-MCNC: 392 MG/DL — HIGH (ref 70–99)
GLUCOSE BLDC GLUCOMTR-MCNC: 402 MG/DL — HIGH (ref 70–99)
GLUCOSE SERPL-MCNC: 371 MG/DL — HIGH (ref 70–99)
HCT VFR BLD CALC: 30.7 % — LOW (ref 39–50)
HGB BLD-MCNC: 10.2 G/DL — LOW (ref 13–17)
IMM GRANULOCYTES NFR BLD AUTO: 0.6 % — SIGNIFICANT CHANGE UP (ref 0–1.5)
INR BLD: 1.2 RATIO — HIGH (ref 0.88–1.16)
LYMPHOCYTES # BLD AUTO: 0.64 K/UL — LOW (ref 1–3.3)
LYMPHOCYTES # BLD AUTO: 3.9 % — LOW (ref 13–44)
MCHC RBC-ENTMCNC: 25.9 PG — LOW (ref 27–34)
MCHC RBC-ENTMCNC: 33.2 GM/DL — SIGNIFICANT CHANGE UP (ref 32–36)
MCV RBC AUTO: 77.9 FL — LOW (ref 80–100)
MONOCYTES # BLD AUTO: 0.82 K/UL — SIGNIFICANT CHANGE UP (ref 0–0.9)
MONOCYTES NFR BLD AUTO: 5 % — SIGNIFICANT CHANGE UP (ref 2–14)
NEUTROPHILS # BLD AUTO: 14.62 K/UL — HIGH (ref 1.8–7.4)
NEUTROPHILS NFR BLD AUTO: 89.6 % — HIGH (ref 43–77)
NRBC # BLD: 0 /100 WBCS — SIGNIFICANT CHANGE UP (ref 0–0)
PLATELET # BLD AUTO: 203 K/UL — SIGNIFICANT CHANGE UP (ref 150–400)
POTASSIUM SERPL-MCNC: 3.8 MMOL/L — SIGNIFICANT CHANGE UP (ref 3.5–5.3)
POTASSIUM SERPL-SCNC: 3.8 MMOL/L — SIGNIFICANT CHANGE UP (ref 3.5–5.3)
PROT SERPL-MCNC: 7.4 GM/DL — SIGNIFICANT CHANGE UP (ref 6–8.3)
PROTHROM AB SERPL-ACNC: 13.8 SEC — HIGH (ref 10.6–13.6)
RBC # BLD: 3.94 M/UL — LOW (ref 4.2–5.8)
RBC # FLD: 14.1 % — SIGNIFICANT CHANGE UP (ref 10.3–14.5)
SARS-COV-2 RNA SPEC QL NAA+PROBE: SIGNIFICANT CHANGE UP
SODIUM SERPL-SCNC: 131 MMOL/L — LOW (ref 135–145)
TROPONIN I SERPL-MCNC: 0.03 NG/ML — SIGNIFICANT CHANGE UP (ref 0.01–0.04)
WBC # BLD: 16.31 K/UL — HIGH (ref 3.8–10.5)
WBC # FLD AUTO: 16.31 K/UL — HIGH (ref 3.8–10.5)

## 2021-06-20 PROCEDURE — 99285 EMERGENCY DEPT VISIT HI MDM: CPT | Mod: 25

## 2021-06-20 PROCEDURE — 70498 CT ANGIOGRAPHY NECK: CPT | Mod: 26,MA,76

## 2021-06-20 PROCEDURE — 99291 CRITICAL CARE FIRST HOUR: CPT

## 2021-06-20 PROCEDURE — 93010 ELECTROCARDIOGRAM REPORT: CPT

## 2021-06-20 PROCEDURE — 0042T: CPT

## 2021-06-20 PROCEDURE — 70496 CT ANGIOGRAPHY HEAD: CPT | Mod: 26,MA,76

## 2021-06-20 PROCEDURE — 70450 CT HEAD/BRAIN W/O DYE: CPT | Mod: 26,MA,59

## 2021-06-20 RX ORDER — SODIUM CHLORIDE 9 MG/ML
1000 INJECTION INTRAMUSCULAR; INTRAVENOUS; SUBCUTANEOUS ONCE
Refills: 0 | Status: COMPLETED | OUTPATIENT
Start: 2021-06-20 | End: 2021-06-20

## 2021-06-20 RX ORDER — ASPIRIN/CALCIUM CARB/MAGNESIUM 324 MG
81 TABLET ORAL DAILY
Refills: 0 | Status: DISCONTINUED | OUTPATIENT
Start: 2021-06-20 | End: 2021-06-20

## 2021-06-20 RX ADMIN — SODIUM CHLORIDE 1000 MILLILITER(S): 9 INJECTION INTRAMUSCULAR; INTRAVENOUS; SUBCUTANEOUS at 20:03

## 2021-06-20 RX ADMIN — Medication 81 MILLIGRAM(S): at 22:03

## 2021-06-20 NOTE — ED PROVIDER NOTE - PROGRESS NOTE DETAILS
Pt CT negative. Opthalmology Dr. Servin consulted, recommends transfer for evaluation. Pt agrees with plan, and ready for transfer.

## 2021-06-20 NOTE — CONSULT NOTE ADULT - ASSESSMENT
Subjective Complaints:      Consult requested by ER doctor:                  Attending:     History of Present Illness:  Chief Complaint/Reason for Admission:  History of Present Illness:  HPI: events noted blurring of vision left eye around at 4. 30 pm  no headache hx of htn diabates afib on eliquis  left leg bka , follows commands eom intact no scalp tenderness for work up nih stroke scale 2 ,         PAST MEDICAL & SURGICAL HISTORY:  Hypertension, unspecified type    Type 2 diabetes mellitus with other neurologic complication, without long-term current use of insulin    Amputation of leg, traumatic, left, subsequent encounter    70yMale    MEDICATIONS  (STANDING):    MEDICATIONS  (PRN):      Allergies    No Known Allergies    Intolerances      FAMILY HISTORY:  No pertinent family history in first degree relatives        REVIEW OF SYSTEMS:  General:  No wt loss, fevers, chills, night sweats  Eyes:  Good vision, no reported pain  ENT:  No sore throat, pain, runny nose, dysphagia  CV:  No pain, palpitatioins, hypo/hypertension  Resp:  No dyspnea, cough, tachypnea, wheezing  GI:  No pain, nausea, vomiting, diarrhea, constipatiion  :  No pain, bleeding, incontinence, nocturia  Muscle:  No pain, weakness  Breast:  No pain, abscess, mass, discharge  Neuro:  No weakness, tingling, memory problems  Psych:  No fatigue, insomnia, mood problems, depression  Endocrine:  No polyuria, polydypsia, cold/heat intolerance  Heme:  No petechiae, ecchymosis, easy bruisability  Skin:  No rash, tattoos, scars, edema      Vital Signs Last 24 Hrs  T(C): 36.8 (20 Jun 2021 18:00), Max: 36.8 (20 Jun 2021 18:00)  T(F): 98.2 (20 Jun 2021 18:00), Max: 98.2 (20 Jun 2021 18:00)  HR: 97 (20 Jun 2021 18:00) (97 - 97)  BP: 145/92 (20 Jun 2021 18:00) (145/92 - 145/92)  BP(mean): --  RR: --  SpO2: 98% (20 Jun 2021 18:00) (98% - 98%)    GENERAL PHYSICAL EXAM:  General:  Appears stated age, well-groomed, well-nourished, no distress  HEENT:  NC/AT, patent nares w/ pink mucosa, OP clear w/o lesions, PERRL, EOMI, conjunctivae clear, no thyromegaly, nodules, adenopathy, no JVD  Chest:  Full & symmetric excursion, no increased effort, breath sounds clear  Cardiovascular:  Regular rhythm, S1, S2, no murmur/rub/S3/S4, no carotid/femoral/abdominal bruit, radial/pedal pulses 2+, no edema  Abdomen:  Soft, non-tender, non-distended, normoactive bowel sounds, no HSM  Extremities:  Gait & station:   Digits:   Nails:   Joints, Bones, Muscles:   ROM:   Stability:  Skin:  No rash/erythema/ecchymoses/petechiae/wounds/abscess/warm/dry  Musculoskeletal:  Full ROM in all joints w/o swelling/tenderness/effusion    NEUROLOGICAL EXAM:  HENT:  Normocephalic head; atraumatic head.  Neck supple.  ENT: normal looking.  Mental State:    Alert.  Fully oriented to person, place and date.  Coherent.  Speech clear and intact.  Cooperative.  Responds appropriately.    Cranial Nerves:  II-XII:   Pupils round and reactive to light and accommodation. as per pt blurring of vision left eye he can count finger  left eye  no diplopia  no nystagmus   Extraocular movements full.  Visual fields full (no homonymous hemianopsia).  Visual acuity wnl.  Facial symmetry intact.  Tongue midline.  Motor Functions:  Moves all extremities.  No pronator drift of UE.  Claps hand well.  Hand  intact bilaterally.    left leg bka   Sensory Functions:   Intact to touch and pinprick to face and extremities.    Reflexes:  Deep tendon reflexes normoactive to biceps, knees and ankles.  Babinski absent (present).  Cerebellar Testing:    Finger to nose intact.  Nystagmus absent.  Neurovascular: Carotid auscultation full without bruits.      LABS:                        10.2   16.31 )-----------( 203      ( 20 Jun 2021 18:58 )             30.7                   RADIOLOGY & ADDITIONAL STUDIES:      Assessment & Opinion:  events noited hx of htn diabates  afib on eliquis  left  eye blurring vision  nih stroke scale 2 ,  for ct head and cta brain  on asa 81 mg  will follow     Recommendations:  Brain MRI.  Carotid doppler.  Echocardiogram.  EEG.   DVT prophylaxis as ordered.  Medications:

## 2021-06-20 NOTE — ED ADULT TRIAGE NOTE - CHIEF COMPLAINT QUOTE
Pt arrives as a xfer for an Ophthalmology consult from [facility] ER. Pt states at 4:30pm he had a sudden onset of blurry vision while watching TV at home. Pt arrives with 18G in LAC and per xfer report pt received ASA 81mg and 1L of NS. Pt states in triage vision that blurry vision still persists bilaterally and he noticed while in the darkened ambulance his vision seemed to temporarily improve. Pt has L sided BKA.

## 2021-06-20 NOTE — ED PROVIDER NOTE - CLINICAL SUMMARY MEDICAL DECISION MAKING FREE TEXT BOX
DDx: CVA, NIHSS 0, no full actual visual loss just blurry vision, no pain to suggest angle closure glaucoma. Retinal vessel occlusion.  Plan: CT head, stroke protocol, CBC, CMP, troponin, neurology, reassess.

## 2021-06-20 NOTE — ED ADULT NURSE NOTE - OBJECTIVE STATEMENT
70 year old AAX4, c/o hyperglycemia, PMH HTN, DM, HLD, pt admits to nausea, blurry vision in the left eye. pt denies cp, sick contacts, travel, weakness in any extremities,

## 2021-06-20 NOTE — ED ADULT NURSE NOTE - NSIMPLEMENTINTERV_GEN_ALL_ED
Implemented All Universal Safety Interventions:  [place] to call system. Call bell, personal items and telephone within reach. Instruct patient to call for assistance. Room bathroom lighting operational. Non-slip footwear when patient is off stretcher. Physically safe environment: no spills, clutter or unnecessary equipment. Stretcher in lowest position, wheels locked, appropriate side rails in place.

## 2021-06-20 NOTE — ED ADULT NURSE NOTE - ED STAT RN HANDOFF DETAILS
Report endorsed to oncgenie Sapp RN. Safety checks compld this shift/Safety rounds completed hourly.  IV sites checked Q2+remains WDL. Meds given as ord with no s/s of adverse RXNs. Fall +skin precs in place. Any issues endorsed to oncgenie RN for follow up. Report endorsed to oncgenie Melchor RN. Safety checks compld this shift/Safety rounds completed hourly.  IV sites checked Q2+remains WDL. Meds given as ord with no s/s of adverse RXNs. Fall +skin precs in place. Any issues endorsed to oncoming RN for follow up.

## 2021-06-20 NOTE — ED PROVIDER NOTE - OBJECTIVE STATEMENT
Pt is a 70 year old male w/PMH of DM, HTN, ESRD, Afib on Eliquis, HLD, left foot amputation presents to the ED today for blurry vision of the left eye at 4:30 pm. Pt reports sitting in the living room when symptoms had occurred. Pt wears glasses. Denies eye pain or similar symptoms previously. Pt had cataracts surgery a few years. Pt is a 70 year old male w/PMH of DM, HTN, CKD, Afib on Eliquis, HLD, left foot amputation presents to the ED today for sudden blurry vison at 4:30 pm. Pt did not have a sensation of curtain going over eyes. Denies eye pain or full loss of vision, can still see fingers. No hx of this in the past. Denies headache, CP, SOB, abdominal pain, weakness, numbness, tingling, eye pain. Pt does not wear contacts but has reading glasses with him.

## 2021-06-20 NOTE — ED PROVIDER NOTE - NSRECPHYSICIAN_ED_A_ED_FT
Instructions: This plan will send the code FBSE to the PM system.  DO NOT or CHANGE the price. Detail Level: Generalized Price (Do Not Change): 0.00 Dr. Dailey

## 2021-06-21 DIAGNOSIS — H34.12 CENTRAL RETINAL ARTERY OCCLUSION, LEFT EYE: ICD-10-CM

## 2021-06-21 DIAGNOSIS — Z79.899 OTHER LONG TERM (CURRENT) DRUG THERAPY: ICD-10-CM

## 2021-06-21 DIAGNOSIS — E78.5 HYPERLIPIDEMIA, UNSPECIFIED: ICD-10-CM

## 2021-06-21 DIAGNOSIS — Z02.9 ENCOUNTER FOR ADMINISTRATIVE EXAMINATIONS, UNSPECIFIED: ICD-10-CM

## 2021-06-21 DIAGNOSIS — N17.9 ACUTE KIDNEY FAILURE, UNSPECIFIED: ICD-10-CM

## 2021-06-21 DIAGNOSIS — E11.65 TYPE 2 DIABETES MELLITUS WITH HYPERGLYCEMIA: ICD-10-CM

## 2021-06-21 DIAGNOSIS — I10 ESSENTIAL (PRIMARY) HYPERTENSION: ICD-10-CM

## 2021-06-21 DIAGNOSIS — Z89.512 ACQUIRED ABSENCE OF LEFT LEG BELOW KNEE: Chronic | ICD-10-CM

## 2021-06-21 DIAGNOSIS — H34.10 CENTRAL RETINAL ARTERY OCCLUSION, UNSPECIFIED EYE: ICD-10-CM

## 2021-06-21 DIAGNOSIS — Z29.9 ENCOUNTER FOR PROPHYLACTIC MEASURES, UNSPECIFIED: ICD-10-CM

## 2021-06-21 DIAGNOSIS — Z98.890 OTHER SPECIFIED POSTPROCEDURAL STATES: Chronic | ICD-10-CM

## 2021-06-21 DIAGNOSIS — D64.9 ANEMIA, UNSPECIFIED: ICD-10-CM

## 2021-06-21 DIAGNOSIS — N18.9 CHRONIC KIDNEY DISEASE, UNSPECIFIED: ICD-10-CM

## 2021-06-21 DIAGNOSIS — D72.829 ELEVATED WHITE BLOOD CELL COUNT, UNSPECIFIED: ICD-10-CM

## 2021-06-21 DIAGNOSIS — Z96.642 PRESENCE OF LEFT ARTIFICIAL HIP JOINT: Chronic | ICD-10-CM

## 2021-06-21 DIAGNOSIS — I50.32 CHRONIC DIASTOLIC (CONGESTIVE) HEART FAILURE: ICD-10-CM

## 2021-06-21 DIAGNOSIS — I48.0 PAROXYSMAL ATRIAL FIBRILLATION: ICD-10-CM

## 2021-06-21 LAB
A1C WITH ESTIMATED AVERAGE GLUCOSE RESULT: 10.6 % — HIGH (ref 4–5.6)
ANION GAP SERPL CALC-SCNC: 15 MMOL/L — HIGH (ref 7–14)
APPEARANCE UR: CLEAR — SIGNIFICANT CHANGE UP
BACTERIA # UR AUTO: NEGATIVE — SIGNIFICANT CHANGE UP
BASOPHILS # BLD AUTO: 0.04 K/UL — SIGNIFICANT CHANGE UP (ref 0–0.2)
BASOPHILS NFR BLD AUTO: 0.3 % — SIGNIFICANT CHANGE UP (ref 0–2)
BILIRUB UR-MCNC: NEGATIVE — SIGNIFICANT CHANGE UP
BUN SERPL-MCNC: 84 MG/DL — HIGH (ref 7–23)
CALCIUM SERPL-MCNC: 9.8 MG/DL — SIGNIFICANT CHANGE UP (ref 8.4–10.5)
CHLORIDE SERPL-SCNC: 95 MMOL/L — LOW (ref 98–107)
CHOLEST SERPL-MCNC: 190 MG/DL — SIGNIFICANT CHANGE UP
CO2 SERPL-SCNC: 26 MMOL/L — SIGNIFICANT CHANGE UP (ref 22–31)
COLOR SPEC: COLORLESS — SIGNIFICANT CHANGE UP
CREAT SERPL-MCNC: 3.1 MG/DL — HIGH (ref 0.5–1.3)
CRP SERPL-MCNC: 13.8 MG/L — HIGH
DIFF PNL FLD: NEGATIVE — SIGNIFICANT CHANGE UP
EOSINOPHIL # BLD AUTO: 0.2 K/UL — SIGNIFICANT CHANGE UP (ref 0–0.5)
EOSINOPHIL NFR BLD AUTO: 1.4 % — SIGNIFICANT CHANGE UP (ref 0–6)
EPI CELLS # UR: 0 /HPF — SIGNIFICANT CHANGE UP (ref 0–5)
ERYTHROCYTE [SEDIMENTATION RATE] IN BLOOD: 64 MM/HR — HIGH (ref 1–15)
ESTIMATED AVERAGE GLUCOSE: 258 MG/DL — HIGH (ref 68–114)
FERRITIN SERPL-MCNC: 99 NG/ML — SIGNIFICANT CHANGE UP (ref 30–400)
GLUCOSE BLDC GLUCOMTR-MCNC: 199 MG/DL — HIGH (ref 70–99)
GLUCOSE BLDC GLUCOMTR-MCNC: 206 MG/DL — HIGH (ref 70–99)
GLUCOSE BLDC GLUCOMTR-MCNC: 212 MG/DL — HIGH (ref 70–99)
GLUCOSE BLDC GLUCOMTR-MCNC: 227 MG/DL — HIGH (ref 70–99)
GLUCOSE SERPL-MCNC: 240 MG/DL — HIGH (ref 70–99)
GLUCOSE UR QL: ABNORMAL
HCT VFR BLD CALC: 35.9 % — LOW (ref 39–50)
HDLC SERPL-MCNC: 40 MG/DL — LOW
HGB BLD-MCNC: 11.7 G/DL — LOW (ref 13–17)
HYALINE CASTS # UR AUTO: 0 /LPF — SIGNIFICANT CHANGE UP (ref 0–7)
IANC: 12.22 K/UL — HIGH (ref 1.5–8.5)
IMM GRANULOCYTES NFR BLD AUTO: 0.8 % — SIGNIFICANT CHANGE UP (ref 0–1.5)
IRON SATN MFR SERPL: 15 % — SIGNIFICANT CHANGE UP (ref 14–50)
IRON SATN MFR SERPL: 35 UG/DL — LOW (ref 45–165)
KETONES UR-MCNC: NEGATIVE — SIGNIFICANT CHANGE UP
LEUKOCYTE ESTERASE UR-ACNC: NEGATIVE — SIGNIFICANT CHANGE UP
LIPID PNL WITH DIRECT LDL SERPL: 125 MG/DL — HIGH
LYMPHOCYTES # BLD AUTO: 0.81 K/UL — LOW (ref 1–3.3)
LYMPHOCYTES # BLD AUTO: 5.7 % — LOW (ref 13–44)
MAGNESIUM SERPL-MCNC: 1.8 MG/DL — SIGNIFICANT CHANGE UP (ref 1.6–2.6)
MCHC RBC-ENTMCNC: 26 PG — LOW (ref 27–34)
MCHC RBC-ENTMCNC: 32.6 GM/DL — SIGNIFICANT CHANGE UP (ref 32–36)
MCV RBC AUTO: 79.8 FL — LOW (ref 80–100)
MONOCYTES # BLD AUTO: 0.92 K/UL — HIGH (ref 0–0.9)
MONOCYTES NFR BLD AUTO: 6.4 % — SIGNIFICANT CHANGE UP (ref 2–14)
NEUTROPHILS # BLD AUTO: 12.22 K/UL — HIGH (ref 1.8–7.4)
NEUTROPHILS NFR BLD AUTO: 85.4 % — HIGH (ref 43–77)
NITRITE UR-MCNC: NEGATIVE — SIGNIFICANT CHANGE UP
NON HDL CHOLESTEROL: 150 MG/DL — HIGH
NRBC # BLD: 0 /100 WBCS — SIGNIFICANT CHANGE UP
NRBC # FLD: 0 K/UL — SIGNIFICANT CHANGE UP
PH UR: 6.5 — SIGNIFICANT CHANGE UP (ref 5–8)
PHOSPHATE SERPL-MCNC: 4.2 MG/DL — SIGNIFICANT CHANGE UP (ref 2.5–4.5)
PLATELET # BLD AUTO: 236 K/UL — SIGNIFICANT CHANGE UP (ref 150–400)
POTASSIUM SERPL-MCNC: 4 MMOL/L — SIGNIFICANT CHANGE UP (ref 3.5–5.3)
POTASSIUM SERPL-SCNC: 4 MMOL/L — SIGNIFICANT CHANGE UP (ref 3.5–5.3)
PROT UR-MCNC: ABNORMAL
RBC # BLD: 4.5 M/UL — SIGNIFICANT CHANGE UP (ref 4.2–5.8)
RBC # FLD: 14.1 % — SIGNIFICANT CHANGE UP (ref 10.3–14.5)
RBC CASTS # UR COMP ASSIST: 0 /HPF — SIGNIFICANT CHANGE UP (ref 0–4)
RETICS #: 96.5 K/UL — SIGNIFICANT CHANGE UP (ref 25–125)
RETICS/RBC NFR: 2.2 % — SIGNIFICANT CHANGE UP (ref 0.5–2.5)
SODIUM SERPL-SCNC: 136 MMOL/L — SIGNIFICANT CHANGE UP (ref 135–145)
SP GR SPEC: 1.02 — SIGNIFICANT CHANGE UP (ref 1.01–1.02)
TIBC SERPL-MCNC: 239 UG/DL — SIGNIFICANT CHANGE UP (ref 220–430)
TRIGL SERPL-MCNC: 123 MG/DL — SIGNIFICANT CHANGE UP
TSH SERPL-MCNC: 2.08 UIU/ML — SIGNIFICANT CHANGE UP (ref 0.27–4.2)
UIBC SERPL-MCNC: 204 UG/DL — SIGNIFICANT CHANGE UP (ref 110–370)
UROBILINOGEN FLD QL: SIGNIFICANT CHANGE UP
WBC # BLD: 14.3 K/UL — HIGH (ref 3.8–10.5)
WBC # FLD AUTO: 14.3 K/UL — HIGH (ref 3.8–10.5)
WBC UR QL: 1 /HPF — SIGNIFICANT CHANGE UP (ref 0–5)

## 2021-06-21 PROCEDURE — 99223 1ST HOSP IP/OBS HIGH 75: CPT

## 2021-06-21 PROCEDURE — 71045 X-RAY EXAM CHEST 1 VIEW: CPT | Mod: 26

## 2021-06-21 PROCEDURE — 99223 1ST HOSP IP/OBS HIGH 75: CPT | Mod: GC

## 2021-06-21 RX ORDER — SIMVASTATIN 20 MG/1
1 TABLET, FILM COATED ORAL
Qty: 0 | Refills: 0 | DISCHARGE

## 2021-06-21 RX ORDER — DILTIAZEM HCL 120 MG
1 CAPSULE, EXT RELEASE 24 HR ORAL
Qty: 0 | Refills: 0 | DISCHARGE

## 2021-06-21 RX ORDER — ISOSORBIDE MONONITRATE 60 MG/1
1 TABLET, EXTENDED RELEASE ORAL
Qty: 0 | Refills: 0 | DISCHARGE

## 2021-06-21 RX ORDER — ACETAMINOPHEN 500 MG
650 TABLET ORAL EVERY 6 HOURS
Refills: 0 | Status: DISCONTINUED | OUTPATIENT
Start: 2021-06-21 | End: 2021-06-30

## 2021-06-21 RX ORDER — HYDRALAZINE HCL 50 MG
100 TABLET ORAL ONCE
Refills: 0 | Status: COMPLETED | OUTPATIENT
Start: 2021-06-21 | End: 2021-06-21

## 2021-06-21 RX ORDER — DEXTROSE 50 % IN WATER 50 %
15 SYRINGE (ML) INTRAVENOUS ONCE
Refills: 0 | Status: DISCONTINUED | OUTPATIENT
Start: 2021-06-21 | End: 2021-06-30

## 2021-06-21 RX ORDER — HYDRALAZINE HCL 50 MG
100 TABLET ORAL EVERY 8 HOURS
Refills: 0 | Status: DISCONTINUED | OUTPATIENT
Start: 2021-06-21 | End: 2021-06-21

## 2021-06-21 RX ORDER — DEXTROSE 50 % IN WATER 50 %
12.5 SYRINGE (ML) INTRAVENOUS ONCE
Refills: 0 | Status: DISCONTINUED | OUTPATIENT
Start: 2021-06-21 | End: 2021-06-30

## 2021-06-21 RX ORDER — SIMVASTATIN 20 MG/1
20 TABLET, FILM COATED ORAL AT BEDTIME
Refills: 0 | Status: DISCONTINUED | OUTPATIENT
Start: 2021-06-21 | End: 2021-06-21

## 2021-06-21 RX ORDER — SODIUM CHLORIDE 9 MG/ML
1000 INJECTION, SOLUTION INTRAVENOUS
Refills: 0 | Status: DISCONTINUED | OUTPATIENT
Start: 2021-06-21 | End: 2021-06-24

## 2021-06-21 RX ORDER — SODIUM CHLORIDE 9 MG/ML
3 INJECTION INTRAMUSCULAR; INTRAVENOUS; SUBCUTANEOUS EVERY 8 HOURS
Refills: 0 | Status: DISCONTINUED | OUTPATIENT
Start: 2021-06-21 | End: 2021-06-30

## 2021-06-21 RX ORDER — ATORVASTATIN CALCIUM 80 MG/1
40 TABLET, FILM COATED ORAL AT BEDTIME
Refills: 0 | Status: DISCONTINUED | OUTPATIENT
Start: 2021-06-21 | End: 2021-06-30

## 2021-06-21 RX ORDER — DILTIAZEM HCL 120 MG
240 CAPSULE, EXT RELEASE 24 HR ORAL EVERY 24 HOURS
Refills: 0 | Status: DISCONTINUED | OUTPATIENT
Start: 2021-06-21 | End: 2021-06-21

## 2021-06-21 RX ORDER — INSULIN LISPRO 100/ML
VIAL (ML) SUBCUTANEOUS
Refills: 0 | Status: DISCONTINUED | OUTPATIENT
Start: 2021-06-21 | End: 2021-06-23

## 2021-06-21 RX ORDER — POVIDONE-IODINE 5 %
0 AEROSOL (ML) TOPICAL
Qty: 0 | Refills: 0 | DISCHARGE

## 2021-06-21 RX ORDER — PANTOPRAZOLE SODIUM 20 MG/1
1 TABLET, DELAYED RELEASE ORAL
Qty: 0 | Refills: 0 | DISCHARGE

## 2021-06-21 RX ORDER — ISOSORBIDE MONONITRATE 60 MG/1
60 TABLET, EXTENDED RELEASE ORAL DAILY
Refills: 0 | Status: DISCONTINUED | OUTPATIENT
Start: 2021-06-21 | End: 2021-06-26

## 2021-06-21 RX ORDER — DEXTROSE 50 % IN WATER 50 %
25 SYRINGE (ML) INTRAVENOUS ONCE
Refills: 0 | Status: DISCONTINUED | OUTPATIENT
Start: 2021-06-21 | End: 2021-06-30

## 2021-06-21 RX ORDER — INSULIN GLARGINE 100 [IU]/ML
12 INJECTION, SOLUTION SUBCUTANEOUS AT BEDTIME
Refills: 0 | Status: DISCONTINUED | OUTPATIENT
Start: 2021-06-21 | End: 2021-06-22

## 2021-06-21 RX ORDER — APIXABAN 2.5 MG/1
5 TABLET, FILM COATED ORAL EVERY 12 HOURS
Refills: 0 | Status: DISCONTINUED | OUTPATIENT
Start: 2021-06-21 | End: 2021-06-22

## 2021-06-21 RX ORDER — BUMETANIDE 0.25 MG/ML
2 INJECTION INTRAMUSCULAR; INTRAVENOUS ONCE
Refills: 0 | Status: COMPLETED | OUTPATIENT
Start: 2021-06-21 | End: 2021-06-21

## 2021-06-21 RX ORDER — APIXABAN 2.5 MG/1
1 TABLET, FILM COATED ORAL
Qty: 0 | Refills: 0 | DISCHARGE

## 2021-06-21 RX ORDER — ASPIRIN/CALCIUM CARB/MAGNESIUM 324 MG
81 TABLET ORAL DAILY
Refills: 0 | Status: DISCONTINUED | OUTPATIENT
Start: 2021-06-21 | End: 2021-06-22

## 2021-06-21 RX ORDER — INSULIN LISPRO 100/ML
3 VIAL (ML) SUBCUTANEOUS
Refills: 0 | Status: DISCONTINUED | OUTPATIENT
Start: 2021-06-21 | End: 2021-06-22

## 2021-06-21 RX ORDER — BUMETANIDE 0.25 MG/ML
1 INJECTION INTRAMUSCULAR; INTRAVENOUS
Qty: 0 | Refills: 0 | DISCHARGE

## 2021-06-21 RX ORDER — GLIMEPIRIDE 1 MG
1 TABLET ORAL
Qty: 0 | Refills: 0 | DISCHARGE

## 2021-06-21 RX ORDER — PANTOPRAZOLE SODIUM 20 MG/1
40 TABLET, DELAYED RELEASE ORAL
Refills: 0 | Status: DISCONTINUED | OUTPATIENT
Start: 2021-06-21 | End: 2021-06-30

## 2021-06-21 RX ORDER — GLUCAGON INJECTION, SOLUTION 0.5 MG/.1ML
1 INJECTION, SOLUTION SUBCUTANEOUS ONCE
Refills: 0 | Status: DISCONTINUED | OUTPATIENT
Start: 2021-06-21 | End: 2021-06-24

## 2021-06-21 RX ORDER — DILTIAZEM HCL 120 MG
240 CAPSULE, EXT RELEASE 24 HR ORAL DAILY
Refills: 0 | Status: DISCONTINUED | OUTPATIENT
Start: 2021-06-21 | End: 2021-06-30

## 2021-06-21 RX ORDER — CANAGLIFLOZIN 100 MG/1
1 TABLET, FILM COATED ORAL
Qty: 0 | Refills: 0 | DISCHARGE

## 2021-06-21 RX ORDER — HYDRALAZINE HCL 50 MG
50 TABLET ORAL ONCE
Refills: 0 | Status: DISCONTINUED | OUTPATIENT
Start: 2021-06-21 | End: 2021-06-21

## 2021-06-21 RX ORDER — INSULIN LISPRO 100/ML
VIAL (ML) SUBCUTANEOUS AT BEDTIME
Refills: 0 | Status: DISCONTINUED | OUTPATIENT
Start: 2021-06-21 | End: 2021-06-23

## 2021-06-21 RX ORDER — CALCITRIOL 0.5 UG/1
1 CAPSULE ORAL
Qty: 0 | Refills: 0 | DISCHARGE

## 2021-06-21 RX ORDER — ERGOCALCIFEROL 1.25 MG/1
1 CAPSULE ORAL
Qty: 0 | Refills: 0 | DISCHARGE

## 2021-06-21 RX ORDER — FLUTICASONE PROPIONATE 220 MCG
1 AEROSOL WITH ADAPTER (GRAM) INHALATION
Qty: 0 | Refills: 0 | DISCHARGE

## 2021-06-21 RX ORDER — DOXAZOSIN MESYLATE 4 MG
4 TABLET ORAL AT BEDTIME
Refills: 0 | Status: DISCONTINUED | OUTPATIENT
Start: 2021-06-21 | End: 2021-06-30

## 2021-06-21 RX ORDER — ISOSORBIDE MONONITRATE 60 MG/1
60 TABLET, EXTENDED RELEASE ORAL DAILY
Refills: 0 | Status: DISCONTINUED | OUTPATIENT
Start: 2021-06-21 | End: 2021-06-21

## 2021-06-21 RX ORDER — HYDRALAZINE HCL 50 MG
100 TABLET ORAL THREE TIMES A DAY
Refills: 0 | Status: DISCONTINUED | OUTPATIENT
Start: 2021-06-21 | End: 2021-06-30

## 2021-06-21 RX ORDER — DOXAZOSIN MESYLATE 4 MG
1 TABLET ORAL
Qty: 0 | Refills: 0 | DISCHARGE

## 2021-06-21 RX ORDER — HYDRALAZINE HCL 50 MG
1 TABLET ORAL
Qty: 0 | Refills: 0 | DISCHARGE

## 2021-06-21 RX ADMIN — Medication 100 MILLIGRAM(S): at 21:30

## 2021-06-21 RX ADMIN — Medication 2: at 17:34

## 2021-06-21 RX ADMIN — SODIUM CHLORIDE 3 MILLILITER(S): 9 INJECTION INTRAMUSCULAR; INTRAVENOUS; SUBCUTANEOUS at 21:19

## 2021-06-21 RX ADMIN — PANTOPRAZOLE SODIUM 40 MILLIGRAM(S): 20 TABLET, DELAYED RELEASE ORAL at 11:20

## 2021-06-21 RX ADMIN — ATORVASTATIN CALCIUM 40 MILLIGRAM(S): 80 TABLET, FILM COATED ORAL at 21:29

## 2021-06-21 RX ADMIN — Medication 1: at 13:11

## 2021-06-21 RX ADMIN — BUMETANIDE 2 MILLIGRAM(S): 0.25 INJECTION INTRAMUSCULAR; INTRAVENOUS at 02:04

## 2021-06-21 RX ADMIN — APIXABAN 5 MILLIGRAM(S): 2.5 TABLET, FILM COATED ORAL at 17:33

## 2021-06-21 RX ADMIN — INSULIN GLARGINE 12 UNIT(S): 100 INJECTION, SOLUTION SUBCUTANEOUS at 22:30

## 2021-06-21 RX ADMIN — Medication 100 MILLIGRAM(S): at 02:04

## 2021-06-21 RX ADMIN — Medication 650 MILLIGRAM(S): at 17:33

## 2021-06-21 RX ADMIN — Medication 81 MILLIGRAM(S): at 12:49

## 2021-06-21 RX ADMIN — Medication 60 MILLIGRAM(S): at 19:57

## 2021-06-21 RX ADMIN — Medication 2: at 07:31

## 2021-06-21 RX ADMIN — SODIUM CHLORIDE 3 MILLILITER(S): 9 INJECTION INTRAMUSCULAR; INTRAVENOUS; SUBCUTANEOUS at 12:49

## 2021-06-21 RX ADMIN — Medication 4 MILLIGRAM(S): at 22:31

## 2021-06-21 RX ADMIN — Medication 650 MILLIGRAM(S): at 19:27

## 2021-06-21 RX ADMIN — SODIUM CHLORIDE 3 MILLILITER(S): 9 INJECTION INTRAMUSCULAR; INTRAVENOUS; SUBCUTANEOUS at 07:22

## 2021-06-21 RX ADMIN — Medication 3 UNIT(S): at 17:34

## 2021-06-21 NOTE — ED PROVIDER NOTE - CLINICAL SUMMARY MEDICAL DECISION MAKING FREE TEXT BOX
Tash Ramos MD: 70 year old male with PMH of DM, HTN, CKD, Afib on Eliquis, HLD, left foot amputation presents as transfer from [facility] for sudden blurry vison at 4:30 pm concerning for vitreous hemorrhage, retinal detachment, diabetic retinopathy. Pt was r/o for vascular cause or stroke at [facility]. Ophtho recs appreciated

## 2021-06-21 NOTE — CONSULT NOTE ADULT - SUBJECTIVE AND OBJECTIVE BOX
HPI:  70 year old right-handed man with PMHx HTN, HLD, CKD, afib on Eliquis, BKA arrives as a xfer for an Ophthalmology consult from [facility] ER. Pt states at 4:30pm he had a sudden onset of blurry vision while watching TV at home. Neurology consulted for stroke workup after pt found to have left CRAO by Ophthalmology. 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 Last injection was about 4 years ago. Last time he saw a doctor (Hao Ram MD) was about 2 years ago. He reports not having any problems since then.     MEDICATIONS  (STANDING):    MEDICATIONS  (PRN):    PAST MEDICAL & SURGICAL HISTORY:  DM (diabetes mellitus)  HTN (hypertension)  HLD (hyperlipidemia)  Afib  Retinopathy  S/P BKA (below knee amputation) unilateral, left    FAMILY HISTORY:    Allergies  No Known Allergies    SHx - No smoking, No ETOH, No drug abuse    Review of Systems:  CONSTITUTIONAL: No fevers, chills  HEENT: +blurry vision; no visual loss; no hearing loss, sneezing, congestion, runny nose or sore throat.  SKIN:  No rash or itching.  CARDIOVASCULAR:  No chest pain, chest pressure or chest discomfort. No palpitations or edema.  RESPIRATORY:  No shortness of breath, cough or sputum.  GASTROINTESTINAL:  No anorexia, nausea, vomiting or diarrhea. No abdominal pain.  GENITOURINARY:  No dysuria. No increased frequency. No retention. No incontinence.  NEUROLOGICAL:  See HPI  MUSCULOSKELETAL:  No muscle, back pain, joint pain or stiffness.  HEMATOLOGIC:  No anemia, bleeding or bruising.    Vital Signs Last 24 Hrs  T(C): 36.7 (20 Jun 2021 23:54), Max: 36.7 (20 Jun 2021 23:54)  T(F): 98 (20 Jun 2021 23:54), Max: 98 (20 Jun 2021 23:54)  HR: 63 (21 Jun 2021 00:45) (57 - 63)  BP: 216/85 (21 Jun 2021 00:45) (193/78 - 216/85)  BP(mean): --  RR: 15 (21 Jun 2021 00:45) (15 - 18)  SpO2: 100% (21 Jun 2021 00:45) (95% - 100%)    PHYSICAL EXAM:  GENERAL: NAD on room air  HEENT: Normocephalic;  conjunctivae and sclerae clear; moist mucous membranes;   NECK: Supple  EXTREMITIES: No cyanosis; no edema; no calf tenderness  SKIN: Warm and dry; no rash             Neurological Exam:  - Mental Status: Alert, oriented to person, place, time, situation; speech is fluent with intact naming, repetition, and comprehension; follows commands  - Cranial Nerves II-XII:    II:  PERRL 3mm b/l; visual fields are full to confrontation; visual acuity 20/30 OU  III, IV, VI:  EOMI, no nystagmus  V:  facial sensation is intact in the V1-V3 distribution bilaterally.  VII:  face is symmetric with normal eye closure and smile  VIII:  hearing is intact to finger rub  IX, X:  uvula is midline and soft palate rises symmetrically  XI:  head turning and shoulder shrug are intact bilaterally  XII:  tongue protrudes in the midline  - Motor:  strength is 5/5 throughout; normal muscle bulk and tone throughout; no pronator drift  - Reflexes:  2+ and symmetric at the biceps, triceps, brachioradialis, knees, and ankles;  plantar reflexes downgoing bilaterally  - Sensory:  intact to light touch, pin prick, vibration, and joint-position sense throughout  - Coordination:  finger-nose-finger and heel-knee-shin intact without dysmetria; rapid alternating hand movements intact  - Gait:  normal steps, base, arm swing, and turning; Romberg testing is negative    Other:                Radiology    CT:   MRI  EKG:  tele:  TTE:  EEG:              HPI:  70 year old right-handed man with PMHx HTN, HLD, CKD, atrial fibrillation on Eliquis, BKA, diabetic retinopathy, cataract surgery OD presents from [facility] as transfer for Ophthalmology consult. Pt states at 4:30pm on 6/20, he had an episode of sudden onset of blurry vision while watching TV at home. Neurology consulted for stroke workup after pt was found to have a left CRAO by Ophthalmology in the ED at [facility]. Pt has a known history of proliferative diabetic retinopathy in L>R eye, currently receiving injections, mainly in his left eye. His last injection was about 4 years ago. Pt denies eye pain with or without movement, irritation or redness, new flashes/floaters, and double vision. Pt denies scalp tenderness, jaw claudication, hip/shoulder myalgias. No facial asymmetry, headache, nausea, vomiting, fall, LOC, head trauma, focal weakness or sensory changes. He denies previous similar episodes. No known hx of stroke.    MEDICATIONS  (STANDING):    MEDICATIONS  (PRN):    PAST MEDICAL & SURGICAL HISTORY:  DM (diabetes mellitus)  HTN (hypertension)  HLD (hyperlipidemia)  Atrial fibrillation  Retinopathy  S/P BKA (below knee amputation) unilateral, left    FAMILY HISTORY:    Allergies  No Known Allergies    SHx - No smoking, No ETOH, No drug abuse    Review of Systems:  CONSTITUTIONAL: No fevers, chills  HEENT: +blurry vision; no visual loss; no hearing loss, sneezing, congestion, runny nose or sore throat.  SKIN:  No rash or itching.  CARDIOVASCULAR:  No chest pain, chest pressure or chest discomfort. No palpitations or edema.  RESPIRATORY:  No shortness of breath, cough or sputum.  GASTROINTESTINAL:  No anorexia, nausea, vomiting or diarrhea. No abdominal pain.  GENITOURINARY:  No dysuria. No increased frequency. No retention. No incontinence.  NEUROLOGICAL:  See HPI  MUSCULOSKELETAL:  No muscle, back pain, joint pain or stiffness.  HEMATOLOGIC:  No anemia, bleeding or bruising.    Vital Signs Last 24 Hrs  T(C): 36.7 (20 Jun 2021 23:54), Max: 36.7 (20 Jun 2021 23:54)  T(F): 98 (20 Jun 2021 23:54), Max: 98 (20 Jun 2021 23:54)  HR: 63 (21 Jun 2021 00:45) (57 - 63)  BP: 216/85 (21 Jun 2021 00:45) (193/78 - 216/85)  BP(mean): --  RR: 15 (21 Jun 2021 00:45) (15 - 18)  SpO2: 100% (21 Jun 2021 00:45) (95% - 100%)    PHYSICAL EXAM:  GENERAL: NAD on room air  HEENT: Normocephalic;  conjunctivae and sclerae clear; moist mucous membranes;   NECK: Supple  EXTREMITIES: No cyanosis; no edema; no calf tenderness  SKIN: Warm and dry; no rash             Neurological Exam:  - Mental Status: Alert, oriented to person, place, time, situation; speech is fluent with intact naming, repetition, and comprehension; follows commands  - Cranial Nerves II-XII:    II:  PERRL 3mm b/l; visual fields are full to confrontation; visual acuity 20/30 OU  III, IV, VI:  EOMI, no nystagmus  V:  facial sensation is intact in the V1-V3 distribution bilaterally.  VII:  face is symmetric with normal eye closure and smile  VIII:  hearing is intact to finger rub  IX, X:  uvula is midline and soft palate rises symmetrically  XI:  head turning and shoulder shrug are intact bilaterally  XII:  tongue protrudes in the midline  - Motor:  strength is 5/5 throughout; normal muscle bulk and tone throughout; no pronator drift  - Reflexes:  2+ and symmetric at the biceps, triceps, brachioradialis, knees, and ankles;  plantar reflexes downgoing bilaterally  - Sensory:  intact to light touch, pin prick, vibration, and joint-position sense throughout  - Coordination:  finger-nose-finger and heel-knee-shin intact without dysmetria; rapid alternating hand movements intact  - Gait:  normal steps, base, arm swing, and turning; Romberg testing is negative    Other:    06-21    136  |  95<L>  |  92<H>  ----------------------------<  296<H>  3.8   |  24  |  3.09<H>    Ca    9.8      21 Jun 2021 02:46    TPro  7.9  /  Alb  4.3  /  TBili  0.2  /  DBili  x   /  AST  16  /  ALT  14  /  AlkPhos  69  06-21                          11.1   14.80 )-----------( 236      ( 21 Jun 2021 02:33 )             33.5                    HPI:  70 year old right-handed man with PMHx HTN, HLD, CKD, atrial fibrillation on Eliquis, BKA, diabetic retinopathy, cataract surgery OD presents from [facility] as transfer for Ophthalmology consult. Pt states at 4:30pm on 6/20, he had an episode of sudden onset of blurry vision while watching TV at home. Neurology consulted for stroke workup after pt was found to have a left CRAO by Ophthalmology in the ED at [facility]. Pt has a known history of proliferative diabetic retinopathy in L>R eye, currently receiving injections, mainly in his left eye. His last injection was about 4 years ago. Pt denies eye pain with or without movement, irritation or redness, new flashes/floaters, and double vision. Pt denies scalp tenderness, jaw claudication, hip/shoulder myalgias. No facial asymmetry, headache, nausea, vomiting, fall, LOC, head trauma, focal weakness or sensory changes. He denies previous similar episodes. No known hx of stroke.    MEDICATIONS  (STANDING):    MEDICATIONS  (PRN):    PAST MEDICAL & SURGICAL HISTORY:  DM (diabetes mellitus)  HTN (hypertension)  HLD (hyperlipidemia)  Atrial fibrillation  Retinopathy  S/P BKA (below knee amputation) unilateral, left    FAMILY HISTORY:    Allergies  No Known Allergies    SHx - No smoking, No ETOH, No drug abuse    Review of Systems:  CONSTITUTIONAL: No fevers, chills  HEENT: +blurry vision; no visual loss; no hearing loss, sneezing, congestion, runny nose or sore throat.  SKIN:  No rash or itching.  CARDIOVASCULAR:  No chest pain, chest pressure or chest discomfort. No palpitations or edema.  RESPIRATORY:  No shortness of breath, cough or sputum.  GASTROINTESTINAL:  No anorexia, nausea, vomiting or diarrhea. No abdominal pain.  GENITOURINARY:  No dysuria. No increased frequency. No retention. No incontinence.  NEUROLOGICAL:  See HPI  MUSCULOSKELETAL:  No muscle, back pain, joint pain or stiffness.  HEMATOLOGIC:  No anemia, bleeding or bruising.    Vital Signs Last 24 Hrs  T(C): 36.7 (20 Jun 2021 23:54), Max: 36.7 (20 Jun 2021 23:54)  T(F): 98 (20 Jun 2021 23:54), Max: 98 (20 Jun 2021 23:54)  HR: 63 (21 Jun 2021 00:45) (57 - 63)  BP: 216/85 (21 Jun 2021 00:45) (193/78 - 216/85)  BP(mean): --  RR: 15 (21 Jun 2021 00:45) (15 - 18)  SpO2: 100% (21 Jun 2021 00:45) (95% - 100%)    PHYSICAL EXAM:  GENERAL: NAD on room air  HEENT: Normocephalic;  conjunctivae and sclerae clear; moist mucous membranes;   NECK: Supple  EXTREMITIES: No cyanosis; no edema; no calf tenderness  SKIN: Warm and dry; no rash             Neurological Exam:  - Mental Status: Alert, oriented to person, place, time, situation; speech is fluent with intact naming, repetition, and comprehension; follows commands  - Cranial Nerves II-XII:    II:  PERRL 3mm b/l; visual fields are full to confrontation; visual acuity 20/30 OU  III, IV, VI:  EOMI, no nystagmus  V:  facial sensation is intact in the V1-V3 distribution bilaterally.  VII:  face is symmetric with normal eye closure and smile  VIII:  hearing is intact to finger rub  IX, X:  uvula is midline and soft palate rises symmetrically  XI:  head turning and shoulder shrug are intact bilaterally  XII:  tongue protrudes in the midline  - Motor: strength 5/5 throughout, LLE deferred due to below knee amputation; normal muscle bulk and tone throughout; no pronator drift  - Reflexes:  2+ and symmetric at the biceps, triceps, brachioradialis, knees, and ankles;  plantar reflexes downgoing bilaterally  - Sensory:  intact to light touch, pin prick, vibration, and joint-position sense throughout  - Coordination:  finger-nose-finger and heel-knee-shin intact without dysmetria; rapid alternating hand movements intact  - Gait:  normal steps, base, arm swing, and turning; Romberg testing is negative    Other:    06-21    136  |  95<L>  |  92<H>  ----------------------------<  296<H>  3.8   |  24  |  3.09<H>    Ca    9.8      21 Jun 2021 02:46    TPro  7.9  /  Alb  4.3  /  TBili  0.2  /  DBili  x   /  AST  16  /  ALT  14  /  AlkPhos  69  06-21                          11.1   14.80 )-----------( 236      ( 21 Jun 2021 02:33 )             33.5                    HPI:  70 year old right-handed man with PMHx HTN, HLD, CKD, atrial fibrillation on Eliquis, BKA, diabetic retinopathy, cataract surgery OD presents from [facility] as transfer for Ophthalmology consult. Pt states at 4:30pm on 6/20, he had an episode of sudden onset of blurry vision while watching TV at home. Neurology consulted for stroke workup after pt was found to have a left CRAO by Ophthalmology in the ED at [facility]. Pt has a known history of proliferative diabetic retinopathy in L>R eye, currently receiving injections, mainly in his left eye. His last injection was about 4 years ago. Pt denies eye pain with or without movement, irritation or redness, new flashes/floaters, and double vision. Pt denies scalp tenderness, jaw claudication, hip/shoulder myalgias. No facial asymmetry, headache, nausea, vomiting, fall, LOC, head trauma, focal weakness or sensory changes. He denies previous similar episodes. No known hx of stroke.    MEDICATIONS  (STANDING):    MEDICATIONS  (PRN):    PAST MEDICAL & SURGICAL HISTORY:  DM (diabetes mellitus)  HTN (hypertension)  HLD (hyperlipidemia)  Atrial fibrillation  Retinopathy  S/P BKA (below knee amputation) unilateral, left    FAMILY HISTORY:    Allergies  No Known Allergies    SHx - No smoking, No ETOH, No drug abuse    Review of Systems:  CONSTITUTIONAL: No fevers, chills  HEENT: +blurry vision; no visual loss; no hearing loss, sneezing, congestion, runny nose or sore throat.  SKIN:  No rash or itching.  CARDIOVASCULAR:  No chest pain, chest pressure or chest discomfort. No palpitations or edema.  RESPIRATORY:  No shortness of breath, cough or sputum.  GASTROINTESTINAL:  No anorexia, nausea, vomiting or diarrhea. No abdominal pain.  GENITOURINARY:  No dysuria. No increased frequency. No retention. No incontinence.  NEUROLOGICAL:  See HPI  MUSCULOSKELETAL:  No muscle, back pain, joint pain or stiffness.  HEMATOLOGIC:  No anemia, bleeding or bruising.    Vital Signs Last 24 Hrs  T(C): 36.7 (20 Jun 2021 23:54), Max: 36.7 (20 Jun 2021 23:54)  T(F): 98 (20 Jun 2021 23:54), Max: 98 (20 Jun 2021 23:54)  HR: 63 (21 Jun 2021 00:45) (57 - 63)  BP: 216/85 (21 Jun 2021 00:45) (193/78 - 216/85)  BP(mean): --  RR: 15 (21 Jun 2021 00:45) (15 - 18)  SpO2: 100% (21 Jun 2021 00:45) (95% - 100%)    PHYSICAL EXAM:  GENERAL: NAD on room air  HEENT: Normocephalic;  conjunctivae and sclerae clear; moist mucous membranes;   NECK: Supple  EXTREMITIES: No cyanosis; no edema; no calf tenderness  SKIN: Warm and dry; no rash             Neurological Exam:  - Mental Status: Alert, oriented to person, place, time, situation; speech is fluent with intact naming, repetition, and comprehension; follows commands  - Cranial Nerves II-XII:    II:  PERRL 3mm b/l; visual fields are full to confrontation; visual acuity 20/30 OU  III, IV, VI:  EOMI, no nystagmus  V:  facial sensation is intact in the V1-V3 distribution bilaterally.  VII:  face is symmetric with normal eye closure and smile  VIII:  hearing is intact to finger rub  IX, X:  uvula is midline and soft palate rises symmetrically  XI:  head turning and shoulder shrug are intact bilaterally  XII:  tongue protrudes in the midline  - Motor: strength 5/5 throughout, LLE deferred due to below knee amputation; normal muscle bulk and tone throughout; no pronator drift  - Reflexes:  2+ and symmetric at the biceps, triceps, brachioradialis, knees, and ankles;  plantar reflexes downgoing bilaterally  - Sensory:  intact to light touch, pin prick, vibration, and joint-position sense throughout  - Coordination:  finger-nose-finger and heel-knee-shin intact without dysmetria; rapid alternating hand movements intact  - Gait:  normal steps, base, arm swing, and turning; Romberg testing is negative    Other:    06-21    136  |  95<L>  |  92<H>  ----------------------------<  296<H>  3.8   |  24  |  3.09<H>    Ca    9.8      21 Jun 2021 02:46    TPro  7.9  /  Alb  4.3  /  TBili  0.2  /  DBili  x   /  AST  16  /  ALT  14  /  AlkPhos  69  06-21                          11.1   14.80 )-----------( 236      ( 21 Jun 2021 02:33 )             33.5     Radiology  [place] Results  CT angiography neck: No evidence of hemodynamically significant stenosis using NASCET criteria.  Patent vertebral arteries.  No evidence of vascular dissection.  CT angiography brain: No major vessel occlusion or proximal stenosis.  CTH : No acute intracranial disease.  CT perfusion: No perfusion abnormality        HPI:  70 year old right-handed man with PMHx HTN, HLD, CKD, atrial fibrillation on Eliquis, BKA, diabetic retinopathy, cataract surgery OD presents from [facility] as transfer for Ophthalmology consult. Pt states at 4:30pm on 6/20, he had an episode of sudden onset of blurry vision while watching TV at home. Neurology consulted for stroke workup after pt was found to have a left CRAO by Ophthalmology in the ED at [facility]. Pt has a known history of proliferative diabetic retinopathy in L>R eye, currently receiving injections, mainly in his left eye. His last injection was about 4 years ago. Pt denies eye pain with or without movement, irritation or redness, new flashes/floaters, and double vision. Pt denies scalp tenderness, jaw claudication, hip/shoulder myalgias. No facial asymmetry, headache, nausea, vomiting, fall, LOC, head trauma, focal weakness or sensory changes. He denies previous similar episodes. No known hx of stroke.    MEDICATIONS  (STANDING):    MEDICATIONS  (PRN):    PAST MEDICAL & SURGICAL HISTORY:  DM (diabetes mellitus)  HTN (hypertension)  HLD (hyperlipidemia)  Atrial fibrillation  Retinopathy  S/P BKA (below knee amputation) unilateral, left    FAMILY HISTORY:    Allergies  No Known Allergies    SHx - No smoking, No ETOH, No drug abuse    Review of Systems:  CONSTITUTIONAL: No fevers, chills  HEENT: +blurry vision; no visual loss; no hearing loss, sneezing, congestion, runny nose or sore throat.  SKIN:  No rash or itching.  CARDIOVASCULAR:  No chest pain, chest pressure or chest discomfort. No palpitations or edema.  RESPIRATORY:  No shortness of breath, cough or sputum.  GASTROINTESTINAL:  No anorexia, nausea, vomiting or diarrhea. No abdominal pain.  GENITOURINARY:  No dysuria. No increased frequency. No retention. No incontinence.  NEUROLOGICAL:  See HPI  MUSCULOSKELETAL:  No muscle, back pain, joint pain or stiffness.  HEMATOLOGIC:  No anemia, bleeding or bruising.    Vital Signs Last 24 Hrs  T(C): 36.7 (20 Jun 2021 23:54), Max: 36.7 (20 Jun 2021 23:54)  T(F): 98 (20 Jun 2021 23:54), Max: 98 (20 Jun 2021 23:54)  HR: 63 (21 Jun 2021 00:45) (57 - 63)  BP: 216/85 (21 Jun 2021 00:45) (193/78 - 216/85)  BP(mean): --  RR: 15 (21 Jun 2021 00:45) (15 - 18)  SpO2: 100% (21 Jun 2021 00:45) (95% - 100%)    PHYSICAL EXAM:  GENERAL: NAD on room air  HEENT: Normocephalic;  conjunctivae and sclerae clear; moist mucous membranes;   NECK: Supple  EXTREMITIES: No cyanosis; no edema; no calf tenderness  SKIN: Warm and dry; no rash             Neurological Exam:  - Mental Status: Alert, oriented to person, place, time, situation; speech is fluent with intact naming, repetition, and comprehension; follows commands  - Cranial Nerves II-XII:    II:  PERRL 3mm b/l; visual fields are full to confrontation; visual acuity 20/30 OU  III, IV, VI:  EOMI, no nystagmus  V:  facial sensation is intact in the V1-V3 distribution bilaterally.  VII:  face is symmetric with normal eye closure and smile  VIII:  hearing is intact to finger rub  IX, X:  uvula is midline and soft palate rises symmetrically  XI:  head turning and shoulder shrug are intact bilaterally  XII:  tongue protrudes in the midline  - Motor: strength 5/5 throughout, LLE deferred due to below knee amputation; normal muscle bulk and tone throughout; no pronator drift  - Reflexes:  2+ and symmetric at the biceps, triceps, brachioradialis, knees, and ankles;  plantar reflexes downgoing bilaterally  - Sensory:  intact to light touch, pin prick, vibration, and joint-position sense throughout  - Coordination:  finger-nose-finger and heel-knee-shin intact without dysmetria; rapid alternating hand movements intact  - Gait:  normal steps, base, arm swing, and turning; Romberg testing is negative    Other:    06-21    136  |  95<L>  |  92<H>  ----------------------------<  296<H>  3.8   |  24  |  3.09<H>    Ca    9.8      21 Jun 2021 02:46    TPro  7.9  /  Alb  4.3  /  TBili  0.2  /  DBili  x   /  AST  16  /  ALT  14  /  AlkPhos  69  06-21                          11.1   14.80 )-----------( 236      ( 21 Jun 2021 02:33 )             33.5     Radiology  [place] Results  CT angiography neck: No evidence of hemodynamically significant stenosis using NASCET criteria.  Patent vertebral arteries.  No evidence of vascular dissection.  CT angiography brain: No major vessel occlusion or proximal stenosis.  CTH: No acute intracranial disease.  CT perfusion: No perfusion abnormality        HPI:  70 year old right-handed man with PMHx HTN, HLD, CKD, atrial fibrillation on Eliquis, BKA, diabetic retinopathy, cataract surgery OD presents from [facility] as transfer for Ophthalmology consult. Pt states at 4:30pm on 6/20, he had an episode of sudden onset of blurry vision while watching TV at home. Neurology consulted for stroke workup after pt was found to have a left CRAO by Ophthalmology in the ED at [facility]. Pt has a known history of proliferative diabetic retinopathy in L>R eye, currently receiving injections, mainly in his left eye. His last injection was about 4 years ago. Pt denies eye pain with or without movement, irritation or redness, new flashes/floaters, and double vision. Pt denies scalp tenderness, jaw claudication, hip/shoulder myalgias. No facial asymmetry, headache, nausea, vomiting, fall, LOC, head trauma, focal weakness or sensory changes. He denies previous similar episodes. No known hx of stroke. He reports some improvement in vision of the left eye - at first could only see outlines of people and shapes, and now can see details and colors more clearly. However light still bothers him.    MEDICATIONS  (STANDING):    MEDICATIONS  (PRN):    PAST MEDICAL & SURGICAL HISTORY:  DM (diabetes mellitus)  HTN (hypertension)  HLD (hyperlipidemia)  Atrial fibrillation  Retinopathy  S/P BKA (below knee amputation) unilateral, left    FAMILY HISTORY:    Allergies  No Known Allergies    SHx - No smoking, No ETOH, No drug abuse    Review of Systems:  CONSTITUTIONAL: No fevers, chills  HEENT: +blurry vision; no visual loss; no hearing loss, sneezing, congestion, runny nose or sore throat.  SKIN:  No rash or itching.  CARDIOVASCULAR:  No chest pain, chest pressure or chest discomfort. No palpitations or edema.  RESPIRATORY:  No shortness of breath, cough or sputum.  GASTROINTESTINAL:  No anorexia, nausea, vomiting or diarrhea. No abdominal pain.  GENITOURINARY:  No dysuria. No increased frequency. No retention. No incontinence.  NEUROLOGICAL:  See HPI  MUSCULOSKELETAL:  No muscle, back pain, joint pain or stiffness.  HEMATOLOGIC:  No anemia, bleeding or bruising.    Vital Signs Last 24 Hrs  T(C): 36.7 (20 Jun 2021 23:54), Max: 36.7 (20 Jun 2021 23:54)  T(F): 98 (20 Jun 2021 23:54), Max: 98 (20 Jun 2021 23:54)  HR: 63 (21 Jun 2021 00:45) (57 - 63)  BP: 216/85 (21 Jun 2021 00:45) (193/78 - 216/85)  BP(mean): --  RR: 15 (21 Jun 2021 00:45) (15 - 18)  SpO2: 100% (21 Jun 2021 00:45) (95% - 100%)    PHYSICAL EXAM:  GENERAL: NAD on room air  HEENT: Normocephalic;  conjunctivae and sclerae clear; moist mucous membranes;   NECK: Supple  EXTREMITIES: No cyanosis; no edema; no calf tenderness  SKIN: Warm and dry; no rash             Neurological Exam:  - Mental Status: Alert, oriented to person, place, time, situation; speech is fluent with intact naming, repetition, and comprehension; follows commands  - Cranial Nerves II-XII:    II:  PERRL 3mm b/l; visual fields are full to confrontation; visual acuity 20/30 OU  III, IV, VI:  EOMI, no nystagmus  V:  facial sensation is intact in the V1-V3 distribution bilaterally.  VII:  face is symmetric with normal eye closure and smile  VIII:  hearing is intact to finger rub  IX, X:  uvula is midline and soft palate rises symmetrically  XI:  head turning and shoulder shrug are intact bilaterally  XII:  tongue protrudes in the midline  - Motor: strength 5/5 throughout, LLE deferred due to below knee amputation; normal muscle bulk and tone throughout; no pronator drift  - Reflexes:  2+ and symmetric at the biceps, triceps, brachioradialis, knees, and ankles;  plantar reflexes downgoing bilaterally  - Sensory:  intact to light touch, pin prick, vibration, and joint-position sense throughout  - Coordination:  finger-nose-finger and heel-knee-shin intact without dysmetria; rapid alternating hand movements intact  - Gait:  normal steps, base, arm swing, and turning; Romberg testing is negative    Other:    06-21    136  |  95<L>  |  92<H>  ----------------------------<  296<H>  3.8   |  24  |  3.09<H>    Ca    9.8      21 Jun 2021 02:46    TPro  7.9  /  Alb  4.3  /  TBili  0.2  /  DBili  x   /  AST  16  /  ALT  14  /  AlkPhos  69  06-21                          11.1   14.80 )-----------( 236      ( 21 Jun 2021 02:33 )             33.5     Radiology  [place] Results  CT angiography neck: No evidence of hemodynamically significant stenosis using NASCET criteria.  Patent vertebral arteries.  No evidence of vascular dissection.  CT angiography brain: No major vessel occlusion or proximal stenosis.  CTH: No acute intracranial disease.  CT perfusion: No perfusion abnormality

## 2021-06-21 NOTE — PHARMACOTHERAPY INTERVENTION NOTE - COMMENTS
Medication history is incomplete. Discrepancies noted in OMR. Please call spectra [phone] if you have any questions.   Medications verified with [facility].

## 2021-06-21 NOTE — H&P ADULT - PROBLEM SELECTOR PLAN 5
Monitor Cr  Cr increased to 3.09 now from 2.84 [Apr 2021]  Patient also received contrast load monitor H/H  no signs of active bleeding  patietn with hx of anemia  Per [facility] documents: Hgb in Apr 9.1 monitor H/H  no signs of active bleeding  patient with hx of anemia  Per [place] documents: Hgb in Apr 9.1 On Glimepiride and Invokana at home.  - Hold oral home meds while inpatient  - Start moderate-dose ISS and FS checks qAC and qhs  - F/u A1c. Possible Endocrine consult pending results.    # Diabetic retinopathy - Severe NPDR vs PDR OU  - Known chronic history of PDR with injection therapy OS> OD  - Severe NPDR - No signs of NVI, NVD, NVE on exam today   - Dot blots seen in all 4 quadrants, with large IRH inferotemporal OD  - No tears, holes or detachment seen  - Retinal detachment precautions: Cautioned for urgent reevaluation if symptoms of curtain vision loss or severe flashing of lights occurs   - Patient follows with Normal Yo MD. Needs OCT and FA imaging to further elucidate disease, and possible injection therapy as an outpatient.

## 2021-06-21 NOTE — PROGRESS NOTE ADULT - SUBJECTIVE AND OBJECTIVE BOX
[facility] DEPARTMENT OF OPHTHALMOLOGY  ------------------------------------------------------------------------------  Liu Domínguez MD PGY 2  Pager: [phone]  ------------------------------------------------------------------------------    History of Present Illness: Pt is a 69 y/o M with PMH DM, HTN, CKD, Afib on Eliquis, HLD, left foot amputation who reported suddenly being unable to see starting 5/20. Reports history of proliferative diabetic retinopathy in both eyes (left > right), history of receiving injections, mainly in his left eye (last injection was about 4 years ago). Last time he saw an eye doctor (Hao Ram MD) was about 2 years ago. He reports not having any problems since then.     Interval History: No acute events overnight. Today patient reports improvement in left vision. Denies any pain.    MEDICATIONS  (STANDING):  apixaban 5 milliGRAM(s) Oral every 12 hours  aspirin enteric coated 81 milliGRAM(s) Oral daily  atorvastatin 40 milliGRAM(s) Oral at bedtime  dextrose 40% Gel 15 Gram(s) Oral once  dextrose 5%. 1000 milliLiter(s) (50 mL/Hr) IV Continuous <Continuous>  dextrose 5%. 1000 milliLiter(s) (100 mL/Hr) IV Continuous <Continuous>  dextrose 50% Injectable 25 Gram(s) IV Push once  dextrose 50% Injectable 12.5 Gram(s) IV Push once  dextrose 50% Injectable 25 Gram(s) IV Push once  doxazosin 4 milliGRAM(s) Oral at bedtime  glucagon  Injectable 1 milliGRAM(s) IntraMuscular once  insulin glargine Injectable (LANTUS) 12 Unit(s) SubCutaneous at bedtime  insulin lispro (ADMELOG) corrective regimen sliding scale   SubCutaneous three times a day before meals  insulin lispro (ADMELOG) corrective regimen sliding scale   SubCutaneous at bedtime  insulin lispro Injectable (ADMELOG) 3 Unit(s) SubCutaneous three times a day before meals  pantoprazole    Tablet 40 milliGRAM(s) Oral before breakfast  sodium chloride 0.9% lock flush 3 milliLiter(s) IV Push every 8 hours    MEDICATIONS  (PRN):  acetaminophen   Tablet .. 650 milliGRAM(s) Oral every 6 hours PRN Temp greater or equal to 38C (100.4F), Mild Pain (1 - 3)      VITALS: T(C): 36.9 (06-21-21 @ 17:28)  T(F): 98.5 (06-21-21 @ 17:28), Max: 98.5 (06-21-21 @ 17:28)  HR: 72 (06-21-21 @ 17:28) (57 - 83)  BP: 191/99 (06-21-21 @ 17:28) (179/90 - 216/85)  RR:  (15 - 18)  SpO2:  (95% - 100%)  Wt(kg): --  General: AAO x 3, appropriate mood and affect    Ophthalmology Exam:  Visual acuity (cc): 20/40 OD, 20/70 OS  Pupils: Pt's eyes still dilated from overnight exam.  Intraocular Pressure: 17 OU  Extraocular movements (EOMs): Full OU, no pain, no diplopia  Confrontational Visual Field (CVF): Full OU  Color Plates 12/12 OD, 10/12 OS    Slit Lamp Exam  External: Flat OU.  Lids/Lashes/Lacrimal Ducts: Flat OU.   Sclera/Conjunctiva: White and quiet OU.  Cornea: Clear OU.  Anterior Chamber: Deep and formed OU.    Iris: Flat OU.  Lens: 2+ NS, 3+ PSC OD, PCIOL with mild PCO OS    Vitreous: within normal limits OU  Disc, cup/disc: sharp and pink, 0.55 OU. Bergmeister's papilla inferior disc.  Macula: within normal limits OU  Vessels: within normal limits OU  Periphery: Microaneurysms scattered with flame shaped hemorrhage inferiorly, hazier view OD, Microaneurysms scattered with few dot blot hemorrhages.    Imaging  CT angiography neck: No evidence of hemodynamically significant stenosis using NASCET criteria.  Patent vertebral arteries.  No evidence of vascular dissection.  CT angiography brain: No major vessel occlusion or proximal stenosis.  CT perfusion: No perfusion abnormality   CT head noncontrast: No acute intracranial disease.    Assessment and Recommendations:  70y male with a past medical history/ocular history of DM, HTN, CKD, Afib on Eliquis, HLD, left foot amputation, PDR s/p Intravitreal injections consulted for painless vision loss left.    1. 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 Vision improved today to 20/70 and color plates 10/12 left eye. Unable to evaluate pupils this morning 2/2 patient's pupils still remained dilated from prior dilated exam.  -Unclear etiology at this point. Patient's history of sudden vision loss of left eye is consistent with possible CRAO left eye. However, patient did not have an APD, which would be more consistent with CRAO Patient's vision and color vision significantly improved today, which may indicate a moving embolus with reperfusion. However, cherry red spot in macula was not seen on today's dilated exam.  -Agree with stroke work-up. CTH, CTA H/N CT perfusion negative. Recommend MR brain/orbit imaging as appropriate.  Cardiac Evaluation with Echo and Medical optimization   -ESR (64) and CRP (13.8) elevated. Pt reports negative GCA symptoms. Etiology of elevated ESR/CRP may be 2/2 CKD and other medical problems. Appreciate rheumatology consultation. +Left scalp tenderness on exam and there is concern for GCA. Rheum recommends B/L temporal artery biopsy and recommends starting IV solumedrol 60 mg qd.  -Follow-up quantiferon TB test, acute hepatitis panel, IL-6, and immunoglobulins panel.    2. 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  -No signs of neovascularization on exam including iris, disc, or elsewhere.    -Blood sugar control as per Medical Team  -Patient follows with Dr. Marialuisa Ram, retina ophthalmologist. Needs OCT and FA imaging to further elucidate disease, and possible Injection therapy as an outpatient.    Seen and discussed with Dr. Ponce, ocular hospitalist.    Outpatient Follow-up: Patient should follow-up with his/her ophthalmologist or with [facility] Department of Ophthalmology within 1 week of after discharge at:    [address]  [place] [address]  [phone]    Liu Domínguez MD, PGY-2  Pager: [phone]  Also available on Microsoft Teams         [facility] DEPARTMENT OF OPHTHALMOLOGY  ------------------------------------------------------------------------------  Liu Domínguez MD PGY 2  Pager: [phone]  ------------------------------------------------------------------------------    History of Present Illness: Pt is a 69 y/o M with PMH DM, HTN, CKD, Afib on Eliquis, HLD, left foot amputation who reported suddenly being unable to see starting 5/20. Reports history of proliferative diabetic retinopathy in both eyes (left > right), history of receiving injections, mainly in his left eye (last injection was about 4 years ago). Last time he saw an eye doctor (Hao Ram MD) was about 2 years ago. He reports not having any problems since then.     Interval History: No acute events overnight. Today patient reports improvement in left vision. Denies any pain.    MEDICATIONS  (STANDING):  apixaban 5 milliGRAM(s) Oral every 12 hours  aspirin enteric coated 81 milliGRAM(s) Oral daily  atorvastatin 40 milliGRAM(s) Oral at bedtime  dextrose 40% Gel 15 Gram(s) Oral once  dextrose 5%. 1000 milliLiter(s) (50 mL/Hr) IV Continuous <Continuous>  dextrose 5%. 1000 milliLiter(s) (100 mL/Hr) IV Continuous <Continuous>  dextrose 50% Injectable 25 Gram(s) IV Push once  dextrose 50% Injectable 12.5 Gram(s) IV Push once  dextrose 50% Injectable 25 Gram(s) IV Push once  doxazosin 4 milliGRAM(s) Oral at bedtime  glucagon  Injectable 1 milliGRAM(s) IntraMuscular once  insulin glargine Injectable (LANTUS) 12 Unit(s) SubCutaneous at bedtime  insulin lispro (ADMELOG) corrective regimen sliding scale   SubCutaneous three times a day before meals  insulin lispro (ADMELOG) corrective regimen sliding scale   SubCutaneous at bedtime  insulin lispro Injectable (ADMELOG) 3 Unit(s) SubCutaneous three times a day before meals  pantoprazole    Tablet 40 milliGRAM(s) Oral before breakfast  sodium chloride 0.9% lock flush 3 milliLiter(s) IV Push every 8 hours    MEDICATIONS  (PRN):  acetaminophen   Tablet .. 650 milliGRAM(s) Oral every 6 hours PRN Temp greater or equal to 38C (100.4F), Mild Pain (1 - 3)      VITALS: T(C): 36.9 (06-21-21 @ 17:28)  T(F): 98.5 (06-21-21 @ 17:28), Max: 98.5 (06-21-21 @ 17:28)  HR: 72 (06-21-21 @ 17:28) (57 - 83)  BP: 191/99 (06-21-21 @ 17:28) (179/90 - 216/85)  RR:  (15 - 18)  SpO2:  (95% - 100%)  Wt(kg): --  General: AAO x 3, appropriate mood and affect    Ophthalmology Exam:  Visual acuity (cc): 20/40 OD, 20/70 OS  Pupils: Pt's eyes still dilated from overnight exam.  Intraocular Pressure: 17 OU  Extraocular movements (EOMs): Full OU, no pain, no diplopia  Confrontational Visual Field (CVF): Full OU  Color Plates 12/12 OD, 10/12 OS    Slit Lamp Exam  External: Flat OU.  Lids/Lashes/Lacrimal Ducts: Flat OU.   Sclera/Conjunctiva: White and quiet OU.  Cornea: Clear OU.  Anterior Chamber: Deep and formed OU.    Iris: Flat OU.  Lens: 2+ NS, 3+ PSC OD, PCIOL with mild PCO OS    Vitreous: within normal limits OU  Disc, cup/disc: sharp and pink, 0.55 OU. Bergmeister's papilla inferior disc.  Macula: within normal limits OU  Vessels: within normal limits OU  Periphery: Microaneurysms scattered with flame shaped hemorrhage inferiorly, hazier view OD, Microaneurysms scattered with few dot blot hemorrhages.    Imaging  CT angiography neck: No evidence of hemodynamically significant stenosis using NASCET criteria.  Patent vertebral arteries.  No evidence of vascular dissection.  CT angiography brain: No major vessel occlusion or proximal stenosis.  CT perfusion: No perfusion abnormality   CT head noncontrast: No acute intracranial disease.    Assessment and Recommendations:  70y male with a past medical history/ocular history of DM, HTN, CKD, Afib on Eliquis, HLD, left foot amputation, PDR s/p Intravitreal injections consulted for painless vision loss left.    1. 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 Vision improved today to 20/70 and color plates 10/12 left eye. Unable to evaluate pupils this morning 2/2 patient's pupils still remained dilated from prior dilated exam.  -Unclear etiology at this point. Patient's history of sudden vision loss of left eye is consistent with possible CRAO left eye. However, patient did not have an APD, which would be more consistent with CRAO Patient's vision and color vision significantly improved today, which may indicate a moving embolus with reperfusion. However, cherry red spot in macula was not seen on today's dilated exam.  -Outpatient fluorescein angiogram and OCT macula will help yield ocualr diagnosis.  -Agree with stroke work-up. CTH, CTA H/N CT perfusion negative. Recommend MR brain/orbit imaging as appropriate.  Cardiac Evaluation with Echo and Medical optimization   -ESR (64) and CRP (13.8) elevated. Pt reports negative GCA symptoms. Etiology of elevated ESR/CRP may be 2/2 CKD and other medical problems. Appreciate rheumatology consultation. +Left scalp tenderness on exam and there is concern for GCA. Rheum recommends B/L temporal artery biopsy and recommends starting IV solumedrol 60 mg qd.  -Follow-up quantiferon TB test, acute hepatitis panel, IL-6, and immunoglobulins panel.    2. 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  -No signs of neovascularization on exam including iris, disc, or elsewhere.    -Blood sugar control as per Medical Team  -Patient follows with Dr. Marialuisa Ram, retina ophthalmologist. Needs OCT and FA imaging to further elucidate disease, and possible Injection therapy as an outpatient.    Seen and discussed with Dr. Ponce, ocular hospitalist.  Discussed with Dr. Rome, neuro-ophthalmologist.    Outpatient Follow-up: Patient should follow-up with his/her ophthalmologist or with [facility] Department of Ophthalmology within 1 week of after discharge at:    [address]  [place] [address]  [phone]    Liu Domínguez MD, PGY-2  Pager: [phone]  Also available on Microsoft Teams         [facility] DEPARTMENT OF OPHTHALMOLOGY  ------------------------------------------------------------------------------  Liu Domínguez MD PGY 2  Pager: [phone]  ------------------------------------------------------------------------------    History of Present Illness: Pt is a 71 y/o M with PMH DM, HTN, CKD, Afib on Eliquis, HLD, left foot amputation who reported suddenly being unable to see starting 5/20. Reports history of proliferative diabetic retinopathy in both eyes (left > right), history of receiving injections, mainly in his left eye (last injection was about 4 years ago). Last time he saw an eye doctor (Hao Ram MD) was about 2 years ago. He reports not having any problems since then.     Interval History: No acute events overnight. Today patient reports improvement in left vision. Denies any pain.    MEDICATIONS  (STANDING):  apixaban 5 milliGRAM(s) Oral every 12 hours  aspirin enteric coated 81 milliGRAM(s) Oral daily  atorvastatin 40 milliGRAM(s) Oral at bedtime  dextrose 40% Gel 15 Gram(s) Oral once  dextrose 5%. 1000 milliLiter(s) (50 mL/Hr) IV Continuous <Continuous>  dextrose 5%. 1000 milliLiter(s) (100 mL/Hr) IV Continuous <Continuous>  dextrose 50% Injectable 25 Gram(s) IV Push once  dextrose 50% Injectable 12.5 Gram(s) IV Push once  dextrose 50% Injectable 25 Gram(s) IV Push once  doxazosin 4 milliGRAM(s) Oral at bedtime  glucagon  Injectable 1 milliGRAM(s) IntraMuscular once  insulin glargine Injectable (LANTUS) 12 Unit(s) SubCutaneous at bedtime  insulin lispro (ADMELOG) corrective regimen sliding scale   SubCutaneous three times a day before meals  insulin lispro (ADMELOG) corrective regimen sliding scale   SubCutaneous at bedtime  insulin lispro Injectable (ADMELOG) 3 Unit(s) SubCutaneous three times a day before meals  pantoprazole    Tablet 40 milliGRAM(s) Oral before breakfast  sodium chloride 0.9% lock flush 3 milliLiter(s) IV Push every 8 hours    MEDICATIONS  (PRN):  acetaminophen   Tablet .. 650 milliGRAM(s) Oral every 6 hours PRN Temp greater or equal to 38C (100.4F), Mild Pain (1 - 3)      VITALS: T(C): 36.9 (06-21-21 @ 17:28)  T(F): 98.5 (06-21-21 @ 17:28), Max: 98.5 (06-21-21 @ 17:28)  HR: 72 (06-21-21 @ 17:28) (57 - 83)  BP: 191/99 (06-21-21 @ 17:28) (179/90 - 216/85)  RR:  (15 - 18)  SpO2:  (95% - 100%)  Wt(kg): --  General: AAO x 3, appropriate mood and affect    Ophthalmology Exam:  Visual acuity (cc): 20/40 OD, 20/70 OS  Pupils: Pt's eyes still dilated from overnight exam.  Intraocular Pressure: 17 OU  Extraocular movements (EOMs): Full OU, no pain, no diplopia  Confrontational Visual Field (CVF): Full OU  Color Plates 12/12 OD, 10/12 OS    Slit Lamp Exam  External: Flat OU.  Lids/Lashes/Lacrimal Ducts: Flat OU.   Sclera/Conjunctiva: White and quiet OU.  Cornea: Clear OU.  Anterior Chamber: Deep and formed OU.    Iris: Flat OU.  Lens: 2+ NS, 3+ PSC OD, PCIOL with mild PCO OS    Vitreous: within normal limits OU  Disc, cup/disc: sharp and pink, 0.55 OU. Bergmeister's papilla inferior disc OD  Macula: within normal limits OU  Vessels: within normal limits OU  Periphery: Microaneurysms scattered with flame shaped hemorrhage inferiorly, hazier view OD, Microaneurysms scattered with few dot blot hemorrhages.    Imaging  CT angiography neck: No evidence of hemodynamically significant stenosis using NASCET criteria.  Patent vertebral arteries.  No evidence of vascular dissection.  CT angiography brain: No major vessel occlusion or proximal stenosis.  CT perfusion: No perfusion abnormality   CT head noncontrast: No acute intracranial disease.    Assessment and Recommendations:  70y male with a past medical history/ocular history of DM, HTN, CKD, Afib on Eliquis, HLD, left foot amputation, PDR s/p Intravitreal injections consulted for painless vision loss left.    1. 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 Vision improved today to 20/70 and color plates 10/12 left eye. Unable to evaluate pupils this morning 2/2 patient's pupils still remained dilated from prior dilated exam.  -Unclear etiology at this point. Patient's history of sudden vision loss of left eye is consistent with possible CRAO left eye. However, patient did not have an APD, which would be less consistent with CRAO Patient's vision and color vision significantly improved today, which may indicate a moving embolus with reperfusion. However, cherry red spot in macula was not seen on today's dilated exam.  Symptoms also not completely consistent with TIA as pt did not have loss of vision and symptoms persisted for hours or migraine with aura- pt states he had a mild headache yesterday that resolved with Tylenol- this happened before the vision changes.  -Outpatient fluorescein angiogram and OCT macula will help yield ocular diagnosis.  -Agree with stroke work-up. CTH, CTA H/N CT perfusion negative. Recommend MR brain/orbit imaging as appropriate.  Cardiac Evaluation with Echo and Medical optimization   -ESR (64) and CRP (13.8) elevated. Pt reports negative GCA symptoms. Etiology of elevated ESR/CRP may be 2/2 CKD and other medical problems. Appreciate rheumatology consultation. +Left scalp tenderness on Rheum's exam and there is concern for GCA per rheumatology. Rheum recommends B/L temporal artery biopsy and recommends starting IV solumedrol 60 mg daily.  -Follow-up quantiferon TB test, acute hepatitis panel, IL-6, and immunoglobulins panel.    2. 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  -No signs of neovascularization on exam including iris, disc, or elsewhere.    -Blood sugar control as per Medical Team  -Patient follows with Dr. Marialuisa Ram, retina ophthalmologist. Needs OCT and FA imaging to further elucidate disease, and possible Injection therapy as an outpatient.  - findings and plan discussed with patient and primary team    Seen and discussed with Dr. Ponce, ocular hospitalist.  Discussed with Dr. Rome, neuro-ophthalmologist.    Outpatient Follow-up: Patient should follow-up with his/her ophthalmologist or with [facility] Department of Ophthalmology within 1 week of after discharge, sooner if symptoms worsen or change at:    [address]  [place] [address]  [phone]    Liu Domínguez MD, PGY-2  Pager: [phone]  Also available on [employer] Teams

## 2021-06-21 NOTE — H&P ADULT - PROBLEM SELECTOR PLAN 2
Monitor WBC  check UA and CXR  afebrile  will hold off further abx WBC 14.8 on admission. No localizing S/S of infection. Pt afebrile. Possibly reactive in setting of stroke.   - Trend WBC  - F/u UA and CXR ordered  - Will monitor off abx for now

## 2021-06-21 NOTE — H&P ADULT - PROBLEM SELECTOR PROBLEM 7
Paroxysmal atrial fibrillation Type 2 diabetes mellitus with hyperglycemia, without long-term current use of insulin HTN (hypertension)

## 2021-06-21 NOTE — H&P ADULT - PROBLEM SELECTOR PLAN 6
cont statin cont eliquis  [id number] score of 3 cont eliquis  [id number] score of 3  cont cardizem EF 55-60% based on TTE in Apr 2021, but with severe concentric LV hypertrophy. Pt euvolemic on exam.   - Hold pt's home Bumex dose for now given possibility of ELMER  - Also hold pt's home hydralazine and Imdur doses for permissive HTN as below  - Strict I&Os  - Daily weights  - Salt and fluid restriction  - TTE pending

## 2021-06-21 NOTE — H&P ADULT - PROBLEM SELECTOR PLAN 1
Monitor on tele  neuro and ophtho consult appreciated  Per ophtho: Retinal detachment Precautions: Cautioned for urgent reevaluation if symptoms of curtain vision loss or severe flashing of lights occurs   - Patient follows with Normal Yo MD. Needs OCT and FA imaging to further elucidate disease, and possible Injection therapy as an outpatient.  Bedside dysphagia screen passed --> dysphagia diet Monitor on tele  neuro and ophtho consult appreciated  Per ophtho: Retinal detachment Precautions: Cautioned for urgent reevaluation if symptoms of curtain vision loss or severe flashing of lights occurs   - Patient follows with Normal Yo MD. Needs OCT and FA imaging to further elucidate disease, and possible Injection therapy as an outpatient.  Bedside dysphagia screen passed --> dysphagia diet  PT/OT Monitor on tele  neuro and ophtho consult appreciated  Per ophtho: Retinal detachment Precautions: Cautioned for urgent reevaluation if symptoms of curtain vision loss or severe flashing of lights occurs   - Patient follows with Normal Yo MD. Needs OCT and FA imaging to further elucidate disease, and possible Injection therapy as an outpatient.  Bedside dysphagia screen passed --> dysphagia diet  PT/OT  MR brain ordered [patient with hardware in Hip and L arm and needs to confirm its MRI compatible] Pt with L eye painless vision loss. 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 CTH and CTA H/N negative. Low suspicion for GCA, given no headaches, tenderness in scalp or temples, jaw claudication, or systemic symptoms.   - Ophthalmology and Neurology consults obtained. Recs appreciated.  - MRI brain wo contrast [patient with hardware in L hip and L arm, need to confirm if MRI compatible]  - TTE ordered  - Check ESR and CRP  - Check lipid profile and HgbA1c  - Pt passed dysphagia screen. C/w dysphagia diet. F/u S&S eval.  - PT/OT eval  - Neuro checks q4hrs  - Aspiration precautions, fall risk protocol   - C/w ASA 81 mg daily and Eliquis 5 mg bid  - Monitor on tele

## 2021-06-21 NOTE — CONSULT NOTE ADULT - SUBJECTIVE AND OBJECTIVE BOX
[facility] DEPARTMENT OF OPHTHALMOLOGY - INITIAL ADULT CONSULT  -----------------------------------------------------------------------------------------------------------------  Luís Coyle MD PGY 2  Pager: [phone]  -----------------------------------------------------------------------------------------------------------------    HPI:As per ED note: t arrives as a xfer for an Ophthalmology consult from [facility] ER. Pt states at 4:30pm he had a sudden onset of blurry vision while watching TV at home.     Interval History: ophthalmology consulted for painless vision loss Left. Patient reported suddenly being unable to see starting today. 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 Last injection was about 4 years ago. Last time he saw a doctor (Hao Ram MD) was about 2 years ago. He reports not having any problems since then.     Pt. Denies  eye pain, irritation or redness, New flashes or floater, and double vision.      PAST MEDICAL & SURGICAL HISTORY:    Past Ocular History: PDR s/p Injectoins OS>OD. CEIOL OS ~ 10 years ago and Cataract OD    Family History:  FAMILY HISTORY:      Ophthalmic Medications: N/A    MEDICATIONS  (STANDING):    MEDICATIONS  (PRN):    Allergies & Intolerances:     Review of Systems:  Constitutional: No fever, chills  Eyes: AS above  Neuro: No tremors  Cardiovascular: No chest pain, palpitations  Respiratory: No SOB, no cough  GI: No nausea, vomiting, abdominal pain  : No dysuria  Skin: no rash  Psych: no depression  Endocrine: no polyuria, polydipsia  Heme/lymph: no swelling    VITALS: T(C): 36.7 (06-20-21 @ 23:54)  T(F): 98 (06-20-21 @ 23:54), Max: 98 (06-20-21 @ 23:54)  HR: 63 (06-21-21 @ 00:45) (57 - 63)  BP: 216/85 (06-21-21 @ 00:45) (193/78 - 216/85)  RR:  (15 - 18)  SpO2:  (95% - 100%)  Wt(kg): --  General: AAO x 3, appropriate mood and affect    Ophthalmology Exam:  Visual acuity (sc): 20/30+ OU.  Pupils: Round reactive OD - Slight irregular not as reactive OS. No appearance of APD direct or reverse .   Tonometry:  12 OU  Extraocular movements (EOMs): Full OU, no pain, no diplopia.  Confrontational Visual Field (CVF): Full OU.  Color Plates: 12/12 OD 1/12 OS (Poor vision limiting)     Pen Light Exam (PLE)  External: Normal OU.  Lids/Lashes/Lacrimal Ducts: Flat OU.  Sclera/Conjunctiva: White and quiet OU.  Cornea: Clear OU.  Anterior Chamber: Deep and formed OU.    Iris: Flat OU.  Lens: Clear OU.    Fundus Exam: dilated with 1% tropicamide and 2.5% phenylephrine  Approval obtained from primary team for dilation  Patient aware that pupils can remained dilated for at least 4-6 hours.  Exam performed with 20 D lens    Vitreous: wnl OU  Disc, cup/disc: sharp and pink, 0.4 OU  Macula: wnl OU  Vessels: wnl OU  Periphery: wnl OU    Labs/Imaging:  ***   [facility] DEPARTMENT OF OPHTHALMOLOGY - INITIAL ADULT CONSULT  -----------------------------------------------------------------------------------------------------------------  Luís Coyle MD PGY 2  Pager: [phone]  -----------------------------------------------------------------------------------------------------------------    HPI:As per ED note: t arrives as a xfer for an Ophthalmology consult from [facility] ER. Pt states at 4:30pm he had a sudden onset of blurry vision while watching TV at home.     Interval History: ophthalmology consulted for painless vision loss Left. PMH DM, HTN, CKD, Afib on Eliquis, HLD, left foot amputation Patient reported suddenly being unable to see starting today. 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 Last injection was about 4 years ago. Last time he saw a doctor (Hao Ram MD) was about 2 years ago. He reports not having any problems since then.     Pt. Denies  eye pain, irritation or redness, New flashes or floater, and double vision.      PAST MEDICAL & SURGICAL HISTORY:    Past Ocular History: PDR s/p Injectoins OS>OD. CEIOL OS ~ 10 years ago and Cataract OD    Family History:  FAMILY HISTORY:      Ophthalmic Medications: N/A    MEDICATIONS  (STANDING):    MEDICATIONS  (PRN):    Allergies & Intolerances:     Review of Systems:  Constitutional: No fever, chills  Eyes: AS above  Neuro: No tremors  Cardiovascular: No chest pain, palpitations  Respiratory: No SOB, no cough  GI: No nausea, vomiting, abdominal pain  : No dysuria  Skin: no rash  Psych: no depression  Endocrine: no polyuria, polydipsia  Heme/lymph: no swelling    VITALS: T(C): 36.7 (06-20-21 @ 23:54)  T(F): 98 (06-20-21 @ 23:54), Max: 98 (06-20-21 @ 23:54)  HR: 63 (06-21-21 @ 00:45) (57 - 63)  BP: 216/85 (06-21-21 @ 00:45) (193/78 - 216/85)  RR:  (15 - 18)  SpO2:  (95% - 100%)  Wt(kg): --  General: AAO x 3, appropriate mood and affect    Ophthalmology Exam:  Visual acuity (sc): 20/30+ OU.  Pupils: Round reactive OD - Slight irregular not as reactive OS. No appearance of APD direct or reverse .   Tonometry:  12 OU  Extraocular movements (EOMs): Full OU, no pain, no diplopia.  Confrontational Visual Field (CVF): Full OU.  Color Plates: 12/12 OD 1/12 OS (Poor vision limiting)     Pen Light Exam (PLE)/ Portable Slit lamp   External: Normal OU.  Lids/Lashes/Lacrimal Ducts: Flat OU.  Sclera/Conjunctiva: White and quiet OU.  Cornea: Clear OU.  Anterior Chamber: Deep and formed OU.    Iris: Flat OU.No NVI OU  Lens: NS/CC/PSC OD CEIOL OS    Fundus Exam: dilated with 1% tropicamide and 2.5% phenylephrine  Approval obtained from primary team for dilation  Patient aware that pupils can remained dilated for at least 4-6 hours.  Exam performed with 20 D lens    Vitreous: PVD, syneresis  OD clear OS  Disc, cup/disc: sharp and pink, 0.4 OD Pallor .4 OS No NVD OU  Macula: DB OD Pallor with Cherry red spot and IRH OS  Vessels: Attenuated  OU No Hollenhorst plaque OU  Periphery: DB 4 quadrants OU    Labs/Imaging:  IMPRESSION:    CT angiography neck: No evidence of hemodynamically significant stenosis using NASCET criteria.  Patent vertebral arteries.  No evidence of vascular dissection.    CT angiography brain: No major vessel occlusion or proximal stenosis.    CT perfusion: No perfusion abnormality      IMPRESSION:  Microvascular iscehmic disease  No acute intracranial disease. [facility] DEPARTMENT OF OPHTHALMOLOGY - INITIAL ADULT CONSULT  -----------------------------------------------------------------------------------------------------------------  Luís Coyle MD PGY 2  Pager: [phone]  -----------------------------------------------------------------------------------------------------------------    HPI:As per ED note: t arrives as a xfer for an Ophthalmology consult from [facility] ER. Pt states at 4:30pm he had a sudden onset of blurry vision while watching TV at home.     Interval History: ophthalmology consulted for painless vision loss Left. PMH DM, HTN, CKD, Afib on Eliquis, HLD, left foot amputation Patient reported suddenly being unable to see starting today. 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 Last injection was about 4 years ago. Last time he saw a doctor (Hao Ram MD) was about 2 years ago. He reports not having any problems since then.     Pt. Denies  eye pain, irritation or redness, New flashes or floater, and double vision.      PAST MEDICAL & SURGICAL HISTORY:    Past Ocular History: PDR s/p Injectoins OS>OD. CEIOL OS ~ 10 years ago and Cataract OD    Family History:  FAMILY HISTORY:      Ophthalmic Medications: N/A    MEDICATIONS  (STANDING):    MEDICATIONS  (PRN):    Allergies & Intolerances:     Review of Systems:  Constitutional: No fever, chills  Eyes: AS above  Neuro: No tremors  Cardiovascular: No chest pain, palpitations  Respiratory: No SOB, no cough  GI: No nausea, vomiting, abdominal pain  : No dysuria  Skin: no rash  Psych: no depression  Endocrine: no polyuria, polydipsia  Heme/lymph: no swelling    VITALS: T(C): 36.7 (06-20-21 @ 23:54)  T(F): 98 (06-20-21 @ 23:54), Max: 98 (06-20-21 @ 23:54)  HR: 63 (06-21-21 @ 00:45) (57 - 63)  BP: 216/85 (06-21-21 @ 00:45) (193/78 - 216/85)  RR:  (15 - 18)  SpO2:  (95% - 100%)  Wt(kg): --  General: AAO x 3, appropriate mood and affect    Ophthalmology Exam:  Visual acuity (sc): 20/30+ OD 20/400 ph 20/200.  Pupils: Round reactive OD - Slight irregular not as reactive OS. No appearance of APD direct or reverse .   Tonometry:  12 OU  Extraocular movements (EOMs): Full OU, no pain, no diplopia.  Confrontational Visual Field (CVF): Full OU.  Color Plates: 12/12 OD 1/12 OS (Poor vision limiting)     Pen Light Exam (PLE)/ Portable Slit lamp   External: Normal OU.  Lids/Lashes/Lacrimal Ducts: Flat OU.  Sclera/Conjunctiva: White and quiet OU.  Cornea: Clear OU.  Anterior Chamber: Deep and formed OU.    Iris: Flat OU.No NVI OU  Lens: NS/CC/PSC OD CEIOL OS    Fundus Exam: dilated with 1% tropicamide and 2.5% phenylephrine  Approval obtained from primary team for dilation  Patient aware that pupils can remained dilated for at least 4-6 hours.  Exam performed with 20 D lens    Vitreous: PVD, syneresis  OD clear OS  Disc, cup/disc: sharp and pink, 0.4 OD Pallor .4 OS No NVD OU  Macula: DB OD Pallor with Cherry red spot and IRH OS  Vessels: Attenuated  OU No Hollenhorst plaque OU  Periphery: DB 4 quadrants OU    Labs/Imaging:  IMPRESSION:    CT angiography neck: No evidence of hemodynamically significant stenosis using NASCET criteria.  Patent vertebral arteries.  No evidence of vascular dissection.    CT angiography brain: No major vessel occlusion or proximal stenosis.    CT perfusion: No perfusion abnormality      IMPRESSION:  Microvascular iscehmic disease  No acute intracranial disease.

## 2021-06-21 NOTE — CONSULT NOTE ADULT - SUBJECTIVE AND OBJECTIVE BOX
ROCCO MORROWJJ  [id number]    HISTORY OF PRESENT ILLNESS:    PAST MEDICAL & SURGICAL HISTORY:  Hypertension, unspecified type    Type 2 diabetes mellitus with other neurologic complication, without long-term current use of insulin    DM (diabetes mellitus)    HTN (hypertension)    HLD (hyperlipidemia)    Afib    Retinopathy    Chronic diastolic congestive heart failure    Chronic kidney disease, unspecified CKD stage    Amputation of leg, traumatic, left, subsequent encounter    S/P BKA (below knee amputation) unilateral, left    S/P hip replacement, left    History of surgery on arm        Review of Systems:  Gen:  No fevers/chills, weight loss  HEENT: No blurry vision, no difficulty swallowing  CVS: No chest pain/palpitations  Resp: No SOB/wheezing  GI: No N/V/C/D/abdominal pain  MSK:  Skin: No new rashes  Neuro: No headaches    MEDICATIONS  (STANDING):  apixaban 5 milliGRAM(s) Oral every 12 hours  aspirin enteric coated 81 milliGRAM(s) Oral daily  atorvastatin 40 milliGRAM(s) Oral at bedtime  dextrose 40% Gel 15 Gram(s) Oral once  dextrose 5%. 1000 milliLiter(s) (50 mL/Hr) IV Continuous <Continuous>  dextrose 5%. 1000 milliLiter(s) (100 mL/Hr) IV Continuous <Continuous>  dextrose 50% Injectable 25 Gram(s) IV Push once  dextrose 50% Injectable 12.5 Gram(s) IV Push once  dextrose 50% Injectable 25 Gram(s) IV Push once  doxazosin 4 milliGRAM(s) Oral at bedtime  glucagon  Injectable 1 milliGRAM(s) IntraMuscular once  insulin lispro (ADMELOG) corrective regimen sliding scale   SubCutaneous three times a day before meals  insulin lispro (ADMELOG) corrective regimen sliding scale   SubCutaneous at bedtime  pantoprazole    Tablet 40 milliGRAM(s) Oral before breakfast  sodium chloride 0.9% lock flush 3 milliLiter(s) IV Push every 8 hours    MEDICATIONS  (PRN):      Allergies    No Known Allergies    Intolerances        PERTINENT MEDICATION HISTORY:    SOCIAL HISTORY:  OCCUPATION:  TRAVEL HISTORY:    FAMILY HISTORY:  No pertinent family history in first degree relatives    Family history of heart failure (Father)        Vital Signs Last 24 Hrs  T(C): 36.6 (2021 11:15), Max: 36.8 (2021 18:00)  T(F): 97.8 (2021 11:15), Max: 98.3 (2021 03:41)  HR: 83 (2021 11:15) (57 - 97)  BP: 179/90 (2021 11:15) (145/92 - 216/85)  BP(mean): --  RR: 17 (2021 11:15) (15 - 18)  SpO2: 98% (2021 11:15) (95% - 100%)    Physical Exam:  General: No apparent distress  HEENT: EOMI, MMM, L. temporal artery scalp tenderness; b/l temporal artery pulses palpable.   CVS: +S1/S2, RRR, no murmurs/rubs/gallops  Resp: CTA b/l. No crackles/wheezing  GI: Soft, NT/ND +BS  MSK:  Shoulders: wnl  Elbows: wnl  Wrists: wnl  MCPs: wnl  PIPs: wnl  DIPs: wnl   Hips: wnl  Knees: LLE below the knee amputation.   Ankle: LLE below the knee amputation   Neuro: AAOx3  Skin: no visible rashes    LABS:                        11.1   14.80 )-----------( 236      ( 2021 02:33 )             33.5     06-21    136  |  95<L>  |  92<H>  ----------------------------<  296<H>  3.8   |  24  |  3.09<H>    Ca    9.8      2021 02:46    TPro  7.9  /  Alb  4.3  /  TBili  0.2  /  DBili  x   /  AST  16  /  ALT  14  /  AlkPhos  69  06-21    PT/INR - ( 2021 18:58 )   PT: 13.8 sec;   INR: 1.20 ratio         PTT - ( 2021 18:58 )  PTT:33.2 sec  Urinalysis Basic - ( 2021 07:56 )    Color: Colorless / Appearance: Clear / S.016 / pH: x  Gluc: x / Ketone: Negative  / Bili: Negative / Urobili: <2 mg/dL   Blood: x / Protein: 30 mg/dL / Nitrite: Negative   Leuk Esterase: Negative / RBC: 0 /HPF / WBC 1 /HPF   Sq Epi: x / Non Sq Epi: 0 /HPF / Bacteria: Negative        RADIOLOGY & ADDITIONAL STUDIES: ROCCO JC  [id number]    HISTORY OF PRESENT ILLNESS:  71 yo man with history of afib on Eliquis, HFpEF (EF 55-60% 2021), HTN, HLD, CKD stage 3-4, DM2 (poorly controlled- A1c this admission >10%) c/b diabetic retinopathy, PVD c/b LLE nec fasc s/p L BKA, and b/l cataracts s/p R eye cataract surgery presents as a transfer from [facility] after he went there for acute onset of L eye blurry vision on  afternoon ~4:30pm. Pt states that as he was watching TV and about to start a new movie, he suddenly developed L eye blurry vision, which he describes as a fuzziness. He has poor vision in his R eye at baseline but had near perfect vision in his L eye prior to this episode. Pt states that immediately before the blurry vision started, he felt slightly dizzy. Denies any associated double vision, eye pain, increased tearing, irritation, redness, or new flashes/floaters. Also denies any headaches, numbness/tingling, limb weakness, chest pain, SOB, palpitations, fevers, chills, cough, abdominal pain, nausea, vomiting, diarrhea, constipation, or urinary symptoms. At [facility], pt was found to have left CRAO, and Neurology was consulted for stroke workup. Pt was transferred to [facility] for ophthalmology eval.     Rheumatology consulted for evaluation for GCA, given patient's elevated ESR.  He states he had a brief headache yesterday preceding onset of L. eye vision deficits, which resolved after taking tylenol. He currently does not have a headache. He denies eye pain. Denies jaw claudication. Denies PMR symptoms (shoulder/hip girdle pain, or other joint pains). He denies fever.     PAST MEDICAL & SURGICAL HISTORY:  Hypertension, unspecified type    Type 2 diabetes mellitus with other neurologic complication, without long-term current use of insulin    DM (diabetes mellitus)    HTN (hypertension)    HLD (hyperlipidemia)    Afib    Retinopathy    Chronic diastolic congestive heart failure    Chronic kidney disease, unspecified CKD stage    Amputation of leg, traumatic, left, subsequent encounter    S/P BKA (below knee amputation) unilateral, left    S/P hip replacement, left    History of surgery on arm        Review of Systems:  Gen:  No fevers/chills, weight loss  HEENT: Intermittent blurry vision in L. eye; no jaw claudication; no difficulty swallowing  CVS: No chest pain/palpitations  Resp: No SOB/wheezing  GI: No N/V/C/D/abdominal pain  MSK: No shoulder or hip girdle pain, no other joint pains. L. below the knee leg amputation.   Skin: No new rashes  Neuro: No headaches    MEDICATIONS  (STANDING):  apixaban 5 milliGRAM(s) Oral every 12 hours  aspirin enteric coated 81 milliGRAM(s) Oral daily  atorvastatin 40 milliGRAM(s) Oral at bedtime  dextrose 40% Gel 15 Gram(s) Oral once  dextrose 5%. 1000 milliLiter(s) (50 mL/Hr) IV Continuous <Continuous>  dextrose 5%. 1000 milliLiter(s) (100 mL/Hr) IV Continuous <Continuous>  dextrose 50% Injectable 25 Gram(s) IV Push once  dextrose 50% Injectable 12.5 Gram(s) IV Push once  dextrose 50% Injectable 25 Gram(s) IV Push once  doxazosin 4 milliGRAM(s) Oral at bedtime  glucagon  Injectable 1 milliGRAM(s) IntraMuscular once  insulin lispro (ADMELOG) corrective regimen sliding scale   SubCutaneous three times a day before meals  insulin lispro (ADMELOG) corrective regimen sliding scale   SubCutaneous at bedtime  pantoprazole    Tablet 40 milliGRAM(s) Oral before breakfast  sodium chloride 0.9% lock flush 3 milliLiter(s) IV Push every 8 hours    MEDICATIONS  (PRN):      Allergies    No Known Allergies    Intolerances        PERTINENT MEDICATION HISTORY:    SOCIAL HISTORY:   OCCUPATION:  TRAVEL HISTORY:    FAMILY HISTORY:  No pertinent family history in first degree relatives    Family history of heart failure (Father)        Vital Signs Last 24 Hrs  T(C): 36.6 (2021 11:15), Max: 36.8 (2021 18:00)  T(F): 97.8 (2021 11:15), Max: 98.3 (2021 03:41)  HR: 83 (2021 11:15) (57 - 97)  BP: 179/90 (2021 11:15) (145/92 - 216/85)  BP(mean): --  RR: 17 (2021 11:15) (15 - 18)  SpO2: 98% (2021 11:15) (95% - 100%)    Physical Exam:  General: No apparent distress  HEENT: EOMI, MMM, L. temporal artery scalp tenderness; b/l temporal artery pulses palpable.   CVS: +S1/S2, RRR, no murmurs/rubs/gallops  Resp: CTA b/l. No crackles/wheezing  GI: Soft, NT/ND +BS  MSK:  Shoulders: wnl  Elbows: wnl  Wrists: wnl  MCPs: wnl  PIPs: wnl  DIPs: wnl   Hips: wnl  Knees: LLE below the knee amputation.   Ankle: LLE below the knee amputation   Neuro: AAOx3  Skin: no visible rashes    LABS:                        11.1   14.80 )-----------( 236      ( 2021 02:33 )             33.5     06-21    136  |  95<L>  |  92<H>  ----------------------------<  296<H>  3.8   |  24  |  3.09<H>    Ca    9.8      2021 02:46    TPro  7.9  /  Alb  4.3  /  TBili  0.2  /  DBili  x   /  AST  16  /  ALT  14  /  AlkPhos  69  06-21    PT/INR - ( 2021 18:58 )   PT: 13.8 sec;   INR: 1.20 ratio         PTT - ( 2021 18:58 )  PTT:33.2 sec  Urinalysis Basic - ( 2021 07:56 )    Color: Colorless / Appearance: Clear / S.016 / pH: x  Gluc: x / Ketone: Negative  / Bili: Negative / Urobili: <2 mg/dL   Blood: x / Protein: 30 mg/dL / Nitrite: Negative   Leuk Esterase: Negative / RBC: 0 /HPF / WBC 1 /HPF   Sq Epi: x / Non Sq Epi: 0 /HPF / Bacteria: Negative        RADIOLOGY & ADDITIONAL STUDIES:

## 2021-06-21 NOTE — ED ADULT NURSE NOTE - OBJECTIVE STATEMENT
break covering RN: 69 y/o m received to room 23 as a transfer from [facility] for opthalmology. Pt a&ox4. pt states around 430p today he developed sudden onset of blurry vision in his L eye and photophobia. Prior to onset of blurry vision, pt had an episode of nausea with dry heaving. pt has hx of cataracts in r eye but denies any problems with his l eye. Pt has hx of L bka, dm, and htn. Pt respirations even and unlabored. pt abdomen soft nontender nondistended. Pt denies fevers, chills, sob, koehler, ha, cough or recent illness. VS as noted, call bell in reach, comfort measures provided, pt arrives with patent 18G IV to L ac, will continue to monitor.

## 2021-06-21 NOTE — H&P ADULT - PROBLEM SELECTOR PLAN 4
monitor H/H  no signs of active bleeding  patietn with hx of anemia  Per [place] documents: Hgb in Apr 9.1 cont hydralazine and cardizem and bumex cont hydralazine and cardizem cont hydralazine and Cardizem [id number]. Currently in sinus rhythm.   - C/w AC with Eliquis 5 mg bid  - Hold Cardizem for now for permissive HTN as below  - Monitor on tele

## 2021-06-21 NOTE — H&P ADULT - NSICDXFAMILYHX_GEN_ALL_CORE_FT
FAMILY HISTORY:  Father  Still living? Unknown  Family history of heart failure, Age at diagnosis: Age Unknown

## 2021-06-21 NOTE — H&P ADULT - NSHPLABSRESULTS_GEN_ALL_CORE
EK.1   14.80 )-----------( 236      ( 2021 02:33 )             33.5     06-    136  |  95<L>  |  92<H>  ----------------------------<  296<H>  3.8   |  24  |  3.09<H>    Ca    9.8      2021 02:46    TPro  7.9  /  Alb  4.3  /  TBili  0.2  /  DBili  x   /  AST  16  /  ALT  14  /  AlkPhos  69  -    [place] Results  CT angiography neck: No evidence of hemodynamically significant stenosis using NASCET criteria.  Patent vertebral arteries.  No evidence of vascular dissection.    CT angiography brain: No major vessel occlusion or proximal stenosis.    CTH : No acute intracranial disease.    CT perfusion: No perfusion abnormality EKG:  snus with 1st deg AV block with PAC  TWI in III, AVR, V1, V3                          11.1   14.80 )-----------( 236      ( 21 Jun 2021 02:33 )             33.5     06-21    136  |  95<L>  |  92<H>  ----------------------------<  296<H>  3.8   |  24  |  3.09<H>    Ca    9.8      21 Jun 2021 02:46    TPro  7.9  /  Alb  4.3  /  TBili  0.2  /  DBili  x   /  AST  16  /  ALT  14  /  AlkPhos  69  06-21    [place] Results  CT angiography neck: No evidence of hemodynamically significant stenosis using NASCET criteria.  Patent vertebral arteries.  No evidence of vascular dissection.    CT angiography brain: No major vessel occlusion or proximal stenosis.    CTH : No acute intracranial disease.    CT perfusion: No perfusion abnormality EKG personally reviewed: Sinus rhythm with 1st deg AV block with PACs, RBBB (not new),  ms                        11.1   14.80 )-----------( 236      ( 21 Jun 2021 02:33 )             33.5     06-21    136  |  95<L>  |  92<H>  ----------------------------<  296<H>  3.8   |  24  |  3.09<H>    Ca    9.8      21 Jun 2021 02:46    TPro  7.9  /  Alb  4.3  /  TBili  0.2  /  DBili  x   /  AST  16  /  ALT  14  /  AlkPhos  69  06-21    Imaging personally reviewed.  CT angiography neck: No evidence of hemodynamically significant stenosis using NASCET criteria.  Patent vertebral arteries.  No evidence of vascular dissection.    CT angiography brain: No major vessel occlusion or proximal stenosis.    CTH : No acute intracranial disease.    CT perfusion: No perfusion abnormality

## 2021-06-21 NOTE — ED PROVIDER NOTE - PROGRESS NOTE DETAILS
Tash Ramos MD: Vitreous hemorrhage of L eye, ophtho rec follow up with retina specialist outpatient Tash Ramos MD: Central retinal artery occlusion of L eye as per ophtho. Will consult neuro. Pt received asa at OSH.

## 2021-06-21 NOTE — H&P ADULT - ATTENDING COMMENTS
69 yo man with history of afib on Eliquis, HFpEF (EF 55-60% 4/2021), HTN, HLD, CKD stage 3-4, DM2 c/b diabetic retinopathy, PVD c/b LLE nec fasc s/p L BKA, and b/l cataracts s/p R eye cataract surgery presents as a transfer from [facility] after he went there for acute onset of L eye blurry vision on Sunday afternoon ~4:30pm, found to have L eye CRAO. Ophthalmology and Neurology following. Stroke workup pending. MRI brain wo contrast ordered, but pt with hardware (will need to determine if hardware is MRI compatible). F/u TTE. C/w ASA and will increase Eliquis to 5 mg bid. Permissive HTN for 24 hrs from symptom onset.

## 2021-06-21 NOTE — H&P ADULT - NEGATIVE ENMT SYMPTOMS
no hearing difficulty/no ear pain/no tinnitus/no vertigo no hearing difficulty/no ear pain/no tinnitus/no vertigo/no sinus symptoms/no nasal congestion/no nasal discharge/no throat pain/no dysphagia

## 2021-06-21 NOTE — H&P ADULT - ASSESSMENT
70 year old male with PMH of DM, HTN, CKD, Afib on Eliquis, HLD, left foot amputation presents as transfer from [facility] for sudden blurry vison on L eye at 4:30 pm  70 year old male with PMH of DM, HTN, CKD, PAD, Afib on Eliquis, HLD, left foot amputation presents as transfer from [facility] for sudden blurry vison on L eye at 4:30 pm  70 year old male with PMH of DM, HTN, CKD, PAD, CHF with EF: 55-60%Afib on Eliquis, HLD, left foot amputation presents as transfer from [facility] for sudden blurry vison on L eye at 4:30 pm  70 year old male with PMH of DM, HTN, CKD, PAD, CHF with EF: 55-60%Afib on Eliquis, HLD, left foot amputation presents as transfer from [facility] for sudden blurry vison on L eye at 4:30 pm, found to have L CRAO.  69 yo man with history of afib on Eliquis, HFpEF (EF 55-60% 4/2021), HTN, HLD, CKD stage 3-4, DM2 c/b diabetic retinopathy, PVD c/b LLE nec fasc s/p L BKA, and b/l cataracts s/p R eye cataract surgery presents as a transfer from [facility] after he went there for acute onset of L eye blurry vision on Sunday afternoon ~4:30pm., found to have L CRAO.

## 2021-06-21 NOTE — H&P ADULT - PROBLEM SELECTOR PLAN 7
cont eliquis  [id number] score of 3 ISS  monitor fingersticks  A1C BP elevated to 216/85, with SBPs ranging from 180s-190s.   - Case discussed with Neurology. Per Neurology, permissive HTN for 24 hrs since symptom onset (6/20 4:30pm). Hold Cardizem, hydralazine, and Imdur for now. Aim for gradual normotension thereafter.   - Bumex currently being held as above

## 2021-06-21 NOTE — CONSULT NOTE ADULT - SUBJECTIVE AND OBJECTIVE BOX
HPI:  69 yo man with PMHx of DM2, HFpEF, HTN, HLD, CKD, and PVD who presented from ophthalmology due to concerns for L central artery occlusion. Endocrine Team consulted for inpatient T2DM management.    Endocrine History:  Patient reports having DM2 for many years. Currently only taking Glimepiride 4mg daily.  Does not check fingersticks at home. No hypoglycemic events in the past month.  Follows up with a PCP   Microvascular complications:  +neuropathy and retinopathy    Interval History:  Patient noted to have HbA1c 10.6  Currently on correctional scale  Tolerating PO    PAST MEDICAL & SURGICAL HISTORY:  Hypertension, unspecified type  Type 2 diabetes mellitus with other neurologic complication, without long-term current use of insulin  DM (diabetes mellitus)  HTN (hypertension)  HLD (hyperlipidemia)  Afib  Retinopathy    FAMILY HISTORY:  Family history of heart failure (Father)    Social History:  No tobacco, EtOH, or illicit drug use    Outpatient Medications:  albuterol 1.25 mg/3 mL (0.042%) inhalation solution:  inhaled   albuterol 1.25 mg/3 mL (0.042%) inhalation solution: 3 milliliter(s) inhaled once a day  aspirin 81 mg oral tablet: 1 tab(s) orally once a day  aspirin 81 mg oral tablet, chewable: 1 tab(s) orally once a day  bumetanide 2 mg oral tablet: 1 tab(s) orally 2 times a day  bumetanide 2 mg oral tablet: 1 tab(s) orally 2 times a day  dilTIAZem 240 mg/24 hours oral capsule, extended release: 1 cap(s) orally once a day  dilTIAZem 240 mg/24 hours oral capsule, extended release: 1 cap(s) orally once a day  doxazosin 4 mg oral tablet: 1 tab(s) orally once a day (at bedtime)  doxazosin 4 mg oral tablet: 1 tab(s) orally once a day (at bedtime)  Eliquis 5 mg oral tablet: 1 tab(s) orally 2 times a day  Eliquis 5 mg oral tablet: 1 tab(s) orally 2 times a day  fluticasone 50 mcg/inh inhalation powder: 1 puff(s) inhaled 2 times a day  fluticasone 50 mcg/inh nasal spray: 1 spray(s) nasal 2 times a day  glimepiride 4 mg oral tablet: 1 tab(s) orally once a day  glimepiride 4 mg oral tablet: 1 tab(s) orally once a day  hydrALAZINE 100 mg oral tablet: 1 tab(s) orally 3 times a day  hydrALAZINE 100 mg oral tablet: 1 tab(s) orally 3 times a day  Invokana 300 mg oral tablet: 1 tab(s) orally once a day  Invokana 300 mg oral tablet: 1 tab(s) orally once a day  isosorbide mononitrate 60 mg oral tablet, extended release: 1 tab(s) orally once a day (in the morning)  isosorbide mononitrate 60 mg oral tablet, extended release: 1 tab(s) orally once a day (in the morning)  lidocaine 5% topical film:  topically   lidocaine 5% topical film: Apply topically to affected area once a day  pantoprazole 40 mg oral delayed release tablet: 1 tab(s) orally once a day  pantoprazole 40 mg oral delayed release tablet: 1 tab(s) orally once a day (before a meal)  polyethylene glycol 3350 oral powder for reconstitution: 17 gram(s) orally once a day  polyethylene glycol 3350 oral powder for reconstitution: 17 gram(s) orally once a day  povidone iodine 10% topical solution: Apply topically to affected area once a day   povidone iodine 10% topical solution:   Senna 8.6 mg oral tablet: 2 tab(s) orally once a day (at bedtime)  senna oral tablet: 2 tab(s) orally once a day (at bedtime)  simvastatin 20 mg oral tablet: 1 tab(s) orally once a day (at bedtime)  simvastatin 20 mg oral tablet: 1 tab(s) orally once a day (at bedtime)    MEDICATIONS  (STANDING):  apixaban 5 milliGRAM(s) Oral every 12 hours  aspirin enteric coated 81 milliGRAM(s) Oral daily  atorvastatin 40 milliGRAM(s) Oral at bedtime  dextrose 40% Gel 15 Gram(s) Oral once  dextrose 5%. 1000 milliLiter(s) (50 mL/Hr) IV Continuous <Continuous>  dextrose 5%. 1000 milliLiter(s) (100 mL/Hr) IV Continuous <Continuous>  dextrose 50% Injectable 25 Gram(s) IV Push once  dextrose 50% Injectable 12.5 Gram(s) IV Push once  dextrose 50% Injectable 25 Gram(s) IV Push once  doxazosin 4 milliGRAM(s) Oral at bedtime  glucagon  Injectable 1 milliGRAM(s) IntraMuscular once  insulin lispro (ADMELOG) corrective regimen sliding scale   SubCutaneous three times a day before meals  insulin lispro (ADMELOG) corrective regimen sliding scale   SubCutaneous at bedtime  pantoprazole    Tablet 40 milliGRAM(s) Oral before breakfast  sodium chloride 0.9% lock flush 3 milliLiter(s) IV Push every 8 hours    MEDICATIONS  (PRN):    Allergies  No Known Allergies    Review of Systems:  General: no fevers, chills, poor appetite, or weight loss  HEENT: + blurry vision, no headache  CV: no chest pain, no palpitations  Pulm: no SOB, no cough  GI: no n/v/d, no abdominal pain  : no dysuria, no hematuria  Skin: no rash or ulcers  Endocrine: no polyuria, polydipsia  Neuro: no tremors  Psych: no depressed mood    Physical exam  VITALS: T(C): 36.6 (06-21-21 @ 11:15)  T(F): 97.8 (06-21-21 @ 11:15), Max: 98.3 (06-21-21 @ 03:41)  HR: 83 (06-21-21 @ 11:15) (57 - 97)  BP: 179/90 (06-21-21 @ 11:15) (145/92 - 216/85)  RR:  (15 - 18)  SpO2:  (95% - 100%)  Wt(kg): 108  General: NAD, well-developed  Eyes: no proptosis, anicteric  HEENT: atraumatic, normocephalic, MMM  Thyroid: normal size, no palpable nodules  CV: normal rate, regular rhythm, no peripheral edema  Pulm: CTA in anterior lung fields, no wheezes, rales, rhonchi  Abd: +BS, soft and non-tender to palpation  Ext: L BKA, 2+ radial pulse  Neuro: A&Ox3, no gross deficits appreciated  Psych: reactive affect, euthymic mood    POCT Blood Glucose.: 199 mg/dL (06-21-21 @ 12:41)  POCT Blood Glucose.: 206 mg/dL (06-21-21 @ 07:14)  POCT Blood Glucose.: 373 mg/dL (06-20-21 @ 21:59)  POCT Blood Glucose.: 392 mg/dL (06-20-21 @ 19:35)  POCT Blood Glucose.: 402 mg/dL (06-20-21 @ 18:14)                            11.1   14.80 )-----------( 236      ( 21 Jun 2021 02:33 )             33.5       06-21    136  |  95<L>  |  92<H>  ----------------------------<  296<H>  3.8   |  24  |  3.09<H>    EGFR if : 22<L>  EGFR if non : 19<L>    Ca    9.8      06-21    TPro  7.9  /  Alb  4.3  /  TBili  0.2  /  DBili  x   /  AST  16  /  ALT  14  /  AlkPhos  69  06-21      HbA1c 10.6                     HPI:  69 yo man with PMHx of DM2, HFpEF, HTN, HLD, CKD, and PVD who presented from ophthalmology due to concerns for L central artery occlusion. Endocrine Team consulted for inpatient T2DM management.    Endocrine History:  Patient reports having DM2 for many years. Currently only taking Glimepiride 4mg daily.  Does not check fingersticks at home. No hypoglycemic events in the past month.  Follows up with a PCP   pt states his DM is all Due to "diet" and he does not want insulin.  Microvascular complications:  +neuropathy and retinopathy    Interval History:  Patient noted to have HbA1c 10.6  Currently on correctional scale  Tolerating PO    PAST MEDICAL & SURGICAL HISTORY:  Hypertension, unspecified type  Type 2 diabetes mellitus with other neurologic complication, without long-term current use of insulin  DM (diabetes mellitus)  HTN (hypertension)  HLD (hyperlipidemia)  Afib  Retinopathy    FAMILY HISTORY:  Family history of heart failure (Father)    Social History:  No tobacco, EtOH, or illicit drug use    Outpatient Medications:  albuterol 1.25 mg/3 mL (0.042%) inhalation solution:  inhaled   albuterol 1.25 mg/3 mL (0.042%) inhalation solution: 3 milliliter(s) inhaled once a day  aspirin 81 mg oral tablet: 1 tab(s) orally once a day  aspirin 81 mg oral tablet, chewable: 1 tab(s) orally once a day  bumetanide 2 mg oral tablet: 1 tab(s) orally 2 times a day  bumetanide 2 mg oral tablet: 1 tab(s) orally 2 times a day  dilTIAZem 240 mg/24 hours oral capsule, extended release: 1 cap(s) orally once a day  dilTIAZem 240 mg/24 hours oral capsule, extended release: 1 cap(s) orally once a day  doxazosin 4 mg oral tablet: 1 tab(s) orally once a day (at bedtime)  doxazosin 4 mg oral tablet: 1 tab(s) orally once a day (at bedtime)  Eliquis 5 mg oral tablet: 1 tab(s) orally 2 times a day  Eliquis 5 mg oral tablet: 1 tab(s) orally 2 times a day  fluticasone 50 mcg/inh inhalation powder: 1 puff(s) inhaled 2 times a day  fluticasone 50 mcg/inh nasal spray: 1 spray(s) nasal 2 times a day  glimepiride 4 mg oral tablet: 1 tab(s) orally once a day  glimepiride 4 mg oral tablet: 1 tab(s) orally once a day  hydrALAZINE 100 mg oral tablet: 1 tab(s) orally 3 times a day  hydrALAZINE 100 mg oral tablet: 1 tab(s) orally 3 times a day  Invokana 300 mg oral tablet: 1 tab(s) orally once a day  Invokana 300 mg oral tablet: 1 tab(s) orally once a day  isosorbide mononitrate 60 mg oral tablet, extended release: 1 tab(s) orally once a day (in the morning)  isosorbide mononitrate 60 mg oral tablet, extended release: 1 tab(s) orally once a day (in the morning)  lidocaine 5% topical film:  topically   lidocaine 5% topical film: Apply topically to affected area once a day  pantoprazole 40 mg oral delayed release tablet: 1 tab(s) orally once a day  pantoprazole 40 mg oral delayed release tablet: 1 tab(s) orally once a day (before a meal)  polyethylene glycol 3350 oral powder for reconstitution: 17 gram(s) orally once a day  polyethylene glycol 3350 oral powder for reconstitution: 17 gram(s) orally once a day  povidone iodine 10% topical solution: Apply topically to affected area once a day   povidone iodine 10% topical solution:   Senna 8.6 mg oral tablet: 2 tab(s) orally once a day (at bedtime)  senna oral tablet: 2 tab(s) orally once a day (at bedtime)  simvastatin 20 mg oral tablet: 1 tab(s) orally once a day (at bedtime)  simvastatin 20 mg oral tablet: 1 tab(s) orally once a day (at bedtime)    MEDICATIONS  (STANDING):  apixaban 5 milliGRAM(s) Oral every 12 hours  aspirin enteric coated 81 milliGRAM(s) Oral daily  atorvastatin 40 milliGRAM(s) Oral at bedtime  dextrose 40% Gel 15 Gram(s) Oral once  dextrose 5%. 1000 milliLiter(s) (50 mL/Hr) IV Continuous <Continuous>  dextrose 5%. 1000 milliLiter(s) (100 mL/Hr) IV Continuous <Continuous>  dextrose 50% Injectable 25 Gram(s) IV Push once  dextrose 50% Injectable 12.5 Gram(s) IV Push once  dextrose 50% Injectable 25 Gram(s) IV Push once  doxazosin 4 milliGRAM(s) Oral at bedtime  glucagon  Injectable 1 milliGRAM(s) IntraMuscular once  insulin lispro (ADMELOG) corrective regimen sliding scale   SubCutaneous three times a day before meals  insulin lispro (ADMELOG) corrective regimen sliding scale   SubCutaneous at bedtime  pantoprazole    Tablet 40 milliGRAM(s) Oral before breakfast  sodium chloride 0.9% lock flush 3 milliLiter(s) IV Push every 8 hours    MEDICATIONS  (PRN):    Allergies  No Known Allergies    Review of Systems:  General: no fevers, chills, poor appetite, or weight loss  HEENT: + blurry vision, no headache  CV: no chest pain, no palpitations  Pulm: no SOB, no cough  GI: no n/v/d, no abdominal pain  : no dysuria, no hematuria  Skin: no rash or ulcers  Endocrine: no polyuria, polydipsia  Neuro: no tremors  Psych: no depressed mood    Physical exam  VITALS: T(C): 36.6 (06-21-21 @ 11:15)  T(F): 97.8 (06-21-21 @ 11:15), Max: 98.3 (06-21-21 @ 03:41)  HR: 83 (06-21-21 @ 11:15) (57 - 97)  BP: 179/90 (06-21-21 @ 11:15) (145/92 - 216/85)  RR:  (15 - 18)  SpO2:  (95% - 100%)  Wt(kg): 108  General: NAD, well-developed  Eyes: no proptosis, anicteric  HEENT: atraumatic, normocephalic, MMM  Thyroid: normal size, no palpable nodules  CV: normal rate, regular rhythm, no peripheral edema  Pulm: CTA in anterior lung fields, no wheezes, rales, rhonchi  Abd: +BS, soft and non-tender to palpation  Ext: L BKA, 2+ radial pulse  Neuro: A&Ox3, no gross deficits appreciated  Psych: reactive affect, euthymic mood    POCT Blood Glucose.: 199 mg/dL (06-21-21 @ 12:41)  POCT Blood Glucose.: 206 mg/dL (06-21-21 @ 07:14)  POCT Blood Glucose.: 373 mg/dL (06-20-21 @ 21:59)  POCT Blood Glucose.: 392 mg/dL (06-20-21 @ 19:35)  POCT Blood Glucose.: 402 mg/dL (06-20-21 @ 18:14)                            11.1   14.80 )-----------( 236      ( 21 Jun 2021 02:33 )             33.5       06-21    136  |  95<L>  |  92<H>  ----------------------------<  296<H>  3.8   |  24  |  3.09<H>    EGFR if : 22<L>  EGFR if non : 19<L>    Ca    9.8      06-21    TPro  7.9  /  Alb  4.3  /  TBili  0.2  /  DBili  x   /  AST  16  /  ALT  14  /  AlkPhos  69  06-21      HbA1c 10.6

## 2021-06-21 NOTE — ED PROVIDER NOTE - ATTENDING CONTRIBUTION TO CARE
69 y/o M with h/o DM, HTN, CKD, AF on eliquis, hyperlipidemia, L BKA transferred from [facility] for ophthalmology evaluation.  Pt developed blurriness to L eye since 4:30pm this evening.  Pt was evaluated at OSH - evaluated by neurology, had initial stroke work-up with unremarkable labs, ct/cta/ctp, and transferred to [facility] to be seen by ophtho.  Pt denies any headache, trauma, fall or injury, photophobia, n/v, diplopia, floaters, curtain-like vision loss, weakness, numbness or tingling.  Pt is lying comfortably in stretcher, awake and alert, nontoxic.  Afeb, hypertensive, VSS.  NCAT EOMI PERRL decreased visual acuity to L eye with grossly normal fields.  Lungs cta bl.  Cards nl S1/S2, RRR, no MRG.  Abd soft ntnd.  Ophtho at bedside pending dilated fundoscopy evaluation, dispo per findings and recommendations.

## 2021-06-21 NOTE — H&P ADULT - MUSCULOSKELETAL
ROM intact detailed exam details… ROM intact/no joint swelling/no joint erythema/no calf tenderness/normal strength

## 2021-06-21 NOTE — CONSULT NOTE ADULT - ASSESSMENT
70 year old right-handed man with PMHx HTN, HLD, CKD stage 5, atrial fibrillation on Eliquis, BKA, diabetic retinopathy, cataract surgery OD presents from [facility] as transfer for Ophthalmology consult. Pt found to have left CRAO and Neurology consulted for stroke workup. LKN 6/20 at 4:30pm, NIHSS 0, preMRS 0. No signs or symptoms concerning for GCA at this time. Neuro exam intact with the exception of visual acuity 20/30 bilaterally (refer to Ophtho note for detailed exam). Labs significant for leukocytosis to 14.80, microcytic anemia, elevated BUN/Cr (unknown baseline, known CKD), elevated CRP 13.8 and ESR 64. CT head, CTA head/neck pending.    Impression: Blurry vision 2/2 L. CRAO, mechanism of which is likely cardioembolic 2/2 atrial fibrillation    Recommendations:  - Follow up final read for CTH, CT angio head/neck   - Appreciate Ophtho recs  - MRI brain w/o contrast  - TTE  - HbA1C, lipid panel  - Continue Eliquis for afib  - Anemia workup: iron/TIBC, ferritin, vitamin B12, folate  - Monitor renal function - possibly worsened by contrast nephropathy and unknown baseline  - Monitor POC glucose w/ meals and at bedtime    Pt to be seen and discussed with Dr. Granda, neurovascular attending.    Velvet Austin DO  PGY-2  Neurology Resident 70 year old right-handed man with PMHx HTN, HLD, CKD stage 5, atrial fibrillation on Eliquis, BKA, diabetic retinopathy, cataract surgery OD presents from [facility] as transfer for Ophthalmology consult. Pt found to have left CRAO and Neurology consulted for stroke workup. LKN 6/20 at 4:30pm, NIHSS 0, preMRS 0. No signs or symptoms concerning for GCA at this time. Neuro exam intact with the exception of visual acuity 20/30 bilaterally (refer to Ophtho note for detailed exam). Labs significant for leukocytosis to 14.80, microcytic anemia, elevated BUN/Cr (unknown baseline, known CKD), elevated CRP 13.8 and ESR 64. CT head, CTA head/neck and [facility].    Impression: Blurry vision 2/2 L. CRAO, mechanism of which is likely cardioembolic 2/2 atrial fibrillation    Recommendations:  - Appreciate Ophtho recs  - MRI brain w/o contrast  - TTE  - HbA1C, lipid panel  - Continue Eliquis for afib  - Anemia workup: iron/TIBC, ferritin, vitamin B12, folate  - Monitor renal function - possibly worsened by contrast nephropathy and unknown baseline  - Monitor POC glucose w/ meals and at bedtime    Pt to be seen and discussed with Dr. Granda, neurovascular attending.    Velvet Austin DO  PGY-2  Neurology Resident

## 2021-06-21 NOTE — H&P ADULT - NEGATIVE MUSCULOSKELETAL SYMPTOMS
no arthralgia/no arthritis/no joint swelling no arthralgia/no arthritis/no joint swelling/no muscle weakness/no back pain/no leg pain L/no leg pain R

## 2021-06-21 NOTE — ED PROVIDER NOTE - PHYSICAL EXAMINATION
CONSTITUTIONAL: Nontoxic, well nourished, well developed, elderly male, resting comfortably in no acute distress  HEAD: Normocephalic; atraumatic  EYES: Normal inspection, EOMI. Ophtho at bedside, exam deferred to ophtho  ENMT: External appears normal; normal oropharynx  NECK: Supple; non-tender; no cervical lymphadenopathy  CARD: RRR; no audible murmurs, rubs, or gallops  RESP: No respiratory distress, lungs ctab/l  ABD: Soft, non-distended; non-tender; no rebound or guarding  EXT: No LE pitting edema or calf tenderness; distal pulses intact with good capillary refill  SKIN: Warm, dry, intact  NEURO: aaox3, CN II-IX intact, 5/5 strength b/l UE and RLE, LLE with BKA, sensation intact in all extremities, no pronator drift

## 2021-06-21 NOTE — H&P ADULT - PROBLEM SELECTOR PLAN 8
ISS  monitor fingersticks  A1C Patient unsure of meds. Unable to wife and med rec pharmacist contacted. Patient unsure of meds. Med hx obtained from recent admission at [facility].  Unable to contact wife and med rec pharmacist contacted. Hgb 11.1 on admission, was 9.1 in Apr 2021. No S/S of active bleed.  - Monitor H/H

## 2021-06-21 NOTE — CONSULT NOTE ADULT - ASSESSMENT
71 yo man with PMHx of DM2, HFpEF, HTN, HLD, CKD, and PVD who presented from ophthalmology due to concerns for L central artery occlusion. Endocrine Team consulted for inpatient T2DM management.    Problem 1: Uncontrolled T2DM c/b:  Home Regimen:  - Lantus qhs  - Lispro AC meals (Hold if patient is NPO)  - Low/Moderate correctional scale before meals and qhs  - Monitor FS before meals and qhs (Inpatient Goal 100-180 mg/dl)  - Consistent Carb Diet  - HbA1c (Goal <8%)  - No evidence of microalbumineruria     Discharge Plan:   - Patient can be discharged on a basal/bolus regimen when medically ready- dose TBD  - Patient can f/u with Endocrine outpatient at the location provided below    #HTN:  Home Regimen:  - Currently well controlled (Goal <130/80)  - Continue current regimen    #HLD:  Home Regimen:  - Lipid Profile  - ASCVD risk  - Patient currently meets criteria for moderate/high intensity statin  - Continue/consider adjusting current regimen   69 yo man with PMHx of DM2, HFpEF, HTN, HLD, CKD, and PVD (s/p L BKA) who presented from ophthalmology due to concerns for L central artery occlusion. Endocrine Team consulted for inpatient T2DM management.    Problem 1: Uncontrolled T2DM c/b neuropathy and retinopathy  Home Regimen: Glimepiride 4 mg daily  HbA1c 10.6  - Lantus qhs  - Lispro AC meals (Hold if patient is NPO)  - Low dose correctional scale before meals and low dose correction qhs  - Monitor FS before meals and qhs (Inpatient Goal 100-180 mg/dl)  - Consistent Carb Diet    Discharge Plan:   - ***Patient can be discharged on a basal/bolus regimen when medically ready- dose TBD  - Patient can f/u with Endocrine outpatient at the location provided below:    [facility]   [address] [place] [address] [id number] [phone]    Problem 2: HTN  - Management per primary team given primary diagnosis of central artery occlusion     Problem 3: HLD  - Continue statin    Scarlet Renee M.D  Endocrine Fellow  Pager #[phone]   69 yo man with PMHx of DM2, HFpEF, HTN, HLD, CKD, and PVD (s/p L BKA) who presented from ophthalmology due to concerns for L central artery occlusion. Endocrine Team consulted for inpatient T2DM management.    Problem 1: Uncontrolled T2DM c/b neuropathy and retinopathy  Home Regimen: Glimepiride 4 mg daily  HbA1c 10.6  Patient currently requiring low doses of correction and BG stable ~200s indicating insulin sensitivity. Therefore, at this time will recommend basal insulin that is slightly lower than weight based dosing.   - Lantus 12 units qhs  - Lispro 3 units AC meals (Hold if patient is NPO)  - Low dose correctional scale before meals and low dose correction qhs  - Monitor FS before meals and qhs (Inpatient Goal 100-180 mg/dl)  - Consistent Carb Diet    Discharge Plan:   - Patient is reluctant to start insulin. Will discuss again with patient given that he may benefit from a combination of basal + oral agents.   - Patient can f/u with Endocrine outpatient at the location provided below:    [facility]   [address] [place] [id number]  [phone]    Problem 2: HTN  - Management per primary team given primary diagnosis of central artery occlusion     Problem 3: HLD  - Continue statin    Scarlet Renee M.D  Endocrine Fellow  Pager #[phone]   69 yo man with PMHx of DM2, HFpEF, HTN, HLD, CKD, and PVD (s/p L BKA) who presented from ophthalmology due to concerns for L central artery occlusion. Endocrine Team consulted for inpatient T2DM management.    Problem 1: Uncontrolled T2DM c/b neuropathy and retinopathy  Home Regimen: Glimepiride 4 mg daily  HbA1c 10.6  Patient currently requiring low doses of correction and BG stable ~200s indicating insulin sensitivity. Therefore, at this time will recommend basal insulin that is slightly lower than weight based dosing.   - Lantus 12 units qhs  - Lispro 3 units AC meals (Hold if patient is NPO)  - Low dose correctional scale before meals and low dose correction qhs  - Monitor FS before meals and qhs (Inpatient Goal 100-180 mg/dl)  - Consistent Carb Diet    Discharge Plan:   - Patient is reluctant to start insulin. Will discuss again with patient given that he may benefit from a combination of basal + oral agents.   - Patient can f/u with Endocrine outpatient at the location provided below:    [facility]   [address] [address]  [phone]    Problem 2: HTN  - Management per primary team given primary diagnosis of central artery occlusion     Problem 3: HLD  - Continue statin    Scarlet Renee M.D  Endocrine Fellow  Pager #[phone]      Mervat Rock MD  Attending Physician   Department of Endocrinology, Diabetes and Metabolism       Pager [phone] ([facility])/ [phone] ([facility]) [please provide 10 digit call back number]  Nights and weekends: [phone]  Please note that this patient may be followed by a different provider tomorrow. If no answer or after hours, please contact [phone].  For final dc reccomendations, please call [phone] or page the endocrine fellow on call.

## 2021-06-21 NOTE — ED ADULT NURSE NOTE - NSIMPLEMENTINTERV_GEN_ALL_ED
Implemented All Fall Risk Interventions:  [place] to call system. Call bell, personal items and telephone within reach. Instruct patient to call for assistance. Room bathroom lighting operational. Non-slip footwear when patient is off stretcher. Physically safe environment: no spills, clutter or unnecessary equipment. Stretcher in lowest position, wheels locked, appropriate side rails in place. Provide visual cue, wrist band, yellow gown, etc. Monitor gait and stability. Monitor for mental status changes and reorient to person, place, and time. Review medications for side effects contributing to fall risk. Reinforce activity limits and safety measures with patient and family.

## 2021-06-21 NOTE — H&P ADULT - PROBLEM SELECTOR PLAN 3
cont hydralazine and cardizem and bumex Monitor Cr  Cr increased to 3.09 now from 2.84 [Apr 2021]  Patient also received contrast load Monitor Cr  Cr increased to 3.09 now from 2.84 [Apr 2021]  Patient also received contrast load  Hold bumex for now  REnal consult in AM Serum Cr 3.09 on admission vs. 2.84 [Apr 2021]  Patient also received contrast load  Hold bumex for now  REnal consult in AM Serum Cr 3.44 on presentation at [facility] and 3.09 on admission here vs. 2.84 in Apr 2021. Pt also received contrast load for CTA H/N.   - Monitor serum Cr and urine output  - Avoid NSAIDs, contrast, and nephrotoxins  - Will hold pt's home Bumex dose for now  - Renally dose meds  - Renal consult to be obtained if renal function worsens in setting of contrast load from CTA H/N

## 2021-06-21 NOTE — ED ADULT NURSE NOTE - CHIEF COMPLAINT QUOTE
Pt arrives as a xfer for an Ophthalmology consult from [facility] ER. Pt states at 4:30pm he had a sudden onset of blurry vision while watching TV at home. Pt arrives with 18G in LAC and per xfer report pt received ASA 81mg and 1L of NS. Pt states in triage vision that blurry vision still persists bilaterally and he noticed while in the darkened ambulance his vision seemed to temporarily improve. Pt has L sided BKA.

## 2021-06-21 NOTE — H&P ADULT - NSHPPHYSICALEXAM_GEN_ALL_CORE
Vital Signs Last 24 Hrs  T(C): 36.6 (21 Jun 2021 07:00), Max: 36.8 (21 Jun 2021 03:41)  T(F): 97.8 (21 Jun 2021 07:00), Max: 98.3 (21 Jun 2021 03:41)  HR: 65 (21 Jun 2021 07:00) (57 - 65)  BP: 183/80 (21 Jun 2021 07:00) (183/80 - 216/85)  BP(mean): --  RR: 16 (21 Jun 2021 07:00) (15 - 18)  SpO2: 97% (21 Jun 2021 07:00) (95% - 100%)

## 2021-06-21 NOTE — H&P ADULT - PROBLEM SELECTOR PROBLEM 5
Chronic kidney disease Anemia Type 2 diabetes mellitus with hyperglycemia, without long-term current use of insulin

## 2021-06-21 NOTE — PATIENT PROFILE ADULT - NSPROHMDIABETMGMTSTRAT_GEN_A_NUR
activity/adequate rest/coping strategies/diet modification/exercise/insulin therapy/medication therapy/routine screenings/weight management

## 2021-06-21 NOTE — H&P ADULT - NEGATIVE GENERAL GENITOURINARY SYMPTOMS
no hematuria/no renal colic/no flank pain L/no flank pain R no hematuria/no flank pain L/no flank pain R/no dysuria

## 2021-06-21 NOTE — ED PROVIDER NOTE - OBJECTIVE STATEMENT
70 year old male with PMH of DM, HTN, CKD, Afib on Eliquis, HLD, left foot amputation presents as transfer from [facility] for sudden blurry vison at 4:30 pm. Hx of retinopathy requiring injections in the past. R eye vision poor and is at baseline. States that L vision usually almost 20/20 without glasses, but today started having blurry vision worse with bright lights. No pain or sensation of curtain over eyes. He uses reading glasses. At , results significant for:     WBC 16.31  's  Cr. 3.44 (baseline 2-3)  COVID neg  Ct head, CTA head and neck neg. CT perfusion neg.     Sent for ophthalmology consult.

## 2021-06-21 NOTE — H&P ADULT - HISTORY OF PRESENT ILLNESS
70 year old right-handed man with PMHx HTN, HLD, CKD, atrial fibrillation on Eliquis, BKA, retinopathy, cataract surgery OD presents from [facility] as transfer for Ophthalmology consult. patient states he was watching TV developed blurry vision in L eye. He denies any pain in his eye. patient initially presented to [place] for the complaints. HE was then transferred to [facility] ED for further eval for L CRAO. Denies fever, chills, cough, falls, LOC, chest pain, SOB, abdominal pain, nausea, vomiting ,melena, hematochezia, LE edema or dysuria.  70 year old right-handed man with PMHx HTN, HLD, CKD, atrial fibrillation on Eliquis, BKA, retinopathy, cataract surgery OD presents from [facility] as transfer for Ophthalmology consult. patient states he was watching TV developed blurry vision in L eye. He denies any pain in his eye. patient initially presented to [place] for the complaints. HE was then transferred to [facility] ED for further eval for L CRAO. Denies fever, chills, cough, falls, LOC, chest pain, SOB, abdominal pain, nausea, vomiting ,melena, hematochezia, LE edema or dysuria.     70 year old male with PMH of DM, HTN, CKD, PAD, CHF with EF: 55-60%Afib on Eliquis, HLD, left foot amputation presents as transfer from [facility] for sudden blurry vison on L eye at 4:30 pm . patient states he was watching TV developed blurry vision in L eye. He denies any pain in his eye. patient initially presented to [place] for the complaints. HE was then transferred to [facility] ED for further eval for L CRAO. Denies fever, chills, cough, falls, LOC, chest pain, SOB, abdominal pain, nausea, vomiting ,melena, hematochezia, LE edema or dysuria.    69 yo man with history of afib on Eliquis, HFpEF (EF 55-60% 4/2021), HTN, HLD, CKD stage 3-4, DM2 c/b diabetic retinopathy, PVD c/b LLE nec fasc s/p L BKA, and b/l cataracts s/p R eye cataract surgery presents as a transfer from [facility] after he went there for acute onset of L eye blurry vision on Sunday afternoon ~4:30pm. Pt states that as he was watching TV and about to start a new movie, he suddenly developed L eye blurry vision, which he describes as a fuzziness. He has poor vision in his R eye at baseline but had near perfect vision in his L eye prior to this episode. Pt states that immediately before the blurry vision started, he felt slightly dizzy. Denies any associated double vision, eye pain, increased tearing, irritation, redness, or new flashes/floaters. Also denies any headaches, numbness/tingling, limb weakness, chest pain, SOB, palpitations, fevers, chills, cough, abdominal pain, nausea, vomiting, diarrhea, constipation, or urinary symptoms. At [facility], pt was found to have left CRAO, and Neurology was consulted for stroke workup. LKN on 6/20 at 4:30pm.     71 yo man with history of afib on Eliquis, HFpEF (EF 55-60% 4/2021), HTN, HLD, CKD stage 3-4, DM2 c/b diabetic retinopathy, PVD c/b LLE nec fasc s/p L BKA, and b/l cataracts s/p R eye cataract surgery presents as a transfer from [facility] after he went there for acute onset of L eye blurry vision on Sunday afternoon ~4:30pm. Pt states that as he was watching TV and about to start a new movie, he suddenly developed L eye blurry vision, which he describes as a fuzziness. He has poor vision in his R eye at baseline but had near perfect vision in his L eye prior to this episode. Pt states that immediately before the blurry vision started, he felt slightly dizzy. Denies any associated double vision, eye pain, increased tearing, irritation, redness, or new flashes/floaters. Also denies any headaches, numbness/tingling, limb weakness, chest pain, SOB, palpitations, fevers, chills, cough, abdominal pain, nausea, vomiting, diarrhea, constipation, or urinary symptoms. At [facility], pt was found to have left CRAO, and Neurology was consulted for stroke workup. Pt was transferred to [facility] for ophthalmology eval.

## 2021-06-21 NOTE — H&P ADULT - PROBLEM SELECTOR PROBLEM 8
Type 2 diabetes mellitus with hyperglycemia, without long-term current use of insulin Medication management Anemia

## 2021-06-21 NOTE — H&P ADULT - NEGATIVE OPHTHALMOLOGIC SYMPTOMS
no diplopia/no photophobia/no lacrimation L/no lacrimation R no diplopia/no photophobia/no lacrimation L/no lacrimation R/no pain L/no pain R/no irritation L/no irritation R

## 2021-06-21 NOTE — H&P ADULT - NEGATIVE GENERAL SYMPTOMS
no fever/no chills/no sweating no fever/no chills/no weight loss/no weight gain/no malaise/no fatigue

## 2021-06-21 NOTE — H&P ADULT - NSICDXPASTMEDICALHX_GEN_ALL_CORE_FT
PAST MEDICAL HISTORY:  Afib     DM (diabetes mellitus)     HLD (hyperlipidemia)     HTN (hypertension)     Retinopathy      PAST MEDICAL HISTORY:  Afib     Chronic diastolic congestive heart failure     Chronic kidney disease, unspecified CKD stage     DM (diabetes mellitus)     HLD (hyperlipidemia)     HTN (hypertension)     Retinopathy

## 2021-06-21 NOTE — ED PROVIDER NOTE - NS ED ROS FT
General: denies fever, chills  HENT: denies nasal congestion, sore throat, rhinorrhea  Eyes: +blurry vision   CV: denies chest pain  Resp: denies difficulty breathing, cough  Abdominal: denies nausea, vomiting, diarrhea, abdominal pain, blood in stool, dark stool  : denies pain with urination  MSK: denies recent trauma  Neuro: denies headaches, numbness, tingling, dizziness, lightheadedness.  Skin: denies new rashes  Endocrine: denies recent weight loss

## 2021-06-21 NOTE — SWALLOW BEDSIDE ASSESSMENT ADULT - COMMENTS
Consult received and chart reviewed. Patient seen at bedside in [facility] this afternoon for an initial assessment of the swallow function, at which time patient was alert and cooperative. RN reporting that patient consumed a regular diet for breakfast despite puree and honey thick liquid recommendation without any difficulties. Patient reports consuming regular solids with thin liquids at baseline. Patient denies pain pre/post today's assessment.     Per chart review, patient is a "69 yo man with history of afib on Eliquis, HFpEF (EF 55-60% 4/2021), HTN, HLD, CKD stage 3-4, DM2 c/b diabetic retinopathy, PVD c/b LLE nec fasc s/p L BKA, and b/l cataracts s/p R eye cataract surgery presents as a transfer from [facility] after he went there for acute onset of L eye blurry vision on Sunday afternoon ~4:30pm. Pt states that as he was watching TV and about to start a new movie, he suddenly developed L eye blurry vision, which he describes as a fuzziness. He has poor vision in his R eye at baseline but had near perfect vision in his L eye prior to this episode. Pt states that immediately before the blurry vision started, he felt slightly dizzy."    WBC is elevated. Most recent CT of brain revealed "No major vessel occlusion or proximal stenosis." Most recent CXR revealed "clear lungs"    Discussed results with RN, patient and call out ACP.

## 2021-06-21 NOTE — ED PROVIDER NOTE - NSFOLLOWUPINSTRUCTIONS_ED_ALL_ED_FT
-You have a vitreous hemorrhage.  -Keep the head of your bed elevation at all times.  -No heavy lifting, straining, or bending forward.  -Avoid take NSAIDs (like Motrin, Advil, ibuprofen, Aleve).  -Return to the ER if symptoms of curtain vision loss or severe flashing of lights occurs.  -Follow up within the week with an ophthalmologist - Dr. Marialuisa Ram if you prefer. Otherwise, call the [facility] Department of Ophthalmology for an appointment within 1 week after discharge:     [address]  [place], [place] [id number] [phone]

## 2021-06-21 NOTE — H&P ADULT - NEUROLOGICAL DETAILS
alert and oriented x 3/responds to verbal commands/cranial nerves intact alert and oriented x 3/responds to verbal commands/sensation intact/normal strength

## 2021-06-21 NOTE — PROGRESS NOTE ADULT - SUBJECTIVE AND OBJECTIVE BOX
MD note-Chart reviewed.consults noted.Vascular consult called for possible Temporal artery Biopsy.Renal consult called.

## 2021-06-21 NOTE — H&P ADULT - NSICDXPASTSURGICALHX_GEN_ALL_CORE_FT
PAST SURGICAL HISTORY:  History of surgery on arm     S/P BKA (below knee amputation) unilateral, left     S/P hip replacement, left

## 2021-06-21 NOTE — H&P ADULT - PROBLEM SELECTOR PLAN 10
1.  Name of PCP:  2.  PCP Contacted on Admission: [ ] Y    [ ] N    3.  PCP contacted at Discharge: [ ] Y    [ ] N    [ ] N/A  4.  Post-Discharge Appointment Date and Location:  5.  Summary of Handoff given to PCP: - DVT ppx: On Eliquis

## 2021-06-21 NOTE — H&P ADULT - PROBLEM SELECTOR PLAN 9
On eliquis Patient unsure of home meds. Med hx obtained from visit to [place]. Unable to contact pt's wife and med rec pharmacist contacted.

## 2021-06-21 NOTE — CONSULT NOTE ADULT - ASSESSMENT
Assessment and Recommendations:  70y male with a past medical history/ocular history of DM, HTN, PDR  consulted for Painless Vision loss Left, found to have Vitreous Heme OS    # Vitreous hemorrhage OS  Etiology likely PDR given history and exam  - May need Intravitreal injection as outpatient  - head of bed elevation at all times  - no heavy lifting, straining, or bending forward  - no NSAIDs; may continue clopidogrel for now given extensive stent history  - Cautioned for urgent reevaluation if symptoms of curtain vision loss or severe flashing of lights occurs   - RTC this Wednesday for retina Evaluation, possible injection - Can follow with his Outside ophthalmologist - Dr. Marialuisa Ram if he prefers and can get an appointment this week.     Outpatient Follow-up: Patient should follow-up with his/her ophthalmologist or with [facility] Department of Ophthalmology within 1 week of after discharge at:    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   - Neg GCA symptoms, Temporal artery pulses full and strong, no headaches, no tenderness in scalp or temples, no jaw claudication, no new joint pains, no fevers/chills/nightsweats, no weight loss  - WIll follow up as outpatient in clinic    # Severe NPDR vs PDR OU  - Known Chronic History of PDR with Injection Therapy OS> OD  - Severe NPDR - No signs of NVI, NVD, NVE on exam today   - Dot blots seen in all 4 quadrants. with large IRH inferotemporal OD  - No tears, Holes or Detachment seen  - Blood sugar control as per Medical Team  - Retinal detachment Precautions: Cautioned for urgent reevaluation if symptoms of curtain vision loss or severe flashing of lights occurs   - Patient follows with Marialuisa Ram MD. Needs OCT and FA imaging to further elucidate disease, and possible Injection therapy as an outpatient.  - Can follow with [facility] Ophthalmology if preferred -Information below.      To be discussed with Attending.     Outpatient Follow-up: Patient should follow-up with his/her ophthalmologist or with [facility] Department of Ophthalmology within 1 week of after discharge at:    [address]  [place] [address]  [phone]    Luís Coyle MD, PGY-2  Pager: [phone]  Also available on Microsoft Teams

## 2021-06-21 NOTE — H&P ADULT - NSHPSOCIALHISTORY_GEN_ALL_CORE
Denies smoking, alcohol or drug use    lives with wife Denies tobacco, alcohol, or illicit drug use.  . Lives with wife.

## 2021-06-21 NOTE — CONSULT NOTE ADULT - ASSESSMENT
70M with HTN, HLD. A. fib on eliquis, DM2 poorly controlled with retinopathy, admitted for acute onset L. eye vision loss that is waxing and waning since yesterday 6/20.     #L eye vision loss- given elevated ESR of 64, as well as left scalp tenderness on exam, pt's age>50, there is concern for temporal arteritis/GCA.   Alternatively, pt has multiple comorbidities predisposing to atherosclerotic disease/thromboembolism. Although DM2 and CKD can cause elevated ESR, these alone may not explain it and pt will need further workup for GCA.     Plan:  Please consult vascular surgery and arrange for bilateral temporal artery biopsy.  Please send Quantiferon TB test, acute hepatitis panel, IL-6 and immunoglobulins panel.  AFTER sending the above labs (must be sent prior to starting steroids), can start IV solumedrol 60 mg QD for empiric treatment of GCA.  We will continue to follow.     Discussed with Dr. Janina Xavier MD PGY4  Rheumatology Fellow  Pager [phone]

## 2021-06-21 NOTE — H&P ADULT - NEGATIVE NEUROLOGICAL SYMPTOMS
no transient paralysis/no weakness/no paresthesias no transient paralysis/no weakness/no paresthesias/no syncope/no loss of sensation/no headache/no loss of consciousness/no hemiparesis

## 2021-06-22 LAB
ANION GAP SERPL CALC-SCNC: 17 MMOL/L — HIGH (ref 7–14)
APPEARANCE UR: CLEAR — SIGNIFICANT CHANGE UP
APTT BLD: 66.9 SEC — HIGH (ref 27–36.3)
BACTERIA # UR AUTO: NEGATIVE — SIGNIFICANT CHANGE UP
BILIRUB UR-MCNC: NEGATIVE — SIGNIFICANT CHANGE UP
BUN SERPL-MCNC: 94 MG/DL — HIGH (ref 7–23)
CALCIUM SERPL-MCNC: 10 MG/DL — SIGNIFICANT CHANGE UP (ref 8.4–10.5)
CHLORIDE SERPL-SCNC: 94 MMOL/L — LOW (ref 98–107)
CO2 SERPL-SCNC: 22 MMOL/L — SIGNIFICANT CHANGE UP (ref 22–31)
COLOR SPEC: SIGNIFICANT CHANGE UP
COMMENT - URINE: SIGNIFICANT CHANGE UP
COVID-19 SPIKE DOMAIN AB INTERP: POSITIVE
COVID-19 SPIKE DOMAIN ANTIBODY RESULT: >250 U/ML — HIGH
CREAT ?TM UR-MCNC: 91 MG/DL — SIGNIFICANT CHANGE UP
CREAT SERPL-MCNC: 3.52 MG/DL — HIGH (ref 0.5–1.3)
DIFF PNL FLD: NEGATIVE — SIGNIFICANT CHANGE UP
EPI CELLS # UR: 1 /HPF — SIGNIFICANT CHANGE UP (ref 0–5)
GLUCOSE BLDC GLUCOMTR-MCNC: 251 MG/DL — HIGH (ref 70–99)
GLUCOSE BLDC GLUCOMTR-MCNC: 308 MG/DL — HIGH (ref 70–99)
GLUCOSE BLDC GLUCOMTR-MCNC: 318 MG/DL — HIGH (ref 70–99)
GLUCOSE BLDC GLUCOMTR-MCNC: 340 MG/DL — HIGH (ref 70–99)
GLUCOSE BLDC GLUCOMTR-MCNC: 343 MG/DL — HIGH (ref 70–99)
GLUCOSE BLDC GLUCOMTR-MCNC: 343 MG/DL — HIGH (ref 70–99)
GLUCOSE BLDC GLUCOMTR-MCNC: 382 MG/DL — HIGH (ref 70–99)
GLUCOSE SERPL-MCNC: 339 MG/DL — HIGH (ref 70–99)
GLUCOSE UR QL: ABNORMAL
GRAN CASTS # UR COMP ASSIST: 1 /LPF — HIGH
HAV IGM SER-ACNC: SIGNIFICANT CHANGE UP
HBV CORE IGM SER-ACNC: SIGNIFICANT CHANGE UP
HBV SURFACE AG SER-ACNC: SIGNIFICANT CHANGE UP
HCT VFR BLD CALC: 31.5 % — LOW (ref 39–50)
HCT VFR BLD CALC: 36.1 % — LOW (ref 39–50)
HCV AB S/CO SERPL IA: 0.21 S/CO — SIGNIFICANT CHANGE UP (ref 0–0.99)
HCV AB S/CO SERPL IA: 0.23 S/CO — SIGNIFICANT CHANGE UP (ref 0–0.99)
HCV AB SERPL-IMP: SIGNIFICANT CHANGE UP
HCV AB SERPL-IMP: SIGNIFICANT CHANGE UP
HGB BLD-MCNC: 10.5 G/DL — LOW (ref 13–17)
HGB BLD-MCNC: 11.7 G/DL — LOW (ref 13–17)
HYALINE CASTS # UR AUTO: 1 /LPF — SIGNIFICANT CHANGE UP (ref 0–7)
KETONES UR-MCNC: NEGATIVE — SIGNIFICANT CHANGE UP
LEUKOCYTE ESTERASE UR-ACNC: NEGATIVE — SIGNIFICANT CHANGE UP
MAGNESIUM SERPL-MCNC: 1.9 MG/DL — SIGNIFICANT CHANGE UP (ref 1.6–2.6)
MCHC RBC-ENTMCNC: 25.5 PG — LOW (ref 27–34)
MCHC RBC-ENTMCNC: 26.4 PG — LOW (ref 27–34)
MCHC RBC-ENTMCNC: 32.4 GM/DL — SIGNIFICANT CHANGE UP (ref 32–36)
MCHC RBC-ENTMCNC: 33.3 GM/DL — SIGNIFICANT CHANGE UP (ref 32–36)
MCV RBC AUTO: 78.6 FL — LOW (ref 80–100)
MCV RBC AUTO: 79.1 FL — LOW (ref 80–100)
NITRITE UR-MCNC: NEGATIVE — SIGNIFICANT CHANGE UP
NRBC # BLD: 0 /100 WBCS — SIGNIFICANT CHANGE UP
NRBC # BLD: 0 /100 WBCS — SIGNIFICANT CHANGE UP
NRBC # FLD: 0 K/UL — SIGNIFICANT CHANGE UP
NRBC # FLD: 0 K/UL — SIGNIFICANT CHANGE UP
PH UR: 6 — SIGNIFICANT CHANGE UP (ref 5–8)
PHOSPHATE SERPL-MCNC: 5.5 MG/DL — HIGH (ref 2.5–4.5)
PLATELET # BLD AUTO: 233 K/UL — SIGNIFICANT CHANGE UP (ref 150–400)
PLATELET # BLD AUTO: 238 K/UL — SIGNIFICANT CHANGE UP (ref 150–400)
POTASSIUM SERPL-MCNC: 4 MMOL/L — SIGNIFICANT CHANGE UP (ref 3.5–5.3)
POTASSIUM SERPL-SCNC: 4 MMOL/L — SIGNIFICANT CHANGE UP (ref 3.5–5.3)
PROT UR-MCNC: ABNORMAL
RBC # BLD: 3.98 M/UL — LOW (ref 4.2–5.8)
RBC # BLD: 4.59 M/UL — SIGNIFICANT CHANGE UP (ref 4.2–5.8)
RBC # FLD: 14.1 % — SIGNIFICANT CHANGE UP (ref 10.3–14.5)
RBC # FLD: 14.2 % — SIGNIFICANT CHANGE UP (ref 10.3–14.5)
RBC CASTS # UR COMP ASSIST: 1 /HPF — SIGNIFICANT CHANGE UP (ref 0–4)
SARS-COV-2 IGG+IGM SERPL QL IA: >250 U/ML — HIGH
SARS-COV-2 IGG+IGM SERPL QL IA: POSITIVE
SODIUM SERPL-SCNC: 133 MMOL/L — LOW (ref 135–145)
SODIUM UR-SCNC: 25 MMOL/L — SIGNIFICANT CHANGE UP
SP GR SPEC: 1.02 — SIGNIFICANT CHANGE UP (ref 1.01–1.02)
UROBILINOGEN FLD QL: SIGNIFICANT CHANGE UP
WBC # BLD: 15.68 K/UL — HIGH (ref 3.8–10.5)
WBC # BLD: 16.34 K/UL — HIGH (ref 3.8–10.5)
WBC # FLD AUTO: 15.68 K/UL — HIGH (ref 3.8–10.5)
WBC # FLD AUTO: 16.34 K/UL — HIGH (ref 3.8–10.5)
WBC UR QL: 6 /HPF — HIGH (ref 0–5)

## 2021-06-22 PROCEDURE — 99223 1ST HOSP IP/OBS HIGH 75: CPT

## 2021-06-22 PROCEDURE — 99232 SBSQ HOSP IP/OBS MODERATE 35: CPT

## 2021-06-22 PROCEDURE — 99232 SBSQ HOSP IP/OBS MODERATE 35: CPT | Mod: GC

## 2021-06-22 RX ORDER — HEPARIN SODIUM 5000 [USP'U]/ML
9000 INJECTION INTRAVENOUS; SUBCUTANEOUS EVERY 6 HOURS
Refills: 0 | Status: DISCONTINUED | OUTPATIENT
Start: 2021-06-22 | End: 2021-06-24

## 2021-06-22 RX ORDER — INSULIN GLARGINE 100 [IU]/ML
16 INJECTION, SOLUTION SUBCUTANEOUS AT BEDTIME
Refills: 0 | Status: DISCONTINUED | OUTPATIENT
Start: 2021-06-22 | End: 2021-06-23

## 2021-06-22 RX ORDER — HEPARIN SODIUM 5000 [USP'U]/ML
INJECTION INTRAVENOUS; SUBCUTANEOUS
Qty: 25000 | Refills: 0 | Status: DISCONTINUED | OUTPATIENT
Start: 2021-06-22 | End: 2021-06-24

## 2021-06-22 RX ORDER — INSULIN LISPRO 100/ML
5 VIAL (ML) SUBCUTANEOUS
Refills: 0 | Status: DISCONTINUED | OUTPATIENT
Start: 2021-06-22 | End: 2021-06-23

## 2021-06-22 RX ORDER — HEPARIN SODIUM 5000 [USP'U]/ML
4000 INJECTION INTRAVENOUS; SUBCUTANEOUS EVERY 6 HOURS
Refills: 0 | Status: DISCONTINUED | OUTPATIENT
Start: 2021-06-22 | End: 2021-06-24

## 2021-06-22 RX ADMIN — Medication 100 MILLIGRAM(S): at 08:17

## 2021-06-22 RX ADMIN — ATORVASTATIN CALCIUM 40 MILLIGRAM(S): 80 TABLET, FILM COATED ORAL at 21:42

## 2021-06-22 RX ADMIN — Medication 100 MILLIGRAM(S): at 13:29

## 2021-06-22 RX ADMIN — Medication 100 MILLIGRAM(S): at 10:57

## 2021-06-22 RX ADMIN — Medication 3: at 18:39

## 2021-06-22 RX ADMIN — SODIUM CHLORIDE 3 MILLILITER(S): 9 INJECTION INTRAMUSCULAR; INTRAVENOUS; SUBCUTANEOUS at 17:14

## 2021-06-22 RX ADMIN — Medication 60 MILLIGRAM(S): at 17:21

## 2021-06-22 RX ADMIN — ISOSORBIDE MONONITRATE 60 MILLIGRAM(S): 60 TABLET, EXTENDED RELEASE ORAL at 11:48

## 2021-06-22 RX ADMIN — Medication 650 MILLIGRAM(S): at 17:14

## 2021-06-22 RX ADMIN — Medication 4: at 09:11

## 2021-06-22 RX ADMIN — Medication 100 MILLIGRAM(S): at 21:53

## 2021-06-22 RX ADMIN — Medication 2: at 21:50

## 2021-06-22 RX ADMIN — Medication 5 UNIT(S): at 18:40

## 2021-06-22 RX ADMIN — Medication 240 MILLIGRAM(S): at 04:26

## 2021-06-22 RX ADMIN — PANTOPRAZOLE SODIUM 40 MILLIGRAM(S): 20 TABLET, DELAYED RELEASE ORAL at 04:25

## 2021-06-22 RX ADMIN — APIXABAN 5 MILLIGRAM(S): 2.5 TABLET, FILM COATED ORAL at 04:25

## 2021-06-22 RX ADMIN — Medication 650 MILLIGRAM(S): at 10:58

## 2021-06-22 RX ADMIN — Medication 100 MILLIGRAM(S): at 21:42

## 2021-06-22 RX ADMIN — Medication 5 UNIT(S): at 12:22

## 2021-06-22 RX ADMIN — INSULIN GLARGINE 16 UNIT(S): 100 INJECTION, SOLUTION SUBCUTANEOUS at 21:49

## 2021-06-22 RX ADMIN — HEPARIN SODIUM 1900 UNIT(S)/HR: 5000 INJECTION INTRAVENOUS; SUBCUTANEOUS at 17:28

## 2021-06-22 RX ADMIN — Medication 5: at 12:21

## 2021-06-22 RX ADMIN — SODIUM CHLORIDE 3 MILLILITER(S): 9 INJECTION INTRAMUSCULAR; INTRAVENOUS; SUBCUTANEOUS at 04:29

## 2021-06-22 RX ADMIN — Medication 3 UNIT(S): at 09:11

## 2021-06-22 RX ADMIN — Medication 4 MILLIGRAM(S): at 21:42

## 2021-06-22 NOTE — PROGRESS NOTE ADULT - ASSESSMENT
71 yo man with PMHx of DM2, HFpEF, HTN, HLD, CKD, and PVD (s/p L BKA) who presented from ophthalmology due to concerns for L central artery occlusion. Endocrine Team consulted for inpatient T2DM management.    Problem 1: Uncontrolled T2DM c/b neuropathy and retinopathy exacerbated by steroids  Home Regimen: Glimepiride 4 mg daily  HbA1c 10.6  - Increase Lantus 16 units qhs  - Increase Lispro to 5 units AC meals (Hold if patient is NPO)  - Low dose correctional scale before meals and low dose correction qhs  - Monitor FS before meals and qhs (Inpatient Goal 100-180 mg/dl)  - Consistent Carb Diet    Discharge Plan:   - Patient is reluctant to start insulin. Will discuss again with patient given that he may benefit from a combination of basal + oral agents. Otherwise, will do maximal PO plan that is appropriate for CKD  - Patient can f/u with Endocrine outpatient at the location provided below:    [facility]   [address] [place] [address]  [phone]    Problem 2: HTN  - Management per primary team given primary diagnosis of central artery occlusion     Problem 3: HLD  - Continue statin      Megan Meneses  Nurse Practitioner  Division of Endocrinology & Diabetes  Pager # [phone]      If after 6PM or before 9AM, or on weekends/holidays, please call endocrine answering service for assistance ([phone]).  For nonurgent matters email [email or web address] for assistance.

## 2021-06-22 NOTE — OCCUPATIONAL THERAPY INITIAL EVALUATION ADULT - PERTINENT HX OF CURRENT PROBLEM, REHAB EVAL
Pt is a 70 year old male presenting to [facility] as a transfer from [facility] after going there for acute onset of left eye blurry vision.  At [facility], pt was found to have left CRAO. CTH: negative. PMH: afib on Eliquis, HFpEF (EF 55-60% 4/2021), HTN, HLD, CKD stage 3-4, DM2 c/b diabetic retinopathy, PVD c/b left lower extremity nec fasc s/p L BKA, and b/l cataracts s/p right eye cataract surgery

## 2021-06-22 NOTE — PROGRESS NOTE ADULT - ASSESSMENT
71 yo man with history of afib on Eliquis, HFpEF (EF 55-60% 4/2021), HTN, HLD, CKD stage 3-4, DM2 c/b diabetic retinopathy, PVD c/b LLE nec fasc s/p L BKA, and b/l cataracts s/p R eye cataract surgery presents as a transfer from [facility] after he went there for acute onset of L eye blurry vision on Sunday afternoon ~4:30pm., found to have L CRAO.

## 2021-06-22 NOTE — PROGRESS NOTE ADULT - SUBJECTIVE AND OBJECTIVE BOX
ROCCO Boyce  [id number]    INTERVAL HPI/OVERNIGHT EVENTS: Patient notes substantial improvement in L. eye blurriness last night after receiving first dose of IV solumedrol. However, by this morning, the blurriness has somewhat returned. Denies eye pain. Denies fever, headache, or joint pains.     MEDICATIONS  (STANDING):  atorvastatin 40 milliGRAM(s) Oral at bedtime  dextrose 40% Gel 15 Gram(s) Oral once  dextrose 5%. 1000 milliLiter(s) (50 mL/Hr) IV Continuous <Continuous>  dextrose 5%. 1000 milliLiter(s) (100 mL/Hr) IV Continuous <Continuous>  dextrose 50% Injectable 25 Gram(s) IV Push once  dextrose 50% Injectable 12.5 Gram(s) IV Push once  dextrose 50% Injectable 25 Gram(s) IV Push once  diltiazem    milliGRAM(s) Oral daily  doxazosin 4 milliGRAM(s) Oral at bedtime  glucagon  Injectable 1 milliGRAM(s) IntraMuscular once  heparin  Infusion.  Unit(s)/Hr (19 mL/Hr) IV Continuous <Continuous>  hydrALAZINE 100 milliGRAM(s) Oral three times a day  insulin glargine Injectable (LANTUS) 16 Unit(s) SubCutaneous at bedtime  insulin lispro (ADMELOG) corrective regimen sliding scale   SubCutaneous three times a day before meals  insulin lispro (ADMELOG) corrective regimen sliding scale   SubCutaneous at bedtime  insulin lispro Injectable (ADMELOG) 5 Unit(s) SubCutaneous three times a day before meals  isosorbide   mononitrate ER Tablet (IMDUR) 60 milliGRAM(s) Oral daily  methylPREDNISolone sodium succinate Injectable 60 milliGRAM(s) IV Push daily  pantoprazole    Tablet 40 milliGRAM(s) Oral before breakfast  sodium chloride 0.9% lock flush 3 milliLiter(s) IV Push every 8 hours    MEDICATIONS  (PRN):  acetaminophen   Tablet .. 650 milliGRAM(s) Oral every 6 hours PRN Temp greater or equal to 38C (100.4F), Mild Pain (1 - 3)  guaiFENesin Oral Liquid (Sugar-Free) 100 milliGRAM(s) Oral every 6 hours PRN Cough  heparin   Injectable 9000 Unit(s) IV Push every 6 hours PRN For aPTT less than 40  heparin   Injectable 4000 Unit(s) IV Push every 6 hours PRN For aPTT between 40 - 57      Allergies    No Known Allergies    Intolerances          Vital Signs Last 24 Hrs  T(C): 37.1 (2021 08:15), Max: 37.1 (2021 08:15)  T(F): 98.7 (2021 08:15), Max: 98.7 (2021 08:15)  HR: 67 (2021 08:15) (65 - 85)  BP: 150/75 (2021 08:15) (150/75 - 192/100)  BP(mean): --  RR: 18 (2021 08:15) (17 - 19)  SpO2: 97% (2021 08:15) (97% - 100%)    Physical Exam:  General: NAD  HEENT: EOMI, MMM, no temporal artery tenderness today. Palpable pulses b/l.   Cardio: +S1/S2, RRR  Resp: CTA b/l  GI: +BS, soft, NT/ND  MSK: no synovitis/joint tenderness; LLE below knee amputation.   Neuro: AAOx3  Psych: wnl    LABS:                        11.7   15.68 )-----------( 233      ( 2021 07:25 )             36.1     06-22    133<L>  |  94<L>  |  94<H>  ----------------------------<  339<H>  4.0   |  22  |  3.52<H>    Ca    10.0      2021 07:25  Phos  5.5     06-22  Mg     1.9     06-22    TPro  7.9  /  Alb  4.3  /  TBili  0.2  /  DBili  x   /  AST  16  /  ALT  14  /  AlkPhos  69  06-21    PT/INR - ( 2021 18:58 )   PT: 13.8 sec;   INR: 1.20 ratio         PTT - ( 2021 18:58 )  PTT:33.2 sec  Urinalysis Basic - ( 2021 07:56 )    Color: Colorless / Appearance: Clear / S.016 / pH: x  Gluc: x / Ketone: Negative  / Bili: Negative / Urobili: <2 mg/dL   Blood: x / Protein: 30 mg/dL / Nitrite: Negative   Leuk Esterase: Negative / RBC: 0 /HPF / WBC 1 /HPF   Sq Epi: x / Non Sq Epi: 0 /HPF / Bacteria: Negative          RADIOLOGY & ADDITIONAL TESTS:

## 2021-06-22 NOTE — CONSULT NOTE ADULT - SUBJECTIVE AND OBJECTIVE BOX
SURGERY CONSULT NOTE     HPI: 70 year old man with PMH of afib (on eliquis), HF, HTN, CKD, previous BKA presented with acute onset L eye blurry vision. He reports it suddenly developed while he was watching TV and was associated with dizziness and a slight headache that proceeded the blurry vision. He has poor right sided vision at baseline. Denies tearing, irritation, floaters or flashers, numbness or tingling in any area or extremity, SOB. He has had no fevers, no symptoms of PMR (shoulder or hip weakness, pain) Upon work up, he was found to have an elevated ESR. Rheumatology was consulted for rule out GCA and vascular surgery was consulted to perform temporal artery biopsy.         PMHx: Hypertension, unspecified type  Type 2 diabetes mellitus with other neurologic complication, without long-term current use of insulin  DM (diabetes mellitus)  HTN (hypertension)  HLD (hyperlipidemia)  Afib  Retinopathy  Chronic diastolic congestive heart failure  Chronic kidney disease, unspecified CKD stage    PSHx: No significant past surgical history  Amputation of leg, traumatic, left, subsequent encounter  S/P BKA (below knee amputation) unilateral, left  S/P hip replacement, left  History of surgery on arm      Medications (inpatient): apixaban 5 milliGRAM(s) Oral every 12 hours  atorvastatin 40 milliGRAM(s) Oral at bedtime  dextrose 40% Gel 15 Gram(s) Oral once  dextrose 5%. 1000 milliLiter(s) IV Continuous <Continuous>  dextrose 5%. 1000 milliLiter(s) IV Continuous <Continuous>  dextrose 50% Injectable 25 Gram(s) IV Push once  dextrose 50% Injectable 12.5 Gram(s) IV Push once  dextrose 50% Injectable 25 Gram(s) IV Push once  diltiazem    milliGRAM(s) Oral daily  doxazosin 4 milliGRAM(s) Oral at bedtime  glucagon  Injectable 1 milliGRAM(s) IntraMuscular once  hydrALAZINE 100 milliGRAM(s) Oral three times a day  insulin glargine Injectable (LANTUS) 12 Unit(s) SubCutaneous at bedtime  insulin lispro (ADMELOG) corrective regimen sliding scale   SubCutaneous three times a day before meals  insulin lispro (ADMELOG) corrective regimen sliding scale   SubCutaneous at bedtime  insulin lispro Injectable (ADMELOG) 3 Unit(s) SubCutaneous three times a day before meals  isosorbide   mononitrate ER Tablet (IMDUR) 60 milliGRAM(s) Oral daily  methylPREDNISolone sodium succinate Injectable 60 milliGRAM(s) IV Push daily  pantoprazole    Tablet 40 milliGRAM(s) Oral before breakfast  sodium chloride 0.9% lock flush 3 milliLiter(s) IV Push every 8 hours    Medications (PRN):acetaminophen   Tablet .. 650 milliGRAM(s) Oral every 6 hours PRN  guaiFENesin Oral Liquid (Sugar-Free) 100 milliGRAM(s) Oral every 6 hours PRN    Allergies: No Known Allergies  (Intolerances: )  Social Hx:   Family Hx: No pertinent family history in first degree relatives    Family history of heart failure (Father)        Physical Exam  T(C): 37.1  HR: 67 (65 - 85)  BP: 150/75 (150/75 - 192/100)  RR: 18 (17 - 19)  SpO2: 97% (97% - 100%)  Tmax: T(C): , Max: 37.1 (21 @ 08:15)    21  -  21  --------------------------------------------------------  IN:    Oral Fluid: 118 mL  Total IN: 118 mL    OUT:    Voided (mL): 800 mL  Total OUT: 800 mL    Total NET: -682 mL      General: well developed, well nourished, NAD  Neuro: alert and oriented, no focal deficits, moves all extremities spontaneously  Respiratory: airway patent, respirations unlabored  CVS: regular rate and rhythm  Abdomen: soft, nontender, nondistended  Extremities: no edema, sensation and movement grossly intact  Skin: warm, dry, appropriate color    Labs:                        11.7   15.68 )-----------( 233      ( 2021 07:25 )             36.1     PT/INR - ( 2021 18:58 )   PT: 13.8 sec;   INR: 1.20 ratio         PTT - ( 2021 18:58 )  PTT:33.2 sec      133<L>  |  94<L>  |  94<H>  ----------------------------<  339<H>  4.0   |  22  |  3.52<H>    Ca    10.0      2021 07:25  Phos  5.5     -  Mg     1.9         TPro  7.9  /  Alb  4.3  /  TBili  0.2  /  DBili  x   /  AST  16  /  ALT  14  /  AlkPhos  69  -    Urinalysis Basic - ( 2021 07:56 )    Color: Colorless / Appearance: Clear / S.016 / pH: x  Gluc: x / Ketone: Negative  / Bili: Negative / Urobili: <2 mg/dL   Blood: x / Protein: 30 mg/dL / Nitrite: Negative   Leuk Esterase: Negative / RBC: 0 /HPF / WBC 1 /HPF   Sq Epi: x / Non Sq Epi: 0 /HPF / Bacteria: Negative            Imaging and other studies: SURGERY CONSULT NOTE     HPI: 70 year old man with PMH of afib (on eliquis), HF, HTN, CKD, previous BKA presented with acute onset L eye blurry vision. He reports it suddenly developed while he was watching TV and was associated with dizziness and a slight headache that proceeded the blurry vision. He has poor right sided vision at baseline. Denies tearing, irritation, floaters or flashers, numbness or tingling in any area or extremity, SOB. He has had no fevers, no symptoms of PMR (shoulder or hip weakness, pain) Upon work up, he was found to have an elevated ESR. Rheumatology was consulted for rule out GCA and vascular surgery was consulted to perform temporal artery biopsy.         PMHx: Hypertension, unspecified type  Type 2 diabetes mellitus with other neurologic complication, without long-term current use of insulin  DM (diabetes mellitus)  HTN (hypertension)  HLD (hyperlipidemia)  Afib  Retinopathy  Chronic diastolic congestive heart failure  Chronic kidney disease, unspecified CKD stage    PSHx: No significant past surgical history  Amputation of leg, traumatic, left, subsequent encounter  S/P BKA (below knee amputation) unilateral, left  S/P hip replacement, left  History of surgery on arm      Medications (inpatient): apixaban 5 milliGRAM(s) Oral every 12 hours  atorvastatin 40 milliGRAM(s) Oral at bedtime  dextrose 40% Gel 15 Gram(s) Oral once  dextrose 5%. 1000 milliLiter(s) IV Continuous <Continuous>  dextrose 5%. 1000 milliLiter(s) IV Continuous <Continuous>  dextrose 50% Injectable 25 Gram(s) IV Push once  dextrose 50% Injectable 12.5 Gram(s) IV Push once  dextrose 50% Injectable 25 Gram(s) IV Push once  diltiazem    milliGRAM(s) Oral daily  doxazosin 4 milliGRAM(s) Oral at bedtime  glucagon  Injectable 1 milliGRAM(s) IntraMuscular once  hydrALAZINE 100 milliGRAM(s) Oral three times a day  insulin glargine Injectable (LANTUS) 12 Unit(s) SubCutaneous at bedtime  insulin lispro (ADMELOG) corrective regimen sliding scale   SubCutaneous three times a day before meals  insulin lispro (ADMELOG) corrective regimen sliding scale   SubCutaneous at bedtime  insulin lispro Injectable (ADMELOG) 3 Unit(s) SubCutaneous three times a day before meals  isosorbide   mononitrate ER Tablet (IMDUR) 60 milliGRAM(s) Oral daily  methylPREDNISolone sodium succinate Injectable 60 milliGRAM(s) IV Push daily  pantoprazole    Tablet 40 milliGRAM(s) Oral before breakfast  sodium chloride 0.9% lock flush 3 milliLiter(s) IV Push every 8 hours    Medications (PRN):acetaminophen   Tablet .. 650 milliGRAM(s) Oral every 6 hours PRN  guaiFENesin Oral Liquid (Sugar-Free) 100 milliGRAM(s) Oral every 6 hours PRN    Allergies: No Known Allergies  (Intolerances: )  Social Hx:   Family Hx: No pertinent family history in first degree relatives    Family history of heart failure (Father)        Physical Exam  T(C): 37.1  HR: 67 (65 - 85)  BP: 150/75 (150/75 - 192/100)  RR: 18 (17 - 19)  SpO2: 97% (97% - 100%)  Tmax: T(C): , Max: 37.1 (21 @ 08:15)    21  -  21  --------------------------------------------------------  IN:    Oral Fluid: 118 mL  Total IN: 118 mL    OUT:    Voided (mL): 800 mL  Total OUT: 800 mL    Total NET: -682 mL      General: well developed, well nourished, NAD  Neuro: alert and oriented  Respiratory: airway patent, respirations unlabored  Abdomen: large, distended   Skin: warm, dry, appropriate color    Labs:                        11.7   15.68 )-----------( 233      ( 2021 07:25 )             36.1     PT/INR - ( 2021 18:58 )   PT: 13.8 sec;   INR: 1.20 ratio         PTT - ( 2021 18:58 )  PTT:33.2 sec      133<L>  |  94<L>  |  94<H>  ----------------------------<  339<H>  4.0   |  22  |  3.52<H>    Ca    10.0      2021 07:25  Phos  5.5     -  Mg     1.9     -    TPro  7.9  /  Alb  4.3  /  TBili  0.2  /  DBili  x   /  AST  16  /  ALT  14  /  AlkPhos  69  -    Urinalysis Basic - ( 2021 07:56 )    Color: Colorless / Appearance: Clear / S.016 / pH: x  Gluc: x / Ketone: Negative  / Bili: Negative / Urobili: <2 mg/dL   Blood: x / Protein: 30 mg/dL / Nitrite: Negative   Leuk Esterase: Negative / RBC: 0 /HPF / WBC 1 /HPF   Sq Epi: x / Non Sq Epi: 0 /HPF / Bacteria: Negative            Imaging and other studies:

## 2021-06-22 NOTE — CONSULT NOTE ADULT - ASSESSMENT
70 year old male with PMH afib on eliquis, CHF, HTN, HLD, CKD, DM, previous BKA now presenting with symptoms concerning for GCA    Plan:  - Plan for OR on Thursday tentatively, checking for OR availability  - Hold eliquis now, if patient needs AC for afib, please start heparin gtt  - Will pre-op and consent patient tomorrow pending official OR availability     Vascular Surgery [id number] 70 year old male with PMH afib on eliquis, CHF, HTN, HLD, CKD, DM, previous BKA now presenting with symptoms concerning for GCA    Plan:  - Plan for OR on Thursday tentatively, checking for OR availability  - Hold eliquis now, if patient needs AC for afib, please start heparin gtt  - Will pre-op and consent patient tomorrow pending official OR availability   - Bilateral carotid duplex    Vascular Surgery [id number] 70 year old male with PMH afib on eliquis, CHF, HTN, HLD, CKD, DM, previous BKA now presenting with symptoms concerning for GCA    Plan:  - Plan for OR on Thursday   - Hold eliquis now, if patient needs AC for afib, please start heparin gtt  - Will pre-op and consent patient   - Bilateral carotid duplex    Vascular Surgery [id number]

## 2021-06-22 NOTE — PROGRESS NOTE ADULT - SUBJECTIVE AND OBJECTIVE BOX
Chief Complaint: t2dm    History:  no vomiting, no hypoglycemia on solumedrol.  Refusing insulin for discharge    MEDICATIONS  (STANDING):  atorvastatin 40 milliGRAM(s) Oral at bedtime  dextrose 40% Gel 15 Gram(s) Oral once  dextrose 5%. 1000 milliLiter(s) (50 mL/Hr) IV Continuous <Continuous>  dextrose 5%. 1000 milliLiter(s) (100 mL/Hr) IV Continuous <Continuous>  dextrose 50% Injectable 25 Gram(s) IV Push once  dextrose 50% Injectable 12.5 Gram(s) IV Push once  dextrose 50% Injectable 25 Gram(s) IV Push once  diltiazem    milliGRAM(s) Oral daily  doxazosin 4 milliGRAM(s) Oral at bedtime  glucagon  Injectable 1 milliGRAM(s) IntraMuscular once  heparin  Infusion.  Unit(s)/Hr (19 mL/Hr) IV Continuous <Continuous>  hydrALAZINE 100 milliGRAM(s) Oral three times a day  insulin glargine Injectable (LANTUS) 16 Unit(s) SubCutaneous at bedtime  insulin lispro (ADMELOG) corrective regimen sliding scale   SubCutaneous three times a day before meals  insulin lispro (ADMELOG) corrective regimen sliding scale   SubCutaneous at bedtime  insulin lispro Injectable (ADMELOG) 5 Unit(s) SubCutaneous three times a day before meals  isosorbide   mononitrate ER Tablet (IMDUR) 60 milliGRAM(s) Oral daily  methylPREDNISolone sodium succinate Injectable 60 milliGRAM(s) IV Push daily  pantoprazole    Tablet 40 milliGRAM(s) Oral before breakfast  sodium chloride 0.9% lock flush 3 milliLiter(s) IV Push every 8 hours    MEDICATIONS  (PRN):  acetaminophen   Tablet .. 650 milliGRAM(s) Oral every 6 hours PRN Temp greater or equal to 38C (100.4F), Mild Pain (1 - 3)  guaiFENesin Oral Liquid (Sugar-Free) 100 milliGRAM(s) Oral every 6 hours PRN Cough  heparin   Injectable 9000 Unit(s) IV Push every 6 hours PRN For aPTT less than 40  heparin   Injectable 4000 Unit(s) IV Push every 6 hours PRN For aPTT between 40 - 57      Allergies    No Known Allergies    Intolerances      Review of Systems:  Constitutional: No fever  ALL OTHER SYSTEMS REVIEWED AND NEGATIVE      PHYSICAL EXAM:  VITALS: T(C): 37.1 (06-22-21 @ 08:15)  T(F): 98.7 (06-22-21 @ 08:15), Max: 98.7 (06-22-21 @ 08:15)  HR: 67 (06-22-21 @ 08:15) (65 - 85)  BP: 150/75 (06-22-21 @ 08:15) (150/75 - 192/100)  RR:  (17 - 19)  SpO2:  (97% - 100%)  Wt(kg): --  GENERAL: NAD, well-developed  GI: Soft, nontender, non distended  SKIN: Dry, intact, No rashes or lesions  PSYCH: Alert and oriented x 3, normal affect, normal mood      CAPILLARY BLOOD GLUCOSE    POCT Blood Glucose.: 382 mg/dL (22 Jun 2021 12:12)  POCT Blood Glucose.: 340 mg/dL (22 Jun 2021 09:05)  POCT Blood Glucose.: 343 mg/dL (22 Jun 2021 03:29)  POCT Blood Glucose.: 308 mg/dL (22 Jun 2021 03:26)  POCT Blood Glucose.: 212 mg/dL (21 Jun 2021 21:48)  POCT Blood Glucose.: 227 mg/dL (21 Jun 2021 17:19)      06-22    133<L>  |  94<L>  |  94<H>  ----------------------------<  339<H>  4.0   |  22  |  3.52<H>    EGFR if : 19<L>  EGFR if non : 16<L>    Ca    10.0      06-22  Mg     1.9     06-22  Phos  5.5     06-22    TPro  7.9  /  Alb  4.3  /  TBili  0.2  /  DBili  x   /  AST  16  /  ALT  14  /  AlkPhos  69  06-21      A1C with Estimated Average Glucose (06.21.21 @ 02:33)    A1C with Estimated Average Glucose Result: 10.6 %    Estimated Average Glucose: 258 mg/dL        Thyroid Function Tests:  06-21 @ 02:46 TSH 2.08 FreeT4 -- T3 -- Anti TPO -- Anti Thyroglobulin Ab -- TSI --

## 2021-06-22 NOTE — PHYSICAL THERAPY INITIAL EVALUATION ADULT - PATIENT PROFILE REVIEW, REHAB EVAL
PT orders received, ambulate with assist, out of bed with assist. Consulted with Tash LOZANO, pt may participate in PT evaluation./yes

## 2021-06-22 NOTE — CONSULT NOTE ADULT - ASSESSMENT
70y male with a past medical history/ocular history of DM, HTN, CKD, Afib on Eliquis, HLD, left foot amputation, PDR s/p Intravitreal injections consulted for painless vision loss left.      Ada on CKD  baseline scr appears to be 3.0  ADA likely sec to progressive CKD  CKD likely sec to longstanding DM with retinopathy  Monitor BMP at present  CHeck UA, Urine lytes, Bladder scan if renal function worsens   Elevated BUN likely sec to steroid use  MOnitor BMP   AVoid further nephrotoxics NSAIDS RCA    HTN  BP fluctuating  MOnitor BP    Hyperphosphatemia  sec to RF  Low PO4 diet     HYponatremia  Avoid hypotonic IVF  MOnitor serum Na  Check Urine NA, Urine Os 70y male with a past medical history/ocular history of DM, HTN, CKD, Afib on Eliquis, HLD, left foot amputation, PDR s/p Intravitreal injections consulted for painless vision loss left.      Ada on CKD  baseline scr appears to be 3.0  ADA likely sec to progressive CKD vs other etiology  CKD likely sec to longstanding DM with retinopathy  Monitor BMP at present  CHeck UA, Urine lytes, Bladder scan   Elevated BUN likely sec to steroid use  MOnitor BMP   AVoid further nephrotoxics NSAIDS RCA    HTN  BP fluctuating  MOnitor BP    Hyperphosphatemia  sec to RF  Low PO4 diet     HYponatremia  Avoid hypotonic IVF  MOnitor serum Na  Check Urine NA, Urine Os

## 2021-06-22 NOTE — PHYSICAL THERAPY INITIAL EVALUATION ADULT - GAIT DEVIATIONS NOTED, PT EVAL
decreased tri/decreased step length/decreased stride length/increased stride width/decreased weight-shifting ability

## 2021-06-22 NOTE — PHYSICAL THERAPY INITIAL EVALUATION ADULT - ADDITIONAL COMMENTS
Pt reports living with wife in a house, +ramp to enter, no stairs inside the home. Pt owns both rolling walker and cane, and uses the rolling walker more frequently while ambulating. Pt was independent with self-care and ADL's prior to admission.

## 2021-06-22 NOTE — PROGRESS NOTE ADULT - ASSESSMENT
71 yo RH M with HTN, HLD, CKD, Afib (on eliquis 2.5 for unclear reason was on 5 in past), BKA, diabetic retinoopathy tx from OSH for optho.  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 doubt GCA  - being treated for possible GCA per rheum but low suspcion   - Temporal artery US and bx by vascular sx as requested by rheum   - Eliquis should be 5mg BID  - lipitor 40  - MRI unlikely to change manegment  - check FS, glucose control <180  - GI/DVT ppx  - Counseling on diet, exercise, and medication adherence was done  - Counseling on smoking cessation and alcohol consumption offered when appropriate.  - Pain assessed and judicious use of narcotics when appropriate was discussed.    - Stroke education given when appropriate.  - Importance of fall prevention discussed.   - Differential diagnosis and plan of care discussed with patient and/or family and primary team  - Thank you for allowing me to participate in the care of this patient. Call with questions.   Donnie Granda MD  Vascular Neurology

## 2021-06-22 NOTE — CONSULT NOTE ADULT - ATTENDING COMMENTS
pt seen and examined by me personally   agree with above
Patient seen/examined. Consulted for temporal artery biopsy to rule out GCA in the setting of left eye vision problems, reportedly improving on steroids. Patient otherwise without focal neurologic deficits. Please obtain carotid duplex. Will plan on left TA biopsy once off Eliquis (tentatively on 6/24/21).
seen in ED  Briefly 69 yo RH M with HTN, HLD, CKD, Afib (on eliquis 2.5 for unclear reason was on 5 in past), BKA, diabetic retinoopathy tx from OSH for optho.  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

## 2021-06-22 NOTE — PHYSICAL THERAPY INITIAL EVALUATION ADULT - PERTINENT HX OF CURRENT PROBLEM, REHAB EVAL
Pt is a 70 year old male presenting to [facility] as a transfer from [facility] after going there for acute onset of left eye blurry vision.  At [facility], pt was found to have left CRAO. CTH: negative. PMH: afib on Eliquis, HFpEF (EF 55-60% 4/2021), HTN, HLD, CKD stage 3-4, DM2 c/b diabetic retinopathy, PVD c/b left lower extremity nec fasc s/p L BKA, and b/l cataracts s/p right eye cataract surgery

## 2021-06-22 NOTE — PROGRESS NOTE ADULT - ASSESSMENT
70M with HTN, HLD. A. fib on eliquis, DM2 poorly controlled with retinopathy, admitted for acute onset L. eye vision loss that is waxing and waning since yesterday 6/20.     #L eye vision loss- given elevated ESR of 64, as well as left scalp tenderness on exam, pt's age>50, there is concern for temporal arteritis/GCA. Pt with noted improvement in symptoms after initial dose of steroid, supporting this.   Alternatively, pt has multiple comorbidities predisposing to atherosclerotic disease/thromboembolism. Although DM2 and CKD can cause elevated ESR, these alone may not explain it and pt will need further workup for GCA.   Given other comorbidities most notably uncontrolled DM2, may need steroid sparing agent such as tocilizumab (IL-6 inhibitor) once in the outpatient setting.     Plan:  - Pending temporal artery biopsy (vascular surgery reccs noted- tentative plan for 6/24).   - f/u Quantiferon TB test, acute hepatitis panel, IL-6 and immunoglobulins panel (all sent- pending in lab)   -continue IV solumedrol 60 mg QD for now, for empiric treatment of GCA.  We will continue to follow.     *** INCOMPLETE NOTE ***    Mell Xavier MD PGY4  Rheumatology Fellow  Pager [phone] 70M with HTN, HLD. A. fib on eliquis, DM2 poorly controlled with retinopathy, admitted for acute onset L. eye vision loss that is waxing and waning since yesterday 6/20.     #L eye vision loss- given elevated ESR of 64, as well as left scalp tenderness on exam, pt's age>50, there is concern for temporal arteritis/GCA. Pt with noted improvement in symptoms after initial dose of steroid, supporting this.   Alternatively, pt has multiple comorbidities predisposing to atherosclerotic disease/thromboembolism. Although DM2 and CKD can cause elevated ESR, these alone may not explain it and pt will need further workup for GCA.   Given other comorbidities most notably uncontrolled DM2, may need steroid sparing agent such as tocilizumab (IL-6 inhibitor) once in the outpatient setting.     Plan:  - given initial improvement with IV solumedrol 60 but recurrence of blurry vision today, starting tomorrow 6/23 recommend to increase steroid dose to IV solumedrol 40 mg BID.  - Pending temporal artery biopsy (vascular surgery reccs noted- tentative plan for 6/24).   - f/u Quantiferon TB test, acute hepatitis panel, IL-6 and immunoglobulins panel (all sent- pending in lab)   -  IV solumedrol 60 mg QD x 1 tonight before increasing the dose on 6/23.  We will continue to follow.     Discussed with Dr. Dorcas Xavier MD PGY4  Rheumatology Fellow  Pager [phone]

## 2021-06-22 NOTE — PROGRESS NOTE ADULT - SUBJECTIVE AND OBJECTIVE BOX
ROCCO JC  70y  Male      Patient is a 70y old  Male who presents with a chief complaint of L eye blurry vision (2021 16:19)  Patient seen and examined.chart reviewed..Admitted with lt.blurry vision,Headaches,no sob,no cp,no fever,no cough    REVIEW OF SYSTEMS:  CONSTITUTIONAL: No fever  RESPIRATORY: No cough, hemoptysis or shortness of breath  CARDIOVASCULAR: No chest pain, palpitations, dizziness, or leg swelling  GASTROINTESTINAL: No abdominal pain. nausea, vomiting, hematemesis  GENITOURINARY: No dysuria, frequency, hematuria   NEUROLOGICAL: No headaches, no dizziness,Blurred vision -lt.eye  MUSCULOSKELETAL: No joint pain or swelling;     INTERVAL HPI/OVERNIGHT EVENTS:  T(C): 36.8 (21 @ 21:36), Max: 37.1 (21 @ 08:15)  HR: 65 (21 @ 21:36) (65 - 85)  BP: 153/74 (21 @ 21:36) (150/75 - 192/100)  RR: 19 (21 @ 21:36) (18 - 19)  SpO2: 97% (21 @ 21:36) (97% - 98%)  Wt(kg): --  I&O's Summary    2021 07:01  -  2021 07:00  --------------------------------------------------------  IN: 118 mL / OUT: 800 mL / NET: -682 mL      T(C): 36.8 (21 @ 21:36), Max: 37.1 (21 @ 08:15)  HR: 65 (21 @ 21:36) (65 - 85)  BP: 153/74 (21 @ 21:36) (150/75 - 192/100)  RR: 19 (21 @ 21:36) (18 - 19)  SpO2: 97% (21 @ 21:36) (97% - 98%)  Wt(kg): --Vital Signs Last 24 Hrs  T(C): 36.8 (2021 21:36), Max: 37.1 (2021 08:15)  T(F): 98.2 (2021 21:36), Max: 98.7 (2021 08:15)  HR: 65 (2021 21:36) (65 - 85)  BP: 153/74 (2021 21:36) (150/75 - 192/100)  BP(mean): --  RR: 19 (2021 21:36) (18 - 19)  SpO2: 97% (2021 21:36) (97% - 98%)    LABS:                        11.7   15.68 )-----------( 233      ( 2021 07:25 )             36.1     06-22    133<L>  |  94<L>  |  94<H>  ----------------------------<  339<H>  4.0   |  22  |  3.52<H>    Ca    10.0      2021 07:25  Phos  5.5     06-  Mg     1.9     -    TPro  7.9  /  Alb  4.3  /  TBili  0.2  /  DBili  x   /  AST  16  /  ALT  14  /  AlkPhos  69  06-21      Urinalysis Basic - ( 2021 14:41 )    Color: Light Yellow / Appearance: Clear / S.018 / pH: x  Gluc: x / Ketone: Negative  / Bili: Negative / Urobili: <2 mg/dL   Blood: x / Protein: 100 mg/dL / Nitrite: Negative   Leuk Esterase: Negative / RBC: 1 /HPF / WBC 6 /HPF   Sq Epi: x / Non Sq Epi: 1 /HPF / Bacteria: Negative      CAPILLARY BLOOD GLUCOSE      POCT Blood Glucose.: 318 mg/dL (2021 21:47)  POCT Blood Glucose.: 343 mg/dL (2021 21:45)  POCT Blood Glucose.: 251 mg/dL (2021 18:32)  POCT Blood Glucose.: 382 mg/dL (2021 12:12)  POCT Blood Glucose.: 340 mg/dL (2021 09:05)  POCT Blood Glucose.: 343 mg/dL (2021 03:29)  POCT Blood Glucose.: 308 mg/dL (2021 03:26)        Urinalysis Basic - ( 2021 14:41 )    Color: Light Yellow / Appearance: Clear / S.018 / pH: x  Gluc: x / Ketone: Negative  / Bili: Negative / Urobili: <2 mg/dL   Blood: x / Protein: 100 mg/dL / Nitrite: Negative   Leuk Esterase: Negative / RBC: 1 /HPF / WBC 6 /HPF   Sq Epi: x / Non Sq Epi: 1 /HPF / Bacteria: Negative        PAST MEDICAL & SURGICAL HISTORY:  Hypertension, unspecified type    Type 2 diabetes mellitus with other neurologic complication, without long-term current use of insulin    DM (diabetes mellitus)    HTN (hypertension)    HLD (hyperlipidemia)    Afib    Retinopathy    Chronic diastolic congestive heart failure    Chronic kidney disease, unspecified CKD stage    Amputation of leg, traumatic, left, subsequent encounter    S/P BKA (below knee amputation) unilateral, left    S/P hip replacement, left    History of surgery on arm        MEDICATIONS  (STANDING):  atorvastatin 40 milliGRAM(s) Oral at bedtime  dextrose 40% Gel 15 Gram(s) Oral once  dextrose 5%. 1000 milliLiter(s) (50 mL/Hr) IV Continuous <Continuous>  dextrose 5%. 1000 milliLiter(s) (100 mL/Hr) IV Continuous <Continuous>  dextrose 50% Injectable 25 Gram(s) IV Push once  dextrose 50% Injectable 12.5 Gram(s) IV Push once  dextrose 50% Injectable 25 Gram(s) IV Push once  diltiazem    milliGRAM(s) Oral daily  doxazosin 4 milliGRAM(s) Oral at bedtime  glucagon  Injectable 1 milliGRAM(s) IntraMuscular once  heparin  Infusion.  Unit(s)/Hr (19 mL/Hr) IV Continuous <Continuous>  hydrALAZINE 100 milliGRAM(s) Oral three times a day  insulin glargine Injectable (LANTUS) 16 Unit(s) SubCutaneous at bedtime  insulin lispro (ADMELOG) corrective regimen sliding scale   SubCutaneous three times a day before meals  insulin lispro (ADMELOG) corrective regimen sliding scale   SubCutaneous at bedtime  insulin lispro Injectable (ADMELOG) 5 Unit(s) SubCutaneous three times a day before meals  isosorbide   mononitrate ER Tablet (IMDUR) 60 milliGRAM(s) Oral daily  pantoprazole    Tablet 40 milliGRAM(s) Oral before breakfast  sodium chloride 0.9% lock flush 3 milliLiter(s) IV Push every 8 hours    MEDICATIONS  (PRN):  acetaminophen   Tablet .. 650 milliGRAM(s) Oral every 6 hours PRN Temp greater or equal to 38C (100.4F), Mild Pain (1 - 3)  guaiFENesin Oral Liquid (Sugar-Free) 100 milliGRAM(s) Oral every 6 hours PRN Cough  heparin   Injectable 9000 Unit(s) IV Push every 6 hours PRN For aPTT less than 40  heparin   Injectable 4000 Unit(s) IV Push every 6 hours PRN For aPTT between 40 - 57        RADIOLOGY & ADDITIONAL TESTS:    Imaging Personally Reviewed:  [ ] YES  [ ] NO    Consultant(s) Notes Reviewed:  [x ] YES  [ ] NO    PHYSICAL EXAM:  GENERAL: Alert and awake lying in bed in no distress  HEAD:  Atraumatic, Normocephalic  EYES: EOMI, VINAY, conjunctiva and sclera clear  NECK: Supple, No JVD, Normal thyroid  NERVOUS SYSTEM:  Alert & Oriented X3, Motor and sensory systems are intact,   CHEST/LUNG: Bilateral clear breath sounds, no rhochi, no wheezing, no crepitations,  HEART: Regular rate and rhythm; No murmurs, rubs, or gallops  ABDOMEN: Soft, Nontender, Nondistended; Bowel sounds present  EXTREMITIES:   lt.BKA        Care Discussed with Consultants/Other Providers [X ] YES  [ ] NO      Code Status: [] Full Code [] DNR [] DNI [] Goals of Care:   Disposition: [] ICU [] Stroke Unit [] RCU []PCU []Floor [] Discharge Home         ISABELA HoffFACP

## 2021-06-22 NOTE — PROGRESS NOTE ADULT - SUBJECTIVE AND OBJECTIVE BOX
Neurology Progress Note    S: Patient seen and examined. No new events overnight. patient denied CP, SOB, HA or pain. states vision was better yesterday then seems to be getting worse again     Medication:  acetaminophen   Tablet .. 650 milliGRAM(s) Oral every 6 hours PRN  apixaban 5 milliGRAM(s) Oral every 12 hours  atorvastatin 40 milliGRAM(s) Oral at bedtime  dextrose 40% Gel 15 Gram(s) Oral once  dextrose 5%. 1000 milliLiter(s) IV Continuous <Continuous>  dextrose 5%. 1000 milliLiter(s) IV Continuous <Continuous>  dextrose 50% Injectable 25 Gram(s) IV Push once  dextrose 50% Injectable 12.5 Gram(s) IV Push once  dextrose 50% Injectable 25 Gram(s) IV Push once  diltiazem    milliGRAM(s) Oral daily  doxazosin 4 milliGRAM(s) Oral at bedtime  glucagon  Injectable 1 milliGRAM(s) IntraMuscular once  guaiFENesin Oral Liquid (Sugar-Free) 100 milliGRAM(s) Oral every 6 hours PRN  hydrALAZINE 100 milliGRAM(s) Oral three times a day  insulin glargine Injectable (LANTUS) 12 Unit(s) SubCutaneous at bedtime  insulin lispro (ADMELOG) corrective regimen sliding scale   SubCutaneous three times a day before meals  insulin lispro (ADMELOG) corrective regimen sliding scale   SubCutaneous at bedtime  insulin lispro Injectable (ADMELOG) 3 Unit(s) SubCutaneous three times a day before meals  isosorbide   mononitrate ER Tablet (IMDUR) 60 milliGRAM(s) Oral daily  methylPREDNISolone sodium succinate Injectable 60 milliGRAM(s) IV Push daily  pantoprazole    Tablet 40 milliGRAM(s) Oral before breakfast  sodium chloride 0.9% lock flush 3 milliLiter(s) IV Push every 8 hours      Vitals:  Vital Signs Last 24 Hrs  T(C): 36.8 (2021 06:07), Max: 37 (2021 22:25)  T(F): 98.2 (2021 06:07), Max: 98.6 (2021 22:25)  HR: 67 (2021 08:15) (65 - 85)  BP: 150/75 (2021 08:15) (150/75 - 192/100)  BP(mean): --  RR: 18 (2021 08:15) (17 - 19)  SpO2: 97% (2021 08:15) (97% - 100%)      PHYSICAL EXAM:  GENERAL: NAD on room air  HEENT: Normocephalic;  conjunctivae and sclerae clear; moist mucous membranes;   NECK: Supple  EXTREMITIES: No cyanosis; no edema; no calf tenderness  SKIN: Warm and dry; no rash             Neurological Exam:  - Mental Status: Alert, oriented to person, place, time, situation; speech is fluent with intact naming, repetition, and comprehension; follows commands  - Cranial Nerves II-XII:    II:  PERRL 3mm b/l; visual fields are full to confrontation; visual acuity 20/30 OU  III, IV, VI:  EOMI, no nystagmus  V:  facial sensation is intact in the V1-V3 distribution bilaterally.  VII:  face is symmetric with normal eye closure and smile  VIII:  hearing is intact to finger rub  IX, X:  uvula is midline and soft palate rises symmetrically  XI:  head turning and shoulder shrug are intact bilaterally  XII:  tongue protrudes in the midline  - Motor: strength 5/5 throughout, LLE deferred due to below knee amputation; normal muscle bulk and tone throughout; no pronator drift  - Reflexes:  2+ and symmetric at the biceps, triceps, brachioradialis, knees, and ankles;  plantar reflexes downgoing bilaterally  - Sensory:  intact to light touch, pin prick, vibration, and joint-position sense throughout  - Coordination:  finger-nose-finger and heel-knee-shin intact without dysmetria; rapid alternating hand movements intact  - Gait:  normal steps, base, arm swing, and turning; Romberg testing is negative    I personally reviewed the below data/images/labs:      CBC Full  -  ( 2021 07:25 )  WBC Count : 15.68 K/uL  RBC Count : 4.59 M/uL  Hemoglobin : 11.7 g/dL  Hematocrit : 36.1 %  Platelet Count - Automated : 233 K/uL  Mean Cell Volume : 78.6 fL  Mean Cell Hemoglobin : 25.5 pg  Mean Cell Hemoglobin Concentration : 32.4 gm/dL  Auto Neutrophil # : x  Auto Lymphocyte # : x  Auto Monocyte # : x  Auto Eosinophil # : x  Auto Basophil # : x  Auto Neutrophil % : x  Auto Lymphocyte % : x  Auto Monocyte % : x  Auto Eosinophil % : x  Auto Basophil % : x        133<L>  |  94<L>  |  94<H>  ----------------------------<  339<H>  4.0   |  22  |  3.52<H>    Ca    10.0      2021 07:25  Phos  5.5     -  Mg     1.9         TPro  7.9  /  Alb  4.3  /  TBili  0.2  /  DBili  x   /  AST  16  /  ALT  14  /  AlkPhos  69  -    LIVER FUNCTIONS - ( 2021 02:46 )  Alb: 4.3 g/dL / Pro: 7.9 g/dL / ALK PHOS: 69 U/L / ALT: 14 U/L / AST: 16 U/L / GGT: x           PT/INR - ( 2021 18:58 )   PT: 13.8 sec;   INR: 1.20 ratio         PTT - ( 2021 18:58 )  PTT:33.2 sec  Urinalysis Basic - ( 2021 07:56 )    Color: Colorless / Appearance: Clear / S.016 / pH: x  Gluc: x / Ketone: Negative  / Bili: Negative / Urobili: <2 mg/dL   Blood: x / Protein: 30 mg/dL / Nitrite: Negative   Leuk Esterase: Negative / RBC: 0 /HPF / WBC 1 /HPF   Sq Epi: x / Non Sq Epi: 0 /HPF / Bacteria: Negative      [place] Results  CT angiography neck: No evidence of hemodynamically significant stenosis using NASCET criteria.  Patent vertebral arteries.  No evidence of vascular dissection.  CT angiography brain: No major vessel occlusion or proximal stenosis.  CTH: No acute intracranial disease.  CT perfusion: No perfusion abnormality

## 2021-06-22 NOTE — PHYSICAL THERAPY INITIAL EVALUATION ADULT - DISCHARGE DISPOSITION, PT EVAL
discharge home, no skilled PT needs. Will continue to follow while at [facility].

## 2021-06-22 NOTE — CONSULT NOTE ADULT - SUBJECTIVE AND OBJECTIVE BOX
[facility] [place] [facility]   MD FIONA GARRETT MD RUORU WONG, PA    TEL:  OFFICE: [phone]  DR HODGES CELL: [phone]  LILLIAN WYNNE CELL: [phone]  DR. HURTADO CELL: [phone]  DR. MAX CELl: [phone]    FROM 5 PM- 7 AM PLEASE CALL ANSWERING SERVICE AT [phone]    -- INITIAL RENAL CONSULT NOTE --- Date Of service 06-22-21 @ 09:47  --------------------------------------------------------------------------------  HPI:  70y male with a past medical history/ocular history of DM, HTN, CKD, Afib on Eliquis, HLD, left foot amputation, PDR s/p Intravitreal injections presnts with  painless vision loss left.  Nephrology consulted for elevated scr     PAST HISTORY  --------------------------------------------------------------------------------  PAST MEDICAL & SURGICAL HISTORY:  Hypertension, unspecified type    Type 2 diabetes mellitus with other neurologic complication, without long-term current use of insulin    DM (diabetes mellitus)    HTN (hypertension)    HLD (hyperlipidemia)    Afib    Retinopathy    Chronic diastolic congestive heart failure    Chronic kidney disease, unspecified CKD stage    Amputation of leg, traumatic, left, subsequent encounter    S/P BKA (below knee amputation) unilateral, left    S/P hip replacement, left    History of surgery on arm      FAMILY HISTORY:  No pertinent family history in first degree relatives    Family history of heart failure (Father)      PAST SOCIAL HISTORY:    ALLERGIES & MEDICATIONS  --------------------------------------------------------------------------------  Allergies    No Known Allergies    Intolerances      Standing Inpatient Medications  apixaban 5 milliGRAM(s) Oral every 12 hours  atorvastatin 40 milliGRAM(s) Oral at bedtime  dextrose 40% Gel 15 Gram(s) Oral once  dextrose 5%. 1000 milliLiter(s) IV Continuous <Continuous>  dextrose 5%. 1000 milliLiter(s) IV Continuous <Continuous>  dextrose 50% Injectable 25 Gram(s) IV Push once  dextrose 50% Injectable 12.5 Gram(s) IV Push once  dextrose 50% Injectable 25 Gram(s) IV Push once  diltiazem    milliGRAM(s) Oral daily  doxazosin 4 milliGRAM(s) Oral at bedtime  glucagon  Injectable 1 milliGRAM(s) IntraMuscular once  hydrALAZINE 100 milliGRAM(s) Oral three times a day  insulin glargine Injectable (LANTUS) 12 Unit(s) SubCutaneous at bedtime  insulin lispro (ADMELOG) corrective regimen sliding scale   SubCutaneous three times a day before meals  insulin lispro (ADMELOG) corrective regimen sliding scale   SubCutaneous at bedtime  insulin lispro Injectable (ADMELOG) 3 Unit(s) SubCutaneous three times a day before meals  isosorbide   mononitrate ER Tablet (IMDUR) 60 milliGRAM(s) Oral daily  methylPREDNISolone sodium succinate Injectable 60 milliGRAM(s) IV Push daily  pantoprazole    Tablet 40 milliGRAM(s) Oral before breakfast  sodium chloride 0.9% lock flush 3 milliLiter(s) IV Push every 8 hours    PRN Inpatient Medications  acetaminophen   Tablet .. 650 milliGRAM(s) Oral every 6 hours PRN  guaiFENesin Oral Liquid (Sugar-Free) 100 milliGRAM(s) Oral every 6 hours PRN      REVIEW OF SYSTEMS  --------------------------------------------------------------------------------  Gen: No fevers/chills  Skin: No rashes  Head/Eyes/Ears: Normal hearing,  Normal vision   Respiratory: No dyspnea, cough  CV: No chest pain  GI: No abdominal pain, diarrhea, constipation, nausea, vomiting  : No dysuria, hematuria  MSK: No  edema  Heme: No easy bruising or bleeding  Psych: No significant depression    All other systems were reviewed and are negative, except as noted.    VITALS/PHYSICAL EXAM  --------------------------------------------------------------------------------  T(C): 36.8 (06-22-21 @ 06:07), Max: 37 (06-21-21 @ 22:25)  HR: 67 (06-22-21 @ 08:15) (65 - 85)  BP: 150/75 (06-22-21 @ 08:15) (150/75 - 192/100)  RR: 18 (06-22-21 @ 08:15) (17 - 19)  SpO2: 97% (06-22-21 @ 08:15) (97% - 100%)  Wt(kg): --  Height (cm): 182.9 (06-20-21 @ 18:00)  Weight (kg): 108.862 (06-21-21 @ 06:48)  BMI (kg/m2): 32.5 (06-21-21 @ 06:48)  BSA (m2): 2.3 (06-21-21 @ 06:48)      06-21-21 @ 07:01  -  06-22-21 @ 07:00  --------------------------------------------------------  IN: 118 mL / OUT: 800 mL / NET: -682 mL      Physical Exam:  	Gen: NAD  	HEENT: MMM  	Pulm: CTA B/L  	CV: S1S2  	Abd: Soft, +BS   	Ext: No LE edema B/L  	Neuro: Awake, alert  	Skin: Warm and dry  	Vascular access:          : pretty  LABS/STUDIES  --------------------------------------------------------------------------------              11.7   15.68 >-----------<  233      [06-22-21 @ 07:25]              36.1     133  |  94  |  94  ----------------------------<  339      [06-22-21 @ 07:25]  4.0   |  22  |  3.52        Ca     10.0     [06-22-21 @ 07:25]      Mg     1.9     [06-22-21 @ 07:25]      Phos  5.5     [06-22-21 @ 07:25]    TPro  7.9  /  Alb  4.3  /  TBili  0.2  /  DBili  x   /  AST  16  /  ALT  14  /  AlkPhos  69  [06-21-21 @ 02:46]    PT/INR: PT 13.8 , INR 1.20       [06-20-21 @ 18:58]  PTT: 33.2       [06-20-21 @ 18:58]    Troponin .033      [06-20-21 @ 18:58]    Creatinine Trend:  SCr 3.52 [06-22 @ 07:25]  SCr 3.10 [06-21 @ 19:21]  SCr 3.09 [06-21 @ 02:46]  SCr 3.44 [06-20 @ 18:58]    Urinalysis - [06-21-21 @ 07:56]      Color Colorless / Appearance Clear / SG 1.016 / pH 6.5      Gluc 500 mg/dL / Ketone Negative  / Bili Negative / Urobili <2 mg/dL       Blood Negative / Protein 30 mg/dL / Leuk Est Negative / Nitrite Negative      RBC 0 / WBC 1 / Hyaline 0 / Gran  / Sq Epi  / Non Sq Epi 0 / Bacteria Negative      Iron 35, TIBC 239, %sat 15      [06-21-21 @ 02:46]  Ferritin 99      [06-21-21 @ 02:46]  HbA1c 8.0      [01-10-20 @ 11:10]  TSH 2.08      [06-21-21 @ 02:46]  Lipid: chol 190, , HDL 40, LDL --      [06-21-21 @ 02:46]    HBsAb Nonreact      [03-11-20 @ 02:58]  HBsAg Nonreact      [03-11-20 @ 02:58]  HCV 0.16, Nonreact      [03-11-20 @ 02:58]    C3 Complement 104      [01-09-20 @ 10:49]  C4 Complement 17      [01-09-20 @ 10:49]  ASLO 206      [01-09-20 @ 10:18]   [facility] [place] [facility]   MD FIONA GARRETT MD RUORU WONG, PA    TEL:  OFFICE: [phone]  DR HODGES CELL: [phone]  LILLIAN WYNNE CELL: [phone]  DR. HURTADO CELL: [phone]  DR. MAX CELl: [phone]    FROM 5 PM- 7 AM PLEASE CALL ANSWERING SERVICE AT [phone]    -- INITIAL RENAL CONSULT NOTE --- Date Of service 06-22-21 @ 09:47  --------------------------------------------------------------------------------  HPI:  70y male with a past medical history/ocular history of DM, HTN, CKD, Afib on Eliquis, HLD, left foot amputation, PDR s/p Intravitreal injections presnts with  painless vision loss left.  Nephrology consulted for elevated scr     PAST HISTORY  --------------------------------------------------------------------------------  PAST MEDICAL & SURGICAL HISTORY:  Hypertension, unspecified type    Type 2 diabetes mellitus with other neurologic complication, without long-term current use of insulin    DM (diabetes mellitus)    HTN (hypertension)    HLD (hyperlipidemia)    Afib    Retinopathy    Chronic diastolic congestive heart failure    Chronic kidney disease, unspecified CKD stage    Amputation of leg, traumatic, left, subsequent encounter    S/P BKA (below knee amputation) unilateral, left    S/P hip replacement, left    History of surgery on arm      FAMILY HISTORY:  No pertinent family history in first degree relatives    Family history of heart failure (Father)      PAST SOCIAL HISTORY:    ALLERGIES & MEDICATIONS  --------------------------------------------------------------------------------  Allergies    No Known Allergies    Intolerances      Standing Inpatient Medications  apixaban 5 milliGRAM(s) Oral every 12 hours  atorvastatin 40 milliGRAM(s) Oral at bedtime  dextrose 40% Gel 15 Gram(s) Oral once  dextrose 5%. 1000 milliLiter(s) IV Continuous <Continuous>  dextrose 5%. 1000 milliLiter(s) IV Continuous <Continuous>  dextrose 50% Injectable 25 Gram(s) IV Push once  dextrose 50% Injectable 12.5 Gram(s) IV Push once  dextrose 50% Injectable 25 Gram(s) IV Push once  diltiazem    milliGRAM(s) Oral daily  doxazosin 4 milliGRAM(s) Oral at bedtime  glucagon  Injectable 1 milliGRAM(s) IntraMuscular once  hydrALAZINE 100 milliGRAM(s) Oral three times a day  insulin glargine Injectable (LANTUS) 12 Unit(s) SubCutaneous at bedtime  insulin lispro (ADMELOG) corrective regimen sliding scale   SubCutaneous three times a day before meals  insulin lispro (ADMELOG) corrective regimen sliding scale   SubCutaneous at bedtime  insulin lispro Injectable (ADMELOG) 3 Unit(s) SubCutaneous three times a day before meals  isosorbide   mononitrate ER Tablet (IMDUR) 60 milliGRAM(s) Oral daily  methylPREDNISolone sodium succinate Injectable 60 milliGRAM(s) IV Push daily  pantoprazole    Tablet 40 milliGRAM(s) Oral before breakfast  sodium chloride 0.9% lock flush 3 milliLiter(s) IV Push every 8 hours    PRN Inpatient Medications  acetaminophen   Tablet .. 650 milliGRAM(s) Oral every 6 hours PRN  guaiFENesin Oral Liquid (Sugar-Free) 100 milliGRAM(s) Oral every 6 hours PRN      REVIEW OF SYSTEMS  --------------------------------------------------------------------------------  Gen: No fevers/chills  Skin: No rashes  Head/Eyes/Ears: Normal hearing,  abnormal vision   Respiratory: No dyspnea, cough  CV: No chest pain  GI: No abdominal pain, diarrhea, constipation, nausea, vomiting  : No dysuria, hematuria  MSK: No  edema  Heme: No easy bruising or bleeding  Psych: No significant depression    All other systems were reviewed and are negative, except as noted.    VITALS/PHYSICAL EXAM  --------------------------------------------------------------------------------  T(C): 36.8 (06-22-21 @ 06:07), Max: 37 (06-21-21 @ 22:25)  HR: 67 (06-22-21 @ 08:15) (65 - 85)  BP: 150/75 (06-22-21 @ 08:15) (150/75 - 192/100)  RR: 18 (06-22-21 @ 08:15) (17 - 19)  SpO2: 97% (06-22-21 @ 08:15) (97% - 100%)  Wt(kg): --  Height (cm): 182.9 (06-20-21 @ 18:00)  Weight (kg): 108.862 (06-21-21 @ 06:48)  BMI (kg/m2): 32.5 (06-21-21 @ 06:48)  BSA (m2): 2.3 (06-21-21 @ 06:48)      06-21-21 @ 07:01  -  06-22-21 @ 07:00  --------------------------------------------------------  IN: 118 mL / OUT: 800 mL / NET: -682 mL      Physical Exam:  	Gen: NAD  	HEENT: MMM  	Pulm: CTA B/L  	CV: S1S2  	Abd: Soft, +BS   	Ext: l BKA  	Neuro: Awake, alert  	Skin: Warm and dry  	Vascular access: no HD cathtere          :  no pretty  LABS/STUDIES  --------------------------------------------------------------------------------              11.7   15.68 >-----------<  233      [06-22-21 @ 07:25]              36.1     133  |  94  |  94  ----------------------------<  339      [06-22-21 @ 07:25]  4.0   |  22  |  3.52        Ca     10.0     [06-22-21 @ 07:25]      Mg     1.9     [06-22-21 @ 07:25]      Phos  5.5     [06-22-21 @ 07:25]    TPro  7.9  /  Alb  4.3  /  TBili  0.2  /  DBili  x   /  AST  16  /  ALT  14  /  AlkPhos  69  [06-21-21 @ 02:46]    PT/INR: PT 13.8 , INR 1.20       [06-20-21 @ 18:58]  PTT: 33.2       [06-20-21 @ 18:58]    Troponin .033      [06-20-21 @ 18:58]    Creatinine Trend:  SCr 3.52 [06-22 @ 07:25]  SCr 3.10 [06-21 @ 19:21]  SCr 3.09 [06-21 @ 02:46]  SCr 3.44 [06-20 @ 18:58]    Urinalysis - [06-21-21 @ 07:56]      Color Colorless / Appearance Clear / SG 1.016 / pH 6.5      Gluc 500 mg/dL / Ketone Negative  / Bili Negative / Urobili <2 mg/dL       Blood Negative / Protein 30 mg/dL / Leuk Est Negative / Nitrite Negative      RBC 0 / WBC 1 / Hyaline 0 / Gran  / Sq Epi  / Non Sq Epi 0 / Bacteria Negative      Iron 35, TIBC 239, %sat 15      [06-21-21 @ 02:46]  Ferritin 99      [06-21-21 @ 02:46]  HbA1c 8.0      [01-10-20 @ 11:10]  TSH 2.08      [06-21-21 @ 02:46]  Lipid: chol 190, , HDL 40, LDL --      [06-21-21 @ 02:46]    HBsAb Nonreact      [03-11-20 @ 02:58]  HBsAg Nonreact      [03-11-20 @ 02:58]  HCV 0.16, Nonreact      [03-11-20 @ 02:58]    C3 Complement 104      [01-09-20 @ 10:49]  C4 Complement 17      [01-09-20 @ 10:49]  ASLO 206      [01-09-20 @ 10:18]

## 2021-06-23 ENCOUNTER — TRANSCRIPTION ENCOUNTER (OUTPATIENT)
Age: 71
End: 2021-06-23

## 2021-06-23 LAB
ANA TITR SER: NEGATIVE — SIGNIFICANT CHANGE UP
ANION GAP SERPL CALC-SCNC: 20 MMOL/L — HIGH (ref 7–14)
APTT BLD: 131.8 SEC — CRITICAL HIGH (ref 27–36.3)
APTT BLD: 56.1 SEC — HIGH (ref 27–36.3)
AUTO DIFF PNL BLD: ABNORMAL
BUN SERPL-MCNC: 120 MG/DL — HIGH (ref 7–23)
C-ANCA SER-ACNC: NEGATIVE — SIGNIFICANT CHANGE UP
CALCIUM SERPL-MCNC: 9.8 MG/DL — SIGNIFICANT CHANGE UP (ref 8.4–10.5)
CHLORIDE SERPL-SCNC: 89 MMOL/L — LOW (ref 98–107)
CO2 SERPL-SCNC: 20 MMOL/L — LOW (ref 22–31)
CREAT SERPL-MCNC: 4.77 MG/DL — HIGH (ref 0.5–1.3)
GAMMA INTERFERON BACKGROUND BLD IA-ACNC: 0.01 IU/ML — SIGNIFICANT CHANGE UP
GLUCOSE BLDC GLUCOMTR-MCNC: 305 MG/DL — HIGH (ref 70–99)
GLUCOSE BLDC GLUCOMTR-MCNC: 326 MG/DL — HIGH (ref 70–99)
GLUCOSE BLDC GLUCOMTR-MCNC: 334 MG/DL — HIGH (ref 70–99)
GLUCOSE BLDC GLUCOMTR-MCNC: 338 MG/DL — HIGH (ref 70–99)
GLUCOSE BLDC GLUCOMTR-MCNC: 371 MG/DL — HIGH (ref 70–99)
GLUCOSE SERPL-MCNC: 334 MG/DL — HIGH (ref 70–99)
HCT VFR BLD CALC: 33.9 % — LOW (ref 39–50)
HGB BLD-MCNC: 10.8 G/DL — LOW (ref 13–17)
IGA FLD-MCNC: 324 MG/DL — SIGNIFICANT CHANGE UP (ref 84–499)
IGG FLD-MCNC: 1390 MG/DL — SIGNIFICANT CHANGE UP (ref 610–1660)
IGM SERPL-MCNC: 105 MG/DL — SIGNIFICANT CHANGE UP (ref 35–242)
KAPPA LC SER QL IFE: 6.18 MG/DL — HIGH (ref 0.33–1.94)
KAPPA/LAMBDA FREE LIGHT CHAIN RATIO, SERUM: 1.44 RATIO — SIGNIFICANT CHANGE UP (ref 0.26–1.65)
LAMBDA LC SER QL IFE: 4.28 MG/DL — HIGH (ref 0.57–2.63)
M TB IFN-G BLD-IMP: NEGATIVE — SIGNIFICANT CHANGE UP
M TB IFN-G CD4+ BCKGRND COR BLD-ACNC: 0 IU/ML — SIGNIFICANT CHANGE UP
M TB IFN-G CD4+CD8+ BCKGRND COR BLD-ACNC: 0.01 IU/ML — SIGNIFICANT CHANGE UP
MAGNESIUM SERPL-MCNC: 1.9 MG/DL — SIGNIFICANT CHANGE UP (ref 1.6–2.6)
MCHC RBC-ENTMCNC: 25.4 PG — LOW (ref 27–34)
MCHC RBC-ENTMCNC: 31.9 GM/DL — LOW (ref 32–36)
MCV RBC AUTO: 79.8 FL — LOW (ref 80–100)
NRBC # BLD: 0 /100 WBCS — SIGNIFICANT CHANGE UP
NRBC # FLD: 0 K/UL — SIGNIFICANT CHANGE UP
P-ANCA SER-ACNC: NEGATIVE — SIGNIFICANT CHANGE UP
PHOSPHATE SERPL-MCNC: 5.8 MG/DL — HIGH (ref 2.5–4.5)
PLATELET # BLD AUTO: 242 K/UL — SIGNIFICANT CHANGE UP (ref 150–400)
POTASSIUM SERPL-MCNC: 4.3 MMOL/L — SIGNIFICANT CHANGE UP (ref 3.5–5.3)
POTASSIUM SERPL-SCNC: 4.3 MMOL/L — SIGNIFICANT CHANGE UP (ref 3.5–5.3)
QUANT TB PLUS MITOGEN MINUS NIL: 0.77 IU/ML — SIGNIFICANT CHANGE UP
RBC # BLD: 4.25 M/UL — SIGNIFICANT CHANGE UP (ref 4.2–5.8)
RBC # FLD: 14.3 % — SIGNIFICANT CHANGE UP (ref 10.3–14.5)
SARS-COV-2 RNA SPEC QL NAA+PROBE: SIGNIFICANT CHANGE UP
SODIUM SERPL-SCNC: 129 MMOL/L — LOW (ref 135–145)
WBC # BLD: 15.93 K/UL — HIGH (ref 3.8–10.5)
WBC # FLD AUTO: 15.93 K/UL — HIGH (ref 3.8–10.5)

## 2021-06-23 PROCEDURE — 93880 EXTRACRANIAL BILAT STUDY: CPT | Mod: 26

## 2021-06-23 PROCEDURE — 99232 SBSQ HOSP IP/OBS MODERATE 35: CPT | Mod: GC

## 2021-06-23 PROCEDURE — 99232 SBSQ HOSP IP/OBS MODERATE 35: CPT

## 2021-06-23 RX ORDER — SODIUM CHLORIDE 9 MG/ML
1000 INJECTION INTRAMUSCULAR; INTRAVENOUS; SUBCUTANEOUS
Refills: 0 | Status: DISCONTINUED | OUTPATIENT
Start: 2021-06-23 | End: 2021-06-30

## 2021-06-23 RX ORDER — INSULIN GLARGINE 100 [IU]/ML
21 INJECTION, SOLUTION SUBCUTANEOUS AT BEDTIME
Refills: 0 | Status: DISCONTINUED | OUTPATIENT
Start: 2021-06-23 | End: 2021-06-24

## 2021-06-23 RX ORDER — INSULIN LISPRO 100/ML
VIAL (ML) SUBCUTANEOUS
Refills: 0 | Status: DISCONTINUED | OUTPATIENT
Start: 2021-06-23 | End: 2021-06-30

## 2021-06-23 RX ORDER — INSULIN LISPRO 100/ML
8 VIAL (ML) SUBCUTANEOUS
Refills: 0 | Status: DISCONTINUED | OUTPATIENT
Start: 2021-06-23 | End: 2021-06-24

## 2021-06-23 RX ORDER — INSULIN LISPRO 100/ML
VIAL (ML) SUBCUTANEOUS AT BEDTIME
Refills: 0 | Status: DISCONTINUED | OUTPATIENT
Start: 2021-06-23 | End: 2021-06-30

## 2021-06-23 RX ORDER — CARVEDILOL PHOSPHATE 80 MG/1
3.12 CAPSULE, EXTENDED RELEASE ORAL EVERY 12 HOURS
Refills: 0 | Status: DISCONTINUED | OUTPATIENT
Start: 2021-06-23 | End: 2021-06-28

## 2021-06-23 RX ORDER — FLUTICASONE PROPIONATE 50 MCG
1 SPRAY, SUSPENSION NASAL
Refills: 0 | Status: DISCONTINUED | OUTPATIENT
Start: 2021-06-23 | End: 2021-06-30

## 2021-06-23 RX ADMIN — ISOSORBIDE MONONITRATE 60 MILLIGRAM(S): 60 TABLET, EXTENDED RELEASE ORAL at 13:45

## 2021-06-23 RX ADMIN — SODIUM CHLORIDE 3 MILLILITER(S): 9 INJECTION INTRAMUSCULAR; INTRAVENOUS; SUBCUTANEOUS at 12:56

## 2021-06-23 RX ADMIN — Medication 8 UNIT(S): at 17:56

## 2021-06-23 RX ADMIN — Medication 40 MILLIGRAM(S): at 17:56

## 2021-06-23 RX ADMIN — Medication 650 MILLIGRAM(S): at 03:44

## 2021-06-23 RX ADMIN — Medication 4 MILLIGRAM(S): at 22:36

## 2021-06-23 RX ADMIN — Medication 5 UNIT(S): at 08:55

## 2021-06-23 RX ADMIN — HEPARIN SODIUM 1800 UNIT(S)/HR: 5000 INJECTION INTRAVENOUS; SUBCUTANEOUS at 17:55

## 2021-06-23 RX ADMIN — Medication 650 MILLIGRAM(S): at 02:44

## 2021-06-23 RX ADMIN — Medication 240 MILLIGRAM(S): at 05:43

## 2021-06-23 RX ADMIN — Medication 4: at 13:43

## 2021-06-23 RX ADMIN — HEPARIN SODIUM 0 UNIT(S)/HR: 5000 INJECTION INTRAVENOUS; SUBCUTANEOUS at 08:02

## 2021-06-23 RX ADMIN — Medication 8 UNIT(S): at 13:44

## 2021-06-23 RX ADMIN — INSULIN GLARGINE 21 UNIT(S): 100 INJECTION, SOLUTION SUBCUTANEOUS at 22:47

## 2021-06-23 RX ADMIN — SODIUM CHLORIDE 3 MILLILITER(S): 9 INJECTION INTRAMUSCULAR; INTRAVENOUS; SUBCUTANEOUS at 22:17

## 2021-06-23 RX ADMIN — HEPARIN SODIUM 1600 UNIT(S)/HR: 5000 INJECTION INTRAVENOUS; SUBCUTANEOUS at 10:03

## 2021-06-23 RX ADMIN — SODIUM CHLORIDE 3 MILLILITER(S): 9 INJECTION INTRAMUSCULAR; INTRAVENOUS; SUBCUTANEOUS at 07:43

## 2021-06-23 RX ADMIN — Medication 4: at 22:48

## 2021-06-23 RX ADMIN — HEPARIN SODIUM 1900 UNIT(S)/HR: 5000 INJECTION INTRAVENOUS; SUBCUTANEOUS at 00:15

## 2021-06-23 RX ADMIN — ATORVASTATIN CALCIUM 40 MILLIGRAM(S): 80 TABLET, FILM COATED ORAL at 22:33

## 2021-06-23 RX ADMIN — Medication 40 MILLIGRAM(S): at 05:46

## 2021-06-23 RX ADMIN — SODIUM CHLORIDE 50 MILLILITER(S): 9 INJECTION INTRAMUSCULAR; INTRAVENOUS; SUBCUTANEOUS at 13:45

## 2021-06-23 RX ADMIN — Medication 8: at 17:57

## 2021-06-23 RX ADMIN — CARVEDILOL PHOSPHATE 3.12 MILLIGRAM(S): 80 CAPSULE, EXTENDED RELEASE ORAL at 17:59

## 2021-06-23 RX ADMIN — Medication 100 MILLIGRAM(S): at 22:34

## 2021-06-23 RX ADMIN — Medication 4: at 08:55

## 2021-06-23 RX ADMIN — PANTOPRAZOLE SODIUM 40 MILLIGRAM(S): 20 TABLET, DELAYED RELEASE ORAL at 05:43

## 2021-06-23 RX ADMIN — Medication 100 MILLIGRAM(S): at 05:43

## 2021-06-23 RX ADMIN — Medication 100 MILLIGRAM(S): at 13:45

## 2021-06-23 RX ADMIN — SODIUM CHLORIDE 3 MILLILITER(S): 9 INJECTION INTRAMUSCULAR; INTRAVENOUS; SUBCUTANEOUS at 02:30

## 2021-06-23 NOTE — PROGRESS NOTE ADULT - SUBJECTIVE AND OBJECTIVE BOX
[facility] [place]  [id number]    INTERVAL HPI/OVERNIGHT EVENTS: L. eye vision overall improved compared to on admission, but not much difference since yesterday. Denies fevers. Had mild headache yesterday, frontal. no jaw claudication.     MEDICATIONS  (STANDING):  atorvastatin 40 milliGRAM(s) Oral at bedtime  carvedilol 3.125 milliGRAM(s) Oral every 12 hours  dextrose 40% Gel 15 Gram(s) Oral once  dextrose 5%. 1000 milliLiter(s) (50 mL/Hr) IV Continuous <Continuous>  dextrose 5%. 1000 milliLiter(s) (100 mL/Hr) IV Continuous <Continuous>  dextrose 50% Injectable 25 Gram(s) IV Push once  dextrose 50% Injectable 12.5 Gram(s) IV Push once  dextrose 50% Injectable 25 Gram(s) IV Push once  diltiazem    milliGRAM(s) Oral daily  doxazosin 4 milliGRAM(s) Oral at bedtime  glucagon  Injectable 1 milliGRAM(s) IntraMuscular once  heparin  Infusion.  Unit(s)/Hr (19 mL/Hr) IV Continuous <Continuous>  hydrALAZINE 100 milliGRAM(s) Oral three times a day  insulin glargine Injectable (LANTUS) 21 Unit(s) SubCutaneous at bedtime  insulin lispro (ADMELOG) corrective regimen sliding scale   SubCutaneous three times a day before meals  insulin lispro (ADMELOG) corrective regimen sliding scale   SubCutaneous at bedtime  insulin lispro Injectable (ADMELOG) 8 Unit(s) SubCutaneous three times a day before meals  isosorbide   mononitrate ER Tablet (IMDUR) 60 milliGRAM(s) Oral daily  methylPREDNISolone sodium succinate Injectable 40 milliGRAM(s) IV Push every 12 hours  pantoprazole    Tablet 40 milliGRAM(s) Oral before breakfast  sodium chloride 0.9% lock flush 3 milliLiter(s) IV Push every 8 hours  sodium chloride 0.9%. 1000 milliLiter(s) (50 mL/Hr) IV Continuous <Continuous>    MEDICATIONS  (PRN):  acetaminophen   Tablet .. 650 milliGRAM(s) Oral every 6 hours PRN Temp greater or equal to 38C (100.4F), Mild Pain (1 - 3)  fluticasone propionate 50 MICROgram(s)/spray Nasal Spray 1 Spray(s) Both Nostrils two times a day PRN congestion  guaiFENesin Oral Liquid (Sugar-Free) 100 milliGRAM(s) Oral every 6 hours PRN Cough  heparin   Injectable 9000 Unit(s) IV Push every 6 hours PRN For aPTT less than 40  heparin   Injectable 4000 Unit(s) IV Push every 6 hours PRN For aPTT between 40 - 57      Allergies    No Known Allergies    Intolerances      Vital Signs Last 24 Hrs  T(C): 36.7 (2021 13:40), Max: 37 (2021 18:10)  T(F): 98 (2021 13:40), Max: 98.6 (2021 18:10)  HR: 87 (2021 13:40) (63 - 87)  BP: 177/99 (2021 13:40) (142/69 - 177/99)  BP(mean): --  RR: 20 (2021 13:40) (18 - 20)  SpO2: 99% (2021 13:40) (94% - 99%)    Physical Exam:  General: NAD  HEENT: EOMI, MMM; visual fields grossly intact b/l.   Cardio: +S1/S2, RRR  Resp: CTA b/l  GI: +BS, soft, NT/ND  MSK: No joint synovitis  Neuro: AAOx3  Psych: wnl    LABS:                        10.8   15.93 )-----------( 242      ( 2021 07:06 )             33.9     06-23    129<L>  |  89<L>  |  120<H>  ----------------------------<  334<H>  4.3   |  20<L>  |  4.77<H>    Ca    9.8      2021 07:06  Phos  5.8     06-23  Mg     1.9     06-23      PTT - ( 2021 07:06 )  PTT:131.8 sec  Urinalysis Basic - ( 2021 14:41 )    Color: Light Yellow / Appearance: Clear / S.018 / pH: x  Gluc: x / Ketone: Negative  / Bili: Negative / Urobili: <2 mg/dL   Blood: x / Protein: 100 mg/dL / Nitrite: Negative   Leuk Esterase: Negative / RBC: 1 /HPF / WBC 6 /HPF   Sq Epi: x / Non Sq Epi: 1 /HPF / Bacteria: Negative          RADIOLOGY & ADDITIONAL TESTS:   [facility] [place]  [id number]    INTERVAL HPI/OVERNIGHT EVENTS: L. eye vision overall improved compared to on admission, but not much difference since yesterday. Denies fevers. Had mild headache yesterday, frontal. no jaw claudication.     MEDICATIONS  (STANDING):  atorvastatin 40 milliGRAM(s) Oral at bedtime  carvedilol 3.125 milliGRAM(s) Oral every 12 hours  dextrose 40% Gel 15 Gram(s) Oral once  dextrose 5%. 1000 milliLiter(s) (50 mL/Hr) IV Continuous <Continuous>  dextrose 5%. 1000 milliLiter(s) (100 mL/Hr) IV Continuous <Continuous>  dextrose 50% Injectable 25 Gram(s) IV Push once  dextrose 50% Injectable 12.5 Gram(s) IV Push once  dextrose 50% Injectable 25 Gram(s) IV Push once  diltiazem    milliGRAM(s) Oral daily  doxazosin 4 milliGRAM(s) Oral at bedtime  glucagon  Injectable 1 milliGRAM(s) IntraMuscular once  heparin  Infusion.  Unit(s)/Hr (19 mL/Hr) IV Continuous <Continuous>  hydrALAZINE 100 milliGRAM(s) Oral three times a day  insulin glargine Injectable (LANTUS) 21 Unit(s) SubCutaneous at bedtime  insulin lispro (ADMELOG) corrective regimen sliding scale   SubCutaneous three times a day before meals  insulin lispro (ADMELOG) corrective regimen sliding scale   SubCutaneous at bedtime  insulin lispro Injectable (ADMELOG) 8 Unit(s) SubCutaneous three times a day before meals  isosorbide   mononitrate ER Tablet (IMDUR) 60 milliGRAM(s) Oral daily  methylPREDNISolone sodium succinate Injectable 40 milliGRAM(s) IV Push every 12 hours  pantoprazole    Tablet 40 milliGRAM(s) Oral before breakfast  sodium chloride 0.9% lock flush 3 milliLiter(s) IV Push every 8 hours  sodium chloride 0.9%. 1000 milliLiter(s) (50 mL/Hr) IV Continuous <Continuous>    MEDICATIONS  (PRN):  acetaminophen   Tablet .. 650 milliGRAM(s) Oral every 6 hours PRN Temp greater or equal to 38C (100.4F), Mild Pain (1 - 3)  fluticasone propionate 50 MICROgram(s)/spray Nasal Spray 1 Spray(s) Both Nostrils two times a day PRN congestion  guaiFENesin Oral Liquid (Sugar-Free) 100 milliGRAM(s) Oral every 6 hours PRN Cough  heparin   Injectable 9000 Unit(s) IV Push every 6 hours PRN For aPTT less than 40  heparin   Injectable 4000 Unit(s) IV Push every 6 hours PRN For aPTT between 40 - 57      Allergies    No Known Allergies    Intolerances      Vital Signs Last 24 Hrs  T(C): 36.7 (2021 13:40), Max: 37 (2021 18:10)  T(F): 98 (2021 13:40), Max: 98.6 (2021 18:10)  HR: 87 (2021 13:40) (63 - 87)  BP: 177/99 (2021 13:40) (142/69 - 177/99)  BP(mean): --  RR: 20 (2021 13:40) (18 - 20)  SpO2: 99% (2021 13:40) (94% - 99%)    Physical Exam:  General: NAD  HEENT: EOMI, MMM; visual fields grossly intact b/l. (confrontation testing performed)  Cardio: +S1/S2, RRR  Resp: CTA b/l  GI: +BS, soft, NT/ND  MSK: No joint synovitis  Neuro: AAOx3  Psych: wnl    LABS:                        10.8   15.93 )-----------( 242      ( 2021 07:06 )             33.9     06-23    129<L>  |  89<L>  |  120<H>  ----------------------------<  334<H>  4.3   |  20<L>  |  4.77<H>    Ca    9.8      2021 07:06  Phos  5.8     06-23  Mg     1.9     06-23      PTT - ( 2021 07:06 )  PTT:131.8 sec  Urinalysis Basic - ( 2021 14:41 )    Color: Light Yellow / Appearance: Clear / S.018 / pH: x  Gluc: x / Ketone: Negative  / Bili: Negative / Urobili: <2 mg/dL   Blood: x / Protein: 100 mg/dL / Nitrite: Negative   Leuk Esterase: Negative / RBC: 1 /HPF / WBC 6 /HPF   Sq Epi: x / Non Sq Epi: 1 /HPF / Bacteria: Negative          RADIOLOGY & ADDITIONAL TESTS:    c< from: CT Angio Head w/ IV Cont (21 @ 21:04) >    CT angiography neck: No evidence of hemodynamically significant stenosis using NASCET criteria.  Patent vertebral arteries.  No evidence of vascular dissection.    CT angiography brain: No major vessel occlusion or proximal stenosis.    CT perfusion: No perfusion abnormality    < end of copied text >

## 2021-06-23 NOTE — DIETITIAN INITIAL EVALUATION ADULT. - DIET TYPE
consistent carbohydrate (no snacks)/DASH/TLC (sodium and cholesterol restricted diet)/no concentrated phosphorus

## 2021-06-23 NOTE — PROGRESS NOTE ADULT - ASSESSMENT
70M with HTN, HLD. A. fib on eliquis, DM2 poorly controlled with retinopathy, admitted for acute onset L. eye vision loss that is waxing and waning since yesterday 6/20.     #L eye vision loss- given elevated ESR of 64, as well as left scalp tenderness on exam, pt's age>50, there is concern for temporal arteritis/GCA. Pt with noted improvement in symptoms after initial dose of steroid, supporting this.   Alternatively, pt has multiple comorbidities predisposing to atherosclerotic disease/thromboembolism. Although DM2 and CKD can cause elevated ESR, these alone may not explain it and pt will need further workup for GCA.   Given other comorbidities most notably uncontrolled DM2, may need steroid sparing agent such as tocilizumab (IL-6 inhibitor) once in the outpatient setting.     Plan:  - continue steroid dose to IV solumedrol 40 mg BID.  - Pending temporal artery biopsy (vascular surgery reccs noted-  plan for 6/24).   - f/u Quantiferon TB test,  IL-6 (pending in lab)     We will continue to follow after biopsy.     Discussed with Dr. Dorcas Xavier MD PGY4  Rheumatology Fellow  Pager [phone] 70M with HTN, HLD. A. fib on eliquis, DM2 poorly controlled with retinopathy, admitted for acute onset L. eye vision loss that is waxing and waning since yesterday 6/20.     #L eye vision loss- given elevated ESR of 64, as well as left scalp tenderness on exam, pt's age>50, there is concern for temporal arteritis/GCA. Pt with noted improvement in symptoms after initial dose of steroid, supporting this.   Alternatively, pt has multiple comorbidities predisposing to atherosclerotic disease/thromboembolism. Although DM2 and CKD can cause elevated ESR, these alone may not explain it and pt will need further workup for GCA.   Given other comorbidities most notably uncontrolled DM2, may need steroid sparing agent such as tocilizumab (IL-6 inhibitor) once in the outpatient setting.     Plan:  - continue steroid dose to IV solumedrol 40 mg BID.  - Pending temporal artery biopsy (vascular surgery reccs noted-  plan for 6/24).   - f/u Quantiferon TB test,  IL-6 (pending in lab)   - start PCP prophylaxis with Bactrim DS three times weekly (has been ordered)-- given patient expected to be on high dose steroids for prolonged taper.     We will continue to follow after biopsy.     Discussed with Dr. Dorcas Xavier MD PGY4  Rheumatology Fellow  Pager [phone] 70M with HTN, HLD. A. fib on eliquis, DM2 poorly controlled with retinopathy, admitted for acute onset L. eye vision loss that is waxing and waning since yesterday 6/20.     #L eye vision loss- given elevated ESR of 64, as well as left scalp tenderness on exam, pt's age>50, there is concern for temporal arteritis/GCA. Pt with noted improvement in symptoms after initial dose of steroid, supporting this.   Alternatively, pt has multiple comorbidities predisposing to atherosclerotic disease/thromboembolism. Although DM2 and CKD can cause elevated ESR, these alone may not explain it and pt will need further workup for GCA.   Given other comorbidities most notably uncontrolled DM2, may need steroid sparing agent such as tocilizumab (IL-6 inhibitor) once in the outpatient setting.     Plan:  - continue IV solumedrol 40 mg BID.  - Pending temporal artery biopsy (vascular surgery reccs noted-  plan for 6/24).   - f/u Quantiferon TB test,  IL-6 (pending in lab)   - start PCP prophylaxis with Bactrim DS three times weekly (has been ordered)-- given patient expected to be on high dose steroids for prolonged taper.     We will continue to follow after biopsy.     Discussed with Dr. Dorcas Xavier MD PGY4  Rheumatology Fellow  Pager [phone]

## 2021-06-23 NOTE — PROGRESS NOTE ADULT - ASSESSMENT
71 yo man with PMHx of DM2, HFpEF, HTN, HLD, CKD, and PVD (s/p L BKA) who presented from ophthalmology due to concerns for L central artery occlusion. Endocrine Team consulted for inpatient T2DM management.    Problem 1: Uncontrolled T2DM c/b neuropathy and retinopathy exacerbated by steroids  Home Regimen: Glimepiride 4 mg daily  HbA1c 10.6  - Increase Lantus to 21 units qhs  - Increase Lispro to 8 units AC meals (Hold if patient is NPO)  - Change to moderate dose correctional scale before meals and moderate dose correction qhs  - Monitor FS before meals and qhs (Inpatient Goal 100-180 mg/dl)  - Consistent Carb Diet    Called by primary team, patient will be NPO after MN tonight.  Please give full dose of Lantus as ordered as patient is moderately hyperglycemic on steroids.  Do not recommend reducing dose.    Discharge Plan:   - patient is now amenable to learning insulin pen- once daily  - Please assure RN has taught patient how to use insulin pen and hypoglycemia s/s and management  - DC plan will be basal / po meds  - Patient can f/u with Endocrine outpatient at the location provided below:    [facility]   [address] [place] [id number]  [phone]    Problem 2: HTN  - Management per primary team given primary diagnosis of central artery occlusion     Problem 3: HLD  - Continue statin      Megan Meneses  Nurse Practitioner  Division of Endocrinology & Diabetes  Pager # [phone]      If after 6PM or before 9AM, or on weekends/holidays, please call endocrine answering service for assistance ([phone]).  For nonurgent matters email [email or web address] for assistance.

## 2021-06-23 NOTE — DIETITIAN INITIAL EVALUATION ADULT. - PROBLEM SELECTOR PLAN 5
On Glimepiride and Invokana at home.  - Hold oral home meds while inpatient  - Start moderate-dose ISS and FS checks qAC and qhs  - F/u A1c. Possible Endocrine consult pending results.    # Diabetic retinopathy - Severe NPDR vs PDR OU  - Known chronic history of PDR with injection therapy OS> OD  - Severe NPDR - No signs of NVI, NVD, NVE on exam today   - Dot blots seen in all 4 quadrants, with large IRH inferotemporal OD  - No tears, holes or detachment seen  - Retinal detachment precautions: Cautioned for urgent reevaluation if symptoms of curtain vision loss or severe flashing of lights occurs   - Patient follows with Normal Yo MD. Needs OCT and FA imaging to further elucidate disease, and possible injection therapy as an outpatient.

## 2021-06-23 NOTE — DIETITIAN INITIAL EVALUATION ADULT. - PROBLEM SELECTOR PLAN 9
Patient unsure of home meds. Med hx obtained from visit to [place]. Unable to contact pt's wife and med rec pharmacist contacted.

## 2021-06-23 NOTE — DIETITIAN INITIAL EVALUATION ADULT. - PERTINENT LABORATORY DATA
06-23 Na129 mmol/L<L> Glu 334 mg/dL<H> K+ 4.3 mmol/L Cr  4.77 mg/dL<H>  mg/dL<H> 06-23 Phos 5.8 mg/dL<H> 06-21 Alb 4.3 g/dL 06-21 Chol 190 mg/dL LDL --    HDL 40 mg/dL<L> Trig 123 mg/dL    CAPILLARY BLOOD GLUCOSE  POCT Blood Glucose.: 326 mg/dL (23 Jun 2021 13:43)  POCT Blood Glucose.: 371 mg/dL (23 Jun 2021 12:38)  POCT Blood Glucose.: 338 mg/dL (23 Jun 2021 08:36)  POCT Blood Glucose.: 318 mg/dL (22 Jun 2021 21:47)  POCT Blood Glucose.: 343 mg/dL (22 Jun 2021 21:45)  POCT Blood Glucose.: 251 mg/dL (22 Jun 2021 18:32)    A1C with Estimated Average Glucose (06.21.21 @ 02:33)    A1C with Estimated Average Glucose Result: 10.6: High Risk (prediabetic)    5.7 - 6.4 %  Diabetic, diagnostic           > 6.5 %  ADA diabetic treatment goal    < 7.0 %  HbA1C values may not accurately reflect mean blood glucose in patients  with Hb variants.  Suggest clinical correlation. %    Estimated Average Glucose: 258 mg/dL    A1C with Estimated Average Glucose Result: 7.4: Method: Immunoassay       Reference Range                4.0-5.6%       High risk (prediabetic)        5.7-6.4%       Diabetic, diagnostic             >=6.5%       ADA diabetic treatment goal       <7.0%  The Hemoglobin A1c testing is NGSP-certified.Reference ranges are based  upon the 2010 recommendations of  the American Diabetes Association.  Interpretation may vary for children  and adolescents. % (04.25.21 @ 11:17)

## 2021-06-23 NOTE — DIETITIAN INITIAL EVALUATION ADULT. - CHIEF COMPLAINT
69 yo man with history of afib on Eliquis, HFpEF (EF 55-60% 4/2021), HTN, HLD, CKD stage 3-4, DM2 c/b diabetic retinopathy, PVD c/b LLE nec fasc s/p L BKA, and b/l cataracts s/p R eye cataract surgery presents as a transfer from [facility] after he went there for acute onset of L eye blurry vision on Sunday afternoon ~4:30pm., found to have L CRAO.

## 2021-06-23 NOTE — PROGRESS NOTE ADULT - SUBJECTIVE AND OBJECTIVE BOX
Chief Complaint: t2dm    History:  no vomiting, no hypoglycemia on solumedrol.  will accept once daily insulin injections at d/c    MEDICATIONS  (STANDING):  atorvastatin 40 milliGRAM(s) Oral at bedtime  dextrose 40% Gel 15 Gram(s) Oral once  dextrose 5%. 1000 milliLiter(s) (50 mL/Hr) IV Continuous <Continuous>  dextrose 5%. 1000 milliLiter(s) (100 mL/Hr) IV Continuous <Continuous>  dextrose 50% Injectable 25 Gram(s) IV Push once  dextrose 50% Injectable 12.5 Gram(s) IV Push once  dextrose 50% Injectable 25 Gram(s) IV Push once  diltiazem    milliGRAM(s) Oral daily  doxazosin 4 milliGRAM(s) Oral at bedtime  glucagon  Injectable 1 milliGRAM(s) IntraMuscular once  heparin  Infusion.  Unit(s)/Hr (19 mL/Hr) IV Continuous <Continuous>  hydrALAZINE 100 milliGRAM(s) Oral three times a day  insulin glargine Injectable (LANTUS) 16 Unit(s) SubCutaneous at bedtime  insulin lispro (ADMELOG) corrective regimen sliding scale   SubCutaneous three times a day before meals  insulin lispro (ADMELOG) corrective regimen sliding scale   SubCutaneous at bedtime  insulin lispro Injectable (ADMELOG) 5 Unit(s) SubCutaneous three times a day before meals  isosorbide   mononitrate ER Tablet (IMDUR) 60 milliGRAM(s) Oral daily  methylPREDNISolone sodium succinate Injectable 60 milliGRAM(s) IV Push daily  pantoprazole    Tablet 40 milliGRAM(s) Oral before breakfast  sodium chloride 0.9% lock flush 3 milliLiter(s) IV Push every 8 hours    MEDICATIONS  (PRN):  acetaminophen   Tablet .. 650 milliGRAM(s) Oral every 6 hours PRN Temp greater or equal to 38C (100.4F), Mild Pain (1 - 3)  guaiFENesin Oral Liquid (Sugar-Free) 100 milliGRAM(s) Oral every 6 hours PRN Cough  heparin   Injectable 9000 Unit(s) IV Push every 6 hours PRN For aPTT less than 40  heparin   Injectable 4000 Unit(s) IV Push every 6 hours PRN For aPTT between 40 - 57      Allergies    No Known Allergies    Intolerances      Review of Systems:  Constitutional: No fever  ALL OTHER SYSTEMS REVIEWED AND NEGATIVE      PHYSICAL EXAM:  Vital Signs Last 24 Hrs  T(C): 36.7 (23 Jun 2021 13:40), Max: 37 (22 Jun 2021 18:10)  T(F): 98 (23 Jun 2021 13:40), Max: 98.6 (22 Jun 2021 18:10)  HR: 87 (23 Jun 2021 13:40) (63 - 87)  BP: 177/99 (23 Jun 2021 13:40) (142/69 - 177/99)  BP(mean): --  RR: 20 (23 Jun 2021 13:40) (18 - 20)  SpO2: 99% (23 Jun 2021 13:40) (94% - 99%)  GENERAL: NAD, well-developed  GI: Soft, nontender, non distended  SKIN: Dry, intact, No rashes or lesions  PSYCH: Alert and oriented x 3, normal affect, normal mood      CAPILLARY BLOOD GLUCOSE      POCT Blood Glucose.: 326 mg/dL (23 Jun 2021 13:43)  POCT Blood Glucose.: 371 mg/dL (23 Jun 2021 12:38)  POCT Blood Glucose.: 338 mg/dL (23 Jun 2021 08:36)  POCT Blood Glucose.: 318 mg/dL (22 Jun 2021 21:47)  POCT Blood Glucose.: 343 mg/dL (22 Jun 2021 21:45)  POCT Blood Glucose.: 251 mg/dL (22 Jun 2021 18:32)  POCT Blood Glucose.: 382 mg/dL (22 Jun 2021 12:12)  POCT Blood Glucose.: 340 mg/dL (22 Jun 2021 09:05)  POCT Blood Glucose.: 343 mg/dL (22 Jun 2021 03:29)  POCT Blood Glucose.: 308 mg/dL (22 Jun 2021 03:26)  POCT Blood Glucose.: 212 mg/dL (21 Jun 2021 21:48)  POCT Blood Glucose.: 227 mg/dL (21 Jun 2021 17:19)      06-23    129<L>  |  89<L>  |  120<H>  ----------------------------<  334<H>  4.3   |  20<L>  |  4.77<H>    Ca    9.8      23 Jun 2021 07:06  Phos  5.8     06-23  Mg     1.9     06-23          A1C with Estimated Average Glucose (06.21.21 @ 02:33)    A1C with Estimated Average Glucose Result: 10.6 %    Estimated Average Glucose: 258 mg/dL        Thyroid Function Tests:  06-21 @ 02:46 TSH 2.08 FreeT4 -- T3 -- Anti TPO -- Anti Thyroglobulin Ab -- TSI --

## 2021-06-23 NOTE — PROGRESS NOTE ADULT - SUBJECTIVE AND OBJECTIVE BOX
[facility]   MD PEGGY GARRETT DO ANAM SIDDIQUI ANGELA WONG, PA    TEL:  OFFICE: [phone]  DR HODGES CELL: [phone]  DR. MAX CELL: [phone]  DR. HURTADO CELL: [phone]  MARGARITA WYNNE CELL: [phone]    From 5pm-7am Answering Service [phone]    -- RENAL FOLLOW UP NOTE ---Date of Service 06-23-21 @ 10:16    Patient is a 70y old  Male who presents with a chief complaint of L eye blurry vision (22 Jun 2021 23:40)      Patient seen and examined at bedside. No chest pain/sob    VITALS:  T(F): 98 (06-23-21 @ 05:17), Max: 98.6 (06-22-21 @ 18:10)  HR: 85 (06-23-21 @ 05:17)  BP: 172/87 (06-23-21 @ 05:17)  RR: 18 (06-23-21 @ 05:17)  SpO2: 97% (06-23-21 @ 05:17)  Wt(kg): --    06-22 @ 07:01  -  06-23 @ 07:00  --------------------------------------------------------  IN: 236 mL / OUT: 600 mL / NET: -364 mL          PHYSICAL EXAM:  Constitutional: NAD  Neck: No JVD  Respiratory: CTAB, no wheezes, rales or rhonchi  Cardiovascular: S1, S2, RRR  Gastrointestinal: BS+, soft, NT/ND  Extremities: No peripheral edema left a    Hospital Medications:   MEDICATIONS  (STANDING):  atorvastatin 40 milliGRAM(s) Oral at bedtime  dextrose 40% Gel 15 Gram(s) Oral once  dextrose 5%. 1000 milliLiter(s) (50 mL/Hr) IV Continuous <Continuous>  dextrose 5%. 1000 milliLiter(s) (100 mL/Hr) IV Continuous <Continuous>  dextrose 50% Injectable 25 Gram(s) IV Push once  dextrose 50% Injectable 12.5 Gram(s) IV Push once  dextrose 50% Injectable 25 Gram(s) IV Push once  diltiazem    milliGRAM(s) Oral daily  doxazosin 4 milliGRAM(s) Oral at bedtime  glucagon  Injectable 1 milliGRAM(s) IntraMuscular once  heparin  Infusion.  Unit(s)/Hr (19 mL/Hr) IV Continuous <Continuous>  hydrALAZINE 100 milliGRAM(s) Oral three times a day  insulin glargine Injectable (LANTUS) 16 Unit(s) SubCutaneous at bedtime  insulin lispro (ADMELOG) corrective regimen sliding scale   SubCutaneous three times a day before meals  insulin lispro (ADMELOG) corrective regimen sliding scale   SubCutaneous at bedtime  insulin lispro Injectable (ADMELOG) 5 Unit(s) SubCutaneous three times a day before meals  isosorbide   mononitrate ER Tablet (IMDUR) 60 milliGRAM(s) Oral daily  methylPREDNISolone sodium succinate Injectable 40 milliGRAM(s) IV Push every 12 hours  pantoprazole    Tablet 40 milliGRAM(s) Oral before breakfast  sodium chloride 0.9% lock flush 3 milliLiter(s) IV Push every 8 hours      LABS:  06-23    129<L>  |  89<L>  |  120<H>  ----------------------------<  334<H>  4.3   |  20<L>  |  4.77<H>    Ca    9.8      23 Jun 2021 07:06  Phos  5.8     06-23  Mg     1.9     06-23      Creatinine Trend: 4.77 <--, 3.52 <--, 3.10 <--, 3.09 <--, 3.44 <--    Phosphorus Level, Serum: 5.8 mg/dL (06-23 @ 07:06)                              10.8   15.93 )-----------( 242      ( 23 Jun 2021 07:06 )             33.9     Urine Studies:  Urinalysis - [06-22-21 @ 14:41]      Color Light Yellow / Appearance Clear / SG 1.018 / pH 6.0      Gluc 500 mg/dL / Ketone Negative  / Bili Negative / Urobili <2 mg/dL       Blood Negative / Protein 100 mg/dL / Leuk Est Negative / Nitrite Negative      RBC 1 / WBC 6 / Hyaline 1 / Gran 1 / Sq Epi  / Non Sq Epi 1 / Bacteria Negative    Urine Creatinine 91      [06-22-21 @ 14:41]  Urine Sodium 25      [06-22-21 @ 14:41]    Iron 35, TIBC 239, %sat 15      [06-21-21 @ 02:46]  Ferritin 99      [06-21-21 @ 02:46]  HbA1c 8.0      [01-10-20 @ 11:10]  TSH 2.08      [06-21-21 @ 02:46]  Lipid: chol 190, , HDL 40, LDL --      [06-21-21 @ 02:46]    HBsAg Nonreact      [06-22-21 @ 09:23]  HCV 0.21, Nonreact      [06-22-21 @ 09:23]      RADIOLOGY & ADDITIONAL STUDIES:

## 2021-06-23 NOTE — DIETITIAN INITIAL EVALUATION ADULT. - PROBLEM SELECTOR PLAN 3
Serum Cr 3.44 on presentation at [facility] and 3.09 on admission here vs. 2.84 in Apr 2021. Pt also received contrast load for CTA H/N.   - Monitor serum Cr and urine output  - Avoid NSAIDs, contrast, and nephrotoxins  - Will hold pt's home Bumex dose for now  - Renally dose meds  - Renal consult to be obtained if renal function worsens in setting of contrast load from CTA H/N

## 2021-06-23 NOTE — DIETITIAN INITIAL EVALUATION ADULT. - PROBLEM SELECTOR PLAN 1
Pt with L eye painless vision loss. 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 CTH and CTA H/N negative. Low suspicion for GCA, given no headaches, tenderness in scalp or temples, jaw claudication, or systemic symptoms.   - Ophthalmology and Neurology consults obtained. Recs appreciated.  - MRI brain wo contrast [patient with hardware in L hip and L arm, need to confirm if MRI compatible]  - TTE ordered  - Check ESR and CRP  - Check lipid profile and HgbA1c  - Pt passed dysphagia screen. C/w dysphagia diet. F/u S&S eval.  - PT/OT eval  - Neuro checks q4hrs  - Aspiration precautions, fall risk protocol   - C/w ASA 81 mg daily and Eliquis 5 mg bid  - Monitor on tele

## 2021-06-23 NOTE — PROGRESS NOTE ADULT - SUBJECTIVE AND OBJECTIVE BOX
ROCCO JC  70y  Male      Patient is a 70y old  Male who presents with a chief complaint of L eye blurry vision (2021 17:16)  Patient feels ok.no cp,no sob,no eadaches,blurred vision is improving.h/o cataract surgery in lt.eye    REVIEW OF SYSTEMS:  as above  INTERVAL HPI/OVERNIGHT EVENTS:  T(C): 36.8 (21 @ 18:00), Max: 36.9 (21 @ :17)  HR: 88 (21 @ 18:00) (63 - 88)  BP: 151/93 (21 @ 18:00) (142/69 - 177/99)  RR: 19 (21 @ 18:00) (18 - 20)  SpO2: 99% (21 @ 18:00) (94% - 99%)  Wt(kg): --  I&O's Summary    2021 07:  -  2021 07:00  --------------------------------------------------------  IN: 236 mL / OUT: 600 mL / NET: -364 mL    :  -  2021 21:12  --------------------------------------------------------  IN: 780 mL / OUT: 800 mL / NET: -20 mL      T(C): 36.8 (21 @ 18:00), Max: 36.9 (21 @ :17)  HR: 88 (21 @ 18:00) (63 - 88)  BP: 151/93 (21 @ 18:00) (142/69 - 177/99)  RR: 19 (21 @ 18:00) (18 - 20)  SpO2: 99% (21 @ 18:00) (94% - 99%)  Wt(kg): --Vital Signs Last 24 Hrs  T(C): 36.8 (2021 18:00), Max: 36.9 (2021 01:17)  T(F): 98.2 (2021 18:00), Max: 98.4 (2021 01:17)  HR: 88 (2021 18:00) (63 - 88)  BP: 151/93 (2021 18:00) (142/69 - 177/99)  BP(mean): --  RR: 19 (2021 18:00) (18 - 20)  SpO2: 99% (2021 18:00) (94% - 99%)    LABS:                        10.8   15.93 )-----------( 242      ( 2021 07:06 )             33.9     06-23    129<L>  |  89<L>  |  120<H>  ----------------------------<  334<H>  4.3   |  20<L>  |  4.77<H>    Ca    9.8      2021 07:06  Phos  5.8     06-23  Mg     1.9     06-23      PTT - ( 2021 16:21 )  PTT:56.1 sec  Urinalysis Basic - ( 2021 14:41 )    Color: Light Yellow / Appearance: Clear / S.018 / pH: x  Gluc: x / Ketone: Negative  / Bili: Negative / Urobili: <2 mg/dL   Blood: x / Protein: 100 mg/dL / Nitrite: Negative   Leuk Esterase: Negative / RBC: 1 /HPF / WBC 6 /HPF   Sq Epi: x / Non Sq Epi: 1 /HPF / Bacteria: Negative      CAPILLARY BLOOD GLUCOSE      POCT Blood Glucose.: 334 mg/dL (2021 17:39)  POCT Blood Glucose.: 326 mg/dL (2021 13:43)  POCT Blood Glucose.: 371 mg/dL (2021 12:38)  POCT Blood Glucose.: 338 mg/dL (2021 08:36)  POCT Blood Glucose.: 318 mg/dL (2021 21:47)  POCT Blood Glucose.: 343 mg/dL (2021 21:45)        Urinalysis Basic - ( 2021 14:41 )    Color: Light Yellow / Appearance: Clear / S.018 / pH: x  Gluc: x / Ketone: Negative  / Bili: Negative / Urobili: <2 mg/dL   Blood: x / Protein: 100 mg/dL / Nitrite: Negative   Leuk Esterase: Negative / RBC: 1 /HPF / WBC 6 /HPF   Sq Epi: x / Non Sq Epi: 1 /HPF / Bacteria: Negative        PAST MEDICAL & SURGICAL HISTORY:  Hypertension, unspecified type    Type 2 diabetes mellitus with other neurologic complication, without long-term current use of insulin    DM (diabetes mellitus)    HTN (hypertension)    HLD (hyperlipidemia)    Afib    Retinopathy    Chronic diastolic congestive heart failure    Chronic kidney disease, unspecified CKD stage    Amputation of leg, traumatic, left, subsequent encounter    S/P BKA (below knee amputation) unilateral, left    S/P hip replacement, left    History of surgery on arm        MEDICATIONS  (STANDING):  atorvastatin 40 milliGRAM(s) Oral at bedtime  carvedilol 3.125 milliGRAM(s) Oral every 12 hours  dextrose 40% Gel 15 Gram(s) Oral once  dextrose 5%. 1000 milliLiter(s) (50 mL/Hr) IV Continuous <Continuous>  dextrose 5%. 1000 milliLiter(s) (100 mL/Hr) IV Continuous <Continuous>  dextrose 50% Injectable 25 Gram(s) IV Push once  dextrose 50% Injectable 12.5 Gram(s) IV Push once  dextrose 50% Injectable 25 Gram(s) IV Push once  diltiazem    milliGRAM(s) Oral daily  doxazosin 4 milliGRAM(s) Oral at bedtime  glucagon  Injectable 1 milliGRAM(s) IntraMuscular once  heparin  Infusion.  Unit(s)/Hr (19 mL/Hr) IV Continuous <Continuous>  hydrALAZINE 100 milliGRAM(s) Oral three times a day  insulin glargine Injectable (LANTUS) 21 Unit(s) SubCutaneous at bedtime  insulin lispro (ADMELOG) corrective regimen sliding scale   SubCutaneous three times a day before meals  insulin lispro (ADMELOG) corrective regimen sliding scale   SubCutaneous at bedtime  insulin lispro Injectable (ADMELOG) 8 Unit(s) SubCutaneous three times a day before meals  isosorbide   mononitrate ER Tablet (IMDUR) 60 milliGRAM(s) Oral daily  methylPREDNISolone sodium succinate Injectable 40 milliGRAM(s) IV Push every 12 hours  pantoprazole    Tablet 40 milliGRAM(s) Oral before breakfast  sodium chloride 0.9% lock flush 3 milliLiter(s) IV Push every 8 hours  sodium chloride 0.9%. 1000 milliLiter(s) (50 mL/Hr) IV Continuous <Continuous>  trimethoprim  160 mG/sulfamethoxazole 800 mG 1 Tablet(s) Oral <User Schedule>    MEDICATIONS  (PRN):  acetaminophen   Tablet .. 650 milliGRAM(s) Oral every 6 hours PRN Temp greater or equal to 38C (100.4F), Mild Pain (1 - 3)  fluticasone propionate 50 MICROgram(s)/spray Nasal Spray 1 Spray(s) Both Nostrils two times a day PRN congestion  guaiFENesin Oral Liquid (Sugar-Free) 100 milliGRAM(s) Oral every 6 hours PRN Cough  heparin   Injectable 9000 Unit(s) IV Push every 6 hours PRN For aPTT less than 40  heparin   Injectable 4000 Unit(s) IV Push every 6 hours PRN For aPTT between 40 - 57        RADIOLOGY & ADDITIONAL TESTS:    Imaging Personally Reviewed:  [ ] YES  [ ] NO    Consultant(s) Notes Reviewed:  [ ] YES  [ ] NO    PHYSICAL EXAM:  GENERAL: Alert and awake lying in bed in no distress  HEAD:  Atraumatic, Normocephalic  EYES: EOMI, VINAY, conjunctiva and sclera clear  NECK: Supple, No JVD, Normal thyroid  NERVOUS SYSTEM:  Alert & Oriented X3, Motor and sensory systems are intact,   CHEST/LUNG: Bilateral clear breath sounds, no rhochi, no wheezing, no crepitations,  HEART: Regular rate and rhythm; No murmurs, rubs, or gallops  ABDOMEN: Soft, Nontender, Nondistended; Bowel sounds present  EXTREMITIES:   Peripheral Pulses are palpable, no  edema        Care Discussed with Consultants/Other Providers [ ] YES  [ ] NO      Code Status: [] Full Code [] DNR [] DNI [] Goals of Care:   Disposition: [] ICU [] Stroke Unit [] RCU []PCU []Floor [] Discharge Home         CAMILA Hoff

## 2021-06-23 NOTE — DIETITIAN INITIAL EVALUATION ADULT. - PROBLEM SELECTOR PLAN 2
WBC 14.8 on admission. No localizing S/S of infection. Pt afebrile. Possibly reactive in setting of stroke.   - Trend WBC  - F/u UA and CXR ordered  - Will monitor off abx for now

## 2021-06-23 NOTE — DIETITIAN INITIAL EVALUATION ADULT. - PROBLEM SELECTOR PLAN 4
[id number] 3. Currently in sinus rhythm.   - C/w AC with Eliquis 5 mg bid  - Hold Cardizem for now for permissive HTN as below  - Monitor on tele

## 2021-06-23 NOTE — DIETITIAN INITIAL EVALUATION ADULT. - OTHER INFO
RD visited with patient for requested consultation.  Patient reported that he has a good appetite and tolerates diet without any issues chewing / swallowing, no GI distress i.e. nausea, vomiting, diarrhea.  Patient reported and noted to complete >75% of meals.  Chart reviewed, patient previously educated on diet recommendations - plate method for DM and renal diet recommendations.  Patient reported he only partially follows the diet & reported that his wife often prepares foods which he feels are not proper based on his diet limitations. He further indicated that he usually consumes meals from [employer]  for lunch (stated they are low sodium, carb controlled meals) and was considering also consuming for dinner as he feels his wife prepares too many starches, which he tends to overconsume.    Patient unsure of weight history as he reported that he has been unable to weigh himself, but feels weight fluctuates due to fluid status. Weight 4/26/21 - 117.2kg; Current weights 6/21 - 104.8kg, 6/23 - 102kg.  Patient with prosthesis, continue to trend inpatient weights for current admission.    Currently on steroid tx - continue to monitor BG.  RD reviewed diet recommendations with patient at bedside - focused on portion control of various carbohydrate foods using the plate method. He is also familiar with previous renal diet recommendations. Currently with increased serum Phos, will recommend no concentrated phosphorus diet restriction.

## 2021-06-23 NOTE — PROGRESS NOTE ADULT - ASSESSMENT
70y male with a past medical history/ocular history of DM, HTN, CKD, Afib on Eliquis, HLD, left foot amputation, PDR s/p Intravitreal injections consulted for painless vision loss left.      Ada on CKD  baseline scr appears to be 3.0  ADA likely TISHA and hyperglycemia   Fena>1%  CKD likely sec to longstanding DM with retinopathy  Monitor BMP at present  Check Bladder scan to r/o obstruction  Elevated BUN likely sec to steroid use  echo with normal EF. consider gentle hydration given uncontrolled hyperglycemia and ada. start NS @ 50cc/hr x1L  MOnitor BMP   AVoid further nephrotoxics NSAIDS RCA    HTN  BP uncontrolled  consider start coreg   MOnitor BP    Hyperphosphatemia  sec to RF  Low PO4 diet     HYponatremia  Avoid hypotonic IVF  optimize dm control  MOnitor serum Na  Check Urine NA, Urine Os  monitor

## 2021-06-23 NOTE — DIETITIAN INITIAL EVALUATION ADULT. - PROBLEM SELECTOR PLAN 6
EF 55-60% based on TTE in Apr 2021, but with severe concentric LV hypertrophy. Pt euvolemic on exam.   - Hold pt's home Bumex dose for now given possibility of ELMER  - Also hold pt's home hydralazine and Imdur doses for permissive HTN as below  - Strict I&Os  - Daily weights  - Salt and fluid restriction  - TTE pending

## 2021-06-23 NOTE — DIETITIAN INITIAL EVALUATION ADULT. - PERTINENT MEDS FT
MEDICATIONS  (STANDING):  atorvastatin 40 milliGRAM(s) Oral at bedtime  carvedilol 3.125 milliGRAM(s) Oral every 12 hours  dextrose 40% Gel 15 Gram(s) Oral once  dextrose 5%. 1000 milliLiter(s) (50 mL/Hr) IV Continuous <Continuous>  dextrose 5%. 1000 milliLiter(s) (100 mL/Hr) IV Continuous <Continuous>  dextrose 50% Injectable 25 Gram(s) IV Push once  dextrose 50% Injectable 12.5 Gram(s) IV Push once  dextrose 50% Injectable 25 Gram(s) IV Push once  diltiazem    milliGRAM(s) Oral daily  doxazosin 4 milliGRAM(s) Oral at bedtime  glucagon  Injectable 1 milliGRAM(s) IntraMuscular once  heparin  Infusion.  Unit(s)/Hr (19 mL/Hr) IV Continuous <Continuous>  hydrALAZINE 100 milliGRAM(s) Oral three times a day  insulin glargine Injectable (LANTUS) 21 Unit(s) SubCutaneous at bedtime  insulin lispro (ADMELOG) corrective regimen sliding scale   SubCutaneous three times a day before meals  insulin lispro (ADMELOG) corrective regimen sliding scale   SubCutaneous at bedtime  insulin lispro Injectable (ADMELOG) 8 Unit(s) SubCutaneous three times a day before meals  isosorbide   mononitrate ER Tablet (IMDUR) 60 milliGRAM(s) Oral daily  methylPREDNISolone sodium succinate Injectable 40 milliGRAM(s) IV Push every 12 hours  pantoprazole    Tablet 40 milliGRAM(s) Oral before breakfast  sodium chloride 0.9% lock flush 3 milliLiter(s) IV Push every 8 hours  sodium chloride 0.9%. 1000 milliLiter(s) (50 mL/Hr) IV Continuous <Continuous>    MEDICATIONS  (PRN):  acetaminophen   Tablet .. 650 milliGRAM(s) Oral every 6 hours PRN Temp greater or equal to 38C (100.4F), Mild Pain (1 - 3)  fluticasone propionate 50 MICROgram(s)/spray Nasal Spray 1 Spray(s) Both Nostrils two times a day PRN congestion  guaiFENesin Oral Liquid (Sugar-Free) 100 milliGRAM(s) Oral every 6 hours PRN Cough  heparin   Injectable 9000 Unit(s) IV Push every 6 hours PRN For aPTT less than 40  heparin   Injectable 4000 Unit(s) IV Push every 6 hours PRN For aPTT between 40 - 57

## 2021-06-23 NOTE — PROGRESS NOTE ADULT - ASSESSMENT
71 yo man with history of afib on Eliquis, HFpEF (EF 55-60% 4/2021), HTN, HLD, CKD stage 3-4, DM2 c/b diabetic retinopathy, PVD c/b LLE nec fasc s/p L BKA, and b/l cataracts s/p R eye cataract surgery presents as a transfer from [facility] after he went there for acute onset of L eye blurry vision on Sunday afternoon ~4:30pm., found to have L CRAO.

## 2021-06-23 NOTE — DIETITIAN INITIAL EVALUATION ADULT. - PHYSCIAL ASSESSMENT
well nourished IBW adjusted for Lt BKA   Adjusted BMI 32.4    No overt findings of muscle or fat loss

## 2021-06-23 NOTE — DIETITIAN INITIAL EVALUATION ADULT. - PROBLEM SELECTOR PLAN 7
BP elevated to 216/85, with SBPs ranging from 180s-190s.   - Case discussed with Neurology. Per Neurology, permissive HTN for 24 hrs since symptom onset (6/20 4:30pm). Hold Cardizem, hydralazine, and Imdur for now. Aim for gradual normotension thereafter.   - Bumex currently being held as above

## 2021-06-24 ENCOUNTER — RESULT REVIEW (OUTPATIENT)
Age: 71
End: 2021-06-24

## 2021-06-24 LAB
ANION GAP SERPL CALC-SCNC: 17 MMOL/L — HIGH (ref 7–14)
APTT BLD: 69.3 SEC — HIGH (ref 27–36.3)
BLD GP AB SCN SERPL QL: NEGATIVE — SIGNIFICANT CHANGE UP
BUN SERPL-MCNC: 120 MG/DL — HIGH (ref 7–23)
CALCIUM SERPL-MCNC: 9.6 MG/DL — SIGNIFICANT CHANGE UP (ref 8.4–10.5)
CHLORIDE SERPL-SCNC: 92 MMOL/L — LOW (ref 98–107)
CO2 SERPL-SCNC: 21 MMOL/L — LOW (ref 22–31)
CREAT SERPL-MCNC: 4.11 MG/DL — HIGH (ref 0.5–1.3)
GLUCOSE BLDC GLUCOMTR-MCNC: 256 MG/DL — HIGH (ref 70–99)
GLUCOSE BLDC GLUCOMTR-MCNC: 259 MG/DL — HIGH (ref 70–99)
GLUCOSE BLDC GLUCOMTR-MCNC: 274 MG/DL — HIGH (ref 70–99)
GLUCOSE BLDC GLUCOMTR-MCNC: 295 MG/DL — HIGH (ref 70–99)
GLUCOSE BLDC GLUCOMTR-MCNC: 296 MG/DL — HIGH (ref 70–99)
GLUCOSE BLDC GLUCOMTR-MCNC: 354 MG/DL — HIGH (ref 70–99)
GLUCOSE SERPL-MCNC: 229 MG/DL — HIGH (ref 70–99)
HCT VFR BLD CALC: 32 % — LOW (ref 39–50)
HGB BLD-MCNC: 10.4 G/DL — LOW (ref 13–17)
IL6 FLD-MCNC: 9.9 PG/ML — SIGNIFICANT CHANGE UP (ref 0–13)
MAGNESIUM SERPL-MCNC: 1.9 MG/DL — SIGNIFICANT CHANGE UP (ref 1.6–2.6)
MCHC RBC-ENTMCNC: 25.6 PG — LOW (ref 27–34)
MCHC RBC-ENTMCNC: 32.5 GM/DL — SIGNIFICANT CHANGE UP (ref 32–36)
MCV RBC AUTO: 78.8 FL — LOW (ref 80–100)
NRBC # BLD: 0 /100 WBCS — SIGNIFICANT CHANGE UP
NRBC # FLD: 0 K/UL — SIGNIFICANT CHANGE UP
PHOSPHATE SERPL-MCNC: 5.7 MG/DL — HIGH (ref 2.5–4.5)
PLATELET # BLD AUTO: 215 K/UL — SIGNIFICANT CHANGE UP (ref 150–400)
POTASSIUM SERPL-MCNC: 4.3 MMOL/L — SIGNIFICANT CHANGE UP (ref 3.5–5.3)
POTASSIUM SERPL-SCNC: 4.3 MMOL/L — SIGNIFICANT CHANGE UP (ref 3.5–5.3)
PTH-INTACT FLD-MCNC: 222 PG/ML — HIGH (ref 15–65)
RBC # BLD: 4.06 M/UL — LOW (ref 4.2–5.8)
RBC # FLD: 14.2 % — SIGNIFICANT CHANGE UP (ref 10.3–14.5)
RH IG SCN BLD-IMP: POSITIVE — SIGNIFICANT CHANGE UP
SODIUM SERPL-SCNC: 130 MMOL/L — LOW (ref 135–145)
WBC # BLD: 16.3 K/UL — HIGH (ref 3.8–10.5)
WBC # FLD AUTO: 16.3 K/UL — HIGH (ref 3.8–10.5)

## 2021-06-24 PROCEDURE — ZZZZZ: CPT

## 2021-06-24 PROCEDURE — 99232 SBSQ HOSP IP/OBS MODERATE 35: CPT

## 2021-06-24 PROCEDURE — 88305 TISSUE EXAM BY PATHOLOGIST: CPT | Mod: 26

## 2021-06-24 PROCEDURE — 37609 LIGATION/BX TEMPORAL ARTERY: CPT | Mod: LT

## 2021-06-24 PROCEDURE — 88313 SPECIAL STAINS GROUP 2: CPT | Mod: 26

## 2021-06-24 RX ORDER — INSULIN LISPRO 100/ML
11 VIAL (ML) SUBCUTANEOUS
Refills: 0 | Status: DISCONTINUED | OUTPATIENT
Start: 2021-06-24 | End: 2021-06-25

## 2021-06-24 RX ORDER — INSULIN GLARGINE 100 [IU]/ML
28 INJECTION, SOLUTION SUBCUTANEOUS AT BEDTIME
Refills: 0 | Status: DISCONTINUED | OUTPATIENT
Start: 2021-06-24 | End: 2021-06-27

## 2021-06-24 RX ADMIN — Medication 11 UNIT(S): at 18:32

## 2021-06-24 RX ADMIN — Medication 40 MILLIGRAM(S): at 05:07

## 2021-06-24 RX ADMIN — Medication 650 MILLIGRAM(S): at 01:24

## 2021-06-24 RX ADMIN — SODIUM CHLORIDE 3 MILLILITER(S): 9 INJECTION INTRAMUSCULAR; INTRAVENOUS; SUBCUTANEOUS at 05:42

## 2021-06-24 RX ADMIN — ATORVASTATIN CALCIUM 40 MILLIGRAM(S): 80 TABLET, FILM COATED ORAL at 23:00

## 2021-06-24 RX ADMIN — Medication 6: at 13:19

## 2021-06-24 RX ADMIN — Medication 650 MILLIGRAM(S): at 00:56

## 2021-06-24 RX ADMIN — Medication 2: at 22:58

## 2021-06-24 RX ADMIN — SODIUM CHLORIDE 3 MILLILITER(S): 9 INJECTION INTRAMUSCULAR; INTRAVENOUS; SUBCUTANEOUS at 22:00

## 2021-06-24 RX ADMIN — Medication 40 MILLIGRAM(S): at 18:33

## 2021-06-24 RX ADMIN — Medication 100 MILLIGRAM(S): at 23:01

## 2021-06-24 RX ADMIN — Medication 100 MILLIGRAM(S): at 13:32

## 2021-06-24 RX ADMIN — PANTOPRAZOLE SODIUM 40 MILLIGRAM(S): 20 TABLET, DELAYED RELEASE ORAL at 05:51

## 2021-06-24 RX ADMIN — Medication 100 MILLIGRAM(S): at 05:10

## 2021-06-24 RX ADMIN — Medication 8 UNIT(S): at 13:18

## 2021-06-24 RX ADMIN — CARVEDILOL PHOSPHATE 3.12 MILLIGRAM(S): 80 CAPSULE, EXTENDED RELEASE ORAL at 18:33

## 2021-06-24 RX ADMIN — CARVEDILOL PHOSPHATE 3.12 MILLIGRAM(S): 80 CAPSULE, EXTENDED RELEASE ORAL at 05:11

## 2021-06-24 RX ADMIN — INSULIN GLARGINE 28 UNIT(S): 100 INJECTION, SOLUTION SUBCUTANEOUS at 22:58

## 2021-06-24 RX ADMIN — Medication 100 MILLIGRAM(S): at 13:23

## 2021-06-24 RX ADMIN — Medication 10: at 18:31

## 2021-06-24 RX ADMIN — Medication 4 MILLIGRAM(S): at 23:01

## 2021-06-24 RX ADMIN — ISOSORBIDE MONONITRATE 60 MILLIGRAM(S): 60 TABLET, EXTENDED RELEASE ORAL at 13:23

## 2021-06-24 RX ADMIN — Medication 650 MILLIGRAM(S): at 13:33

## 2021-06-24 NOTE — PROGRESS NOTE ADULT - ASSESSMENT
70y male with a past medical history/ocular history of DM, HTN, CKD, Afib on Eliquis, HLD, left foot amputation, PDR s/p Intravitreal injections consulted for painless vision loss left.      Ada on CKD  baseline scr appears to be 3.0  ADA likely TISHA and hyperglycemia   Fena>1% peaked 4.77 improving today  CKD likely sec to longstanding DM with retinopathy  Monitor BMP at present  Elevated BUN likely sec to steroid use  echo with normal EF. s/p start NS @ 50cc/hr x1L  renal function better  MOnitor BMP   AVoid further nephrotoxics NSAIDS RCA    HTN  BP uncontrolled  started coreg, up titrated if needed  MOnitor BP and HR    Hyperphosphatemia  sec to RF  Low PO4 diet     HYponatremia  Avoid hypotonic IVF  optimize dm control  check urine na and osmo  continue NS  monitor

## 2021-06-24 NOTE — PROGRESS NOTE ADULT - SUBJECTIVE AND OBJECTIVE BOX
Chief Complaint: t2dm    History:  no vomiting, no hypoglycemia on solumedrol.  will accept once daily insulin injections at d/c    MEDICATIONS  (STANDING):  atorvastatin 40 milliGRAM(s) Oral at bedtime  dextrose 40% Gel 15 Gram(s) Oral once  dextrose 5%. 1000 milliLiter(s) (50 mL/Hr) IV Continuous <Continuous>  dextrose 5%. 1000 milliLiter(s) (100 mL/Hr) IV Continuous <Continuous>  dextrose 50% Injectable 25 Gram(s) IV Push once  dextrose 50% Injectable 12.5 Gram(s) IV Push once  dextrose 50% Injectable 25 Gram(s) IV Push once  diltiazem    milliGRAM(s) Oral daily  doxazosin 4 milliGRAM(s) Oral at bedtime  glucagon  Injectable 1 milliGRAM(s) IntraMuscular once  heparin  Infusion.  Unit(s)/Hr (19 mL/Hr) IV Continuous <Continuous>  hydrALAZINE 100 milliGRAM(s) Oral three times a day  insulin glargine Injectable (LANTUS) 16 Unit(s) SubCutaneous at bedtime  insulin lispro (ADMELOG) corrective regimen sliding scale   SubCutaneous three times a day before meals  insulin lispro (ADMELOG) corrective regimen sliding scale   SubCutaneous at bedtime  insulin lispro Injectable (ADMELOG) 5 Unit(s) SubCutaneous three times a day before meals  isosorbide   mononitrate ER Tablet (IMDUR) 60 milliGRAM(s) Oral daily  methylPREDNISolone sodium succinate Injectable 60 milliGRAM(s) IV Push daily  pantoprazole    Tablet 40 milliGRAM(s) Oral before breakfast  sodium chloride 0.9% lock flush 3 milliLiter(s) IV Push every 8 hours    MEDICATIONS  (PRN):  acetaminophen   Tablet .. 650 milliGRAM(s) Oral every 6 hours PRN Temp greater or equal to 38C (100.4F), Mild Pain (1 - 3)  guaiFENesin Oral Liquid (Sugar-Free) 100 milliGRAM(s) Oral every 6 hours PRN Cough  heparin   Injectable 9000 Unit(s) IV Push every 6 hours PRN For aPTT less than 40  heparin   Injectable 4000 Unit(s) IV Push every 6 hours PRN For aPTT between 40 - 57      Allergies    No Known Allergies    Intolerances      Review of Systems:  Constitutional: No fever  ALL OTHER SYSTEMS REVIEWED AND NEGATIVE      Vital Signs Last 24 Hrs  T(C): 36.6 (24 Jun 2021 13:20), Max: 36.8 (24 Jun 2021 02:51)  T(F): 97.9 (24 Jun 2021 13:20), Max: 98.2 (24 Jun 2021 02:51)  HR: 86 (24 Jun 2021 13:20) (63 - 91)  BP: 195/101 (24 Jun 2021 13:20) (141/77 - 195/101)  BP(mean): 107 (24 Jun 2021 11:00) (88 - 107)  RR: 18 (24 Jun 2021 13:20) (11 - 18)  SpO2: 98% (24 Jun 2021 13:20) (93% - 100%)  GENERAL: NAD, well-developed  GI: Soft, nontender, non distended  SKIN: Dry, intact, No rashes or lesions  PSYCH: Alert and oriented x 3, normal affect, normal mood      CAPILLARY BLOOD GLUCOSE      POCT Blood Glucose.: 259 mg/dL (24 Jun 2021 12:23)  POCT Blood Glucose.: 274 mg/dL (24 Jun 2021 10:34)  POCT Blood Glucose.: 256 mg/dL (24 Jun 2021 07:20)  POCT Blood Glucose.: 295 mg/dL (24 Jun 2021 03:02)  POCT Blood Glucose.: 305 mg/dL (23 Jun 2021 22:46)    POCT Blood Glucose.: 326 mg/dL (23 Jun 2021 13:43)  POCT Blood Glucose.: 371 mg/dL (23 Jun 2021 12:38)  POCT Blood Glucose.: 338 mg/dL (23 Jun 2021 08:36)  POCT Blood Glucose.: 318 mg/dL (22 Jun 2021 21:47)  POCT Blood Glucose.: 343 mg/dL (22 Jun 2021 21:45)  POCT Blood Glucose.: 251 mg/dL (22 Jun 2021 18:32)  POCT Blood Glucose.: 382 mg/dL (22 Jun 2021 12:12)  POCT Blood Glucose.: 340 mg/dL (22 Jun 2021 09:05)  POCT Blood Glucose.: 343 mg/dL (22 Jun 2021 03:29)  POCT Blood Glucose.: 308 mg/dL (22 Jun 2021 03:26)  POCT Blood Glucose.: 212 mg/dL (21 Jun 2021 21:48)  POCT Blood Glucose.: 227 mg/dL (21 Jun 2021 17:19)      06-23    129<L>  |  89<L>  |  120<H>  ----------------------------<  334<H>  4.3   |  20<L>  |  4.77<H>    Ca    9.8      23 Jun 2021 07:06  Phos  5.8     06-23  Mg     1.9     06-23          A1C with Estimated Average Glucose (06.21.21 @ 02:33)    A1C with Estimated Average Glucose Result: 10.6 %    Estimated Average Glucose: 258 mg/dL        Thyroid Function Tests:  06-21 @ 02:46 TSH 2.08 FreeT4 -- T3 -- Anti TPO -- Anti Thyroglobulin Ab -- TSI --

## 2021-06-24 NOTE — PROGRESS NOTE ADULT - SUBJECTIVE AND OBJECTIVE BOX
ODALYSGINAROSLYN EDUARDOUEL  70y  Male      Patient is a 70y old  Male who presents with a chief complaint of L eye blurry vision (24 Jun 2021 18:06)  s/p lt.Temporal artery biopsy.feels ok.no headache,no n/v,no sob,no cp,no fever,no cough.FS runs high-250-300 range    REVIEW OF SYSTEMS:  as above  INTERVAL HPI/OVERNIGHT EVENTS:  T(C): 36.8 (06-24-21 @ 18:33), Max: 36.8 (06-24-21 @ 02:51)  HR: 88 (06-24-21 @ 18:33) (63 - 88)  BP: 145/84 (06-24-21 @ 18:33) (141/77 - 195/101)  RR: 20 (06-24-21 @ 18:33) (11 - 20)  SpO2: 95% (06-24-21 @ 18:33) (93% - 99%)  Wt(kg): --  I&O's Summary    23 Jun 2021 07:01  -  24 Jun 2021 07:00  --------------------------------------------------------  IN: 1030 mL / OUT: 2500 mL / NET: -1470 mL    24 Jun 2021 07:01  -  24 Jun 2021 20:46  --------------------------------------------------------  IN: 100 mL / OUT: 1000 mL / NET: -900 mL      T(C): 36.8 (06-24-21 @ 18:33), Max: 36.8 (06-24-21 @ 02:51)  HR: 88 (06-24-21 @ 18:33) (63 - 88)  BP: 145/84 (06-24-21 @ 18:33) (141/77 - 195/101)  RR: 20 (06-24-21 @ 18:33) (11 - 20)  SpO2: 95% (06-24-21 @ 18:33) (93% - 99%)  Wt(kg): --Vital Signs Last 24 Hrs  T(C): 36.8 (24 Jun 2021 18:33), Max: 36.8 (24 Jun 2021 02:51)  T(F): 98.3 (24 Jun 2021 18:33), Max: 98.3 (24 Jun 2021 18:33)  HR: 88 (24 Jun 2021 18:33) (63 - 88)  BP: 145/84 (24 Jun 2021 18:33) (141/77 - 195/101)  BP(mean): 107 (24 Jun 2021 11:00) (88 - 107)  RR: 20 (24 Jun 2021 18:33) (11 - 20)  SpO2: 95% (24 Jun 2021 18:33) (93% - 99%)    LABS:                        10.4   16.30 )-----------( 215      ( 24 Jun 2021 07:23 )             32.0     06-24    130<L>  |  92<L>  |  120<H>  ----------------------------<  229<H>  4.3   |  21<L>  |  4.11<H>    Ca    9.6      24 Jun 2021 07:23  Phos  5.7     06-24  Mg     1.9     06-24      PTT - ( 24 Jun 2021 00:03 )  PTT:69.3 sec    CAPILLARY BLOOD GLUCOSE      POCT Blood Glucose.: 354 mg/dL (24 Jun 2021 18:16)  POCT Blood Glucose.: 259 mg/dL (24 Jun 2021 12:23)  POCT Blood Glucose.: 274 mg/dL (24 Jun 2021 10:34)  POCT Blood Glucose.: 256 mg/dL (24 Jun 2021 07:20)  POCT Blood Glucose.: 295 mg/dL (24 Jun 2021 03:02)  POCT Blood Glucose.: 305 mg/dL (23 Jun 2021 22:46)            PAST MEDICAL & SURGICAL HISTORY:  Hypertension, unspecified type    Type 2 diabetes mellitus with other neurologic complication, without long-term current use of insulin    DM (diabetes mellitus)    HTN (hypertension)    HLD (hyperlipidemia)    Afib    Retinopathy    Chronic diastolic congestive heart failure    Chronic kidney disease, unspecified CKD stage    Amputation of leg, traumatic, left, subsequent encounter    S/P BKA (below knee amputation) unilateral, left    S/P hip replacement, left    History of surgery on arm        MEDICATIONS  (STANDING):  atorvastatin 40 milliGRAM(s) Oral at bedtime  carvedilol 3.125 milliGRAM(s) Oral every 12 hours  dextrose 40% Gel 15 Gram(s) Oral once  dextrose 50% Injectable 25 Gram(s) IV Push once  dextrose 50% Injectable 12.5 Gram(s) IV Push once  dextrose 50% Injectable 25 Gram(s) IV Push once  diltiazem    milliGRAM(s) Oral daily  doxazosin 4 milliGRAM(s) Oral at bedtime  hydrALAZINE 100 milliGRAM(s) Oral three times a day  insulin glargine Injectable (LANTUS) 28 Unit(s) SubCutaneous at bedtime  insulin lispro (ADMELOG) corrective regimen sliding scale   SubCutaneous three times a day before meals  insulin lispro (ADMELOG) corrective regimen sliding scale   SubCutaneous at bedtime  insulin lispro Injectable (ADMELOG) 11 Unit(s) SubCutaneous three times a day before meals  isosorbide   mononitrate ER Tablet (IMDUR) 60 milliGRAM(s) Oral daily  methylPREDNISolone sodium succinate Injectable 40 milliGRAM(s) IV Push every 12 hours  pantoprazole    Tablet 40 milliGRAM(s) Oral before breakfast  sodium chloride 0.9% lock flush 3 milliLiter(s) IV Push every 8 hours  sodium chloride 0.9%. 1000 milliLiter(s) (50 mL/Hr) IV Continuous <Continuous>  trimethoprim   80 mG/sulfamethoxazole 400 mG 1 Tablet(s) Oral <User Schedule>    MEDICATIONS  (PRN):  acetaminophen   Tablet .. 650 milliGRAM(s) Oral every 6 hours PRN Temp greater or equal to 38C (100.4F), Mild Pain (1 - 3)  fluticasone propionate 50 MICROgram(s)/spray Nasal Spray 1 Spray(s) Both Nostrils two times a day PRN congestion  guaiFENesin Oral Liquid (Sugar-Free) 100 milliGRAM(s) Oral every 6 hours PRN Cough        RADIOLOGY & ADDITIONAL TESTS:    Imaging Personally Reviewed:  [ ] YES  [ ] NO    Consultant(s) Notes Reviewed:  [ ] YES  [ ] NO    PHYSICAL EXAM:  GENERAL: Alert and awake lying in bed in no distress  HEAD:  Atraumatic, Normocephalic s/p lt.Temporal artery biopsy-wound is clean.no bleeding no infection  EYES: EOMI, VINAY, conjunctiva and sclera clear  NECK: Supple, No JVD, Normal thyroid  NERVOUS SYSTEM:  Alert & Oriented X3, Motor and sensory systems are intact,   CHEST/LUNG: Bilateral clear breath sounds, no rhochi, no wheezing, no crepitations,  HEART: Regular rate and rhythm; No murmurs, rubs, or gallops  ABDOMEN: Soft, Nontender, Nondistended; Bowel sounds   [id number]-[id number]        Care Discussed with Consultants/Other Providers [x ] YES  [ ] NO      Code Status: [] Full Code [] DNR [] DNI [] Goals of Care:   Disposition: [] ICU [] Stroke Unit [] RCU []PCU []Floor [] Discharge Home         MELINA [email or web address]

## 2021-06-24 NOTE — PROGRESS NOTE ADULT - ASSESSMENT
69 yo man with history of afib on Eliquis, HFpEF (EF 55-60% 4/2021), HTN, HLD, CKD stage 3-4, DM2 c/b diabetic retinopathy, PVD c/b LLE nec fasc s/p L BKA, and b/l cataracts s/p R eye cataract surgery presents as a transfer from [facility] after he went there for acute onset of L eye blurry vision on Sunday afternoon ~4:30pm., found to have L CRAO.

## 2021-06-24 NOTE — PRE-OP CHECKLIST - PATIENT PROBLEMS/NEEDS
Did call katie and he confirms taking 1/2 tablet.  He is going to call [facility] and check on refills.   
Directions are for 1/2 tablet per day.  Was given a #90 with 3 refills which is almost 24 months worth.   
Patient expressed no known problems or needs

## 2021-06-24 NOTE — PROGRESS NOTE ADULT - SUBJECTIVE AND OBJECTIVE BOX
[facility]   MD PEGGY GARRETT DO ANAM SIDDIQUI ANGELA WONG, PA    TEL:  OFFICE: [phone]  DR HODGES CELL: [phone]  DR. MAX CELL: [phone]  DR. HURTADO CELL: [phone]  MARGARITA WYNNE CELL: [phone]    From 5pm-7am Answering Service [phone]    -- RENAL FOLLOW UP NOTE ---Date of Service 06-24-21 @ 13:21    Patient is a 70y old  Male who presents with a chief complaint of L eye blurry vision (23 Jun 2021 21:12)      Patient seen and examined at bedside. No chest pain/sob    VITALS:  T(F): 97.7 (06-24-21 @ 10:45), Max: 98.2 (06-23-21 @ 18:00)  HR: 73 (06-24-21 @ 11:00)  BP: 166/83 (06-24-21 @ 11:00)  RR: 12 (06-24-21 @ 11:00)  SpO2: 97% (06-24-21 @ 11:00)  Wt(kg): --    06-23 @ 07:01  -  06-24 @ 07:00  --------------------------------------------------------  IN: 1030 mL / OUT: 2500 mL / NET: -1470 mL    06-24 @ 07:01  -  06-24 @ 13:21  --------------------------------------------------------  IN: 100 mL / OUT: 1000 mL / NET: -900 mL        Weight (kg): 108.9 (06-24 @ 08:29)    PHYSICAL EXAM:  Constitutional: NAD  Neck: No JVD  Respiratory: CTAB, no wheezes, rales or rhonchi  Cardiovascular: S1, S2, RRR  Gastrointestinal: BS+, soft, NT/ND  Extremities: No peripheral edema    Hospital Medications:   MEDICATIONS  (STANDING):  atorvastatin 40 milliGRAM(s) Oral at bedtime  carvedilol 3.125 milliGRAM(s) Oral every 12 hours  dextrose 40% Gel 15 Gram(s) Oral once  dextrose 50% Injectable 25 Gram(s) IV Push once  dextrose 50% Injectable 12.5 Gram(s) IV Push once  dextrose 50% Injectable 25 Gram(s) IV Push once  diltiazem    milliGRAM(s) Oral daily  doxazosin 4 milliGRAM(s) Oral at bedtime  hydrALAZINE 100 milliGRAM(s) Oral three times a day  insulin glargine Injectable (LANTUS) 21 Unit(s) SubCutaneous at bedtime  insulin lispro (ADMELOG) corrective regimen sliding scale   SubCutaneous three times a day before meals  insulin lispro (ADMELOG) corrective regimen sliding scale   SubCutaneous at bedtime  insulin lispro Injectable (ADMELOG) 8 Unit(s) SubCutaneous three times a day before meals  isosorbide   mononitrate ER Tablet (IMDUR) 60 milliGRAM(s) Oral daily  methylPREDNISolone sodium succinate Injectable 40 milliGRAM(s) IV Push every 12 hours  pantoprazole    Tablet 40 milliGRAM(s) Oral before breakfast  sodium chloride 0.9% lock flush 3 milliLiter(s) IV Push every 8 hours  sodium chloride 0.9%. 1000 milliLiter(s) (50 mL/Hr) IV Continuous <Continuous>  trimethoprim   80 mG/sulfamethoxazole 400 mG 1 Tablet(s) Oral <User Schedule>      LABS:  06-24    130<L>  |  92<L>  |  120<H>  ----------------------------<  229<H>  4.3   |  21<L>  |  4.11<H>    Ca    9.6      24 Jun 2021 07:23  Phos  5.7     06-24  Mg     1.9     06-24      Creatinine Trend: 4.11 <--, 4.77 <--, 3.52 <--, 3.10 <--, 3.09 <--, 3.44 <--    Phosphorus Level, Serum: 5.7 mg/dL (06-24 @ 07:23)    calcium--  intact [id number]  parathyroid hormone intact, serum--                            10.4   16.30 )-----------( 215      ( 24 Jun 2021 07:23 )             32.0     Urine Studies:  Urinalysis - [06-22-21 @ 14:41]      Color Light Yellow / Appearance Clear / SG 1.018 / pH 6.0      Gluc 500 mg/dL / Ketone Negative  / Bili Negative / Urobili <2 mg/dL       Blood Negative / Protein 100 mg/dL / Leuk Est Negative / Nitrite Negative      RBC 1 / WBC 6 / Hyaline 1 / Gran 1 / Sq Epi  / Non Sq Epi 1 / Bacteria Negative    Urine Creatinine 91      [06-22-21 @ 14:41]  Urine Sodium 25      [06-22-21 @ 14:41]    Iron 35, TIBC 239, %sat 15      [06-21-21 @ 02:46]  Ferritin 99      [06-21-21 @ 02:46]  PTH -- (Ca --)      [06-24-21 @ 07:23]   222  HbA1c 8.0      [01-10-20 @ 11:10]  TSH 2.08      [06-21-21 @ 02:46]  Lipid: chol 190, , HDL 40, LDL --      [06-21-21 @ 02:46]    HBsAg Nonreact      [06-22-21 @ 09:23]  HCV 0.21, Nonreact      [06-22-21 @ 09:23]    ERIN: titer Negative, pattern --      [06-22-21 @ 09:10]  ANCA: cANCA Negative, pANCA Negative, atypical ANCA Indeterminate      [06-22-21 @ 09:10]  Free Light Chains: kappa 6.18, lambda 4.28, ratio = 1.44      [06-22 @ 09:10]    RADIOLOGY & ADDITIONAL STUDIES:

## 2021-06-24 NOTE — PROGRESS NOTE ADULT - ASSESSMENT
69 yo man with PMHx of DM2, HFpEF, HTN, HLD, CKD, and PVD (s/p L BKA) who presented from ophthalmology due to concerns for L central artery occlusion. Endocrine Team consulted for inpatient T2DM management.    Problem 1: Uncontrolled T2DM c/b neuropathy and retinopathy exacerbated by steroids  Home Regimen: Glimepiride 4 mg daily  HbA1c 10.6  - Increase Lantus to 28 units qhs  - Increase Lispro to 11 units AC meals (Hold if patient is NPO)  - Change to moderate dose correctional scale before meals and moderate dose correction qhs  - Monitor FS before meals and qhs (Inpatient Goal 100-180 mg/dl)  - Consistent Carb Diet    Called by primary team, patient will be NPO after MN tonight.  Please give full dose of Lantus as ordered as patient is moderately hyperglycemic on steroids.  Do not recommend reducing dose.    Discharge Plan:   - patient is now amenable to learning insulin pen- once daily  - Please assure RN has taught patient how to use insulin pen and hypoglycemia s/s and management  - DC plan will be basal / po meds  - Patient can f/u with Endocrine outpatient at the location provided below:    [facility]   [address] [place] [id number]  [phone]    Problem 2: HTN  - Management per primary team given primary diagnosis of central artery occlusion     Problem 3: HLD  - Continue statin      Megan Meneses  Nurse Practitioner  Division of Endocrinology & Diabetes  Pager # [phone]      If after 6PM or before 9AM, or on weekends/holidays, please call endocrine answering service for assistance ([phone]).  For nonurgent matters email [email or web address] for assistance.

## 2021-06-25 LAB
ANION GAP SERPL CALC-SCNC: 17 MMOL/L — HIGH (ref 7–14)
BUN SERPL-MCNC: 106 MG/DL — HIGH (ref 7–23)
CALCIUM SERPL-MCNC: 9.4 MG/DL — SIGNIFICANT CHANGE UP (ref 8.4–10.5)
CHLORIDE SERPL-SCNC: 98 MMOL/L — SIGNIFICANT CHANGE UP (ref 98–107)
CO2 SERPL-SCNC: 19 MMOL/L — LOW (ref 22–31)
CREAT SERPL-MCNC: 3.23 MG/DL — HIGH (ref 0.5–1.3)
GLUCOSE BLDC GLUCOMTR-MCNC: 168 MG/DL — HIGH (ref 70–99)
GLUCOSE BLDC GLUCOMTR-MCNC: 195 MG/DL — HIGH (ref 70–99)
GLUCOSE BLDC GLUCOMTR-MCNC: 219 MG/DL — HIGH (ref 70–99)
GLUCOSE BLDC GLUCOMTR-MCNC: 227 MG/DL — HIGH (ref 70–99)
GLUCOSE BLDC GLUCOMTR-MCNC: 272 MG/DL — HIGH (ref 70–99)
GLUCOSE SERPL-MCNC: 165 MG/DL — HIGH (ref 70–99)
HCT VFR BLD CALC: 33.4 % — LOW (ref 39–50)
HGB BLD-MCNC: 10.8 G/DL — LOW (ref 13–17)
MAGNESIUM SERPL-MCNC: 1.9 MG/DL — SIGNIFICANT CHANGE UP (ref 1.6–2.6)
MCHC RBC-ENTMCNC: 26 PG — LOW (ref 27–34)
MCHC RBC-ENTMCNC: 32.3 GM/DL — SIGNIFICANT CHANGE UP (ref 32–36)
MCV RBC AUTO: 80.3 FL — SIGNIFICANT CHANGE UP (ref 80–100)
NRBC # BLD: 0 /100 WBCS — SIGNIFICANT CHANGE UP
NRBC # FLD: 0 K/UL — SIGNIFICANT CHANGE UP
PHOSPHATE SERPL-MCNC: 4.6 MG/DL — HIGH (ref 2.5–4.5)
PLATELET # BLD AUTO: 219 K/UL — SIGNIFICANT CHANGE UP (ref 150–400)
POTASSIUM SERPL-MCNC: 4.6 MMOL/L — SIGNIFICANT CHANGE UP (ref 3.5–5.3)
POTASSIUM SERPL-SCNC: 4.6 MMOL/L — SIGNIFICANT CHANGE UP (ref 3.5–5.3)
RBC # BLD: 4.16 M/UL — LOW (ref 4.2–5.8)
RBC # FLD: 14.3 % — SIGNIFICANT CHANGE UP (ref 10.3–14.5)
SODIUM SERPL-SCNC: 134 MMOL/L — LOW (ref 135–145)
WBC # BLD: 16.54 K/UL — HIGH (ref 3.8–10.5)
WBC # FLD AUTO: 16.54 K/UL — HIGH (ref 3.8–10.5)

## 2021-06-25 PROCEDURE — 93306 TTE W/DOPPLER COMPLETE: CPT | Mod: 26

## 2021-06-25 PROCEDURE — 99232 SBSQ HOSP IP/OBS MODERATE 35: CPT

## 2021-06-25 RX ORDER — APIXABAN 2.5 MG/1
5 TABLET, FILM COATED ORAL EVERY 12 HOURS
Refills: 0 | Status: DISCONTINUED | OUTPATIENT
Start: 2021-06-25 | End: 2021-06-30

## 2021-06-25 RX ORDER — INSULIN LISPRO 100/ML
13 VIAL (ML) SUBCUTANEOUS
Refills: 0 | Status: DISCONTINUED | OUTPATIENT
Start: 2021-06-25 | End: 2021-06-26

## 2021-06-25 RX ADMIN — APIXABAN 5 MILLIGRAM(S): 2.5 TABLET, FILM COATED ORAL at 17:39

## 2021-06-25 RX ADMIN — SODIUM CHLORIDE 3 MILLILITER(S): 9 INJECTION INTRAMUSCULAR; INTRAVENOUS; SUBCUTANEOUS at 21:58

## 2021-06-25 RX ADMIN — SODIUM CHLORIDE 3 MILLILITER(S): 9 INJECTION INTRAMUSCULAR; INTRAVENOUS; SUBCUTANEOUS at 06:28

## 2021-06-25 RX ADMIN — Medication 1 TABLET(S): at 09:03

## 2021-06-25 RX ADMIN — Medication 2: at 21:58

## 2021-06-25 RX ADMIN — ATORVASTATIN CALCIUM 40 MILLIGRAM(S): 80 TABLET, FILM COATED ORAL at 21:57

## 2021-06-25 RX ADMIN — Medication 4: at 18:08

## 2021-06-25 RX ADMIN — Medication 4 MILLIGRAM(S): at 21:55

## 2021-06-25 RX ADMIN — Medication 100 MILLIGRAM(S): at 14:33

## 2021-06-25 RX ADMIN — SODIUM CHLORIDE 3 MILLILITER(S): 9 INJECTION INTRAMUSCULAR; INTRAVENOUS; SUBCUTANEOUS at 13:08

## 2021-06-25 RX ADMIN — Medication 40 MILLIGRAM(S): at 17:33

## 2021-06-25 RX ADMIN — CARVEDILOL PHOSPHATE 3.12 MILLIGRAM(S): 80 CAPSULE, EXTENDED RELEASE ORAL at 06:34

## 2021-06-25 RX ADMIN — ISOSORBIDE MONONITRATE 60 MILLIGRAM(S): 60 TABLET, EXTENDED RELEASE ORAL at 12:53

## 2021-06-25 RX ADMIN — Medication 13 UNIT(S): at 18:09

## 2021-06-25 RX ADMIN — Medication 100 MILLIGRAM(S): at 06:35

## 2021-06-25 RX ADMIN — CARVEDILOL PHOSPHATE 3.12 MILLIGRAM(S): 80 CAPSULE, EXTENDED RELEASE ORAL at 17:34

## 2021-06-25 RX ADMIN — PANTOPRAZOLE SODIUM 40 MILLIGRAM(S): 20 TABLET, DELAYED RELEASE ORAL at 08:58

## 2021-06-25 RX ADMIN — Medication 100 MILLIGRAM(S): at 21:58

## 2021-06-25 RX ADMIN — Medication 240 MILLIGRAM(S): at 06:34

## 2021-06-25 RX ADMIN — Medication 11 UNIT(S): at 08:55

## 2021-06-25 RX ADMIN — Medication 11 UNIT(S): at 12:53

## 2021-06-25 RX ADMIN — Medication 4: at 12:52

## 2021-06-25 RX ADMIN — Medication 2: at 08:54

## 2021-06-25 RX ADMIN — Medication 40 MILLIGRAM(S): at 06:56

## 2021-06-25 RX ADMIN — INSULIN GLARGINE 28 UNIT(S): 100 INJECTION, SOLUTION SUBCUTANEOUS at 21:57

## 2021-06-25 NOTE — PROGRESS NOTE ADULT - SUBJECTIVE AND OBJECTIVE BOX
[facility] [place] [facility]   MD PEGGY GARRETT DO ANAM SIDDIQUI ANGELA WONG, PA    TEL:  OFFICE: [phone]  DR HODGES CELL: [phone]  DR. MAX CELL: [phone]  DR. HURTADO CELL: [phone]  MARGARITA WYNNE CELL: [phone]    From 5pm-7am Answering Service [phone]    -- RENAL FOLLOW UP NOTE ---Date of Service 06-25-21 @ 14:00    Patient is a 70y old  Male who presents with a chief complaint of L eye blurry vision (25 Jun 2021 12:22)      Patient seen and examined at bedside. No chest pain/sob    VITALS:  T(F): 98.2 (06-25-21 @ 10:27), Max: 98.5 (06-25-21 @ 06:27)  HR: 85 (06-25-21 @ 10:27)  BP: 155/87 (06-25-21 @ 10:27)  RR: 18 (06-25-21 @ 10:27)  SpO2: 100% (06-25-21 @ 10:27)  Wt(kg): --    06-24 @ 07:01  -  06-25 @ 07:00  --------------------------------------------------------  IN: 600 mL / OUT: 2500 mL / NET: -1900 mL    06-25 @ 07:01  -  06-25 @ 14:00  --------------------------------------------------------  IN: 0 mL / OUT: 250 mL / NET: -250 mL          PHYSICAL EXAM:  Constitutional: NAD  Neck: No JVD  Respiratory: CTAB, no wheezes, rales or rhonchi  Cardiovascular: S1, S2, RRR  Gastrointestinal: BS+, soft, NT/ND  Extremities: No peripheral edema. left [facility] Medications:   MEDICATIONS  (STANDING):  atorvastatin 40 milliGRAM(s) Oral at bedtime  carvedilol 3.125 milliGRAM(s) Oral every 12 hours  dextrose 40% Gel 15 Gram(s) Oral once  dextrose 50% Injectable 25 Gram(s) IV Push once  dextrose 50% Injectable 12.5 Gram(s) IV Push once  dextrose 50% Injectable 25 Gram(s) IV Push once  diltiazem    milliGRAM(s) Oral daily  doxazosin 4 milliGRAM(s) Oral at bedtime  hydrALAZINE 100 milliGRAM(s) Oral three times a day  insulin glargine Injectable (LANTUS) 28 Unit(s) SubCutaneous at bedtime  insulin lispro (ADMELOG) corrective regimen sliding scale   SubCutaneous three times a day before meals  insulin lispro (ADMELOG) corrective regimen sliding scale   SubCutaneous at bedtime  insulin lispro Injectable (ADMELOG) 13 Unit(s) SubCutaneous three times a day before meals  isosorbide   mononitrate ER Tablet (IMDUR) 60 milliGRAM(s) Oral daily  methylPREDNISolone sodium succinate Injectable 40 milliGRAM(s) IV Push every 12 hours  pantoprazole    Tablet 40 milliGRAM(s) Oral before breakfast  sodium chloride 0.9% lock flush 3 milliLiter(s) IV Push every 8 hours  sodium chloride 0.9%. 1000 milliLiter(s) (50 mL/Hr) IV Continuous <Continuous>  trimethoprim   80 mG/sulfamethoxazole 400 mG 1 Tablet(s) Oral <User Schedule>      LABS:  06-25    134<L>  |  98  |  106<H>  ----------------------------<  165<H>  4.6   |  19<L>  |  3.23<H>    Ca    9.4      25 Jun 2021 06:57  Phos  4.6     06-25  Mg     1.9     06-25      Creatinine Trend: 3.23 <--, 4.11 <--, 4.77 <--, 3.52 <--, 3.10 <--, 3.09 <--, 3.44 <--    Phosphorus Level, Serum: 4.6 mg/dL (06-25 @ 06:57)                              10.8   16.54 )-----------( 219      ( 25 Jun 2021 06:57 )             33.4     Urine Studies:  Urinalysis - [06-22-21 @ 14:41]      Color Light Yellow / Appearance Clear / SG 1.018 / pH 6.0      Gluc 500 mg/dL / Ketone Negative  / Bili Negative / Urobili <2 mg/dL       Blood Negative / Protein 100 mg/dL / Leuk Est Negative / Nitrite Negative      RBC 1 / WBC 6 / Hyaline 1 / Gran 1 / Sq Epi  / Non Sq Epi 1 / Bacteria Negative    Urine Creatinine 91      [06-22-21 @ 14:41]  Urine Sodium 25      [06-22-21 @ 14:41]    Iron 35, TIBC 239, %sat 15      [06-21-21 @ 02:46]  Ferritin 99      [06-21-21 @ 02:46]  PTH -- (Ca --)      [06-24-21 @ 07:23]   222  HbA1c 8.0      [01-10-20 @ 11:10]  TSH 2.08      [06-21-21 @ 02:46]  Lipid: chol 190, , HDL 40, LDL --      [06-21-21 @ 02:46]    HBsAg Nonreact      [06-22-21 @ 09:23]  HCV 0.21, Nonreact      [06-22-21 @ 09:23]    ERIN: titer Negative, pattern --      [06-22-21 @ 09:10]  ANCA: cANCA Negative, pANCA Negative, atypical ANCA Indeterminate      [06-22-21 @ 09:10]  Free Light Chains: kappa 6.18, lambda 4.28, ratio = 1.44      [06-22 @ 09:10]    RADIOLOGY & ADDITIONAL STUDIES:

## 2021-06-25 NOTE — PROGRESS NOTE ADULT - SUBJECTIVE AND OBJECTIVE BOX
This document is currently in progress and therefore incomplete.    [facility] DEPARTMENT OF OPHTHALMOLOGY  ------------------------------------------------------------------------------  Liu Domínguez MD PGY 2  Pager: [phone]  ------------------------------------------------------------------------------    History of Present Illness: Pt is a 69 y/o M with PMH DM, HTN, CKD, Afib on Eliquis, HLD, left foot amputation who reported suddenly being unable to see starting 5/20. Reports history of proliferative diabetic retinopathy in both eyes (left > right), history of receiving injections, mainly in his left eye (last injection was about 4 years ago). Last time he saw an eye doctor (Hao Ram MD) was about 2 years ago. He reports not having any problems since then.     Interval History: No acute events overnight. Today patient denying blurred vision, eye pain, flashes, floaters, FBS, erythema, or discharge.     MEDICATIONS  (STANDING):  apixaban 5 milliGRAM(s) Oral every 12 hours  atorvastatin 40 milliGRAM(s) Oral at bedtime  carvedilol 3.125 milliGRAM(s) Oral every 12 hours  dextrose 40% Gel 15 Gram(s) Oral once  dextrose 50% Injectable 25 Gram(s) IV Push once  dextrose 50% Injectable 12.5 Gram(s) IV Push once  dextrose 50% Injectable 25 Gram(s) IV Push once  diltiazem    milliGRAM(s) Oral daily  doxazosin 4 milliGRAM(s) Oral at bedtime  hydrALAZINE 100 milliGRAM(s) Oral three times a day  insulin glargine Injectable (LANTUS) 28 Unit(s) SubCutaneous at bedtime  insulin lispro (ADMELOG) corrective regimen sliding scale   SubCutaneous three times a day before meals  insulin lispro (ADMELOG) corrective regimen sliding scale   SubCutaneous at bedtime  insulin lispro Injectable (ADMELOG) 13 Unit(s) SubCutaneous three times a day before meals  isosorbide   mononitrate ER Tablet (IMDUR) 60 milliGRAM(s) Oral daily  methylPREDNISolone sodium succinate Injectable 40 milliGRAM(s) IV Push every 12 hours  pantoprazole    Tablet 40 milliGRAM(s) Oral before breakfast  sodium chloride 0.9% lock flush 3 milliLiter(s) IV Push every 8 hours  sodium chloride 0.9%. 1000 milliLiter(s) (50 mL/Hr) IV Continuous <Continuous>  trimethoprim   80 mG/sulfamethoxazole 400 mG 1 Tablet(s) Oral <User Schedule>    MEDICATIONS  (PRN):  acetaminophen   Tablet .. 650 milliGRAM(s) Oral every 6 hours PRN Temp greater or equal to 38C (100.4F), Mild Pain (1 - 3)  fluticasone propionate 50 MICROgram(s)/spray Nasal Spray 1 Spray(s) Both Nostrils two times a day PRN congestion  guaiFENesin Oral Liquid (Sugar-Free) 100 milliGRAM(s) Oral every 6 hours PRN Cough      VITALS: T(C): 36.7 (06-25-21 @ 14:27)  T(F): 98 (06-25-21 @ 14:27), Max: 98.5 (06-25-21 @ 06:27)  HR: 75 (06-25-21 @ 14:27) (75 - 94)  BP: 138/85 (06-25-21 @ 14:27) (136/89 - 168/89)  RR:  (18 - 20)  SpO2:  (95% - 100%)  Wt(kg): --  General: AAO x 3, appropriate mood and affect    Ophthalmology Exam:  Visual acuity (cc): 20/40 OD, 20/70 OS  Pupils: Pt's eyes still dilated from overnight exam.  Intraocular Pressure: 17 OU  Extraocular movements (EOMs): Full OU, no pain, no diplopia  Confrontational Visual Field (CVF): Full OU  Color Plates 12/12 OD, 10/12 OS    Slit Lamp Exam  External: Flat OU.  Lids/Lashes/Lacrimal Ducts: Flat OU.   Sclera/Conjunctiva: White and quiet OU.  Cornea: Clear OU.  Anterior Chamber: Deep and formed OU.    Iris: Flat OU.  Lens: 2+ NS, 3+ PSC OD, PCIOL with mild PCO OS    Imaging  CT angiography neck: No evidence of hemodynamically significant stenosis using NASCET criteria.  Patent vertebral arteries.  No evidence of vascular dissection.  CT angiography brain: No major vessel occlusion or proximal stenosis.  CT perfusion: No perfusion abnormality   CT head noncontrast: No acute intracranial disease.    Assessment and Recommendations:  70y male with a past medical history/ocular history of DM, HTN, CKD, Afib on Eliquis, HLD, left foot amputation, PDR s/p Intravitreal injections consulted for painless vision loss left.    1. 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 Vision improved today to ________ and color plates 10/12 left eye. No APD.  -Unclear etiology at this point. Patient's history of sudden vision loss of left eye is consistent with possible CRAO left eye. However, patient did not have an APD, which would be more consistent with CRAO Patient's vision and color vision significantly improved, which may indicate a moving embolus with reperfusion. However, cherry red spot in macula was not seen on today's dilated exam.  -Outpatient fluorescein angiogram and OCT macula will help yield ocualr diagnosis.  -Agree with stroke work-up. CTH, CTA H/N CT perfusion negative. Recommend MR brain/orbit imaging as appropriate.  Cardiac Evaluation with Echo and Medical optimization   -ESR (64) and CRP (13.8) elevated. Pt reports negative GCA symptoms. Etiology of elevated ESR/CRP may be 2/2 CKD and other medical problems. Appreciate rheumatology consultation. +Left scalp tenderness on exam and there is concern for GCA. Rheum recommends B/L temporal artery biopsy and recommends starting IV solumedrol 60 mg qd.  -Follow-up quantiferon TB test, acute hepatitis panel, IL-6, and immunoglobulins panel.    2. 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  -No signs of neovascularization on exam including iris, disc, or elsewhere.    -Blood sugar control as per Medical Team  -Patient follows with Dr. Marialuisa Ram, retina ophthalmologist. Needs OCT and FA imaging to further elucidate disease, and possible Injection therapy as an outpatient.    Discussed with Dr. Hudson, ophthalmology attending.    Outpatient Follow-up: Patient should follow-up with his/her ophthalmologist or with [facility] Department of Ophthalmology within 1 week of after discharge at:    [address] [facility]. [address] [place] [address]  [phone]    Liu Domínguez MD, PGY-2  Pager: [phone]  Also available on Microsoft Teams     [facility] DEPARTMENT OF OPHTHALMOLOGY  ------------------------------------------------------------------------------  Liu Domínguez MD PGY 2  Pager: [phone]  ------------------------------------------------------------------------------    History of Present Illness: Pt is a 71 y/o M with PMH DM, HTN, CKD, Afib on Eliquis, HLD, left foot amputation who reported suddenly being unable to see starting 5/20. Reports history of proliferative diabetic retinopathy in both eyes (left > right), history of receiving injections, mainly in his left eye (last injection was about 4 years ago). Last time he saw an eye doctor (Hao Ram MD) was about 2 years ago. He reports not having any problems since then.     Interval History: No acute events overnight. Today patient denying blurred vision, eye pain, flashes, floaters, FBS, erythema, or discharge.     MEDICATIONS  (STANDING):  apixaban 5 milliGRAM(s) Oral every 12 hours  atorvastatin 40 milliGRAM(s) Oral at bedtime  carvedilol 3.125 milliGRAM(s) Oral every 12 hours  dextrose 40% Gel 15 Gram(s) Oral once  dextrose 50% Injectable 25 Gram(s) IV Push once  dextrose 50% Injectable 12.5 Gram(s) IV Push once  dextrose 50% Injectable 25 Gram(s) IV Push once  diltiazem    milliGRAM(s) Oral daily  doxazosin 4 milliGRAM(s) Oral at bedtime  hydrALAZINE 100 milliGRAM(s) Oral three times a day  insulin glargine Injectable (LANTUS) 28 Unit(s) SubCutaneous at bedtime  insulin lispro (ADMELOG) corrective regimen sliding scale   SubCutaneous three times a day before meals  insulin lispro (ADMELOG) corrective regimen sliding scale   SubCutaneous at bedtime  insulin lispro Injectable (ADMELOG) 13 Unit(s) SubCutaneous three times a day before meals  isosorbide   mononitrate ER Tablet (IMDUR) 60 milliGRAM(s) Oral daily  methylPREDNISolone sodium succinate Injectable 40 milliGRAM(s) IV Push every 12 hours  pantoprazole    Tablet 40 milliGRAM(s) Oral before breakfast  sodium chloride 0.9% lock flush 3 milliLiter(s) IV Push every 8 hours  sodium chloride 0.9%. 1000 milliLiter(s) (50 mL/Hr) IV Continuous <Continuous>  trimethoprim   80 mG/sulfamethoxazole 400 mG 1 Tablet(s) Oral <User Schedule>    MEDICATIONS  (PRN):  acetaminophen   Tablet .. 650 milliGRAM(s) Oral every 6 hours PRN Temp greater or equal to 38C (100.4F), Mild Pain (1 - 3)  fluticasone propionate 50 MICROgram(s)/spray Nasal Spray 1 Spray(s) Both Nostrils two times a day PRN congestion  guaiFENesin Oral Liquid (Sugar-Free) 100 milliGRAM(s) Oral every 6 hours PRN Cough      VITALS: T(C): 36.7 (06-25-21 @ 14:27)  T(F): 98 (06-25-21 @ 14:27), Max: 98.5 (06-25-21 @ 06:27)  HR: 75 (06-25-21 @ 14:27) (75 - 94)  BP: 138/85 (06-25-21 @ 14:27) (136/89 - 168/89)  RR:  (18 - 20)  SpO2:  (95% - 100%)  Wt(kg): --  General: AAO x 3, appropriate mood and affect    Ophthalmology Exam:  Visual acuity (cc): 20/30-2 OD, 20/50 OS  Pupils: Pt's eyes still dilated from overnight exam.  Intraocular Pressure: 17 OU  Extraocular movements (EOMs): Full OU, no pain, no diplopia  Confrontational Visual Field (CVF): Full OU  Color Plates 12/12 OD, 9/12 OS    Slit Lamp Exam  External: Flat OU.  Lids/Lashes/Lacrimal Ducts: Flat OU.   Sclera/Conjunctiva: White and quiet OU.  Cornea: Clear OU.  Anterior Chamber: Deep and formed OU.    Iris: Flat OU.  Lens: 2+ NS, 3+ PSC OD, PCIOL with mild PCO OS    Imaging  CT angiography neck: No evidence of hemodynamically significant stenosis using NASCET criteria.  Patent vertebral arteries.  No evidence of vascular dissection.  CT angiography brain: No major vessel occlusion or proximal stenosis.  CT perfusion: No perfusion abnormality   CT head noncontrast: No acute intracranial disease.    TTE 1. Mitral annular calcification, otherwise normal mitral  valve. Minimal mitral regurgitation.  2. Moderate concentric left ventricular hypertrophy.  3. Endocardium not well visualized; grossly normal left  ventricular systolic function. Endocardial visualization  enhanced with intravenous injection of echo contrast  (Definity).    Assessment and Recommendations:  70y male with a past medical history/ocular history of DM, HTN, CKD, Afib on Eliquis, HLD, left foot amputation, PDR s/p Intravitreal injections consulted for painless vision loss left.    1. 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 Vision improved today to 20/50 and color plates stable at 9-10/12 left eye. No APD.  -Unclear etiology at this point. Patient's history of sudden vision loss of left eye is consistent with possible CRAO left eye. However, patient did not have an APD, which would be more consistent with CRAO Patient's vision and color vision significantly improved, which may indicate a moving embolus with reperfusion. However, cherry red spot in macula was not seen on today's dilated exam.  -Outpatient fluorescein angiogram and OCT macula will help yield ocualr diagnosis.  -Agree with stroke work-up. CTH, CTA H/N CT perfusion negative. Recommended MR brain/orbit imaging, though unable to obtain given hardware. S/p TTE. Cardiac Evaluation with Echo and Medical optimization   -ESR (64) and CRP (13.8) elevated. Pt reports negative GCA symptoms. Etiology of elevated ESR/CRP may be 2/2 CKD and other medical problems. Appreciate rheumatology consultation. +Left scalp tenderness on exam and there is concern for GCA. S/p B/L temporal artery biopsy and recommends starting IV solumedrol 60 mg qd.  -Follow-up quantiferon TB test, acute hepatitis panel, IL-6, and immunoglobulins panel.    2. 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  -No signs of neovascularization on exam including iris, disc, or elsewhere.    -Blood sugar control as per Medical Team  -Patient follows with Dr. Marialuisa Ram, retina ophthalmologist. Needs OCT and FA imaging to further elucidate disease, and possible Injection therapy as an outpatient.    Discussed with Dr. Hudson, ophthalmology attending.    Outpatient Follow-up: Patient should follow-up with his/her ophthalmologist or with [facility] Department of Ophthalmology within 1 week of after discharge at:    [address] [facility]. [address]  [place] [address]  [phone]    Liu Domínguez MD, PGY-2  Pager: [phone]  Also available on Microsoft Teams

## 2021-06-25 NOTE — CONSULT NOTE ADULT - SUBJECTIVE AND OBJECTIVE BOX
Frank Ghosh MD  Interventional Cardiology / Endovascular Specialist  [place] Office : [address] [place] [address] [place] [address]  Tel:   [place] Office : [address] [facility] [place] [address]  Tel: [phone]  Cell : [phone]      HISTORY OF PRESENTING ILLNESS:    69 yo man with history of afib on Eliquis 2.5 mg po BID, HFpEF (EF 55-60% 4/2021), HTN, HLD, CKD stage 3-4, DM2 c/b diabetic retinopathy, PVD c/b LLE nec fasc s/p L BKA, and b/l cataracts s/p R eye cataract surgery presents as a transfer from [facility] after he went there for acute onset of L eye blurry vision on Sunday afternoon ~4:30pm. Pt states that as he was watching TV and about to start a new movie, he suddenly developed L eye blurry vision, which he describes as a fuzziness. He has poor vision in his R eye at baseline but had near perfect vision in his L eye prior to this episode. Pt states that immediately before the blurry vision started, he felt slightly dizzy. Denies any associated double vision, eye pain, increased tearing, irritation, redness, or new flashes/floaters. Also denies any headaches, numbness/tingling, limb weakness, chest pain, SOB, palpitations, fevers, chills, cough, abdominal pain, nausea, vomiting, diarrhea, constipation, or urinary symptoms. At [facility], pt was found to have left CRAO, and Neurology was consulted for stroke workup. Pt was transferred to [facility] for ophthalmology eval. Found to have Lt. CRAO. Also being treated for GCA , s/p temporal artery biopsy     PAST MEDICAL & SURGICAL HISTORY:  Hypertension, unspecified type    Type 2 diabetes mellitus with other neurologic complication, without long-term current use of insulin    DM (diabetes mellitus)    HTN (hypertension)    HLD (hyperlipidemia)    Afib    Retinopathy    Chronic diastolic congestive heart failure    Chronic kidney disease, unspecified CKD stage    Amputation of leg, traumatic, left, subsequent encounter    S/P BKA (below knee amputation) unilateral, left    S/P hip replacement, left    History of surgery on arm        SOCIAL HISTORY: Substance Use (street drugs): ( x ) never used  (  ) other:    FAMILY HISTORY:  No pertinent family history in first degree relatives    Family history of heart failure (Father)        REVIEW OF SYSTEMS:  CONSTITUTIONAL: No fever, weight loss, or fatigue  EYES: No eye pain, visual disturbances, or discharge  ENMT:  No difficulty hearing, tinnitus, vertigo; No sinus or throat pain  BREASTS: No pain, masses, or nipple discharge  GASTROINTESTINAL: No abdominal or epigastric pain. No nausea, vomiting, or hematemesis; No diarrhea or constipation. No melena or hematochezia.  GENITOURINARY: No dysuria, frequency, hematuria, or incontinence  NEUROLOGICAL: No headaches, memory loss, loss of strength, numbness, or tremors  ENDOCRINE: No heat or cold intolerance; No hair loss  MUSCULOSKELETAL: No joint pain or swelling; No muscle, back, or extremity pain  PSYCHIATRIC: No depression, anxiety, mood swings, or difficulty sleeping  HEME/LYMPH: No easy bruising, or bleeding gums  All others negative    MEDICATIONS:  apixaban 5 milliGRAM(s) Oral every 12 hours  carvedilol 3.125 milliGRAM(s) Oral every 12 hours  diltiazem    milliGRAM(s) Oral daily  doxazosin 4 milliGRAM(s) Oral at bedtime  hydrALAZINE 100 milliGRAM(s) Oral three times a day  isosorbide   mononitrate ER Tablet (IMDUR) 60 milliGRAM(s) Oral daily    trimethoprim   80 mG/sulfamethoxazole 400 mG 1 Tablet(s) Oral <User Schedule>    guaiFENesin Oral Liquid (Sugar-Free) 100 milliGRAM(s) Oral every 6 hours PRN    acetaminophen   Tablet .. 650 milliGRAM(s) Oral every 6 hours PRN    pantoprazole    Tablet 40 milliGRAM(s) Oral before breakfast    atorvastatin 40 milliGRAM(s) Oral at bedtime  dextrose 40% Gel 15 Gram(s) Oral once  dextrose 50% Injectable 25 Gram(s) IV Push once  dextrose 50% Injectable 12.5 Gram(s) IV Push once  dextrose 50% Injectable 25 Gram(s) IV Push once  insulin glargine Injectable (LANTUS) 28 Unit(s) SubCutaneous at bedtime  insulin lispro (ADMELOG) corrective regimen sliding scale   SubCutaneous three times a day before meals  insulin lispro (ADMELOG) corrective regimen sliding scale   SubCutaneous at bedtime  insulin lispro Injectable (ADMELOG) 13 Unit(s) SubCutaneous three times a day before meals  methylPREDNISolone sodium succinate Injectable 40 milliGRAM(s) IV Push every 12 hours    fluticasone propionate 50 MICROgram(s)/spray Nasal Spray 1 Spray(s) Both Nostrils two times a day PRN  sodium chloride 0.9% lock flush 3 milliLiter(s) IV Push every 8 hours  sodium chloride 0.9%. 1000 milliLiter(s) IV Continuous <Continuous>      FAMILY HISTORY:  No pertinent family history in first degree relatives    Family history of heart failure (Father)          Allergies    No Known Allergies    Intolerances    	      PHYSICAL EXAM:  T(C): 36.7 (06-25-21 @ 14:27), Max: 36.9 (06-24-21 @ 22:30)  HR: 75 (06-25-21 @ 14:27) (75 - 94)  BP: 138/85 (06-25-21 @ 14:27) (136/89 - 168/89)  RR: 18 (06-25-21 @ 14:27) (18 - 20)  SpO2: 100% (06-25-21 @ 14:27) (95% - 100%)  Wt(kg): --  I&O's Summary    24 Jun 2021 07:01  -  25 Jun 2021 07:00  --------------------------------------------------------  IN: 600 mL / OUT: 2500 mL / NET: -1900 mL    25 Jun 2021 07:01  -  25 Jun 2021 14:45  --------------------------------------------------------  IN: 550 mL / OUT: 750 mL / NET: -200 mL        GENERAL: NAD  EYES: conjunctiva and sclera clear  ENMT: No tonsillar erythema, exudates, or enlargement  Cardiovascular: Normal S1 S2, No JVD, No murmur  Respiratory: Lungs clear to auscultation	  Gastrointestinal:  Soft, Non-tender, + BS	  Extremities: Rt BKA      LABS:	 	    CARDIAC MARKERS:                                  10.8   16.54 )-----------( 219      ( 25 Jun 2021 06:57 )             33.4     06-25    134<L>  |  98  |  106<H>  ----------------------------<  165<H>  4.6   |  19<L>  |  3.23<H>    Ca    9.4      25 Jun 2021 06:57  Phos  4.6     06-25  Mg     1.9     06-25      proBNP:   Lipid Profile:   HgA1c:   TSH:     Consultant(s) Notes Reviewed:  [x ] YES  [ ] NO    Care Discussed with Consultants/Other Providers [ x] YES  [ ] NO    Imaging Personally Reviewed independently:  [x] YES  [ ] NO    All labs, radiologic studies, vitals, orders and medications list reviewed. Patient is seen and examined at bedside. Case discussed with medical team.    ASSESSMENT/PLAN:

## 2021-06-25 NOTE — PROGRESS NOTE ADULT - ASSESSMENT
69 yo man with history of afib on Eliquis, HFpEF (EF 55-60% 4/2021), HTN, HLD, CKD stage 3-4, DM2 c/b diabetic retinopathy, PVD c/b LLE nec fasc s/p L BKA, and b/l cataracts s/p R eye cataract surgery presents as a transfer from [facility] after he went there for acute onset of L eye blurry vision on Sunday afternoon ~4:30pm., found to have L CRAO.

## 2021-06-25 NOTE — PROGRESS NOTE ADULT - SUBJECTIVE AND OBJECTIVE BOX
ROCCO MORROWALESHA  [id number]    INTERVAL HPI/OVERNIGHT EVENTS:    MEDICATIONS  (STANDING):  apixaban 5 milliGRAM(s) Oral every 12 hours  atorvastatin 40 milliGRAM(s) Oral at bedtime  carvedilol 3.125 milliGRAM(s) Oral every 12 hours  dextrose 40% Gel 15 Gram(s) Oral once  dextrose 50% Injectable 25 Gram(s) IV Push once  dextrose 50% Injectable 12.5 Gram(s) IV Push once  dextrose 50% Injectable 25 Gram(s) IV Push once  diltiazem    milliGRAM(s) Oral daily  doxazosin 4 milliGRAM(s) Oral at bedtime  hydrALAZINE 100 milliGRAM(s) Oral three times a day  insulin glargine Injectable (LANTUS) 28 Unit(s) SubCutaneous at bedtime  insulin lispro (ADMELOG) corrective regimen sliding scale   SubCutaneous three times a day before meals  insulin lispro (ADMELOG) corrective regimen sliding scale   SubCutaneous at bedtime  insulin lispro Injectable (ADMELOG) 13 Unit(s) SubCutaneous three times a day before meals  isosorbide   mononitrate ER Tablet (IMDUR) 60 milliGRAM(s) Oral daily  methylPREDNISolone sodium succinate Injectable 40 milliGRAM(s) IV Push every 12 hours  pantoprazole    Tablet 40 milliGRAM(s) Oral before breakfast  sodium chloride 0.9% lock flush 3 milliLiter(s) IV Push every 8 hours  sodium chloride 0.9%. 1000 milliLiter(s) (50 mL/Hr) IV Continuous <Continuous>  trimethoprim   80 mG/sulfamethoxazole 400 mG 1 Tablet(s) Oral <User Schedule>    MEDICATIONS  (PRN):  acetaminophen   Tablet .. 650 milliGRAM(s) Oral every 6 hours PRN Temp greater or equal to 38C (100.4F), Mild Pain (1 - 3)  fluticasone propionate 50 MICROgram(s)/spray Nasal Spray 1 Spray(s) Both Nostrils two times a day PRN congestion  guaiFENesin Oral Liquid (Sugar-Free) 100 milliGRAM(s) Oral every 6 hours PRN Cough      Allergies    No Known Allergies    Intolerances        Review of Systems:   General: No fevers/chills, no fatigue  HEENT: No blurry vision, dysphagia, or odynophagia  CVS: No CP/palpitations  Resp: No SOB/wheezing  GI: No N/V/C/D/abdominal pain  MSK:   Skin: No new rashes  Neuro: No headaches      Vital Signs Last 24 Hrs  T(C): 36.7 (25 Jun 2021 14:27), Max: 36.9 (24 Jun 2021 22:30)  T(F): 98 (25 Jun 2021 14:27), Max: 98.5 (25 Jun 2021 06:27)  HR: 75 (25 Jun 2021 14:27) (75 - 94)  BP: 138/85 (25 Jun 2021 14:27) (136/89 - 168/89)  BP(mean): --  RR: 18 (25 Jun 2021 14:27) (18 - 20)  SpO2: 100% (25 Jun 2021 14:27) (95% - 100%)    Physical Exam:  General: NAD  HEENT: EOMI, MMM  Cardio: +S1/S2, RRR  Resp: CTA b/l  GI: +BS, soft, NT/ND  MSK:  Neuro: AAOx3  Psych: wnl    LABS:                        10.8   16.54 )-----------( 219      ( 25 Jun 2021 06:57 )             33.4     06-25    134<L>  |  98  |  106<H>  ----------------------------<  165<H>  4.6   |  19<L>  |  3.23<H>    Ca    9.4      25 Jun 2021 06:57  Phos  4.6     06-25  Mg     1.9     06-25      PTT - ( 24 Jun 2021 00:03 )  PTT:69.3 sec        RADIOLOGY & ADDITIONAL TESTS:

## 2021-06-25 NOTE — PROGRESS NOTE ADULT - SUBJECTIVE AND OBJECTIVE BOX
ROCCO JC  70y  Male      Patient is a 70y old  Male who presents with a chief complaint of L eye blurry vision (25 Jun 2021 16:03)  Patient feels better.lt.eye vision is improving.no headacjes.no fever,no sob,no cp,no cough    REVIEW OF SYSTEMS:  CONSTITUTIONAL: No fever  RESPIRATORY: No cough, hemoptysis or shortness of breath  CARDIOVASCULAR: No chest pain, palpitations, dizziness, or leg swelling  GASTROINTESTINAL: No abdominal pain. nausea, vomiting, hematemesis  GENITOURINARY: No dysuria, frequency, hematuria   NEUROLOGICAL: No headaches, no dizziness  MUSCULOSKELETAL: No joint pain or swelling;     INTERVAL HPI/OVERNIGHT EVENTS:  T(C): 36.7 (06-25-21 @ 15:30), Max: 36.9 (06-24-21 @ 22:30)  HR: 92 (06-25-21 @ 15:30) (75 - 94)  BP: 132/86 (06-25-21 @ 15:30) (132/86 - 168/89)  RR: 18 (06-25-21 @ 15:30) (18 - 18)  SpO2: 100% (06-25-21 @ 15:30) (99% - 100%)  Wt(kg): --  I&O's Summary    24 Jun 2021 07:01  -  25 Jun 2021 07:00  --------------------------------------------------------  IN: 600 mL / OUT: 2500 mL / NET: -1900 mL    25 Jun 2021 07:01  -  25 Jun 2021 20:55  --------------------------------------------------------  IN: 550 mL / OUT: 750 mL / NET: -200 mL      T(C): 36.7 (06-25-21 @ 15:30), Max: 36.9 (06-24-21 @ 22:30)  HR: 92 (06-25-21 @ 15:30) (75 - 94)  BP: 132/86 (06-25-21 @ 15:30) (132/86 - 168/89)  RR: 18 (06-25-21 @ 15:30) (18 - 18)  SpO2: 100% (06-25-21 @ 15:30) (99% - 100%)  Wt(kg): --Vital Signs Last 24 Hrs  T(C): 36.7 (25 Jun 2021 15:30), Max: 36.9 (24 Jun 2021 22:30)  T(F): 98.1 (25 Jun 2021 15:30), Max: 98.5 (25 Jun 2021 06:27)  HR: 92 (25 Jun 2021 15:30) (75 - 94)  BP: 132/86 (25 Jun 2021 15:30) (132/86 - 168/89)  BP(mean): --  RR: 18 (25 Jun 2021 15:30) (18 - 18)  SpO2: 100% (25 Jun 2021 15:30) (99% - 100%)    LABS:                        10.8   16.54 )-----------( 219      ( 25 Jun 2021 06:57 )             33.4     06-25    134<L>  |  98  |  106<H>  ----------------------------<  165<H>  4.6   |  19<L>  |  3.23<H>    Ca    9.4      25 Jun 2021 06:57  Phos  4.6     06-25  Mg     1.9     06-25      PTT - ( 24 Jun 2021 00:03 )  PTT:69.3 sec    CAPILLARY BLOOD GLUCOSE      POCT Blood Glucose.: 219 mg/dL (25 Jun 2021 17:44)  POCT Blood Glucose.: 227 mg/dL (25 Jun 2021 12:32)  POCT Blood Glucose.: 168 mg/dL (25 Jun 2021 08:42)  POCT Blood Glucose.: 195 mg/dL (25 Jun 2021 03:47)  POCT Blood Glucose.: 296 mg/dL (24 Jun 2021 22:41)            PAST MEDICAL & SURGICAL HISTORY:  Hypertension, unspecified type    Type 2 diabetes mellitus with other neurologic complication, without long-term current use of insulin    DM (diabetes mellitus)    HTN (hypertension)    HLD (hyperlipidemia)    Afib    Retinopathy    Chronic diastolic congestive heart failure    Chronic kidney disease, unspecified CKD stage    Amputation of leg, traumatic, left, subsequent encounter    S/P BKA (below knee amputation) unilateral, left    S/P hip replacement, left    History of surgery on arm        MEDICATIONS  (STANDING):  apixaban 5 milliGRAM(s) Oral every 12 hours  atorvastatin 40 milliGRAM(s) Oral at bedtime  carvedilol 3.125 milliGRAM(s) Oral every 12 hours  dextrose 40% Gel 15 Gram(s) Oral once  dextrose 50% Injectable 25 Gram(s) IV Push once  dextrose 50% Injectable 12.5 Gram(s) IV Push once  dextrose 50% Injectable 25 Gram(s) IV Push once  diltiazem    milliGRAM(s) Oral daily  doxazosin 4 milliGRAM(s) Oral at bedtime  hydrALAZINE 100 milliGRAM(s) Oral three times a day  insulin glargine Injectable (LANTUS) 28 Unit(s) SubCutaneous at bedtime  insulin lispro (ADMELOG) corrective regimen sliding scale   SubCutaneous three times a day before meals  insulin lispro (ADMELOG) corrective regimen sliding scale   SubCutaneous at bedtime  insulin lispro Injectable (ADMELOG) 13 Unit(s) SubCutaneous three times a day before meals  isosorbide   mononitrate ER Tablet (IMDUR) 60 milliGRAM(s) Oral daily  methylPREDNISolone sodium succinate Injectable 40 milliGRAM(s) IV Push every 12 hours  pantoprazole    Tablet 40 milliGRAM(s) Oral before breakfast  sodium chloride 0.9% lock flush 3 milliLiter(s) IV Push every 8 hours  sodium chloride 0.9%. 1000 milliLiter(s) (50 mL/Hr) IV Continuous <Continuous>  trimethoprim   80 mG/sulfamethoxazole 400 mG 1 Tablet(s) Oral <User Schedule>    MEDICATIONS  (PRN):  acetaminophen   Tablet .. 650 milliGRAM(s) Oral every 6 hours PRN Temp greater or equal to 38C (100.4F), Mild Pain (1 - 3)  fluticasone propionate 50 MICROgram(s)/spray Nasal Spray 1 Spray(s) Both Nostrils two times a day PRN congestion  guaiFENesin Oral Liquid (Sugar-Free) 100 milliGRAM(s) Oral every 6 hours PRN Cough        RADIOLOGY & ADDITIONAL TESTS:    Imaging Personally Reviewed:  [ ] YES  [ ] NO    Consultant(s) Notes Reviewed:  [x ] YES  [ ] NO    PHYSICAL EXAM:  GENERAL: Alert and awake lying in bed in no distress  HEAD:  Atraumatic, Normocephalic  EYES: EOMI, VINAY, conjunctiva and sclera clear  NECK: Supple, No JVD, Normal thyroid  NERVOUS SYSTEM:  Alert & Oriented X3, Motor and sensory systems are intact,   CHEST/LUNG: Bilateral clear breath sounds, no rhochi, no wheezing, no crepitations,  HEART: Regular rate and rhythm; No murmurs, rubs, or gallops  ABDOMEN: Soft, Nontender, Nondistended; Bowel sounds present  EXTREMITIES:   Peripheral Pulses are palpable, no  edema        Care Discussed with Consultants/Other Providers [ ] YES  [ ] NO      Code Status: [] Full Code [] DNR [] DNI [] Goals of Care:   Disposition: [] ICU [] Stroke Unit [] RCU []PCU []Floor [] Discharge Home         [facility]ELINA Hoff.[facility]

## 2021-06-25 NOTE — PROGRESS NOTE ADULT - SUBJECTIVE AND OBJECTIVE BOX
Neurology Progress Note    S: Patient seen and examined. No new events overnight. patient denied CP, SOB, HA or pain. TA bx     Medication:  MEDICATIONS  (STANDING):  atorvastatin 40 milliGRAM(s) Oral at bedtime  carvedilol 3.125 milliGRAM(s) Oral every 12 hours  dextrose 40% Gel 15 Gram(s) Oral once  dextrose 50% Injectable 25 Gram(s) IV Push once  dextrose 50% Injectable 12.5 Gram(s) IV Push once  dextrose 50% Injectable 25 Gram(s) IV Push once  diltiazem    milliGRAM(s) Oral daily  doxazosin 4 milliGRAM(s) Oral at bedtime  hydrALAZINE 100 milliGRAM(s) Oral three times a day  insulin glargine Injectable (LANTUS) 28 Unit(s) SubCutaneous at bedtime  insulin lispro (ADMELOG) corrective regimen sliding scale   SubCutaneous three times a day before meals  insulin lispro (ADMELOG) corrective regimen sliding scale   SubCutaneous at bedtime  insulin lispro Injectable (ADMELOG) 11 Unit(s) SubCutaneous three times a day before meals  isosorbide   mononitrate ER Tablet (IMDUR) 60 milliGRAM(s) Oral daily  methylPREDNISolone sodium succinate Injectable 40 milliGRAM(s) IV Push every 12 hours  pantoprazole    Tablet 40 milliGRAM(s) Oral before breakfast  sodium chloride 0.9% lock flush 3 milliLiter(s) IV Push every 8 hours  sodium chloride 0.9%. 1000 milliLiter(s) (50 mL/Hr) IV Continuous <Continuous>  trimethoprim   80 mG/sulfamethoxazole 400 mG 1 Tablet(s) Oral <User Schedule>    MEDICATIONS  (PRN):  acetaminophen   Tablet .. 650 milliGRAM(s) Oral every 6 hours PRN Temp greater or equal to 38C (100.4F), Mild Pain (1 - 3)  fluticasone propionate 50 MICROgram(s)/spray Nasal Spray 1 Spray(s) Both Nostrils two times a day PRN congestion  guaiFENesin Oral Liquid (Sugar-Free) 100 milliGRAM(s) Oral every 6 hours PRN Cough      Vitals:  Vital Signs Last 24 Hrs  T(C): 36.9 (2021 06:27), Max: 36.9 (2021 22:30)  T(F): 98.5 (2021 06:27), Max: 98.5 (2021 06:27)  HR: 94 (2021 06:27) (65 - 94)  BP: 168/89 (2021 06:27) (136/89 - 195/101)  BP(mean): 107 (2021 11:00) (88 - 107)  RR: 18 (2021 06:27) (11 - 20)  SpO2: 100% (2021 06:27) (93% - 100%)    PHYSICAL EXAM:  GENERAL: NAD on room air  HEENT: Normocephalic;  conjunctivae and sclerae clear; moist mucous membranes;   NECK: Supple  EXTREMITIES: No cyanosis; no edema; no calf tenderness  SKIN: Warm and dry; no rash             Neurological Exam:  - Mental Status: Alert, oriented to person, place, time, situation; speech is fluent with intact naming, repetition, and comprehension; follows commands  - Cranial Nerves II-XII:    II:  PERRL 3mm b/l; visual fields are full to confrontation; visual acuity 20/30 OU  III, IV, VI:  EOMI, no nystagmus  V:  facial sensation is intact in the V1-V3 distribution bilaterally.  VII:  face is symmetric with normal eye closure and smile  VIII:  hearing is intact to finger rub  IX, X:  uvula is midline and soft palate rises symmetrically  XI:  head turning and shoulder shrug are intact bilaterally  XII:  tongue protrudes in the midline  - Motor: strength 5/5 throughout, LLE deferred due to below knee amputation; normal muscle bulk and tone throughout; no pronator drift  - Reflexes:  2+ and symmetric at the biceps, triceps, brachioradialis, knees, and ankles;  plantar reflexes downgoing bilaterally  - Sensory:  intact to light touch, pin prick, vibration, and joint-position sense throughout  - Coordination:  finger-nose-finger and heel-knee-shin intact without dysmetria; rapid alternating hand movements intact  - Gait:  normal steps, base, arm swing, and turning; Romberg testing is negative    I personally reviewed the below data/images/labs:    CBC Full  -  ( 2021 06:57 )  WBC Count : 16.54 K/uL  RBC Count : 4.16 M/uL  Hemoglobin : 10.8 g/dL  Hematocrit : 33.4 %  Platelet Count - Automated : 219 K/uL  Mean Cell Volume : 80.3 fL  Mean Cell Hemoglobin : 26.0 pg  Mean Cell Hemoglobin Concentration : 32.3 gm/dL  Auto Neutrophil # : x  Auto Lymphocyte # : x  Auto Monocyte # : x  Auto Eosinophil # : x  Auto Basophil # : x  Auto Neutrophil % : x  Auto Lymphocyte % : x  Auto Monocyte % : x  Auto Eosinophil % : x  Auto Basophil % : x    06    134<L>  |  98  |  106<H>  ----------------------------<  165<H>  4.6   |  19<L>  |  3.23<H>    Ca    9.4      2021 06:57  Phos  4.6       Mg     1.9             Urinalysis Basic - ( 2021 07:56 )    Color: Colorless / Appearance: Clear / S.016 / pH: x  Gluc: x / Ketone: Negative  / Bili: Negative / Urobili: <2 mg/dL   Blood: x / Protein: 30 mg/dL / Nitrite: Negative   Leuk Esterase: Negative / RBC: 0 /HPF / WBC 1 /HPF   Sq Epi: x / Non Sq Epi: 0 /HPF / Bacteria: Negative      Gary Results  CT angiography neck: No evidence of hemodynamically significant stenosis using NASCET criteria.  Patent vertebral arteries.  No evidence of vascular dissection.  CT angiography brain: No major vessel occlusion or proximal stenosis.  CTH: No acute intracranial disease.  CT perfusion: No perfusion abnormality

## 2021-06-25 NOTE — PROGRESS NOTE ADULT - SUBJECTIVE AND OBJECTIVE BOX
VASCULAR SURGERY PROGRESS NOTE    INTERVAL EVENTS: Patient POD1 from temporal artery biopsy, recovering well. Denies pain, changes in vision      OBJECTIVE:    Vital Signs Last 24 Hrs  T(C): 36.8 (25 Jun 2021 10:27), Max: 36.9 (24 Jun 2021 22:30)  T(F): 98.2 (25 Jun 2021 10:27), Max: 98.5 (25 Jun 2021 06:27)  HR: 85 (25 Jun 2021 10:27) (83 - 94)  BP: 155/87 (25 Jun 2021 10:27) (136/89 - 195/101)  BP(mean): --  RR: 18 (25 Jun 2021 10:27) (18 - 20)  SpO2: 100% (25 Jun 2021 10:27) (95% - 100%)    General Appearance: Resting comfortably, no acute distress  HEENT: L temporal incision site C/D/I, no erythema/hematoma  Chest: non-labored breathing, no respiratory distress  CV: Pulse regular presently  Abdomen: Soft, non-tender, non-distended  Extremities: warm and well perfused    I&O's Summary    24 Jun 2021 07:01  -  25 Jun 2021 07:00  --------------------------------------------------------  IN: 600 mL / OUT: 2500 mL / NET: -1900 mL    25 Jun 2021 07:01  -  25 Jun 2021 12:22  --------------------------------------------------------  IN: 0 mL / OUT: 250 mL / NET: -250 mL      I&O's Detail    24 Jun 2021 07:01  -  25 Jun 2021 07:00  --------------------------------------------------------  IN:    Oral Fluid: 500 mL    sodium chloride 0.9%: 100 mL  Total IN: 600 mL    OUT:    Voided (mL): 2500 mL  Total OUT: 2500 mL    Total NET: -1900 mL      25 Jun 2021 07:01  -  25 Jun 2021 12:22  --------------------------------------------------------  IN:  Total IN: 0 mL    OUT:    Voided (mL): 250 mL  Total OUT: 250 mL    Total NET: -250 mL            LABS:                        10.8   16.54 )-----------( 219      ( 25 Jun 2021 06:57 )             33.4     06-25    134<L>  |  98  |  106<H>  ----------------------------<  165<H>  4.6   |  19<L>  |  3.23<H>    Ca    9.4      25 Jun 2021 06:57  Phos  4.6     06-25  Mg     1.9     06-25      PTT - ( 24 Jun 2021 00:03 )  PTT:69.3 sec      RADIOLOGY & ADDITIONAL STUDIES:

## 2021-06-25 NOTE — PROGRESS NOTE ADULT - SUBJECTIVE AND OBJECTIVE BOX
ANESTHESIA POSTOP CHECK    70y Male POSTOP DAY 1     Vital Signs Last 24 Hrs  T(C): 36.9 (25 Jun 2021 06:27), Max: 36.9 (24 Jun 2021 22:30)  T(F): 98.5 (25 Jun 2021 06:27), Max: 98.5 (25 Jun 2021 06:27)  HR: 94 (25 Jun 2021 06:27) (65 - 94)  BP: 168/89 (25 Jun 2021 06:27) (136/89 - 195/101)  BP(mean): 107 (24 Jun 2021 11:00) (88 - 107)  RR: 18 (25 Jun 2021 06:27) (11 - 20)  SpO2: 100% (25 Jun 2021 06:27) (93% - 100%)  I&O's Summary    24 Jun 2021 07:01  -  25 Jun 2021 07:00  --------------------------------------------------------  IN: 600 mL / OUT: 2500 mL / NET: -1900 mL        [X ] NO APPARENT ANESTHESIA COMPLICATIONS      Comments:

## 2021-06-25 NOTE — CONSULT NOTE ADULT - ASSESSMENT
EKG SR RBBB     Echo: < from: TTE Echo Complete w/o Contrast w/ Doppler (04.25.21 @ 09:38) >   1. Left ventricular ejection fraction, by visual estimation, is 55 to 60%.   2. Normal global left ventricular systolic function.   3. Normal left ventricular internal cavity size.   4. There is severe concentric left ventricular hypertrophy.   5. Moderate mitral annular calcification.   6. Thickening of the anterior and posterior mitral valve leaflets.   7. Trace mitral valve regurgitation.   8. Peak transaortic gradient equals 21.2 mmHg, mean transaortic gradient equals 8.8 mmHg, the calculated aortic valve area equals 1.85 cm² by the continuity equation consistent with mild aortic stenosis.    < end of copied text >     Assessment and Plan:    1) Afib : agree with Neuro, pt needs to be on Eliquis 5 mg po BID as <80yrs of age, >60 kg in weight even though creat. >1.5 , pt on Dilt and Coreg if they are home meds can continue     2) GCA : t/t per rhum     3) DVT PPX Eliquis

## 2021-06-25 NOTE — PROGRESS NOTE ADULT - ASSESSMENT
70y male with a past medical history/ocular history of DM, HTN, CKD, Afib on Eliquis, HLD, left foot amputation, PDR s/p Intravitreal injections consulted for painless vision loss left.      Ada on CKD  baseline scr appears to be 3.0  ADA likely TISHA and hyperglycemia   Fena>1% peaked 4.77 improving today  CKD likely sec to longstanding DM with retinopathy  Monitor BMP at present  Elevated BUN likely sec to steroid use  renal function better  MOnitor BMP   AVoid further nephrotoxics NSAIDS RCA    HTN  BP uncontrolled  started coreg, up titrated if needed  MOnitor BP and HR    Hyperphosphatemia  sec to RF  Low PO4 diet     HYponatremia  Avoid hypotonic IVF  optimize dm control  check urine na and osmo  better  monitor

## 2021-06-25 NOTE — PROGRESS NOTE ADULT - ASSESSMENT
70 year old male with PMH afib on eliquis, CHF, HTN, HLD, CKD, DM, previous BKA now presenting with symptoms concerning for GCA, s/p L temporal artery biopsy    Plan:  - Patient recovering appropriately  - Follow up pathology  - Please call vascular surgery back with any further questions or concerns    Vascular Surgery [id number]

## 2021-06-25 NOTE — DIETITIAN INITIAL EVALUATION ADULT. - FLUIDS
[id number]

## 2021-06-25 NOTE — PROGRESS NOTE ADULT - ASSESSMENT
69 yo RH M with HTN, HLD, CKD, Afib (on eliquis 2.5 for unclear reason was on 5 in past), BKA, diabetic retinoopathy tx from OSH for optho.  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 lower likely GCA but being treated   - being treated for possible GCA per rheum on steroids   - Temporal artery US and bx by vascular sx as requested by rheum   - Eliquis should be 5mg BID - resume when able   - lipitor 40  - MRI unlikely to change manegment unable to obtaine given hardware   - check FS, glucose control <180  - GI/DVT ppx  - Counseling on diet, exercise, and medication adherence was done  - Counseling on smoking cessation and alcohol consumption offered when appropriate.  - Pain assessed and judicious use of narcotics when appropriate was discussed.    - Stroke education given when appropriate.  - Importance of fall prevention discussed.   - Differential diagnosis and plan of care discussed with patient and/or family and primary team  - Thank you for allowing me to participate in the care of this patient. Call with questions.   - d/c plan when able   Donnie Granda MD  Vascular Neurology

## 2021-06-26 LAB
ANION GAP SERPL CALC-SCNC: 15 MMOL/L — HIGH (ref 7–14)
BASOPHILS # BLD AUTO: 0.01 K/UL — SIGNIFICANT CHANGE UP (ref 0–0.2)
BASOPHILS NFR BLD AUTO: 0.1 % — SIGNIFICANT CHANGE UP (ref 0–2)
BUN SERPL-MCNC: 96 MG/DL — HIGH (ref 7–23)
CALCIUM SERPL-MCNC: 9.4 MG/DL — SIGNIFICANT CHANGE UP (ref 8.4–10.5)
CHLORIDE SERPL-SCNC: 99 MMOL/L — SIGNIFICANT CHANGE UP (ref 98–107)
CO2 SERPL-SCNC: 21 MMOL/L — LOW (ref 22–31)
CREAT SERPL-MCNC: 2.68 MG/DL — HIGH (ref 0.5–1.3)
EOSINOPHIL # BLD AUTO: 0 K/UL — SIGNIFICANT CHANGE UP (ref 0–0.5)
EOSINOPHIL NFR BLD AUTO: 0 % — SIGNIFICANT CHANGE UP (ref 0–6)
GLUCOSE BLDC GLUCOMTR-MCNC: 152 MG/DL — HIGH (ref 70–99)
GLUCOSE BLDC GLUCOMTR-MCNC: 209 MG/DL — HIGH (ref 70–99)
GLUCOSE BLDC GLUCOMTR-MCNC: 239 MG/DL — HIGH (ref 70–99)
GLUCOSE BLDC GLUCOMTR-MCNC: 263 MG/DL — HIGH (ref 70–99)
GLUCOSE SERPL-MCNC: 150 MG/DL — HIGH (ref 70–99)
HCT VFR BLD CALC: 34 % — LOW (ref 39–50)
HGB BLD-MCNC: 11.1 G/DL — LOW (ref 13–17)
IANC: 14.82 K/UL — HIGH (ref 1.5–8.5)
IMM GRANULOCYTES NFR BLD AUTO: 1.2 % — SIGNIFICANT CHANGE UP (ref 0–1.5)
LYMPHOCYTES # BLD AUTO: 0.65 K/UL — LOW (ref 1–3.3)
LYMPHOCYTES # BLD AUTO: 3.8 % — LOW (ref 13–44)
MAGNESIUM SERPL-MCNC: 2 MG/DL — SIGNIFICANT CHANGE UP (ref 1.6–2.6)
MCHC RBC-ENTMCNC: 25.8 PG — LOW (ref 27–34)
MCHC RBC-ENTMCNC: 32.6 GM/DL — SIGNIFICANT CHANGE UP (ref 32–36)
MCV RBC AUTO: 78.9 FL — LOW (ref 80–100)
MONOCYTES # BLD AUTO: 1.38 K/UL — HIGH (ref 0–0.9)
MONOCYTES NFR BLD AUTO: 8.1 % — SIGNIFICANT CHANGE UP (ref 2–14)
NEUTROPHILS # BLD AUTO: 14.82 K/UL — HIGH (ref 1.8–7.4)
NEUTROPHILS NFR BLD AUTO: 86.8 % — HIGH (ref 43–77)
NRBC # BLD: 0 /100 WBCS — SIGNIFICANT CHANGE UP
NRBC # FLD: 0 K/UL — SIGNIFICANT CHANGE UP
PHOSPHATE SERPL-MCNC: 4.2 MG/DL — SIGNIFICANT CHANGE UP (ref 2.5–4.5)
PLATELET # BLD AUTO: 218 K/UL — SIGNIFICANT CHANGE UP (ref 150–400)
POTASSIUM SERPL-MCNC: 4.5 MMOL/L — SIGNIFICANT CHANGE UP (ref 3.5–5.3)
POTASSIUM SERPL-SCNC: 4.5 MMOL/L — SIGNIFICANT CHANGE UP (ref 3.5–5.3)
RBC # BLD: 4.31 M/UL — SIGNIFICANT CHANGE UP (ref 4.2–5.8)
RBC # FLD: 14.3 % — SIGNIFICANT CHANGE UP (ref 10.3–14.5)
SODIUM SERPL-SCNC: 135 MMOL/L — SIGNIFICANT CHANGE UP (ref 135–145)
WBC # BLD: 17.06 K/UL — HIGH (ref 3.8–10.5)
WBC # FLD AUTO: 17.06 K/UL — HIGH (ref 3.8–10.5)

## 2021-06-26 RX ORDER — ISOSORBIDE MONONITRATE 60 MG/1
120 TABLET, EXTENDED RELEASE ORAL DAILY
Refills: 0 | Status: DISCONTINUED | OUTPATIENT
Start: 2021-06-26 | End: 2021-06-26

## 2021-06-26 RX ORDER — INSULIN LISPRO 100/ML
15 VIAL (ML) SUBCUTANEOUS
Refills: 0 | Status: DISCONTINUED | OUTPATIENT
Start: 2021-06-26 | End: 2021-06-27

## 2021-06-26 RX ORDER — ISOSORBIDE MONONITRATE 60 MG/1
120 TABLET, EXTENDED RELEASE ORAL DAILY
Refills: 0 | Status: DISCONTINUED | OUTPATIENT
Start: 2021-06-26 | End: 2021-06-28

## 2021-06-26 RX ADMIN — Medication 4: at 13:09

## 2021-06-26 RX ADMIN — Medication 2: at 21:58

## 2021-06-26 RX ADMIN — APIXABAN 5 MILLIGRAM(S): 2.5 TABLET, FILM COATED ORAL at 17:59

## 2021-06-26 RX ADMIN — Medication 40 MILLIGRAM(S): at 05:59

## 2021-06-26 RX ADMIN — Medication 40 MILLIGRAM(S): at 17:59

## 2021-06-26 RX ADMIN — Medication 13 UNIT(S): at 09:36

## 2021-06-26 RX ADMIN — Medication 15 UNIT(S): at 18:43

## 2021-06-26 RX ADMIN — ATORVASTATIN CALCIUM 40 MILLIGRAM(S): 80 TABLET, FILM COATED ORAL at 21:59

## 2021-06-26 RX ADMIN — Medication 650 MILLIGRAM(S): at 01:40

## 2021-06-26 RX ADMIN — Medication 100 MILLIGRAM(S): at 22:00

## 2021-06-26 RX ADMIN — Medication 13 UNIT(S): at 13:10

## 2021-06-26 RX ADMIN — CARVEDILOL PHOSPHATE 3.12 MILLIGRAM(S): 80 CAPSULE, EXTENDED RELEASE ORAL at 05:58

## 2021-06-26 RX ADMIN — SODIUM CHLORIDE 3 MILLILITER(S): 9 INJECTION INTRAMUSCULAR; INTRAVENOUS; SUBCUTANEOUS at 07:38

## 2021-06-26 RX ADMIN — Medication 240 MILLIGRAM(S): at 05:57

## 2021-06-26 RX ADMIN — Medication 2: at 09:36

## 2021-06-26 RX ADMIN — SODIUM CHLORIDE 3 MILLILITER(S): 9 INJECTION INTRAMUSCULAR; INTRAVENOUS; SUBCUTANEOUS at 22:00

## 2021-06-26 RX ADMIN — ISOSORBIDE MONONITRATE 120 MILLIGRAM(S): 60 TABLET, EXTENDED RELEASE ORAL at 13:09

## 2021-06-26 RX ADMIN — Medication 100 MILLIGRAM(S): at 05:56

## 2021-06-26 RX ADMIN — APIXABAN 5 MILLIGRAM(S): 2.5 TABLET, FILM COATED ORAL at 05:58

## 2021-06-26 RX ADMIN — Medication 4: at 18:42

## 2021-06-26 RX ADMIN — Medication 100 MILLIGRAM(S): at 13:09

## 2021-06-26 RX ADMIN — Medication 650 MILLIGRAM(S): at 02:35

## 2021-06-26 RX ADMIN — INSULIN GLARGINE 28 UNIT(S): 100 INJECTION, SOLUTION SUBCUTANEOUS at 21:58

## 2021-06-26 RX ADMIN — CARVEDILOL PHOSPHATE 3.12 MILLIGRAM(S): 80 CAPSULE, EXTENDED RELEASE ORAL at 17:59

## 2021-06-26 RX ADMIN — Medication 4 MILLIGRAM(S): at 21:59

## 2021-06-26 RX ADMIN — SODIUM CHLORIDE 3 MILLILITER(S): 9 INJECTION INTRAMUSCULAR; INTRAVENOUS; SUBCUTANEOUS at 17:51

## 2021-06-26 NOTE — PROGRESS NOTE ADULT - ASSESSMENT
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

## 2021-06-26 NOTE — PROGRESS NOTE ADULT - ASSESSMENT
70y male with a past medical history/ocular history of DM, HTN, CKD, Afib on Eliquis, HLD, left foot amputation, PDR s/p Intravitreal injections consulted for painless vision loss left.      Ada on CKD  baseline scr appears to be 3.0  ADA likely TISHA and hyperglycemia   Fena>1% peaked 4.77 improving today  CKD likely sec to longstanding DM with retinopathy  Monitor BMP at present  Elevated BUN likely sec to steroid use  renal function improving   MOnitor BMP   AVoid further nephrotoxics NSAIDS RCA    HTN  BP uncontrolled likely sec to steroids  On coreg, up titrated if needed  MOnitor BP and HR    Hyperphosphatemia  sec to RF  Low PO4 diet     HYponatremia  Avoid hypotonic IVF  optimize dm control  check urine na and osmo  improving   monitor

## 2021-06-26 NOTE — PROGRESS NOTE ADULT - SUBJECTIVE AND OBJECTIVE BOX
Neurology Progress Note    S: Patient seen and examined. No new events overnight. patient denied CP, SOB, HA or pain. on steroids.     Medication:  MEDICATIONS  (STANDING):  atorvastatin 40 milliGRAM(s) Oral at bedtime  carvedilol 3.125 milliGRAM(s) Oral every 12 hours  dextrose 40% Gel 15 Gram(s) Oral once  dextrose 50% Injectable 25 Gram(s) IV Push once  dextrose 50% Injectable 12.5 Gram(s) IV Push once  dextrose 50% Injectable 25 Gram(s) IV Push once  diltiazem    milliGRAM(s) Oral daily  doxazosin 4 milliGRAM(s) Oral at bedtime  hydrALAZINE 100 milliGRAM(s) Oral three times a day  insulin glargine Injectable (LANTUS) 28 Unit(s) SubCutaneous at bedtime  insulin lispro (ADMELOG) corrective regimen sliding scale   SubCutaneous three times a day before meals  insulin lispro (ADMELOG) corrective regimen sliding scale   SubCutaneous at bedtime  insulin lispro Injectable (ADMELOG) 11 Unit(s) SubCutaneous three times a day before meals  isosorbide   mononitrate ER Tablet (IMDUR) 60 milliGRAM(s) Oral daily  methylPREDNISolone sodium succinate Injectable 40 milliGRAM(s) IV Push every 12 hours  pantoprazole    Tablet 40 milliGRAM(s) Oral before breakfast  sodium chloride 0.9% lock flush 3 milliLiter(s) IV Push every 8 hours  sodium chloride 0.9%. 1000 milliLiter(s) (50 mL/Hr) IV Continuous <Continuous>  trimethoprim   80 mG/sulfamethoxazole 400 mG 1 Tablet(s) Oral <User Schedule>    MEDICATIONS  (PRN):  acetaminophen   Tablet .. 650 milliGRAM(s) Oral every 6 hours PRN Temp greater or equal to 38C (100.4F), Mild Pain (1 - 3)  fluticasone propionate 50 MICROgram(s)/spray Nasal Spray 1 Spray(s) Both Nostrils two times a day PRN congestion  guaiFENesin Oral Liquid (Sugar-Free) 100 milliGRAM(s) Oral every 6 hours PRN Cough      Vitals:  Vital Signs Last 24 Hrs  T(C): 36.8 (2021 05:56), Max: 36.8 (2021 10:27)  T(F): 98.3 (2021 05:56), Max: 98.3 (2021 21:56)  HR: 68 (2021 05:56) (68 - 92)  BP: 181/89 (2021 05:56) (132/86 - 196/98)  BP(mean): --  RR: 18 (2021 05:56) (18 - 18)  SpO2: 100% (2021 05:56) (100% - 100%)    PHYSICAL EXAM:  GENERAL: NAD on room air  HEENT: Normocephalic;  conjunctivae and sclerae clear; moist mucous membranes;   NECK: Supple  EXTREMITIES: No cyanosis; no edema; no calf tenderness  SKIN: Warm and dry; no rash             Neurological Exam:  - Mental Status: Alert, oriented to person, place, time, situation; speech is fluent with intact naming, repetition, and comprehension; follows commands  - Cranial Nerves II-XII:    II:  PERRL 3mm b/l; visual fields are full to confrontation; visual acuity 20/30 OU  III, IV, VI:  EOMI, no nystagmus  V:  facial sensation is intact in the V1-V3 distribution bilaterally.  VII:  face is symmetric with normal eye closure and smile  VIII:  hearing is intact to finger rub  IX, X:  uvula is midline and soft palate rises symmetrically  XI:  head turning and shoulder shrug are intact bilaterally  XII:  tongue protrudes in the midline  - Motor: strength 5/5 throughout, LLE deferred due to below knee amputation; normal muscle bulk and tone throughout; no pronator drift  - Reflexes:  2+ and symmetric at the biceps, triceps, brachioradialis, knees, and ankles;  plantar reflexes downgoing bilaterally  - Sensory:  intact to light touch, pin prick, vibration, and joint-position sense throughout  - Coordination:  finger-nose-finger and heel-knee-shin intact without dysmetria; rapid alternating hand movements intact  - Gait:  normal steps, base, arm swing, and turning; Romberg testing is negative    I personally reviewed the below data/images/labs:    CBC Full  -  ( 2021 06:48 )  WBC Count : 17.06 K/uL  RBC Count : 4.31 M/uL  Hemoglobin : 11.1 g/dL  Hematocrit : 34.0 %  Platelet Count - Automated : 218 K/uL  Mean Cell Volume : 78.9 fL  Mean Cell Hemoglobin : 25.8 pg  Mean Cell Hemoglobin Concentration : 32.6 gm/dL  Auto Neutrophil # : 14.82 K/uL  Auto Lymphocyte # : 0.65 K/uL  Auto Monocyte # : 1.38 K/uL  Auto Eosinophil # : 0.00 K/uL  Auto Basophil # : 0.01 K/uL  Auto Neutrophil % : 86.8 %  Auto Lymphocyte % : 3.8 %  Auto Monocyte % : 8.1 %  Auto Eosinophil % : 0.0 %  Auto Basophil % : 0.1 %        135  |  99  |  96<H>  ----------------------------<  150<H>  4.5   |  21<L>  |  2.68<H>    Ca    9.4      2021 06:48  Phos  4.2       Mg     2.0             Urinalysis Basic - ( 2021 07:56 )    Color: Colorless / Appearance: Clear / S.016 / pH: x  Gluc: x / Ketone: Negative  / Bili: Negative / Urobili: <2 mg/dL   Blood: x / Protein: 30 mg/dL / Nitrite: Negative   Leuk Esterase: Negative / RBC: 0 /HPF / WBC 1 /HPF   Sq Epi: x / Non Sq Epi: 0 /HPF / Bacteria: Negative      Bro[place] Results  CT angiography neck: No evidence of hemodynamically significant stenosis using NASCET criteria.  Patent vertebral arteries.  No evidence of vascular dissection.  CT angiography brain: No major vessel occlusion or proximal stenosis.  CTH: No acute intracranial disease.  CT perfusion: No perfusion abnormality

## 2021-06-26 NOTE — PROGRESS NOTE ADULT - ASSESSMENT
69 yo RH M with HTN, HLD, CKD, Afib (on eliquis 2.5 for unclear reason was on 5 in past), BKA, diabetic retinoopathy tx from OSH for optho.  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 lower likely GCA but being treated   - being treated for possible GCA per rheum on steroids   - Temporal artery US and bx by vascular sx as requested by rheum f/u results   - Eliquis should be 5mg BID - resume when able   - lipitor 40  - MRI unlikely to change manegment unable to obtaine given hardware   - check FS, glucose control <180  - GI/DVT ppx  - Counseling on diet, exercise, and medication adherence was done  - Counseling on smoking cessation and alcohol consumption offered when appropriate.  - Pain assessed and judicious use of narcotics when appropriate was discussed.    - Stroke education given when appropriate.  - Importance of fall prevention discussed.   - Differential diagnosis and plan of care discussed with patient and/or family and primary team  - Thank you for allowing me to participate in the care of this patient. Call with questions.   - d/c plan when able   Donnie Granda MD  Vascular Neurology

## 2021-06-26 NOTE — PROGRESS NOTE ADULT - SUBJECTIVE AND OBJECTIVE BOX
ROCCO JC  70y  Male      Patient is a 70y old  Male who presents with a chief complaint of L eye blurry vision (26 Jun 2021 19:25)  Patient is comfortable,resting,nad.no dyspnea,no cp,no fever.vision in lt.eye is improving per patient    REVIEW OF SYSTEMS:  CONSTITUTIONAL: No fever  RESPIRATORY: No cough, hemoptysis or shortness of breath  CARDIOVASCULAR: No chest pain, palpitations, dizziness, or leg swelling  GASTROINTESTINAL: No abdominal pain. nausea, vomiting, hematemesis  GENITOURINARY: No dysuria, frequency, hematuria   NEUROLOGICAL: No headaches, no dizziness  MUSCULOSKELETAL: No joint pain or swelling;     INTERVAL HPI/OVERNIGHT EVENTS:  T(C): 36.8 (06-26-21 @ 17:56), Max: 36.8 (06-25-21 @ 21:56)  HR: 71 (06-26-21 @ 17:56) (68 - 89)  BP: 168/84 (06-26-21 @ 17:56) (168/84 - 196/98)  RR: 20 (06-26-21 @ 17:56) (18 - 20)  SpO2: 97% (06-26-21 @ 17:56) (97% - 100%)  Wt(kg): --  I&O's Summary    25 Jun 2021 07:01  -  26 Jun 2021 07:00  --------------------------------------------------------  IN: 790 mL / OUT: 1950 mL / NET: -1160 mL      T(C): 36.8 (06-26-21 @ 17:56), Max: 36.8 (06-25-21 @ 21:56)  HR: 71 (06-26-21 @ 17:56) (68 - 89)  BP: 168/84 (06-26-21 @ 17:56) (168/84 - 196/98)  RR: 20 (06-26-21 @ 17:56) (18 - 20)  SpO2: 97% (06-26-21 @ 17:56) (97% - 100%)  Wt(kg): --Vital Signs Last 24 Hrs  T(C): 36.8 (26 Jun 2021 17:56), Max: 36.8 (25 Jun 2021 21:56)  T(F): 98.3 (26 Jun 2021 17:56), Max: 98.3 (25 Jun 2021 21:56)  HR: 71 (26 Jun 2021 17:56) (68 - 89)  BP: 168/84 (26 Jun 2021 17:56) (168/84 - 196/98)  BP(mean): --  RR: 20 (26 Jun 2021 17:56) (18 - 20)  SpO2: 97% (26 Jun 2021 17:56) (97% - 100%)    LABS:                        11.1   17.06 )-----------( 218      ( 26 Jun 2021 06:48 )             34.0     06-26    135  |  99  |  96<H>  ----------------------------<  150<H>  4.5   |  21<L>  |  2.68<H>    Ca    9.4      26 Jun 2021 06:48  Phos  4.2     06-26  Mg     2.0     06-26          CAPILLARY BLOOD GLUCOSE      POCT Blood Glucose.: 209 mg/dL (26 Jun 2021 17:58)  POCT Blood Glucose.: 239 mg/dL (26 Jun 2021 12:23)  POCT Blood Glucose.: 152 mg/dL (26 Jun 2021 09:19)            PAST MEDICAL & SURGICAL HISTORY:  Hypertension, unspecified type    Type 2 diabetes mellitus with other neurologic complication, without long-term current use of insulin    DM (diabetes mellitus)    HTN (hypertension)    HLD (hyperlipidemia)    Afib    Retinopathy    Chronic diastolic congestive heart failure    Chronic kidney disease, unspecified CKD stage    Amputation of leg, traumatic, left, subsequent encounter    S/P BKA (below knee amputation) unilateral, left    S/P hip replacement, left    History of surgery on arm        MEDICATIONS  (STANDING):  apixaban 5 milliGRAM(s) Oral every 12 hours  atorvastatin 40 milliGRAM(s) Oral at bedtime  carvedilol 3.125 milliGRAM(s) Oral every 12 hours  dextrose 40% Gel 15 Gram(s) Oral once  dextrose 50% Injectable 25 Gram(s) IV Push once  dextrose 50% Injectable 12.5 Gram(s) IV Push once  dextrose 50% Injectable 25 Gram(s) IV Push once  diltiazem    milliGRAM(s) Oral daily  doxazosin 4 milliGRAM(s) Oral at bedtime  hydrALAZINE 100 milliGRAM(s) Oral three times a day  insulin glargine Injectable (LANTUS) 28 Unit(s) SubCutaneous at bedtime  insulin lispro (ADMELOG) corrective regimen sliding scale   SubCutaneous three times a day before meals  insulin lispro (ADMELOG) corrective regimen sliding scale   SubCutaneous at bedtime  insulin lispro Injectable (ADMELOG) 15 Unit(s) SubCutaneous three times a day before meals  isosorbide   mononitrate ER Tablet (IMDUR) 120 milliGRAM(s) Oral daily  methylPREDNISolone sodium succinate Injectable 40 milliGRAM(s) IV Push every 12 hours  pantoprazole    Tablet 40 milliGRAM(s) Oral before breakfast  sodium chloride 0.9% lock flush 3 milliLiter(s) IV Push every 8 hours  sodium chloride 0.9%. 1000 milliLiter(s) (50 mL/Hr) IV Continuous <Continuous>  trimethoprim   80 mG/sulfamethoxazole 400 mG 1 Tablet(s) Oral <User Schedule>    MEDICATIONS  (PRN):  acetaminophen   Tablet .. 650 milliGRAM(s) Oral every 6 hours PRN Temp greater or equal to 38C (100.4F), Mild Pain (1 - 3)  fluticasone propionate 50 MICROgram(s)/spray Nasal Spray 1 Spray(s) Both Nostrils two times a day PRN congestion  guaiFENesin Oral Liquid (Sugar-Free) 100 milliGRAM(s) Oral every 6 hours PRN Cough        RADIOLOGY & ADDITIONAL TESTS:    Imaging Personally Reviewed:  [x ] YES  [ ] NO    Consultant(s) Notes Reviewed:  [ ] YES  [ ] NO    PHYSICAL EXAM:  GENERAL: Alert and awake lying in bed in no distress  HEAD:  Atraumatic, Normocephalic  EYES: EOMI, VINAY, conjunctiva and sclera clear  NECK: Supple, No JVD, Normal thyroid  NERVOUS SYSTEM:  Alert & Oriented X3, Motor and sensory systems are intact,   CHEST/LUNG: Bilateral clear breath sounds, no rhochi, no wheezing, no crepitations,  HEART: Regular rate and rhythm; No murmurs, rubs, or gallops  ABDOMEN: Soft, Nontender, Nondistended; Bowel sounds present  EXTREMITIES:  LT.BKA        Care Discussed with Consultants/Other Providers [ X] YES  [ ] NO      Code Status: [] Full Code [] DNR [] DNI [] Goals of Care:   Disposition: [] ICU [] Stroke Unit [] RCU []PCU []Floor [] Discharge Home         [email or web address] [email or web address]

## 2021-06-26 NOTE — PROGRESS NOTE ADULT - SUBJECTIVE AND OBJECTIVE BOX
[facility] DEPARTMENT OF OPHTHALMOLOGY  ------------------------------------------------------------------------------  Hina Leonard MD, MPH, PGY-3  Pager: [phone]  ------------------------------------------------------------------------------    Interval History: No acute events overnight. Today patient stating that vision is improving. While discussing exam, patient also stated that he has a history of diplopia in the past. Questioned whether there were any new symptoms, any recent experience of diplopia, which the patient denied. Advised to let us know if recurrent.     MEDICATIONS  (STANDING):  apixaban 5 milliGRAM(s) Oral every 12 hours  atorvastatin 40 milliGRAM(s) Oral at bedtime  carvedilol 3.125 milliGRAM(s) Oral every 12 hours  dextrose 40% Gel 15 Gram(s) Oral once  dextrose 50% Injectable 25 Gram(s) IV Push once  dextrose 50% Injectable 12.5 Gram(s) IV Push once  dextrose 50% Injectable 25 Gram(s) IV Push once  diltiazem    milliGRAM(s) Oral daily  doxazosin 4 milliGRAM(s) Oral at bedtime  hydrALAZINE 100 milliGRAM(s) Oral three times a day  insulin glargine Injectable (LANTUS) 28 Unit(s) SubCutaneous at bedtime  insulin lispro (ADMELOG) corrective regimen sliding scale   SubCutaneous three times a day before meals  insulin lispro (ADMELOG) corrective regimen sliding scale   SubCutaneous at bedtime  insulin lispro Injectable (ADMELOG) 15 Unit(s) SubCutaneous three times a day before meals  isosorbide   mononitrate ER Tablet (IMDUR) 120 milliGRAM(s) Oral daily  methylPREDNISolone sodium succinate Injectable 40 milliGRAM(s) IV Push every 12 hours  pantoprazole    Tablet 40 milliGRAM(s) Oral before breakfast  sodium chloride 0.9% lock flush 3 milliLiter(s) IV Push every 8 hours  sodium chloride 0.9%. 1000 milliLiter(s) (50 mL/Hr) IV Continuous <Continuous>  trimethoprim   80 mG/sulfamethoxazole 400 mG 1 Tablet(s) Oral <User Schedule>    MEDICATIONS  (PRN):  acetaminophen   Tablet .. 650 milliGRAM(s) Oral every 6 hours PRN Temp greater or equal to 38C (100.4F), Mild Pain (1 - 3)  fluticasone propionate 50 MICROgram(s)/spray Nasal Spray 1 Spray(s) Both Nostrils two times a day PRN congestion  guaiFENesin Oral Liquid (Sugar-Free) 100 milliGRAM(s) Oral every 6 hours PRN Cough    VITALS: T(C): 36.8 (06-26-21 @ 17:56)  T(F): 98.3 (06-26-21 @ 17:56), Max: 98.3 (06-25-21 @ 21:56)  HR: 71 (06-26-21 @ 17:56) (68 - 89)  BP: 168/84 (06-26-21 @ 17:56) (168/84 - 196/98)  RR:  (18 - 20)  SpO2:  (97% - 100%)  Wt(kg): --  General: AAO x 3, appropriate mood and affect    Visual acuity (cc): 20/30-2 OU  Pupils: PERRL OU no APD  Intraocular Pressure: 17 OU  Extraocular movements (EOMs): Full OU, no pain, no diplopia  Confrontational Visual Field (CVF): Full OU  Color Plates 12/12 OD, 2/12 OS    Slit Lamp Exam  External: Flat OU.  Lids/Lashes/Lacrimal Ducts: Flat OU.   Sclera/Conjunctiva: White and quiet OU.  Cornea: Clear OU.  Anterior Chamber: Deep and formed OU.    Iris: Flat OU.  Lens: 2+ NS, 3+ PSC OD, PCIOL with mild PCO OS    Imaging  CT angiography neck: No evidence of hemodynamically significant stenosis using NASCET criteria.  Patent vertebral arteries.  No evidence of vascular dissection.  CT angiography brain: No major vessel occlusion or proximal stenosis.  CT perfusion: No perfusion abnormality   CT head noncontrast: No acute intracranial disease.    TTE 1. Mitral annular calcification, otherwise normal mitral  valve. Minimal mitral regurgitation.  2. Moderate concentric left ventricular hypertrophy.  3. Endocardium not well visualized; grossly normal left  ventricular systolic function. Endocardial visualization  enhanced with intravenous injection of echo contrast  (Definity).    Assessment and Recommendations:  70y male with a past medical history/ocular history of DM, HTN, CKD, Afib on Eliquis, HLD, left foot amputation, PDR s/p Intravitreal injections consulted for painless vision loss left. Concern for CRAO OS, on steroids with improving vision. On steroids per rheum, s/p bilateral TAB with vascular, results pending. Please see recs below:     1. 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 Vision improved today to 20/30-2 OS, though color plates reduced to 2/12 left eye. No APD.  -Unclear etiology at this point. Patient's history of sudden vision loss of left eye is consistent with possible CRAO left eye. However, patient did not have an APD, which would be more consistent with CRAO Patient's vision significantly improved, which may indicate a moving embolus with reperfusion. However, cherry red spot in macula was not seen on today's dilated exam.  -Outpatient fluorescein angiogram and OCT macula will help yield ocualr diagnosis.  -Agree with stroke work-up. CTH, CTA H/N CT perfusion negative. Recommended MR brain/orbit imaging, though unable to obtain given hardware. S/p TTE. Cardiac Evaluation with Echo and Medical optimization   -ESR (64) and CRP (13.8) elevated. Pt reports negative GCA symptoms. Etiology of elevated ESR/CRP may be 2/2 CKD and other medical problems. Appreciate rheumatology consultation. +Left scalp tenderness on exam and there is concern for GCA. S/p B/L temporal artery biopsy and recommends starting IV solumedrol 60 mg qd.  -Follow-up quantiferon TB test, acute hepatitis panel, IL-6, and immunoglobulins panel.    2. 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  -No signs of neovascularization on exam including iris, disc, or elsewhere.    -Blood sugar control as per Medical Team  -Patient follows with Dr. Marialuisa Ram, retina ophthalmologist. Needs OCT and FA imaging to further elucidate disease, and possible Injection therapy as an outpatient.    Assessment and Recommendations:              Outpatient follow-up: Patient should follow-up with his/her ophthalmologist or with [facility] Department of Ophthalmology within 1 week of after discharge at:    [address]  [place] [address]  [phone]    Hina Leonard MD, MPH PGY-3  Pager: [phone]  Google Voice: [phone]   Also available on [employer] Teams

## 2021-06-26 NOTE — PROGRESS NOTE ADULT - ASSESSMENT
EKG SR RBBB     Echo: < from: TTE Echo Complete w/o Contrast w/ Doppler (04.25.21 @ 09:38) >   1. Left ventricular ejection fraction, by visual estimation, is 55 to 60%.   2. Normal global left ventricular systolic function.   3. Normal left ventricular internal cavity size.   4. There is severe concentric left ventricular hypertrophy.   5. Moderate mitral annular calcification.   6. Thickening of the anterior and posterior mitral valve leaflets.   7. Trace mitral valve regurgitation.   8. Peak transaortic gradient equals 21.2 mmHg, mean transaortic gradient equals 8.8 mmHg, the calculated aortic valve area equals 1.85 cm² by the continuity equation consistent with mild aortic stenosis.    < end of copied text >     Assessment and Plan:    1) Afib : c/w Eliquis 5 mg po BID , pt on Dilt and Coreg if they are home meds can continue     2) GCA : t/t per rhum     3) DVT PPX Eliquis

## 2021-06-26 NOTE — PROGRESS NOTE ADULT - SUBJECTIVE AND OBJECTIVE BOX
[facility]   MD FIONA GARRETT MD RUORU WONG, PA    TEL:  OFFICE: [phone]  DR HODGES CELL: [phone]  LILLIAN WYNNE CELL: [phone]  DR. HURTADO CELL: [phone]  DR. MAX CELL: [phone]    FROM 5 PM - 7 AM PLEASE CALL ANSWERING SERVICE: [phone]    RENAL FOLLOW UP NOTE--Date of Service 06-26-21 @ 11:59  --------------------------------------------------------------------------------  HPI:      Pt seen and examined at bedside.   Denies SOB, chest pain     PAST HISTORY  --------------------------------------------------------------------------------  No significant changes to PMH, PSH, FHx, SHx, unless otherwise noted    ALLERGIES & MEDICATIONS  --------------------------------------------------------------------------------  Allergies    No Known Allergies    Intolerances      Standing Inpatient Medications  apixaban 5 milliGRAM(s) Oral every 12 hours  atorvastatin 40 milliGRAM(s) Oral at bedtime  carvedilol 3.125 milliGRAM(s) Oral every 12 hours  dextrose 40% Gel 15 Gram(s) Oral once  dextrose 50% Injectable 25 Gram(s) IV Push once  dextrose 50% Injectable 12.5 Gram(s) IV Push once  dextrose 50% Injectable 25 Gram(s) IV Push once  diltiazem    milliGRAM(s) Oral daily  doxazosin 4 milliGRAM(s) Oral at bedtime  hydrALAZINE 100 milliGRAM(s) Oral three times a day  insulin glargine Injectable (LANTUS) 28 Unit(s) SubCutaneous at bedtime  insulin lispro (ADMELOG) corrective regimen sliding scale   SubCutaneous three times a day before meals  insulin lispro (ADMELOG) corrective regimen sliding scale   SubCutaneous at bedtime  insulin lispro Injectable (ADMELOG) 13 Unit(s) SubCutaneous three times a day before meals  isosorbide   mononitrate ER Tablet (IMDUR) 120 milliGRAM(s) Oral daily  methylPREDNISolone sodium succinate Injectable 40 milliGRAM(s) IV Push every 12 hours  pantoprazole    Tablet 40 milliGRAM(s) Oral before breakfast  sodium chloride 0.9% lock flush 3 milliLiter(s) IV Push every 8 hours  sodium chloride 0.9%. 1000 milliLiter(s) IV Continuous <Continuous>  trimethoprim   80 mG/sulfamethoxazole 400 mG 1 Tablet(s) Oral <User Schedule>    PRN Inpatient Medications  acetaminophen   Tablet .. 650 milliGRAM(s) Oral every 6 hours PRN  fluticasone propionate 50 MICROgram(s)/spray Nasal Spray 1 Spray(s) Both Nostrils two times a day PRN  guaiFENesin Oral Liquid (Sugar-Free) 100 milliGRAM(s) Oral every 6 hours PRN      REVIEW OF SYSTEMS  --------------------------------------------------------------------------------  General: no fever    MSK: no edema     VITALS/PHYSICAL EXAM  --------------------------------------------------------------------------------  T(C): 36.8 (06-26-21 @ 05:56), Max: 36.8 (06-25-21 @ 21:56)  HR: 68 (06-26-21 @ 05:56) (68 - 92)  BP: 181/89 (06-26-21 @ 05:56) (132/86 - 196/98)  RR: 18 (06-26-21 @ 05:56) (18 - 18)  SpO2: 100% (06-26-21 @ 05:56) (100% - 100%)  Wt(kg): --        06-25-21 @ 07:01  -  06-26-21 @ 07:00  --------------------------------------------------------  IN: 790 mL / OUT: 1950 mL / NET: -1160 mL      Physical Exam:  	Gen: NAD  	HEENT: MMM  	Pulm: CTA B/L  	CV: S1S2  	Abd: Soft, +BS  	Ext: amputation                      Neuro: Awake   	Skin: Warm and Dry   	Vascular access: NO HD catheter            no  pretty  LABS/STUDIES  --------------------------------------------------------------------------------              11.1   17.06 >-----------<  218      [06-26-21 @ 06:48]              34.0     135  |  99  |  96  ----------------------------<  150      [06-26-21 @ 06:48]  4.5   |  21  |  2.68        Ca     9.4     [06-26-21 @ 06:48]      Mg     2.0     [06-26-21 @ 06:48]      Phos  4.2     [06-26-21 @ 06:48]            Creatinine Trend:  SCr 2.68 [06-26 @ 06:48]  SCr 3.23 [06-25 @ 06:57]  SCr 4.11 [06-24 @ 07:23]  SCr 4.77 [06-23 @ 07:06]  SCr 3.52 [06-22 @ 07:25]    Urinalysis - [06-22-21 @ 14:41]      Color Light Yellow / Appearance Clear / SG 1.018 / pH 6.0      Gluc 500 mg/dL / Ketone Negative  / Bili Negative / Urobili <2 mg/dL       Blood Negative / Protein 100 mg/dL / Leuk Est Negative / Nitrite Negative      RBC 1 / WBC 6 / Hyaline 1 / Gran 1 / Sq Epi  / Non Sq Epi 1 / Bacteria Negative    Urine Creatinine 91      [06-22-21 @ 14:41]  Urine Sodium 25      [06-22-21 @ 14:41]    Iron 35, TIBC 239, %sat 15      [06-21-21 @ 02:46]  Ferritin 99      [06-21-21 @ 02:46]  PTH -- (Ca --)      [06-24-21 @ 07:23]   222  HbA1c 8.0      [01-10-20 @ 11:10]  TSH 2.08      [06-21-21 @ 02:46]  Lipid: chol 190, , HDL 40, LDL --      [06-21-21 @ 02:46]    HBsAg Nonreact      [06-22-21 @ 09:23]  HCV 0.21, Nonreact      [06-22-21 @ 09:23]    ERIN: titer Negative, pattern --      [06-22-21 @ 09:10]  ANCA: cANCA Negative, pANCA Negative, atypical ANCA Indeterminate      [06-22-21 @ 09:10]  Free Light Chains: kappa 6.18, lambda 4.28, ratio = 1.44      [06-22 @ 09:10]

## 2021-06-26 NOTE — PROGRESS NOTE ADULT - SUBJECTIVE AND OBJECTIVE BOX
Frank Ghosh MD  Interventional Cardiology / Endovascular Specialist  [place] Office : [address] [place] [address] [place] [address]  Tel:   [place] Office : [address] [facility] [place] [address]  Tel: [phone]  Cell : [phone]    Pt. Lying in bed, in NAD denies CP, SOB     MEDICATIONS:  apixaban 5 milliGRAM(s) Oral every 12 hours  carvedilol 3.125 milliGRAM(s) Oral every 12 hours  diltiazem    milliGRAM(s) Oral daily  doxazosin 4 milliGRAM(s) Oral at bedtime  hydrALAZINE 100 milliGRAM(s) Oral three times a day  isosorbide   mononitrate ER Tablet (IMDUR) 120 milliGRAM(s) Oral daily    trimethoprim   80 mG/sulfamethoxazole 400 mG 1 Tablet(s) Oral <User Schedule>    guaiFENesin Oral Liquid (Sugar-Free) 100 milliGRAM(s) Oral every 6 hours PRN    acetaminophen   Tablet .. 650 milliGRAM(s) Oral every 6 hours PRN    pantoprazole    Tablet 40 milliGRAM(s) Oral before breakfast    atorvastatin 40 milliGRAM(s) Oral at bedtime  dextrose 40% Gel 15 Gram(s) Oral once  dextrose 50% Injectable 25 Gram(s) IV Push once  dextrose 50% Injectable 12.5 Gram(s) IV Push once  dextrose 50% Injectable 25 Gram(s) IV Push once  insulin glargine Injectable (LANTUS) 28 Unit(s) SubCutaneous at bedtime  insulin lispro (ADMELOG) corrective regimen sliding scale   SubCutaneous three times a day before meals  insulin lispro (ADMELOG) corrective regimen sliding scale   SubCutaneous at bedtime  insulin lispro Injectable (ADMELOG) 13 Unit(s) SubCutaneous three times a day before meals  methylPREDNISolone sodium succinate Injectable 40 milliGRAM(s) IV Push every 12 hours    fluticasone propionate 50 MICROgram(s)/spray Nasal Spray 1 Spray(s) Both Nostrils two times a day PRN  sodium chloride 0.9% lock flush 3 milliLiter(s) IV Push every 8 hours  sodium chloride 0.9%. 1000 milliLiter(s) IV Continuous <Continuous>      PAST MEDICAL/SURGICAL HISTORY  PAST MEDICAL & SURGICAL HISTORY:  Hypertension, unspecified type    Type 2 diabetes mellitus with other neurologic complication, without long-term current use of insulin    DM (diabetes mellitus)    HTN (hypertension)    HLD (hyperlipidemia)    Afib    Retinopathy    Chronic diastolic congestive heart failure    Chronic kidney disease, unspecified CKD stage    Amputation of leg, traumatic, left, subsequent encounter    S/P BKA (below knee amputation) unilateral, left    S/P hip replacement, left    History of surgery on arm        SOCIAL HISTORY: Substance Use (street drugs): ( x ) never used  (  ) other:    FAMILY HISTORY:  No pertinent family history in first degree relatives    Family history of heart failure (Father)        REVIEW OF SYSTEMS:  CONSTITUTIONAL: No fever, weight loss, or fatigue  EYES: No eye pain, visual disturbances, or discharge  ENMT:  No difficulty hearing, tinnitus, vertigo; No sinus or throat pain  BREASTS: No pain, masses, or nipple discharge  GASTROINTESTINAL: No abdominal or epigastric pain. No nausea, vomiting, or hematemesis; No diarrhea or constipation. No melena or hematochezia.  GENITOURINARY: No dysuria, frequency, hematuria, or incontinence  NEUROLOGICAL: No headaches, memory loss, loss of strength, numbness, or tremors  ENDOCRINE: No heat or cold intolerance; No hair loss  MUSCULOSKELETAL: No joint pain or swelling; No muscle, back, or extremity pain  PSYCHIATRIC: No depression, anxiety, mood swings, or difficulty sleeping  HEME/LYMPH: No easy bruising, or bleeding gums  All others negative    PHYSICAL EXAM:  T(C): 36.8 (06-26-21 @ 05:56), Max: 36.8 (06-25-21 @ 21:56)  HR: 68 (06-26-21 @ 05:56) (68 - 92)  BP: 181/89 (06-26-21 @ 05:56) (132/86 - 196/98)  RR: 18 (06-26-21 @ 05:56) (18 - 18)  SpO2: 100% (06-26-21 @ 05:56) (100% - 100%)  Wt(kg): --  I&O's Summary    25 Jun 2021 07:01  -  26 Jun 2021 07:00  --------------------------------------------------------  IN: 790 mL / OUT: 1950 mL / NET: -1160 mL        GENERAL: NAD  EYES: conjunctiva and sclera clear  ENMT: No tonsillar erythema, exudates, or enlargement  Cardiovascular: Normal S1 S2, No JVD, No murmur  Respiratory: Lungs clear to auscultation	  Gastrointestinal:  Soft, Non-tender, + BS	  Extremities: Rt BKA                              11.1   17.06 )-----------( 218      ( 26 Jun 2021 06:48 )             34.0     06-26    135  |  99  |  96<H>  ----------------------------<  150<H>  4.5   |  21<L>  |  2.68<H>    Ca    9.4      26 Jun 2021 06:48  Phos  4.2     06-26  Mg     2.0     06-26      proBNP:   Lipid Profile:   HgA1c:   TSH:     Consultant(s) Notes Reviewed:  [x ] YES  [ ] NO    Care Discussed with Consultants/Other Providers [ x] YES  [ ] NO    Imaging Personally Reviewed independently:  [x] YES  [ ] NO    All labs, radiologic studies, vitals, orders and medications list reviewed. Patient is seen and examined at bedside. Case discussed with medical team.

## 2021-06-27 DIAGNOSIS — R73.9 HYPERGLYCEMIA, UNSPECIFIED: ICD-10-CM

## 2021-06-27 LAB
ANION GAP SERPL CALC-SCNC: 12 MMOL/L — SIGNIFICANT CHANGE UP (ref 7–14)
BASOPHILS # BLD AUTO: 0.01 K/UL — SIGNIFICANT CHANGE UP (ref 0–0.2)
BASOPHILS NFR BLD AUTO: 0.1 % — SIGNIFICANT CHANGE UP (ref 0–2)
BUN SERPL-MCNC: 96 MG/DL — HIGH (ref 7–23)
CALCIUM SERPL-MCNC: 9.3 MG/DL — SIGNIFICANT CHANGE UP (ref 8.4–10.5)
CHLORIDE SERPL-SCNC: 99 MMOL/L — SIGNIFICANT CHANGE UP (ref 98–107)
CO2 SERPL-SCNC: 19 MMOL/L — LOW (ref 22–31)
CREAT SERPL-MCNC: 2.65 MG/DL — HIGH (ref 0.5–1.3)
CULTURE RESULTS: SIGNIFICANT CHANGE UP
CULTURE RESULTS: SIGNIFICANT CHANGE UP
EOSINOPHIL # BLD AUTO: 0 K/UL — SIGNIFICANT CHANGE UP (ref 0–0.5)
EOSINOPHIL NFR BLD AUTO: 0 % — SIGNIFICANT CHANGE UP (ref 0–6)
GLUCOSE BLDC GLUCOMTR-MCNC: 201 MG/DL — HIGH (ref 70–99)
GLUCOSE BLDC GLUCOMTR-MCNC: 223 MG/DL — HIGH (ref 70–99)
GLUCOSE BLDC GLUCOMTR-MCNC: 244 MG/DL — HIGH (ref 70–99)
GLUCOSE BLDC GLUCOMTR-MCNC: 269 MG/DL — HIGH (ref 70–99)
GLUCOSE SERPL-MCNC: 199 MG/DL — HIGH (ref 70–99)
HCT VFR BLD CALC: 32.7 % — LOW (ref 39–50)
HGB BLD-MCNC: 10.5 G/DL — LOW (ref 13–17)
IANC: 15.88 K/UL — HIGH (ref 1.5–8.5)
IMM GRANULOCYTES NFR BLD AUTO: 1.3 % — SIGNIFICANT CHANGE UP (ref 0–1.5)
LYMPHOCYTES # BLD AUTO: 0.59 K/UL — LOW (ref 1–3.3)
LYMPHOCYTES # BLD AUTO: 3.3 % — LOW (ref 13–44)
MAGNESIUM SERPL-MCNC: 1.9 MG/DL — SIGNIFICANT CHANGE UP (ref 1.6–2.6)
MCHC RBC-ENTMCNC: 25.6 PG — LOW (ref 27–34)
MCHC RBC-ENTMCNC: 32.1 GM/DL — SIGNIFICANT CHANGE UP (ref 32–36)
MCV RBC AUTO: 79.8 FL — LOW (ref 80–100)
MONOCYTES # BLD AUTO: 1 K/UL — HIGH (ref 0–0.9)
MONOCYTES NFR BLD AUTO: 5.6 % — SIGNIFICANT CHANGE UP (ref 2–14)
NEUTROPHILS # BLD AUTO: 15.88 K/UL — HIGH (ref 1.8–7.4)
NEUTROPHILS NFR BLD AUTO: 89.7 % — HIGH (ref 43–77)
NRBC # BLD: 0 /100 WBCS — SIGNIFICANT CHANGE UP
NRBC # FLD: 0 K/UL — SIGNIFICANT CHANGE UP
PHOSPHATE SERPL-MCNC: 4.1 MG/DL — SIGNIFICANT CHANGE UP (ref 2.5–4.5)
PLATELET # BLD AUTO: 215 K/UL — SIGNIFICANT CHANGE UP (ref 150–400)
POTASSIUM SERPL-MCNC: 4.6 MMOL/L — SIGNIFICANT CHANGE UP (ref 3.5–5.3)
POTASSIUM SERPL-SCNC: 4.6 MMOL/L — SIGNIFICANT CHANGE UP (ref 3.5–5.3)
RBC # BLD: 4.1 M/UL — LOW (ref 4.2–5.8)
RBC # FLD: 14.6 % — HIGH (ref 10.3–14.5)
SODIUM SERPL-SCNC: 130 MMOL/L — LOW (ref 135–145)
SPECIMEN SOURCE: SIGNIFICANT CHANGE UP
SPECIMEN SOURCE: SIGNIFICANT CHANGE UP
WBC # BLD: 17.71 K/UL — HIGH (ref 3.8–10.5)
WBC # FLD AUTO: 17.71 K/UL — HIGH (ref 3.8–10.5)

## 2021-06-27 PROCEDURE — 99232 SBSQ HOSP IP/OBS MODERATE 35: CPT

## 2021-06-27 RX ORDER — INSULIN LISPRO 100/ML
18 VIAL (ML) SUBCUTANEOUS
Refills: 0 | Status: DISCONTINUED | OUTPATIENT
Start: 2021-06-27 | End: 2021-06-28

## 2021-06-27 RX ORDER — INSULIN GLARGINE 100 [IU]/ML
30 INJECTION, SOLUTION SUBCUTANEOUS AT BEDTIME
Refills: 0 | Status: DISCONTINUED | OUTPATIENT
Start: 2021-06-27 | End: 2021-06-28

## 2021-06-27 RX ADMIN — INSULIN GLARGINE 30 UNIT(S): 100 INJECTION, SOLUTION SUBCUTANEOUS at 21:30

## 2021-06-27 RX ADMIN — Medication 240 MILLIGRAM(S): at 05:06

## 2021-06-27 RX ADMIN — Medication 650 MILLIGRAM(S): at 07:00

## 2021-06-27 RX ADMIN — Medication 4: at 09:35

## 2021-06-27 RX ADMIN — Medication 15 UNIT(S): at 13:25

## 2021-06-27 RX ADMIN — Medication 650 MILLIGRAM(S): at 06:26

## 2021-06-27 RX ADMIN — CARVEDILOL PHOSPHATE 3.12 MILLIGRAM(S): 80 CAPSULE, EXTENDED RELEASE ORAL at 18:30

## 2021-06-27 RX ADMIN — CARVEDILOL PHOSPHATE 3.12 MILLIGRAM(S): 80 CAPSULE, EXTENDED RELEASE ORAL at 05:07

## 2021-06-27 RX ADMIN — SODIUM CHLORIDE 3 MILLILITER(S): 9 INJECTION INTRAMUSCULAR; INTRAVENOUS; SUBCUTANEOUS at 12:16

## 2021-06-27 RX ADMIN — Medication 100 MILLIGRAM(S): at 21:30

## 2021-06-27 RX ADMIN — ATORVASTATIN CALCIUM 40 MILLIGRAM(S): 80 TABLET, FILM COATED ORAL at 21:31

## 2021-06-27 RX ADMIN — Medication 4 MILLIGRAM(S): at 21:31

## 2021-06-27 RX ADMIN — Medication 15 UNIT(S): at 09:35

## 2021-06-27 RX ADMIN — APIXABAN 5 MILLIGRAM(S): 2.5 TABLET, FILM COATED ORAL at 05:08

## 2021-06-27 RX ADMIN — PANTOPRAZOLE SODIUM 40 MILLIGRAM(S): 20 TABLET, DELAYED RELEASE ORAL at 05:06

## 2021-06-27 RX ADMIN — Medication 100 MILLIGRAM(S): at 05:07

## 2021-06-27 RX ADMIN — Medication 4: at 18:28

## 2021-06-27 RX ADMIN — Medication 18 UNIT(S): at 18:28

## 2021-06-27 RX ADMIN — Medication 40 MILLIGRAM(S): at 18:29

## 2021-06-27 RX ADMIN — Medication 100 MILLIGRAM(S): at 13:24

## 2021-06-27 RX ADMIN — Medication 6: at 13:24

## 2021-06-27 RX ADMIN — SODIUM CHLORIDE 3 MILLILITER(S): 9 INJECTION INTRAMUSCULAR; INTRAVENOUS; SUBCUTANEOUS at 05:00

## 2021-06-27 RX ADMIN — Medication 40 MILLIGRAM(S): at 05:07

## 2021-06-27 RX ADMIN — APIXABAN 5 MILLIGRAM(S): 2.5 TABLET, FILM COATED ORAL at 18:30

## 2021-06-27 RX ADMIN — SODIUM CHLORIDE 3 MILLILITER(S): 9 INJECTION INTRAMUSCULAR; INTRAVENOUS; SUBCUTANEOUS at 21:21

## 2021-06-27 NOTE — PROGRESS NOTE ADULT - ASSESSMENT
70y male with a past medical history/ocular history of DM, HTN, CKD, Afib on Eliquis, HLD, left foot amputation, PDR s/p Intravitreal injections consulted for painless vision loss left.      Ada on CKD  baseline scr appears to be 3.0  ADA likely TISHA and hyperglycemia   Fena>1% peaked 4.77   CKD likely sec to longstanding DM with retinopathy  Monitor BMP at present  Elevated BUN likely sec to steroid use  renal function stable  MOnitor BMP   AVoid further nephrotoxics NSAIDS RCA    HTN  BP uncontrolled likely sec to steroids  On coreg, up titrated if needed  MOnitor BP and HR    Hyperphosphatemia  sec to RF  Low PO4 diet     HYponatremia  Avoid hypotonic IVF  optimize dm control  check urine na and osmo  monitor

## 2021-06-27 NOTE — PROGRESS NOTE ADULT - SUBJECTIVE AND OBJECTIVE BOX
ROCCO JC  70y  Male      Patient is a 70y old  Male who presents with a chief complaint of L eye blurry vision (27 Jun 2021 14:34)  Patient feels better,no sob,no cp,no fever.no cough,no vomiitng.    REVIEW OF SYSTEMS:  CONSTITUTIONAL: No fever  RESPIRATORY: No cough, hemoptysis or shortness of breath  CARDIOVASCULAR: No chest pain, palpitations, dizziness, or leg swelling  GASTROINTESTINAL: No abdominal pain. nausea, vomiting, hematemesis  GENITOURINARY: No dysuria, frequency, hematuria   NEUROLOGICAL: No headaches, no dizziness  MUSCULOSKELETAL: No joint pain or swelling;     INTERVAL HPI/OVERNIGHT EVENTS:  T(C): 36.3 (06-27-21 @ 21:22), Max: 36.8 (06-26-21 @ 21:57)  HR: 61 (06-27-21 @ 21:22) (55 - 75)  BP: 171/81 (06-27-21 @ 21:22) (153/89 - 186/92)  RR: 17 (06-27-21 @ 21:22) (17 - 20)  SpO2: 98% (06-27-21 @ 21:22) (96% - 98%)  Wt(kg): --  I&O's Summary    26 Jun 2021 07:01  -  27 Jun 2021 07:00  --------------------------------------------------------  IN: 360 mL / OUT: 1300 mL / NET: -940 mL    27 Jun 2021 07:01  -  27 Jun 2021 21:55  --------------------------------------------------------  IN: 900 mL / OUT: 1525 mL / NET: -625 mL      T(C): 36.3 (06-27-21 @ 21:22), Max: 36.8 (06-26-21 @ 21:57)  HR: 61 (06-27-21 @ 21:22) (55 - 75)  BP: 171/81 (06-27-21 @ 21:22) (153/89 - 186/92)  RR: 17 (06-27-21 @ 21:22) (17 - 20)  SpO2: 98% (06-27-21 @ 21:22) (96% - 98%)  Wt(kg): --Vital Signs Last 24 Hrs  T(C): 36.3 (27 Jun 2021 21:22), Max: 36.8 (26 Jun 2021 21:57)  T(F): 97.4 (27 Jun 2021 21:22), Max: 98.3 (26 Jun 2021 21:57)  HR: 61 (27 Jun 2021 21:22) (55 - 75)  BP: 171/81 (27 Jun 2021 21:22) (153/89 - 186/92)  BP(mean): --  RR: 17 (27 Jun 2021 21:22) (17 - 20)  SpO2: 98% (27 Jun 2021 21:22) (96% - 98%)    LABS:                        10.5   17.71 )-----------( 215      ( 27 Jun 2021 07:42 )             32.7     06-27    130<L>  |  99  |  96<H>  ----------------------------<  199<H>  4.6   |  19<L>  |  2.65<H>    Ca    9.3      27 Jun 2021 07:42  Phos  4.1     06-27  Mg     1.9     06-27          CAPILLARY BLOOD GLUCOSE      POCT Blood Glucose.: 244 mg/dL (27 Jun 2021 21:19)  POCT Blood Glucose.: 201 mg/dL (27 Jun 2021 17:36)  POCT Blood Glucose.: 269 mg/dL (27 Jun 2021 12:39)  POCT Blood Glucose.: 223 mg/dL (27 Jun 2021 08:59)            PAST MEDICAL & SURGICAL HISTORY:  Hypertension, unspecified type    Type 2 diabetes mellitus with other neurologic complication, without long-term current use of insulin    DM (diabetes mellitus)    HTN (hypertension)    HLD (hyperlipidemia)    Afib    Retinopathy    Chronic diastolic congestive heart failure    Chronic kidney disease, unspecified CKD stage    Amputation of leg, traumatic, left, subsequent encounter    S/P BKA (below knee amputation) unilateral, left    S/P hip replacement, left    History of surgery on arm        MEDICATIONS  (STANDING):  apixaban 5 milliGRAM(s) Oral every 12 hours  atorvastatin 40 milliGRAM(s) Oral at bedtime  carvedilol 3.125 milliGRAM(s) Oral every 12 hours  dextrose 40% Gel 15 Gram(s) Oral once  dextrose 50% Injectable 25 Gram(s) IV Push once  dextrose 50% Injectable 12.5 Gram(s) IV Push once  dextrose 50% Injectable 25 Gram(s) IV Push once  diltiazem    milliGRAM(s) Oral daily  doxazosin 4 milliGRAM(s) Oral at bedtime  hydrALAZINE 100 milliGRAM(s) Oral three times a day  insulin glargine Injectable (LANTUS) 30 Unit(s) SubCutaneous at bedtime  insulin lispro (ADMELOG) corrective regimen sliding scale   SubCutaneous three times a day before meals  insulin lispro (ADMELOG) corrective regimen sliding scale   SubCutaneous at bedtime  insulin lispro Injectable (ADMELOG) 18 Unit(s) SubCutaneous three times a day before meals  isosorbide   mononitrate ER Tablet (IMDUR) 120 milliGRAM(s) Oral daily  methylPREDNISolone sodium succinate Injectable 40 milliGRAM(s) IV Push every 12 hours  pantoprazole    Tablet 40 milliGRAM(s) Oral before breakfast  sodium chloride 0.9% lock flush 3 milliLiter(s) IV Push every 8 hours  sodium chloride 0.9%. 1000 milliLiter(s) (50 mL/Hr) IV Continuous <Continuous>  trimethoprim   80 mG/sulfamethoxazole 400 mG 1 Tablet(s) Oral <User Schedule>    MEDICATIONS  (PRN):  acetaminophen   Tablet .. 650 milliGRAM(s) Oral every 6 hours PRN Temp greater or equal to 38C (100.4F), Mild Pain (1 - 3)  fluticasone propionate 50 MICROgram(s)/spray Nasal Spray 1 Spray(s) Both Nostrils two times a day PRN congestion  guaiFENesin Oral Liquid (Sugar-Free) 100 milliGRAM(s) Oral every 6 hours PRN Cough        RADIOLOGY & ADDITIONAL TESTS:    Imaging Personally Reviewed:  [ ] YES  [ ] NO    Consultant(s) Notes Reviewed:  [x ] YES  [ ] NO    PHYSICAL EXAM:  GENERAL: Alert and awake lying in bed in no distress  HEAD:  Atraumatic, Normocephalic  EYES: EOMI, VINAY, conjunctiva and sclera clear  NECK: Supple, No JVD, Normal thyroid  NERVOUS SYSTEM:  Alert & Oriented X3, Motor and sensory systems are intact,   CHEST/LUNG: Bilateral clear breath sounds, no rhochi, no wheezing, no crepitations,  HEART: Regular rate and rhythm; No murmurs, rubs, or gallops  ABDOMEN: Soft, Nontender, Nondistended; Bowel sounds present  EXTREMITIES:   Peripheral Pulses are palpable, no  edema        Care Discussed with Consultants/Other Providers [x ] YES  [ ] NO      Code Status: [] Full Code [] DNR [] DNI [] Goals of Care:   Disposition: [] ICU [] Stroke Unit [] RCU []PCU []Floor [] Discharge Home         [email or web address] [email or web address]

## 2021-06-27 NOTE — PROGRESS NOTE ADULT - SUBJECTIVE AND OBJECTIVE BOX
Frank Ghosh MD  Interventional Cardiology / Endovascular Specialist  [place] Office : [phone] [place] [address] [place] [address]  Tel:   [place] Office : [address] [facility] [place] [address]  Tel: [phone]  Cell : [phone]    Pt. Lying in bed, in NAD denies CP, SOB   	  MEDICATIONS:  apixaban 5 milliGRAM(s) Oral every 12 hours  carvedilol 3.125 milliGRAM(s) Oral every 12 hours  diltiazem    milliGRAM(s) Oral daily  doxazosin 4 milliGRAM(s) Oral at bedtime  hydrALAZINE 100 milliGRAM(s) Oral three times a day  isosorbide   mononitrate ER Tablet (IMDUR) 120 milliGRAM(s) Oral daily    trimethoprim   80 mG/sulfamethoxazole 400 mG 1 Tablet(s) Oral <User Schedule>    guaiFENesin Oral Liquid (Sugar-Free) 100 milliGRAM(s) Oral every 6 hours PRN    acetaminophen   Tablet .. 650 milliGRAM(s) Oral every 6 hours PRN    pantoprazole    Tablet 40 milliGRAM(s) Oral before breakfast    atorvastatin 40 milliGRAM(s) Oral at bedtime  dextrose 40% Gel 15 Gram(s) Oral once  dextrose 50% Injectable 25 Gram(s) IV Push once  dextrose 50% Injectable 12.5 Gram(s) IV Push once  dextrose 50% Injectable 25 Gram(s) IV Push once  insulin glargine Injectable (LANTUS) 28 Unit(s) SubCutaneous at bedtime  insulin lispro (ADMELOG) corrective regimen sliding scale   SubCutaneous three times a day before meals  insulin lispro (ADMELOG) corrective regimen sliding scale   SubCutaneous at bedtime  insulin lispro Injectable (ADMELOG) 15 Unit(s) SubCutaneous three times a day before meals  methylPREDNISolone sodium succinate Injectable 40 milliGRAM(s) IV Push every 12 hours    fluticasone propionate 50 MICROgram(s)/spray Nasal Spray 1 Spray(s) Both Nostrils two times a day PRN  sodium chloride 0.9% lock flush 3 milliLiter(s) IV Push every 8 hours  sodium chloride 0.9%. 1000 milliLiter(s) IV Continuous <Continuous>      PAST MEDICAL/SURGICAL HISTORY  PAST MEDICAL & SURGICAL HISTORY:  Hypertension, unspecified type    Type 2 diabetes mellitus with other neurologic complication, without long-term current use of insulin    DM (diabetes mellitus)    HTN (hypertension)    HLD (hyperlipidemia)    Afib    Retinopathy    Chronic diastolic congestive heart failure    Chronic kidney disease, unspecified CKD stage    Amputation of leg, traumatic, left, subsequent encounter    S/P BKA (below knee amputation) unilateral, left    S/P hip replacement, left    History of surgery on arm        SOCIAL HISTORY: Substance Use (street drugs): ( x ) never used  (  ) other:    FAMILY HISTORY:  No pertinent family history in first degree relatives    Family history of heart failure (Father)        REVIEW OF SYSTEMS:  CONSTITUTIONAL: No fever, weight loss, or fatigue  EYES: No eye pain, visual disturbances, or discharge  ENMT:  No difficulty hearing, tinnitus, vertigo; No sinus or throat pain  BREASTS: No pain, masses, or nipple discharge  GASTROINTESTINAL: No abdominal or epigastric pain. No nausea, vomiting, or hematemesis; No diarrhea or constipation. No melena or hematochezia.  GENITOURINARY: No dysuria, frequency, hematuria, or incontinence  NEUROLOGICAL: No headaches, memory loss, loss of strength, numbness, or tremors  ENDOCRINE: No heat or cold intolerance; No hair loss  MUSCULOSKELETAL: No joint pain or swelling; No muscle, back, or extremity pain  PSYCHIATRIC: No depression, anxiety, mood swings, or difficulty sleeping  HEME/LYMPH: No easy bruising, or bleeding gums  All others negative    PHYSICAL EXAM:  T(C): 36.8 (06-27-21 @ 04:56), Max: 36.8 (06-26-21 @ 17:56)  HR: 64 (06-27-21 @ 04:56) (64 - 89)  BP: 153/89 (06-27-21 @ 04:56) (153/89 - 185/95)  RR: 18 (06-27-21 @ 04:56) (18 - 20)  SpO2: 97% (06-27-21 @ 04:56) (97% - 99%)  Wt(kg): --  I&O's Summary    26 Jun 2021 07:01  -  27 Jun 2021 07:00  --------------------------------------------------------  IN: 360 mL / OUT: 1300 mL / NET: -940 mL        GENERAL: NAD  EYES: conjunctiva and sclera clear  ENMT: No tonsillar erythema, exudates, or enlargement  Cardiovascular: Normal S1 S2, No JVD, No murmur  Respiratory: Lungs clear to auscultation	  Gastrointestinal:  Soft, Non-tender, + BS	  Extremities: Rt BKA                                                     10.5   17.71 )-----------( 215      ( 27 Jun 2021 07:42 )             32.7     06-27    130<L>  |  99  |  96<H>  ----------------------------<  199<H>  4.6   |  19<L>  |  2.65<H>    Ca    9.3      27 Jun 2021 07:42  Phos  4.1     06-27  Mg     1.9     06-27      proBNP:   Lipid Profile:   HgA1c:   TSH:     Consultant(s) Notes Reviewed:  [x ] YES  [ ] NO    Care Discussed with Consultants/Other Providers [ x] YES  [ ] NO    Imaging Personally Reviewed independently:  [x] YES  [ ] NO    All labs, radiologic studies, vitals, orders and medications list reviewed. Patient is seen and examined at bedside. Case discussed with medical team.

## 2021-06-27 NOTE — PROGRESS NOTE ADULT - SUBJECTIVE AND OBJECTIVE BOX
Chief Complaint: DM 2 with hyperglycemia exacerbated by steroids     History: Patient seen at bedside. Reports he is eating meals, denies n/v, denies s/s of hypoglycemia  Remains on Solumedrol 40 mg BID, hyperglycemia continued with most recent 269 mg/dl    Discussed outpatient plans with patient. He previously was refusing rapid acting/pre-meal insulin, accepting of 1 insulin injection per day. However, discussed that one insulin injection per day will not be sufficient for BG control, especially in setting of steroid use.   After discussion, patient agreeable to full basal/bolus insulin regimen, "at least for a little while, I don't want to be on it forever". States he will practice insulin pen use with RN today  Patient reports he will be attempting to make dietary changes at home to lose weight - he had success previously, using a meal delivery service ("[employer] ") that provided consistent carbohydrate/renal diet but recently has been choosing to eat take-out instead  Patient requesting evaluation of CGM (Freestyle Carlos Eduardo), reports "I hate to prick myself"    MEDICATIONS  (STANDING):  apixaban 5 milliGRAM(s) Oral every 12 hours  atorvastatin 40 milliGRAM(s) Oral at bedtime  carvedilol 3.125 milliGRAM(s) Oral every 12 hours  dextrose 40% Gel 15 Gram(s) Oral once  dextrose 50% Injectable 25 Gram(s) IV Push once  dextrose 50% Injectable 12.5 Gram(s) IV Push once  dextrose 50% Injectable 25 Gram(s) IV Push once  diltiazem    milliGRAM(s) Oral daily  doxazosin 4 milliGRAM(s) Oral at bedtime  hydrALAZINE 100 milliGRAM(s) Oral three times a day  insulin glargine Injectable (LANTUS) 28 Unit(s) SubCutaneous at bedtime  insulin lispro (ADMELOG) corrective regimen sliding scale   SubCutaneous three times a day before meals  insulin lispro (ADMELOG) corrective regimen sliding scale   SubCutaneous at bedtime  insulin lispro Injectable (ADMELOG) 15 Unit(s) SubCutaneous three times a day before meals  isosorbide   mononitrate ER Tablet (IMDUR) 120 milliGRAM(s) Oral daily  methylPREDNISolone sodium succinate Injectable 40 milliGRAM(s) IV Push every 12 hours  pantoprazole    Tablet 40 milliGRAM(s) Oral before breakfast  sodium chloride 0.9% lock flush 3 milliLiter(s) IV Push every 8 hours  sodium chloride 0.9%. 1000 milliLiter(s) (50 mL/Hr) IV Continuous <Continuous>  trimethoprim   80 mG/sulfamethoxazole 400 mG 1 Tablet(s) Oral <User Schedule>    MEDICATIONS  (PRN):  acetaminophen   Tablet .. 650 milliGRAM(s) Oral every 6 hours PRN Temp greater or equal to 38C (100.4F), Mild Pain (1 - 3)  fluticasone propionate 50 MICROgram(s)/spray Nasal Spray 1 Spray(s) Both Nostrils two times a day PRN congestion  guaiFENesin Oral Liquid (Sugar-Free) 100 milliGRAM(s) Oral every 6 hours PRN Cough    No Known Allergies    Review of Systems:  Cardiovascular: No chest pain  Respiratory: No SOB  GI: No nausea, vomiting  Endocrine: no hypoglycemia     PHYSICAL EXAM:  VITALS: T(C): 36.8 (06-27-21 @ 04:56)  T(F): 98.3 (06-27-21 @ 04:56), Max: 98.3 (06-26-21 @ 17:56)  HR: 64 (06-27-21 @ 04:56) (64 - 72)  BP: 153/89 (06-27-21 @ 04:56) (153/89 - 168/84)  RR:  (18 - 20)  SpO2:  (97% - 97%)  Wt(kg): --  GENERAL: NAD  EYES: No proptosis, no lid lag, anicteric  HEENT:  Atraumatic, Normocephalic, moist mucous membranes  RESPIRATORY: unlabored respirations   PSYCH: Alert and oriented x 3    CAPILLARY BLOOD GLUCOSE    POCT Blood Glucose.: 269 mg/dL (27 Jun 2021 12:39)  POCT Blood Glucose.: 223 mg/dL (27 Jun 2021 08:59)  POCT Blood Glucose.: 263 mg/dL (26 Jun 2021 21:53)  POCT Blood Glucose.: 209 mg/dL (26 Jun 2021 17:58)      06-27    130<L>  |  99  |  96<H>  ----------------------------<  199<H>  4.6   |  19<L>  |  2.65<H>    EGFR if : 27<L>  EGFR if non : 23<L>    Ca    9.3      06-27  Mg     1.9     06-27  Phos  4.1     06-27        Thyroid Function Tests:  06-21 @ 02:46 TSH 2.08 FreeT4 -- T3 -- Anti TPO -- Anti Thyroglobulin Ab -- TSI --      A1C with Estimated Average Glucose Result: 10.6 % (06-21-21 @ 02:33)  A1C with Estimated Average Glucose Result: 7.4 % (04-25-21 @ 11:17)    Diet, DASH/TLC:   Sodium & Cholesterol Restricted  Consistent Carbohydrate No Snacks (CSTCHO)  For patients receiving Renal Replacement - No Protein Restr, No Conc K, No Conc Phos, Low Sodium (RENAL) (06-23-21 @ 16:32)

## 2021-06-27 NOTE — PROGRESS NOTE ADULT - ASSESSMENT
71 yo man with history of afib on Eliquis, HFpEF (EF 55-60% 4/2021), HTN, HLD, CKD stage 3-4, DM2 c/b diabetic retinopathy, PVD c/b LLE nec fasc s/p L BKA, and b/l cataracts s/p R eye cataract surgery presents as a transfer from [facility] after he went there for acute onset of L eye blurry vision on Sunday afternoon ~4:30pm., found to have L CRAO.

## 2021-06-27 NOTE — PROGRESS NOTE ADULT - SUBJECTIVE AND OBJECTIVE BOX
[facility]   MD FIONA GARRETT MD RUORU WONG, PA    TEL:  OFFICE: [phone]  DR HODGES CELL: [phone]  LILLIAN WYNNE CELL: [phone]  DR. HURTADO CELL: [phone]  DR. MAX CELL: [phone]    FROM 5 PM - 7 AM PLEASE CALL ANSWERING SERVICE: [phone]    RENAL FOLLOW UP NOTE--Date of Service 06-27-21 @ 11:32  --------------------------------------------------------------------------------  HPI:      Pt seen and examined at bedside.   Denies SOB, chest pain     PAST HISTORY  --------------------------------------------------------------------------------  No significant changes to PMH, PSH, FHx, SHx, unless otherwise noted    ALLERGIES & MEDICATIONS  --------------------------------------------------------------------------------  Allergies    No Known Allergies    Intolerances      Standing Inpatient Medications  apixaban 5 milliGRAM(s) Oral every 12 hours  atorvastatin 40 milliGRAM(s) Oral at bedtime  carvedilol 3.125 milliGRAM(s) Oral every 12 hours  dextrose 40% Gel 15 Gram(s) Oral once  dextrose 50% Injectable 25 Gram(s) IV Push once  dextrose 50% Injectable 12.5 Gram(s) IV Push once  dextrose 50% Injectable 25 Gram(s) IV Push once  diltiazem    milliGRAM(s) Oral daily  doxazosin 4 milliGRAM(s) Oral at bedtime  hydrALAZINE 100 milliGRAM(s) Oral three times a day  insulin glargine Injectable (LANTUS) 28 Unit(s) SubCutaneous at bedtime  insulin lispro (ADMELOG) corrective regimen sliding scale   SubCutaneous three times a day before meals  insulin lispro (ADMELOG) corrective regimen sliding scale   SubCutaneous at bedtime  insulin lispro Injectable (ADMELOG) 15 Unit(s) SubCutaneous three times a day before meals  isosorbide   mononitrate ER Tablet (IMDUR) 120 milliGRAM(s) Oral daily  methylPREDNISolone sodium succinate Injectable 40 milliGRAM(s) IV Push every 12 hours  pantoprazole    Tablet 40 milliGRAM(s) Oral before breakfast  sodium chloride 0.9% lock flush 3 milliLiter(s) IV Push every 8 hours  sodium chloride 0.9%. 1000 milliLiter(s) IV Continuous <Continuous>  trimethoprim   80 mG/sulfamethoxazole 400 mG 1 Tablet(s) Oral <User Schedule>    PRN Inpatient Medications  acetaminophen   Tablet .. 650 milliGRAM(s) Oral every 6 hours PRN  fluticasone propionate 50 MICROgram(s)/spray Nasal Spray 1 Spray(s) Both Nostrils two times a day PRN  guaiFENesin Oral Liquid (Sugar-Free) 100 milliGRAM(s) Oral every 6 hours PRN      REVIEW OF SYSTEMS  --------------------------------------------------------------------------------  General: no fever  CVS: no chest pain  RESP: no sob, no cough  ABD: no abdominal pain  : no dysuria,  MSK: no edema     VITALS/PHYSICAL EXAM  --------------------------------------------------------------------------------  T(C): 36.8 (06-27-21 @ 04:56), Max: 36.8 (06-26-21 @ 17:56)  HR: 64 (06-27-21 @ 04:56) (64 - 89)  BP: 153/89 (06-27-21 @ 04:56) (153/89 - 185/95)  RR: 18 (06-27-21 @ 04:56) (18 - 20)  SpO2: 97% (06-27-21 @ 04:56) (97% - 99%)  Wt(kg): --        06-26-21 @ 07:01  -  06-27-21 @ 07:00  --------------------------------------------------------  IN: 360 mL / OUT: 1300 mL / NET: -940 mL      Physical Exam:  	Gen: NAD  	HEENT: MMM  	Pulm: CTA B/L  	CV: S1S2  	Abd: Soft, +BS  	Ext: amputation                      Neuro: Awake   	Skin: Warm and Dry   	Vascular access: NO HD catheter            no  pretty  LABS/STUDIES  --------------------------------------------------------------------------------              10.5   17.71 >-----------<  215      [06-27-21 @ 07:42]              32.7     130  |  99  |  96  ----------------------------<  199      [06-27-21 @ 07:42]  4.6   |  19  |  2.65        Ca     9.3     [06-27-21 @ 07:42]      Mg     1.9     [06-27-21 @ 07:42]      Phos  4.1     [06-27-21 @ 07:42]            Creatinine Trend:  SCr 2.65 [06-27 @ 07:42]  SCr 2.68 [06-26 @ 06:48]  SCr 3.23 [06-25 @ 06:57]  SCr 4.11 [06-24 @ 07:23]  SCr 4.77 [06-23 @ 07:06]    Urinalysis - [06-22-21 @ 14:41]      Color Light Yellow / Appearance Clear / SG 1.018 / pH 6.0      Gluc 500 mg/dL / Ketone Negative  / Bili Negative / Urobili <2 mg/dL       Blood Negative / Protein 100 mg/dL / Leuk Est Negative / Nitrite Negative      RBC 1 / WBC 6 / Hyaline 1 / Gran 1 / Sq Epi  / Non Sq Epi 1 / Bacteria Negative    Urine Creatinine 91      [06-22-21 @ 14:41]  Urine Sodium 25      [06-22-21 @ 14:41]    Iron 35, TIBC 239, %sat 15      [06-21-21 @ 02:46]  Ferritin 99      [06-21-21 @ 02:46]  PTH -- (Ca --)      [06-24-21 @ 07:23]   222  HbA1c 8.0      [01-10-20 @ 11:10]  TSH 2.08      [06-21-21 @ 02:46]  Lipid: chol 190, , HDL 40, LDL --      [06-21-21 @ 02:46]    HBsAg Nonreact      [06-22-21 @ 09:23]  HCV 0.21, Nonreact      [06-22-21 @ 09:23]    ERIN: titer Negative, pattern --      [06-22-21 @ 09:10]  ANCA: cANCA Negative, pANCA Negative, atypical ANCA Indeterminate      [06-22-21 @ 09:10]  Free Light Chains: kappa 6.18, lambda 4.28, ratio = 1.44      [06-22 @ 09:10]

## 2021-06-27 NOTE — PROGRESS NOTE ADULT - ASSESSMENT
69 yo man with PMHx of DM2, HFpEF, HTN, HLD, CKD, and PVD (s/p L BKA) who presented from ophthalmology due to concerns for L central artery occlusion vs GCA. S/p temporal artery biopsy on 6/24. Endocrine Team consulted for inpatient T2DM management.    1. Uncontrolled T2DM c/b neuropathy and retinopathy exacerbated by steroids  Home Regimen: Glimepiride 4 mg daily, Invokana 300 mg (listed in outpatient med rec, unclear if taking)  HbA1c 10.6, goal A1c 7%    While inpatient:  BG target 100-180 mg/dl  Increase Lantus to 30 units SQ qHS  Increase Admelog to 18 units SQ TID before meals (Hold if NPO/not eating meal)  Continue Admelog MODERATE dose correctional scale before meals, moderate dose at bedtime  Check BG before meals and bedtime  Consistent carbohydrate diet  DM Education: Will need education on insulin pen and glucometer prior to discharge, ensure patient performs successful insulin PEN return demonstration and document in DM CPG. Patient with some visual deficit, so will need to ensure he can see dosing on insulin pen VS learn how to use insulin pen clicks safely     Discharge Plan:   Basal/bolus insulin PENS with dose TBD   STOP Glimepiride   Patient requesting Freestyle Carlos Eduardo CGM. Please send prescription for Freestyle Libre2 SENSORS (prescribe 2 sensors, 1 month supply) to pharmacy to assess insurance coverage. Endocrine team will followup to educate patient on use of CGM and provide Carlos Eduardo PDM reader if sensors are covered.  Patient should followup as outpatient with PCP, opthalmology and endocrinology  If desiring to followup with [facility]  Endocr[facility]ology Faculty [facility], [address] [place] [address], [phone]    2. Steroid induced hyperglycemia  Currently on Solumedrol 40 mg IV BID  Per chart, empiric treatment for possible GCA, patient s/p temporal artery biopsy. If GCA, steroids would be prolonged course - please keep endocrine informed of steroid plan    3. HTN  Management and goal per primary team/neurology    4. HLD  Continue Atorvastatin 40 mg daily if no contraindication     Lucie Contreras  Nurse Practitioner  Division of Endocrinology & Diabetes  In house pager #[phone]/long range pager #[phone]    If before 9AM or after 6PM, or on weekends/holidays, please call endocrine answering service for assistance ([phone]).  For nonurgent matters email [email or web address] for assistance.  69 yo man with PMHx of DM2, HFpEF, HTN, HLD, CKD, and PVD (s/p L BKA) who presented from ophthalmology due to concerns for L central artery occlusion vs GCA. S/p temporal artery biopsy on 6/24. Endocrine Team consulted for inpatient T2DM management.    1. Uncontrolled T2DM c/b neuropathy and retinopathy exacerbated by steroids  Home Regimen: Glimepiride 4 mg daily, Invokana 300 mg (listed in outpatient med rec, unclear if taking)  HbA1c 10.6, goal A1c 7%    While inpatient:  BG target 100-180 mg/dl  Increase Lantus to 30 units SQ qHS  Increase Admelog to 18 units SQ TID before meals (Hold if NPO/not eating meal)  Continue Admelog MODERATE dose correctional scale before meals, moderate dose at bedtime  Check BG before meals and bedtime  Consistent carbohydrate diet  DM Education: Will need education on insulin pen and glucometer prior to discharge, ensure patient performs successful insulin PEN return demonstration and document in DM CPG. Patient with some visual deficit, so will need to ensure he can see dosing on insulin pen VS learn how to use insulin pen clicks safely     Discharge Plan:   Basal/bolus insulin PENS with dose TBD   STOP Glimepiride   Patient requesting Freestyle Carlos Eduardo CGM. Please send prescription for Freestyle Libre2 SENSORS (prescribe 2 sensors, 1 month supply) to pharmacy to assess insurance coverage. Endocrine team will followup to educate patient on use of CGM and provide Carlos Eduardo PDM reader if sensors are covered.  Patient should followup as outpatient with PCP, opthalmology and endocrinology  If desiring to followup with [facility]  Endocrinology Faculty Practice, [address] [place] [address], [phone]    2. Steroid induced hyperglycemia  Currently on Solumedrol 40 mg IV BID  Per chart, empiric treatment for possible GCA, patient s/p temporal artery biopsy. If GCA, steroids would be prolonged course - please keep endocrine informed of steroid plan    3. HTN  Management and goal per primary team/neurology    4. HLD  Continue Atorvastatin 40 mg daily if no contraindication     Lucie Contreras  Nurse Practitioner  Division of Endocrinology & Diabetes  In house pager #[phone]/long range pager #[phone]    If before 9AM or after 6PM, or on weekends/holidays, please call endocrine answering service for assistance ([phone]).  For nonurgent matters email [email or web address] for assistance.     ADDENDUM 17:30:  Notified by primary RN that patient performed successful return demonstration of insulin PEN on 6/27 (see POC flowsheet for RN documentation details)  Patient was able to dial to correct dose, used "counting clicks" to help, due to poor vision   Patient has some difficulty seeing the BD katie pen needle. Would recommend BD AutoShield Duo needles for discharge. Caps are larger, they are easier to see/inject, needle automatically retracts after injection. Will need new prescription for these for discharge\  Will follow

## 2021-06-27 NOTE — PROGRESS NOTE ADULT - ASSESSMENT
EKG SR RBBB     Echo: < from: TTE Echo Complete w/o Contrast w/ Doppler (04.25.21 @ 09:38) >   1. Left ventricular ejection fraction, by visual estimation, is 55 to 60%.   2. Normal global left ventricular systolic function.   3. Normal left ventricular internal cavity size.   4. There is severe concentric left ventricular hypertrophy.   5. Moderate mitral annular calcification.   6. Thickening of the anterior and posterior mitral valve leaflets.   7. Trace mitral valve regurgitation.   8. Peak transaortic gradient equals 21.2 mmHg, mean transaortic gradient equals 8.8 mmHg, the calculated aortic valve area equals 1.85 cm² by the continuity equation consistent with mild aortic stenosis.    < end of copied text >     Assessment and Plan:    1) Afib : c/w Eliquis 5 mg po BID , pt on Dilt and Coreg if they are home meds can continue     2) GCA : t/t per rhum     3) HTN : increase imdur to 120 mg po daily      4) ELMER on CKD : improving renal onboard     DVT PPX Eliquis

## 2021-06-28 LAB
ANION GAP SERPL CALC-SCNC: 16 MMOL/L — HIGH (ref 7–14)
BASOPHILS # BLD AUTO: 0.03 K/UL — SIGNIFICANT CHANGE UP (ref 0–0.2)
BASOPHILS NFR BLD AUTO: 0.1 % — SIGNIFICANT CHANGE UP (ref 0–2)
BUN SERPL-MCNC: 95 MG/DL — HIGH (ref 7–23)
CALCIUM SERPL-MCNC: 9.4 MG/DL — SIGNIFICANT CHANGE UP (ref 8.4–10.5)
CHLORIDE SERPL-SCNC: 97 MMOL/L — LOW (ref 98–107)
CO2 SERPL-SCNC: 18 MMOL/L — LOW (ref 22–31)
CREAT SERPL-MCNC: 2.48 MG/DL — HIGH (ref 0.5–1.3)
EOSINOPHIL # BLD AUTO: 0 K/UL — SIGNIFICANT CHANGE UP (ref 0–0.5)
EOSINOPHIL NFR BLD AUTO: 0 % — SIGNIFICANT CHANGE UP (ref 0–6)
GLUCOSE BLDC GLUCOMTR-MCNC: 134 MG/DL — HIGH (ref 70–99)
GLUCOSE BLDC GLUCOMTR-MCNC: 185 MG/DL — HIGH (ref 70–99)
GLUCOSE BLDC GLUCOMTR-MCNC: 186 MG/DL — HIGH (ref 70–99)
GLUCOSE BLDC GLUCOMTR-MCNC: 202 MG/DL — HIGH (ref 70–99)
GLUCOSE SERPL-MCNC: 220 MG/DL — HIGH (ref 70–99)
HCT VFR BLD CALC: 35.4 % — LOW (ref 39–50)
HGB BLD-MCNC: 11.5 G/DL — LOW (ref 13–17)
IANC: 21.6 K/UL — HIGH (ref 1.5–8.5)
IMM GRANULOCYTES NFR BLD AUTO: 1.6 % — HIGH (ref 0–1.5)
LYMPHOCYTES # BLD AUTO: 0.52 K/UL — LOW (ref 1–3.3)
LYMPHOCYTES # BLD AUTO: 2.2 % — LOW (ref 13–44)
MAGNESIUM SERPL-MCNC: 1.8 MG/DL — SIGNIFICANT CHANGE UP (ref 1.6–2.6)
MCHC RBC-ENTMCNC: 26.1 PG — LOW (ref 27–34)
MCHC RBC-ENTMCNC: 32.5 GM/DL — SIGNIFICANT CHANGE UP (ref 32–36)
MCV RBC AUTO: 80.3 FL — SIGNIFICANT CHANGE UP (ref 80–100)
MONOCYTES # BLD AUTO: 0.65 K/UL — SIGNIFICANT CHANGE UP (ref 0–0.9)
MONOCYTES NFR BLD AUTO: 2.8 % — SIGNIFICANT CHANGE UP (ref 2–14)
NEUTROPHILS # BLD AUTO: 21.6 K/UL — HIGH (ref 1.8–7.4)
NEUTROPHILS NFR BLD AUTO: 93.3 % — HIGH (ref 43–77)
NRBC # BLD: 0 /100 WBCS — SIGNIFICANT CHANGE UP
NRBC # FLD: 0 K/UL — SIGNIFICANT CHANGE UP
OSMOLALITY UR: 443 MOSM/KG — SIGNIFICANT CHANGE UP (ref 50–1200)
PHOSPHATE SERPL-MCNC: 4.7 MG/DL — HIGH (ref 2.5–4.5)
PLATELET # BLD AUTO: 225 K/UL — SIGNIFICANT CHANGE UP (ref 150–400)
POTASSIUM SERPL-MCNC: 5.2 MMOL/L — SIGNIFICANT CHANGE UP (ref 3.5–5.3)
POTASSIUM SERPL-SCNC: 5.2 MMOL/L — SIGNIFICANT CHANGE UP (ref 3.5–5.3)
RBC # BLD: 4.41 M/UL — SIGNIFICANT CHANGE UP (ref 4.2–5.8)
RBC # FLD: 14.6 % — HIGH (ref 10.3–14.5)
SODIUM SERPL-SCNC: 131 MMOL/L — LOW (ref 135–145)
SODIUM UR-SCNC: 49 MMOL/L — SIGNIFICANT CHANGE UP
SURGICAL PATHOLOGY STUDY: SIGNIFICANT CHANGE UP
WBC # BLD: 23.17 K/UL — HIGH (ref 3.8–10.5)
WBC # FLD AUTO: 23.17 K/UL — HIGH (ref 3.8–10.5)

## 2021-06-28 PROCEDURE — 99232 SBSQ HOSP IP/OBS MODERATE 35: CPT

## 2021-06-28 RX ORDER — ISOPROPYL ALCOHOL, BENZOCAINE .7; .06 ML/ML; ML/ML
1 SWAB TOPICAL
Qty: 100 | Refills: 1
Start: 2021-06-28 | End: 2021-08-16

## 2021-06-28 RX ORDER — ISOSORBIDE MONONITRATE 60 MG/1
120 TABLET, EXTENDED RELEASE ORAL DAILY
Refills: 0 | Status: DISCONTINUED | OUTPATIENT
Start: 2021-06-28 | End: 2021-06-30

## 2021-06-28 RX ORDER — ENOXAPARIN SODIUM 100 MG/ML
34 INJECTION SUBCUTANEOUS
Qty: 10 | Refills: 0
Start: 2021-06-28 | End: 2021-07-27

## 2021-06-28 RX ORDER — INSULIN LISPRO 100/ML
21 VIAL (ML) SUBCUTANEOUS
Refills: 0 | Status: DISCONTINUED | OUTPATIENT
Start: 2021-06-28 | End: 2021-06-30

## 2021-06-28 RX ORDER — INSULIN GLARGINE 100 [IU]/ML
34 INJECTION, SOLUTION SUBCUTANEOUS AT BEDTIME
Refills: 0 | Status: DISCONTINUED | OUTPATIENT
Start: 2021-06-28 | End: 2021-06-30

## 2021-06-28 RX ORDER — INSULIN LISPRO 100/ML
21 VIAL (ML) SUBCUTANEOUS
Qty: 10 | Refills: 0
Start: 2021-06-28 | End: 2021-07-27

## 2021-06-28 RX ADMIN — Medication 40 MILLIGRAM(S): at 06:37

## 2021-06-28 RX ADMIN — Medication 21 UNIT(S): at 18:15

## 2021-06-28 RX ADMIN — PANTOPRAZOLE SODIUM 40 MILLIGRAM(S): 20 TABLET, DELAYED RELEASE ORAL at 06:37

## 2021-06-28 RX ADMIN — INSULIN GLARGINE 34 UNIT(S): 100 INJECTION, SOLUTION SUBCUTANEOUS at 22:36

## 2021-06-28 RX ADMIN — SODIUM CHLORIDE 3 MILLILITER(S): 9 INJECTION INTRAMUSCULAR; INTRAVENOUS; SUBCUTANEOUS at 21:33

## 2021-06-28 RX ADMIN — Medication 1 TABLET(S): at 08:51

## 2021-06-28 RX ADMIN — Medication 18 UNIT(S): at 08:52

## 2021-06-28 RX ADMIN — CARVEDILOL PHOSPHATE 3.12 MILLIGRAM(S): 80 CAPSULE, EXTENDED RELEASE ORAL at 06:37

## 2021-06-28 RX ADMIN — Medication 40 MILLIGRAM(S): at 18:15

## 2021-06-28 RX ADMIN — Medication 0: at 22:37

## 2021-06-28 RX ADMIN — Medication 21 UNIT(S): at 13:22

## 2021-06-28 RX ADMIN — SODIUM CHLORIDE 3 MILLILITER(S): 9 INJECTION INTRAMUSCULAR; INTRAVENOUS; SUBCUTANEOUS at 06:39

## 2021-06-28 RX ADMIN — APIXABAN 5 MILLIGRAM(S): 2.5 TABLET, FILM COATED ORAL at 18:16

## 2021-06-28 RX ADMIN — SODIUM CHLORIDE 3 MILLILITER(S): 9 INJECTION INTRAMUSCULAR; INTRAVENOUS; SUBCUTANEOUS at 14:52

## 2021-06-28 RX ADMIN — ATORVASTATIN CALCIUM 40 MILLIGRAM(S): 80 TABLET, FILM COATED ORAL at 21:36

## 2021-06-28 RX ADMIN — Medication 100 MILLIGRAM(S): at 04:27

## 2021-06-28 RX ADMIN — Medication 100 MILLIGRAM(S): at 11:26

## 2021-06-28 RX ADMIN — Medication 650 MILLIGRAM(S): at 23:57

## 2021-06-28 RX ADMIN — Medication 650 MILLIGRAM(S): at 05:27

## 2021-06-28 RX ADMIN — Medication 100 MILLIGRAM(S): at 21:36

## 2021-06-28 RX ADMIN — Medication 2: at 18:15

## 2021-06-28 RX ADMIN — ISOSORBIDE MONONITRATE 120 MILLIGRAM(S): 60 TABLET, EXTENDED RELEASE ORAL at 16:30

## 2021-06-28 RX ADMIN — Medication 240 MILLIGRAM(S): at 06:37

## 2021-06-28 RX ADMIN — Medication 2: at 08:51

## 2021-06-28 RX ADMIN — Medication 650 MILLIGRAM(S): at 04:27

## 2021-06-28 RX ADMIN — Medication 4 MILLIGRAM(S): at 21:36

## 2021-06-28 RX ADMIN — APIXABAN 5 MILLIGRAM(S): 2.5 TABLET, FILM COATED ORAL at 06:37

## 2021-06-28 RX ADMIN — Medication 4: at 13:21

## 2021-06-28 NOTE — PROGRESS NOTE ADULT - ASSESSMENT
71 yo man with history of afib on Eliquis, HFpEF (EF 55-60% 4/2021), HTN, HLD, CKD stage 3-4, DM2 c/b diabetic retinopathy, PVD c/b LLE nec fasc s/p L BKA, and b/l cataracts s/p R eye cataract surgery presents as a transfer from [facility] after he went there for acute onset of L eye blurry vision on Sunday afternoon ~4:30pm., found to have L CRAO.

## 2021-06-28 NOTE — PROGRESS NOTE ADULT - ASSESSMENT
70M with HTN, HLD. A. fib on eliquis, DM2 poorly controlled with retinopathy, admitted for acute onset L. eye vision loss that is waxing and waning since yesterday 6/20.     #L eye vision loss- given elevated ESR of 64, as well as left scalp tenderness on exam, pt's age>50, there is concern for temporal arteritis/GCA. Pt with noted improvement in symptoms after initial dose of steroid, supporting this. Temporal artery biopsy showed no overt evidence of arterial wall inflammation, however thickening in some layers- also it was performed 3 days after initiation of steroids and as such, may not be the most sensitive.   Alternatively, pt has multiple comorbidities predisposing to atherosclerotic disease/thromboembolism. Although DM2 and CKD can cause elevated ESR, these alone may not explain it and pt will need further treatment for GCA.     Plan:  - given equivocal temporal artery biopsy, continue treatment for GCA with steroids, taper to PO prednisone 60 mg QD starting 6/29 and patient can be discharged on this dose.   - will likely need initiation of Actemra as steroid sparing agent given his poorly controlled diabetes, once he follows up with rheumatology as outpatient.   - continue PCP prophylaxis with Bactrim DS three times weekly- given patient expected to be on high dose steroids for prolonged taper.    Patient can follow up with Dr. Rao (rheumatologist) upon discharge at [address] [place] [address], [address] in [place]. Office number is [phone]. We can facilitate appointment scheduling.     Discussed with Dr. Shannan Xavier MD PGY4  Rheumatology Fellow  Pager [phone] 70M with HTN, HLD. A. fib on eliquis, DM2 poorly controlled with retinopathy, admitted for acute onset L. eye vision loss that is waxing and waning since yesterday 6/20.     #L eye vision loss- given elevated ESR of 64, as well as left scalp tenderness on exam, pt's age>50, there is concern for temporal arteritis/GCA. Pt with noted improvement in symptoms after initial dose of steroid, supporting this. Temporal artery biopsy showed no overt evidence of arterial wall inflammation, however thickening in some layers- also it was performed 3 days after initiation of steroids and as such, may not be the most sensitive.   Alternatively, pt has multiple comorbidities predisposing to atherosclerotic disease/thromboembolism. Although DM2 and CKD can cause elevated ESR, these alone may not explain it and pt will need further treatment for GCA.     Plan:  - given equivocal temporal artery biopsy, continue treatment for GCA with steroids, taper to PO prednisone 60 mg QD starting 6/29 and patient can be discharged on this dose.   - will likely need initiation of Actemra as steroid sparing agent given his poorly controlled diabetes, once he follows up with rheumatology as outpatient.   - continue PCP prophylaxis with Bactrim DS three times weekly- given patient expected to be on high dose steroids for prolonged taper.    Patient can follow up with Dr. Shaikh (rheumatologist) upon discharge, in [place]. Pt prefers to follow up near his residence in [place]. We can facilitate appointment scheduling. Discussed with Dr. Shannan Xavier MD PGY4  Rheumatology Fellow  Pager [phone]

## 2021-06-28 NOTE — PROVIDER CONTACT NOTE (OTHER) - BACKGROUND
70 year male admitted with central retinal artery occlusion.
70 year old male admitted with central retinal artery occlusion. PMH of HTN, HLD, AFIB, and CKD.
Pt has HTN
70 year old male admitted with central retinal artery occlusion. PMH of DM, CKD, retinopathy, and AFIB.
Pt has history of pauses and bradycardia
70 year old male admitted with central retinal artery oclusion. PMH of HTN, CKD, AFIB, and PAD.

## 2021-06-28 NOTE — PROGRESS NOTE ADULT - SUBJECTIVE AND OBJECTIVE BOX
ODALYSGINAROSLYN EDUARDOUEL  70y  Male      Patient is a 70y old  Male who presents with a chief complaint of L eye blurry vision (28 Jun 2021 16:11)  Patient feels better,lt.eye vision is improving per patient/Blood sugar is poorly controlled/.  no sob,no cp,no fever,no cough      REVIEW OF SYSTEMS:  CONSTITUTIONAL: No fever  RESPIRATORY: No cough, hemoptysis or shortness of breath  CARDIOVASCULAR: No chest pain, palpitations, dizziness, or leg swelling  GASTROINTESTINAL: No abdominal pain. nausea, vomiting, hematemesis  GENITOURINARY: No dysuria, frequency, hematuria   NEUROLOGICAL: No headaches, no dizziness  MUSCULOSKELETAL: No joint pain or swelling;     INTERVAL HPI/OVERNIGHT EVENTS:  T(C): 36.6 (06-28-21 @ 18:12), Max: 36.7 (06-28-21 @ 04:16)  HR: 71 (06-28-21 @ 18:12) (48 - 73)  BP: 166/93 (06-28-21 @ 18:12) (143/73 - 186/82)  RR: 16 (06-28-21 @ 18:12) (16 - 18)  SpO2: 99% (06-28-21 @ 18:12) (95% - 99%)  Wt(kg): --  I&O's Summary    27 Jun 2021 07:01  -  28 Jun 2021 07:00  --------------------------------------------------------  IN: 1400 mL / OUT: 2125 mL / NET: -725 mL    28 Jun 2021 07:01  -  28 Jun 2021 20:08  --------------------------------------------------------  IN: 800 mL / OUT: 1600 mL / NET: -800 mL      T(C): 36.6 (06-28-21 @ 18:12), Max: 36.7 (06-28-21 @ 04:16)  HR: 71 (06-28-21 @ 18:12) (48 - 73)  BP: 166/93 (06-28-21 @ 18:12) (143/73 - 186/82)  RR: 16 (06-28-21 @ 18:12) (16 - 18)  SpO2: 99% (06-28-21 @ 18:12) (95% - 99%)  Wt(kg): --Vital Signs Last 24 Hrs  T(C): 36.6 (28 Jun 2021 18:12), Max: 36.7 (28 Jun 2021 04:16)  T(F): 97.8 (28 Jun 2021 18:12), Max: 98.1 (28 Jun 2021 04:16)  HR: 71 (28 Jun 2021 18:12) (48 - 73)  BP: 166/93 (28 Jun 2021 18:12) (143/73 - 186/82)  BP(mean): --  RR: 16 (28 Jun 2021 18:12) (16 - 18)  SpO2: 99% (28 Jun 2021 18:12) (95% - 99%)    LABS:                        11.5   23.17 )-----------( 225      ( 28 Jun 2021 15:05 )             35.4     06-28    131<L>  |  97<L>  |  95<H>  ----------------------------<  220<H>  5.2   |  18<L>  |  2.48<H>    Ca    9.4      28 Jun 2021 15:05  Phos  4.7     06-28  Mg     1.8     06-28          CAPILLARY BLOOD GLUCOSE      POCT Blood Glucose.: 185 mg/dL (28 Jun 2021 17:43)  POCT Blood Glucose.: 202 mg/dL (28 Jun 2021 12:45)  POCT Blood Glucose.: 186 mg/dL (28 Jun 2021 08:49)  POCT Blood Glucose.: 244 mg/dL (27 Jun 2021 21:19)            PAST MEDICAL & SURGICAL HISTORY:  Hypertension, unspecified type    Type 2 diabetes mellitus with other neurologic complication, without long-term current use of insulin    DM (diabetes mellitus)    HTN (hypertension)    HLD (hyperlipidemia)    Afib    Retinopathy    Chronic diastolic congestive heart failure    Chronic kidney disease, unspecified CKD stage    Amputation of leg, traumatic, left, subsequent encounter    S/P BKA (below knee amputation) unilateral, left    S/P hip replacement, left    History of surgery on arm        MEDICATIONS  (STANDING):  apixaban 5 milliGRAM(s) Oral every 12 hours  atorvastatin 40 milliGRAM(s) Oral at bedtime  dextrose 40% Gel 15 Gram(s) Oral once  dextrose 50% Injectable 25 Gram(s) IV Push once  dextrose 50% Injectable 25 Gram(s) IV Push once  dextrose 50% Injectable 12.5 Gram(s) IV Push once  diltiazem    milliGRAM(s) Oral daily  doxazosin 4 milliGRAM(s) Oral at bedtime  hydrALAZINE 100 milliGRAM(s) Oral three times a day  insulin glargine Injectable (LANTUS) 34 Unit(s) SubCutaneous at bedtime  insulin lispro (ADMELOG) corrective regimen sliding scale   SubCutaneous three times a day before meals  insulin lispro (ADMELOG) corrective regimen sliding scale   SubCutaneous at bedtime  insulin lispro Injectable (ADMELOG) 21 Unit(s) SubCutaneous three times a day before meals  isosorbide   mononitrate ER Tablet (IMDUR) 120 milliGRAM(s) Oral daily  methylPREDNISolone sodium succinate Injectable 40 milliGRAM(s) IV Push every 12 hours  pantoprazole    Tablet 40 milliGRAM(s) Oral before breakfast  sodium chloride 0.9% lock flush 3 milliLiter(s) IV Push every 8 hours  sodium chloride 0.9%. 1000 milliLiter(s) (50 mL/Hr) IV Continuous <Continuous>  trimethoprim   80 mG/sulfamethoxazole 400 mG 1 Tablet(s) Oral <User Schedule>    MEDICATIONS  (PRN):  acetaminophen   Tablet .. 650 milliGRAM(s) Oral every 6 hours PRN Temp greater or equal to 38C (100.4F), Mild Pain (1 - 3)  fluticasone propionate 50 MICROgram(s)/spray Nasal Spray 1 Spray(s) Both Nostrils two times a day PRN congestion  guaiFENesin Oral Liquid (Sugar-Free) 100 milliGRAM(s) Oral every 6 hours PRN Cough        RADIOLOGY & ADDITIONAL TESTS:    Imaging Personally Reviewed:  [ ] YES  [ ] NO    Consultant(s) Notes Reviewed:  [ ] YES  [ ] NO    PHYSICAL EXAM:  GENERAL: Alert and awake lying in bed in no distress  HEAD:  Atraumatic, Normocephalic  EYES: EOMI, VINAY, conjunctiva and sclera clear  NECK: Supple, No JVD, Normal thyroid  NERVOUS SYSTEM:  Alert & Oriented X3, Motor and sensory systems are intact,   CHEST/LUNG: Bilateral clear breath sounds, no rhochi, no wheezing, no crepitations,  HEART: Regular rate and rhythm; No murmurs, rubs, or gallops  ABDOMEN: Soft, Nontender, Nondistended; Bowel sounds present  EXTREMITIES:  s/p lt.BKA        Care Discussed with Consultants/Other Providers [X ] YES  [ ] NO      Code Status: [] Full Code [] DNR [] DNI [] Goals of Care:   Disposition: [] ICU [] Stroke Unit [] RCU []PCU []Floor [] Discharge Home         MELINA Hoff.FACP

## 2021-06-28 NOTE — PROVIDER CONTACT NOTE (OTHER) - SITUATION
/92. PA notified and aware.
Pt /101
Hypertensive to 191/99
/111. PA notified and aware.
/100. Pa notified and aware.
/87. PA notified and aware.
Pt bradycardic on Tele to 37, with Pauses of 2.09 and 2.17 consecutively while Pt sleeping. /68; HR 48
Pt's /79; HR 57 and Imdur due to be given

## 2021-06-28 NOTE — PROGRESS NOTE ADULT - ASSESSMENT
69 yo man with PMHx of DM2, HFpEF, HTN, HLD, CKD, and PVD (s/p L BKA) who presented from ophthalmology due to concerns for L central artery occlusion vs GCA. S/p temporal artery biopsy on 6/24. Endocrine Team consulted for inpatient T2DM management.    1. Uncontrolled T2DM c/b neuropathy and retinopathy exacerbated by steroids  Home Regimen: Glimepiride 4 mg daily, Invokana 300 mg (listed in outpatient med rec, unclear if taking)  HbA1c 10.6, goal A1c 7%    While inpatient:  BG target 100-180 mg/dl  Increase Lantus to 34 units SQ qHS  Increase Admelog to 21 units SQ TID before meals (Hold if NPO/not eating meal)  Continue Admelog MODERATE dose correctional scale before meals, moderate dose at bedtime  Check BG before meals and bedtime  Consistent carbohydrate diet  DM Education: Will need education on insulin pen and glucometer prior to discharge, ensure patient performs successful insulin PEN return demonstration and document in DM CPG. Patient with some visual deficit, so will need to ensure he can see dosing on insulin pen VS learn how to use insulin pen clicks safely     Discharge Plan:   Basal/bolus insulin PENS with dose TBD   Please send Lantus solsotar pen and humalog kwikpen as test script to check insurance coverage.  Ok to send with current doses and update prior to d/c    If Lantus not covered- can try alternating with one of following   tresiba/basaglar/toujeo/Levemir    If Humalog not covered- can try alternating with one of following  novolog/apidra/admelog/fiasp    STOP Glimepiride     Patient requesting Freestyle Carlos Eduardo Continuous glucometer (CGM). Please send prescription for Freestyle Libre2 SENSORS (prescribe 2 sensors, 1 month supply) to pharmacy to assess insurance coverage.   Endocrine team will followup to educate patient on use of CGM and provide Carlos Eduardo PDM reader if sensors are covered.  If covered, patient will also need BD katie pen needles 4 x daily.  Please assess insurance coverage of insulin and CGM so that approrpiate dc plan and education can be complete  Patient should followup as outpatient with PCP, opthalmology and endocrinology  If desiring to followup with [facility]  Endocrinology Faculty Practice, [address] [place] [address], [place] [address], [phone]    2. Steroid induced hyperglycemia  Currently on Solumedrol 40 mg IV BID  Per chart, empiric treatment for possible GCA, patient s/p temporal artery biopsy. If GCA, steroids would be prolonged course - please keep endocrine informed of steroid plan    3. HTN  Management and goal per primary team/neurology    4. HLD  Continue Atorvastatin 40 mg daily if no contraindication     Megan Meneses  Nurse Practitioner  Division of Endocrinology & Diabetes  Pager # [phone]      If after 6PM or before 9AM, or on weekends/holidays, please call endocrine answering service for assistance ([phone]).  For nonurgent matters email [email or web address] for assistance.

## 2021-06-28 NOTE — PROGRESS NOTE ADULT - ASSESSMENT
69 yo RH M with HTN, HLD, CKD, Afib (on eliquis 2.5 for unclear reason was on 5 in past), BKA, diabetic retinoopathy tx from OSH for optho.  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 lower likely GCA but being treated   - being treated for possible GCA per rheum on steroids still getting solumedral 60 q 12hrs.  monitor sugars. now hyponatremia.   - Temporal artery US and bx by vascular sx as requested by rheum f/u results   - Eliquis should be 5mg BID - resume when able   - lipitor 40  - MRI unlikely to change manegment unable to obtaine given hardware   - check FS, glucose control <180  - GI/DVT ppx  - Counseling on diet, exercise, and medication adherence was done  - Counseling on smoking cessation and alcohol consumption offered when appropriate.  - Pain assessed and judicious use of narcotics when appropriate was discussed.    - Stroke education given when appropriate.  - Importance of fall prevention discussed.   - Differential diagnosis and plan of care discussed with patient and/or family and primary team  - Thank you for allowing me to participate in the care of this patient. Call with questions.   - d/c plan when able   Donnie Granda MD  Vascular Neurology

## 2021-06-28 NOTE — PROVIDER CONTACT NOTE (OTHER) - REASON
Hypertensive 191/99
/100
/101
/92
Pt's BP and Imdur due
/111
Pt bradycardic on Tele to 37, with Pauses of 2.09 and 2.17 consecutively while Pt sleeping. /68; HR 48
/87

## 2021-06-28 NOTE — PROGRESS NOTE ADULT - SUBJECTIVE AND OBJECTIVE BOX
[facility] NEPHROLOGY PRACTICE   MD PEGGY GARRETT DO ANAM SIDDIQUI ANGELA WONG, PA    TEL:  OFFICE: [phone]  DR HODGES CELL: [phone]  DR. MAX CELL: [phone]  DR. HURTADO CELL: [phone]  MARGARITA WYNNE CELL: [phone]    From 5pm-7am Answering Service [phone]    -- RENAL FOLLOW UP NOTE ---Date of Service 06-28-21 @ 12:33    Patient is a 70y old  Male who presents with a chief complaint of L eye blurry vision (28 Jun 2021 09:18)      Patient seen and examined at bedside. No chest pain/sob    VITALS:  T(F): 97.8 (06-28-21 @ 11:10), Max: 98.3 (06-27-21 @ 13:20)  HR: 48 (06-28-21 @ 11:10)  BP: 173/68 (06-28-21 @ 11:10)  RR: 17 (06-28-21 @ 11:10)  SpO2: 95% (06-28-21 @ 11:10)  Wt(kg): --    06-27 @ 07:01  -  06-28 @ 07:00  --------------------------------------------------------  IN: 1400 mL / OUT: 2125 mL / NET: -725 mL          PHYSICAL EXAM:  Constitutional: NAD  Neck: No JVD  Respiratory: CTAB, no wheezes, rales or rhonchi  Cardiovascular: S1, S2, RRR  Gastrointestinal: BS+, soft, NT/ND  Extremities: No peripheral edema, left bka    Hospital Medications:   MEDICATIONS  (STANDING):  apixaban 5 milliGRAM(s) Oral every 12 hours  atorvastatin 40 milliGRAM(s) Oral at bedtime  carvedilol 3.125 milliGRAM(s) Oral every 12 hours  dextrose 40% Gel 15 Gram(s) Oral once  dextrose 50% Injectable 25 Gram(s) IV Push once  dextrose 50% Injectable 12.5 Gram(s) IV Push once  dextrose 50% Injectable 25 Gram(s) IV Push once  diltiazem    milliGRAM(s) Oral daily  doxazosin 4 milliGRAM(s) Oral at bedtime  hydrALAZINE 100 milliGRAM(s) Oral three times a day  insulin glargine Injectable (LANTUS) 34 Unit(s) SubCutaneous at bedtime  insulin lispro (ADMELOG) corrective regimen sliding scale   SubCutaneous three times a day before meals  insulin lispro (ADMELOG) corrective regimen sliding scale   SubCutaneous at bedtime  insulin lispro Injectable (ADMELOG) 21 Unit(s) SubCutaneous three times a day before meals  isosorbide   mononitrate ER Tablet (IMDUR) 120 milliGRAM(s) Oral daily  methylPREDNISolone sodium succinate Injectable 40 milliGRAM(s) IV Push every 12 hours  pantoprazole    Tablet 40 milliGRAM(s) Oral before breakfast  sodium chloride 0.9% lock flush 3 milliLiter(s) IV Push every 8 hours  sodium chloride 0.9%. 1000 milliLiter(s) (50 mL/Hr) IV Continuous <Continuous>  trimethoprim   80 mG/sulfamethoxazole 400 mG 1 Tablet(s) Oral <User Schedule>      LABS:  06-27    130<L>  |  99  |  96<H>  ----------------------------<  199<H>  4.6   |  19<L>  |  2.65<H>    Ca    9.3      27 Jun 2021 07:42  Phos  4.1     06-27  Mg     1.9     06-27      Creatinine Trend: 2.65 <--, 2.68 <--, 3.23 <--, 4.11 <--, 4.77 <--, 3.52 <--, 3.10 <--                                10.5   17.71 )-----------( 215      ( 27 Jun 2021 07:42 )             32.7     Urine Studies:  Urinalysis - [06-22-21 @ 14:41]      Color Light Yellow / Appearance Clear / SG 1.018 / pH 6.0      Gluc 500 mg/dL / Ketone Negative  / Bili Negative / Urobili <2 mg/dL       Blood Negative / Protein 100 mg/dL / Leuk Est Negative / Nitrite Negative      RBC 1 / WBC 6 / Hyaline 1 / Gran 1 / Sq Epi  / Non Sq Epi 1 / Bacteria Negative    Urine Creatinine 91      [06-22-21 @ 14:41]  Urine Sodium 25      [06-22-21 @ 14:41]    Iron 35, TIBC 239, %sat 15      [06-21-21 @ 02:46]  Ferritin 99      [06-21-21 @ 02:46]  PTH -- (Ca --)      [06-24-21 @ 07:23]   222  HbA1c 8.0      [01-10-20 @ 11:10]  TSH 2.08      [06-21-21 @ 02:46]  Lipid: chol 190, , HDL 40, LDL --      [06-21-21 @ 02:46]    HBsAg Nonreact      [06-22-21 @ 09:23]  HCV 0.21, Nonreact      [06-22-21 @ 09:23]    ERIN: titer Negative, pattern --      [06-22-21 @ 09:10]  ANCA: cANCA Negative, pANCA Negative, atypical ANCA Indeterminate      [06-22-21 @ 09:10]  Free Light Chains: kappa 6.18, lambda 4.28, ratio = 1.44      [06-22 @ 09:10]    RADIOLOGY & ADDITIONAL STUDIES:

## 2021-06-28 NOTE — PROGRESS NOTE ADULT - ASSESSMENT
69 yo man with history of afib on Eliquis 2.5 mg po BID, HFpEF (EF 55-60% 4/2021), HTN, HLD, CKD stage 3-4, DM2 c/b diabetic retinopathy, PVD c/b LLE nec fasc s/p L BKA, and b/l cataracts s/p R eye cataract surgery presents as a transfer from [facility] after he went there for acute onset of L eye blurry vision    EKG SR RBBB   Tele: SB 40s, RBBB    1) Afib  -c/w Eliquis 5 mg po BID  -coreg discontinued 2/2 bradycardia. patient asymptomatic. continue to hold  -continue with cardizem   -continue to monitor on tele      2) GCA  - t/t per rhum     3) HTN   -elevated. has not been getting imdur 2/2 HR hold parameters. Re-ordered and paramaters adjusted. Give dose now  -c/w hydralazine  -coreg discontinued 2/2 bradycardia  -continue to monitor BP     4) ELMER on CKD   -improving   -renal onboard     5) DVT PPX  -Eliquis    71 yo man with history of afib on Eliquis 2.5 mg po BID, HFpEF (EF 55-60% 4/2021), HTN, HLD, CKD stage 3-4, DM2 c/b diabetic retinopathy, PVD c/b LLE nec fasc s/p L BKA, and b/l cataracts s/p R eye cataract surgery presents as a transfer from [facility] after he went there for acute onset of L eye blurry vision    EKG SR RBBB   Tele: SB 40s, RBBB    1) Afib  -c/w Eliquis 5 mg po BID  -coreg discontinued 2/2 bradycardia. patient asymptomatic. continue to hold  -continue with cardizem   -echo moderate concentric LVH, grossly normal LV function   -continue to monitor on tele      2) GCA  - t/t per rhum     3) HTN   -elevated. has not been getting imdur 2/2 HR hold parameters. Re-ordered and paramaters adjusted. Give dose now  -c/w hydralazine  -coreg discontinued 2/2 bradycardia  -continue to monitor BP     4) ELMER on CKD   -improving   -renal onboard     5) DVT PPX  -Eliquis

## 2021-06-28 NOTE — PROGRESS NOTE ADULT - SUBJECTIVE AND OBJECTIVE BOX
Neurology Progress Note    S: Patient seen and examined. No new events overnight. patient denied CP, SOB, HA or pain. resting in bed. wants to leave .     Medication:  MEDICATIONS  (STANDING):  apixaban 5 milliGRAM(s) Oral every 12 hours  atorvastatin 40 milliGRAM(s) Oral at bedtime  carvedilol 3.125 milliGRAM(s) Oral every 12 hours  dextrose 40% Gel 15 Gram(s) Oral once  dextrose 50% Injectable 25 Gram(s) IV Push once  dextrose 50% Injectable 12.5 Gram(s) IV Push once  dextrose 50% Injectable 25 Gram(s) IV Push once  diltiazem    milliGRAM(s) Oral daily  doxazosin 4 milliGRAM(s) Oral at bedtime  hydrALAZINE 100 milliGRAM(s) Oral three times a day  insulin glargine Injectable (LANTUS) 30 Unit(s) SubCutaneous at bedtime  insulin lispro (ADMELOG) corrective regimen sliding scale   SubCutaneous three times a day before meals  insulin lispro (ADMELOG) corrective regimen sliding scale   SubCutaneous at bedtime  insulin lispro Injectable (ADMELOG) 18 Unit(s) SubCutaneous three times a day before meals  isosorbide   mononitrate ER Tablet (IMDUR) 120 milliGRAM(s) Oral daily  methylPREDNISolone sodium succinate Injectable 40 milliGRAM(s) IV Push every 12 hours  pantoprazole    Tablet 40 milliGRAM(s) Oral before breakfast  sodium chloride 0.9% lock flush 3 milliLiter(s) IV Push every 8 hours  sodium chloride 0.9%. 1000 milliLiter(s) (50 mL/Hr) IV Continuous <Continuous>  trimethoprim   80 mG/sulfamethoxazole 400 mG 1 Tablet(s) Oral <User Schedule>    MEDICATIONS  (PRN):  acetaminophen   Tablet .. 650 milliGRAM(s) Oral every 6 hours PRN Temp greater or equal to 38C (100.4F), Mild Pain (1 - 3)  fluticasone propionate 50 MICROgram(s)/spray Nasal Spray 1 Spray(s) Both Nostrils two times a day PRN congestion  guaiFENesin Oral Liquid (Sugar-Free) 100 milliGRAM(s) Oral every 6 hours PRN Cough      Vitals:  Vital Signs Last 24 Hrs  T(C): 36.7 (2021 06:32), Max: 36.8 (2021 13:20)  T(F): 98 (2021 06:32), Max: 98.3 (2021 13:20)  HR: 57 (2021 06:32) (55 - 75)  BP: 183/62 (2021 06:32) (162/94 - 186/92)  BP(mean): --  RR: 18 (2021 06:32) (17 - 20)  SpO2: 98% (2021 06:32) (96% - 99%)    PHYSICAL EXAM:  GENERAL: NAD on room air  HEENT: Normocephalic;  conjunctivae and sclerae clear; moist mucous membranes;   NECK: Supple  EXTREMITIES: No cyanosis; no edema; no calf tenderness  SKIN: Warm and dry; no rash             Neurological Exam:  - Mental Status: Alert, oriented to person, place, time, situation; speech is fluent with intact naming, repetition, and comprehension; follows commands  - Cranial Nerves II-XII:    II:  PERRL 3mm b/l; visual fields are full to confrontation; visual acuity 20/30 OU  III, IV, VI:  EOMI, no nystagmus  V:  facial sensation is intact in the V1-V3 distribution bilaterally.  VII:  face is symmetric with normal eye closure and smile  VIII:  hearing is intact to finger rub  IX, X:  uvula is midline and soft palate rises symmetrically  XI:  head turning and shoulder shrug are intact bilaterally  XII:  tongue protrudes in the midline  - Motor: strength 5/5 throughout, LLE deferred due to below knee amputation; normal muscle bulk and tone throughout; no pronator drift  - Reflexes:  2+ and symmetric at the biceps, triceps, brachioradialis, knees, and ankles;  plantar reflexes downgoing bilaterally  - Sensory:  intact to light touch, pin prick, vibration, and joint-position sense throughout  - Coordination:  finger-nose-finger and heel-knee-shin intact without dysmetria; rapid alternating hand movements intact  - Gait:  normal steps, base, arm swing, and turning; Romberg testing is negative    I personally reviewed the below data/images/labs:    CBC Full  -  ( 2021 07:42 )  WBC Count : 17.71 K/uL  RBC Count : 4.10 M/uL  Hemoglobin : 10.5 g/dL  Hematocrit : 32.7 %  Platelet Count - Automated : 215 K/uL  Mean Cell Volume : 79.8 fL  Mean Cell Hemoglobin : 25.6 pg  Mean Cell Hemoglobin Concentration : 32.1 gm/dL  Auto Neutrophil # : 15.88 K/uL  Auto Lymphocyte # : 0.59 K/uL  Auto Monocyte # : 1.00 K/uL  Auto Eosinophil # : 0.00 K/uL  Auto Basophil # : 0.01 K/uL  Auto Neutrophil % : 89.7 %  Auto Lymphocyte % : 3.3 %  Auto Monocyte % : 5.6 %  Auto Eosinophil % : 0.0 %  Auto Basophil % : 0.1 %    06-    130<L>  |  99  |  96<H>  ----------------------------<  199<H>  4.6   |  19<L>  |  2.65<H>    Ca    9.3      2021 07:42  Phos  4.1       Mg     1.9             Urinalysis Basic - ( 2021 07:56 )    Color: Colorless / Appearance: Clear / S.016 / pH: x  Gluc: x / Ketone: Negative  / Bili: Negative / Urobili: <2 mg/dL   Blood: x / Protein: 30 mg/dL / Nitrite: Negative   Leuk Esterase: Negative / RBC: 0 /HPF / WBC 1 /HPF   Sq Epi: x / Non Sq Epi: 0 /HPF / Bacteria: Negative      [facility] Results  CT angiography neck: No evidence of hemodynamically significant stenosis using NASCET criteria.  Patent vertebral arteries.  No evidence of vascular dissection.  CT angiography brain: No major vessel occlusion or proximal stenosis.  CTH: No acute intracranial disease.  CT perfusion: No perfusion abnormality

## 2021-06-28 NOTE — PROGRESS NOTE ADULT - ASSESSMENT
70y male with a past medical history/ocular history of DM, HTN, CKD, Afib on Eliquis, HLD, left foot amputation, PDR s/p Intravitreal injections consulted for painless vision loss left.      Ada on CKD  baseline scr appears to be 3.0  ADA likely TISHA and hyperglycemia   Fena>1% peaked 4.77   CKD likely sec to longstanding DM with retinopathy  Monitor BMP at present  Elevated BUN likely sec to steroid use  renal function improved  MOnitor BMP   AVoid further nephrotoxics NSAIDS RCA    HTN  BP uncontrolled  started on coreg now kwasi will dc  imdur increased   MOnitor BP and HR    Hyperphosphatemia  sec to RF  Low PO4 diet     HYponatremia  Avoid hypotonic IVF  optimize dm control  check urine na and osmo  monitor

## 2021-06-28 NOTE — PROVIDER CONTACT NOTE (OTHER) - NAME OF MD/NP/PA/DO NOTIFIED:
Meche Espinoza
JIGAR Faulkner
Shamir Salinas NP
OSMANY Head PA-C
JIGAR Faulkner
Meche Espinoza

## 2021-06-28 NOTE — PROVIDER CONTACT NOTE (OTHER) - DATE AND TIME:
21-Jun-2021 21:15
23-Jun-2021 05:17
21-Jun-2021 22:25
21-Jun-2021 17:15
21-Jun-2021 20:05
22-Jun-2021 04:14
28-Jun-2021 11:15
28-Jun-2021 16:30

## 2021-06-28 NOTE — PROGRESS NOTE ADULT - SUBJECTIVE AND OBJECTIVE BOX
ROCCO ODALYSALESHA  [id number]    INTERVAL HPI/OVERNIGHT EVENTS:    MEDICATIONS  (STANDING):  apixaban 5 milliGRAM(s) Oral every 12 hours  atorvastatin 40 milliGRAM(s) Oral at bedtime  dextrose 40% Gel 15 Gram(s) Oral once  dextrose 50% Injectable 25 Gram(s) IV Push once  dextrose 50% Injectable 12.5 Gram(s) IV Push once  dextrose 50% Injectable 25 Gram(s) IV Push once  diltiazem    milliGRAM(s) Oral daily  doxazosin 4 milliGRAM(s) Oral at bedtime  hydrALAZINE 100 milliGRAM(s) Oral three times a day  insulin glargine Injectable (LANTUS) 34 Unit(s) SubCutaneous at bedtime  insulin lispro (ADMELOG) corrective regimen sliding scale   SubCutaneous three times a day before meals  insulin lispro (ADMELOG) corrective regimen sliding scale   SubCutaneous at bedtime  insulin lispro Injectable (ADMELOG) 21 Unit(s) SubCutaneous three times a day before meals  isosorbide   mononitrate ER Tablet (IMDUR) 120 milliGRAM(s) Oral daily  methylPREDNISolone sodium succinate Injectable 40 milliGRAM(s) IV Push every 12 hours  pantoprazole    Tablet 40 milliGRAM(s) Oral before breakfast  sodium chloride 0.9% lock flush 3 milliLiter(s) IV Push every 8 hours  sodium chloride 0.9%. 1000 milliLiter(s) (50 mL/Hr) IV Continuous <Continuous>  trimethoprim   80 mG/sulfamethoxazole 400 mG 1 Tablet(s) Oral <User Schedule>    MEDICATIONS  (PRN):  acetaminophen   Tablet .. 650 milliGRAM(s) Oral every 6 hours PRN Temp greater or equal to 38C (100.4F), Mild Pain (1 - 3)  fluticasone propionate 50 MICROgram(s)/spray Nasal Spray 1 Spray(s) Both Nostrils two times a day PRN congestion  guaiFENesin Oral Liquid (Sugar-Free) 100 milliGRAM(s) Oral every 6 hours PRN Cough      Allergies    No Known Allergies    Intolerances        Review of Systems:   General: No fevers/chills, no fatigue  HEENT: No blurry vision, dysphagia, or odynophagia  CVS: No CP/palpitations  Resp: No SOB/wheezing  GI: No N/V/C/D/abdominal pain  MSK:   Skin: No new rashes  Neuro: No headaches      Vital Signs Last 24 Hrs  T(C): 36.6 (28 Jun 2021 11:10), Max: 36.8 (27 Jun 2021 13:20)  T(F): 97.8 (28 Jun 2021 11:10), Max: 98.3 (27 Jun 2021 13:20)  HR: 48 (28 Jun 2021 11:10) (48 - 75)  BP: 173/68 (28 Jun 2021 11:10) (143/73 - 186/92)  BP(mean): --  RR: 17 (28 Jun 2021 11:10) (17 - 20)  SpO2: 95% (28 Jun 2021 11:10) (95% - 99%)    Physical Exam:  General: NAD  HEENT: EOMI, MMM  Cardio: +S1/S2, RRR  Resp: CTA b/l  GI: +BS, soft, NT/ND  MSK:  Neuro: AAOx3  Psych: wnl    LABS:                        10.5   17.71 )-----------( 215      ( 27 Jun 2021 07:42 )             32.7     06-27    130<L>  |  99  |  96<H>  ----------------------------<  199<H>  4.6   |  19<L>  |  2.65<H>    Ca    9.3      27 Jun 2021 07:42  Phos  4.1     06-27  Mg     1.9     06-27              RADIOLOGY & ADDITIONAL TESTS:   ROCCO [place]  [id number]    INTERVAL HPI/OVERNIGHT EVENTS: Patient feels that L. eye vision overall improved but unchanged from several days ago. Denies fevers, chills, or eye pain. NO headaches or jaw pain.     MEDICATIONS  (STANDING):  apixaban 5 milliGRAM(s) Oral every 12 hours  atorvastatin 40 milliGRAM(s) Oral at bedtime  dextrose 40% Gel 15 Gram(s) Oral once  dextrose 50% Injectable 25 Gram(s) IV Push once  dextrose 50% Injectable 12.5 Gram(s) IV Push once  dextrose 50% Injectable 25 Gram(s) IV Push once  diltiazem    milliGRAM(s) Oral daily  doxazosin 4 milliGRAM(s) Oral at bedtime  hydrALAZINE 100 milliGRAM(s) Oral three times a day  insulin glargine Injectable (LANTUS) 34 Unit(s) SubCutaneous at bedtime  insulin lispro (ADMELOG) corrective regimen sliding scale   SubCutaneous three times a day before meals  insulin lispro (ADMELOG) corrective regimen sliding scale   SubCutaneous at bedtime  insulin lispro Injectable (ADMELOG) 21 Unit(s) SubCutaneous three times a day before meals  isosorbide   mononitrate ER Tablet (IMDUR) 120 milliGRAM(s) Oral daily  methylPREDNISolone sodium succinate Injectable 40 milliGRAM(s) IV Push every 12 hours  pantoprazole    Tablet 40 milliGRAM(s) Oral before breakfast  sodium chloride 0.9% lock flush 3 milliLiter(s) IV Push every 8 hours  sodium chloride 0.9%. 1000 milliLiter(s) (50 mL/Hr) IV Continuous <Continuous>  trimethoprim   80 mG/sulfamethoxazole 400 mG 1 Tablet(s) Oral <User Schedule>    MEDICATIONS  (PRN):  acetaminophen   Tablet .. 650 milliGRAM(s) Oral every 6 hours PRN Temp greater or equal to 38C (100.4F), Mild Pain (1 - 3)  fluticasone propionate 50 MICROgram(s)/spray Nasal Spray 1 Spray(s) Both Nostrils two times a day PRN congestion  guaiFENesin Oral Liquid (Sugar-Free) 100 milliGRAM(s) Oral every 6 hours PRN Cough      Allergies    No Known Allergies    Intolerances          Vital Signs Last 24 Hrs  T(C): 36.6 (28 Jun 2021 11:10), Max: 36.8 (27 Jun 2021 13:20)  T(F): 97.8 (28 Jun 2021 11:10), Max: 98.3 (27 Jun 2021 13:20)  HR: 48 (28 Jun 2021 11:10) (48 - 75)  BP: 173/68 (28 Jun 2021 11:10) (143/73 - 186/92)  BP(mean): --  RR: 17 (28 Jun 2021 11:10) (17 - 20)  SpO2: 95% (28 Jun 2021 11:10) (95% - 99%)    Physical Exam:  General: NAD  HEENT: EOMI, MMM  Cardio: +S1/S2, RRR  Resp: CTA b/l  GI: +BS, soft, NT/ND  MSK: No joint synovitis  Neuro: AAOx3  Psych: wnl    LABS:                        10.5   17.71 )-----------( 215      ( 27 Jun 2021 07:42 )             32.7     06-27    130<L>  |  99  |  96<H>  ----------------------------<  199<H>  4.6   |  19<L>  |  2.65<H>    Ca    9.3      27 Jun 2021 07:42  Phos  4.1     06-27  Mg     1.9     06-27              RADIOLOGY & ADDITIONAL TESTS:

## 2021-06-28 NOTE — PROGRESS NOTE ADULT - SUBJECTIVE AND OBJECTIVE BOX
Frank Ghosh MD  Interventional Cardiology / Advance Heart Failure and Cardiac Transplant Specialist  [place] Office : [address] [place] [address] [place]. [address]  Tel:   [place] Office : [address] [facility] [place] [address]  Tel: [phone]  Cell : [phone]    Subjective/Overnight events: Pt is lying in bed comfortable not in distress, no chest pains no SOB no palpitations  	  MEDICATIONS:  apixaban 5 milliGRAM(s) Oral every 12 hours  diltiazem    milliGRAM(s) Oral daily  doxazosin 4 milliGRAM(s) Oral at bedtime  hydrALAZINE 100 milliGRAM(s) Oral three times a day  isosorbide   mononitrate ER Tablet (IMDUR) 120 milliGRAM(s) Oral daily    trimethoprim   80 mG/sulfamethoxazole 400 mG 1 Tablet(s) Oral <User Schedule>    guaiFENesin Oral Liquid (Sugar-Free) 100 milliGRAM(s) Oral every 6 hours PRN    acetaminophen   Tablet .. 650 milliGRAM(s) Oral every 6 hours PRN    pantoprazole    Tablet 40 milliGRAM(s) Oral before breakfast    atorvastatin 40 milliGRAM(s) Oral at bedtime  dextrose 40% Gel 15 Gram(s) Oral once  dextrose 50% Injectable 25 Gram(s) IV Push once  dextrose 50% Injectable 12.5 Gram(s) IV Push once  dextrose 50% Injectable 25 Gram(s) IV Push once  insulin glargine Injectable (LANTUS) 34 Unit(s) SubCutaneous at bedtime  insulin lispro (ADMELOG) corrective regimen sliding scale   SubCutaneous at bedtime  insulin lispro (ADMELOG) corrective regimen sliding scale   SubCutaneous three times a day before meals  insulin lispro Injectable (ADMELOG) 21 Unit(s) SubCutaneous three times a day before meals  methylPREDNISolone sodium succinate Injectable 40 milliGRAM(s) IV Push every 12 hours    fluticasone propionate 50 MICROgram(s)/spray Nasal Spray 1 Spray(s) Both Nostrils two times a day PRN  sodium chloride 0.9% lock flush 3 milliLiter(s) IV Push every 8 hours  sodium chloride 0.9%. 1000 milliLiter(s) IV Continuous <Continuous>      PAST MEDICAL/SURGICAL HISTORY  PAST MEDICAL & SURGICAL HISTORY:  Hypertension, unspecified type    Type 2 diabetes mellitus with other neurologic complication, without long-term current use of insulin    DM (diabetes mellitus)    HTN (hypertension)    HLD (hyperlipidemia)    Afib    Retinopathy    Chronic diastolic congestive heart failure    Chronic kidney disease, unspecified CKD stage    Amputation of leg, traumatic, left, subsequent encounter    S/P BKA (below knee amputation) unilateral, left    S/P hip replacement, left    History of surgery on arm        SOCIAL HISTORY: Substance Use (street drugs): ( x ) never used  (  ) other:    FAMILY HISTORY:  No pertinent family history in first degree relatives    Family history of heart failure (Father)        REVIEW OF SYSTEMS:  CONSTITUTIONAL: No fever, weight loss, or fatigue  EYES: No eye pain, visual disturbances, or discharge  ENMT:  No difficulty hearing, tinnitus, vertigo; No sinus or throat pain  BREASTS: No pain, masses, or nipple discharge  GASTROINTESTINAL: No abdominal or epigastric pain. No nausea, vomiting, or hematemesis; No diarrhea or constipation. No melena or hematochezia.  GENITOURINARY: No dysuria, frequency, hematuria, or incontinence  NEUROLOGICAL: No headaches, memory loss, loss of strength, numbness, or tremors  ENDOCRINE: No heat or cold intolerance; No hair loss  MUSCULOSKELETAL: No joint pain or swelling; No muscle, back, or extremity pain  PSYCHIATRIC: No depression, anxiety, mood swings, or difficulty sleeping  HEME/LYMPH: No easy bruising, or bleeding gums  All others negative    PHYSICAL EXAM:  T(C): 36.4 (06-28-21 @ 13:19), Max: 36.7 (06-27-21 @ 18:20)  HR: 49 (06-28-21 @ 13:19) (48 - 75)  BP: 168/71 (06-28-21 @ 13:19) (143/73 - 186/92)  RR: 16 (06-28-21 @ 13:19) (16 - 20)  SpO2: 95% (06-28-21 @ 13:19) (95% - 99%)  Wt(kg): --  I&O's Summary    27 Jun 2021 07:01  -  28 Jun 2021 07:00  --------------------------------------------------------  IN: 1400 mL / OUT: 2125 mL / NET: -725 mL    28 Jun 2021 07:01  -  28 Jun 2021 15:00  --------------------------------------------------------  IN: 0 mL / OUT: 200 mL / NET: -200 mL        GENERAL: NAD  EYES: conjunctiva and sclera clear  ENMT: No tonsillar erythema, exudates, or enlargement  Cardiovascular: Normal S1 S2, No JVD, No murmur  Respiratory: Lungs clear to auscultation	  Gastrointestinal:  Soft, Non-tender, + BS	  Extremities: Rt BKA                                  10.5   17.71 )-----------( 215      ( 27 Jun 2021 07:42 )             32.7     06-27    130<L>  |  99  |  96<H>  ----------------------------<  199<H>  4.6   |  19<L>  |  2.65<H>    Ca    9.3      27 Jun 2021 07:42  Phos  4.1     06-27  Mg     1.9     06-27      proBNP:   Lipid Profile:   HgA1c:   TSH:     Consultant(s) Notes Reviewed:  [x ] YES  [ ] NO    Care Discussed with Consultants/Other Providers [ x] YES  [ ] NO    Imaging Personally Reviewed independently:  [x] YES  [ ] NO    All labs, radiologic studies, vitals, orders and medications list reviewed. Patient is seen and examined at bedside. Case discussed with medical team.         Frank Ghosh MD  Interventional Cardiology / Advance Heart Failure and Cardiac Transplant Specialist  [place] Office : [address] [place] [address] [place]. [address]  Tel:   [place] Office : [address] [facility] [place] [address]  Tel: [phone]  Cell : [phone]    Subjective/Overnight events: Pt is lying in bed comfortable not in distress, no chest pains no SOB no palpitations  	  MEDICATIONS:  apixaban 5 milliGRAM(s) Oral every 12 hours  diltiazem    milliGRAM(s) Oral daily  doxazosin 4 milliGRAM(s) Oral at bedtime  hydrALAZINE 100 milliGRAM(s) Oral three times a day  isosorbide   mononitrate ER Tablet (IMDUR) 120 milliGRAM(s) Oral daily    trimethoprim   80 mG/sulfamethoxazole 400 mG 1 Tablet(s) Oral <User Schedule>    guaiFENesin Oral Liquid (Sugar-Free) 100 milliGRAM(s) Oral every 6 hours PRN    acetaminophen   Tablet .. 650 milliGRAM(s) Oral every 6 hours PRN    pantoprazole    Tablet 40 milliGRAM(s) Oral before breakfast    atorvastatin 40 milliGRAM(s) Oral at bedtime  dextrose 40% Gel 15 Gram(s) Oral once  dextrose 50% Injectable 25 Gram(s) IV Push once  dextrose 50% Injectable 12.5 Gram(s) IV Push once  dextrose 50% Injectable 25 Gram(s) IV Push once  insulin glargine Injectable (LANTUS) 34 Unit(s) SubCutaneous at bedtime  insulin lispro (ADMELOG) corrective regimen sliding scale   SubCutaneous at bedtime  insulin lispro (ADMELOG) corrective regimen sliding scale   SubCutaneous three times a day before meals  insulin lispro Injectable (ADMELOG) 21 Unit(s) SubCutaneous three times a day before meals  methylPREDNISolone sodium succinate Injectable 40 milliGRAM(s) IV Push every 12 hours    fluticasone propionate 50 MICROgram(s)/spray Nasal Spray 1 Spray(s) Both Nostrils two times a day PRN  sodium chloride 0.9% lock flush 3 milliLiter(s) IV Push every 8 hours  sodium chloride 0.9%. 1000 milliLiter(s) IV Continuous <Continuous>      PAST MEDICAL/SURGICAL HISTORY  PAST MEDICAL & SURGICAL HISTORY:  Hypertension, unspecified type    Type 2 diabetes mellitus with other neurologic complication, without long-term current use of insulin    DM (diabetes mellitus)    HTN (hypertension)    HLD (hyperlipidemia)    Afib    Retinopathy    Chronic diastolic congestive heart failure    Chronic kidney disease, unspecified CKD stage    Amputation of leg, traumatic, left, subsequent encounter    S/P BKA (below knee amputation) unilateral, left    S/P hip replacement, left    History of surgery on arm        SOCIAL HISTORY: Substance Use (street drugs): ( x ) never used  (  ) other:    FAMILY HISTORY:  No pertinent family history in first degree relatives    Family history of heart failure (Father)           PHYSICAL EXAM:  T(C): 36.4 (06-28-21 @ 13:19), Max: 36.7 (06-27-21 @ 18:20)  HR: 49 (06-28-21 @ 13:19) (48 - 75)  BP: 168/71 (06-28-21 @ 13:19) (143/73 - 186/92)  RR: 16 (06-28-21 @ 13:19) (16 - 20)  SpO2: 95% (06-28-21 @ 13:19) (95% - 99%)  Wt(kg): --  I&O's Summary    27 Jun 2021 07:01  -  28 Jun 2021 07:00  --------------------------------------------------------  IN: 1400 mL / OUT: 2125 mL / NET: -725 mL    28 Jun 2021 07:01  -  28 Jun 2021 15:00  --------------------------------------------------------  IN: 0 mL / OUT: 200 mL / NET: -200 mL        GENERAL: NAD s/l left temporal artery biopsy   EYES: conjunctiva and sclera clear  ENMT: No tonsillar erythema, exudates, or enlargement  Cardiovascular: Normal S1 S2, No JVD, No murmur  Respiratory: Lungs clear to auscultation	  Gastrointestinal:  Soft, Non-tender, + BS	  Extremities: Rt BKA                                  10.5   17.71 )-----------( 215      ( 27 Jun 2021 07:42 )             32.7     06-27    130<L>  |  99  |  96<H>  ----------------------------<  199<H>  4.6   |  19<L>  |  2.65<H>    Ca    9.3      27 Jun 2021 07:42  Phos  4.1     06-27  Mg     1.9     06-27      proBNP:   Lipid Profile:   HgA1c:   TSH:     Consultant(s) Notes Reviewed:  [x ] YES  [ ] NO    Care Discussed with Consultants/Other Providers [ x] YES  [ ] NO    Imaging Personally Reviewed independently:  [x] YES  [ ] NO    All labs, radiologic studies, vitals, orders and medications list reviewed. Patient is seen and examined at bedside. Case discussed with medical team.

## 2021-06-28 NOTE — PROVIDER CONTACT NOTE (OTHER) - ACTION/TREATMENT ORDERED:
RN to give hydralazine early.
RN to give Imdur dose with bradycardia
Will await further provider recommendations.
Will await further provider recommendations.
Continue to monitor BP. Pt ok to be transported to  where he has an inpatient bed
Administer interventions as ordered
Will await provider recommendations.
Will await further provider recommendations

## 2021-06-28 NOTE — PROGRESS NOTE ADULT - SUBJECTIVE AND OBJECTIVE BOX
Chief Complaint: DM 2 with hyperglycemia exacerbated by steroids     History: Patient seen at bedside. Reports he is eating meals, denies n/v, denies s/s of hypoglycemia  Remains on Solumedrol 40 mg BID, hyperglycemia continued.  Patient prefers CGM. And is amenable to 4x daily injections    MEDICATIONS  (STANDING):  apixaban 5 milliGRAM(s) Oral every 12 hours  atorvastatin 40 milliGRAM(s) Oral at bedtime  carvedilol 3.125 milliGRAM(s) Oral every 12 hours  dextrose 40% Gel 15 Gram(s) Oral once  dextrose 50% Injectable 25 Gram(s) IV Push once  dextrose 50% Injectable 12.5 Gram(s) IV Push once  dextrose 50% Injectable 25 Gram(s) IV Push once  diltiazem    milliGRAM(s) Oral daily  doxazosin 4 milliGRAM(s) Oral at bedtime  hydrALAZINE 100 milliGRAM(s) Oral three times a day  insulin glargine Injectable (LANTUS) 28 Unit(s) SubCutaneous at bedtime  insulin lispro (ADMELOG) corrective regimen sliding scale   SubCutaneous three times a day before meals  insulin lispro (ADMELOG) corrective regimen sliding scale   SubCutaneous at bedtime  insulin lispro Injectable (ADMELOG) 15 Unit(s) SubCutaneous three times a day before meals  isosorbide   mononitrate ER Tablet (IMDUR) 120 milliGRAM(s) Oral daily  methylPREDNISolone sodium succinate Injectable 40 milliGRAM(s) IV Push every 12 hours  pantoprazole    Tablet 40 milliGRAM(s) Oral before breakfast  sodium chloride 0.9% lock flush 3 milliLiter(s) IV Push every 8 hours  sodium chloride 0.9%. 1000 milliLiter(s) (50 mL/Hr) IV Continuous <Continuous>  trimethoprim   80 mG/sulfamethoxazole 400 mG 1 Tablet(s) Oral <User Schedule>    MEDICATIONS  (PRN):  acetaminophen   Tablet .. 650 milliGRAM(s) Oral every 6 hours PRN Temp greater or equal to 38C (100.4F), Mild Pain (1 - 3)  fluticasone propionate 50 MICROgram(s)/spray Nasal Spray 1 Spray(s) Both Nostrils two times a day PRN congestion  guaiFENesin Oral Liquid (Sugar-Free) 100 milliGRAM(s) Oral every 6 hours PRN Cough    No Known Allergies    Review of Systems:  Cardiovascular: No chest pain  Respiratory: No SOB  GI: No nausea, vomiting  Endocrine: no hypoglycemia       Vital Signs Last 24 Hrs  T(C): 36.4 (28 Jun 2021 13:19), Max: 36.7 (27 Jun 2021 18:20)  T(F): 97.6 (28 Jun 2021 13:19), Max: 98.1 (28 Jun 2021 04:16)  HR: 49 (28 Jun 2021 13:19) (48 - 75)  BP: 168/71 (28 Jun 2021 13:19) (143/73 - 186/92)  BP(mean): --  RR: 16 (28 Jun 2021 13:19) (16 - 20)  SpO2: 95% (28 Jun 2021 13:19) (95% - 99%)  GENERAL: NAD  EYES: No proptosis, no lid lag, anicteric  HEENT:  Atraumatic, Normocephalic, moist mucous membranes  RESPIRATORY: unlabored respirations   PSYCH: Alert and oriented x 3    CAPILLARY BLOOD GLUCOSE  POCT Blood Glucose.: 202 mg/dL (28 Jun 2021 12:45)  POCT Blood Glucose.: 186 mg/dL (28 Jun 2021 08:49)  POCT Blood Glucose.: 244 mg/dL (27 Jun 2021 21:19)  POCT Blood Glucose.: 201 mg/dL (27 Jun 2021 17:36)  POCT Blood Glucose.: 269 mg/dL (27 Jun 2021 12:39)  POCT Blood Glucose.: 223 mg/dL (27 Jun 2021 08:59)  POCT Blood Glucose.: 263 mg/dL (26 Jun 2021 21:53)  POCT Blood Glucose.: 209 mg/dL (26 Jun 2021 17:58)      06-28    131<L>  |  97<L>  |  95<H>  ----------------------------<  220<H>  5.2   |  18<L>  |  2.48<H>    Ca    9.4      28 Jun 2021 15:05  Phos  4.7     06-28  Mg     1.8     06-28        Thyroid Function Tests:  06-21 @ 02:46 TSH 2.08 FreeT4 -- T3 -- Anti TPO -- Anti Thyroglobulin Ab -- TSI --      A1C with Estimated Average Glucose Result: 10.6 % (06-21-21 @ 02:33)  A1C with Estimated Average Glucose Result: 7.4 % (04-25-21 @ 11:17)    Diet, DASH/TLC:   Sodium & Cholesterol Restricted  Consistent Carbohydrate No Snacks (CSTCHO)  For patients receiving Renal Replacement - No Protein Restr, No Conc K, No Conc Phos, Low Sodium (RENAL) (06-23-21 @ 16:32)

## 2021-06-28 NOTE — PROVIDER CONTACT NOTE (OTHER) - RECOMMENDATIONS
As per PA okfelicitas to give morning meds now. Will await further provider recommendations. Will continue to monitor.
Continue to monitor.
Hold Imdur as per parameters for bradycardia. Continue to monitor on Tele. Consider alternate BP medication to be given now
Will repeat BP and VS. Doxazosin given as per orders. Will await provider recommendations. Will continue to monitor.
Confirm with cardiology that RN to give Imdur dose with Bradycardia.
As per MAR morning medications given. Will await further provider recommendations. Will continue to monitor.
Will repeat BP and VS. Hydralazine given as per orders. Will await further provider recommendations. Will continue to monitor.

## 2021-06-28 NOTE — PROVIDER CONTACT NOTE (OTHER) - ASSESSMENT
Pt is AOx4 without signs or symptoms of distress
Pt sitting up in stretcher, asymptomatic
Pt is AOx4 without signs or symptoms of distress
Resting in stretcher, asymptomatic. Denies headache, dizziness, or discomfort.
Pt asymptomatic. Pt denies chest pain, SOB, or palpitations. Pt denies headache or pain.
Pt asymptomatic. Pt denies chest pain, SOB, or palpitations. Pt denies headache or pain.
Pt asymptomatic. Pt denies dizziness or headache. Pt denies chest pain, SOB, or palpitations.
Pt asymptomatic. Pt denies headache, chest pain, SOB, or palpitations.

## 2021-06-29 ENCOUNTER — TRANSCRIPTION ENCOUNTER (OUTPATIENT)
Age: 71
End: 2021-06-29

## 2021-06-29 LAB
ANION GAP SERPL CALC-SCNC: 13 MMOL/L — SIGNIFICANT CHANGE UP (ref 7–14)
BASOPHILS # BLD AUTO: 0.04 K/UL — SIGNIFICANT CHANGE UP (ref 0–0.2)
BASOPHILS NFR BLD AUTO: 0.2 % — SIGNIFICANT CHANGE UP (ref 0–2)
BUN SERPL-MCNC: 96 MG/DL — HIGH (ref 7–23)
CALCIUM SERPL-MCNC: 9 MG/DL — SIGNIFICANT CHANGE UP (ref 8.4–10.5)
CHLORIDE SERPL-SCNC: 101 MMOL/L — SIGNIFICANT CHANGE UP (ref 98–107)
CO2 SERPL-SCNC: 20 MMOL/L — LOW (ref 22–31)
CREAT SERPL-MCNC: 2.39 MG/DL — HIGH (ref 0.5–1.3)
EOSINOPHIL # BLD AUTO: 0 K/UL — SIGNIFICANT CHANGE UP (ref 0–0.5)
EOSINOPHIL NFR BLD AUTO: 0 % — SIGNIFICANT CHANGE UP (ref 0–6)
GLUCOSE BLDC GLUCOMTR-MCNC: 111 MG/DL — HIGH (ref 70–99)
GLUCOSE BLDC GLUCOMTR-MCNC: 130 MG/DL — HIGH (ref 70–99)
GLUCOSE BLDC GLUCOMTR-MCNC: 133 MG/DL — HIGH (ref 70–99)
GLUCOSE BLDC GLUCOMTR-MCNC: 156 MG/DL — HIGH (ref 70–99)
GLUCOSE SERPL-MCNC: 109 MG/DL — HIGH (ref 70–99)
HCT VFR BLD CALC: 32.2 % — LOW (ref 39–50)
HGB BLD-MCNC: 10.3 G/DL — LOW (ref 13–17)
IANC: 20.87 K/UL — HIGH (ref 1.5–8.5)
IMM GRANULOCYTES NFR BLD AUTO: 1.8 % — HIGH (ref 0–1.5)
LYMPHOCYTES # BLD AUTO: 0.65 K/UL — LOW (ref 1–3.3)
LYMPHOCYTES # BLD AUTO: 2.8 % — LOW (ref 13–44)
MAGNESIUM SERPL-MCNC: 1.8 MG/DL — SIGNIFICANT CHANGE UP (ref 1.6–2.6)
MCHC RBC-ENTMCNC: 25.8 PG — LOW (ref 27–34)
MCHC RBC-ENTMCNC: 32 GM/DL — SIGNIFICANT CHANGE UP (ref 32–36)
MCV RBC AUTO: 80.7 FL — SIGNIFICANT CHANGE UP (ref 80–100)
MONOCYTES # BLD AUTO: 0.96 K/UL — HIGH (ref 0–0.9)
MONOCYTES NFR BLD AUTO: 4.2 % — SIGNIFICANT CHANGE UP (ref 2–14)
NEUTROPHILS # BLD AUTO: 20.87 K/UL — HIGH (ref 1.8–7.4)
NEUTROPHILS NFR BLD AUTO: 91 % — HIGH (ref 43–77)
NRBC # BLD: 0 /100 WBCS — SIGNIFICANT CHANGE UP
NRBC # FLD: 0 K/UL — SIGNIFICANT CHANGE UP
PHOSPHATE SERPL-MCNC: 4.4 MG/DL — SIGNIFICANT CHANGE UP (ref 2.5–4.5)
PLATELET # BLD AUTO: 189 K/UL — SIGNIFICANT CHANGE UP (ref 150–400)
POTASSIUM SERPL-MCNC: 4.7 MMOL/L — SIGNIFICANT CHANGE UP (ref 3.5–5.3)
POTASSIUM SERPL-SCNC: 4.7 MMOL/L — SIGNIFICANT CHANGE UP (ref 3.5–5.3)
RBC # BLD: 3.99 M/UL — LOW (ref 4.2–5.8)
RBC # FLD: 14.6 % — HIGH (ref 10.3–14.5)
SODIUM SERPL-SCNC: 134 MMOL/L — LOW (ref 135–145)
WBC # BLD: 22.93 K/UL — HIGH (ref 3.8–10.5)
WBC # FLD AUTO: 22.93 K/UL — HIGH (ref 3.8–10.5)

## 2021-06-29 RX ADMIN — Medication 2: at 12:59

## 2021-06-29 RX ADMIN — APIXABAN 5 MILLIGRAM(S): 2.5 TABLET, FILM COATED ORAL at 06:07

## 2021-06-29 RX ADMIN — SODIUM CHLORIDE 3 MILLILITER(S): 9 INJECTION INTRAMUSCULAR; INTRAVENOUS; SUBCUTANEOUS at 15:44

## 2021-06-29 RX ADMIN — ATORVASTATIN CALCIUM 40 MILLIGRAM(S): 80 TABLET, FILM COATED ORAL at 22:15

## 2021-06-29 RX ADMIN — Medication 240 MILLIGRAM(S): at 06:07

## 2021-06-29 RX ADMIN — Medication 60 MILLIGRAM(S): at 17:51

## 2021-06-29 RX ADMIN — Medication 21 UNIT(S): at 09:11

## 2021-06-29 RX ADMIN — APIXABAN 5 MILLIGRAM(S): 2.5 TABLET, FILM COATED ORAL at 17:51

## 2021-06-29 RX ADMIN — Medication 100 MILLIGRAM(S): at 06:07

## 2021-06-29 RX ADMIN — Medication 4 MILLIGRAM(S): at 22:15

## 2021-06-29 RX ADMIN — Medication 40 MILLIGRAM(S): at 06:08

## 2021-06-29 RX ADMIN — ISOSORBIDE MONONITRATE 120 MILLIGRAM(S): 60 TABLET, EXTENDED RELEASE ORAL at 13:00

## 2021-06-29 RX ADMIN — Medication 0: at 22:21

## 2021-06-29 RX ADMIN — PANTOPRAZOLE SODIUM 40 MILLIGRAM(S): 20 TABLET, DELAYED RELEASE ORAL at 06:07

## 2021-06-29 RX ADMIN — Medication 100 MILLIGRAM(S): at 22:15

## 2021-06-29 RX ADMIN — SODIUM CHLORIDE 3 MILLILITER(S): 9 INJECTION INTRAMUSCULAR; INTRAVENOUS; SUBCUTANEOUS at 22:06

## 2021-06-29 RX ADMIN — Medication 100 MILLIGRAM(S): at 14:00

## 2021-06-29 RX ADMIN — SODIUM CHLORIDE 3 MILLILITER(S): 9 INJECTION INTRAMUSCULAR; INTRAVENOUS; SUBCUTANEOUS at 06:13

## 2021-06-29 RX ADMIN — INSULIN GLARGINE 34 UNIT(S): 100 INJECTION, SOLUTION SUBCUTANEOUS at 22:22

## 2021-06-29 RX ADMIN — Medication 21 UNIT(S): at 17:53

## 2021-06-29 RX ADMIN — Medication 650 MILLIGRAM(S): at 00:35

## 2021-06-29 RX ADMIN — Medication 21 UNIT(S): at 13:00

## 2021-06-29 NOTE — PROGRESS NOTE ADULT - ASSESSMENT
70y male with a past medical history/ocular history of DM, HTN, CKD, Afib on Eliquis, HLD, left foot amputation, PDR s/p Intravitreal injections consulted for painless vision loss left.      Ada on CKD  baseline scr appears to be 3.0  ADA likely TISHA and hyperglycemia   Fena>1% peaked 4.77   CKD likely sec to longstanding DM with retinopathy  Monitor BMP at present  Elevated BUN likely sec to steroid use  renal function improved  MOnitor BMP   AVoid further nephrotoxics NSAIDS RCA    HTN  BP better  started on coreg now kwasi will dc  imdur increased   MOnitor BP and HR    Hyperphosphatemia  sec to RF  Low PO4 diet     HYponatremia  Avoid hypotonic IVF  optimize dm control  urine work up suggested SIADH  free water restriction <1L/day  monitor

## 2021-06-29 NOTE — DISCHARGE NOTE PROVIDER - PROVIDER TOKENS
PROVIDER:[TOKEN:[[id number]]],FREE:[LAST:[Your outpatient ophthalmologist],PHONE:[(   )    -],FAX:[(   )    -],FOLLOWUP:[1 week]],FREE:[LAST:[Your primary care physician],PHONE:[(   )    -],FAX:[(   )    -]] PROVIDER:[TOKEN:[[id number]]],FREE:[LAST:[Your outpatient ophthalmologist],PHONE:[(   )    -],FAX:[(   )    -],FOLLOWUP:[1 week]],FREE:[LAST:[Your primary care physician],PHONE:[(   )    -],FAX:[(   )    -]],PROVIDER:[[id number]],FOLLOWUP:[2 weeks]]

## 2021-06-29 NOTE — DISCHARGE NOTE PROVIDER - NSDCFUADDAPPT_GEN_ALL_CORE_FT
Follow up with your Rheumatologist, Endocrinologist, Primary Care Doctor and Ophthalmologist within 2 weeks after discharge.

## 2021-06-29 NOTE — PROGRESS NOTE ADULT - ASSESSMENT
71 yo man with history of afib on Eliquis 2.5 mg po BID, HFpEF (EF 55-60% 4/2021), HTN, HLD, CKD stage 3-4, DM2 c/b diabetic retinopathy, PVD c/b LLE nec fasc s/p L BKA, and b/l cataracts s/p R eye cataract surgery presents as a transfer from [facility] after he went there for acute onset of L eye blurry vision    EKG SR RBBB   Tele: SB 50s, RBBB    1) Afib  -c/w Eliquis 5 mg po BID  -coreg discontinued 2/2 bradycardia. patient asymptomatic. continue to hold. HR improving  -continue with cardizem   -echo moderate concentric LVH, grossly normal LV function   -continue to monitor on tele      2) GCA  - t/t per rhum     3) HTN   -improving  -c/w hydralazine and imdur  -coreg discontinued 2/2 bradycardia  -continue to monitor BP     4) ELMER on CKD   -improving   -renal onboard     5) DVT PPX  -Eliquis

## 2021-06-29 NOTE — PROGRESS NOTE ADULT - PROBLEM SELECTOR PROBLEM 9
Medication management

## 2021-06-29 NOTE — PROGRESS NOTE ADULT - PROBLEM SELECTOR PLAN 1
Pt with L eye painless vision loss. 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 CTH and CTA H/N negative. Low suspicion for GCA, given no headaches, tenderness in scalp or temples, jaw claudication, or systemic symptoms.   - Ophthalmology and Neurology consults obtained. Recs appreciated.Rheumatology consult appreciated-cont iv stroids,possible -Temporal artery bopsy.Vascular consult appreciated.t  - MRI brain wo contrast [patient with hardware in L hip and L arm, need to confirm if MRI compatible]  - TTE ordered  - Check ESR and CRP  - Check lipid profile and HgbA1c  - Pt passed dysphagia screen. C/w dysphagia diet. F/u S&S eval.  - PT/OT eval  - Neuro checks q4hrs  - Aspiration precautions, fall risk protocol   - C/w ASA 81 mg daily and Eliquis 5 mg bid  - Monitor on tele
-s/p lt.Temporal artery biopsy.doing well-f/u result.B/L DIABETIC RETINOPATHY  -Reheumatology f/u noted.Transition to po steroid.May need immunosuppresive agent given uncontrolled DM.Temporal artery biopsy reuslt-equvocal  -OPTHALMOLOGY F/U NOTED
-s/p lt.Temporal artery biopsy.doing well-f/u result.B/L DIABETIC RETINOPATHY  -Reheumatology f/u noted.cont.iv steroids.monitor FS with coverage  -OPTHALMOLOGY F/U NOTED
-s/p lt.Temporal artery biopsy.doing well-f/u result.B/L DIABETIC RETINOPATHY  -Reheumatology f/u noted.cont.iv steroids.monitor FS with coverage  -OPTHALMOLOGY F/U NOTED
Pt with L eye painless vision loss. 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 CTH and CTA H/N negative. Low suspicion for GCA, given no headaches, tenderness in scalp or temples, jaw claudication, or systemic symptoms.   - Ophthalmology and Neurology consults obtained. Recs appreciated.Rheumatology consult appreciated-cont iv stroids,possible -Temporal artery bopsy.Vascular consult appreciated.t  - MRI brain wo contrast [patient with hardware in L hip and L arm, need to confirm if MRI compatible]  - TTE ordered  - Check ESR and CRP  - Check lipid profile and HgbA1c  - Pt passed dysphagia screen. C/w dysphagia diet. .  - PT/OT eval  - Neuro checks q4hrs  - Aspiration precautions, fall risk protocol   - C/w ASA 81 mg daily and Eliquis 5 mg bid  - Monitor on tele
-s/p lt.Temporal artery biopsy.doing well-f/u result  -Reheumatology f/u noted.cont.iv steroids.monitor FS with coverage
-s/p lt.Temporal artery biopsy.doing well-f/u resul  -Reheumatology f/u noted.cont.iv steroids.monitor FS with coverage
-s/p lt.Temporal artery biopsy.doing well-f/u result.B/L DIABETIC RETINOPATHY  -Reheumatology f/u noted.Transition to po steroid.May need immunosuppresive agent given uncontrolled DM.Temporal artery biopsy reuslt-equvocal  -OPTHALMOLOGY F/U NOTED

## 2021-06-29 NOTE — DISCHARGE NOTE PROVIDER - NSDCCPCAREPLAN_GEN_ALL_CORE_FT
PRINCIPAL DISCHARGE DIAGNOSIS  Diagnosis: Central retinal artery occlusion  Assessment and Plan of Treatment: You had temporal artery biopsy performed on 6/24 which you tolerated well. The biopsy doesnt show any evidence of inflammation, however you were started on steroids prior to the biopsy. You were seen by the opthalmology and rheumatology team. You were treated IV steroids which was transitioned to oral. You are to continue oral prednisone 60mg daily and continue bactrim DS 3 times weekly. You are to follow up with Dr. Shaikh (rheumatologist) upon discharge in [place]. You are to follow up with your opthalmology or with [facility] Department of Ophthalmology within 1 week of after discharge at:  [address] [place] [address] [phone].         PRINCIPAL DISCHARGE DIAGNOSIS  Diagnosis: Central retinal artery occlusion  Assessment and Plan of Treatment: You had temporal artery biopsy performed on 6/24 which you tolerated well. The biopsy doesnt show any evidence of inflammation, however you were started on steroids prior to the biopsy. You were seen by the opthalmology and rheumatology team. You were treated IV steroids which was transitioned to oral. You are to continue oral prednisone 60mg daily and continue bactrim DS 3 times weekly. You are to follow up with Dr. Shaikh (rheumatologist) upon discharge in [place]. You are to follow up with your opthalmology or with [facility] Department of Ophthalmology within 1 week of after discharge at:  [address] [place] [address] [phone].        SECONDARY DISCHARGE DIAGNOSES  Diagnosis: HLD (hyperlipidemia)  Assessment and Plan of Treatment: To maintain normal cholesterol levels to prevent stroke, coronary artery disease, peripheral vascular disease and heart attacks.   Low fat diet, exercise daily and continue current medications. Follow up with primary care physician and cardiologist for management.    Diagnosis: Chronic diastolic congestive heart failure  Assessment and Plan of Treatment: To relieve and prevent worsening symptoms associated with congestive heart failure, to improve quality of life, and to treat underlying conditions such as coronary heart disease, high blood pressure, or diabetes, and to maintain euvolemia.   Low salt diet, fluid restriction to 1500 ml daily, monitor your fluid intake and weight daily, exercise as tolerated 30 minutes daily, and follow up with your physician within 1 to 2 weeks.    Diagnosis: HTN (hypertension)  Assessment and Plan of Treatment: To maintain a normal blood pressure to prevent heart attack, stroke and renal failure.   Low sodium and fat diet, continue anti-hypertensive medications, and follow up with primary care physician.    Diagnosis: Paroxysmal atrial fibrillation  Assessment and Plan of Treatment: To restore or maintain a normal heart rate and rhythm, to prevent blood clots, and decrease the risks of stroke CVA/TIA.   Please take your medications as prescribed.  Continue to take your blood thinner as prescribed and follow with your physician to monitor your levels.  Low fat diet, reduce caffeine intake, and exercise at least 30 minutes daily.    Diagnosis: Chronic kidney disease  Assessment and Plan of Treatment: To prevent shortness of breath, fluid overload, and electrolytes imbalance, and to slow down worsening kidney disease.   Continue blood pressure, cholesterol and diabetic medications. Goal of hemoglobin A1C (HgbA1C) < 7%.  Avoid nephrotoxic drugs such as nonsteroidal anti-inflammatory agents (NSAIDs).   Please follow up with your nephrologist to monitor your kidney function, continue with low protein and potassium diet.    Diagnosis: Diabetes mellitus  Assessment and Plan of Treatment: To maintain a normal blood glucose level and HgA1C level < 5.7 and to prevent diabetic complications such as uncontrolled diabetes, diabetic coma, blindness, renal failure, and amputations.   Monitor finger sticks pre-meal and bedtime, low salt, fat and carbohydrate diet, minimize glucose intake.  Exercise daily for at least 30 minutes and weight loss.  Follow up with primary care physician and endocrinologist for routine Hemoglobin A1C checks and management.  Follow up with your ophthalmologist for routine yearly vision exams.

## 2021-06-29 NOTE — DISCHARGE NOTE PROVIDER - NSDCMRMEDTOKEN_GEN_ALL_CORE_FT
albuterol 1.25 mg/3 mL (0.042%) inhalation solution: 3 milliliter(s) inhaled once a day  No recent fill history per [facility] or [facility]  alcohol swabs : Apply topically to affected area 4 times a day   aspirin 81 mg oral tablet: 1 tab(s) orally once a day  bumetanide 2 mg oral tablet: 1 tab(s) orally once a day  calcitriol 0.25 mcg oral capsule: 1 cap(s) orally once a day  DilTIAZem Hydrochloride  mg/24 hours oral capsule, extended release: 1 cap(s) orally 2 times a day  doxazosin 8 mg oral tablet: 1 tab(s) orally once a day  Per [facility], 90-day supply dispensed 4/16/2021    Per [facility], 90-day supply of 4mg tablets (one tablet taken once daily) was also filled 4/22/2021  Eliquis 2.5 mg oral tablet: 1 tab(s) orally 2 times a day  fluticasone 50 mcg/inh nasal spray: 1 spray(s) nasal 2 times a day  Per pharmacy, 30-day supply dispensed 4/28/2021  freestyle anaid device : Apply topically to affected area 3 times a day   freestyle anaid sensor : Apply topically to affected area once a day   glimepiride 4 mg oral tablet: 1 tab(s) orally once a day before breakfast or first main meal of the day  glucometer (per patient&#x27;s insurance): Test blood sugars four times a day. Dispense #1 glucometer.  HumaLOG KwikPen 100 units/mL injectable solution: 21 unit(s) subcutaneous 3 times a day (with meals)   hydrALAZINE 100 mg oral tablet: 1 tab(s) orally 3 times a day  Insulin Pen Needles, 4mm: 1 application subcutaneously 4 times a day. ** Use with insulin pen **   Invokana 300 mg oral tablet: 1 tab(s) orally once a day  No recent fill history per [facility] or [facility]  isosorbide mononitrate 60 mg oral tablet, extended release: 1 tab(s) orally once a day (in the morning)  lancets: 1 application subcutaneously 3 times a day     ICD Code E11.9   Lantus Solostar Pen 100 units/mL subcutaneous solution: 34 unit(s) subcutaneous once a day (at bedtime)   lidocaine 5% topical film: Apply topically to affected area once a day, As Needed  pantoprazole 40 mg oral delayed release tablet: 1 tab(s) orally once a day  polyethylene glycol 3350 oral powder for reconstitution: 17 gram(s) orally once a day, As Needed  Senna 8.6 mg oral tablet: 2 tab(s) orally once a day (at bedtime), As Needed  simvastatin 20 mg oral tablet: 1 tab(s) orally once a day (at bedtime)  test strips (per patient&#x27;s insurance): 1 application subcutaneously 3 times a day . ** Compatible with patient&#x27;s glucometer **     ICD Code E11.9   Vitamin D2 50,000 intl units (1.25 mg) oral capsule: 1 cap(s) orally 2 times a week   acetaminophen 325 mg oral tablet: 2 tab(s) orally every 6 hours, As needed, Temp greater or equal to 38C (100.4F), Mild Pain (1 - 3)  alcohol swabs : Apply topically to affected area 4 times a day   apixaban 5 mg oral tablet: 1 tab(s) orally every 12 hours  atorvastatin 40 mg oral tablet: 1 tab(s) orally once a day (at bedtime)  calcitriol 0.25 mcg oral capsule: 1 cap(s) orally once a day  doxazosin 4 mg oral tablet: 1 tab(s) orally once a day (at bedtime)  fluticasone 50 mcg/inh nasal spray: 1 spray(s) nasal 2 times a day  freestyle anaid device : Apply topically to affected area 3 times a day   freestyle anaid sensor : Apply topically to affected area once a day   glucometer (per patient&#x27;s insurance): 1 application subcutaneous 4 times a day   HumaLOG KwikPen 100 units/mL injectable solution: 21 unit(s) subcutaneous 3 times a day (with meals)   hydrALAZINE 100 mg oral tablet: 1 tab(s) orally 3 times a day  Insulin Pen Needles, 4mm: 1 application subcutaneously 4 times a day . ** Use with insulin pen **   isosorbide mononitrate 120 mg oral tablet, extended release: 1 tab(s) orally once a day  lancets: 1 application subcutaneously 3 times a day     ICD Code E11.9   Lantus Solostar Pen 100 units/mL subcutaneous solution: 34 unit(s) subcutaneous once a day (at bedtime)   NIFEdipine 60 mg oral tablet, extended release: 1 tab(s) orally once a day  pantoprazole 40 mg oral delayed release tablet: 1 tab(s) orally once a day  predniSONE 20 mg oral tablet: 3 tab(s) orally once a day  sulfamethoxazole-trimethoprim 400 mg-80 mg oral tablet: 1 tab(s) orally 3 times a week (Mon, Wed, Fri)  test strips (per patient&#x27;s insurance): 1 application subcutaneously 3 times a day  . ** Compatible with patient&#x27;s glucometer ** ICD Code E11.9   Vitamin D2 50,000 intl units (1.25 mg) oral capsule: 1 cap(s) orally 2 times a week   acetaminophen 325 mg oral tablet: 2 tab(s) orally every 6 hours, As needed, Temp greater or equal to 38C (100.4F), Mild Pain (1 - 3)  alcohol swabs : Apply topically to affected area 4 times a day   apixaban 5 mg oral tablet: 1 tab(s) orally every 12 hours  atorvastatin 40 mg oral tablet: 1 tab(s) orally once a day (at bedtime)  calcitriol 0.25 mcg oral capsule: 1 cap(s) orally once a day  doxazosin 4 mg oral tablet: 1 tab(s) orally once a day (at bedtime)  fluticasone 50 mcg/inh nasal spray: 1 spray(s) nasal 2 times a day  Freestyle Carlos Eduardo 2 KIT: 1 unit(s) subcutaneous once a day   freestyle carlos eduardo 2 Sensors: 1 each subcutaneous every 14 days  glucometer (per patient&#x27;s insurance): 1 application subcutaneous 4 times a day   HumaLOG KwikPen 100 units/mL injectable solution: 21 unit(s) subcutaneous 3 times a day (with meals)   hydrALAZINE 100 mg oral tablet: 1 tab(s) orally 3 times a day  Insulin Pen Needles, 4mm: 1 application subcutaneously 4 times a day . ** Use with insulin pen **   isosorbide mononitrate 120 mg oral tablet, extended release: 1 tab(s) orally once a day  lancets: 1 application subcutaneously 3 times a day     ICD Code E11.9   Lantus Solostar Pen 100 units/mL subcutaneous solution: 34 unit(s) subcutaneous once a day (at bedtime)   NIFEdipine 60 mg oral tablet, extended release: 1 tab(s) orally once a day  pantoprazole 40 mg oral delayed release tablet: 1 tab(s) orally once a day  predniSONE 20 mg oral tablet: 3 tab(s) orally once a day  sulfamethoxazole-trimethoprim 400 mg-80 mg oral tablet: 1 tab(s) orally 3 times a week (Mon, Wed, Fri)  test strips (per patient&#x27;s insurance): 1 application subcutaneously 3 times a day  . ** Compatible with patient&#x27;s glucometer ** ICD Code E11.9   Vitamin D2 50,000 intl units (1.25 mg) oral capsule: 1 cap(s) orally 2 times a week

## 2021-06-29 NOTE — PROGRESS NOTE ADULT - PROBLEM SELECTOR PLAN 3
Serum Cr 3.44 on presentation at [facility] and 3.09 on admission here vs. 2.84 in Apr 2021. Pt also received contrast load for CTA H/N.   - Monitor serum Cr and urine output  - Avoid NSAIDs, contrast, and nephrotoxins  -Renal consult appreciated-ELMER ON CKD-monitor renal function-Gentle hydration ordered
-likey sec.to TISHA,Hyperglycemia.Monitor.Renal f/u noted.
Serum Cr 3.44 on presentation at [facility] and 3.09 on admission here vs. 2.84 in Apr 2021. Pt also received contrast load for CTA H/N.   - Monitor serum Cr and urine output  - Avoid NSAIDs, contrast, and nephrotoxins  -Renal consult appreciated-ELMER ON CKD-monitor renal function
Serum Cr 3.44 on presentation at [facility] and 3.09 on admission here vs. 2.84 in Apr 2021. Pt also received contrast load for CTA H/N.   - Monitor serum Cr and urine output  - Avoid NSAIDs, contrast, and nephrotoxins  -Renal consult appreciated-ELMER ON CKD-monitor renal function-Gentle hydration ordered
Serum Cr 3.44 on presentation at [facility] and 3.09 on admission here vs. 2.84 in Apr 2021. Pt also received contrast load for CTA H/N.   - Monitor serum Cr and urine output  - Avoid NSAIDs, contrast, and nephrotoxins  -Renal consult appreciated-ELMER ON CKD-monitor renal function- S/P Gentle hydration
-likey sec.to TISHA,Hyperglycemia.Monitor.Renal f/u noted.
-likey sec.to TISHA,Hyperglycemia.Monitor.Renal f/u noted.
Serum Cr 3.44 on presentation at [facility] and 3.09 on admission here vs. 2.84 in Apr 2021. Pt also received contrast load for CTA H/N.   - Monitor serum Cr and urine output  - Avoid NSAIDs, contrast, and nephrotoxins  -Renal consult appreciated-ELMER ON CKD-monitor renal function-Gentle hydration ordered

## 2021-06-29 NOTE — DISCHARGE NOTE PROVIDER - CARE PROVIDER_API CALL
Cedric Shaikh  INTERNAL MEDICINE  [address] [place] [address]  Phone: [phone]  Fax: [phone]  Follow Up Time:     Your outpatient ophthalmologist,   Phone: (   )    -  Fax: (   )    -  Follow Up Time: 1 week    Your primary care physician,   Phone: (   )    -  Fax: (   )    -  Follow Up Time:    Cedric Shaikh  INTERNAL MEDICINE  [facility] [place] [address]  Phone: [phone]  Fax: [phone]  Follow Up Time:     Your outpatient ophthalmologist,   Phone: (   )    -  Fax: (   )    -  Follow Up Time: 1 week    Your primary care physician,   Phone: (   )    -  Fax: (   )    -  Follow Up Time:     Donnie Granda (MD)  Neurology; Vascular Neurology  [address], [address]  [place] [address]  Phone: [phone]  Fax: [phone]  Follow Up Time: 2 weeks

## 2021-06-29 NOTE — PROGRESS NOTE ADULT - SUBJECTIVE AND OBJECTIVE BOX
Subjective/Overnight events: Pt is lying in bed comfortable not in distress, no chest pains no SOB no palpitations  	  MEDICATIONS:  apixaban 5 milliGRAM(s) Oral every 12 hours  diltiazem    milliGRAM(s) Oral daily  doxazosin 4 milliGRAM(s) Oral at bedtime  hydrALAZINE 100 milliGRAM(s) Oral three times a day  isosorbide   mononitrate ER Tablet (IMDUR) 120 milliGRAM(s) Oral daily    trimethoprim   80 mG/sulfamethoxazole 400 mG 1 Tablet(s) Oral <User Schedule>    guaiFENesin Oral Liquid (Sugar-Free) 100 milliGRAM(s) Oral every 6 hours PRN    acetaminophen   Tablet .. 650 milliGRAM(s) Oral every 6 hours PRN    pantoprazole    Tablet 40 milliGRAM(s) Oral before breakfast    atorvastatin 40 milliGRAM(s) Oral at bedtime  dextrose 40% Gel 15 Gram(s) Oral once  dextrose 50% Injectable 25 Gram(s) IV Push once  dextrose 50% Injectable 12.5 Gram(s) IV Push once  dextrose 50% Injectable 25 Gram(s) IV Push once  insulin glargine Injectable (LANTUS) 34 Unit(s) SubCutaneous at bedtime  insulin lispro (ADMELOG) corrective regimen sliding scale   SubCutaneous three times a day before meals  insulin lispro (ADMELOG) corrective regimen sliding scale   SubCutaneous at bedtime  insulin lispro Injectable (ADMELOG) 21 Unit(s) SubCutaneous three times a day before meals  predniSONE   Tablet 60 milliGRAM(s) Oral daily    fluticasone propionate 50 MICROgram(s)/spray Nasal Spray 1 Spray(s) Both Nostrils two times a day PRN  sodium chloride 0.9% lock flush 3 milliLiter(s) IV Push every 8 hours  sodium chloride 0.9%. 1000 milliLiter(s) IV Continuous <Continuous>      PAST MEDICAL/SURGICAL HISTORY  PAST MEDICAL & SURGICAL HISTORY:  Hypertension, unspecified type    Type 2 diabetes mellitus with other neurologic complication, without long-term current use of insulin    DM (diabetes mellitus)    HTN (hypertension)    HLD (hyperlipidemia)    Afib    Retinopathy    Chronic diastolic congestive heart failure    Chronic kidney disease, unspecified CKD stage    Amputation of leg, traumatic, left, subsequent encounter    S/P BKA (below knee amputation) unilateral, left    S/P hip replacement, left    History of surgery on arm        SOCIAL HISTORY: Substance Use (street drugs): ( x ) never used  (  ) other:    FAMILY HISTORY:  No pertinent family history in first degree relatives    Family history of heart failure (Father)    PHYSICAL EXAM:  T(C): 36.8 (06-29-21 @ 05:25), Max: 36.8 (06-29-21 @ 05:25)  HR: 62 (06-29-21 @ 05:25) (49 - 71)  BP: 156/74 (06-29-21 @ 05:25) (156/74 - 182/79)  RR: 20 (06-29-21 @ 05:25) (16 - 20)  SpO2: 97% (06-29-21 @ 05:25) (95% - 99%)  Wt(kg): --  I&O's Summary    28 Jun 2021 07:01  -  29 Jun 2021 07:00  --------------------------------------------------------  IN: 800 mL / OUT: 1600 mL / NET: -800 mL    29 Jun 2021 07:01  -  29 Jun 2021 12:00  --------------------------------------------------------  IN: 0 mL / OUT: 350 mL / NET: -350 mL          GENERAL: NAD   EYES: conjunctiva and sclera clear  ENMT: No tonsillar erythema, exudates, or enlargement  Cardiovascular: Normal S1 S2, No JVD, No murmur  Respiratory: Lungs clear to auscultation	  Gastrointestinal:  Soft, Non-tender, + BS	  Extremities: Rt BKA                                    10.3   22.93 )-----------( 189      ( 29 Jun 2021 07:06 )             32.2     06-29    134<L>  |  101  |  96<H>  ----------------------------<  109<H>  4.7   |  20<L>  |  2.39<H>    Ca    9.0      29 Jun 2021 07:06  Phos  4.4     06-29  Mg     1.80     06-29      proBNP:   Lipid Profile:   HgA1c:   TSH:     Consultant(s) Notes Reviewed:  [x ] YES  [ ] NO    Care Discussed with Consultants/Other Providers [ x] YES  [ ] NO    Imaging Personally Reviewed independently:  [x] YES  [ ] NO    All labs, radiologic studies, vitals, orders and medications list reviewed. Patient is seen and examined at bedside. Case discussed with medical team.

## 2021-06-29 NOTE — PROGRESS NOTE ADULT - SUBJECTIVE AND OBJECTIVE BOX
Neurology Progress Note    S: Patient seen and examined. No new events overnight. patient denied CP, SOB, HA or pain. resting in bed. wants to leave .  bx was neg for GCA    Medication:  MEDICATIONS  (STANDING):  apixaban 5 milliGRAM(s) Oral every 12 hours  atorvastatin 40 milliGRAM(s) Oral at bedtime  dextrose 40% Gel 15 Gram(s) Oral once  dextrose 50% Injectable 25 Gram(s) IV Push once  dextrose 50% Injectable 12.5 Gram(s) IV Push once  dextrose 50% Injectable 25 Gram(s) IV Push once  diltiazem    milliGRAM(s) Oral daily  doxazosin 4 milliGRAM(s) Oral at bedtime  hydrALAZINE 100 milliGRAM(s) Oral three times a day  insulin glargine Injectable (LANTUS) 34 Unit(s) SubCutaneous at bedtime  insulin lispro (ADMELOG) corrective regimen sliding scale   SubCutaneous three times a day before meals  insulin lispro (ADMELOG) corrective regimen sliding scale   SubCutaneous at bedtime  insulin lispro Injectable (ADMELOG) 21 Unit(s) SubCutaneous three times a day before meals  isosorbide   mononitrate ER Tablet (IMDUR) 120 milliGRAM(s) Oral daily  pantoprazole    Tablet 40 milliGRAM(s) Oral before breakfast  predniSONE   Tablet 60 milliGRAM(s) Oral daily  sodium chloride 0.9% lock flush 3 milliLiter(s) IV Push every 8 hours  sodium chloride 0.9%. 1000 milliLiter(s) (50 mL/Hr) IV Continuous <Continuous>  trimethoprim   80 mG/sulfamethoxazole 400 mG 1 Tablet(s) Oral <User Schedule>    MEDICATIONS  (PRN):  acetaminophen   Tablet .. 650 milliGRAM(s) Oral every 6 hours PRN Temp greater or equal to 38C (100.4F), Mild Pain (1 - 3)  fluticasone propionate 50 MICROgram(s)/spray Nasal Spray 1 Spray(s) Both Nostrils two times a day PRN congestion  guaiFENesin Oral Liquid (Sugar-Free) 100 milliGRAM(s) Oral every 6 hours PRN Cough      Vitals:  Vital Signs Last 24 Hrs  T(C): 36.8 (2021 05:25), Max: 36.8 (2021 05:25)  T(F): 98.3 (2021 05:25), Max: 98.3 (2021 05:25)  HR: 62 (2021 05:25) (57 - 71)  BP: 156/74 (2021 05:25) (156/74 - 182/79)  BP(mean): 97 (2021 05:25) (97 - 99)  RR: 20 (2021 05:25) (16 - 20)  SpO2: 97% (2021 05:25) (96% - 99%)    PHYSICAL EXAM:  GENERAL: NAD on room air  HEENT: Normocephalic;  conjunctivae and sclerae clear; moist mucous membranes;   NECK: Supple  EXTREMITIES: No cyanosis; no edema; no calf tenderness  SKIN: Warm and dry; no rash             Neurological Exam:  - Mental Status: Alert, oriented to person, place, time, situation; speech is fluent with intact naming, repetition, and comprehension; follows commands  - Cranial Nerves II-XII:    II:  PERRL 3mm b/l; visual fields are full to confrontation; visual acuity 20/30 OU  III, IV, VI:  EOMI, no nystagmus  V:  facial sensation is intact in the V1-V3 distribution bilaterally.  VII:  face is symmetric with normal eye closure and smile  VIII:  hearing is intact to finger rub  IX, X:  uvula is midline and soft palate rises symmetrically  XI:  head turning and shoulder shrug are intact bilaterally  XII:  tongue protrudes in the midline  - Motor: strength 5/5 throughout, LLE deferred due to below knee amputation; normal muscle bulk and tone throughout; no pronator drift  - Reflexes:  2+ and symmetric at the biceps, triceps, brachioradialis, knees, and ankles;  plantar reflexes downgoing bilaterally  - Sensory:  intact to light touch, pin prick, vibration, and joint-position sense throughout  - Coordination:  finger-nose-finger and heel-knee-shin intact without dysmetria; rapid alternating hand movements intact  - Gait:  normal steps, base, arm swing, and turning; Romberg testing is negative    I personally reviewed the below data/images/labs:    CBC Full  -  ( 2021 07:06 )  WBC Count : 22.93 K/uL  RBC Count : 3.99 M/uL  Hemoglobin : 10.3 g/dL  Hematocrit : 32.2 %  Platelet Count - Automated : 189 K/uL  Mean Cell Volume : 80.7 fL  Mean Cell Hemoglobin : 25.8 pg  Mean Cell Hemoglobin Concentration : 32.0 gm/dL  Auto Neutrophil # : 20.87 K/uL  Auto Lymphocyte # : 0.65 K/uL  Auto Monocyte # : 0.96 K/uL  Auto Eosinophil # : 0.00 K/uL  Auto Basophil # : 0.04 K/uL  Auto Neutrophil % : 91.0 %  Auto Lymphocyte % : 2.8 %  Auto Monocyte % : 4.2 %  Auto Eosinophil % : 0.0 %  Auto Basophil % : 0.2 %        134<L>  |  101  |  96<H>  ----------------------------<  109<H>  4.7   |  20<L>  |  2.39<H>    Ca    9.0      2021 07:06  Phos  4.4       Mg     1.80             Urinalysis Basic - ( 2021 07:56 )    Color: Colorless / Appearance: Clear / S.016 / pH: x  Gluc: x / Ketone: Negative  / Bili: Negative / Urobili: <2 mg/dL   Blood: x / Protein: 30 mg/dL / Nitrite: Negative   Leuk Esterase: Negative / RBC: 0 /HPF / WBC 1 /HPF   Sq Epi: x / Non Sq Epi: 0 /HPF / Bacteria: Negative      [facility] Results  CT angiography neck: No evidence of hemodynamically significant stenosis using NASCET criteria.  Patent vertebral arteries.  No evidence of vascular dissection.  CT angiography brain: No major vessel occlusion or proximal stenosis.  CTH: No acute intracranial disease.  CT perfusion: No perfusion abnormality

## 2021-06-29 NOTE — PROGRESS NOTE ADULT - ASSESSMENT
69 yo man with PMHx of DM2, HFpEF, HTN, HLD, CKD, and PVD (s/p L BKA) who presented from ophthalmology due to concerns for L central artery occlusion vs GCA. S/p temporal artery biopsy on 6/24. Endocrine Team consulted for inpatient T2DM management.    1. Uncontrolled T2DM c/b neuropathy and retinopathy exacerbated by steroids  Home Regimen: Glimepiride 4 mg daily, Invokana 300 mg (listed in outpatient med rec, unclear if taking)  HbA1c 10.6, goal A1c 7%    While inpatient:  BG target 100-180 mg/dl  Contniue Lantus 34 units SQ qHS  Continue Admelog 21 units SQ TID before meals (Hold if NPO/not eating meal)  Continue Admelog MODERATE dose correctional scale before meals, moderate dose at bedtime  Check BG before meals and bedtime  Consistent carbohydrate diet    DM Education: Will need education on insulin pen and glucometer prior to discharge, ensure patient performs successful insulin PEN return demonstration and document in DM CPG. Patient with some visual deficit, so will need to ensure he can see dosing on insulin pen VS learn how to use insulin pen clicks safely     Discharge Plan:   Basal/bolus insulin PENS with dose TBD   Please send Lantus solsotar pen and humalog kwikpen as test script to check insurance coverage.  Ok to send with current doses and update prior to d/c  Lantus and humalog pens covered- with high copays.    STOP Glimepiride/invokana    Patient requesting Freestyle Carlos Eduardo Continuous glucometer (CGM). Please send prescription for Freestyle Libre2 SENSORS (prescribe 2 sensors, 1 month supply) to pharmacy to assess insurance coverage.   Endocrine team will followup to educate patient on use of CGM and provide Carlos Eduardo PDM reader if sensors are covered.  If covered, patient will also need BD katie pen needles 4 x daily.  Please assess insurance coverage of insulin and CGM so that approrpiate dc plan and education can be complete  - Will call [employer] in AM for PA for CGM.  Patient should followup as outpatient with PCP, opthalmology and endocrinology  If desiring to followup with [facility]  [facility], [address] [place] [id number], [place] [address], [phone]    2. Steroid induced hyperglycemia  Now on prednisone 60 mg daily  Per chart, empiric treatment for possible GCA, patient s/p temporal artery biopsy. If GCA, steroids would be prolonged course - please keep endocrine informed of steroid plan.  As per notes patient will be on prednisone 60 mg daily until follow up with rheumatology practice.  Patient reprots he does not have transportation and prefers house calls.  Advised patient that a local rheumatology and endocrine provider with be cotacted for appointments by the primary team.  This patient will need close montioring of FS, especially while on steroids.  In addition, patient will be on a long course of steroids and abruptly stopping steroids can cause adrenal crisis.  Therefore, please assure both endocrine and rheumtology appointments are set up prior to discharge for safest dc plan    3. HTN  Management and goal per primary team/neurology    4. HLD  Continue Atorvastatin 40 mg daily if no contraindication     Discussed case with OK and XOCHITL Meneses  Nurse Practitioner  Division of Endocrinology & Diabetes  Pager # [phone]      If after 6PM or before 9AM, or on weekends/holidays, please call endocrine answering service for assistance ([phone]).  For nonurgent matters email [email or web address] for assistance.

## 2021-06-29 NOTE — CONSULT NOTE ADULT - PROVIDER SPECIALTY LIST ADULT
Infectious Disease
Rheumatology
Endocrinology
Neurology
Nephrology
Cardiology
Vascular Surgery
Ophthalmology

## 2021-06-29 NOTE — PROGRESS NOTE ADULT - PROBLEM SELECTOR PLAN 5
=CHECK A1C- 10 .FS WITH COVERAGE.Increase Lantus 34 units,Humolog-21 units ,Endocrine f/u noted.    # Diabetic retinopathy - Severe NPDR vs PDR OU  - Known chronic history of PDR with injection therapy OS> OD  - Severe NPDR - No signs of NVI, NVD, NVE on exam today   - Dot blots seen in all 4 quadrants, with large IRH inferotemporal OD  - No tears, holes or detachment seen  - Retinal detachment precautions: Cautioned for urgent reevaluation if symptoms of curtain vision loss or severe flashing of lights occurs   - Patient follows with Normal Yo MD. Needs OCT and FA imaging to further elucidate disease, and possible injection therapy as an outpatient.
=CHECK A1C- 10 .FS WITH COVERAGE.Increase Lantus 28 units,Humolog-11 units ,Endocrine f/u noted.    # Diabetic retinopathy - Severe NPDR vs PDR OU  - Known chronic history of PDR with injection therapy OS> OD  - Severe NPDR - No signs of NVI, NVD, NVE on exam today   - Dot blots seen in all 4 quadrants, with large IRH inferotemporal OD  - No tears, holes or detachment seen  - Retinal detachment precautions: Cautioned for urgent reevaluation if symptoms of curtain vision loss or severe flashing of lights occurs   - Patient follows with Normal Yo MD. Needs OCT and FA imaging to further elucidate disease, and possible injection therapy as an outpatient.
=CHECK A1C- 10 .FS WITH COVERAGE.Increase Lantus 28 units,Humolog-11 units ,Endocrine f/u noted.    # Diabetic retinopathy - Severe NPDR vs PDR OU  - Known chronic history of PDR with injection therapy OS> OD  - Severe NPDR - No signs of NVI, NVD, NVE on exam today   - Dot blots seen in all 4 quadrants, with large IRH inferotemporal OD  - No tears, holes or detachment seen  - Retinal detachment precautions: Cautioned for urgent reevaluation if symptoms of curtain vision loss or severe flashing of lights occurs   - Patient follows with Normal Yo MD. Needs OCT and FA imaging to further elucidate disease, and possible injection therapy as an outpatient.
=CHECK A1C- 10 .FS WITH COVERAGE.Increase Lantus 34 units,Humolog-21 units ,Endocrine f/u noted.    # Diabetic retinopathy - Severe NPDR vs PDR OU  - Known chronic history of PDR with injection therapy OS> OD  - Severe NPDR - No signs of NVI, NVD, NVE on exam today   - Dot blots seen in all 4 quadrants, with large IRH inferotemporal OD  - No tears, holes or detachment seen  - Retinal detachment precautions: Cautioned for urgent reevaluation if symptoms of curtain vision loss or severe flashing of lights occurs   - Patient follows with Normal Yo MD. Needs OCT and FA imaging to further elucidate disease, and possible injection therapy as an outpatient.
=CHECK A1C- 10 .FS WITH COVERAGE.Increase Lantus,Endocrine consult appreciated    # Diabetic retinopathy - Severe NPDR vs PDR OU  - Known chronic history of PDR with injection therapy OS> OD  - Severe NPDR - No signs of NVI, NVD, NVE on exam today   - Dot blots seen in all 4 quadrants, with large IRH inferotemporal OD  - No tears, holes or detachment seen  - Retinal detachment precautions: Cautioned for urgent reevaluation if symptoms of curtain vision loss or severe flashing of lights occurs   - Patient follows with Normal Yo MD. Needs OCT and FA imaging to further elucidate disease, and possible injection therapy as an outpatient.
=CHECK A1C- 10 .FS WITH COVERAGE.Increase Lantus 28 units,Humolog-11 units ,Endocrine f/u noted.    # Diabetic retinopathy - Severe NPDR vs PDR OU  - Known chronic history of PDR with injection therapy OS> OD  - Severe NPDR - No signs of NVI, NVD, NVE on exam today   - Dot blots seen in all 4 quadrants, with large IRH inferotemporal OD  - No tears, holes or detachment seen  - Retinal detachment precautions: Cautioned for urgent reevaluation if symptoms of curtain vision loss or severe flashing of lights occurs   - Patient follows with Normal Yo MD. Needs OCT and FA imaging to further elucidate disease, and possible injection therapy as an outpatient.
=CHECK A1C-.FS WITH COVERAGE    # Diabetic retinopathy - Severe NPDR vs PDR OU  - Known chronic history of PDR with injection therapy OS> OD  - Severe NPDR - No signs of NVI, NVD, NVE on exam today   - Dot blots seen in all 4 quadrants, with large IRH inferotemporal OD  - No tears, holes or detachment seen  - Retinal detachment precautions: Cautioned for urgent reevaluation if symptoms of curtain vision loss or severe flashing of lights occurs   - Patient follows with Normal Yo MD. Needs OCT and FA imaging to further elucidate disease, and possible injection therapy as an outpatient.
=CHECK A1C- 10 .FS WITH COVERAGE.Increase Lantus 28 units,Humolog-11 units ,Endocrine f/u noted.    # Diabetic retinopathy - Severe NPDR vs PDR OU  - Known chronic history of PDR with injection therapy OS> OD  - Severe NPDR - No signs of NVI, NVD, NVE on exam today   - Dot blots seen in all 4 quadrants, with large IRH inferotemporal OD  - No tears, holes or detachment seen  - Retinal detachment precautions: Cautioned for urgent reevaluation if symptoms of curtain vision loss or severe flashing of lights occurs   - Patient follows with Normal Yo MD. Needs OCT and FA imaging to further elucidate disease, and possible injection therapy as an outpatient.

## 2021-06-29 NOTE — CONSULT NOTE ADULT - CONSULT REASON
HAMZAH REYES
elevated scr
evaluate for GCA
Painless vision loss
Afib   CRAO
HbA1c 10.6
Leukocytosis
temporal artery biopsy

## 2021-06-29 NOTE — PROGRESS NOTE ADULT - PROBLEM SELECTOR PLAN 8
Hgb 11.1 on admission, was 9.1 in Apr 2021. No S/S of active bleed.  - Monitor H/H

## 2021-06-29 NOTE — PROGRESS NOTE ADULT - PROBLEM SELECTOR PLAN 7
-Improving,cont.cardiazem,Hydralazine,Isordil.mmmonitor BP

## 2021-06-29 NOTE — PROGRESS NOTE ADULT - SUBJECTIVE AND OBJECTIVE BOX
Chief Complaint: DM 2 with hyperglycemia exacerbated by steroids     History: Patient seen at bedside. Reports he is eating meals, denies n/v, denies s/s of hypoglycemia  Now on prednisone 60 mg po daily,   Patient prefers CGM. And is amenable to 4x daily injections    MEDICATIONS  (STANDING):  apixaban 5 milliGRAM(s) Oral every 12 hours  atorvastatin 40 milliGRAM(s) Oral at bedtime  carvedilol 3.125 milliGRAM(s) Oral every 12 hours  dextrose 40% Gel 15 Gram(s) Oral once  dextrose 50% Injectable 25 Gram(s) IV Push once  dextrose 50% Injectable 12.5 Gram(s) IV Push once  dextrose 50% Injectable 25 Gram(s) IV Push once  diltiazem    milliGRAM(s) Oral daily  doxazosin 4 milliGRAM(s) Oral at bedtime  hydrALAZINE 100 milliGRAM(s) Oral three times a day  insulin glargine Injectable (LANTUS) 28 Unit(s) SubCutaneous at bedtime  insulin lispro (ADMELOG) corrective regimen sliding scale   SubCutaneous three times a day before meals  insulin lispro (ADMELOG) corrective regimen sliding scale   SubCutaneous at bedtime  insulin lispro Injectable (ADMELOG) 15 Unit(s) SubCutaneous three times a day before meals  isosorbide   mononitrate ER Tablet (IMDUR) 120 milliGRAM(s) Oral daily  methylPREDNISolone sodium succinate Injectable 40 milliGRAM(s) IV Push every 12 hours  pantoprazole    Tablet 40 milliGRAM(s) Oral before breakfast  sodium chloride 0.9% lock flush 3 milliLiter(s) IV Push every 8 hours  sodium chloride 0.9%. 1000 milliLiter(s) (50 mL/Hr) IV Continuous <Continuous>  trimethoprim   80 mG/sulfamethoxazole 400 mG 1 Tablet(s) Oral <User Schedule>    MEDICATIONS  (PRN):  acetaminophen   Tablet .. 650 milliGRAM(s) Oral every 6 hours PRN Temp greater or equal to 38C (100.4F), Mild Pain (1 - 3)  fluticasone propionate 50 MICROgram(s)/spray Nasal Spray 1 Spray(s) Both Nostrils two times a day PRN congestion  guaiFENesin Oral Liquid (Sugar-Free) 100 milliGRAM(s) Oral every 6 hours PRN Cough    No Known Allergies    Review of Systems:  Cardiovascular: No chest pain  Respiratory: No SOB  GI: No nausea, vomiting  Endocrine: no hypoglycemia       Vital Signs Last 24 Hrs  T(C): 36.6 (29 Jun 2021 13:00), Max: 36.8 (29 Jun 2021 05:25)  T(F): 97.8 (29 Jun 2021 13:00), Max: 98.3 (29 Jun 2021 05:25)  HR: 60 (29 Jun 2021 13:00) (58 - 71)  BP: 150/70 (29 Jun 2021 13:00) (150/70 - 166/93)  BP(mean): 97 (29 Jun 2021 05:25) (97 - 99)  RR: 18 (29 Jun 2021 13:00) (16 - 20)  SpO2: 98% (29 Jun 2021 13:00) (96% - 99%)  GENERAL: NAD  EYES: No proptosis, no lid lag, anicteric  HEENT:  Atraumatic, Normocephalic, moist mucous membranes  RESPIRATORY: unlabored respirations   PSYCH: Alert and oriented x 3    CAPILLARY BLOOD GLUCOSE    POCT Blood Glucose.: 156 mg/dL (29 Jun 2021 12:07)  POCT Blood Glucose.: 111 mg/dL (29 Jun 2021 08:32)  POCT Blood Glucose.: 134 mg/dL (28 Jun 2021 22:18)  POCT Blood Glucose.: 185 mg/dL (28 Jun 2021 17:43)  POCT Blood Glucose.: 202 mg/dL (28 Jun 2021 12:45)  POCT Blood Glucose.: 186 mg/dL (28 Jun 2021 08:49)  POCT Blood Glucose.: 244 mg/dL (27 Jun 2021 21:19)  POCT Blood Glucose.: 201 mg/dL (27 Jun 2021 17:36)  POCT Blood Glucose.: 269 mg/dL (27 Jun 2021 12:39)  POCT Blood Glucose.: 223 mg/dL (27 Jun 2021 08:59)  POCT Blood Glucose.: 263 mg/dL (26 Jun 2021 21:53)  POCT Blood Glucose.: 209 mg/dL (26 Jun 2021 17:58)    06-29    134<L>  |  101  |  96<H>  ----------------------------<  109<H>  4.7   |  20<L>  |  2.39<H>    Ca    9.0      29 Jun 2021 07:06  Phos  4.4     06-29  Mg     1.80     06-29        Thyroid Function Tests:  06-21 @ 02:46 TSH 2.08 FreeT4 -- T3 -- Anti TPO -- Anti Thyroglobulin Ab -- TSI --      A1C with Estimated Average Glucose Result: 10.6 % (06-21-21 @ 02:33)  A1C with Estimated Average Glucose Result: 7.4 % (04-25-21 @ 11:17)    Diet, DASH/TLC:   Sodium & Cholesterol Restricted  Consistent Carbohydrate No Snacks (CSTCHO)  For patients receiving Renal Replacement - No Protein Restr, No Conc K, No Conc Phos, Low Sodium (RENAL) (06-23-21 @ 16:32)

## 2021-06-29 NOTE — CONSULT NOTE ADULT - SUBJECTIVE AND OBJECTIVE BOX
Patient is a 70y old  Male who presents with a chief complaint of L eye blurry vision (29 Jun 2021 12:00)      HPI:    71 yo man with history of afib on Eliquis, HFpEF (EF 55-60% 4/2021), HTN, HLD, CKD stage 3-4, DM2 c/b diabetic retinopathy, PVD c/b LLE nec fasc s/p L BKA, and b/l cataracts s/p R eye cataract surgery presents as a transfer from [facility] after he went there for acute onset of L eye blurry vision on Sunday afternoon ~4:30pm. Pt states that as he was watching TV and about to start a new movie, he suddenly developed L eye blurry vision, which he describes as a fuzziness. He has poor vision in his R eye at baseline but had near perfect vision in his L eye prior to this episode. Pt states that immediately before the blurry vision started, he felt slightly dizzy. Denies any associated double vision, eye pain, increased tearing, irritation, redness, or new flashes/floaters. Also denies any headaches, numbness/tingling, limb weakness, chest pain, SOB, palpitations, fevers, chills, cough, abdominal pain, nausea, vomiting, diarrhea, constipation, or urinary symptoms. At [facility], pt was found to have left CRAO, and Neurology was consulted for stroke workup. Pt was transferred to [facility] for ophthalmology eval.   (21 Jun 2021 06:00)    Pt afebrile.  WBC on admission 14.8 --> 22.9.  cxr no active pulm disease.  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  Seen by Ophthalmology.      Pt with leukocytosis, UA (-), cxr no pna.  Bcc 6/21 NGTD.  Pt on high dose steroids and Bactrim ppx.         REVIEW OF SYSTEMS:    CONSTITUTIONAL: No fever, weight loss, or fatigue  EYES: No eye pain, visual disturbances, or discharge  ENMT:  No sore throat  NECK: No pain or stiffness  RESPIRATORY: No cough, wheezing, chills or hemoptysis; No shortness of breath  CARDIOVASCULAR: No chest pain, palpitations, dizziness, or leg swelling  GASTROINTESTINAL: No abdominal or epigastric pain. No nausea, vomiting, or hematemesis; No diarrhea or constipation. No melena or hematochezia.  GENITOURINARY: No dysuria, frequency, hematuria, or incontinence  NEUROLOGICAL: No headaches, memory loss, loss of strength, numbness, or tremors  SKIN: No itching, burning, rashes, or lesions   LYMPH NODES: No enlarged glands  MUSCULOSKELETAL: No joint pain or swelling; No muscle, back, or extremity pain      PAST MEDICAL & SURGICAL HISTORY:  Hypertension, unspecified type    Type 2 diabetes mellitus with other neurologic complication, without long-term current use of insulin    DM (diabetes mellitus)    HTN (hypertension)    HLD (hyperlipidemia)    Afib    Retinopathy    Chronic diastolic congestive heart failure    Chronic kidney disease, unspecified CKD stage    Amputation of leg, traumatic, left, subsequent encounter    S/P BKA (below knee amputation) unilateral, left    S/P hip replacement, left    History of surgery on arm        Allergies    No Known Allergies    Intolerances        FAMILY HISTORY:  No pertinent family history in first degree relatives    Family history of heart failure (Father)        SOCIAL HISTORY:        MEDICATIONS  (STANDING):  apixaban 5 milliGRAM(s) Oral every 12 hours  atorvastatin 40 milliGRAM(s) Oral at bedtime  dextrose 40% Gel 15 Gram(s) Oral once  dextrose 50% Injectable 25 Gram(s) IV Push once  dextrose 50% Injectable 12.5 Gram(s) IV Push once  dextrose 50% Injectable 25 Gram(s) IV Push once  diltiazem    milliGRAM(s) Oral daily  doxazosin 4 milliGRAM(s) Oral at bedtime  hydrALAZINE 100 milliGRAM(s) Oral three times a day  insulin glargine Injectable (LANTUS) 34 Unit(s) SubCutaneous at bedtime  insulin lispro (ADMELOG) corrective regimen sliding scale   SubCutaneous three times a day before meals  insulin lispro (ADMELOG) corrective regimen sliding scale   SubCutaneous at bedtime  insulin lispro Injectable (ADMELOG) 21 Unit(s) SubCutaneous three times a day before meals  isosorbide   mononitrate ER Tablet (IMDUR) 120 milliGRAM(s) Oral daily  pantoprazole    Tablet 40 milliGRAM(s) Oral before breakfast  predniSONE   Tablet 60 milliGRAM(s) Oral daily  sodium chloride 0.9% lock flush 3 milliLiter(s) IV Push every 8 hours  sodium chloride 0.9%. 1000 milliLiter(s) (50 mL/Hr) IV Continuous <Continuous>  trimethoprim   80 mG/sulfamethoxazole 400 mG 1 Tablet(s) Oral <User Schedule>    MEDICATIONS  (PRN):  acetaminophen   Tablet .. 650 milliGRAM(s) Oral every 6 hours PRN Temp greater or equal to 38C (100.4F), Mild Pain (1 - 3)  fluticasone propionate 50 MICROgram(s)/spray Nasal Spray 1 Spray(s) Both Nostrils two times a day PRN congestion  guaiFENesin Oral Liquid (Sugar-Free) 100 milliGRAM(s) Oral every 6 hours PRN Cough      Vital Signs Last 24 Hrs  T(C): 36.8 (29 Jun 2021 05:25), Max: 36.8 (29 Jun 2021 05:25)  T(F): 98.3 (29 Jun 2021 05:25), Max: 98.3 (29 Jun 2021 05:25)  HR: 62 (29 Jun 2021 05:25) (49 - 71)  BP: 156/74 (29 Jun 2021 05:25) (156/74 - 182/79)  BP(mean): 97 (29 Jun 2021 05:25) (97 - 99)  RR: 20 (29 Jun 2021 05:25) (16 - 20)  SpO2: 97% (29 Jun 2021 05:25) (95% - 99%)    PHYSICAL EXAM:    GENERAL: NAD, well-groomed  HEAD:  Atraumatic, Normocephalic  EYES: EOMI, PERRLA, conjunctiva and sclera clear  ENMT: No tonsillar erythema, exudates, or enlargement; Moist mucous membranes  NECK: Supple, No JVD  CHEST/LUNG: Clear to percussion bilaterally; No rales, rhonchi, wheezing, or rubs  HEART: Regular rate and rhythm; No murmurs, rubs, or gallops  ABDOMEN: Soft, Nontender, Nondistended; Bowel sounds present  EXTREMITIES:  2+ Peripheral Pulses, No clubbing, cyanosis, or edema  LYMPH: No lymphadenopathy noted  SKIN: No rashes or lesions    LABS:  CBC Full  -  ( 29 Jun 2021 07:06 )  WBC Count : 22.93 K/uL  RBC Count : 3.99 M/uL  Hemoglobin : 10.3 g/dL  Hematocrit : 32.2 %  Platelet Count - Automated : 189 K/uL  Mean Cell Volume : 80.7 fL  Mean Cell Hemoglobin : 25.8 pg  Mean Cell Hemoglobin Concentration : 32.0 gm/dL  Auto Neutrophil # : 20.87 K/uL  Auto Lymphocyte # : 0.65 K/uL  Auto Monocyte # : 0.96 K/uL  Auto Eosinophil # : 0.00 K/uL  Auto Basophil # : 0.04 K/uL  Auto Neutrophil % : 91.0 %  Auto Lymphocyte % : 2.8 %  Auto Monocyte % : 4.2 %  Auto Eosinophil % : 0.0 %  Auto Basophil % : 0.2 %      06-29    134<L>  |  101  |  96<H>  ----------------------------<  109<H>  4.7   |  20<L>  |  2.39<H>    Ca    9.0      29 Jun 2021 07:06  Phos  4.4     06-29  Mg     1.80     06-29            MICROBIOLOGY:    COVID-19 PCR . (06.23.21 @ 18:19)   COVID-19 PCR: NotDetec: You can help in the fight against COVID-19. [employer] may contact   you to see if you are interested in voluntarily participating in one of   our clinical trials.   Testing is performed using polymerase chain reaction (PCR) or   transcription mediated amplification (TMA). This COVID-19 (SARS-CoV-2)   nucleic acid amplification test was validated by [employer] and is   in use under the FDA Emergency Use Authorization (EUA) for clinical labs   CLIA-certified to perform high complexity testing. Test results should be   correlated with clinical presentation, patient history, and epidemiology.     Urinalysis (06.22.21 @ 14:41)   Glucose Qualitative, Urine: 500 mg/dL   Blood, Urine: Negative   pH Urine: 6.0   Color: Light Yellow   Urine Appearance: Clear   Bilirubin: Negative   Ketone - Urine: Negative   Specific Gravity: 1.018   Protein, Urine: 100 mg/dL   Urobilinogen: <2 mg/dL   Nitrite: Negative   Leukocyte Esterase Concentration: Negative     Culture - Blood (06.21.21 @ 19:11)   Specimen Source: .Blood Blood-Peripheral   Culture Results:   No Growth Final     Culture - Blood (06.21.21 @ 19:05)   Specimen Source: .Blood Blood-Venous   Culture Results:   No Growth Final                 RADIOLOGY:    < from: Xray Chest 1 View- PORTABLE-Routine (Xray Chest 1 View- PORTABLE-Routine .) (06.21.21 @ 11:54) >    EXAM:  XR CHEST PORTABLE ROUTINE 1V        PROCEDURE DATE:  Jun 21 2021         INTERPRETATION:  Chest one view    HISTORY: Leukocytosis    COMPARISON STUDY: 4/23/2021    Frontal expiratory view of the chest shows the heart to be normal in size. The lungs are clear and there is no evidence of pneumothorax nor pleural effusion. There may be a calcified lymph node in the left axilla.    IMPRESSION:  No active pulmonary disease.    < end of copied text >        < from: CT Angio Neck w/ IV Cont (06.20.21 @ 21:06) >  IMPRESSION:    CT angiography neck: No evidence of hemodynamically significant stenosis using NASCET criteria.  Patent vertebral arteries.  No evidence of vascular dissection.    CT angiography brain: No major vessel occlusion or proximal stenosis.    CT perfusion: No perfusion abnormality    < end of copied text >      < from: CT Brain Stroke Protocol (06.20.21 @ 20:44) >    IMPRESSION:    No acute intracranial disease.    < end of copied text >             Patient is a 70y old  Male who presents with a chief complaint of L eye blurry vision (29 Jun 2021 12:00)      HPI:    71 yo man with history of afib on Eliquis, HFpEF (EF 55-60% 4/2021), HTN, HLD, CKD stage 3-4, DM2 c/b diabetic retinopathy, PVD c/b LLE nec fasc s/p L BKA, and b/l cataracts s/p R eye cataract surgery presents as a transfer from [facility] after he went there for acute onset of L eye blurry vision on Sunday afternoon ~4:30pm. Pt states that as he was watching TV and about to start a new movie, he suddenly developed L eye blurry vision, which he describes as a fuzziness. He has poor vision in his R eye at baseline but had near perfect vision in his L eye prior to this episode. Pt states that immediately before the blurry vision started, he felt slightly dizzy. Denies any associated double vision, eye pain, increased tearing, irritation, redness, or new flashes/floaters. Also denies any headaches, numbness/tingling, limb weakness, chest pain, SOB, palpitations, fevers, chills, cough, abdominal pain, nausea, vomiting, diarrhea, constipation, or urinary symptoms. At [facility], pt was found to have left CRAO, and Neurology was consulted for stroke workup. Pt was transferred to [facility] for ophthalmology eval.   (21 Jun 2021 06:00)    Pt afebrile.  WBC on admission 14.8 --> 22.9.  cxr no active pulm disease.  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  Seen by Ophthalmology for L eye vision loss.  Unable to get MRI 2/2 hardware, s/p temporal artery bx 6/24,  Pt on IV solumedrol 40mg BID- for empiric treatment of GCA, Pathology negative for giant cell.       Pt with leukocytosis, UA (-), cxr no pna.  Bcc 6/21 NGTD.  Pt on high dose steroids and Bactrim ppx.         REVIEW OF SYSTEMS:    CONSTITUTIONAL: No fever, weight loss, or fatigue  EYES: No eye pain, visual disturbances, or discharge  ENMT:  No sore throat  NECK: No pain or stiffness  RESPIRATORY: No cough, wheezing, chills or hemoptysis; No shortness of breath  CARDIOVASCULAR: No chest pain, palpitations, dizziness, or leg swelling  GASTROINTESTINAL: No abdominal or epigastric pain. No nausea, vomiting, or hematemesis; No diarrhea or constipation. No melena or hematochezia.  GENITOURINARY: No dysuria, frequency, hematuria, or incontinence  NEUROLOGICAL: No headaches, memory loss, loss of strength, numbness, or tremors  SKIN: No itching, burning, rashes, or lesions   LYMPH NODES: No enlarged glands  MUSCULOSKELETAL: No joint pain or swelling; No muscle, back, or extremity pain      PAST MEDICAL & SURGICAL HISTORY:  Hypertension, unspecified type    Type 2 diabetes mellitus with other neurologic complication, without long-term current use of insulin    DM (diabetes mellitus)    HTN (hypertension)    HLD (hyperlipidemia)    Afib    Retinopathy    Chronic diastolic congestive heart failure    Chronic kidney disease, unspecified CKD stage    Amputation of leg, traumatic, left, subsequent encounter    S/P BKA (below knee amputation) unilateral, left    S/P hip replacement, left    History of surgery on arm        Allergies    No Known Allergies    Intolerances        FAMILY HISTORY:  No pertinent family history in first degree relatives    Family history of heart failure (Father)        SOCIAL HISTORY:        MEDICATIONS  (STANDING):  apixaban 5 milliGRAM(s) Oral every 12 hours  atorvastatin 40 milliGRAM(s) Oral at bedtime  dextrose 40% Gel 15 Gram(s) Oral once  dextrose 50% Injectable 25 Gram(s) IV Push once  dextrose 50% Injectable 12.5 Gram(s) IV Push once  dextrose 50% Injectable 25 Gram(s) IV Push once  diltiazem    milliGRAM(s) Oral daily  doxazosin 4 milliGRAM(s) Oral at bedtime  hydrALAZINE 100 milliGRAM(s) Oral three times a day  insulin glargine Injectable (LANTUS) 34 Unit(s) SubCutaneous at bedtime  insulin lispro (ADMELOG) corrective regimen sliding scale   SubCutaneous three times a day before meals  insulin lispro (ADMELOG) corrective regimen sliding scale   SubCutaneous at bedtime  insulin lispro Injectable (ADMELOG) 21 Unit(s) SubCutaneous three times a day before meals  isosorbide   mononitrate ER Tablet (IMDUR) 120 milliGRAM(s) Oral daily  pantoprazole    Tablet 40 milliGRAM(s) Oral before breakfast  predniSONE   Tablet 60 milliGRAM(s) Oral daily  sodium chloride 0.9% lock flush 3 milliLiter(s) IV Push every 8 hours  sodium chloride 0.9%. 1000 milliLiter(s) (50 mL/Hr) IV Continuous <Continuous>  trimethoprim   80 mG/sulfamethoxazole 400 mG 1 Tablet(s) Oral <User Schedule>    MEDICATIONS  (PRN):  acetaminophen   Tablet .. 650 milliGRAM(s) Oral every 6 hours PRN Temp greater or equal to 38C (100.4F), Mild Pain (1 - 3)  fluticasone propionate 50 MICROgram(s)/spray Nasal Spray 1 Spray(s) Both Nostrils two times a day PRN congestion  guaiFENesin Oral Liquid (Sugar-Free) 100 milliGRAM(s) Oral every 6 hours PRN Cough      Vital Signs Last 24 Hrs  T(C): 36.8 (29 Jun 2021 05:25), Max: 36.8 (29 Jun 2021 05:25)  T(F): 98.3 (29 Jun 2021 05:25), Max: 98.3 (29 Jun 2021 05:25)  HR: 62 (29 Jun 2021 05:25) (49 - 71)  BP: 156/74 (29 Jun 2021 05:25) (156/74 - 182/79)  BP(mean): 97 (29 Jun 2021 05:25) (97 - 99)  RR: 20 (29 Jun 2021 05:25) (16 - 20)  SpO2: 97% (29 Jun 2021 05:25) (95% - 99%)    PHYSICAL EXAM:    GENERAL: NAD, well-groomed  HEAD:  Atraumatic, Normocephalic  EYES: EOMI, PERRLA, conjunctiva and sclera clear  ENMT: No tonsillar erythema, exudates, or enlargement; Moist mucous membranes  NECK: Supple, No JVD  CHEST/LUNG: Clear to percussion bilaterally; No rales, rhonchi, wheezing, or rubs  HEART: Regular rate and rhythm; No murmurs, rubs, or gallops  ABDOMEN: Soft, Nontender, Nondistended; Bowel sounds present  EXTREMITIES:  2+ Peripheral Pulses, No clubbing, cyanosis, or edema  LYMPH: No lymphadenopathy noted  SKIN: No rashes or lesions    LABS:  CBC Full  -  ( 29 Jun 2021 07:06 )  WBC Count : 22.93 K/uL  RBC Count : 3.99 M/uL  Hemoglobin : 10.3 g/dL  Hematocrit : 32.2 %  Platelet Count - Automated : 189 K/uL  Mean Cell Volume : 80.7 fL  Mean Cell Hemoglobin : 25.8 pg  Mean Cell Hemoglobin Concentration : 32.0 gm/dL  Auto Neutrophil # : 20.87 K/uL  Auto Lymphocyte # : 0.65 K/uL  Auto Monocyte # : 0.96 K/uL  Auto Eosinophil # : 0.00 K/uL  Auto Basophil # : 0.04 K/uL  Auto Neutrophil % : 91.0 %  Auto Lymphocyte % : 2.8 %  Auto Monocyte % : 4.2 %  Auto Eosinophil % : 0.0 %  Auto Basophil % : 0.2 %      06-29    134<L>  |  101  |  96<H>  ----------------------------<  109<H>  4.7   |  20<L>  |  2.39<H>    Ca    9.0      29 Jun 2021 07:06  Phos  4.4     06-29  Mg     1.80     06-29            MICROBIOLOGY:    COVID-19 PCR . (06.23.21 @ 18:19)   COVID-19 PCR: NotDetec: You can help in the fight against COVID-19. [facility] may contact   you to see if you are interested in voluntarily participating in one of   our clinical trials.   Testing is performed using polymerase chain reaction (PCR) or   transcription mediated amplification (TMA). This COVID-19 (SARS-CoV-2)   nucleic acid amplification test was validated by [facility] and is   in use under the FDA Emergency Use Authorization (EUA) for clinical labs   CLIA-certified to perform high complexity testing. Test results should be   correlated with clinical presentation, patient history, and epidemiology.     Urinalysis (06.22.21 @ 14:41)   Glucose Qualitative, Urine: 500 mg/dL   Blood, Urine: Negative   pH Urine: 6.0   Color: Light Yellow   Urine Appearance: Clear   Bilirubin: Negative   Ketone - Urine: Negative   Specific Gravity: 1.018   Protein, Urine: 100 mg/dL   Urobilinogen: <2 mg/dL   Nitrite: Negative   Leukocyte Esterase Concentration: Negative     Culture - Blood (06.21.21 @ 19:11)   Specimen Source: .Blood Blood-Peripheral   Culture Results:   No Growth Final     Culture - Blood (06.21.21 @ 19:05)   Specimen Source: .Blood Blood-Venous   Culture Results:   No Growth Final                 RADIOLOGY:    < from: Xray Chest 1 View- PORTABLE-Routine (Xray Chest 1 View- PORTABLE-Routine .) (06.21.21 @ 11:54) >    EXAM:  XR CHEST PORTABLE ROUTINE 1V        PROCEDURE DATE:  Jun 21 2021         INTERPRETATION:  Chest one view    HISTORY: Leukocytosis    COMPARISON STUDY: 4/23/2021    Frontal expiratory view of the chest shows the heart to be normal in size. The lungs are clear and there is no evidence of pneumothorax nor pleural effusion. There may be a calcified lymph node in the left axilla.    IMPRESSION:  No active pulmonary disease.    < end of copied text >        < from: CT Angio Neck w/ IV Cont (06.20.21 @ 21:06) >  IMPRESSION:    CT angiography neck: No evidence of hemodynamically significant stenosis using NASCET criteria.  Patent vertebral arteries.  No evidence of vascular dissection.    CT angiography brain: No major vessel occlusion or proximal stenosis.    CT perfusion: No perfusion abnormality    < end of copied text >      < from: CT Brain Stroke Protocol (06.20.21 @ 20:44) >    IMPRESSION:    No acute intracranial disease.    < end of copied text >

## 2021-06-29 NOTE — PROGRESS NOTE ADULT - PROBLEM SELECTOR PLAN 10
- DVT ppx: On Eliquis

## 2021-06-29 NOTE — PROGRESS NOTE ADULT - PROBLEM SELECTOR PLAN 4
[id number] 3. Currently in sinus rhythm.   - C/w AC with Eliquis 5 mg bid. .=Can resume  in am .cont. Cardizem   - Monitor on tele  -f/u ECHO-NL LV FUNCTION  -Cardiology consult APPRECIATED.
[id number] 3. Currently in sinus rhythm.   - C/w AC with Eliquis 5 mg bid. .=Can resume  in am .cont. Cardizem   - Monitor on tele  -f/u ECHO-NL LV FUNCTION  -Cardiology F/U NOTED..
[id number] 3. Currently in sinus rhythm.   - C/w AC with Eliquis 5 mg bid. cont. Cardizem   - Monitor on tele
[id number] 3. Currently in sinus rhythm.   - C/w AC with Eliquis 5 mg bid. .=Can resume  in am .cont. Cardizem   - Monitor on tele  -f/u ECHO-NL LV FUNCTION  -Cardiology F/U NOTED..
[id number] 3. Currently in sinus rhythm.   - C/w AC with Eliquis 5 mg bid. .=Can resume  in am .cont. Cardizem   - Monitor on tele  -f/u ECHO  -Cardiology consult called.
[id number] 3. Currently in sinus rhythm.   - C/w AC with Eliquis 5 mg bid. .=Can resume  in am .cont. Cardizem   - Monitor on tele  -f/u ECHO-NL LV FUNCTION  -Cardiology F/U NOTED..
[id number] 3. Currently in sinus rhythm.   - C/w AC with Eliquis 5 mg bid. .=Can resume  in am .cont. Cardizem   - Monitor on tele  -f/u ECHO-NL LV FUNCTION  -Cardiology F/U NOTED..
[id number] 3. Currently in sinus rhythm.   - C/w AC with Eliquis 5 mg bid. .Hold am dose for procedure .cont. Cardizem   - Monitor on tele  -f/u ECHO  -Cardiology consult called.

## 2021-06-29 NOTE — PROGRESS NOTE ADULT - PROBLEM SELECTOR PLAN 2
WBC elevated. No localizing S/S of infection. Pt afebrile. Possibly reactive .Patient on steroids   - Trend WBC  - Will monitor off abx for now.ID Consult if needed
WBC elevated. No localizing S/S of infection. Pt afebrile. Possibly reactive .Patient on steroids   - Trend WBC  - Will monitor off abx for now.ID Consult APPRECIATED.OUTPT F/U WITH pcp
WBC elevated. No localizing S/S of infection. Pt afebrile. Possibly reactive .Patient on steroids   - Trend WBC  - Will monitor off abx for now.ID Consult if needed
WBC elevated. No localizing S/S of infection. Pt afebrile. Possibly reactive .Patient on steroids   - Trend WBC  - Will monitor off abx for now.ID Consult if needed
WBC 14.8 on admission. No localizing S/S of infection. Pt afebrile. Possibly reactive in setting of stroke.   - Trend WBC  - F/u UA and CXR ordered  - Will monitor off abx for now
WBC elevated. No localizing S/S of infection. Pt afebrile. Possibly reactive .Patient on steroids   - Trend WBC  - Will monitor off abx for now.ID Consult if needed

## 2021-06-29 NOTE — PROGRESS NOTE ADULT - PROBLEM SELECTOR PLAN 9
Patient unsure of home meds. Med hx obtained from visit to [place]. Unable to contact pt's wife and med rec pharmacist contacted.
Patient unsure of home meds. Med hx obtained from visit to [place]. Unable to contact pt's wife and med rec pharmacist contacted.
Patient unsure of home meds. Med hx obtained from visit to [place]. Unable to contact pt's wife and med rec pharmacist contacted.
Patient unsure of home meds. Med hx obtained from visit to [place]. Unable to contact pt's wife and med rec pharmacist contacted.
Patient unsure of home meds. Med hx obtained from visit to [place]. Unable to contact pt's wife and med rec pharmacist contacted.
Patient unsure of home meds. Med hx obtained from visit to [place]. Unable to contact pt's wife and med rec pharmacist contacted.
Patient unsure of home meds. Med hx obtained from visit to [place]. Unable to contact pt's wife and med rec pharmacist contacted.
Patient unsure of home meds. Med hx obtained from visit to [place]. Unable to contact pt's wife and med rec pharmacist contacted.

## 2021-06-29 NOTE — PROGRESS NOTE ADULT - SUBJECTIVE AND OBJECTIVE BOX
ODALYSGINAROCCO EDUARDO  70y  Male      Patient is a 70y old  Male who presents with a chief complaint of L eye blurry vision (29 Jun 2021 17:27)  feels better.lt.eye vision is improving.elevated wbc count-asymtomatic    REVIEW OF SYSTEMS:  CONSTITUTIONAL: No fever  RESPIRATORY: No cough, hemoptysis or shortness of breath  CARDIOVASCULAR: No chest pain, palpitations, dizziness, or leg swelling  GASTROINTESTINAL: No abdominal pain. nausea, vomiting, hematemesis  GENITOURINARY: No dysuria, frequency, hematuria   NEUROLOGICAL: No headaches, no dizziness  MUSCULOSKELETAL: No joint pain or swelling;     INTERVAL HPI/OVERNIGHT EVENTS:  T(C): 36.6 (06-29-21 @ 17:00), Max: 36.8 (06-29-21 @ 05:25)  HR: 77 (06-29-21 @ 17:00) (58 - 77)  BP: 155/76 (06-29-21 @ 17:00) (150/70 - 160/76)  RR: 18 (06-29-21 @ 17:00) (18 - 20)  SpO2: 98% (06-29-21 @ 17:00) (96% - 98%)  Wt(kg): --  I&O's Summary    28 Jun 2021 07:01  -  29 Jun 2021 07:00  --------------------------------------------------------  IN: 800 mL / OUT: 1600 mL / NET: -800 mL    29 Jun 2021 07:01  -  29 Jun 2021 20:48  --------------------------------------------------------  IN: 0 mL / OUT: 350 mL / NET: -350 mL      T(C): 36.6 (06-29-21 @ 17:00), Max: 36.8 (06-29-21 @ 05:25)  HR: 77 (06-29-21 @ 17:00) (58 - 77)  BP: 155/76 (06-29-21 @ 17:00) (150/70 - 160/76)  RR: 18 (06-29-21 @ 17:00) (18 - 20)  SpO2: 98% (06-29-21 @ 17:00) (96% - 98%)  Wt(kg): --Vital Signs Last 24 Hrs  T(C): 36.6 (29 Jun 2021 17:00), Max: 36.8 (29 Jun 2021 05:25)  T(F): 97.8 (29 Jun 2021 17:00), Max: 98.3 (29 Jun 2021 05:25)  HR: 77 (29 Jun 2021 17:00) (58 - 77)  BP: 155/76 (29 Jun 2021 17:00) (150/70 - 160/76)  BP(mean): 97 (29 Jun 2021 05:25) (97 - 99)  RR: 18 (29 Jun 2021 17:00) (18 - 20)  SpO2: 98% (29 Jun 2021 17:00) (96% - 98%)    LABS:                        10.3   22.93 )-----------( 189      ( 29 Jun 2021 07:06 )             32.2     06-29    134<L>  |  101  |  96<H>  ----------------------------<  109<H>  4.7   |  20<L>  |  2.39<H>    Ca    9.0      29 Jun 2021 07:06  Phos  4.4     06-29  Mg     1.80     06-29          CAPILLARY BLOOD GLUCOSE      POCT Blood Glucose.: 133 mg/dL (29 Jun 2021 17:39)  POCT Blood Glucose.: 156 mg/dL (29 Jun 2021 12:07)  POCT Blood Glucose.: 111 mg/dL (29 Jun 2021 08:32)  POCT Blood Glucose.: 134 mg/dL (28 Jun 2021 22:18)            PAST MEDICAL & SURGICAL HISTORY:  Hypertension, unspecified type    Type 2 diabetes mellitus with other neurologic complication, without long-term current use of insulin    DM (diabetes mellitus)    HTN (hypertension)    HLD (hyperlipidemia)    Afib    Retinopathy    Chronic diastolic congestive heart failure    Chronic kidney disease, unspecified CKD stage    Amputation of leg, traumatic, left, subsequent encounter    S/P BKA (below knee amputation) unilateral, left    S/P hip replacement, left    History of surgery on arm        MEDICATIONS  (STANDING):  apixaban 5 milliGRAM(s) Oral every 12 hours  atorvastatin 40 milliGRAM(s) Oral at bedtime  dextrose 40% Gel 15 Gram(s) Oral once  dextrose 50% Injectable 25 Gram(s) IV Push once  dextrose 50% Injectable 12.5 Gram(s) IV Push once  dextrose 50% Injectable 25 Gram(s) IV Push once  diltiazem    milliGRAM(s) Oral daily  doxazosin 4 milliGRAM(s) Oral at bedtime  hydrALAZINE 100 milliGRAM(s) Oral three times a day  insulin glargine Injectable (LANTUS) 34 Unit(s) SubCutaneous at bedtime  insulin lispro (ADMELOG) corrective regimen sliding scale   SubCutaneous three times a day before meals  insulin lispro (ADMELOG) corrective regimen sliding scale   SubCutaneous at bedtime  insulin lispro Injectable (ADMELOG) 21 Unit(s) SubCutaneous three times a day before meals  isosorbide   mononitrate ER Tablet (IMDUR) 120 milliGRAM(s) Oral daily  pantoprazole    Tablet 40 milliGRAM(s) Oral before breakfast  predniSONE   Tablet 60 milliGRAM(s) Oral daily  sodium chloride 0.9% lock flush 3 milliLiter(s) IV Push every 8 hours  sodium chloride 0.9%. 1000 milliLiter(s) (50 mL/Hr) IV Continuous <Continuous>  trimethoprim   80 mG/sulfamethoxazole 400 mG 1 Tablet(s) Oral <User Schedule>    MEDICATIONS  (PRN):  acetaminophen   Tablet .. 650 milliGRAM(s) Oral every 6 hours PRN Temp greater or equal to 38C (100.4F), Mild Pain (1 - 3)  fluticasone propionate 50 MICROgram(s)/spray Nasal Spray 1 Spray(s) Both Nostrils two times a day PRN congestion  guaiFENesin Oral Liquid (Sugar-Free) 100 milliGRAM(s) Oral every 6 hours PRN Cough        RADIOLOGY & ADDITIONAL TESTS:    Imaging Personally Reviewed:  [ ] YES  [ ] NO    Consultant(s) Notes Reviewed:  [x ] YES  [ ] NO    PHYSICAL EXAM:  GENERAL: Alert and awake lying in bed in no distress  HEAD:  Atraumatic, Normocephalic  EYES: EOMI, VINAY, conjunctiva and sclera clear  NECK: Supple, No JVD, Normal thyroid  NERVOUS SYSTEM:  Alert & Oriented X3, Motor and sensory systems are intact,   CHEST/LUNG: Bilateral clear breath sounds, no rhochi, no wheezing, no crepitations,  HEART: Regular rate and rhythm; No murmurs, rubs, or gallops  ABDOMEN: Soft, Nontender, Nondistended; Bowel sounds present  EXTREMITIES:  LT.BKA        Care Discussed with Consultants/Other Providers [X ] YES  [ ] NO      Code Status: [] Full Code [] DNR [] DNI [] Goals of Care:   Disposition: [] ICU [] Stroke Unit [] RCU []PCU []Floor [] Discharge Home         MELINA Hoff.FACP

## 2021-06-29 NOTE — CONSULT NOTE ADULT - REASON FOR ADMISSION
L eye blurry vision

## 2021-06-29 NOTE — DISCHARGE NOTE PROVIDER - HOSPITAL COURSE
69 yo man with history of afib on Eliquis, HFpEF (EF 55-60% 4/2021), HTN, HLD, CKD stage 3-4, DM2 c/b diabetic retinopathy, PVD c/b LLE nec fasc s/p L BKA, and b/l cataracts s/p R eye cataract surgery presents as a transfer from [facility] after he went there for acute onset of L eye blurry vision on Sunday afternoon ~4:30pm., found to have L CRAO.     Central retinal artery occlusion of left eye.   -s/p lt.Temporal artery biopsy.doing well-Temporal artery biopsy showed no overt evidence of arterial wall inflammation, however thickening in some layers- also it was performed 3 days after initiation of steroids and as such, may not be the most sensitive.   -Rheum f/u noted. s/p IV steroids, transitioned to oral on 6/29.May need immunosuppresive agent given uncontrolled DM.  -OPTHALMOLOGY F/U NOTED.      Leukocytosis.  WBC elevated. No localizing S/S of infection. Pt afebrile. Possibly reactive .Patient on steroids   - Trend WBC  - Will monitor off abx for now.ID Consult if needed.     Acute kidney injury superimposed on chronic kidney disease.  -likey sec.to TISHA, Hyperglycemia. Monitor. Renal f/u noted.      Paroxysmal atrial fibrillation.   - [id number] 3. Currently in sinus rhythm.   - C/w AC with Eliquis 5 mg bid. .=Can resume  in am .cont. Cardizem   - Monitor on tele  - ECHO-NL LV FUNCTION  - Cardiology F/U NOTED..     Type 2 diabetes mellitus with hyperglycemia, without long-term current use of insulin.  A1C- 10 .FS WITH COVERAGE.Increase Lantus 34 units,Humolog-21 units ,Endocrine f/u noted.    Diabetic retinopathy - Severe NPDR vs PDR OU  - Known chronic history of PDR with injection therapy OS> OD  - Severe NPDR - No signs of NVI, NVD, NVE on exam today   - Dot blots seen in all 4 quadrants, with large IRH inferotemporal OD  - No tears, holes or detachment seen  - Retinal detachment precautions: Cautioned for urgent reevaluation if symptoms of curtain vision loss or severe flashing of lights occurs   - Patient follows with Normal Yo RENDON. Needs OCT and FA imaging to further elucidate disease, and possible injection therapy as an outpatient.      Chronic diastolic congestive heart failure.   -  EF 55-60% based on TTE in Apr 2021, but with severe concentric LV hypertrophy. Pt euvolemic on exam.   - Hold pt's home Bumex dose for now given possibility of ELMER  - Also hold pt's home hydralazine and Imdur doses for permissive HTN as below  - Strict I&Os  - Daily weights  - Salt and fluid restriction.    HTN (hypertension).    -Improving,cont.cardiazem,Hydralazine,Isordil.mmmonitor BP.     Anemia.   - Hgb 11.1 on admission, was 9.1 in Apr 2021. No S/S of active bleed.  - Monitor H/H.     Patient is medically stable for discharge on 6/29/2021 per attending Dr. Hoff.     71 yo man with history of afib on Eliquis, HFpEF (EF 55-60% 4/2021), HTN, HLD, CKD stage 3-4, DM2 c/b diabetic retinopathy, PVD c/b LLE nec fasc s/p L BKA, and b/l cataracts s/p R eye cataract surgery presents as a transfer from [facility] [place] after he went there for acute onset of L eye blurry vision on Sunday afternoon ~4:30pm., found to have L CRAO.     Central retinal artery occlusion of left eye.   -s/p lt.Temporal artery biopsy.doing well-Temporal artery biopsy showed no overt evidence of arterial wall inflammation, however thickening in some layers- also it was performed 3 days after initiation of steroids and as such, may not be the most sensitive.   -Rheum f/u noted. s/p IV steroids, transitioned to oral on 6/29.May need immunosuppresive agent given uncontrolled DM.  -OPTHALMOLOGY F/U NOTED.      Leukocytosis.  WBC elevated. No localizing S/S of infection. Pt afebrile. Possibly reactive .Patient on steroids   - Trend WBC  - Will monitor off abx for now.ID Consult if needed.     Acute kidney injury superimposed on chronic kidney disease.  -likey sec.to TISHA, Hyperglycemia. Monitor. Renal f/u noted.      Paroxysmal atrial fibrillation.   - [id number] 3. Currently in sinus rhythm.   - C/w AC with Eliquis 5 mg bid. .=Can resume  in am .cont. Cardizem   - Monitor on tele  - ECHO-NL LV FUNCTION  - Cardiology F/U NOTED..     Type 2 diabetes mellitus with hyperglycemia, without long-term current use of insulin.  A1C- 10 .FS WITH COVERAGE.Increase Lantus 34 units,Humolog-21 units ,Endocrine f/u noted.    Diabetic retinopathy - Severe NPDR vs PDR OU  - Known chronic history of PDR with injection therapy OS> OD  - Severe NPDR - No signs of NVI, NVD, NVE on exam today   - Dot blots seen in all 4 quadrants, with large IRH inferotemporal OD  - No tears, holes or detachment seen  - Retinal detachment precautions: Cautioned for urgent reevaluation if symptoms of curtain vision loss or severe flashing of lights occurs   - Patient follows with Normal Yo RENDON. Needs OCT and FA imaging to further elucidate disease, and possible injection therapy as an outpatient.      Chronic diastolic congestive heart failure.   -  EF 55-60% based on TTE in Apr 2021, but with severe concentric LV hypertrophy. Pt euvolemic on exam.   - Hold pt's home Bumex dose for now given possibility of ELMER  - Also hold pt's home hydralazine and Imdur doses for permissive HTN as below  - Strict I&Os  - Daily weights  - Salt and fluid restriction.    HTN (hypertension).    -Improving,cont.cardiazem,Hydralazine,Isordil.mmmonitor BP.     Anemia.   - Hgb 11.1 on admission, was 9.1 in Apr 2021. No S/S of active bleed.  - Monitor H/H.     Patient is medically stable for discharge on -------------------- per  ------------------------.         71 y/o male, with a PmHx of Afib on Eliquis, HFpEF (EF 55-60% 4/2021), HTN, HLD, CKD stage 3-4, DM2 c/b diabetic retinopathy, PVD c/b LLE nec fasc s/p L BKA, and b/l cataracts s/p R eye cataract surgery, presents as a transfer from [facility] after he went there for acute onset of L eye blurry vision on Sunday afternoon ~4:30pm., found to have L CRAO.     On Admission:    1. Central retinal artery occlusion of left eye.   -s/p lt.Temporal artery biopsy.doing well-Temporal artery biopsy showed no overt evidence of arterial wall inflammation, however thickening in some layers- also it was performed 3 days after initiation of steroids and as such, may not be the most sensitive.   -Rheum f/u noted. s/p IV steroids, transitioned to oral on 6/29.May need immunosuppresive agent given uncontrolled DM.  -OPTHALMOLOGY F/U NOTED.      2. Leukocytosis.  WBC elevated. No localizing S/S of infection. Pt afebrile. Possibly reactive .Patient on steroids   - Trend WBC  - Will monitor off abx for now.ID Consult if needed.     3. Acute kidney injury superimposed on chronic kidney disease.  -likey sec.to TISHA, Hyperglycemia. Monitor. Renal f/u noted.     4. Paroxysmal atrial fibrillation.   - [id number] 3. Currently in sinus rhythm.   - C/w AC with Eliquis 5 mg bid. .=Can resume  in am .cont. Cardizem   - Monitor on tele  - ECHO-NL LV FUNCTION  - Cardiology F/U NOTED..     5. Type 2 diabetes mellitus with hyperglycemia, without long-term current use of insulin.  A1C- 10 .FS WITH COVERAGE.Increase Lantus 34 units,Humolog-21 units ,Endocrine f/u noted.    6. Diabetic retinopathy - Severe NPDR vs PDR OU  - Known chronic history of PDR with injection therapy OS> OD  - Severe NPDR - No signs of NVI, NVD, NVE on exam today   - Dot blots seen in all 4 quadrants, with large IRH inferotemporal OD  - No tears, holes or detachment seen  - Retinal detachment precautions: Cautioned for urgent reevaluation if symptoms of curtain vision loss or severe flashing of lights occurs   - Patient follows with Normal Safra MD. Needs OCT and FA imaging to further elucidate disease, and possible injection therapy as an outpatient.     7. Chronic diastolic congestive heart failure.   -  EF 55-60% based on TTE in Apr 2021, but with severe concentric LV hypertrophy. Pt euvolemic on exam.   - Hold pt's home Bumex dose for now given possibility of ELMER  - Also hold pt's home hydralazine and Imdur doses for permissive HTN as below  - Strict I&Os  - Daily weights  - Salt and fluid restriction.    8. HTN (hypertension).    -Improving,cont.cardiazem,Hydralazine,Isordil.mmmonitor BP.     9. Anemia.   - Hgb 11.1 on admission, was 9.1 in Apr 2021. No S/S of active bleed.  - Monitor H/H    Pt comfortable at this time and is now medically cleared for discharge home as per Dr. Hoff. Outpatient follow up.    Reviewed discharge medications with patient; All new medications requiring new prescription sent to pharmacy of patients choice. Reviewed need for prescription from previous home medications and new prescriptions sent if requested. Patient in agreement and understands.

## 2021-06-29 NOTE — PROGRESS NOTE ADULT - ASSESSMENT
71 yo RH M with HTN, HLD, CKD, Afib (on eliquis 2.5 for unclear reason was on 5 in past), BKA, diabetic retinoopathy tx from OSH for optho.  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 lower likely GCA but being treated   - leukocytosis likely reactive   - being treated for possible GCA per rheum on steroids still getting solumedral 60 q 12hrs.  monitor sugars. now hyponatremia.   - Temporal artery US and bx by vascular sx as requested by rheum f/u results --> bx neg. c/w prednisone per rheum   - Eliquis should be 5mg BID - resume when able   - lipitor 40  - MRI unlikely to change manegment unable to obtaine given hardware   - check FS, glucose control <180  - GI/DVT ppx  - Counseling on diet, exercise, and medication adherence was done  - Counseling on smoking cessation and alcohol consumption offered when appropriate.  - Pain assessed and judicious use of narcotics when appropriate was discussed.    - Stroke education given when appropriate.  - Importance of fall prevention discussed.   - Differential diagnosis and plan of care discussed with patient and/or family and primary team  - Thank you for allowing me to participate in the care of this patient. Call with questions.   - d/c plan when able outpt f/u   Donnie Granda MD  Vascular Neurology

## 2021-06-29 NOTE — CONSULT NOTE ADULT - ASSESSMENT
71 yo man with history of afib on Eliquis, HFpEF (EF 55-60% 4/2021), HTN, HLD, CKD stage 3-4, DM2 c/b diabetic retinopathy, PVD c/b LLE nec fasc s/p L BKA, and b/l cataracts s/p R eye cataract surgery presents as a transfer from [facility] after he went there for acute onset of L eye blurry vision on Sunday afternoon ~4:30pm. Pt states that as he was watching TV and about to start a new movie, he suddenly developed L eye blurry vision, which he describes as a fuzziness. He has poor vision in his R eye at baseline but had near perfect vision in his L eye prior to this episode. Pt states that immediately before the blurry vision started, he felt slightly dizzy. Denies any associated double vision, eye pain, increased tearing, irritation, redness, or new flashes/floaters. Also denies any headaches, numbness/tingling, limb weakness, chest pain, SOB, palpitations, fevers, chills, cough, abdominal pain, nausea, vomiting, diarrhea, constipation, or urinary symptoms. At [facility], pt was found to have left CRAO, and Neurology was consulted for stroke workup. Pt was transferred to [facility] for ophthalmology eval.   (21 Jun 2021 06:00)    Pt afebrile.  WBC on admission 14.8 --> 22.9.  cxr no active pulm disease.  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  Seen by Ophthalmology for L eye vision loss.  Unable to get MRI 2/2 hardware, s/p temporal artery bx 6/24,  Pt on IV solumedrol 40mg BID- for empiric treatment of GCA, Pathology negative for giant cell.       Pt with leukocytosis, UA (-), cxr no pna.  Bcc 6/21 NGTD.  Pt on high dose steroids and Bactrim ppx.     Leukocytosis:    - Elevated WBC ?reactive and secondary to high dose steroids.  Infectious w/u thus far negative.  No new localizing signs/symptoms on clinical exam.      - No diarrhea to suggest Cdiff.    - Recommend continued outpt f/u and repeat cbc with diff as outpt with pt's PCP for monitoring.    - No further ID w/u indicated at this time.    - Cont Bactrim 3x/week for PCP prophylaxis throughout duration of steriods    d/c planning as per primary team.    d/w Medicine    Melia Raines  [phone]

## 2021-06-29 NOTE — PROGRESS NOTE ADULT - SUBJECTIVE AND OBJECTIVE BOX
Frank Ghosh MD  Interventional Cardiology / Advance Heart Failure and Cardiac Transplant Specialist  [place] Office : [phone] [place] [address] [place]. [address]  Tel:   [place] Office : [phone] [facility] [place] [address]  Tel: [phone]  Cell : [phone]    Subjective/Overnight events: Pt is lying in bed comfortable not in distress, no chest pains no SOB no palpitations  	  MEDICATIONS:  apixaban 5 milliGRAM(s) Oral every 12 hours  diltiazem    milliGRAM(s) Oral daily  doxazosin 4 milliGRAM(s) Oral at bedtime  hydrALAZINE 100 milliGRAM(s) Oral three times a day  isosorbide   mononitrate ER Tablet (IMDUR) 120 milliGRAM(s) Oral daily    trimethoprim   80 mG/sulfamethoxazole 400 mG 1 Tablet(s) Oral <User Schedule>    guaiFENesin Oral Liquid (Sugar-Free) 100 milliGRAM(s) Oral every 6 hours PRN    acetaminophen   Tablet .. 650 milliGRAM(s) Oral every 6 hours PRN    pantoprazole    Tablet 40 milliGRAM(s) Oral before breakfast    atorvastatin 40 milliGRAM(s) Oral at bedtime  dextrose 40% Gel 15 Gram(s) Oral once  dextrose 50% Injectable 25 Gram(s) IV Push once  dextrose 50% Injectable 12.5 Gram(s) IV Push once  dextrose 50% Injectable 25 Gram(s) IV Push once  insulin glargine Injectable (LANTUS) 34 Unit(s) SubCutaneous at bedtime  insulin lispro (ADMELOG) corrective regimen sliding scale   SubCutaneous three times a day before meals  insulin lispro (ADMELOG) corrective regimen sliding scale   SubCutaneous at bedtime  insulin lispro Injectable (ADMELOG) 21 Unit(s) SubCutaneous three times a day before meals  predniSONE   Tablet 60 milliGRAM(s) Oral daily    fluticasone propionate 50 MICROgram(s)/spray Nasal Spray 1 Spray(s) Both Nostrils two times a day PRN  sodium chloride 0.9% lock flush 3 milliLiter(s) IV Push every 8 hours  sodium chloride 0.9%. 1000 milliLiter(s) IV Continuous <Continuous>      PAST MEDICAL/SURGICAL HISTORY  PAST MEDICAL & SURGICAL HISTORY:  Hypertension, unspecified type    Type 2 diabetes mellitus with other neurologic complication, without long-term current use of insulin    DM (diabetes mellitus)    HTN (hypertension)    HLD (hyperlipidemia)    Afib    Retinopathy    Chronic diastolic congestive heart failure    Chronic kidney disease, unspecified CKD stage    Amputation of leg, traumatic, left, subsequent encounter    S/P BKA (below knee amputation) unilateral, left    S/P hip replacement, left    History of surgery on arm        SOCIAL HISTORY: Substance Use (street drugs): ( x ) never used  (  ) other:    FAMILY HISTORY:  No pertinent family history in first degree relatives    Family history of heart failure (Father)        REVIEW OF SYSTEMS:  CONSTITUTIONAL: No fever, weight loss, or fatigue  EYES: No eye pain, visual disturbances, or discharge  ENMT:  No difficulty hearing, tinnitus, vertigo; No sinus or throat pain  BREASTS: No pain, masses, or nipple discharge  GASTROINTESTINAL: No abdominal or epigastric pain. No nausea, vomiting, or hematemesis; No diarrhea or constipation. No melena or hematochezia.  GENITOURINARY: No dysuria, frequency, hematuria, or incontinence  NEUROLOGICAL: No headaches, memory loss, loss of strength, numbness, or tremors  ENDOCRINE: No heat or cold intolerance; No hair loss  MUSCULOSKELETAL: No joint pain or swelling; No muscle, back, or extremity pain  PSYCHIATRIC: No depression, anxiety, mood swings, or difficulty sleeping  HEME/LYMPH: No easy bruising, or bleeding gums  All others negative    PHYSICAL EXAM:  T(C): 36.8 (06-29-21 @ 05:25), Max: 36.8 (06-29-21 @ 05:25)  HR: 62 (06-29-21 @ 05:25) (49 - 71)  BP: 156/74 (06-29-21 @ 05:25) (156/74 - 182/79)  RR: 20 (06-29-21 @ 05:25) (16 - 20)  SpO2: 97% (06-29-21 @ 05:25) (95% - 99%)  Wt(kg): --  I&O's Summary    28 Jun 2021 07:01  -  29 Jun 2021 07:00  --------------------------------------------------------  IN: 800 mL / OUT: 1600 mL / NET: -800 mL    29 Jun 2021 07:01  -  29 Jun 2021 12:00  --------------------------------------------------------  IN: 0 mL / OUT: 350 mL / NET: -350 mL          GENERAL: NAD s/l left temporal artery biopsy   EYES: conjunctiva and sclera clear  ENMT: No tonsillar erythema, exudates, or enlargement  Cardiovascular: Normal S1 S2, No JVD, No murmur  Respiratory: Lungs clear to auscultation	  Gastrointestinal:  Soft, Non-tender, + BS	  Extremities: Rt BKA                                    10.3   22.93 )-----------( 189      ( 29 Jun 2021 07:06 )             32.2     06-29    134<L>  |  101  |  96<H>  ----------------------------<  109<H>  4.7   |  20<L>  |  2.39<H>    Ca    9.0      29 Jun 2021 07:06  Phos  4.4     06-29  Mg     1.80     06-29      proBNP:   Lipid Profile:   HgA1c:   TSH:     Consultant(s) Notes Reviewed:  [x ] YES  [ ] NO    Care Discussed with Consultants/Other Providers [ x] YES  [ ] NO    Imaging Personally Reviewed independently:  [x] YES  [ ] NO    All labs, radiologic studies, vitals, orders and medications list reviewed. Patient is seen and examined at bedside. Case discussed with medical team.

## 2021-06-29 NOTE — PROGRESS NOTE ADULT - ASSESSMENT
69 yo man with history of afib on Eliquis, HFpEF (EF 55-60% 4/2021), HTN, HLD, CKD stage 3-4, DM2 c/b diabetic retinopathy, PVD c/b LLE nec fasc s/p L BKA, and b/l cataracts s/p R eye cataract surgery presents as a transfer from [facility] after he went there for acute onset of L eye blurry vision on Sunday afternoon ~4:30pm., found to have L CRAO.

## 2021-06-29 NOTE — PROGRESS NOTE ADULT - PROBLEM SELECTOR PLAN 6
EF 55-60% based on TTE in Apr 2021, but with severe concentric LV hypertrophy. Pt euvolemic on exam.   - Hold pt's home Bumex dose for now given possibility of ELMER  - Also hold pt's home hydralazine and Imdur doses for permissive HTN as below  - Strict I&Os  - Daily weights  - Salt and fluid restriction
EF 55-60% based on TTE in Apr 2021, but with severe concentric LV hypertrophy. Pt euvolemic on exam.   - Hold pt's home Bumex dose for now given possibility of ELMER  - Also hold pt's home hydralazine and Imdur doses for permissive HTN as below  - Strict I&Os  - Daily weights  - Salt and fluid restriction
EF 55-60% based on TTE in Apr 2021, but with severe concentric LV hypertrophy. Pt euvolemic on exam.   - Hold pt's home Bumex dose for now given possibility of ELMER  - Also hold pt's home hydralazine and Imdur doses for permissive HTN as below  - Strict I&Os  - Daily weights  - Salt and fluid restriction  - TTE
EF 55-60% based on TTE in Apr 2021, but with severe concentric LV hypertrophy. Pt euvolemic on exam.   - Hold pt's home Bumex dose for now given possibility of ELMER  - Also hold pt's home hydralazine and Imdur doses for permissive HTN as below  - Strict I&Os  - Daily weights  - Salt and fluid restriction
EF 55-60% based on TTE in Apr 2021, but with severe concentric LV hypertrophy. Pt euvolemic on exam.   - Hold pt's home Bumex dose for now given possibility of ELMER  - Also hold pt's home hydralazine and Imdur doses for permissive HTN as below  - Strict I&Os  - Daily weights  - Salt and fluid restriction  - TTE pending
EF 55-60% based on TTE in Apr 2021, but with severe concentric LV hypertrophy. Pt euvolemic on exam.   - Hold pt's home Bumex dose for now given possibility of ELMER  - Also hold pt's home hydralazine and Imdur doses for permissive HTN as below  - Strict I&Os  - Daily weights  - Salt and fluid restriction  - TTE pending

## 2021-06-30 ENCOUNTER — TRANSCRIPTION ENCOUNTER (OUTPATIENT)
Age: 71
End: 2021-06-30

## 2021-06-30 VITALS
OXYGEN SATURATION: 100 % | TEMPERATURE: 98 F | RESPIRATION RATE: 18 BRPM | HEART RATE: 61 BPM | DIASTOLIC BLOOD PRESSURE: 68 MMHG | SYSTOLIC BLOOD PRESSURE: 175 MMHG

## 2021-06-30 LAB
ANION GAP SERPL CALC-SCNC: 12 MMOL/L — SIGNIFICANT CHANGE UP (ref 7–14)
BASOPHILS # BLD AUTO: 0 K/UL — SIGNIFICANT CHANGE UP (ref 0–0.2)
BASOPHILS NFR BLD AUTO: 0 % — SIGNIFICANT CHANGE UP (ref 0–2)
BUN SERPL-MCNC: 94 MG/DL — HIGH (ref 7–23)
BURR CELLS BLD QL SMEAR: PRESENT — SIGNIFICANT CHANGE UP
CALCIUM SERPL-MCNC: 9.2 MG/DL — SIGNIFICANT CHANGE UP (ref 8.4–10.5)
CHLORIDE SERPL-SCNC: 100 MMOL/L — SIGNIFICANT CHANGE UP (ref 98–107)
CO2 SERPL-SCNC: 22 MMOL/L — SIGNIFICANT CHANGE UP (ref 22–31)
CREAT SERPL-MCNC: 2.33 MG/DL — HIGH (ref 0.5–1.3)
ELLIPTOCYTES BLD QL SMEAR: SIGNIFICANT CHANGE UP
EOSINOPHIL # BLD AUTO: 0 K/UL — SIGNIFICANT CHANGE UP (ref 0–0.5)
EOSINOPHIL NFR BLD AUTO: 0 % — SIGNIFICANT CHANGE UP (ref 0–6)
GIANT PLATELETS BLD QL SMEAR: PRESENT — SIGNIFICANT CHANGE UP
GLUCOSE BLDC GLUCOMTR-MCNC: 134 MG/DL — HIGH (ref 70–99)
GLUCOSE BLDC GLUCOMTR-MCNC: 153 MG/DL — HIGH (ref 70–99)
GLUCOSE BLDC GLUCOMTR-MCNC: 191 MG/DL — HIGH (ref 70–99)
GLUCOSE SERPL-MCNC: 145 MG/DL — HIGH (ref 70–99)
HCT VFR BLD CALC: 32.8 % — LOW (ref 39–50)
HGB BLD-MCNC: 10.7 G/DL — LOW (ref 13–17)
IANC: 23.6 K/UL — HIGH (ref 1.5–8.5)
LYMPHOCYTES # BLD AUTO: 0.24 K/UL — LOW (ref 1–3.3)
LYMPHOCYTES # BLD AUTO: 0.9 % — LOW (ref 13–44)
MAGNESIUM SERPL-MCNC: 1.8 MG/DL — SIGNIFICANT CHANGE UP (ref 1.6–2.6)
MANUAL SMEAR VERIFICATION: SIGNIFICANT CHANGE UP
MCHC RBC-ENTMCNC: 26.1 PG — LOW (ref 27–34)
MCHC RBC-ENTMCNC: 32.6 GM/DL — SIGNIFICANT CHANGE UP (ref 32–36)
MCV RBC AUTO: 80 FL — SIGNIFICANT CHANGE UP (ref 80–100)
MONOCYTES # BLD AUTO: 0.68 K/UL — SIGNIFICANT CHANGE UP (ref 0–0.9)
MONOCYTES NFR BLD AUTO: 2.6 % — SIGNIFICANT CHANGE UP (ref 2–14)
MYELOCYTES NFR BLD: 1.8 % — HIGH (ref 0–0)
NEUTROPHILS # BLD AUTO: 24.93 K/UL — HIGH (ref 1.8–7.4)
NEUTROPHILS NFR BLD AUTO: 94.7 % — HIGH (ref 43–77)
PHOSPHATE SERPL-MCNC: 3.7 MG/DL — SIGNIFICANT CHANGE UP (ref 2.5–4.5)
PLAT MORPH BLD: SIGNIFICANT CHANGE UP
PLATELET # BLD AUTO: 203 K/UL — SIGNIFICANT CHANGE UP (ref 150–400)
PLATELET COUNT - ESTIMATE: NORMAL — SIGNIFICANT CHANGE UP
POIKILOCYTOSIS BLD QL AUTO: SIGNIFICANT CHANGE UP
POTASSIUM SERPL-MCNC: 5.3 MMOL/L — SIGNIFICANT CHANGE UP (ref 3.5–5.3)
POTASSIUM SERPL-SCNC: 5.3 MMOL/L — SIGNIFICANT CHANGE UP (ref 3.5–5.3)
RBC # BLD: 4.1 M/UL — LOW (ref 4.2–5.8)
RBC # FLD: 14.8 % — HIGH (ref 10.3–14.5)
RBC BLD AUTO: ABNORMAL
SCHISTOCYTES BLD QL AUTO: SLIGHT — SIGNIFICANT CHANGE UP
SODIUM SERPL-SCNC: 134 MMOL/L — LOW (ref 135–145)
WBC # BLD: 26.32 K/UL — HIGH (ref 3.8–10.5)
WBC # FLD AUTO: 26.32 K/UL — HIGH (ref 3.8–10.5)

## 2021-06-30 PROCEDURE — 99233 SBSQ HOSP IP/OBS HIGH 50: CPT

## 2021-06-30 RX ORDER — INSULIN LISPRO 100/ML
21 VIAL (ML) SUBCUTANEOUS
Qty: 10 | Refills: 0
Start: 2021-06-30 | End: 2021-07-29

## 2021-06-30 RX ORDER — FLUTICASONE PROPIONATE 50 MCG
1 SPRAY, SUSPENSION NASAL
Qty: 0 | Refills: 0 | DISCHARGE

## 2021-06-30 RX ORDER — NIFEDIPINE 30 MG
1 TABLET, EXTENDED RELEASE 24 HR ORAL
Qty: 0 | Refills: 0 | DISCHARGE
Start: 2021-06-30

## 2021-06-30 RX ORDER — DILTIAZEM HCL 120 MG
1 CAPSULE, EXT RELEASE 24 HR ORAL
Qty: 0 | Refills: 0 | DISCHARGE

## 2021-06-30 RX ORDER — DOXAZOSIN MESYLATE 4 MG
1 TABLET ORAL
Qty: 0 | Refills: 0 | DISCHARGE

## 2021-06-30 RX ORDER — APIXABAN 2.5 MG/1
1 TABLET, FILM COATED ORAL
Qty: 0 | Refills: 0 | DISCHARGE
Start: 2021-06-30

## 2021-06-30 RX ORDER — CANAGLIFLOZIN 100 MG/1
1 TABLET, FILM COATED ORAL
Qty: 0 | Refills: 0 | DISCHARGE

## 2021-06-30 RX ORDER — ISOPROPYL ALCOHOL, BENZOCAINE .7; .06 ML/ML; ML/ML
1 SWAB TOPICAL
Qty: 100 | Refills: 1
Start: 2021-06-30 | End: 2021-08-18

## 2021-06-30 RX ORDER — PANTOPRAZOLE SODIUM 20 MG/1
1 TABLET, DELAYED RELEASE ORAL
Qty: 0 | Refills: 0 | DISCHARGE

## 2021-06-30 RX ORDER — APIXABAN 2.5 MG/1
1 TABLET, FILM COATED ORAL
Qty: 60 | Refills: 0
Start: 2021-06-30 | End: 2021-07-29

## 2021-06-30 RX ORDER — PANTOPRAZOLE SODIUM 20 MG/1
1 TABLET, DELAYED RELEASE ORAL
Qty: 30 | Refills: 0
Start: 2021-06-30 | End: 2021-07-29

## 2021-06-30 RX ORDER — ATORVASTATIN CALCIUM 80 MG/1
1 TABLET, FILM COATED ORAL
Qty: 0 | Refills: 0 | DISCHARGE
Start: 2021-06-30

## 2021-06-30 RX ORDER — ACETAMINOPHEN 500 MG
2 TABLET ORAL
Qty: 0 | Refills: 0 | DISCHARGE
Start: 2021-06-30

## 2021-06-30 RX ORDER — DOXAZOSIN MESYLATE 4 MG
1 TABLET ORAL
Qty: 0 | Refills: 0 | DISCHARGE
Start: 2021-06-30

## 2021-06-30 RX ORDER — ISOSORBIDE MONONITRATE 60 MG/1
1 TABLET, EXTENDED RELEASE ORAL
Qty: 0 | Refills: 0 | DISCHARGE
Start: 2021-06-30

## 2021-06-30 RX ORDER — POLYETHYLENE GLYCOL 3350 17 G/17G
17 POWDER, FOR SOLUTION ORAL
Qty: 0 | Refills: 0 | DISCHARGE

## 2021-06-30 RX ORDER — DOXAZOSIN MESYLATE 4 MG
1 TABLET ORAL
Qty: 30 | Refills: 0
Start: 2021-06-30 | End: 2021-07-29

## 2021-06-30 RX ORDER — ISOSORBIDE MONONITRATE 60 MG/1
1 TABLET, EXTENDED RELEASE ORAL
Qty: 0 | Refills: 0 | DISCHARGE

## 2021-06-30 RX ORDER — LIDOCAINE 4 G/100G
1 CREAM TOPICAL
Qty: 0 | Refills: 0 | DISCHARGE

## 2021-06-30 RX ORDER — EPINEPHRINE 0.3 MG/.3ML
3 INJECTION INTRAMUSCULAR; SUBCUTANEOUS
Qty: 0 | Refills: 0 | DISCHARGE

## 2021-06-30 RX ORDER — HYDRALAZINE HCL 50 MG
1 TABLET ORAL
Qty: 90 | Refills: 0
Start: 2021-06-30 | End: 2021-07-29

## 2021-06-30 RX ORDER — SIMVASTATIN 20 MG/1
1 TABLET, FILM COATED ORAL
Qty: 0 | Refills: 0 | DISCHARGE

## 2021-06-30 RX ORDER — ATORVASTATIN CALCIUM 80 MG/1
1 TABLET, FILM COATED ORAL
Qty: 30 | Refills: 0
Start: 2021-06-30 | End: 2021-07-29

## 2021-06-30 RX ORDER — ASPIRIN/CALCIUM CARB/MAGNESIUM 324 MG
1 TABLET ORAL
Qty: 0 | Refills: 0 | DISCHARGE

## 2021-06-30 RX ORDER — GLIMEPIRIDE 1 MG
1 TABLET ORAL
Qty: 0 | Refills: 0 | DISCHARGE

## 2021-06-30 RX ORDER — ISOSORBIDE MONONITRATE 60 MG/1
1 TABLET, EXTENDED RELEASE ORAL
Qty: 30 | Refills: 0
Start: 2021-06-30 | End: 2021-07-29

## 2021-06-30 RX ORDER — HYDRALAZINE HCL 50 MG
1 TABLET ORAL
Qty: 0 | Refills: 0 | DISCHARGE

## 2021-06-30 RX ORDER — NIFEDIPINE 30 MG
60 TABLET, EXTENDED RELEASE 24 HR ORAL DAILY
Refills: 0 | Status: DISCONTINUED | OUTPATIENT
Start: 2021-06-30 | End: 2021-06-30

## 2021-06-30 RX ORDER — APIXABAN 2.5 MG/1
1 TABLET, FILM COATED ORAL
Qty: 0 | Refills: 0 | DISCHARGE

## 2021-06-30 RX ORDER — FLUTICASONE PROPIONATE 50 MCG
1 SPRAY, SUSPENSION NASAL
Qty: 1 | Refills: 0
Start: 2021-06-30 | End: 2021-07-29

## 2021-06-30 RX ORDER — BUMETANIDE 0.25 MG/ML
1 INJECTION INTRAMUSCULAR; INTRAVENOUS
Qty: 0 | Refills: 0 | DISCHARGE

## 2021-06-30 RX ORDER — SENNA PLUS 8.6 MG/1
2 TABLET ORAL
Qty: 0 | Refills: 0 | DISCHARGE

## 2021-06-30 RX ORDER — NIFEDIPINE 30 MG
1 TABLET, EXTENDED RELEASE 24 HR ORAL
Qty: 30 | Refills: 0
Start: 2021-06-30 | End: 2021-07-29

## 2021-06-30 RX ORDER — ENOXAPARIN SODIUM 100 MG/ML
34 INJECTION SUBCUTANEOUS
Qty: 10 | Refills: 0
Start: 2021-06-30 | End: 2021-07-29

## 2021-06-30 RX ADMIN — PANTOPRAZOLE SODIUM 40 MILLIGRAM(S): 20 TABLET, DELAYED RELEASE ORAL at 06:12

## 2021-06-30 RX ADMIN — Medication 21 UNIT(S): at 09:10

## 2021-06-30 RX ADMIN — Medication 1 TABLET(S): at 09:27

## 2021-06-30 RX ADMIN — Medication 21 UNIT(S): at 13:06

## 2021-06-30 RX ADMIN — ISOSORBIDE MONONITRATE 120 MILLIGRAM(S): 60 TABLET, EXTENDED RELEASE ORAL at 13:08

## 2021-06-30 RX ADMIN — Medication 2: at 13:06

## 2021-06-30 RX ADMIN — APIXABAN 5 MILLIGRAM(S): 2.5 TABLET, FILM COATED ORAL at 05:47

## 2021-06-30 RX ADMIN — Medication 60 MILLIGRAM(S): at 05:46

## 2021-06-30 RX ADMIN — Medication 100 MILLIGRAM(S): at 05:46

## 2021-06-30 RX ADMIN — Medication 100 MILLIGRAM(S): at 13:47

## 2021-06-30 RX ADMIN — Medication 240 MILLIGRAM(S): at 05:46

## 2021-06-30 RX ADMIN — SODIUM CHLORIDE 3 MILLILITER(S): 9 INJECTION INTRAMUSCULAR; INTRAVENOUS; SUBCUTANEOUS at 06:39

## 2021-06-30 NOTE — PROGRESS NOTE ADULT - SUBJECTIVE AND OBJECTIVE BOX
[facility]   MD PEGGY GARRETT DO ANAM SIDDIQUI ANGELA WONG, PA    TEL:  OFFICE: [phone]  DR HODGES CELL: [phone]  DR. MAX CELL: [phone]  DR. HURTADO CELL: [phone]  MARGARITA WYNNE CELL: [phone]    From 5pm-7am Answering Service [phone]    -- RENAL FOLLOW UP NOTE ---Date of Service 06-30-21 @ 11:17    Patient is a 70y old  Male who presents with a chief complaint of L eye blurry vision (30 Jun 2021 09:24)      Patient seen and examined at bedside. No chest pain/sob    VITALS:  T(F): 97.9 (06-30-21 @ 08:50), Max: 97.9 (06-30-21 @ 05:46)  HR: 58 (06-30-21 @ 08:50)  BP: 167/78 (06-30-21 @ 08:50)  RR: 18 (06-30-21 @ 08:50)  SpO2: 100% (06-30-21 @ 08:50)  Wt(kg): --    06-29 @ 07:01  -  06-30 @ 07:00  --------------------------------------------------------  IN: 0 mL / OUT: 1500 mL / NET: -1500 mL    06-30 @ 07:01  -  06-30 @ 11:17  --------------------------------------------------------  IN: 0 mL / OUT: 450 mL / NET: -450 mL          PHYSICAL EXAM:  Constitutional: NAD  Neck: No JVD  Respiratory: CTAB, no wheezes, rales or rhonchi  Cardiovascular: S1, S2, RRR  Gastrointestinal: BS+, soft, NT/ND  Extremities: No peripheral edema, left a    Hospital Medications:   MEDICATIONS  (STANDING):  apixaban 5 milliGRAM(s) Oral every 12 hours  atorvastatin 40 milliGRAM(s) Oral at bedtime  dextrose 40% Gel 15 Gram(s) Oral once  dextrose 50% Injectable 25 Gram(s) IV Push once  dextrose 50% Injectable 12.5 Gram(s) IV Push once  dextrose 50% Injectable 25 Gram(s) IV Push once  diltiazem    milliGRAM(s) Oral daily  doxazosin 4 milliGRAM(s) Oral at bedtime  hydrALAZINE 100 milliGRAM(s) Oral three times a day  insulin glargine Injectable (LANTUS) 34 Unit(s) SubCutaneous at bedtime  insulin lispro (ADMELOG) corrective regimen sliding scale   SubCutaneous three times a day before meals  insulin lispro (ADMELOG) corrective regimen sliding scale   SubCutaneous at bedtime  insulin lispro Injectable (ADMELOG) 21 Unit(s) SubCutaneous three times a day before meals  isosorbide   mononitrate ER Tablet (IMDUR) 120 milliGRAM(s) Oral daily  pantoprazole    Tablet 40 milliGRAM(s) Oral before breakfast  predniSONE   Tablet 60 milliGRAM(s) Oral daily  sodium chloride 0.9% lock flush 3 milliLiter(s) IV Push every 8 hours  sodium chloride 0.9%. 1000 milliLiter(s) (50 mL/Hr) IV Continuous <Continuous>  trimethoprim   80 mG/sulfamethoxazole 400 mG 1 Tablet(s) Oral <User Schedule>      LABS:  06-30    134<L>  |  100  |  94<H>  ----------------------------<  145<H>  5.3   |  22  |  2.33<H>    Ca    9.2      30 Jun 2021 06:54  Phos  3.7     06-30  Mg     1.80     06-30      Creatinine Trend: 2.33 <--, 2.39 <--, 2.48 <--, 2.65 <--, 2.68 <--, 3.23 <--, 4.11 <--    Phosphorus Level, Serum: 3.7 mg/dL (06-30 @ 06:54)                              10.7   26.32 )-----------( 203      ( 30 Jun 2021 06:54 )             32.8     Urine Studies:  Urinalysis - [06-22-21 @ 14:41]      Color Light Yellow / Appearance Clear / SG 1.018 / pH 6.0      Gluc 500 mg/dL / Ketone Negative  / Bili Negative / Urobili <2 mg/dL       Blood Negative / Protein 100 mg/dL / Leuk Est Negative / Nitrite Negative      RBC 1 / WBC 6 / Hyaline 1 / Gran 1 / Sq Epi  / Non Sq Epi 1 / Bacteria Negative    Urine Sodium 49      [06-28-21 @ 18:27]  Urine Osmolality 443      [06-28-21 @ 18:27]    Iron 35, TIBC 239, %sat 15      [06-21-21 @ 02:46]  Ferritin 99      [06-21-21 @ 02:46]  PTH -- (Ca --)      [06-24-21 @ 07:23]   222  HbA1c 8.0      [01-10-20 @ 11:10]  TSH 2.08      [06-21-21 @ 02:46]  Lipid: chol 190, , HDL 40, LDL --      [06-21-21 @ 02:46]    HBsAg Nonreact      [06-22-21 @ 09:23]  HCV 0.21, Nonreact      [06-22-21 @ 09:23]    ERIN: titer Negative, pattern --      [06-22-21 @ 09:10]  ANCA: cANCA Negative, pANCA Negative, atypical ANCA Indeterminate      [06-22-21 @ 09:10]  Free Light Chains: kappa 6.18, lambda 4.28, ratio = 1.44      [06-22 @ 09:10]    RADIOLOGY & ADDITIONAL STUDIES:   Offered and provided

## 2021-06-30 NOTE — PROGRESS NOTE ADULT - PROBLEM SELECTOR PROBLEM 2
Steroid-induced hyperglycemia
Essential hypertension
Leukocytosis
Steroid-induced hyperglycemia
Steroid-induced hyperglycemia
Leukocytosis
Essential hypertension
Essential hypertension
Leukocytosis
Steroid-induced hyperglycemia
Leukocytosis

## 2021-06-30 NOTE — PROGRESS NOTE ADULT - ASSESSMENT
71 yo RH M with HTN, HLD, CKD, Afib (on eliquis 2.5 for unclear reason was on 5 in past), BKA, diabetic retinoopathy tx from OSH for optho.  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 lower likely GCA but being treated   - leukocytosis likely reactive   - being treated for possible GCA per rheum on steroids still getting solumedral 60 q 12hrs.  monitor sugars. now hyponatremia.   - Temporal artery US and bx by vascular sx as requested by rheum f/u results --> bx neg. c/w prednisone per rheum   - Eliquis should be 5mg BID - resume when able   - lipitor 40  - MRI unlikely to change manegment unable to obtaine given hardware   - check FS, glucose control <180  - GI/DVT ppx  - Counseling on diet, exercise, and medication adherence was done  - Counseling on smoking cessation and alcohol consumption offered when appropriate.  - Pain assessed and judicious use of narcotics when appropriate was discussed.    - Stroke education given when appropriate.  - Importance of fall prevention discussed.   - Differential diagnosis and plan of care discussed with patient and/or family and primary team  - Thank you for allowing me to participate in the care of this patient. Call with questions.   - d/c plan when able outpt f/u in 2 weeks also has rheum and optho f./u   Donnie Granda MD  Vascular Neurology

## 2021-06-30 NOTE — PROGRESS NOTE ADULT - PROVIDER SPECIALTY LIST ADULT
Neurology
Ophthalmology
Rheumatology
Cardiology
Cardiology
Endocrinology
Endocrinology
Nephrology
Ophthalmology
Cardiology
Endocrinology
Nephrology
Neurology
Ophthalmology
Rheumatology
Anesthesia
Cardiology
Infectious Disease
Internal Medicine
Nephrology
Neurology
Neurology
Rheumatology
Cardiology
Internal Medicine
Neurology
Neurology
Rheumatology
Vascular Surgery
Endocrinology
Nephrology
Endocrinology
Internal Medicine

## 2021-06-30 NOTE — PROGRESS NOTE ADULT - NSICDXPILOT_GEN_ALL_CORE
[place]

## 2021-06-30 NOTE — PROGRESS NOTE ADULT - PROBLEM SELECTOR PROBLEM 1
Type 2 diabetes mellitus with hyperglycemia, without long-term current use of insulin
Central retinal artery occlusion of left eye
Type 2 diabetes mellitus with hyperglycemia, without long-term current use of insulin
Central retinal artery occlusion of left eye
Type 2 diabetes mellitus with hyperglycemia, without long-term current use of insulin
Central retinal artery occlusion of left eye
Type 2 diabetes mellitus with hyperglycemia, without long-term current use of insulin
Central retinal artery occlusion of left eye
Type 2 diabetes mellitus with hyperglycemia, without long-term current use of insulin
Central retinal artery occlusion of left eye

## 2021-06-30 NOTE — PROGRESS NOTE ADULT - ASSESSMENT
69 yo man with PMHx of DM2, HFpEF, HTN, HLD, CKD, and PVD (s/p L BKA) who presented from ophthalmology due to concerns for L central artery occlusion vs GCA. S/p temporal artery biopsy on 6/24. Endocrine Team consulted for inpatient T2DM management.    1. Uncontrolled T2DM c/b neuropathy and retinopathy exacerbated by steroids  Home Regimen: Glimepiride 4 mg daily, Invokana 300 mg (listed in outpatient med rec, unclear if taking)  HbA1c 10.6, goal A1c 7%    While inpatient:  BG target 100-180 mg/dl  Contniue Lantus 34 units SQ qHS  Continue Admelog 21 units SQ TID before meals (Hold if NPO/not eating meal)  Continue Admelog MODERATE dose correctional scale before meals, moderate dose at bedtime  Check BG before meals and bedtime  Consistent carbohydrate diet    DM Education: received education on insulin pen and CGM glucometer prior to discharge, ensure patient performs successful insulin PEN return demonstration and document in DM CPG. Patient with some visual deficit, able to count clicks and perform pen successfully.  Taught CGM today, and placed prior to d/c    Discharge Plan:   Basal/bolus insulin PENS with dose TBD   Please send Lantus solsotar pen and humalog kwikpen   send with current doses and update prior to d/c  Lantus and humalog pens covered- with high copays.  Patient aware and willing to pay    STOP Glimepiride/invokana    Patient should followup as outpatient with PCP, opthalmology and endocrinology  only able to follow up close to home area.  Per CM, primary team found rheumatologist and endocrinologist for patient to follow up with post d/c.  Patient will need close monitoring once steroids are taperedf.  Discussed with patient and primary team    2. Steroid induced hyperglycemia  Now on prednisone 60 mg daily  Per chart, empiric treatment for possible GCA, patient s/p temporal artery biopsy. If GCA, steroids would be prolonged course - please keep endocrine informed of steroid plan.  As per notes patient will be on prednisone 60 mg daily until follow up with rheumatology practice.  Patient reprots he does not have transportation and prefers house calls.  Advised patient that a local rheumatology and endocrine provider with be cotacted for appointments by the primary team.  This patient will need close montioring of FS, especially while on steroids.  In addition, patient will be on a long course of steroids and abruptly stopping steroids can cause adrenal crisis.  Therefore, please assure both endocrine and rheumtology appointments are set up prior to discharge for safest dc plan    3. HTN  Management and goal per primary team/neurology    4. HLD  Continue Atorvastatin 40 mg daily if no contraindication     Discussed case with OK and XOCHITL Meneses  Nurse Practitioner  Division of Endocrinology & Diabetes  Pager # [phone]      If after 6PM or before 9AM, or on weekends/holidays, please call endocrine answering service for assistance ([phone]).  For nonurgent matters email [email or web address] for assistance. 71 yo man with PMHx of DM2, HFpEF, HTN, HLD, CKD, and PVD (s/p L BKA) who presented from ophthalmology due to concerns for L central artery occlusion vs GCA. S/p temporal artery biopsy on 6/24. Endocrine Team consulted for inpatient T2DM management.    1. Uncontrolled T2DM c/b neuropathy and retinopathy exacerbated by steroids  Home Regimen: Glimepiride 4 mg daily, Invokana 300 mg (listed in outpatient med rec, unclear if taking)  HbA1c 10.6, goal A1c 7%    While inpatient:  BG target 100-180 mg/dl  Contniue Lantus 34 units SQ qHS  Continue Admelog 21 units SQ TID before meals (Hold if NPO/not eating meal)  Continue Admelog MODERATE dose correctional scale before meals, moderate dose at bedtime  Check BG before meals and bedtime  Consistent carbohydrate diet    DM Education: received education on insulin pen and CGM glucometer prior to discharge, ensure patient performs successful insulin PEN return demonstration and document in DM CPG. Patient with some visual deficit, able to count clicks and perform pen successfully.  Taught CGM today, and placed prior to d/c        TIME SPENT: > 40 minutes educating patient and coordinating care    Discharge Plan:   Basal/bolus insulin PENS with dose TBD   Please send Lantus solsotar pen and humalog kwikpen   send with current doses and update prior to d/c  Lantus and humalog pens covered- with high copays.  Patient aware and willing to pay    STOP Glimepiride/invokana    Patient should followup as outpatient with PCP, opthalmology and endocrinology  only able to follow up close to home area.  Per CM, primary team found rheumatologist and endocrinologist for patient to follow up with post d/c.  Patient will need close monitoring once steroids are taperedf.  Discussed with patient and primary team    2. Steroid induced hyperglycemia  Now on prednisone 60 mg daily  Per chart, empiric treatment for possible GCA, patient s/p temporal artery biopsy. If GCA, steroids would be prolonged course - please keep endocrine informed of steroid plan.  As per notes patient will be on prednisone 60 mg daily until follow up with rheumatology practice.  Patient reprots he does not have transportation and prefers house calls.  Advised patient that a local rheumatology and endocrine provider with be cotacted for appointments by the primary team.  This patient will need close montioring of FS, especially while on steroids.  In addition, patient will be on a long course of steroids and abruptly stopping steroids can cause adrenal crisis.  Therefore, please assure both endocrine and rheumtology appointments are set up prior to discharge for safest dc plan    3. HTN  Management and goal per primary team/neurology    4. HLD  Continue Atorvastatin 40 mg daily if no contraindication     Discussed case with OK and XOCHITL Meneses  Nurse Practitioner  Division of Endocrinology & Diabetes  Pager # [phone]      If after 6PM or before 9AM, or on weekends/holidays, please call endocrine answering service for assistance ([phone]).  For nonurgent matters email [email or web address] for assistance.

## 2021-06-30 NOTE — PROGRESS NOTE ADULT - SUBJECTIVE AND OBJECTIVE BOX
Infectious Diseases progress note:    Subjective: NAD, afebrile.  WBC 26 <-- 22.  No new somatic complaints.     ROS:  CONSTITUTIONAL:  No fever, chills, rigors  CARDIOVASCULAR:  No chest pain or palpitations  RESPIRATORY:   No SOB, cough, dyspnea on exertion.  No wheezing  GASTROINTESTINAL:  No abd pain, N/V, diarrhea/constipation  EXTREMITIES:  No swelling or joint pain  GENITOURINARY:  No burning on urination, increased frequency or urgency.  No flank pain  NEUROLOGIC:  No HA, visual disturbances  SKIN: No rashes    Allergies    No Known Allergies    Intolerances        ANTIBIOTICS/RELEVANT:  antimicrobials  trimethoprim   80 mG/sulfamethoxazole 400 mG 1 Tablet(s) Oral <User Schedule>    immunologic:    OTHER:  acetaminophen   Tablet .. 650 milliGRAM(s) Oral every 6 hours PRN  apixaban 5 milliGRAM(s) Oral every 12 hours  atorvastatin 40 milliGRAM(s) Oral at bedtime  dextrose 40% Gel 15 Gram(s) Oral once  dextrose 50% Injectable 25 Gram(s) IV Push once  dextrose 50% Injectable 12.5 Gram(s) IV Push once  dextrose 50% Injectable 25 Gram(s) IV Push once  doxazosin 4 milliGRAM(s) Oral at bedtime  fluticasone propionate 50 MICROgram(s)/spray Nasal Spray 1 Spray(s) Both Nostrils two times a day PRN  guaiFENesin Oral Liquid (Sugar-Free) 100 milliGRAM(s) Oral every 6 hours PRN  hydrALAZINE 100 milliGRAM(s) Oral three times a day  insulin glargine Injectable (LANTUS) 34 Unit(s) SubCutaneous at bedtime  insulin lispro (ADMELOG) corrective regimen sliding scale   SubCutaneous three times a day before meals  insulin lispro (ADMELOG) corrective regimen sliding scale   SubCutaneous at bedtime  insulin lispro Injectable (ADMELOG) 21 Unit(s) SubCutaneous three times a day before meals  isosorbide   mononitrate ER Tablet (IMDUR) 120 milliGRAM(s) Oral daily  NIFEdipine XL 60 milliGRAM(s) Oral daily  pantoprazole    Tablet 40 milliGRAM(s) Oral before breakfast  predniSONE   Tablet 60 milliGRAM(s) Oral daily  sodium chloride 0.9% lock flush 3 milliLiter(s) IV Push every 8 hours  sodium chloride 0.9%. 1000 milliLiter(s) IV Continuous <Continuous>      Objective:  Vital Signs Last 24 Hrs  T(C): 36.7 (30 Jun 2021 12:50), Max: 36.7 (30 Jun 2021 12:50)  T(F): 98 (30 Jun 2021 12:50), Max: 98 (30 Jun 2021 12:50)  HR: 61 (30 Jun 2021 12:50) (58 - 77)  BP: 175/68 (30 Jun 2021 12:50) (146/72 - 175/68)  BP(mean): 98 (29 Jun 2021 21:00) (98 - 98)  RR: 18 (30 Jun 2021 12:50) (18 - 20)  SpO2: 100% (30 Jun 2021 12:50) (96% - 100%)    PHYSICAL EXAM:  Constitutional:NAD  Eyes:VINAY, EOMI  Ear/Nose/Throat: no thrush, mucositis.  Moist mucous membranes	  Neck:no JVD, no lymphadenopathy, supple  Respiratory: CTA logan  Cardiovascular: S1S2 RRR, no murmurs  Gastrointestinal:soft, nontender,  nondistended (+) BS  Extremities:no e/e/c  Skin:  no rashes, open wounds or ulcerations        LABS:                        10.7   26.32 )-----------( 203      ( 30 Jun 2021 06:54 )             32.8     06-30    134<L>  |  100  |  94<H>  ----------------------------<  145<H>  5.3   |  22  |  2.33<H>    Ca    9.2      30 Jun 2021 06:54  Phos  3.7     06-30  Mg     1.80     06-30            MICROBIOLOGY:    Culture - Blood (06.21.21 @ 19:11)   Specimen Source: .Blood Blood-Peripheral   Culture Results:   No Growth Final       RADIOLOGY & ADDITIONAL STUDIES:    < from: Xray Chest 1 View- PORTABLE-Routine (Xray Chest 1 View- PORTABLE-Routine .) (06.21.21 @ 11:54) >    IMPRESSION:  No active pulmonary disease.    < end of copied text >

## 2021-06-30 NOTE — PROGRESS NOTE ADULT - TIME BILLING
A/P
A/P  discussed with family at bedside at length
A/p
A/P
D/W STAFF

## 2021-06-30 NOTE — PROGRESS NOTE ADULT - SUBJECTIVE AND OBJECTIVE BOX
Frank Ghosh MD  Interventional Cardiology / Advance Heart Failure and Cardiac Transplant Specialist  [place] Office : [address] [place] [address] [place]. [address]  Tel:   [place] Office : [address] [facility] [place] [address]  Tel: [phone]  Cell : [phone]    Subjective/Overnight events: Pt is lying in bed   	  MEDICATIONS:  apixaban 5 milliGRAM(s) Oral every 12 hours  doxazosin 4 milliGRAM(s) Oral at bedtime  hydrALAZINE 100 milliGRAM(s) Oral three times a day  isosorbide   mononitrate ER Tablet (IMDUR) 120 milliGRAM(s) Oral daily  NIFEdipine XL 60 milliGRAM(s) Oral daily    trimethoprim   80 mG/sulfamethoxazole 400 mG 1 Tablet(s) Oral <User Schedule>    guaiFENesin Oral Liquid (Sugar-Free) 100 milliGRAM(s) Oral every 6 hours PRN    acetaminophen   Tablet .. 650 milliGRAM(s) Oral every 6 hours PRN    pantoprazole    Tablet 40 milliGRAM(s) Oral before breakfast    atorvastatin 40 milliGRAM(s) Oral at bedtime  dextrose 40% Gel 15 Gram(s) Oral once  dextrose 50% Injectable 25 Gram(s) IV Push once  dextrose 50% Injectable 12.5 Gram(s) IV Push once  dextrose 50% Injectable 25 Gram(s) IV Push once  insulin glargine Injectable (LANTUS) 34 Unit(s) SubCutaneous at bedtime  insulin lispro (ADMELOG) corrective regimen sliding scale   SubCutaneous three times a day before meals  insulin lispro (ADMELOG) corrective regimen sliding scale   SubCutaneous at bedtime  insulin lispro Injectable (ADMELOG) 21 Unit(s) SubCutaneous three times a day before meals  predniSONE   Tablet 60 milliGRAM(s) Oral daily    fluticasone propionate 50 MICROgram(s)/spray Nasal Spray 1 Spray(s) Both Nostrils two times a day PRN  sodium chloride 0.9% lock flush 3 milliLiter(s) IV Push every 8 hours  sodium chloride 0.9%. 1000 milliLiter(s) IV Continuous <Continuous>      PAST MEDICAL/SURGICAL HISTORY  PAST MEDICAL & SURGICAL HISTORY:  Hypertension, unspecified type    Type 2 diabetes mellitus with other neurologic complication, without long-term current use of insulin    DM (diabetes mellitus)    HTN (hypertension)    HLD (hyperlipidemia)    Afib    Retinopathy    Chronic diastolic congestive heart failure    Chronic kidney disease, unspecified CKD stage    Amputation of leg, traumatic, left, subsequent encounter    S/P BKA (below knee amputation) unilateral, left    S/P hip replacement, left    History of surgery on arm        SOCIAL HISTORY: Substance Use (street drugs): ( x ) never used  (  ) other:    FAMILY HISTORY:  No pertinent family history in first degree relatives    Family history of heart failure (Father)        REVIEW OF SYSTEMS:  CONSTITUTIONAL: No fever, weight loss, or fatigue  EYES: No eye pain, visual disturbances, or discharge  ENMT:  No difficulty hearing, tinnitus, vertigo; No sinus or throat pain  BREASTS: No pain, masses, or nipple discharge  GASTROINTESTINAL: No abdominal or epigastric pain. No nausea, vomiting, or hematemesis; No diarrhea or constipation. No melena or hematochezia.  GENITOURINARY: No dysuria, frequency, hematuria, or incontinence  NEUROLOGICAL: No headaches, memory loss, loss of strength, numbness, or tremors  ENDOCRINE: No heat or cold intolerance; No hair loss  MUSCULOSKELETAL: No joint pain or swelling; No muscle, back, or extremity pain  PSYCHIATRIC: No depression, anxiety, mood swings, or difficulty sleeping  HEME/LYMPH: No easy bruising, or bleeding gums  All others negative    PHYSICAL EXAM:  T(C): 36.6 (06-30-21 @ 08:50), Max: 36.6 (06-29-21 @ 13:00)  HR: 58 (06-30-21 @ 08:50) (58 - 77)  BP: 167/78 (06-30-21 @ 08:50) (146/72 - 167/78)  RR: 18 (06-30-21 @ 08:50) (18 - 20)  SpO2: 100% (06-30-21 @ 08:50) (96% - 100%)  Wt(kg): --  I&O's Summary    29 Jun 2021 07:01  -  30 Jun 2021 07:00  --------------------------------------------------------  IN: 0 mL / OUT: 1500 mL / NET: -1500 mL    30 Jun 2021 07:01 - 30 Jun 2021 12:20  --------------------------------------------------------  IN: 0 mL / OUT: 450 mL / NET: -450 mL        GENERAL: NAD s/l left temporal artery biopsy   EYES: conjunctiva and sclera clear  ENMT: No tonsillar erythema, exudates, or enlargement  Cardiovascular: Normal S1 S2, No JVD, No murmur  Respiratory: Lungs clear to auscultation	  Gastrointestinal:  Soft, Non-tender, + BS	  Extremities: Rt BKA                                    10.7   26.32 )-----------( 203      ( 30 Jun 2021 06:54 )             32.8     06-30    134<L>  |  100  |  94<H>  ----------------------------<  145<H>  5.3   |  22  |  2.33<H>    Ca    9.2      30 Jun 2021 06:54  Phos  3.7     06-30  Mg     1.80     06-30      proBNP:   Lipid Profile:   HgA1c:   TSH:     Consultant(s) Notes Reviewed:  [x ] YES  [ ] NO    Care Discussed with Consultants/Other Providers [ x] YES  [ ] NO    Imaging Personally Reviewed independently:  [x] YES  [ ] NO    All labs, radiologic studies, vitals, orders and medications list reviewed. Patient is seen and examined at bedside. Case discussed with medical team.

## 2021-06-30 NOTE — PROGRESS NOTE ADULT - SUBJECTIVE AND OBJECTIVE BOX
Chief Complaint: DM 2 with hyperglycemia exacerbated by steroids     History: Patient seen at bedside. Reports he is eating meals, denies n/v, denies s/s of hypoglycemia  Now on prednisone 60 mg po daily,   Patient prefers CGM. And is amenable to 4x daily injections  CGM placed prior to d/c    MEDICATIONS  (STANDING):  apixaban 5 milliGRAM(s) Oral every 12 hours  atorvastatin 40 milliGRAM(s) Oral at bedtime  carvedilol 3.125 milliGRAM(s) Oral every 12 hours  dextrose 40% Gel 15 Gram(s) Oral once  dextrose 50% Injectable 25 Gram(s) IV Push once  dextrose 50% Injectable 12.5 Gram(s) IV Push once  dextrose 50% Injectable 25 Gram(s) IV Push once  diltiazem    milliGRAM(s) Oral daily  doxazosin 4 milliGRAM(s) Oral at bedtime  hydrALAZINE 100 milliGRAM(s) Oral three times a day  insulin glargine Injectable (LANTUS) 28 Unit(s) SubCutaneous at bedtime  insulin lispro (ADMELOG) corrective regimen sliding scale   SubCutaneous three times a day before meals  insulin lispro (ADMELOG) corrective regimen sliding scale   SubCutaneous at bedtime  insulin lispro Injectable (ADMELOG) 15 Unit(s) SubCutaneous three times a day before meals  isosorbide   mononitrate ER Tablet (IMDUR) 120 milliGRAM(s) Oral daily  methylPREDNISolone sodium succinate Injectable 40 milliGRAM(s) IV Push every 12 hours  pantoprazole    Tablet 40 milliGRAM(s) Oral before breakfast  sodium chloride 0.9% lock flush 3 milliLiter(s) IV Push every 8 hours  sodium chloride 0.9%. 1000 milliLiter(s) (50 mL/Hr) IV Continuous <Continuous>  trimethoprim   80 mG/sulfamethoxazole 400 mG 1 Tablet(s) Oral <User Schedule>    MEDICATIONS  (PRN):  acetaminophen   Tablet .. 650 milliGRAM(s) Oral every 6 hours PRN Temp greater or equal to 38C (100.4F), Mild Pain (1 - 3)  fluticasone propionate 50 MICROgram(s)/spray Nasal Spray 1 Spray(s) Both Nostrils two times a day PRN congestion  guaiFENesin Oral Liquid (Sugar-Free) 100 milliGRAM(s) Oral every 6 hours PRN Cough    No Known Allergies    Review of Systems:  Cardiovascular: No chest pain  Respiratory: No SOB  GI: No nausea, vomiting  Endocrine: no hypoglycemia       Vital Signs Last 24 Hrs  T(C): 36.7 (30 Jun 2021 12:50), Max: 36.7 (30 Jun 2021 12:50)  T(F): 98 (30 Jun 2021 12:50), Max: 98 (30 Jun 2021 12:50)  HR: 61 (30 Jun 2021 12:50) (58 - 77)  BP: 175/68 (30 Jun 2021 12:50) (146/72 - 175/68)  BP(mean): 98 (29 Jun 2021 21:00) (98 - 98)  RR: 18 (30 Jun 2021 12:50) (18 - 20)  SpO2: 100% (30 Jun 2021 12:50) (96% - 100%)  GENERAL: NAD  EYES: No proptosis, no lid lag, anicteric  HEENT:  Atraumatic, Normocephalic, moist mucous membranes  RESPIRATORY: unlabored respirations   PSYCH: Alert and oriented x 3    CAPILLARY BLOOD GLUCOSE    POCT Blood Glucose.: 153 mg/dL (30 Jun 2021 17:29)  POCT Blood Glucose.: 191 mg/dL (30 Jun 2021 12:33)  POCT Blood Glucose.: 134 mg/dL (30 Jun 2021 08:41)  POCT Blood Glucose.: 130 mg/dL (29 Jun 2021 22:14)  POCT Blood Glucose.: 156 mg/dL (29 Jun 2021 12:07)  POCT Blood Glucose.: 111 mg/dL (29 Jun 2021 08:32)  POCT Blood Glucose.: 134 mg/dL (28 Jun 2021 22:18)  POCT Blood Glucose.: 185 mg/dL (28 Jun 2021 17:43)  POCT Blood Glucose.: 202 mg/dL (28 Jun 2021 12:45)  POCT Blood Glucose.: 186 mg/dL (28 Jun 2021 08:49)  POCT Blood Glucose.: 244 mg/dL (27 Jun 2021 21:19)  POCT Blood Glucose.: 201 mg/dL (27 Jun 2021 17:36)  POCT Blood Glucose.: 269 mg/dL (27 Jun 2021 12:39)  POCT Blood Glucose.: 223 mg/dL (27 Jun 2021 08:59)  POCT Blood Glucose.: 263 mg/dL (26 Jun 2021 21:53)  POCT Blood Glucose.: 209 mg/dL (26 Jun 2021 17:58)  06-30    134<L>  |  100  |  94<H>  ----------------------------<  145<H>  5.3   |  22  |  2.33<H>    Ca    9.2      30 Jun 2021 06:54  Phos  3.7     06-30  Mg     1.80     06-30          Thyroid Function Tests:  06-21 @ 02:46 TSH 2.08 FreeT4 -- T3 -- Anti TPO -- Anti Thyroglobulin Ab -- TSI --      A1C with Estimated Average Glucose Result: 10.6 % (06-21-21 @ 02:33)  A1C with Estimated Average Glucose Result: 7.4 % (04-25-21 @ 11:17)    Diet, DASH/TLC:   Sodium & Cholesterol Restricted  Consistent Carbohydrate No Snacks (CSTCHO)  For patients receiving Renal Replacement - No Protein Restr, No Conc K, No Conc Phos, Low Sodium (RENAL) (06-23-21 @ 16:32)

## 2021-06-30 NOTE — PROGRESS NOTE ADULT - REASON FOR ADMISSION
L eye blurry vision

## 2021-06-30 NOTE — PROGRESS NOTE ADULT - SUBJECTIVE AND OBJECTIVE BOX
Neurology Progress Note    S: Patient seen and examined. No new events overnight. patient denied CP, SOB, HA or pain. resting in bed. wants to leave .  bx was neg for GCA    Medication:  MEDICATIONS  (STANDING):  apixaban 5 milliGRAM(s) Oral every 12 hours  atorvastatin 40 milliGRAM(s) Oral at bedtime  dextrose 40% Gel 15 Gram(s) Oral once  dextrose 50% Injectable 25 Gram(s) IV Push once  dextrose 50% Injectable 12.5 Gram(s) IV Push once  dextrose 50% Injectable 25 Gram(s) IV Push once  diltiazem    milliGRAM(s) Oral daily  doxazosin 4 milliGRAM(s) Oral at bedtime  hydrALAZINE 100 milliGRAM(s) Oral three times a day  insulin glargine Injectable (LANTUS) 34 Unit(s) SubCutaneous at bedtime  insulin lispro (ADMELOG) corrective regimen sliding scale   SubCutaneous three times a day before meals  insulin lispro (ADMELOG) corrective regimen sliding scale   SubCutaneous at bedtime  insulin lispro Injectable (ADMELOG) 21 Unit(s) SubCutaneous three times a day before meals  isosorbide   mononitrate ER Tablet (IMDUR) 120 milliGRAM(s) Oral daily  pantoprazole    Tablet 40 milliGRAM(s) Oral before breakfast  predniSONE   Tablet 60 milliGRAM(s) Oral daily  sodium chloride 0.9% lock flush 3 milliLiter(s) IV Push every 8 hours  sodium chloride 0.9%. 1000 milliLiter(s) (50 mL/Hr) IV Continuous <Continuous>  trimethoprim   80 mG/sulfamethoxazole 400 mG 1 Tablet(s) Oral <User Schedule>    MEDICATIONS  (PRN):  acetaminophen   Tablet .. 650 milliGRAM(s) Oral every 6 hours PRN Temp greater or equal to 38C (100.4F), Mild Pain (1 - 3)  fluticasone propionate 50 MICROgram(s)/spray Nasal Spray 1 Spray(s) Both Nostrils two times a day PRN congestion  guaiFENesin Oral Liquid (Sugar-Free) 100 milliGRAM(s) Oral every 6 hours PRN Cough      Vitals:  Vital Signs Last 24 Hrs  T(C): 36.6 (2021 08:50), Max: 36.6 (2021 13:00)  T(F): 97.9 (2021 08:50), Max: 97.9 (2021 05:46)  HR: 58 (2021 08:50) (58 - 77)  BP: 167/78 (2021 08:50) (146/72 - 167/78)  BP(mean): 98 (2021 21:00) (98 - 98)  RR: 18 (2021 08:50) (18 - 20)  SpO2: 100% (2021 08:50) (96% - 100%)    PHYSICAL EXAM:  GENERAL: NAD on room air  HEENT: Normocephalic;  conjunctivae and sclerae clear; moist mucous membranes;   NECK: Supple  EXTREMITIES: No cyanosis; no edema; no calf tenderness  SKIN: Warm and dry; no rash             Neurological Exam:  - Mental Status: Alert, oriented to person, place, time, situation; speech is fluent with intact naming, repetition, and comprehension; follows commands  - Cranial Nerves II-XII:    II:  PERRL 3mm b/l; visual fields are full to confrontation; visual acuity 20/30 OU  III, IV, VI:  EOMI, no nystagmus  V:  facial sensation is intact in the V1-V3 distribution bilaterally.  VII:  face is symmetric with normal eye closure and smile  VIII:  hearing is intact to finger rub  IX, X:  uvula is midline and soft palate rises symmetrically  XI:  head turning and shoulder shrug are intact bilaterally  XII:  tongue protrudes in the midline  - Motor: strength 5/5 throughout, LLE deferred due to below knee amputation; normal muscle bulk and tone throughout; no pronator drift  - Reflexes:  2+ and symmetric at the biceps, triceps, brachioradialis, knees, and ankles;  plantar reflexes downgoing bilaterally  - Sensory:  intact to light touch, pin prick, vibration, and joint-position sense throughout  - Coordination:  finger-nose-finger and heel-knee-shin intact without dysmetria; rapid alternating hand movements intact  - Gait:  normal steps, base, arm swing, and turning; Romberg testing is negative    I personally reviewed the below data/images/labs:  CBC Full  -  ( 2021 06:54 )  WBC Count : 26.32 K/uL  RBC Count : 4.10 M/uL  Hemoglobin : 10.7 g/dL  Hematocrit : 32.8 %  Platelet Count - Automated : 203 K/uL  Mean Cell Volume : 80.0 fL  Mean Cell Hemoglobin : 26.1 pg  Mean Cell Hemoglobin Concentration : 32.6 gm/dL  Auto Neutrophil # : x  Auto Lymphocyte # : x  Auto Monocyte # : x  Auto Eosinophil # : x  Auto Basophil # : x  Auto Neutrophil % : x  Auto Lymphocyte % : x  Auto Monocyte % : x  Auto Eosinophil % : x  Auto Basophil % : x    06-30    134<L>  |  100  |  94<H>  ----------------------------<  145<H>  5.3   |  22  |  2.33<H>    Ca    9.2      2021 06:54  Phos  3.7       Mg     1.80               Urinalysis Basic - ( 2021 07:56 )    Color: Colorless / Appearance: Clear / S.016 / pH: x  Gluc: x / Ketone: Negative  / Bili: Negative / Urobili: <2 mg/dL   Blood: x / Protein: 30 mg/dL / Nitrite: Negative   Leuk Esterase: Negative / RBC: 0 /HPF / WBC 1 /HPF   Sq Epi: x / Non Sq Epi: 0 /HPF / Bacteria: Negative      [place] Results  CT angiography neck: No evidence of hemodynamically significant stenosis using NASCET criteria.  Patent vertebral arteries.  No evidence of vascular dissection.  CT angiography brain: No major vessel occlusion or proximal stenosis.  CTH: No acute intracranial disease.  CT perfusion: No perfusion abnormality

## 2021-06-30 NOTE — PROGRESS NOTE ADULT - PROBLEM SELECTOR PROBLEM 4
Hyperlipidemia, unspecified hyperlipidemia type
Paroxysmal atrial fibrillation
Paroxysmal atrial fibrillation
Hyperlipidemia, unspecified hyperlipidemia type
Paroxysmal atrial fibrillation

## 2021-06-30 NOTE — PROGRESS NOTE ADULT - PROBLEM SELECTOR PROBLEM 3
Acute kidney injury superimposed on chronic kidney disease
Hyperlipidemia, unspecified hyperlipidemia type
Essential hypertension
Essential hypertension
Hyperlipidemia, unspecified hyperlipidemia type
Essential hypertension
Hyperlipidemia, unspecified hyperlipidemia type
Essential hypertension
Acute kidney injury superimposed on chronic kidney disease

## 2021-06-30 NOTE — PROGRESS NOTE ADULT - ASSESSMENT
70y male with a past medical history/ocular history of DM, HTN, CKD, Afib on Eliquis, HLD, left foot amputation, PDR s/p Intravitreal injections consulted for painless vision loss left.      Ada on CKD  unclear baseline  ADA likely TISHA and hyperglycemia   Fena>1% peaked 4.77   CKD likely sec to longstanding DM with retinopathy  Monitor BMP at present  Elevated BUN likely sec to steroid use  renal function improving   MOnitor BMP   AVoid further nephrotoxics NSAIDS RCA    HTN  BP uncontrolled  started on coreg now kwasi will dc  imdur increased   on diltiazem, still HR low, will change to nifedipine 60   MOnitor BP and HR    Hyperphosphatemia  sec to RF  Low PO4 diet     HYponatremia  Avoid hypotonic IVF  optimize dm control  urine work up suggested SIADH  free water restriction <1L/day  stable  monitor

## 2021-06-30 NOTE — DISCHARGE NOTE NURSING/CASE MANAGEMENT/SOCIAL WORK - PATIENT PORTAL LINK FT
You can access the FollowMyHealth Patient Portal offered by [facility] by registering at the following website: [email or web address]. By joining [employer]’s FollowMyHealth portal, you will also be able to view your health information using other applications (apps) compatible with our system.

## 2021-06-30 NOTE — PROGRESS NOTE ADULT - ASSESSMENT
71 yo man with history of afib on Eliquis 2.5 mg po BID, HFpEF (EF 55-60% 4/2021), HTN, HLD, CKD stage 3-4, DM2 c/b diabetic retinopathy, PVD c/b LLE nec fasc s/p L BKA, and b/l cataracts s/p R eye cataract surgery presents as a transfer from [facility] after he went there for acute onset of L eye blurry vision    EKG SR RBBB   Tele: SB 50s, RBBB    1) Afib  -c/w Eliquis 5 mg po BID  -coreg discontinued 2/2 bradycardia. patient asymptomatic. continue to hold. HR improving  -continue with cardizem   -echo moderate concentric LVH, grossly normal LV function   -continue to monitor on tele      2) GCA  - t/t per rhum     3) HTN   -improving  -c/w hydralazine and imdur  -coreg discontinued 2/2 bradycardia  -continue to monitor BP     4) ELMER on CKD   -improving   -renal onboard     5) DVT PPX  -Eliquis

## 2021-06-30 NOTE — CHART NOTE - NSCHARTNOTEFT_GEN_A_CORE
71 yo man with PMHx of DM2, HFpEF, HTN, HLD, CKD, and PVD (s/p L BKA) who presented from ophthalmology due to concerns for L central artery occlusion. Endocrine Team consulted for inpatient T2DM management.    Diabetes follow up   Please refer to last progress note for full plan of care   Chart reviewed - fasting glucose stable, prandial glucose above target of 100-180 mg/dL  Will continue Lantus 28 units at bedtime    Will increase Admelog to 15 units TID before meals - HOLD if not eating   Will continue Admelog MODERATE correctional scale before meals and bedtime  Endocrine following      CAPILLARY BLOOD GLUCOSE    POCT Blood Glucose.: 239 mg/dL (26 Jun 2021 12:23)  POCT Blood Glucose.: 152 mg/dL (26 Jun 2021 09:19)  POCT Blood Glucose.: 272 mg/dL (25 Jun 2021 21:33)  POCT Blood Glucose.: 219 mg/dL (25 Jun 2021 17:44)  POCT Blood Glucose.: 227 mg/dL (25 Jun 2021 12:32)  POCT Blood Glucose.: 168 mg/dL (25 Jun 2021 08:42)  POCT Blood Glucose.: 195 mg/dL (25 Jun 2021 03:47)  POCT Blood Glucose.: 296 mg/dL (24 Jun 2021 22:41)  POCT Blood Glucose.: 354 mg/dL (24 Jun 2021 18:16)    06-26    135  |  99  |  96<H>  ----------------------------<  150<H>  4.5   |  21<L>  |  2.68<H>    Ca    9.4      26 Jun 2021 06:48  Phos  4.2     06-26  Mg     2.0     06-26    MEDICATIONS  (STANDING):  apixaban 5 milliGRAM(s) Oral every 12 hours  atorvastatin 40 milliGRAM(s) Oral at bedtime  carvedilol 3.125 milliGRAM(s) Oral every 12 hours  dextrose 40% Gel 15 Gram(s) Oral once  dextrose 50% Injectable 25 Gram(s) IV Push once  dextrose 50% Injectable 12.5 Gram(s) IV Push once  dextrose 50% Injectable 25 Gram(s) IV Push once  diltiazem    milliGRAM(s) Oral daily  doxazosin 4 milliGRAM(s) Oral at bedtime  hydrALAZINE 100 milliGRAM(s) Oral three times a day  insulin glargine Injectable (LANTUS) 28 Unit(s) SubCutaneous at bedtime  insulin lispro (ADMELOG) corrective regimen sliding scale   SubCutaneous three times a day before meals  insulin lispro (ADMELOG) corrective regimen sliding scale   SubCutaneous at bedtime  insulin lispro Injectable (ADMELOG) 13 Unit(s) SubCutaneous three times a day before meals  isosorbide   mononitrate ER Tablet (IMDUR) 120 milliGRAM(s) Oral daily  methylPREDNISolone sodium succinate Injectable 40 milliGRAM(s) IV Push every 12 hours  pantoprazole    Tablet 40 milliGRAM(s) Oral before breakfast  sodium chloride 0.9% lock flush 3 milliLiter(s) IV Push every 8 hours  sodium chloride 0.9%. 1000 milliLiter(s) (50 mL/Hr) IV Continuous <Continuous>  trimethoprim   80 mG/sulfamethoxazole 400 mG 1 Tablet(s) Oral <User Schedule>    Diet, DASH/TLC:   Sodium & Cholesterol Restricted  Consistent Carbohydrate {No Snacks} (CSTCHO)  For patients receiving Renal Replacement - No Protein Restr, No Conc K, No Conc Phos, Low Sodium (RENAL) (06-23-21 @ 16:32)    A1C with Estimated Average Glucose Result: 10.6 % (06-21-21 @ 02:33)  A1C with Estimated Average Glucose Result: 7.4 % (04-25-21 @ 11:17)    Lucie Contreras  Nurse Practitioner  Division of Endocrinology & Diabetes  In house pager #[phone]/long range pager #[phone]    If before 9AM or after 6PM, or on weekends/holidays, please call endocrine answering service for assistance ([phone]).  For nonurgent matters email [email or web address] for assistance.
Diabetes follow up   Please refer to last progress note for full plan of care   Chart reviewed   Prandial glucose above target of 100-180 mg/dL   Will increase Admelog to 13 units TID before meals - HOLD if not eating   Will continue Lantus 28 units at bedtime   Will continue Admelog MODERATE correctional scale before meals and bedtime  Endocrine following      MEDICATIONS  (STANDING):  atorvastatin 40 milliGRAM(s) Oral at bedtime  carvedilol 3.125 milliGRAM(s) Oral every 12 hours  dextrose 40% Gel 15 Gram(s) Oral once  dextrose 50% Injectable 25 Gram(s) IV Push once  dextrose 50% Injectable 12.5 Gram(s) IV Push once  dextrose 50% Injectable 25 Gram(s) IV Push once  diltiazem    milliGRAM(s) Oral daily  doxazosin 4 milliGRAM(s) Oral at bedtime  hydrALAZINE 100 milliGRAM(s) Oral three times a day  insulin glargine Injectable (LANTUS) 28 Unit(s) SubCutaneous at bedtime  insulin lispro (ADMELOG) corrective regimen sliding scale   SubCutaneous three times a day before meals  insulin lispro (ADMELOG) corrective regimen sliding scale   SubCutaneous at bedtime  insulin lispro Injectable (ADMELOG) 11 Unit(s) SubCutaneous three times a day before meals  isosorbide   mononitrate ER Tablet (IMDUR) 60 milliGRAM(s) Oral daily  methylPREDNISolone sodium succinate Injectable 40 milliGRAM(s) IV Push every 12 hours  pantoprazole    Tablet 40 milliGRAM(s) Oral before breakfast  sodium chloride 0.9% lock flush 3 milliLiter(s) IV Push every 8 hours  sodium chloride 0.9%. 1000 milliLiter(s) (50 mL/Hr) IV Continuous <Continuous>  trimethoprim   80 mG/sulfamethoxazole 400 mG 1 Tablet(s) Oral <User Schedule>    CAPILLARY BLOOD GLUCOSE      POCT Blood Glucose.: 227 mg/dL (25 Jun 2021 12:32)  POCT Blood Glucose.: 168 mg/dL (25 Jun 2021 08:42)  POCT Blood Glucose.: 195 mg/dL (25 Jun 2021 03:47)  POCT Blood Glucose.: 296 mg/dL (24 Jun 2021 22:41)  POCT Blood Glucose.: 354 mg/dL (24 Jun 2021 18:16)      06-25    134<L>  |  98  |  106<H>  ----------------------------<  165<H>  4.6   |  19<L>  |  3.23<H>    Ca    9.4      25 Jun 2021 06:57  Phos  4.6     06-25  Mg     1.9     06-25
Please see endocrine progress note from [facility]es[facility] today for complete plan of care  Patient seen for education and placement of Freestyle Libre2 CGM  Patient requesting Freestyle Carlos Eduardo CGM for monitoring of BG at home, will be discharged on basal/bolus insulin, new to insulin injections  Explained use of Freestyle Libre2 and how to use - how to replace sensor, when to change, how to use reader, avoidance of excessive Vitamin C  Placed new sensor on R posterior upper arm. Site CDI. Sensor warming up (can be used in 60 minutes)  Patient reported understanding of instructions provided
Post Op Check Vascular Surgery    STATUS POST:  L temporal artery biopsy     Patient currently sitting in bed comfortably, denies dizziness, SOB, blurred vision, H/A, N/V/D, chest pain, palpitations. Patient tolerating regular diet, voiding and ambulating at baseline.      Central retinal artery occlusion, vitreous hemorrhage    ---------------------------------------------------------------------------------------------   VITALS  T(C): 36.6 (06-24-21 @ 13:20), Max: 36.8 (06-23-21 @ 18:00)  HR: 86 (06-24-21 @ 13:20) (63 - 91)  BP: 195/101 (06-24-21 @ 13:20) (141/77 - 195/101)  RR: 18 (06-24-21 @ 13:20) (11 - 19)  SpO2: 98% (06-24-21 @ 13:20) (93% - 100%)  CAPILLARY BLOOD GLUCOSE      POCT Blood Glucose.: 259 mg/dL (24 Jun 2021 12:23)  POCT Blood Glucose.: 274 mg/dL (24 Jun 2021 10:34)  POCT Blood Glucose.: 256 mg/dL (24 Jun 2021 07:20)  POCT Blood Glucose.: 295 mg/dL (24 Jun 2021 03:02)  POCT Blood Glucose.: 305 mg/dL (23 Jun 2021 22:46)  POCT Blood Glucose.: 334 mg/dL (23 Jun 2021 17:39)      Is/Os    06-23 @ 07:01  -  06-24 @ 07:00  --------------------------------------------------------  IN:    Oral Fluid: 1030 mL  Total IN: 1030 mL    OUT:    Voided (mL): 2500 mL  Total OUT: 2500 mL    Total NET: -1470 mL      06-24 @ 07:01  - 06-24 @ 15:18  --------------------------------------------------------  IN:    sodium chloride 0.9%: 100 mL  Total IN: 100 mL    OUT:    Voided (mL): 1000 mL  Total OUT: 1000 mL    Total NET: -900 mL          ---------------------------------------------------------------------------------------------   PHYSICAL EXAM:   General: NAD, Lying in bed comfortably  Neuro: alert, oriented x3  HEENT: NC/AT, EOMI, L temporal incision site C/D/I, no erythema/hematoma  Neck: Soft, supple  Cardio: RRR, nml S1/S2  Resp: Good effort, CTA b/l  GI/Abd: Soft, NT/ND, no rebound/guarding, no masses palpated  Vascular: All 4 extremities warm.  Skin: Intact, no breakdown  Lymphatic/Nodes: No palpable lymphadenopathy  Musculoskeletal: All 4 extremities moving spontaneously, no limitations, no LE edema    ---------------------------------------------------------------------------------------------   MEDICATIONS (STANDING): atorvastatin 40 milliGRAM(s) Oral at bedtime  carvedilol 3.125 milliGRAM(s) Oral every 12 hours  dextrose 40% Gel 15 Gram(s) Oral once  dextrose 50% Injectable 25 Gram(s) IV Push once  dextrose 50% Injectable 12.5 Gram(s) IV Push once  dextrose 50% Injectable 25 Gram(s) IV Push once  diltiazem    milliGRAM(s) Oral daily  doxazosin 4 milliGRAM(s) Oral at bedtime  hydrALAZINE 100 milliGRAM(s) Oral three times a day  insulin lispro (ADMELOG) corrective regimen sliding scale   SubCutaneous three times a day before meals  insulin lispro (ADMELOG) corrective regimen sliding scale   SubCutaneous at bedtime  isosorbide   mononitrate ER Tablet (IMDUR) 60 milliGRAM(s) Oral daily  methylPREDNISolone sodium succinate Injectable 40 milliGRAM(s) IV Push every 12 hours  pantoprazole    Tablet 40 milliGRAM(s) Oral before breakfast  sodium chloride 0.9% lock flush 3 milliLiter(s) IV Push every 8 hours  sodium chloride 0.9%. 1000 milliLiter(s) IV Continuous <Continuous>  trimethoprim   80 mG/sulfamethoxazole 400 mG 1 Tablet(s) Oral <User Schedule>    MEDICATIONS (PRN):acetaminophen   Tablet .. 650 milliGRAM(s) Oral every 6 hours PRN Temp greater or equal to 38C (100.4F), Mild Pain (1 - 3)  guaiFENesin Oral Liquid (Sugar-Free) 100 milliGRAM(s) Oral every 6 hours PRN Cough      ---------------------------------------------------------------------------------------------   LABS  CBC (06-24 @ 07:23)                              10.4<L>                         16.30<H>  )----------------(  215        --    % Neutrophils, --    % Lymphocytes, ANC: --                                  32.0<L>  CBC (06-23 @ 07:06)                              10.8<L>                         15.93<H>  )----------------(  242        --    % Neutrophils, --    % Lymphocytes, ANC: --                                  33.9<L>    BMP (06-24 @ 07:23)             130<L>  |  92<L>   |  120<H>		Ca++ --      Ca 9.6                ---------------------------------( 229<H>		Mg 1.9                4.3     |  21<L>   |  4.11<H>			Ph 5.7<H>  BMP (06-23 @ 07:06)             129<L>  |  89<L>   |  120<H>		Ca++ --      Ca 9.8                ---------------------------------( 334<H>		Mg 1.9                4.3     |  20<L>   |  4.77<H>			Ph 5.8<H>      Coags (06-24 @ 00:03)  aPTT 69.3<H> / INR -- / PT --  Coags (06-23 @ 16:21)  aPTT 56.1<H> / INR -- / PT --            IMAGING STUDIES      ---------------------------------------------------------------------------------------------       ASSESSMENT  70 year old male with PMH afib on eliquis, CHF, HTN, HLD, CKD, DM, previous BKA now presenting with symptoms concerning for GCA s/p Left temporal artery biopsy    PLAN  - Diet: Regular, carb consistent, dash diet  - Pain control with PO medications.    - Continue home medications  - OOB, ambulate as tolerated  - continue chemical VTE ppx  -restart eliquis tomorrow 6/25  -pt stable for discharge home per vascular surgery  -f/u with Dr. Eriberto MENA in 1 week
House Endocrine consulted for uncontrolled DM, a1c 10.6.      Shamir Salinas NP  pager [phone]

## 2021-06-30 NOTE — PROGRESS NOTE ADULT - ASSESSMENT
69 yo man with history of afib on Eliquis, HFpEF (EF 55-60% 4/2021), HTN, HLD, CKD stage 3-4, DM2 c/b diabetic retinopathy, PVD c/b LLE nec fasc s/p L BKA, and b/l cataracts s/p R eye cataract surgery presents as a transfer from [facility] after he went there for acute onset of L eye blurry vision on Sunday afternoon ~4:30pm. Pt states that as he was watching TV and about to start a new movie, he suddenly developed L eye blurry vision, which he describes as a fuzziness. He has poor vision in his R eye at baseline but had near perfect vision in his L eye prior to this episode. Pt states that immediately before the blurry vision started, he felt slightly dizzy. Denies any associated double vision, eye pain, increased tearing, irritation, redness, or new flashes/floaters. Also denies any headaches, numbness/tingling, limb weakness, chest pain, SOB, palpitations, fevers, chills, cough, abdominal pain, nausea, vomiting, diarrhea, constipation, or urinary symptoms. At [facility], pt was found to have left CRAO, and Neurology was consulted for stroke workup. Pt was transferred to [facility] for ophthalmology eval.   (21 Jun 2021 06:00)    Pt afebrile.  WBC on admission 14.8 --> 22.9.  cxr no active pulm disease.  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  Seen by Ophthalmology for L eye vision loss.  Unable to get MRI 2/2 hardware, s/p temporal artery bx 6/24,  Pt on IV solumedrol 40mg BID- for empiric treatment of GCA, Pathology negative for giant cell.       Pt with leukocytosis, UA (-), cxr no pna.  Bcc 6/21 NGTD.  Pt on high dose steroids and Bactrim ppx.     Leukocytosis:    - Elevated WBC ?reactive and secondary to high dose steroids.  Infectious w/u thus far negative.  No new localizing signs/symptoms on clinical exam.      - No diarrhea to suggest Cdiff.    - Recommend continued outpt f/u and repeat cbc with diff as outpt with pt's PCP for monitoring.    - No further ID w/u indicated at this time.    - Cont Bactrim 3x/week for PCP prophylaxis throughout duration of steriods    * WBC elevated at 26, suspect secondary to steroids and demargination of neutrophils.  Cont to monitor.         Melia Joshii  [phone]

## 2021-06-30 NOTE — PROGRESS NOTE ADULT - ATTENDING COMMENTS
D/W PA  -Hyponatremia sec.to Hyperglycemia.Gentle Hydration  -PT
D/W PA  -Hyponatremia sec.to Hyperglycemia.Gentle Hydration  -PT,f/u biopsy result of Temporal artery.
D/W PA  -Hyponatremia sec.to Hyperglycemia.s/p  Hydration  -d/c planning f/u with PCP,RHEUMATOLOGY,OPTHALMOLOGY IN 1-2 WEEKS.stable for d/c Home
IVF as above
pt seen and examined agree with plan above
pt seen and examined agree with plan above
Concern for GCA and with improvement in symptoms with solumedrol 60mg at admission but now with reports of episodes of loss of vision in a "splattered and patchy" pattern and not consistent with a quadrant loss in a visual field. Unclear if described symptoms (haziness with patchiness) is truly due to GCA but does temporally resolve with steroids.  Will need biopsy to confirm diagnosis. Given obesity and diabetes he has significant risk with steroids.   will give solumedrol 60mg tonight and start 40mg bid tomorrow morning   close monitoring of blood sugar.   If visual changes worsen will need to consider pulse dose steroid  biopsy planned for Thursday
Concern for GCA and with improvement in symptoms with solumedrol 60mg last night and return of symptoms (mild) this afternoon. Unclear is described symptoms (haziness with patchiness) is truly due to GCA but does temporally resolve with steroids.  Will need biopsy to confirm diagnosis. Given obesity and diabetes he has significant risk with sterorids.   will give solumedrol 60mg tonight and start 40mg bid tomorrow morning   close monitoring of blood sugar.   If visual changes worsen will need to consider pulse dose steroid  biopsy planned for Thursday
I have interviewed and examined the patient and reviewed the residents note including the history, exam, assessment, and plan.  I agree with the residents assessment and plan.    70y male with a past medical history/ocular history of DM, HTN, CKD, Afib on Eliquis, HLD, left foot amputation, PDR s/p Intravitreal injections consulted for painless vision loss left.    1. 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 Vision improved today to 20/70 and color plates 10/12 left eye. Unable to evaluate pupils this morning 2/2 patient's pupils still remained dilated from prior dilated exam.  -Unclear etiology at this point. Patient's history of sudden vision loss of left eye is consistent with possible CRAO left eye. However, patient did not have an APD, which would be less consistent with CRAO. Patient's vision and color vision significantly improved today, which may indicate a moving embolus with reperfusion. However, cherry red spot in macula was not seen on today's dilated exam.  Symptoms also not completely consistent with TIA as pt did not have loss of vision or migraine with aura- pt states he had a mild headache yesterday that resolved with Tylenol- this happened before the vision changes.  -Outpatient fluorescein angiogram and OCT macula will need to be performed as an outpatient  -Agree with stroke work-up. CTH, CTA H/N CT perfusion negative. Recommend MR brain/orbit imaging as appropriate.  Cardiac Evaluation with Echo, EKG, and Medical optimization   -ESR (64) and CRP (13.8) elevated. Pt reports negative GCA symptoms. Etiology of elevated ESR/CRP may be 2/2 CKD and other medical problems. Appreciate rheumatology consultation. +Left scalp tenderness on Rheum's exam and there is concern for GCA per rheumatology. Rheum recommends B/L temporal artery biopsy and recommends starting IV solumedrol 60 mg daily.  -Follow-up quantiferon TB test, acute hepatitis panel, IL-6, and immunoglobulins panel.    2. 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  -No signs of neovascularization on exam including iris, disc, or elsewhere.    -Blood sugar control as per Medical Team  -Patient follows with Dr. Marialuisa Ram, retina ophthalmologist. Needs OCT and FA imaging to further elucidate disease, and possible Injection therapy as an outpatient.  - findings and plan discussed with patient and primary team  - case also discussed directly with Rheum fellow    Ingrid Ponce MD
pt seen and examined agree with plan above
Patient with vision loss,  headache, elevated ESR and poorly controlled DM.  Patient is s/p biopsy.    Patient with moderate suspicion for GCA prior to biopsy and even though biopsy without evidence of inflammatory infiltrate, given clinical suspicion for GCA, continue with prednisone.  Follow up with Dr. Shaikh or Dr. Toscano as an outpatient.
D/W PA  -Hyponatremia sec.to Hyperglycemia.Gentle Hydration  -PT
D/W PA  -Hyponatremia sec.to Hyperglycemia.s/p  Hydration  -d/c planning f/u with PCP,RHEUMATOLOGY,OPTHALMOLOGY IN 1-2 WEEKS
D/W PA  -Hyponatremia sec.to Hyperglycemia.Gentle Hydration  -Medically optimized for procedure.Hold AM DOSE OF Eliquis.Cardiology consult called.
D/W PA  -Hyponatremia sec.to Hyperglycemia.Gentle Hydration  -PT
D/W PA

## 2021-08-23 NOTE — ED ADULT NURSE NOTE - NSFALLRSKHARMRISK_ED_ALL_ED
[facility] PROGRESS NOTE      NAME: Magaly Spain   AGE: 76 year old  MRN: [id number]   : 1944  DATE: 2021    PCP:Sabas Adams MD     SUBJECTIVE:   Patient is 76-year-old female presents for medical necessary follow-up visit for essential hypertension and status post dual-chamber pacemaker.  She also reports that she needs preoperative cardiac evaluation prior to undergoing surgery.  She states that her surgery is in 2 days.  She reports that she has not scheduled her echocardiogram or stress test.  She denies chest pain, palpitations, shortness of breath.  No further complaints.    ROS:   Consitiutional ROS: no weight loss or gain  ENMT ROS: negative for - epistaxis, sore throat or vertigo  Respiratory ROS: no cough, shortness of breath, or wheezing  Cardiovascular ROS: negative for - chest pain, palpitations or shortness of breath  Gastrointestinal ROS: negative for - abdominal pain, blood in stools, change in bowel habits or nausea/vomiting  Genito-Urinary ROS: no dysuria, trouble voiding, or hematuria  Musculoskeletal ROS: negative for - joint pain or muscle pain  Neurological ROS: negative for - confusion or headaches  Dermatological ROS: negative for - rash or skin lesion changes  All other review of systems are negative unless stated in HPI    OBJECTIVE:  /80  HR 84  PHYSICAL EXAM:  Constitutional: Alert, cooperative, no distress, appears stated age  ENMT: EOMI, Scerlae anicteric, MMM. No JVD, no LAD, no thyromegaly  Respiratory: Clear to ascultation bilaterally, no R/R/W  Cardiovascular: Regular rate and rhythm. S1, S2 normal. No M/R/G. Pulses 2+ and symmetric  Gastrointestinal: Soft, NT/ND. Bowel sounds normal. No masses, no organomegaly  Genitalia: Deferred  Musculoskeletal: Extremities normal, atraumatic. No cyanosis, clubbing, or edema  Osteopathic: Deferred   Psychiatric: Normal Affect  Skin: Skin color, texture, turgor normal. No rashes or lesions.  Neurologic: A&Ox3,  CNII-XII grossly intact    Medication:    Current Outpatient Medications   Medication Sig Dispense Refill   • carvedilol (COREG) 25 MG tablet TAKE 1 TABLET BY MOUTH TWICE DAILY WITH MEALS 180 tablet 1   • acarbose (PRECOSE) 25 MG tablet Take 1 tablet by mouth 3 times daily (with meals). 270 tablet 0   • metFORMIN (GLUCOPHAGE) 500 MG tablet Take 1 tablet by mouth 3 times daily. 270 tablet 0   • meloxicam (MOBIC) 15 MG tablet TAKE 1 TABLET EVERY DAY WITH FOOD 90 tablet 0   • benazepril-hydrochlorthiazide (LOTENSIN HCT) 20-25 MG per tablet Take 1.5 tablets by mouth daily. 135 tablet 2   • hydrALAZINE (APRESOLINE) 25 MG tablet Take 1 tablet by mouth 3 times daily. 270 tablet 3   • atorvastatin (LIPITOR) 20 MG tablet Take 1 tablet by mouth daily. 90 tablet 0   • lidocaine (LIDODERM) 5 % patch UNW AND SYBIL 1 PA TO SKIN Q 24 H. REMOVE PA 12 H AFTER APPLYING.     • dorzolamide (TRUSOPT) 2 % ophthalmic solution INT 1 GTT INTO OU BID     • Accu-Chek FastClix Lancets Misc Test blood sugar 1 time daily. E11.9. 100 each 3   • blood glucose (Accu-Chek Guide) test strip Test blood sugar 1 time daily as directed. Diagnosis: E11.9. 100 each 3   • dorzolamide-timolol (COSOPT) 22.3-6.8 MG/ML ophthalmic solution INT 1 GTT INTO OU BID     • Blood Glucose Monitoring Suppl (ACCU-CHEK GUIDE ME) w/Device Kit 1 kit 1 time for 1 dose. E11.9 1 kit 0   • montelukast (SINGULAIR) 10 MG tablet TAKE 1 TABLET BY MOUTH EVERY NIGHT 90 tablet 0   • LUMIGAN 0.01 % ophthalmic solution INSTILL 1 GTT INTO BOTH EYES HS  2   • brimonidine (ALPHAGAN) 0.2 % ophthalmic solution daily.      • latanoprost (XALATAN) 0.005 % ophthalmic solution INT 1 GTT INTO OU NIGHTLY  3   • ROCKLATAN 0.02-0.005 % Solution      • aspirin 81 MG tablet Take by mouth daily.     • glimepiride (AMARYL) 4 MG tablet Take 4 mg by mouth 2 times daily.       No current facility-administered medications for this visit.       Recent Labs:  Reviewed and assessed    Lab Results   Component  Value Date    CREATININE 1.10 (H) 01/07/2021    CREATININE 0.99 (H) 07/31/2020    BUN 21 (H) 01/07/2021    BUN 14 07/31/2020    CO2 27 01/07/2021    CO2 26 07/31/2020       Lab Results   Component Value Date    WBC 8.1 07/31/2020    HGB 8.8 (L) 07/31/2020    HCT 26.9 (L) 07/31/2020    MCV 82.3 07/31/2020     07/31/2020     No results found for: CKTOTAL, CKMB, TROPONINI    Lab Results   Component Value Date    HDL 49 (L) 01/07/2021    HDL 47 (L) 05/13/2020    HDL 42 (L) 05/10/2018    HDL 43 (L) 02/24/2017     No results found for: INR    ECG Findings:  N/A    The 10-year ASCVD risk score (Siena GARCIA Jr., et al., 2013) is: 34.6%    Values used to calculate the score:      Age: 76 years      Sex: Female      Is Non- : Yes      Diabetic: Yes      Tobacco smoker: No      Systolic Blood Pressure: 156 mmHg      Is BP treated: Yes      HDL Cholesterol: 49 mg/dL      Total Cholesterol: 154 mg/dL    Impression:   Preoperative cardiac risk stratification  Status post dual-chamber pacemaker  Essential hypertension, at goal  Dyslipidemia, at goal  Diabetes mellitus type 2, at goal  CKD stage III  10-year ASCVD risk greater than 7.5%    Plan:   I have instructed the patient that she will need to have an echocardiogram and a stress test as ordered in the past before I can clear her for surgery.  I do not know the patient's functional capacity and therefore I am unable to assess her risk for planned procedure.  Patient verbalized understanding.  As soon as I have the results of her echocardiogram and stress test, I can further risk stratify her.   I have reviewed the patient's labs  I reviewed the patient's medications.  No changes were made to her medication regimen.  Continue to follow-up with device clinic  Continue to follow-up with the EP  Patient advised eat a heart healthy sodium restricted diet  Patient advised to exercise regularly  DM management per primary  Follow-up in 3 months    Thank  You,  Carla Maharaj, DO  [facility]  8/23/2021  12:41 PM   no

## 2021-10-06 NOTE — SWALLOW BEDSIDE ASSESSMENT ADULT - SWALLOW EVAL: DIAGNOSIS
Refill provided.    1. Patient demonstrated a functional oral management for puree, solids and thin liquid textures marked by adequate bolus collection, transfer, and posterior transport. 2. Patient demonstrated a judged functional pharyngeal phase of swallow for puree, solids and thin liquid textures marked by a suspected timely pharyngeal swallow trigger with hyolaryngeal elevation noted upon digital palpation without evidence of airway penetration/aspiration.

## 2022-02-14 NOTE — PHYSICAL THERAPY INITIAL EVALUATION ADULT - ASSISTIVE DEVICE FOR TRANSFER: GAIT, REHAB EVAL
Chief Complaint   Patient presents with   • Gyn Exam       This is a 32 year old  Patient's last menstrual period was 2022. currently on nothing for birth control who presents today for an annual gyn exam.  She is doing overall well. She and her  own [employer] and [employer].   She and her partner are just now starting to attempt for pregnancy.      ROS:  Constitutional:  Denies F/C/fatigue/unexpected change in weight  Eyes:  Denies change in vision/blurred vision  HEENT:  Denies HA/change in hearing/runny nose/sore throat  Cardiac:  Denies CP/palpitations  Resp:  Denies SOB/cough/wheeze/dyspnea  GI:  Denies N/V/diarrhea/constipation/blood in stool  :  Denies unusual vaginal discharge/vaginal bleeding/dysuria/change in urinary frequency/urinary incontinence  MSK:  Denies mm aches/mm atrophy/joint pains/joint effusions  Neuro:  Denies seizures/blackouts/numbness/tingling  Skin:  Denies rashes/lesions  Psych:  Denies depression/anxiety  Endo:  Denies heat/cold, increased sweating  Heme/Lymph:  Denies easy bruising/bleeding/swollen glands    Past Medical History:   Diagnosis Date   • Bilateral Macromastia 2019   • Breast lump in female     L   • Bronchitis    • Closed fracture of left ankle 2018   • Concussion     in high school   • FNHTR (febrile nonhemolytic transfusion reaction)    • Fracture     Left wrist fracture in 5th grade   • Lipoma of upper arm    • Migraine with aura     adult onset        Past Surgical History:   Procedure Laterality Date   • Breast reduction surgery  2019   • Lipoma resection  2016    Left upper arm   • Eads tooth extraction         OB History    Para Term  AB Living   0 0 0 0 0 0   SAB IAB Ectopic Molar Multiple Live Births   0 0 0   0     Obstetric Comments   GYN: 12x30 x6-7. Currently on nothing for birth control.  Denies h/o abnormal Pap smears.  Last Pap smear 10/16/19 Neg/HPV neg.  Denies STIs.   Has sex with men only.  Been in a monogamous relationship since        Social History     Tobacco Use   • Smoking status: Never Smoker   • Smokeless tobacco: Never Used   Substance Use Topics   • Alcohol use: Not Currently   • Drug use: Yes     Frequency: 7.0 times per week     Types: Marijuana     Comment: Uses nightly for migraine relief Smokes1/2 bowl and uses CBD oil       Family History   Problem Relation Age of Onset   • Thyroid Mother         hypothyroid   • Heart Mother         Valve issues   • Migraine Mother    • Cancer Mother         Thryoid cancer   • Other Sister         pituitary tumor   • Patient is unaware of any medical problems Sister    • Cancer Maternal Aunt         Three Maternal Aunts - basal cell skin cancer (not melanoma)   • Cancer, Breast Other    • Cancer Other         Two maternal cousins, leukemia   • Cancer Other 34        Thyroid cancer       Current Outpatient Medications   Medication Sig Dispense Refill   • Prenatal Multivit-Min-Fe-FA (PRE-BIANCA PO) Take 1 tablet by mouth daily.     • sumatriptan (IMITREX) 100 MG tablet TAKE ONE TABLET BY MOUTH AT onset of migraine. May repeat after 2 hours AS NEEDED 20 tablet 1   • omeprazole (PrilOSEC) 20 MG capsule TAKE ONE CAPSULE BY MOUTH DAILY 60 capsule 6   • sertraline (ZOLOFT) 50 MG tablet take 1 tablet BY MOUTH 1 time a day 90 tablet 3   • clonazePAM (KlonoPIN) 0.5 MG tablet Take 1 tablet by mouth 2 times daily as needed for Anxiety. 30 tablet 0   • NON FORMULARY 1 g daily. [employer]- Fresh cap mushrooms     • naproxen (NAPROSYN) 250 MG tablet Take 250 mg by mouth 2 times daily (with meals).     • acetaminophen 650 MG Tab Take 650 mg by mouth every 4 hours as needed for Pain. 30 tablet 0   • influenza virus quadrivalent vaccine inactivated, PRESERVATIVE FREE, (Flulaval Quadrivalent) 0.5 ML injection Inject 0.5 mLs into the muscle 1 time for 1 dose 0.5 mL 0   • COVID-19 mRNA, Pfizer, 30 MCG/0.3ML vaccine Inject 0.3 mLs into the muscle.  0.3 mL 0     No current facility-administered medications for this visit.       ALLERGIES:  Sulfa antibiotics and Codeine    Physical Exam:   Visit Vitals  /80   Pulse 82   Ht 5' 9\" (1.753 m)   Wt 117.5 kg (259 lb)   LMP 2022   BMI 38.25 kg/m²       GEN:  No acute distress, adequate nutritional status  EYES:  Extraocular muscles intact.  No conjunctival erythema or scleral icterus.    HEENT:  Normocephalic, atraumatic.  Moist mucous membranes.    RESPIRATORY:  Nonlabored breathing without use of accessory muscles.   ABDOMEN:  No masses on examination.  Soft, nondistended, nontender.    MUSCULOSKELETAL:  Normal stance and gait.  No clubbing, cyanosis or edema.  NEUROLOGIC:  Cranial nerves 2-12 grossly intact.  SKIN:  No rashes or lesions noted.  Warm and dry to palpation.   PSYCHIATRIC:  Mood stable, affect congruent.  Alert and oriented x3.  Intact recent and remote history.  GENITOURINARY:  Inspection of external genitalia reveals normal mons pubis, pubic hair distribution, labia minora, majora and clitoris.   Urethra:  No masses, tenderness, or scarring.  Sterile speculum exam reveals pink and moist vaginal mucosa.  No unusual vaginal discharge.  Cervix is free from any lesions.  No cervical motion tenderness.    Uterus normal in size, contour.  No uterine tenderness.  No adnexal tenderness or masses palpated.    Anus free from lesions or hemorrhoids.   BREASTS:  Soft, symmetrical, and nontender without discrete masses, dimpling or nodularity.  Nipples are free from lesions or discharge.  Axillary and supraclavicular regions are free from masses, fullness or tenderness or lymphadenopathy.  Well-healed scars bilaterally    A/P:   This is a 32 year old  Patient's last menstrual period was 2022. currently on nothing for birth control who presents today for an annual gyn exam.   1. Annual gyn exam  -Complete breast and pelvic exam performed today  -Last Pap smear: 10/16/19 Neg/HPV neg  -STI  testing:  Declined  -Immunization: Influenza vaccine completed. Covid vaccine completed.   -Fertility plans in the next year:  Attempting for pregnancy.  Encouraged to take prenatal vitamins   -Calcium minimum recommendations: 19-49yo =1000 mg/day, 51 and over=1200 mg/day  -Vit D minimum recommendations: 19-71yo =600 international units/day, 71 and over =800 international units/day    2. Dispo: RTC in 1 year     and HAMZAH BK prosthesis/rolling walker

## 2022-05-11 NOTE — ED ADULT TRIAGE NOTE - HOSPITALS/PSYCHIATRIC FACILITIES
Caterina Bonner Pulmonary, [facility] [phone] [facility]

## 2022-07-21 NOTE — PROGRESS NOTE ADULT - SUBJECTIVE AND OBJECTIVE BOX
VARIABLE BLOOD PRESSURE   NO PAIN; SOME DISCOMFORT  REGULAR RATE_&_RHYTHM;NORMAL_S1&S2_NO_SIGNIFICANT_MURMURS,RUBS,OR_GALLOPS.   CLEAR LUNGS   ABDOMEN SOFT, NONTENDER, NO DISTENSION  WOUND DRESSED    PRN HYDRALAZINE ORDERED  FOR SURGICAL REVISION  ON CALCIUM KB BLOCKER & ANTICOAGULATION   FAMILY PRESENT AT BEDSIDE     LIBAN ROBERTS MD, FACC Offered and provided

## 2022-11-11 NOTE — PROVIDER CONTACT NOTE (CRITICAL VALUE NOTIFICATION) - PERSON GIVING RESULT:
"Patient: Liset Wright    Procedure Summary     Date: 11/11/22 Room / Location: [facility] END[facility]COPY 5 / [facility]    Anesthesia Start: 0914 Anesthesia Stop: 0927    Procedure: ESOPHAGOGASTRODUODENOSCOPY WITH ANESTHESIA Diagnosis:       Nausea      (Nausea [R11.0])    Surgeons: Grant Kaur DO Provider: Aranza Preston CRNA    Anesthesia Type: MAC ASA Status: 2          Anesthesia Type: MAC    Vitals  Vitals Value Taken Time   /59 11/11/22 0941   Temp     Pulse 55 11/11/22 0942   Resp 22 11/11/22 0940   SpO2 99 % 11/11/22 0942   Vitals shown include unvalidated device data.        Post Anesthesia Care and Evaluation    Patient location during evaluation: PHASE II  Patient participation: complete - patient participated  Level of consciousness: awake and awake and alert  Pain score: 0  Pain management: adequate    Airway patency: patent  Anesthetic complications: No anesthetic complications  PONV Status: none  Cardiovascular status: acceptable  Respiratory status: acceptable  Hydration status: acceptable    Comments: Patient discharged according to acceptable Deandra score per RN assessment. See nursing records for further information.     Blood pressure 154/59, pulse 57, temperature 97 °F (36.1 °C), temperature source Temporal, resp. rate 22, height 165.1 cm (65\"), weight 62.1 kg (137 lb), SpO2 99 %, not currently breastfeeding.        " Laboratory

## 2023-01-07 NOTE — ED ADULT TRIAGE NOTE - RESPIRATORY RATE (BREATHS/MIN)
[FreeTextEntry1] : RIght radial head fracture, minimally displaced - will manage with closed management\par \par Reviewed radiographs with patient and discussed pathoanatomy. Discussed alignment is within acceptable parameters to manage with closed management. Discussed risk of stiffness, late tendon injury, and displacement requiring operative intervention. NWB, elevate, NSAIDs prn.\par \par Discussed need to cease splinting and cease sling. Begin ROM. Will remain NWB through about 6 weeks post injury.\par \par F/u 4weeks; repeat films 17

## 2023-02-13 NOTE — ED ADULT NURSE NOTE - NS_NURSE_RECEIVING_PHYS_ED_ALL_ED_FT
This nurse was in pt room at time of GOLD Turner informing pt and pt's family of risks of waiting here until we have a urologist. Pt and pt's family both stated they are aware of the risks and pt is wanting to stay here to be admitted.       Na Hernandez RN  02/13/23 [id number] MD Jacinto

## 2023-09-20 NOTE — PROGRESS NOTE ADULT - SUBJECTIVE AND OBJECTIVE BOX
Family Medicine Progress Note      SUBJECTIVE  Patient was seen and examined at bedside. Patient was scheduled to receive HD today however HR was running in 120s-130s so HD was rescheduled to tomorrow. Patient has no new complaints as of the moment.     OBJECTIVE    I/O's    Intake/Output Summary (Last 24 hours) at 9/20/2023 0759  Last data filed at 9/20/2023 0606  Gross per 24 hour   Intake 480 ml   Output 1500 ml   Net -1020 ml       Last Recorded Vitals  Vitals:    09/20/23 0749   BP: 91/57   Pulse: (!) 111   Resp:    Temp:            Physical Exam  HENT:      Head: Normocephalic and atraumatic.   Cardiovascular:      Rate and Rhythm: Rhythm irregular.      Heart sounds: No murmur heard.     No gallop.      Comments: Afib with RVR  Pulmonary:      Breath sounds: No wheezing or rales.      Comments: Shallow breath sounds with increased respiratory effort.   Abdominal:      General: Abdomen is flat.      Palpations: Abdomen is soft.      Tenderness: There is no abdominal tenderness.   Musculoskeletal:      Comments: + 3 bilateral LE edema    Skin:     General: Skin is warm and dry.   Neurological:      General: No focal deficit present.      Mental Status: He is oriented to person, place, and time.         Labs   Recent Results (from the past 24 hour(s))   GLUCOSE, BEDSIDE - POINT OF CARE    Collection Time: 09/19/23 11:12 AM   Result Value Ref Range    GLUCOSE, BEDSIDE - POINT OF CARE 196 (H) 70 - 99 mg/dL   Urinalysis & Reflex Microscopy With Culture If Indicated    Collection Time: 09/19/23 11:21 AM   Result Value Ref Range    COLOR, URINALYSIS Straw     APPEARANCE, URINALYSIS Cloudy     GLUCOSE, URINALYSIS Negative Negative mg/dL    BILIRUBIN, URINALYSIS Negative Negative    KETONES, URINALYSIS Negative Negative mg/dL    SPECIFIC GRAVITY, URINALYSIS 1.011 1.005 - 1.030    OCCULT BLOOD, URINALYSIS Trace (A) Negative    PH, URINALYSIS 6.0 5.0 - 7.0    PROTEIN, URINALYSIS 30 (A) Negative mg/dL    UROBILINOGEN,  POSTOP CHECK - s/p amputation of left foot     Patient seen and examined bedside resting comfortably. No complaints offered. Pain is well controlled with medication.   denies fevers, chills, dyspnea, chest pain, abdominal pain, N/V.       T(F): 98 (01-10-20 @ 21:27), Max: 99.9 (01-10-20 @ 00:40)  HR: 79 (01-10-20 @ 21:27) (71 - 87)  BP: 154/72 (01-10-20 @ 21:27) (128/74 - 158/78)  RR: 18 (01-10-20 @ 21:27) (15 - 22)  SpO2: 95% (01-10-20 @ 21:27) (92% - 99%)  Wt(kg): --  CAPILLARY BLOOD GLUCOSE      POCT Blood Glucose.: 331 mg/dL (10 Yann 2020 21:39)  POCT Blood Glucose.: 246 mg/dL (10 Yann 2020 17:16)  POCT Blood Glucose.: 247 mg/dL (10 Yann 2020 10:57)  POCT Blood Glucose.: 301 mg/dL (10 Yann 2020 07:32)      PHYSICAL EXAM:  General: NAD, A&Ox3  CV: +S1+S2 regular rate and rhythm  Lung: clear to auscultation bilaterally, respirations nonlabored   Abdomen: soft, NTND   Extremities: L BKA site with dressing c/d/i     LABS:                        7.7    38.52 )-----------( 413      ( 10 Yann 2020 21:21 )             23.2     01-10    133<L>  |  98  |  69<H>  ----------------------------<  309<H>  3.6   |  23  |  2.83<H>    Ca    8.3<L>      10 Yann 2020 21:21      PT/INR - ( 10 Yann 2020 08:35 )   PT: 14.8 sec;   INR: 1.31 ratio         PTT - ( 10 Yann 2020 08:35 )  PTT:35.4 sec    I&O:     Culture Results:   Numerous Streptococcus agalactiae (Group B)  Streptococcus agalactiae (Group B) isolated  Group B streptococci are susceptible to ampicillin,  penicillin and cefazolin, but may be resistant to  erythromycin and clindamycin.  Recommendations for intrapartum prophylaxis for Group B  streptococci are penicillin or ampicillin. (01-09 @ 10:13)  Culture Results:   Numerous Streptococcus agalactiae (Group B)  Streptococcus agalactiae (Group B) isolated  Group B streptococci are susceptible to ampicillin,  penicillin and cefazolin, but may be resistant to  erythromycin and clindamycin.  Recommendations for intrapartum prophylaxis for Group B  streptococci are penicillin or ampicillin. (01-09 @ 10:12)  Culture Results:   No growth (01-08 @ 18:07)  Culture Results:   No growth to date. (01-08 @ 18:05)  Culture Results:   No growth to date. 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 dressing change sunday per surgery.   -continue VTE prophylaxis   -Increase activity with PT, OOB, Ambulate  -educated on proper incentive spirometry use  -f/u AM labs  -medical management, supportive care and prophylactic measure as per primary team   -will discuss with surgical attending URINALYSIS 0.2 0.2, 1.0 mg/dL    NITRITE, URINALYSIS Negative Negative    LEUKOCYTE ESTERASE, URINALYSIS Large (A) Negative    SQUAMOUS EPITHELIAL, URINALYSIS None Seen None Seen, 1 to 5 /hpf    ERYTHROCYTES, URINALYSIS 3 to 5 (A) None Seen, 1 to 2 /hpf    LEUKOCYTES, URINALYSIS >100 (A) None Seen, 1 to 5 /hpf    BACTERIA, URINALYSIS Few (A) None Seen /hpf    HYALINE CASTS, URINALYSIS None Seen None Seen, 1 to 5 /lpf   GLUCOSE, BEDSIDE - POINT OF CARE    Collection Time: 09/19/23  5:27 PM   Result Value Ref Range    GLUCOSE, BEDSIDE - POINT OF CARE 149 (H) 70 - 99 mg/dL   Staphylococcus aureus Methicillin Resistant (MRSA) PCR    Collection Time: 09/19/23  6:19 PM    Specimen: Nares; Swab   Result Value Ref Range    MRSA PCR Not Detected Not Detected   GLUCOSE, BEDSIDE - POINT OF CARE    Collection Time: 09/19/23  8:41 PM   Result Value Ref Range    GLUCOSE, BEDSIDE - POINT OF CARE 191 (H) 70 - 99 mg/dL   GLUCOSE, BEDSIDE - POINT OF CARE    Collection Time: 09/20/23  6:10 AM   Result Value Ref Range    GLUCOSE, BEDSIDE - POINT OF CARE 123 (H) 70 - 99 mg/dL       Imaging  XR CHEST AP OR PA   Final Result   FINDINGS AND IMPRESSION:      Left-sided hemodialysis cath in place. Heart size prominent unchanged.   Redemonstrated airspace opacities in the right upper lobe and lung bases   suggestive of multifocal pneumonia. Findings more prominent in the right   upper lobe. Asymmetric edema considered less likely though not entirely   excluded. Mild prominence and redistribution of vasculature. Probable small   right-sided pleural effusion. No pneumothorax. Heart size stable.         Electronically Signed by: MAGED DOUGHERTY M.D.    Signed on: 9/20/2023 7:20 AM    Workstation ID: [id number]      XR CHEST AP OR PA   Final Result   Unchanged cardiopulmonary status.      Electronically Signed by: CARMELINA ANN M.D.    Signed on: 9/19/2023 6:28 PM    Workstation ID: [id number]      XR CHEST AP OR PA   Final Result    FINDINGS/IMPRESSION:      New left central line with tip in area of superior cavoatrial junction or   upper right atrium.      No pneumothorax.      Prominent heart size. Atherosclerotic aorta.      Prominent bronchovascular and interstitial markings worsened compared to   prior study. New airspace opacities bilaterally including ill-defined 5.5 x   4.7 cm opacity right upper lung field and ill-defined opacity right lung   base measuring at least 5.3 x 3.7 cm. Possible minimal blunting of   costophrenic angles.      Findings could represent pulmonary edema. Other processes including   pneumonia not excluded from the differential. Correlate clinically.       Electronically Signed by: GILL MARIE M.D.    Signed on: 9/18/2023 11:58 AM    Workstation ID: [id number]      IR PERMANENT DIALYSIS CATHETER INSERTION AGE 5 OR OLDER   Final Result   *   Right internal jugular venogram demonstrated high-grade   stenosis/occlusion of the inferior segment right internal jugular vein.   *   Tunneled hemodialysis catheter placement via left internal jugular vein   as discussed above.               Electronically Signed by: PHU MÉNDEZ M.D.    Signed on: 9/14/2023 2:35 PM    Workstation ID: [id number]      US VASC RENAL DUPLEX ONLY BILATERAL   Final Result   *   Elevated resistive indices bilaterally, suggestive of intrinsic renal   parenchymal disease.   *   Otherwise, essentially unremarkable bilateral renal doppler ultrasound   without suggestion of stenosis.         Dictated by: WES TORRES MD   Dictated on: 9/12/2023 2:57 PM          IPHU M.D., have reviewed the images and report and concur   with these findings interpreted by WES TORRES MD.      Electronically Signed by: PHU MÉNDEZ M.D.    Signed on: 9/14/2023 10:11 AM    Workstation ID: [id number]            Assessment/Plan  Patient is a 72 yo male with a PMHx significant for R ischemic CVA (June 2023), CAD s/p PCI  & MIRACLE to RCA (2010) with restenosis, Afib with GINA thrombus on Warfarin, HFrEF (EF of 30%), CKD Stage V, and T2DM who presented as a direct admit from SNF c/f severe uremia and ESRD. Now also being treated for UTI and hospital acquired pneumonia on antibiotics.     # UTI  # Hospital Acquired Pneumonia   - waxing and waning attention   - Repeat UA (9/19): trace blood, large leukocyte esterases, negative nitrites c/f infection   - Repeat CXR (9/20):  Redemonstrated airspace opacities in the right upper lobe and lung bases suggestive of multifocal pneumonia. Findings more prominent in the right upper lobe.   - MRSA nares negative   - f/u with urine cultures  - vancomycin discontinued    - continue total 7 days cefepime (today Day 2)  - Palliative consulted due to worsening condition               #Acute Decompensated HFrEF  # Sustained Afib with RVR   - TTE (8/11): EF 30% with moderate AS and MR    - continue diltiazem 120 mg daily, Toprol- BID   - patient continues to have sustained episodes of Afib  - Cardiology following:    - given infection, HR in 120s is acceptable   - If HR 130s > try IV lopressor 5mg if BP stable   -  If HR 140s > try diltiazem push if BP stable   - EP consulted, pending further recs     # ESRD   - On admission:  Cr 5.8 GFR 9   - Recent increase in Bumex to 2mg BID and recent abdominal hematoma   - Nephrology following:              - Perma cath placement 9/13/2023              - HD sessions on 9/13, 9/14, 9/16, 9/18, 9/19, next tomorrow 9/21             - Continue cardizem, metoprolol, and bumex    - follow up with further recs       #Anemia, likely of chronic disease   - baseline hg 9-10. On admission 7.2   - Ferritin 93, IRON 32, % Sat 13, TIBC 251   - transfuse if hg < 7   - Hgb stable 8.1     #thrombocytopenia, improved   - PLT: 216   - Consider DDAVP to improve platelet fnx given ESRD  - D/t pts GINA thrombus, hold until pt more medically stable    # R ischemic CVA  -  continued left sided deficits   - continue home warfarin and statin.   - PT/OT  - Speech following    - currently NPO           # Sacral Ulcer Stage II   - Stage II sacral pressure ulcer   - wound care consult              - off-loaded with bordered form. No further recommendations      #T2DM   - home regimen: 10U lantus nightly   - continue 10U Lantus  - LDISS      # HX Prostate Cancer  - Follows with Dr. Chaidez  - continue flomax      # Hx of Diffuse Large B cell lymphoma, in remission   - follows with Dr. Lopez  - received chemotherapy with [facility]. [facility] and completed 6 cycles on 07/02/2013       Fluids: SLIV   Electrolytes: Monitor; Replete PRN.   Nutrition: NPO     DVT Prophylaxis: SCD's, Coumadin     Code Status: Full Code   Primary Emergency Contact: FATIMAH CARDONA JR  Mobile Phone: [phone]  Relation: Son  Primary Care Physician: Chanda Seo MD Hemisha Sangani OMS IV   9/20/2023 7:59 AM

## 2023-11-08 NOTE — DISCHARGE NOTE NURSING/CASE MANAGEMENT/SOCIAL WORK - NSDCPEELIQUISDIET_GEN_ALL_CORE
Spoke with TM in regards to failed safecheck. She reports sx onset 3/16 of fever and congestion, cough. She states her son was sick recently---covid negative    TM self scheduled a PCR. Off work letters sent to TM and manager. Await results.        No Eat healthy foods you enjoy. Apixaban/Eliquis DOES NOT have a special diet. Limit your alcohol intake.

## 2023-12-21 NOTE — CONSULT NOTE ADULT - CONSULT REQUESTED DATE/TIME
21-Jun-2021 16:36
22-Jun-2021 09:47
22-Jun-2021 11:28
25-Jun-2021 14:45
21-Jun-2021 01:18
21-Jun-2021 03:12
21-Jun-2021 14:37
29-Jun-2021 12:00
[Diarrhea: Grade 0] : Diarrhea: Grade 0
[Negative] : Allergic/Immunologic

## 2023-12-25 NOTE — PATIENT PROFILE ADULT - DO YOU FEEL THREATENED BY OTHERS?
Dx: Left IT fx  Procedure: ORIF L hip  I met patient and daughter. Discussed goals of surgery including early WB fracture healing and mobilization. Discussed risks including infection, wound healing, non union, malunion, shabbir implant fracture intraop or post op, bleeding, nerve injury, organ damage, blood clot, death. Patient is higher risk. Medical notes labs and imaging reviewed. Informed consent signed. Dx: Left IT fx  Procedure: ORIF L hip  I met patient and daughter. Discussed goals of surgery including early WB fracture healing and mobilization. Discussed risks including infection, wound healing, non union, malunion, shabbir implant fracture intraop or post op, bleeding, nerve injury, organ damage, blood clot, death. Patient is higher risk. Medical notes labs and imaging reviewed. Informed consent signed.    Addendum: upon further review of Left femur xray I saw lesion in diaphysis of bone. Made decision to cancel case until can work up lesion better. Will get CT femur to confirm bony lesion and based on that can make decision on IMR vs ORIF vs biopsy no

## 2023-12-31 PROBLEM — Z23 NEED FOR VACCINATION WITH 13-POLYVALENT PNEUMOCOCCAL CONJUGATE VACCINE: Status: ACTIVE | Noted: 2018-05-30

## 2024-08-26 ENCOUNTER — INPATIENT (INPATIENT)
Facility: HOSPITAL | Age: 74
LOS: 25 days | Discharge: NURSING, REHAB OR LONG-TERM CARE | End: 2024-09-21
Attending: HOSPITALIST | Admitting: HOSPITALIST
Payer: MEDICARE

## 2024-08-26 VITALS
RESPIRATION RATE: 18 BRPM | DIASTOLIC BLOOD PRESSURE: 48 MMHG | OXYGEN SATURATION: 99 % | HEART RATE: 65 BPM | WEIGHT: 240.08 LBS | TEMPERATURE: 96 F | HEIGHT: 72 IN | SYSTOLIC BLOOD PRESSURE: 103 MMHG

## 2024-08-26 DIAGNOSIS — Z96.642 PRESENCE OF LEFT ARTIFICIAL HIP JOINT: Chronic | ICD-10-CM

## 2024-08-26 DIAGNOSIS — Z89.512 ACQUIRED ABSENCE OF LEFT LEG BELOW KNEE: Chronic | ICD-10-CM

## 2024-08-26 DIAGNOSIS — S88.912D: Chronic | ICD-10-CM

## 2024-08-26 DIAGNOSIS — Z98.890 OTHER SPECIFIED POSTPROCEDURAL STATES: Chronic | ICD-10-CM

## 2024-08-26 PROBLEM — E78.5 HYPERLIPIDEMIA, UNSPECIFIED: Chronic | Status: ACTIVE | Noted: 2021-06-21

## 2024-08-26 PROBLEM — I50.32 CHRONIC DIASTOLIC (CONGESTIVE) HEART FAILURE: Chronic | Status: ACTIVE | Noted: 2021-06-21

## 2024-08-26 PROBLEM — E11.9 TYPE 2 DIABETES MELLITUS WITHOUT COMPLICATIONS: Chronic | Status: ACTIVE | Noted: 2021-06-21

## 2024-08-26 PROBLEM — I48.91 UNSPECIFIED ATRIAL FIBRILLATION: Chronic | Status: ACTIVE | Noted: 2021-06-21

## 2024-08-26 PROBLEM — I10 ESSENTIAL (PRIMARY) HYPERTENSION: Chronic | Status: ACTIVE | Noted: 2021-06-21

## 2024-08-26 PROBLEM — N18.9 CHRONIC KIDNEY DISEASE, UNSPECIFIED: Chronic | Status: ACTIVE | Noted: 2021-06-21

## 2024-08-26 PROBLEM — H35.00 UNSPECIFIED BACKGROUND RETINOPATHY: Chronic | Status: ACTIVE | Noted: 2021-06-21

## 2024-08-26 LAB
ALBUMIN SERPL ELPH-MCNC: 2.9 G/DL — LOW (ref 3.3–5)
ALBUMIN SERPL ELPH-MCNC: 3 G/DL — LOW (ref 3.3–5)
ALP SERPL-CCNC: 52 U/L — SIGNIFICANT CHANGE UP (ref 40–120)
ALP SERPL-CCNC: 53 U/L — SIGNIFICANT CHANGE UP (ref 40–120)
ALT FLD-CCNC: 28 U/L — SIGNIFICANT CHANGE UP (ref 12–78)
ALT FLD-CCNC: 31 U/L — SIGNIFICANT CHANGE UP (ref 12–78)
AMMONIA BLD-MCNC: 28 UMOL/L — SIGNIFICANT CHANGE UP (ref 11–32)
ANION GAP SERPL CALC-SCNC: 11 MMOL/L — SIGNIFICANT CHANGE UP (ref 5–17)
ANION GAP SERPL CALC-SCNC: 9 MMOL/L — SIGNIFICANT CHANGE UP (ref 5–17)
ANISOCYTOSIS BLD QL: SIGNIFICANT CHANGE UP
AST SERPL-CCNC: 12 U/L — LOW (ref 15–37)
AST SERPL-CCNC: 16 U/L — SIGNIFICANT CHANGE UP (ref 15–37)
BASO STIPL BLD QL SMEAR: PRESENT — SIGNIFICANT CHANGE UP
BASOPHILS # BLD AUTO: 0 K/UL — SIGNIFICANT CHANGE UP (ref 0–0.2)
BASOPHILS NFR BLD AUTO: 0 % — SIGNIFICANT CHANGE UP (ref 0–2)
BILIRUB SERPL-MCNC: 0.2 MG/DL — SIGNIFICANT CHANGE UP (ref 0.2–1.2)
BILIRUB SERPL-MCNC: 0.3 MG/DL — SIGNIFICANT CHANGE UP (ref 0.2–1.2)
BUN SERPL-MCNC: 136 MG/DL — HIGH (ref 7–23)
BUN SERPL-MCNC: 136 MG/DL — HIGH (ref 7–23)
BURR CELLS BLD QL SMEAR: PRESENT — SIGNIFICANT CHANGE UP
CALCIUM SERPL-MCNC: 8.7 MG/DL — SIGNIFICANT CHANGE UP (ref 8.5–10.1)
CALCIUM SERPL-MCNC: 9 MG/DL — SIGNIFICANT CHANGE UP (ref 8.5–10.1)
CHLORIDE SERPL-SCNC: 106 MMOL/L — SIGNIFICANT CHANGE UP (ref 96–108)
CHLORIDE SERPL-SCNC: 106 MMOL/L — SIGNIFICANT CHANGE UP (ref 96–108)
CK MB CFR SERPL CALC: 17.8 NG/ML — HIGH (ref 0.5–3.6)
CO2 SERPL-SCNC: 18 MMOL/L — LOW (ref 22–31)
CO2 SERPL-SCNC: 20 MMOL/L — LOW (ref 22–31)
CREAT SERPL-MCNC: 5.42 MG/DL — HIGH (ref 0.5–1.3)
CREAT SERPL-MCNC: 5.72 MG/DL — HIGH (ref 0.5–1.3)
EGFR: 10 ML/MIN/1.73M2 — LOW
EGFR: 10 ML/MIN/1.73M2 — LOW
EOSINOPHIL # BLD AUTO: 0.24 K/UL — SIGNIFICANT CHANGE UP (ref 0–0.5)
EOSINOPHIL NFR BLD AUTO: 2 % — SIGNIFICANT CHANGE UP (ref 0–6)
GLUCOSE BLDC GLUCOMTR-MCNC: 203 MG/DL — HIGH (ref 70–99)
GLUCOSE BLDC GLUCOMTR-MCNC: 310 MG/DL — HIGH (ref 70–99)
GLUCOSE BLDC GLUCOMTR-MCNC: 312 MG/DL — HIGH (ref 70–99)
GLUCOSE SERPL-MCNC: 287 MG/DL — HIGH (ref 70–99)
GLUCOSE SERPL-MCNC: 323 MG/DL — HIGH (ref 70–99)
HCT VFR BLD CALC: 27.6 % — LOW (ref 39–50)
HGB BLD-MCNC: 8.8 G/DL — LOW (ref 13–17)
HYPOCHROMIA BLD QL: SLIGHT — SIGNIFICANT CHANGE UP
LG PLATELETS BLD QL AUTO: SLIGHT — SIGNIFICANT CHANGE UP
LYMPHOCYTES # BLD AUTO: 0 % — LOW (ref 13–44)
LYMPHOCYTES # BLD AUTO: 0 K/UL — LOW (ref 1–3.3)
MAGNESIUM SERPL-MCNC: 1 MG/DL — CRITICAL LOW (ref 1.6–2.6)
MANUAL SMEAR VERIFICATION: SIGNIFICANT CHANGE UP
MCHC RBC-ENTMCNC: 27.9 PG — SIGNIFICANT CHANGE UP (ref 27–34)
MCHC RBC-ENTMCNC: 31.9 G/DL — LOW (ref 32–36)
MCV RBC AUTO: 87.6 FL — SIGNIFICANT CHANGE UP (ref 80–100)
MONOCYTES # BLD AUTO: 0.71 K/UL — SIGNIFICANT CHANGE UP (ref 0–0.9)
MONOCYTES NFR BLD AUTO: 6 % — SIGNIFICANT CHANGE UP (ref 2–14)
NEUTROPHILS # BLD AUTO: 10.96 K/UL — HIGH (ref 1.8–7.4)
NEUTROPHILS NFR BLD AUTO: 92 % — HIGH (ref 43–77)
NEUTS HYPERSEG # BLD: PRESENT — SIGNIFICANT CHANGE UP
NRBC # BLD: 0 /100 WBCS — SIGNIFICANT CHANGE UP (ref 0–0)
NRBC # BLD: SIGNIFICANT CHANGE UP /100 WBCS (ref 0–0)
OVALOCYTES BLD QL SMEAR: SLIGHT — SIGNIFICANT CHANGE UP
PLAT MORPH BLD: NORMAL — SIGNIFICANT CHANGE UP
PLATELET # BLD AUTO: 182 K/UL — SIGNIFICANT CHANGE UP (ref 150–400)
POTASSIUM SERPL-MCNC: 6.2 MMOL/L — CRITICAL HIGH (ref 3.5–5.3)
POTASSIUM SERPL-MCNC: 6.2 MMOL/L — CRITICAL HIGH (ref 3.5–5.3)
POTASSIUM SERPL-SCNC: 6.2 MMOL/L — CRITICAL HIGH (ref 3.5–5.3)
POTASSIUM SERPL-SCNC: 6.2 MMOL/L — CRITICAL HIGH (ref 3.5–5.3)
PROT SERPL-MCNC: 6.6 GM/DL — SIGNIFICANT CHANGE UP (ref 6–8.3)
PROT SERPL-MCNC: 6.9 GM/DL — SIGNIFICANT CHANGE UP (ref 6–8.3)
RBC # BLD: 3.15 M/UL — LOW (ref 4.2–5.8)
RBC # FLD: 16.8 % — HIGH (ref 10.3–14.5)
RBC BLD AUTO: ABNORMAL
SODIUM SERPL-SCNC: 135 MMOL/L — SIGNIFICANT CHANGE UP (ref 135–145)
SODIUM SERPL-SCNC: 135 MMOL/L — SIGNIFICANT CHANGE UP (ref 135–145)
TROPONIN I, HIGH SENSITIVITY RESULT: 32.7 NG/L — SIGNIFICANT CHANGE UP
WBC # BLD: 11.91 K/UL — HIGH (ref 3.8–10.5)
WBC # FLD AUTO: 11.91 K/UL — HIGH (ref 3.8–10.5)

## 2024-08-26 PROCEDURE — 76937 US GUIDE VASCULAR ACCESS: CPT | Mod: 26,59

## 2024-08-26 PROCEDURE — 93010 ELECTROCARDIOGRAM REPORT: CPT

## 2024-08-26 PROCEDURE — 99285 EMERGENCY DEPT VISIT HI MDM: CPT

## 2024-08-26 PROCEDURE — 77001 FLUOROGUIDE FOR VEIN DEVICE: CPT | Mod: 26

## 2024-08-26 PROCEDURE — 36000 PLACE NEEDLE IN VEIN: CPT | Mod: 59

## 2024-08-26 PROCEDURE — 76937 US GUIDE VASCULAR ACCESS: CPT | Mod: 26

## 2024-08-26 PROCEDURE — 99223 1ST HOSP IP/OBS HIGH 75: CPT

## 2024-08-26 PROCEDURE — 74176 CT ABD & PELVIS W/O CONTRAST: CPT | Mod: 26,MC

## 2024-08-26 PROCEDURE — 36556 INSERT NON-TUNNEL CV CATH: CPT

## 2024-08-26 PROCEDURE — 36556 INSERT NON-TUNNEL CV CATH: CPT | Mod: RT

## 2024-08-26 PROCEDURE — 70450 CT HEAD/BRAIN W/O DYE: CPT | Mod: 26,MC

## 2024-08-26 PROCEDURE — 71250 CT THORAX DX C-: CPT | Mod: 26

## 2024-08-26 RX ORDER — CALCITRIOL 0.25 UG/1
0.25 CAPSULE ORAL DAILY
Refills: 0 | Status: DISCONTINUED | OUTPATIENT
Start: 2024-08-26 | End: 2024-08-27

## 2024-08-26 RX ORDER — DEXTROSE 15 G/33 G
15 GEL IN PACKET (GRAM) ORAL ONCE
Refills: 0 | Status: DISCONTINUED | OUTPATIENT
Start: 2024-08-26 | End: 2024-09-21

## 2024-08-26 RX ORDER — MAGNESIUM OXIDE TAB 400 MG (240 MG ELEMENTAL MG) 400 (240 MG) MG
400 TAB ORAL
Refills: 0 | Status: DISCONTINUED | OUTPATIENT
Start: 2024-08-26 | End: 2024-08-26

## 2024-08-26 RX ORDER — DOXAZOSIN MESYLATE 1 MG
2 TABLET ORAL AT BEDTIME
Refills: 0 | Status: DISCONTINUED | OUTPATIENT
Start: 2024-08-26 | End: 2024-09-21

## 2024-08-26 RX ORDER — INSULIN REGULAR, HUMAN 100/ML (3)
5 INSULIN PEN (ML) SUBCUTANEOUS ONCE
Refills: 0 | Status: COMPLETED | OUTPATIENT
Start: 2024-08-26 | End: 2024-08-26

## 2024-08-26 RX ORDER — PANTOPRAZOLE SODIUM 40 MG
40 TABLET, DELAYED RELEASE (ENTERIC COATED) ORAL
Refills: 0 | Status: DISCONTINUED | OUTPATIENT
Start: 2024-08-26 | End: 2024-09-21

## 2024-08-26 RX ORDER — DIPHENHYDRAMINE HCL 50 MG
25 CAPSULE ORAL ONCE
Refills: 0 | Status: COMPLETED | OUTPATIENT
Start: 2024-08-26 | End: 2024-08-26

## 2024-08-26 RX ORDER — SODIUM ZIRCONIUM CYCLOSILICATE 5 G/5G
10 POWDER, FOR SUSPENSION ORAL
Refills: 0 | Status: DISCONTINUED | OUTPATIENT
Start: 2024-08-26 | End: 2024-08-27

## 2024-08-26 RX ORDER — CHLORHEXIDINE GLUCONATE 40 MG/ML
1 SOLUTION TOPICAL
Refills: 0 | Status: DISCONTINUED | OUTPATIENT
Start: 2024-08-26 | End: 2024-09-21

## 2024-08-26 RX ORDER — DOXAZOSIN MESYLATE 1 MG
4 TABLET ORAL AT BEDTIME
Refills: 0 | Status: DISCONTINUED | OUTPATIENT
Start: 2024-08-26 | End: 2024-08-26

## 2024-08-26 RX ORDER — DEXTROSE 15 G/33 G
25 GEL IN PACKET (GRAM) ORAL ONCE
Refills: 0 | Status: DISCONTINUED | OUTPATIENT
Start: 2024-08-26 | End: 2024-09-21

## 2024-08-26 RX ORDER — GLUCAGON INJECTION, SOLUTION 1 MG/.2ML
1 INJECTION, SOLUTION SUBCUTANEOUS ONCE
Refills: 0 | Status: DISCONTINUED | OUTPATIENT
Start: 2024-08-26 | End: 2024-09-21

## 2024-08-26 RX ORDER — ISOSORBIDE MONONITRATE 30 MG/1
120 TABLET, EXTENDED RELEASE ORAL DAILY
Refills: 0 | Status: DISCONTINUED | OUTPATIENT
Start: 2024-08-26 | End: 2024-08-26

## 2024-08-26 RX ORDER — HYDRALAZINE HCL 50 MG
100 TABLET ORAL THREE TIMES A DAY
Refills: 0 | Status: DISCONTINUED | OUTPATIENT
Start: 2024-08-26 | End: 2024-08-26

## 2024-08-26 RX ORDER — DEXTROSE 15 G/33 G
25 GEL IN PACKET (GRAM) ORAL ONCE
Refills: 0 | Status: COMPLETED | OUTPATIENT
Start: 2024-08-26 | End: 2024-08-26

## 2024-08-26 RX ORDER — DEXTROSE 15 G/33 G
12.5 GEL IN PACKET (GRAM) ORAL ONCE
Refills: 0 | Status: DISCONTINUED | OUTPATIENT
Start: 2024-08-26 | End: 2024-09-21

## 2024-08-26 RX ORDER — NIFEDIPINE 60 MG/1
60 TABLET, FILM COATED, EXTENDED RELEASE ORAL DAILY
Refills: 0 | Status: DISCONTINUED | OUTPATIENT
Start: 2024-08-26 | End: 2024-08-26

## 2024-08-26 RX ORDER — CALCIUM GLUCONATE 61(648) MG
1 TABLET ORAL ONCE
Refills: 0 | Status: COMPLETED | OUTPATIENT
Start: 2024-08-26 | End: 2024-08-26

## 2024-08-26 RX ORDER — HYDRALAZINE HCL 50 MG
50 TABLET ORAL EVERY 8 HOURS
Refills: 0 | Status: DISCONTINUED | OUTPATIENT
Start: 2024-08-26 | End: 2024-09-21

## 2024-08-26 RX ORDER — MAGNESIUM OXIDE TAB 400 MG (240 MG ELEMENTAL MG) 400 (240 MG) MG
400 TAB ORAL
Refills: 0 | Status: COMPLETED | OUTPATIENT
Start: 2024-08-26 | End: 2024-08-27

## 2024-08-26 RX ORDER — APIXABAN 5 MG/1
5 TABLET, FILM COATED ORAL
Refills: 0 | Status: DISCONTINUED | OUTPATIENT
Start: 2024-08-26 | End: 2024-09-06

## 2024-08-26 RX ORDER — INSULIN GLARGINE 100 [IU]/ML
25 INJECTION, SOLUTION SUBCUTANEOUS AT BEDTIME
Refills: 0 | Status: DISCONTINUED | OUTPATIENT
Start: 2024-08-26 | End: 2024-09-21

## 2024-08-26 RX ADMIN — Medication 25 MILLIGRAM(S): at 18:36

## 2024-08-26 RX ADMIN — APIXABAN 5 MILLIGRAM(S): 5 TABLET, FILM COATED ORAL at 18:35

## 2024-08-26 RX ADMIN — Medication 5 UNIT(S): at 11:53

## 2024-08-26 RX ADMIN — Medication 2 MILLIGRAM(S): at 22:53

## 2024-08-26 RX ADMIN — SODIUM ZIRCONIUM CYCLOSILICATE 10 GRAM(S): 5 POWDER, FOR SUSPENSION ORAL at 17:11

## 2024-08-26 RX ADMIN — Medication 40 MILLIGRAM(S): at 22:51

## 2024-08-26 RX ADMIN — Medication 5 MILLIGRAM(S): at 11:53

## 2024-08-26 RX ADMIN — MAGNESIUM OXIDE TAB 400 MG (240 MG ELEMENTAL MG) 400 MILLIGRAM(S): 400 (240 MG) TAB at 22:54

## 2024-08-26 RX ADMIN — Medication 8: at 18:33

## 2024-08-26 RX ADMIN — Medication 100 GRAM(S): at 11:52

## 2024-08-26 RX ADMIN — Medication 25 GRAM(S): at 11:52

## 2024-08-26 RX ADMIN — Medication 25 MILLIGRAM(S): at 11:52

## 2024-08-26 RX ADMIN — Medication 6 UNIT(S): at 18:34

## 2024-08-26 RX ADMIN — INSULIN GLARGINE 25 UNIT(S): 100 INJECTION, SOLUTION SUBCUTANEOUS at 22:52

## 2024-08-26 NOTE — ED ADULT NURSE NOTE - CHPI ED NUR TIMING2
----- Message from Precious Montenegro DO sent at 3/10/2020  2:43 PM CDT -----  Please notify that it does look like she has another endometrial polyp.  Okay to go ahead and schedule hysteroscopy, D&C, polypectomy if she is ready to do this.   gradual onset/sudden onset

## 2024-08-26 NOTE — ED ADULT NURSE NOTE - NSICDXPASTMEDICALHX_GEN_ALL_CORE_FT
PAST MEDICAL HISTORY:  Afib     Chronic diastolic congestive heart failure     Chronic kidney disease, unspecified CKD stage     DM (diabetes mellitus)     HLD (hyperlipidemia)     HTN (hypertension)     Hypertension, unspecified type     Retinopathy     Type 2 diabetes mellitus with other neurologic complication, without long-term current use of insulin

## 2024-08-26 NOTE — ED ADULT TRIAGE NOTE - CHIEF COMPLAINT QUOTE
BIBEMS from home with wife c/o increased loss of hearing and blurry vision x 1 hour pt AAO x 3 pt reports being off balanced and dizzy pt with hx of previous CVA with left eye and hearing decreased pt hx of CVA HTN kidney DM AFIB  pt with 18 g to his right wrist pt with c/o of itching and rash all over x months and pain to his bottom probably from skin break down, below knee amputee

## 2024-08-26 NOTE — ED PROVIDER NOTE - PHYSICAL EXAMINATION
GEN: Awake, alert, interactive, NAD.  HEAD AND NECK: NC/AT. Airway patent. Neck supple.   EYES:  Clear b/l. EOMI. PERRL.   ENT: Moist mucus membranes.   CARDIAC: Regular rate, regular rhythm. No evident pedal edema.    RESP/CHEST: Normal respiratory effort with no use of accessory muscles or retractions. Clear throughout on auscultation.  ABD: Soft, non-distended, non-tender. No rebound, no guarding.   BACK: No midline spinal TTP. No CVAT.   EXTREMITIES: Moving all extremities with no apparent deformities.   SKIN: + crusting rash to L ear, multiple excoriations / scabs and open wounds to BUE, + tense bullae to R dorsal hand.   NEURO: AOx3, CN II-XII grossly intact, no focal deficits.   PSYCH: Appropriate mood and affect.

## 2024-08-26 NOTE — H&P ADULT - NSHPPHYSICALEXAM_GEN_ALL_CORE
PHYSICAL EXAMINATION:  Vital Signs Last 24 Hrs  T(C): 37.1 (26 Aug 2024 16:24), Max: 37.1 (26 Aug 2024 16:24)  T(F): 98.7 (26 Aug 2024 16:24), Max: 98.7 (26 Aug 2024 16:24)  HR: 62 (26 Aug 2024 16:24) (56 - 65)  BP: 122/87 (26 Aug 2024 16:08) (100/56 - 122/87)  BP(mean): --  RR: 11 (26 Aug 2024 16:24) (11 - 18)  SpO2: 92% (26 Aug 2024 16:24) (92% - 99%)    Parameters below as of 26 Aug 2024 16:24  Patient On (Oxygen Delivery Method): room air      CAPILLARY BLOOD GLUCOSE      POCT Blood Glucose.: 325 mg/dL (26 Aug 2024 11:50)  POCT Blood Glucose.: 258 mg/dL (26 Aug 2024 08:54)      GENERAL: NAD,   ENMT: mucous membranes moist  NECK: supple, No JVD  CHEST/LUNG: clear to auscultation bilaterally; no rales, rhonchi, or wheezing b/l  HEART: normal S1, S2  ABDOMEN: BS+, soft, ND, NT   EXTREMITIES:  pulses palpable; no clubbing, cyanosis, or edema b/l LEs  NEURO: awake, alert, interactive; moves all extremities PHYSICAL EXAMINATION:  Vital Signs Last 24 Hrs  T(C): 37.1 (26 Aug 2024 16:24), Max: 37.1 (26 Aug 2024 16:24)  T(F): 98.7 (26 Aug 2024 16:24), Max: 98.7 (26 Aug 2024 16:24)  HR: 62 (26 Aug 2024 16:24) (56 - 65)  BP: 122/87 (26 Aug 2024 16:08) (100/56 - 122/87)  BP(mean): --  RR: 11 (26 Aug 2024 16:24) (11 - 18)  SpO2: 92% (26 Aug 2024 16:24) (92% - 99%)    Parameters below as of 26 Aug 2024 16:24  Patient On (Oxygen Delivery Method): room air      CAPILLARY BLOOD GLUCOSE      POCT Blood Glucose.: 325 mg/dL (26 Aug 2024 11:50)  POCT Blood Glucose.: 258 mg/dL (26 Aug 2024 08:54)      GENERAL: NAD, seen on 1-C, comfortable, hears well, left BKA healed, no CP or SOB  ENMT: mucous membranes moist  NECK: supple, No JVD  CHEST/LUNG: clear to auscultation bilaterally; no rales, rhonchi, or wheezing b/l  HEART: normal S1, S2  ABDOMEN: BS+, soft, ND, NT   EXTREMITIES:  pulses palpable; no clubbing, cyanosis, or edema b/l LEs, left BKA site healed.   NEURO: awake, alert, interactive; moves all extremities

## 2024-08-26 NOTE — H&P ADULT - ASSESSMENT
73 year old male PMH HTN, HLD, DM, CHF, CKD stage IV, dementia / anxiety BIBEMS accompanied by wife due to weakness. Per wife, pt not feeling well since last night - went to bed prior to his own birthday party. Per EMS, pt complaining of decreased L vision and decreased L hearing as well as noted off balance with ambulation to restroom this AM. Pt states L eye is usually his good eye, and still with better vision in L than R, but not as good as usual. Pt reports chronic itching rash to body x past 3 months, f/u'd with Derm, was told likely 2/2 renal disease. Hearing later normalized in ER.       Plan:  Admit to tele for weakness, progression of CKD. Had temporary HD cath placed by IR today. Renal Attending plans for first  HD session tomorrow.     K was 6.2 on arrival, will give 4 doses of Lokalma. Will need eventual perm cath and outpatient HD center. CT head on arrival, no acute     findings, CT abdomen no acute findings, CT chest non-contrast ordered as follow up to CT abdomen for pulmonary nodule eval.        Glucose not controlled on arrival at [phone],323. Will start Lantus 25/Admelog 6 units tid, A1C in AM, SSC. Pt states DM well     controlled 1 pill at home. Not on prednisone now.     Will continue home meds: Eliquis, Rocaltrol, Cardura, Hydralazine, Imdur, Lipitor and Protonix.

## 2024-08-26 NOTE — PROCEDURE NOTE - NSPOSTPRCRAD_GEN_A_CORE
fluoroscopy/central line located in the superior vena cava/line adjusted to depth of insertion/no pneumothorax/post-procedure radiography performed

## 2024-08-26 NOTE — CONSULT NOTE ADULT - SUBJECTIVE AND OBJECTIVE BOX
Interventional Radiology    Evaluate for Procedure: nontunneled hemodialysis catheter placement    HPI: 73y Male with PMH HTN, HLD, DM, CKD, CHF BIBEMS weakness. Afebrile, VSS. Suspected CKD 5-6; Severe uremic pruritis with excoriations, r/o superinfection. Plan per nephrology to initiated HD. IR consulted for nontunneled hemodialysis catheter placement.     Allergies: No Known Allergies    Medications (Abx/Cardiac/Anticoagulation/Blood Products)      Data:  182.9  108.9  T(C): 36.2  HR: 56  BP: 100/56  RR: 16  SpO2: 97%    -WBC 11.91 / HgB 8.8 / Hct 27.6 / Plt 182  -Na 135 / Cl 106 /  / Glucose 287  -K 6.2 / CO2 18 / Cr 5.42  -ALT 31 / Alk Phos 53 / T.Bili 0.3  -INR -- / PTT 69.3    Radiology: Reviewed    Assessment/Plan:   -73y Male with PMH HTN, HLD, DM, CKD, CHF BIBEMS weakness. Afebrile, VSS. Suspected CKD 5-6; Severe uremic pruritis with excoriations, r/o superinfection. Plan per nephrology to initiated HD. IR consulted for nontunneled hemodialysis catheter placement.       - case reviewed with Dr. Charles  - Will plan to place [facility] today  - IR procedure ordered  - Labs reviewed   - does not need to be NPO  - d/w primary team      Interventional Radiology  ------------------------------------  For emergent consults, please call [phone], or call or message on TEAMs.   For non-emergent consults, consults after hours or over the weekend, please place IR Consult order.

## 2024-08-26 NOTE — CONSULT NOTE ADULT - SUBJECTIVE AND OBJECTIVE BOX
Patient chart reviewed, full consult to follow.   Suspected CKD 5-6;   Severe uremic pruritis with excoriations, r/o superinfection     Initiate HD for tomorrow  Kaleb, ID jessica  Discussed with daughter at bedside    Thank you for the courtesy of this consultation. [facility] NEPHROLOGY SERVICES, [facility]  NEPHROLOGY AND HYPERTENSION  [address]  [address]  [place] [address]  [phone]    MD YISEL WEEKS MD YELENA ROSENBERG, MD BINNY KOSHY, MD CHRISTOPHER CAPUTO, MD EDWARD BOVER, MD      Information from chart:  "Patient is a 73y old  Male who presents with a chief complaint of   HPI:  73 year old male PMH HTN, HLD, DM, CHF, CKD stage IV, dementia / anxiety BIBEMS accompanied by wife due to weakness. Per wife, pt not feeling well since last night - went to bed prior to his own birthday party. Per EMS, pt complaining of decreased L vision and decreased L hearing as well as noted off balance with ambulation to restroom this AM. Pt states L eye is usually his good eye, and still with better vision in L than R, but not as good as usual. Pt reports chronic itching rash to body x past 3 months, f/u'd with Derm, was told likely 2/2 renal disease. Pt with excoriations, bleeding 2/2 AC. Per EMS, pt with borderline BP on their arrival.  (26 Aug 2024 16:46)   "    Longstanding CKD 4-5;   Increasing severe pruritis followed by dermatology, suspected uremic   Excoriations     PAST MEDICAL & SURGICAL HISTORY:  Hypertension, unspecified type      Type 2 diabetes mellitus with other neurologic complication, without long-term current use of insulin      DM (diabetes mellitus)      HTN (hypertension)      HLD (hyperlipidemia)      Afib      Retinopathy      Chronic diastolic congestive heart failure      Chronic kidney disease, unspecified CKD stage      Amputation of leg, traumatic, left, subsequent encounter      S/P BKA (below knee amputation) unilateral, left      S/P hip replacement, left      History of surgery on arm        FAMILY HISTORY:  No pertinent family history in first degree relatives    Family history of heart failure (Father)      Allergies    No Known Allergies    Intolerances      Home Medications:  acetaminophen 325 mg oral tablet: 2 tab(s) orally every 6 hours, As needed, Temp greater or equal to 38C (100.4F), Mild Pain (1 - 3) (30 Jun 2021 12:09)  calcitriol 0.25 mcg oral capsule: 1 cap(s) orally once a day (21 Jun 2021 16:42)  Vitamin D2 50,000 intl units (1.25 mg) oral capsule: 1 cap(s) orally 2 times a week (21 Jun 2021 16:42)    MEDICATIONS  (STANDING):  atorvastatin 40 milliGRAM(s) Oral at bedtime  calcitriol   Capsule 0.25 MICROGram(s) Oral daily  chlorhexidine 2% Cloths 1 Application(s) Topical <User Schedule>  dextrose 5%. 1000 milliLiter(s) (50 mL/Hr) IV Continuous <Continuous>  dextrose 5%. 1000 milliLiter(s) (100 mL/Hr) IV Continuous <Continuous>  dextrose 50% Injectable 12.5 Gram(s) IV Push once  dextrose 50% Injectable 25 Gram(s) IV Push once  dextrose 50% Injectable 25 Gram(s) IV Push once  diphenhydrAMINE Injectable 25 milliGRAM(s) IV Push once  doxazosin 2 milliGRAM(s) Oral at bedtime  glucagon  Injectable 1 milliGRAM(s) IntraMuscular once  hydrALAZINE 50 milliGRAM(s) Oral every 8 hours  insulin lispro (ADMELOG) corrective regimen sliding scale   SubCutaneous at bedtime  insulin lispro (ADMELOG) corrective regimen sliding scale   SubCutaneous three times a day before meals  pantoprazole    Tablet 40 milliGRAM(s) Oral before breakfast  sodium zirconium cyclosilicate 10 Gram(s) Oral two times a day    MEDICATIONS  (PRN):  dextrose Oral Gel 15 Gram(s) Oral once PRN Blood Glucose LESS THAN 70 milliGRAM(s)/deciliter    Vital Signs Last 24 Hrs  T(C): 37.1 (26 Aug 2024 16:24), Max: 37.1 (26 Aug 2024 16:24)  T(F): 98.7 (26 Aug 2024 16:24), Max: 98.7 (26 Aug 2024 16:24)  HR: 62 (26 Aug 2024 16:24) (56 - 65)  BP: 122/87 (26 Aug 2024 16:08) (100/56 - 122/87)  BP(mean): --  RR: 11 (26 Aug 2024 16:24) (11 - 18)  SpO2: 92% (26 Aug 2024 16:24) (92% - 99%)    Parameters below as of 26 Aug 2024 16:24  Patient On (Oxygen Delivery Method): room air        Daily Height in cm: 182.88 (26 Aug 2024 08:52)    Daily     CAPILLARY BLOOD GLUCOSE      POCT Blood Glucose.: 310 mg/dL (26 Aug 2024 17:05)  POCT Blood Glucose.: 325 mg/dL (26 Aug 2024 11:50)  POCT Blood Glucose.: 258 mg/dL (26 Aug 2024 08:54)    PHYSICAL EXAM:      T(C): 37.1 (08-26-24 @ 16:24), Max: 37.1 (08-26-24 @ 16:24)  HR: 62 (08-26-24 @ 16:24) (56 - 65)  BP: 122/87 (08-26-24 @ 16:08) (100/56 - 122/87)  RR: 11 (08-26-24 @ 16:24) (11 - 18)  SpO2: 92% (08-26-24 @ 16:24) (92% - 99%)  Wt(kg): --  Lungs clear  Heart S1S2  Abd soft NT ND  Extremities:   2-3  edema  Diffuse excoriations; erythema               08-26    135  |  106  |  136<H>  ----------------------------<  323<H>  6.2<HH>   |  20<L>  |  5.72<H>    Ca    9.0      26 Aug 2024 13:50  Mg     1.0     08-26    TPro  6.6  /  Alb  2.9<L>  /  TBili  0.2  /  DBili  x   /  AST  12<L>  /  ALT  28  /  AlkPhos  52  08-26                          8.8    11.91 )-----------( 182      ( 26 Aug 2024 09:52 )             27.6     Creatinine Trend: 5.72<--, 5.42<--  Urinalysis Basic - ( 26 Aug 2024 13:50 )    Color: x / Appearance: x / SG: x / pH: x  Gluc: 323 mg/dL / Ketone: x  / Bili: x / Urobili: x   Blood: x / Protein: x / Nitrite: x   Leuk Esterase: x / RBC: x / WBC x   Sq Epi: x / Non Sq Epi: x / Bacteria: x            Assessment     Suspected CKD 5-6;   Severe uremic pruritis with excoriations, r/o superinfection     Plan  Initiate HD for tomorrow  Derm, ID eval  Replete Mg  Moderate HTN regimen to allow UF with hemodialysis   Discussed with daughter at bedside    Augusto Santoyo MD          Thank you for the courtesy of this consultation.

## 2024-08-26 NOTE — ED ADULT NURSE NOTE - NSICDXFAMILYHX_GEN_ALL_CORE_FT
FAMILY HISTORY:  No pertinent family history in first degree relatives    Father  Still living? Unknown  Family history of heart failure, Age at diagnosis: Age Unknown

## 2024-08-26 NOTE — ED ADULT NURSE NOTE - NSFALLHARMRISKINTERV_ED_ALL_ED
Assistance OOB with selected safe patient handling equipment if applicable/Communicate risk of Fall with Harm to all staff, patient, and family/Monitor gait and stability/Provide patient with walking aids/Provide visual cue: red socks, yellow wristband, yellow gown, etc/Reinforce activity limits and safety measures with patient and family/Bed in lowest position, wheels locked, appropriate side rails in place/Call bell, personal items and telephone in reach/Instruct patient to call for assistance before getting out of bed/chair/stretcher/Non-slip footwear applied when patient is off stretcher/[place] to call system/Physically safe environment - no spills, clutter or unnecessary equipment/Purposeful Proactive Rounding/Room/bathroom lighting operational, light cord in reach

## 2024-08-26 NOTE — H&P ADULT - HISTORY OF PRESENT ILLNESS
73 year old male PMH HTN, HLD, DM, CHF, CKD stage IV, dementia / anxiety BIBEMS accompanied by wife due to weakness. Per wife, pt not feeling well since last night - went to bed prior to his own birthday party. Per EMS, pt complaining of decreased L vision and decreased L hearing as well as noted off balance with ambulation to restroom this AM. Pt states L eye is usually his good eye, and still with better vision in L than R, but not as good as usual. Pt reports chronic itching rash to body x past 3 months, f/u'd with Derm, was told likely 2/2 renal disease. Pt with excoriations, bleeding 2/2 AC. Per EMS, pt with borderline BP on their arrival.  73 year old male PMH HTN, HLD, DM, CHF, CKD stage IV, dementia / anxiety BIBEMS accompanied by wife due to weakness. Per wife, pt not feeling well since last night - went to bed prior to his own birthday party. Per EMS, pt complaining of decreased L vision and decreased L hearing as well as noted off balance with ambulation to restroom this AM. Pt states L eye is usually his good eye, and still with better vision in L than R, but not as good as usual. Pt reports chronic itching rash to body x past 3 months, f/u'd with Derm, was told likely 2/2 renal disease. Hearing later normalized in ER.     Note: Has left BKA.

## 2024-08-26 NOTE — ED PROVIDER NOTE - OBJECTIVE STATEMENT
73M PMH HTN, HLD, DM, CHF, CKD stage IV, dementia / anxiety BIBEMS accompanied by wife d/t weakness. Per wife, pt not feeling well since last night - went to bed prior to his own birthday party. Per EMS, pt complaining of decreased L vision and decreased L hearing as well as noted off balance w/ ambulation to restroom this AM. Pt states L eye is usually his good eye, and still w/ better vision in L than R, but not as good as usual. Pt believes L hearing decreased 2/2 crusting rash to L ear. Pt reports itching rash to body x past 3mo, f/u'd w/ Derm, told likely 2/2 renal disease as w/o other etiology identified. Pt w/ excoriations, bleeding 2/2 AC. Per EMS, pt w/ borderline BP on their arrival.     PMH as above, PSH L BKA, NKDA, Meds as listed.

## 2024-08-26 NOTE — ED PROVIDER NOTE - CLINICAL SUMMARY MEDICAL DECISION MAKING FREE TEXT BOX
73M PMH HTN, HLD, DM, CKD, CHF BIBEMS weakness. Afebrile, VSS. No focal deficits, NIH. Will not call Code Stroke. 73M PMH HTN, HLD, DM, CKD, CHF BIBEMS weakness. Afebrile, VSS. No focal deficits, NIH 0. Will not call Code Stroke.   W/u significant for: Cr 5.4/, K+ 6.2, given hyperK+ cocktail. CT brain and AP w/o acute pathology. Will admit to Tele (d/w Dr Staley), Nephro (Dr Santoyo) following. Pt, wife updated to results, admission. They understand / agree w/ this plan.

## 2024-08-26 NOTE — ED ADULT NURSE NOTE - NSICDXPASTSURGICALHX_GEN_ALL_CORE_FT
PAST SURGICAL HISTORY:  Amputation of leg, traumatic, left, subsequent encounter     History of surgery on arm     S/P BKA (below knee amputation) unilateral, left     S/P hip replacement, left

## 2024-08-26 NOTE — ED ADULT NURSE NOTE - OBJECTIVE STATEMENT
patient alert and oriented x4, came in for weakness. pt states he woke up this morning with increased hearing loss and ringing in the ear for the past 1x hour associated with feeling off balanced, weakness, dizziness. pt has also has a generalized rash for the past month or so and has been to the doctor and believed to be from his kidneys. pmh of CVA, HTN, DM, afib, kidney disease, BKA. pt in no acute respiratory distress or discomfort at this time. fall precautions maintained. safety precautions maintained. patient alert and oriented x4, came in for weakness. pt states he woke up this morning with increased hearing loss and ringing in the ear for the past 1x hour associated with feeling off balanced, weakness, dizziness. pt has a generalized rash for the past month or so and has been to the doctor and believed to be from his kidneys. pmh of CVA, HTN, DM, afib, kidney disease, BKA. pt in no acute respiratory distress or discomfort at this time. fall precautions maintained. safety precautions maintained. pt placed on continuous cardiac monitor and pulse ox.

## 2024-08-26 NOTE — H&P ADULT - NSHPLABSRESULTS_GEN_ALL_CORE
LABS:                        8.8    11.91 )-----------( 182      ( 26 Aug 2024 09:52 )             27.6     08-26    135  |  106  |  136<H>  ----------------------------<  323<H>  6.2<HH>   |  20<L>  |  5.72<H>    Ca    9.0      26 Aug 2024 13:50  Mg     1.0     08-26    TPro  6.6  /  Alb  2.9<L>  /  TBili  0.2  /  DBili  x   /  AST  12<L>  /  ALT  28  /  AlkPhos  52  08-26      Urinalysis Basic - ( 26 Aug 2024 13:50 )    Color: x / Appearance: x / SG: x / pH: x  Gluc: 323 mg/dL / Ketone: x  / Bili: x / Urobili: x   Blood: x / Protein: x / Nitrite: x   Leuk Esterase: x / RBC: x / WBC x   Sq Epi: x / Non Sq Epi: x / Bacteria: x          RADIOLOGY & ADDITIONAL TESTS:

## 2024-08-26 NOTE — ED ADULT NURSE NOTE - ED STAT RN HANDOFF DETAILS
report given to ED Hold Jael LOZANO. patient alert and oriented x3, denies pain or discomfort. VS stable, in no acute distress, safety precautions maintained. fall precautions maintained.

## 2024-08-27 LAB
A1C WITH ESTIMATED AVERAGE GLUCOSE RESULT: 7 % — HIGH (ref 4–5.6)
ANION GAP SERPL CALC-SCNC: 7 MMOL/L — SIGNIFICANT CHANGE UP (ref 5–17)
APPEARANCE UR: ABNORMAL
BACTERIA # UR AUTO: ABNORMAL /HPF
BASOPHILS # BLD AUTO: 0.04 K/UL — SIGNIFICANT CHANGE UP (ref 0–0.2)
BASOPHILS NFR BLD AUTO: 0.5 % — SIGNIFICANT CHANGE UP (ref 0–2)
BILIRUB UR-MCNC: NEGATIVE — SIGNIFICANT CHANGE UP
BUN SERPL-MCNC: 132 MG/DL — HIGH (ref 7–23)
CALCIUM SERPL-MCNC: 9.2 MG/DL — SIGNIFICANT CHANGE UP (ref 8.5–10.1)
CHLORIDE SERPL-SCNC: 111 MMOL/L — HIGH (ref 96–108)
CO2 SERPL-SCNC: 22 MMOL/L — SIGNIFICANT CHANGE UP (ref 22–31)
COLOR SPEC: YELLOW — SIGNIFICANT CHANGE UP
CREAT SERPL-MCNC: 5.73 MG/DL — HIGH (ref 0.5–1.3)
DIFF PNL FLD: NEGATIVE — SIGNIFICANT CHANGE UP
EGFR: 10 ML/MIN/1.73M2 — LOW
EOSINOPHIL # BLD AUTO: 1.7 K/UL — HIGH (ref 0–0.5)
EOSINOPHIL NFR BLD AUTO: 20 % — HIGH (ref 0–6)
ESTIMATED AVERAGE GLUCOSE: 154 MG/DL — HIGH (ref 68–114)
GLUCOSE BLDC GLUCOMTR-MCNC: 105 MG/DL — HIGH (ref 70–99)
GLUCOSE BLDC GLUCOMTR-MCNC: 126 MG/DL — HIGH (ref 70–99)
GLUCOSE BLDC GLUCOMTR-MCNC: 166 MG/DL — HIGH (ref 70–99)
GLUCOSE SERPL-MCNC: 106 MG/DL — HIGH (ref 70–99)
GLUCOSE UR QL: NEGATIVE MG/DL — SIGNIFICANT CHANGE UP
HAV IGM SER-ACNC: SIGNIFICANT CHANGE UP
HBV CORE IGM SER-ACNC: SIGNIFICANT CHANGE UP
HBV SURFACE AG SER-ACNC: SIGNIFICANT CHANGE UP
HCT VFR BLD CALC: 27.4 % — LOW (ref 39–50)
HCV AB S/CO SERPL IA: 0.16 S/CO — SIGNIFICANT CHANGE UP (ref 0–0.99)
HCV AB SERPL-IMP: SIGNIFICANT CHANGE UP
HGB BLD-MCNC: 8.7 G/DL — LOW (ref 13–17)
IMM GRANULOCYTES NFR BLD AUTO: 0.5 % — SIGNIFICANT CHANGE UP (ref 0–0.9)
KETONES UR-MCNC: NEGATIVE MG/DL — SIGNIFICANT CHANGE UP
LEUKOCYTE ESTERASE UR-ACNC: ABNORMAL
LYMPHOCYTES # BLD AUTO: 0.25 K/UL — LOW (ref 1–3.3)
LYMPHOCYTES # BLD AUTO: 2.9 % — LOW (ref 13–44)
MAGNESIUM SERPL-MCNC: 1.3 MG/DL — LOW (ref 1.6–2.6)
MCHC RBC-ENTMCNC: 27.7 PG — SIGNIFICANT CHANGE UP (ref 27–34)
MCHC RBC-ENTMCNC: 31.8 G/DL — LOW (ref 32–36)
MCV RBC AUTO: 87.3 FL — SIGNIFICANT CHANGE UP (ref 80–100)
MONOCYTES # BLD AUTO: 0.9 K/UL — SIGNIFICANT CHANGE UP (ref 0–0.9)
MONOCYTES NFR BLD AUTO: 10.6 % — SIGNIFICANT CHANGE UP (ref 2–14)
NEUTROPHILS # BLD AUTO: 5.56 K/UL — SIGNIFICANT CHANGE UP (ref 1.8–7.4)
NEUTROPHILS NFR BLD AUTO: 65.5 % — SIGNIFICANT CHANGE UP (ref 43–77)
NITRITE UR-MCNC: POSITIVE
NRBC # BLD: 0 /100 WBCS — SIGNIFICANT CHANGE UP (ref 0–0)
PH UR: 5 — SIGNIFICANT CHANGE UP (ref 5–8)
PHOSPHATE SERPL-MCNC: 5.9 MG/DL — HIGH (ref 2.5–4.5)
PLATELET # BLD AUTO: 181 K/UL — SIGNIFICANT CHANGE UP (ref 150–400)
POTASSIUM SERPL-MCNC: 5.5 MMOL/L — HIGH (ref 3.5–5.3)
POTASSIUM SERPL-SCNC: 5.5 MMOL/L — HIGH (ref 3.5–5.3)
PROT UR-MCNC: 30 MG/DL
RBC # BLD: 3.14 M/UL — LOW (ref 4.2–5.8)
RBC # FLD: 17 % — HIGH (ref 10.3–14.5)
RBC CASTS # UR COMP ASSIST: 1 /HPF — SIGNIFICANT CHANGE UP (ref 0–4)
SODIUM SERPL-SCNC: 140 MMOL/L — SIGNIFICANT CHANGE UP (ref 135–145)
SP GR SPEC: 1.01 — SIGNIFICANT CHANGE UP (ref 1–1.03)
UROBILINOGEN FLD QL: 0.2 MG/DL — SIGNIFICANT CHANGE UP (ref 0.2–1)
WBC # BLD: 8.49 K/UL — SIGNIFICANT CHANGE UP (ref 3.8–10.5)
WBC # FLD AUTO: 8.49 K/UL — SIGNIFICANT CHANGE UP (ref 3.8–10.5)
WBC UR QL: 10 /HPF — HIGH (ref 0–5)

## 2024-08-27 PROCEDURE — 99233 SBSQ HOSP IP/OBS HIGH 50: CPT

## 2024-08-27 RX ORDER — SEVELAMER CARBONATE 800 MG/1
1600 TABLET, FILM COATED ORAL
Refills: 0 | Status: DISCONTINUED | OUTPATIENT
Start: 2024-08-27 | End: 2024-09-21

## 2024-08-27 RX ORDER — LANOLIN
1 OINTMENT (GRAM) TOPICAL DAILY
Refills: 0 | Status: DISCONTINUED | OUTPATIENT
Start: 2024-08-27 | End: 2024-08-27

## 2024-08-27 RX ORDER — POLYETHYLENE GLYCOL 3350 17 G/17G
17 POWDER, FOR SOLUTION ORAL DAILY
Refills: 0 | Status: DISCONTINUED | OUTPATIENT
Start: 2024-08-27 | End: 2024-09-21

## 2024-08-27 RX ORDER — VANCOMYCIN/0.9 % SOD CHLORIDE 1.75G/25
750 PLASTIC BAG, INJECTION (ML) INTRAVENOUS ONCE
Refills: 0 | Status: COMPLETED | OUTPATIENT
Start: 2024-08-27 | End: 2024-08-27

## 2024-08-27 RX ORDER — PETROLATUM 865 MG/G
1 OINTMENT TOPICAL DAILY
Refills: 0 | Status: DISCONTINUED | OUTPATIENT
Start: 2024-08-27 | End: 2024-09-21

## 2024-08-27 RX ADMIN — CALCITRIOL 0.25 MICROGRAM(S): 0.25 CAPSULE ORAL at 15:10

## 2024-08-27 RX ADMIN — INSULIN GLARGINE 25 UNIT(S): 100 INJECTION, SOLUTION SUBCUTANEOUS at 22:09

## 2024-08-27 RX ADMIN — Medication 100 MILLIGRAM(S): at 15:40

## 2024-08-27 RX ADMIN — PETROLATUM 1 APPLICATION(S): 865 OINTMENT TOPICAL at 15:10

## 2024-08-27 RX ADMIN — Medication 50 MILLIGRAM(S): at 05:52

## 2024-08-27 RX ADMIN — APIXABAN 5 MILLIGRAM(S): 5 TABLET, FILM COATED ORAL at 18:14

## 2024-08-27 RX ADMIN — POLYETHYLENE GLYCOL 3350 17 GRAM(S): 17 POWDER, FOR SOLUTION ORAL at 16:28

## 2024-08-27 RX ADMIN — Medication 40 MILLIGRAM(S): at 22:13

## 2024-08-27 RX ADMIN — SEVELAMER CARBONATE 1600 MILLIGRAM(S): 800 TABLET, FILM COATED ORAL at 18:14

## 2024-08-27 RX ADMIN — Medication 6 UNIT(S): at 08:48

## 2024-08-27 RX ADMIN — APIXABAN 5 MILLIGRAM(S): 5 TABLET, FILM COATED ORAL at 05:52

## 2024-08-27 RX ADMIN — CHLORHEXIDINE GLUCONATE 1 APPLICATION(S): 40 SOLUTION TOPICAL at 05:47

## 2024-08-27 RX ADMIN — Medication 50 MILLIGRAM(S): at 22:08

## 2024-08-27 RX ADMIN — SODIUM ZIRCONIUM CYCLOSILICATE 10 GRAM(S): 5 POWDER, FOR SUSPENSION ORAL at 05:48

## 2024-08-27 RX ADMIN — MAGNESIUM OXIDE TAB 400 MG (240 MG ELEMENTAL MG) 400 MILLIGRAM(S): 400 (240 MG) TAB at 09:54

## 2024-08-27 RX ADMIN — Medication 2 MILLIGRAM(S): at 22:09

## 2024-08-27 RX ADMIN — Medication 40 MILLIGRAM(S): at 05:53

## 2024-08-27 RX ADMIN — Medication 250 MILLIGRAM(S): at 16:43

## 2024-08-27 RX ADMIN — Medication 50 MILLIGRAM(S): at 15:41

## 2024-08-27 NOTE — PROGRESS NOTE ADULT - SUBJECTIVE AND OBJECTIVE BOX
[facility] NEPHROLOGY SERVICES, [facility]  NEPHROLOGY AND HYPERTENSION  [address]  [address]  [place] [address]  [phone]    MD YISEL WEEKS MD YELENA ROSENBERG, MD BINNY KOSHY, MD CHRISTOPHER CAPUTO, MD EDWARD BOVER, MD          Patient events noted  No distress  On HD   Pruritis    MEDICATIONS  (STANDING):  apixaban 5 milliGRAM(s) Oral two times a day  AQUAPHOR (petrolatum Ointment) 1 Application(s) Topical daily  atorvastatin 40 milliGRAM(s) Oral at bedtime  cefTRIAXone   IVPB 1000 milliGRAM(s) IV Intermittent every 24 hours  chlorhexidine 2% Cloths 1 Application(s) Topical <User Schedule>  dextrose 5%. 1000 milliLiter(s) (50 mL/Hr) IV Continuous <Continuous>  dextrose 5%. 1000 milliLiter(s) (100 mL/Hr) IV Continuous <Continuous>  dextrose 50% Injectable 12.5 Gram(s) IV Push once  dextrose 50% Injectable 25 Gram(s) IV Push once  dextrose 50% Injectable 25 Gram(s) IV Push once  doxazosin 2 milliGRAM(s) Oral at bedtime  glucagon  Injectable 1 milliGRAM(s) IntraMuscular once  hydrALAZINE 50 milliGRAM(s) Oral every 8 hours  insulin glargine Injectable (LANTUS) 25 Unit(s) SubCutaneous at bedtime  insulin lispro (ADMELOG) corrective regimen sliding scale   SubCutaneous three times a day before meals  insulin lispro (ADMELOG) corrective regimen sliding scale   SubCutaneous at bedtime  insulin lispro Injectable (ADMELOG) 6 Unit(s) SubCutaneous three times a day before meals  pantoprazole    Tablet 40 milliGRAM(s) Oral before breakfast  sevelamer carbonate 1600 milliGRAM(s) Oral three times a day with meals    MEDICATIONS  (PRN):  dextrose Oral Gel 15 Gram(s) Oral once PRN Blood Glucose LESS THAN 70 milliGRAM(s)/deciliter  polyethylene glycol 3350 17 Gram(s) Oral daily PRN Constipation      08-27-24 @ 07:01  -  08-27-24 @ 17:44  --------------------------------------------------------  IN: 840 mL / OUT: 1500 mL / NET: -660 mL      PHYSICAL EXAM:      T(C): 36.4 (08-27-24 @ 16:56), Max: 36.6 (08-27-24 @ 11:14)  HR: 87 (08-27-24 @ 16:56) (69 - 87)  BP: 152/67 (08-27-24 @ 16:56) (106/54 - 154/74)  RR: 18 (08-27-24 @ 16:56) (16 - 18)  SpO2: 96% (08-27-24 @ 16:56) (96% - 98%)  Wt(kg): --  Lungs clear  Heart S1S2  Abd soft NT ND  Extremities:   tr edema                                    8.7    8.49  )-----------( 181      ( 27 Aug 2024 06:36 )             27.4     08-27    140  |  111<H>  |  132<H>  ----------------------------<  106<H>  5.5<H>   |  22  |  5.73<H>    Ca    9.2      27 Aug 2024 06:36  Phos  5.9     08-27  Mg     1.3     08-27    TPro  6.6  /  Alb  2.9<L>  /  TBili  0.2  /  DBili  x   /  AST  12<L>  /  ALT  28  /  AlkPhos  52  08-26      LIVER FUNCTIONS - ( 26 Aug 2024 13:50 )  Alb: 2.9 g/dL / Pro: 6.6 gm/dL / ALK PHOS: 52 U/L / ALT: 28 U/L / AST: 12 U/L / GGT: x           Creatinine Trend: 5.73<--, 5.72<--, 5.42<--      Assessment     Suspected CKD 5-6;   Severe uremic pruritis with excoriations, r/o superinfection       Plan  HD for 2 hours today  Derm, ID eval  Replete Mg  Moderated HTN regimen to allow UF with hemodialysis     Augusto Santoyo MD

## 2024-08-27 NOTE — PATIENT PROFILE ADULT - FALL HARM RISK - HARM RISK INTERVENTIONS

## 2024-08-27 NOTE — PROGRESS NOTE ADULT - SUBJECTIVE AND OBJECTIVE BOX
Hospitalist Daily Progress Note     *SUBJECTIVE*    Interval Events:  NAEON, Resting comfortably. HD Stable.      *OBJECTIVE*    PHYSICAL EXAM:  GENERAL: NAD comfortable, hears well, left BKA healed  CHEST/LUNG: clear to auscultation bilaterally; no rales, rhonchi, or wheezing b/l  HEART: normal S1, S2 RRR. no rubs gallops murmers   ABDOMEN: BS+, soft, ND, NT   EXTREMITIES:  pulses palpable; no clubbing, cyanosis, or edema b/l LEs, left BKA site healed.   NEURO: awake, alert, interactive; moves all extremities    OBJECTIVE DATA:   Vital Signs Last 24 Hrs  T(C): 34.8 (27 Aug 2024 08:08), Max: 37.1 (26 Aug 2024 16:24)  T(F): 94.6 (27 Aug 2024 08:08), Max: 98.7 (26 Aug 2024 16:24)  HR: 74 (27 Aug 2024 08:08) (56 - 74)  BP: 136/66 (27 Aug 2024 08:08) (100/56 - 136/66)  BP(mean): --  RR: 17 (27 Aug 2024 08:08) (11 - 17)  SpO2: 96% (27 Aug 2024 08:08) (92% - 99%)    Parameters below as of 27 Aug 2024 08:08  Patient On (Oxygen Delivery Method): room air               Daily     Daily     Labs, Interval Radiology studies, Medications reviewed by me

## 2024-08-27 NOTE — PROGRESS NOTE ADULT - ASSESSMENT
73 year old male PMH HTN, HLD, DM, CHF, CKD stage IV, dementia / anxiety BIBEMS accompanied by wife due to weakness. Per wife, pt not feeling well since last night - went to bed prior to his own birthday party. Per EMS, pt complaining of decreased L vision and decreased L hearing as well as noted off balance with ambulation to restroom this AM. Pt states L eye is usually his good eye, and still with better vision in L than R, but not as good as usual. Pt reports chronic itching rash to body x past 3 months, f/u'd with Derm, was told likely 2/2 renal disease. Hearing later normalized in ER.     #CVA r/o   NIHSS 0 in ER.     ##ESRD   ##HyperK  Temp cath placed and patient to initiate Dialysis here   - Nephro following     ##Type 2 DM    #recent BKA     #pulm nodule     #HTN      findings, CT abdomen no acute findings, CT chest non-contrast ordered as follow up to CT abdomen for pulmonary nodule eval.        Glucose not controlled on arrival at 287,323. Will start Lantus 25/Admelog 6 units tid, A1C in AM, SSC. Pt states DM well     controlled 1 pill at home. Not on prednisone now.     Will continue home meds: Eliquis, Rocaltrol, Cardura, Hydralazine, Imdur, Lipitor and Protonix.     73 year old male PMH HTN, HLD, DM, CHF, CKD stage IV, dementia / anxiety BIBEMS accompanied by wife due to weakness. Per wife, pt not feeling well since last night - went to bed prior to his own birthday party. Per EMS, pt complaining of decreased L vision and decreased L hearing as well as noted off balance with ambulation to restroom this AM. Pt states L eye is usually his good eye, and still with better vision in L than R, but not as good as usual. Pt reports chronic itching rash to body x past 3 months, f/u'd with Derm, was told likely 2/2 renal disease. Hearing later normalized in ER.     ##ESRD   ##HyperK  Temp cath placed and patient to initiate Dialysis here   - Nephro following     ##Hypothermia    ##Type 2 DM    #recent BKA     #pulm nodule     #HTN      findings, CT abdomen no acute findings, CT chest non-contrast ordered as follow up to CT abdomen for pulmonary nodule eval.        Glucose not controlled on arrival at 287,323. Will start Lantus 25/Admelog 6 units tid, A1C in AM, SSC. Pt states DM well     controlled 1 pill at home. Not on prednisone now.     Will continue home meds: Eliquis, Rocaltrol, Cardura, Hydralazine, Imdur, Lipitor and Protonix.     73 year old male PMH HTN, HLD, DM, CHF, CKD stage IV, dementia / anxiety BIBEMS accompanied by wife due to weakness. Per wife, pt not feeling well since last night - went to bed prior to his own birthday party. Per EMS, pt complaining of decreased L vision and decreased L hearing as well as noted off balance with ambulation to restroom this AM. Pt states L eye is usually his good eye, and still with better vision in L than R, but not as good as usual. Pt reports chronic itching rash to body x past 3 months, f/u'd with Derm, was told likely 2/2 renal disease. Hearing later normalized in ER.     ##ESRD   ##HyperK  Temp cath placed and patient to initiate Dialysis here 2/2 Uremia and hyperkalemic. Temp cath placed yesterday by IR.   - C/w Inpatient dialysis initiation    - Will need IR tunneled cath  - Pending O/p Dialysis slot   - Nephro following     ##Hypothermic  ##SSTI  Noted to be hypothermic to 92F. CT abd No evidence of acute inflammatory or obstructive process in the   abdomen and pelvis. UA positive nitrites, LEs.   - Check blood cultures, Urine culture, TSH  - Empirically treat with IV Abx  - C/w [place]     ##Type 2 DM  A1c 7; C/w Lantus 25 + 6U w/ meals + SSI  - Hypoglycemia protocol     #Pulm nodule   - CT noted There are scattered nodules at the visualized lung bases measuring up to 6 mm  - Pending CT chest     #HTN  - C/w cardura, hydralazine     #HLD  - C/w statin     #History of DVT  - C/w Eliquis     Diet: renal, CCD  DVT prophylaxis: eliquis   Dispo: Tele   Code Status: FC     I have spent a total of 50 minutes to prepare to see the patient, obtaining and reviewing history, physical examination, explaining the diagnosis, prognosis and treatment plan with the patient/family/caregiver. I also have spent the time ordering studies and testing, interpreting results, medicine reconciliation, IDRs, subspecialty consultation and documentation as above.

## 2024-08-28 LAB
ANION GAP SERPL CALC-SCNC: 7 MMOL/L — SIGNIFICANT CHANGE UP (ref 5–17)
BASOPHILS # BLD AUTO: 0.04 K/UL — SIGNIFICANT CHANGE UP (ref 0–0.2)
BASOPHILS NFR BLD AUTO: 0.4 % — SIGNIFICANT CHANGE UP (ref 0–2)
BUN SERPL-MCNC: 98 MG/DL — HIGH (ref 7–23)
CALCIUM SERPL-MCNC: 8.9 MG/DL — SIGNIFICANT CHANGE UP (ref 8.5–10.1)
CHLORIDE SERPL-SCNC: 108 MMOL/L — SIGNIFICANT CHANGE UP (ref 96–108)
CO2 SERPL-SCNC: 24 MMOL/L — SIGNIFICANT CHANGE UP (ref 22–31)
CREAT SERPL-MCNC: 4.6 MG/DL — HIGH (ref 0.5–1.3)
EGFR: 13 ML/MIN/1.73M2 — LOW
EOSINOPHIL # BLD AUTO: 1.86 K/UL — HIGH (ref 0–0.5)
EOSINOPHIL NFR BLD AUTO: 19.7 % — HIGH (ref 0–6)
GLUCOSE BLDC GLUCOMTR-MCNC: 125 MG/DL — HIGH (ref 70–99)
GLUCOSE BLDC GLUCOMTR-MCNC: 150 MG/DL — HIGH (ref 70–99)
GLUCOSE BLDC GLUCOMTR-MCNC: 92 MG/DL — SIGNIFICANT CHANGE UP (ref 70–99)
GLUCOSE BLDC GLUCOMTR-MCNC: 95 MG/DL — SIGNIFICANT CHANGE UP (ref 70–99)
GLUCOSE SERPL-MCNC: 79 MG/DL — SIGNIFICANT CHANGE UP (ref 70–99)
HAV IGM SER-ACNC: SIGNIFICANT CHANGE UP
HBV CORE IGM SER-ACNC: SIGNIFICANT CHANGE UP
HBV SURFACE AB SER-ACNC: <3 MIU/ML — LOW
HBV SURFACE AG SER-ACNC: SIGNIFICANT CHANGE UP
HCT VFR BLD CALC: 29.1 % — LOW (ref 39–50)
HCV AB S/CO SERPL IA: 0.18 S/CO — SIGNIFICANT CHANGE UP (ref 0–0.99)
HCV AB SERPL-IMP: SIGNIFICANT CHANGE UP
HGB BLD-MCNC: 9.2 G/DL — LOW (ref 13–17)
IMM GRANULOCYTES NFR BLD AUTO: 0.6 % — SIGNIFICANT CHANGE UP (ref 0–0.9)
LYMPHOCYTES # BLD AUTO: 0.35 K/UL — LOW (ref 1–3.3)
LYMPHOCYTES # BLD AUTO: 3.7 % — LOW (ref 13–44)
MAGNESIUM SERPL-MCNC: 1.4 MG/DL — LOW (ref 1.6–2.6)
MCHC RBC-ENTMCNC: 27.6 PG — SIGNIFICANT CHANGE UP (ref 27–34)
MCHC RBC-ENTMCNC: 31.6 G/DL — LOW (ref 32–36)
MCV RBC AUTO: 87.4 FL — SIGNIFICANT CHANGE UP (ref 80–100)
MONOCYTES # BLD AUTO: 1.32 K/UL — HIGH (ref 0–0.9)
MONOCYTES NFR BLD AUTO: 14 % — SIGNIFICANT CHANGE UP (ref 2–14)
NEUTROPHILS # BLD AUTO: 5.81 K/UL — SIGNIFICANT CHANGE UP (ref 1.8–7.4)
NEUTROPHILS NFR BLD AUTO: 61.6 % — SIGNIFICANT CHANGE UP (ref 43–77)
NRBC # BLD: 0 /100 WBCS — SIGNIFICANT CHANGE UP (ref 0–0)
PLATELET # BLD AUTO: 183 K/UL — SIGNIFICANT CHANGE UP (ref 150–400)
POTASSIUM SERPL-MCNC: 5.3 MMOL/L — SIGNIFICANT CHANGE UP (ref 3.5–5.3)
POTASSIUM SERPL-SCNC: 5.3 MMOL/L — SIGNIFICANT CHANGE UP (ref 3.5–5.3)
RBC # BLD: 3.33 M/UL — LOW (ref 4.2–5.8)
RBC # FLD: 17 % — HIGH (ref 10.3–14.5)
SODIUM SERPL-SCNC: 139 MMOL/L — SIGNIFICANT CHANGE UP (ref 135–145)
T3 SERPL-MCNC: 98 NG/DL — SIGNIFICANT CHANGE UP (ref 80–200)
T4 AB SER-ACNC: 4.6 UG/DL — SIGNIFICANT CHANGE UP (ref 4.6–12)
TSH SERPL-MCNC: 3.45 UIU/ML — SIGNIFICANT CHANGE UP (ref 0.36–3.74)
WBC # BLD: 9.44 K/UL — SIGNIFICANT CHANGE UP (ref 3.8–10.5)
WBC # FLD AUTO: 9.44 K/UL — SIGNIFICANT CHANGE UP (ref 3.8–10.5)

## 2024-08-28 PROCEDURE — 99232 SBSQ HOSP IP/OBS MODERATE 35: CPT

## 2024-08-28 RX ORDER — ACETAMINOPHEN 325 MG/1
650 TABLET ORAL EVERY 6 HOURS
Refills: 0 | Status: DISCONTINUED | OUTPATIENT
Start: 2024-08-28 | End: 2024-09-21

## 2024-08-28 RX ORDER — DIPHENHYDRAMINE HCL 50 MG
25 CAPSULE ORAL ONCE
Refills: 0 | Status: COMPLETED | OUTPATIENT
Start: 2024-08-28 | End: 2024-08-28

## 2024-08-28 RX ORDER — AMMONIUM LACTATE 12 %
1 LOTION (GRAM) TOPICAL EVERY 12 HOURS
Refills: 0 | Status: DISCONTINUED | OUTPATIENT
Start: 2024-08-28 | End: 2024-09-03

## 2024-08-28 RX ADMIN — ACETAMINOPHEN 650 MILLIGRAM(S): 325 TABLET ORAL at 12:42

## 2024-08-28 RX ADMIN — Medication 40 MILLIGRAM(S): at 21:43

## 2024-08-28 RX ADMIN — ACETAMINOPHEN 650 MILLIGRAM(S): 325 TABLET ORAL at 11:19

## 2024-08-28 RX ADMIN — Medication 1 APPLICATION(S): at 11:43

## 2024-08-28 RX ADMIN — Medication 3 MILLIGRAM(S): at 21:43

## 2024-08-28 RX ADMIN — SEVELAMER CARBONATE 1600 MILLIGRAM(S): 800 TABLET, FILM COATED ORAL at 17:16

## 2024-08-28 RX ADMIN — APIXABAN 5 MILLIGRAM(S): 5 TABLET, FILM COATED ORAL at 05:56

## 2024-08-28 RX ADMIN — APIXABAN 5 MILLIGRAM(S): 5 TABLET, FILM COATED ORAL at 17:16

## 2024-08-28 RX ADMIN — SEVELAMER CARBONATE 1600 MILLIGRAM(S): 800 TABLET, FILM COATED ORAL at 08:05

## 2024-08-28 RX ADMIN — Medication 50 MILLIGRAM(S): at 05:56

## 2024-08-28 RX ADMIN — Medication 40 MILLIGRAM(S): at 05:56

## 2024-08-28 RX ADMIN — INSULIN GLARGINE 25 UNIT(S): 100 INJECTION, SOLUTION SUBCUTANEOUS at 21:43

## 2024-08-28 RX ADMIN — Medication 25 GRAM(S): at 21:43

## 2024-08-28 RX ADMIN — Medication 50 MILLIGRAM(S): at 21:42

## 2024-08-28 RX ADMIN — Medication 1 APPLICATION(S): at 19:04

## 2024-08-28 RX ADMIN — PETROLATUM 1 APPLICATION(S): 865 OINTMENT TOPICAL at 11:21

## 2024-08-28 RX ADMIN — Medication 25 MILLIGRAM(S): at 22:57

## 2024-08-28 RX ADMIN — Medication 100 MILLIGRAM(S): at 17:15

## 2024-08-28 RX ADMIN — CHLORHEXIDINE GLUCONATE 1 APPLICATION(S): 40 SOLUTION TOPICAL at 05:56

## 2024-08-28 RX ADMIN — SEVELAMER CARBONATE 1600 MILLIGRAM(S): 800 TABLET, FILM COATED ORAL at 11:21

## 2024-08-28 RX ADMIN — Medication 2 MILLIGRAM(S): at 21:42

## 2024-08-28 NOTE — PROGRESS NOTE ADULT - SUBJECTIVE AND OBJECTIVE BOX
HPI:  73 year old male PMH HTN, HLD, DM, CHF, CKD stage IV, dementia / anxiety BIBEMS accompanied by wife due to weakness. Per wife, pt not feeling well since last night - went to bed prior to his own birthday party. Per EMS, pt complaining of decreased L vision and decreased L hearing as well as noted off balance with ambulation to restroom this AM. Pt states L eye is usually his good eye, and still with better vision in L than R, but not as good as usual. Pt reports chronic itching rash to body x past 3 months, f/u'd with Derm, was told likely 2/2 renal disease. Hearing later normalized in ER.     Note: Has left BKA.  (26 Aug 2024 16:46)  Patient is a 74y old  Male who presents with a chief complaint of Weakness, progression of CKD (28 Aug 2024 17:26)      INTERVAL HPI/OVERNIGHT EVENTS: no acute events     MEDICATIONS  (STANDING):  ammonium lactate 12% Lotion 1 Application(s) Topical every 12 hours  apixaban 5 milliGRAM(s) Oral two times a day  AQUAPHOR (petrolatum Ointment) 1 Application(s) Topical daily  atorvastatin 40 milliGRAM(s) Oral at bedtime  cefTRIAXone   IVPB 1000 milliGRAM(s) IV Intermittent every 24 hours  chlorhexidine 2% Cloths 1 Application(s) Topical <User Schedule>  dextrose 5%. 1000 milliLiter(s) (50 mL/Hr) IV Continuous <Continuous>  dextrose 5%. 1000 milliLiter(s) (100 mL/Hr) IV Continuous <Continuous>  dextrose 50% Injectable 12.5 Gram(s) IV Push once  dextrose 50% Injectable 25 Gram(s) IV Push once  dextrose 50% Injectable 25 Gram(s) IV Push once  doxazosin 2 milliGRAM(s) Oral at bedtime  glucagon  Injectable 1 milliGRAM(s) IntraMuscular once  hydrALAZINE 50 milliGRAM(s) Oral every 8 hours  insulin glargine Injectable (LANTUS) 25 Unit(s) SubCutaneous at bedtime  insulin lispro (ADMELOG) corrective regimen sliding scale   SubCutaneous three times a day before meals  insulin lispro (ADMELOG) corrective regimen sliding scale   SubCutaneous at bedtime  insulin lispro Injectable (ADMELOG) 6 Unit(s) SubCutaneous three times a day before meals  magnesium sulfate  IVPB 2 Gram(s) IV Intermittent once  melatonin 3 milliGRAM(s) Oral at bedtime  pantoprazole    Tablet 40 milliGRAM(s) Oral before breakfast  sevelamer carbonate 1600 milliGRAM(s) Oral three times a day with meals    MEDICATIONS  (PRN):  acetaminophen     Tablet .. 650 milliGRAM(s) Oral every 6 hours PRN Temp greater or equal to 38C (100.4F), Moderate Pain (4 - 6)  dextrose Oral Gel 15 Gram(s) Oral once PRN Blood Glucose LESS THAN 70 milliGRAM(s)/deciliter  polyethylene glycol 3350 17 Gram(s) Oral daily PRN Constipation      Allergies    No Known Allergies    Intolerances        REVIEW OF SYSTEMS:  CONSTITUTIONAL: No fever, weight loss, or fatigue  EYES: No eye pain, visual disturbances, or discharge  ENMT:  No difficulty hearing, tinnitus, vertigo; No sinus or throat pain  NECK: No pain or stiffness  BREASTS: No pain, masses, or nipple discharge  RESPIRATORY: No cough, wheezing, chills or hemoptysis; No shortness of breath  CARDIOVASCULAR: No chest pain, palpitations, dizziness, or leg swelling  GASTROINTESTINAL: No abdominal or epigastric pain. No nausea, vomiting, or hematemesis; No diarrhea or constipation. No melena or hematochezia.  GENITOURINARY: No dysuria, frequency, hematuria, or incontinence  NEUROLOGICAL: No headaches, memory loss, loss of strength, numbness, or tremors  SKIN: No itching, burning, rashes, or lesions   LYMPH NODES: No enlarged glands  ENDOCRINE: No heat or cold intolerance; No hair loss  MUSCULOSKELETAL: No joint pain or swelling; No muscle, back, or extremity pain  PSYCHIATRIC: No depression, anxiety, mood swings, or difficulty sleeping  HEME/LYMPH: No easy bruising, or bleeding gums  ALLERGY AND IMMUNOLOGIC: No hives or eczema    Vital Signs Last 24 Hrs  T(C): 36.8 (28 Aug 2024 16:52), Max: 36.8 (28 Aug 2024 16:52)  T(F): 98.2 (28 Aug 2024 16:52), Max: 98.2 (28 Aug 2024 16:52)  HR: 92 (28 Aug 2024 16:52) (56 - 92)  BP: 119/70 (28 Aug 2024 16:52) (119/70 - 180/80)  RR: 18 (28 Aug 2024 16:52) (17 - 18)  SpO2: 92% (28 Aug 2024 16:52) (92% - 98%)    Parameters below as of 28 Aug 2024 16:45  Patient On (Oxygen Delivery Method): room air        PHYSICAL EXAM:  GENERAL: NAD, well-groomed, well-developed  HEAD:  Atraumatic, Normocephalic  EYES: vision disturbances   ENMT: No tonsillar erythema, exudates, or enlargement; Moist mucous membranes, Good dentition, No lesions  NECK: Supple, No JVD, Normal thyroid  NERVOUS SYSTEM:  Alert & Oriented X3, Good concentration; Motor Strength 5/5 B/L upper and lower extremities; DTRs 2+ intact and symmetric  CHEST/LUNG: Clear to ascultation  bilaterally; No rales, rhonchi, wheezing, or rubs  HEART: Regular rate and rhythm; No murmurs, rubs, or gallops  ABDOMEN: Soft, Nontender, Nondistended; Bowel sounds present  EXTREMITIES: BKA   LYMPH: No lymphadenopathy noted  SKIN: multiple rashes  LABS:                        9.2    9.44  )-----------( 183      ( 28 Aug 2024 06:48 )             29.1     08-28    139  |  108  |  98<H>  ----------------------------<  79  5.3   |  24  |  4.60<H>    Ca    8.9      28 Aug 2024 06:48  Phos  5.9     08-27  Mg     1.4     08-28        Urinalysis Basic - ( 28 Aug 2024 06:48 )    Color: x / Appearance: x / SG: x / pH: x  Gluc: 79 mg/dL / Ketone: x  / Bili: x / Urobili: x   Blood: x / Protein: x / Nitrite: x   Leuk Esterase: x / RBC: x / WBC x   Sq Epi: x / Non Sq Epi: x / Bacteria: x      CAPILLARY BLOOD GLUCOSE      POCT Blood Glucose.: 95 mg/dL (28 Aug 2024 16:47)  POCT Blood Glucose.: 92 mg/dL (28 Aug 2024 11:22)  POCT Blood Glucose.: 125 mg/dL (28 Aug 2024 08:07)  POCT Blood Glucose.: 166 mg/dL (27 Aug 2024 21:37)      RADIOLOGY & ADDITIONAL TESTS:  < from: CT Chest No Cont (08.26.24 @ 17:37) >    ACC: [id number] EXAM:  CT CHEST   ORDERED BY: Shreyas Staley     PROCEDURE DATE:  08/26/2024          INTERPRETATION:  CLINICAL INFORMATION: Pulmonary nodules on CT abdomen   and pelvis.    COMPARISON: None.    CONTRAST/COMPLICATIONS:  IV Contrast: None.  Oral Contrast: None.  Complications: None.    PROCEDURE:  CT of the Chest was performed.  Sagittal and coronal reformats were performed.    FINDINGS:    LUNGS AND LARGE AIRWAYS: Patent central airways. Multiple bilateral   pulmonary nodules. For example:  *  Posterior segment of the right upper lobe (series 3, image 254),   measuring 0.5 cm  *  Lateral segment of the right middle lobe (series 3, image 326),   measuring 0.4 cm  *  Posterior basal nodule in the left lower lobe, measuring 0.9 cm   (series 3, image 537)  *  Superior segment of the left lower lobe (series 3, image 314),   measuring 0.7 cm  PLEURA: No pleural effusion.  VESSELS: Right IJ central line with catheter tip terminating in the   superior vena cava. Atherosclerotic calcifications in the aorta and   coronary arteries.  HEART: Heart size is normal. No pericardial effusion.  MEDIASTINUM AND YAW: No lymphadenopathy.  CHEST WALL AND LOWER NECK: Within normal limits.  VISUALIZED UPPER ABDOMEN: Hypodense right thyroid nodule, measuring 0.7   cm.  BONES: Degenerative changes.    IMPRESSION:  Bilateral pulmonary nodules, measuring up to 0.9 cm in the left lower   lobe. Follow-up CT is recommended in 3-6 months to assess for persistence.    < end of copied text >    Imaging Personally Reviewed:  [ ] YES  [ ] NO    Consultant(s) Notes Reviewed:  [ ] YES  [ ] NO    Care Discussed with Consultants/Other Providers [ ] YES  [ ] NO

## 2024-08-28 NOTE — PROGRESS NOTE ADULT - SUBJECTIVE AND OBJECTIVE BOX
[facility] NEPHROLOGY SERVICES, [facility]  NEPHROLOGY AND HYPERTENSION  [address]  [address]  [place] [address]  [phone]    MD YISEL WEEKS MD YELENA ROSENBERG, MD BINNY KOSHY, MD CHRISTOPHER CAPUTO, MD EDWARD BOVER, MD          Patient events noted    MEDICATIONS  (STANDING):  ammonium lactate 12% Lotion 1 Application(s) Topical every 12 hours  apixaban 5 milliGRAM(s) Oral two times a day  AQUAPHOR (petrolatum Ointment) 1 Application(s) Topical daily  atorvastatin 40 milliGRAM(s) Oral at bedtime  cefTRIAXone   IVPB 1000 milliGRAM(s) IV Intermittent every 24 hours  chlorhexidine 2% Cloths 1 Application(s) Topical <User Schedule>  dextrose 5%. 1000 milliLiter(s) (100 mL/Hr) IV Continuous <Continuous>  dextrose 5%. 1000 milliLiter(s) (50 mL/Hr) IV Continuous <Continuous>  dextrose 50% Injectable 25 Gram(s) IV Push once  dextrose 50% Injectable 12.5 Gram(s) IV Push once  dextrose 50% Injectable 25 Gram(s) IV Push once  doxazosin 2 milliGRAM(s) Oral at bedtime  glucagon  Injectable 1 milliGRAM(s) IntraMuscular once  hydrALAZINE 50 milliGRAM(s) Oral every 8 hours  insulin glargine Injectable (LANTUS) 25 Unit(s) SubCutaneous at bedtime  insulin lispro (ADMELOG) corrective regimen sliding scale   SubCutaneous at bedtime  insulin lispro (ADMELOG) corrective regimen sliding scale   SubCutaneous three times a day before meals  insulin lispro Injectable (ADMELOG) 6 Unit(s) SubCutaneous three times a day before meals  melatonin 3 milliGRAM(s) Oral at bedtime  pantoprazole    Tablet 40 milliGRAM(s) Oral before breakfast  sevelamer carbonate 1600 milliGRAM(s) Oral three times a day with meals    MEDICATIONS  (PRN):  acetaminophen     Tablet .. 650 milliGRAM(s) Oral every 6 hours PRN Temp greater or equal to 38C (100.4F), Moderate Pain (4 - 6)  dextrose Oral Gel 15 Gram(s) Oral once PRN Blood Glucose LESS THAN 70 milliGRAM(s)/deciliter  polyethylene glycol 3350 17 Gram(s) Oral daily PRN Constipation      08-27-24 @ 07:01  -  08-28-24 @ 07:00  --------------------------------------------------------  IN: 840 mL / OUT: 1800 mL / NET: -960 mL    08-28-24 @ 07:01  -  08-28-24 @ 17:26  --------------------------------------------------------  IN: 660 mL / OUT: 300 mL / NET: 360 mL      PHYSICAL EXAM:      T(C): 36.8 (08-28-24 @ 16:52), Max: 36.8 (08-28-24 @ 16:52)  HR: 92 (08-28-24 @ 16:52) (56 - 92)  BP: 119/70 (08-28-24 @ 16:52) (119/70 - 180/80)  RR: 18 (08-28-24 @ 16:52) (17 - 18)  SpO2: 92% (08-28-24 @ 16:52) (92% - 97%)  Wt(kg): --  Lungs clear  Heart S1S2  Abd soft NT ND  Extremities:   1 edema  skin diffuse excoriations                                   9.2    9.44  )-----------( 183      ( 28 Aug 2024 06:48 )             29.1     08-28    139  |  108  |  98<H>  ----------------------------<  79  5.3   |  24  |  4.60<H>    Ca    8.9      28 Aug 2024 06:48  Phos  5.9     08-27  Mg     1.4     08-28          Creatinine Trend: 4.60<--, 5.73<--, 5.72<--, 5.42<--    Assessment     Suspected CKD 5-6;   Severe uremic pruritis with excoriations, r/o superinfection       Plan  HD for today, 3 hours   Derm, ID eval  Replete Mg  Moderated HTN regimen to allow UF with hemodialysis     Augusto Santoyo MD

## 2024-08-28 NOTE — PROGRESS NOTE ADULT - ASSESSMENT
73 year old male PMH HTN, HLD, DM, CHF, CKD stage IV, dementia / anxiety BIBEMS accompanied by wife due to weakness. Per wife, pt not feeling well since last night - went to bed prior to his own birthday party. Per EMS, pt complaining of decreased L vision and decreased L hearing as well as noted off balance with ambulation to restroom this AM. Pt states L eye is usually his good eye, and still with better vision in L than R, but not as good as usual. Pt reports chronic itching rash to body x past 3 months, f/u'd with Derm, was told likely 2/2 renal disease. Hearing later normalized in ER.     ##ESRD   ##HyperK  Temp cath placed and patient to initiate Dialysis here 2/2 Uremia and hyperkalemic. Temp cath placed yesterday IR.   - C/w Inpatient dialysis initiation    - Will need IR tunneled cath  - Pending O/p Dialysis slot   - Nephro following     ##Hypothermic  ##SSTI  Noted to be hypothermic to 92F. CT abd No evidence of acute inflammatory or obstructive process in the   abdomen and pelvis. UA positive nitrites, LEs.   - Check blood cultures, Urine culture, TSH  - Empirically treat with IV Abx  - C/w B[place]hugger     ##Type 2 DM  A1c 7; C/w Lantus 25 + 6U w/ meals + SSI  - Hypoglycemia protocol     #Pulm nodule   - CT noted There are scattered nodules at the visualized lung bases measuring up to 6 mm  CT chest reviewed will need follow up in 3-6  months     #HTN  - C/w cardura, hydralazine     #HLD  - C/w statin     #History of DVT  - C/w Eliquis     Diet: renal, CCD  DVT prophylaxis: eliquis   Dispo: Tele   Code Status: FC

## 2024-08-29 ENCOUNTER — TRANSCRIPTION ENCOUNTER (OUTPATIENT)
Age: 74
End: 2024-08-29

## 2024-08-29 LAB
ALBUMIN SERPL ELPH-MCNC: 2.6 G/DL — LOW (ref 3.3–5)
ALP SERPL-CCNC: 59 U/L — SIGNIFICANT CHANGE UP (ref 40–120)
ALT FLD-CCNC: 26 U/L — SIGNIFICANT CHANGE UP (ref 12–78)
ANION GAP SERPL CALC-SCNC: 7 MMOL/L — SIGNIFICANT CHANGE UP (ref 5–17)
AST SERPL-CCNC: 20 U/L — SIGNIFICANT CHANGE UP (ref 15–37)
BILIRUB SERPL-MCNC: 0.4 MG/DL — SIGNIFICANT CHANGE UP (ref 0.2–1.2)
BUN SERPL-MCNC: 63 MG/DL — HIGH (ref 7–23)
CALCIUM SERPL-MCNC: 8.3 MG/DL — LOW (ref 8.5–10.1)
CHLORIDE SERPL-SCNC: 102 MMOL/L — SIGNIFICANT CHANGE UP (ref 96–108)
CO2 SERPL-SCNC: 28 MMOL/L — SIGNIFICANT CHANGE UP (ref 22–31)
CREAT SERPL-MCNC: 4.07 MG/DL — HIGH (ref 0.5–1.3)
EGFR: 15 ML/MIN/1.73M2 — LOW
GLUCOSE BLDC GLUCOMTR-MCNC: 109 MG/DL — HIGH (ref 70–99)
GLUCOSE BLDC GLUCOMTR-MCNC: 139 MG/DL — HIGH (ref 70–99)
GLUCOSE BLDC GLUCOMTR-MCNC: 198 MG/DL — HIGH (ref 70–99)
GLUCOSE BLDC GLUCOMTR-MCNC: 253 MG/DL — HIGH (ref 70–99)
GLUCOSE SERPL-MCNC: 121 MG/DL — HIGH (ref 70–99)
HCT VFR BLD CALC: 29.6 % — LOW (ref 39–50)
HGB BLD-MCNC: 9.4 G/DL — LOW (ref 13–17)
MAGNESIUM SERPL-MCNC: 1.5 MG/DL — LOW (ref 1.6–2.6)
MCHC RBC-ENTMCNC: 27.9 PG — SIGNIFICANT CHANGE UP (ref 27–34)
MCHC RBC-ENTMCNC: 31.8 G/DL — LOW (ref 32–36)
MCV RBC AUTO: 87.8 FL — SIGNIFICANT CHANGE UP (ref 80–100)
NRBC # BLD: 0 /100 WBCS — SIGNIFICANT CHANGE UP (ref 0–0)
PHOSPHATE SERPL-MCNC: 4.3 MG/DL — SIGNIFICANT CHANGE UP (ref 2.5–4.5)
PLATELET # BLD AUTO: 191 K/UL — SIGNIFICANT CHANGE UP (ref 150–400)
POTASSIUM SERPL-MCNC: 4.7 MMOL/L — SIGNIFICANT CHANGE UP (ref 3.5–5.3)
POTASSIUM SERPL-SCNC: 4.7 MMOL/L — SIGNIFICANT CHANGE UP (ref 3.5–5.3)
PROT SERPL-MCNC: 6.8 GM/DL — SIGNIFICANT CHANGE UP (ref 6–8.3)
RBC # BLD: 3.37 M/UL — LOW (ref 4.2–5.8)
RBC # FLD: 16.4 % — HIGH (ref 10.3–14.5)
SODIUM SERPL-SCNC: 137 MMOL/L — SIGNIFICANT CHANGE UP (ref 135–145)
WBC # BLD: 10.4 K/UL — SIGNIFICANT CHANGE UP (ref 3.8–10.5)
WBC # FLD AUTO: 10.4 K/UL — SIGNIFICANT CHANGE UP (ref 3.8–10.5)

## 2024-08-29 PROCEDURE — 99232 SBSQ HOSP IP/OBS MODERATE 35: CPT

## 2024-08-29 RX ADMIN — Medication 40 MILLIGRAM(S): at 05:19

## 2024-08-29 RX ADMIN — Medication 50 MILLIGRAM(S): at 05:19

## 2024-08-29 RX ADMIN — INSULIN GLARGINE 25 UNIT(S): 100 INJECTION, SOLUTION SUBCUTANEOUS at 21:13

## 2024-08-29 RX ADMIN — Medication 6 UNIT(S): at 17:03

## 2024-08-29 RX ADMIN — Medication 6 UNIT(S): at 11:41

## 2024-08-29 RX ADMIN — SEVELAMER CARBONATE 1600 MILLIGRAM(S): 800 TABLET, FILM COATED ORAL at 11:59

## 2024-08-29 RX ADMIN — Medication 2 MILLIGRAM(S): at 21:12

## 2024-08-29 RX ADMIN — CHLORHEXIDINE GLUCONATE 1 APPLICATION(S): 40 SOLUTION TOPICAL at 05:18

## 2024-08-29 RX ADMIN — Medication 3 MILLIGRAM(S): at 21:12

## 2024-08-29 RX ADMIN — Medication 40 MILLIGRAM(S): at 21:12

## 2024-08-29 RX ADMIN — Medication 2: at 17:02

## 2024-08-29 RX ADMIN — Medication 50 MILLIGRAM(S): at 21:12

## 2024-08-29 RX ADMIN — APIXABAN 5 MILLIGRAM(S): 5 TABLET, FILM COATED ORAL at 05:19

## 2024-08-29 RX ADMIN — SEVELAMER CARBONATE 1600 MILLIGRAM(S): 800 TABLET, FILM COATED ORAL at 17:04

## 2024-08-29 RX ADMIN — Medication 6 UNIT(S): at 07:50

## 2024-08-29 RX ADMIN — Medication 1 APPLICATION(S): at 05:19

## 2024-08-29 RX ADMIN — SEVELAMER CARBONATE 1600 MILLIGRAM(S): 800 TABLET, FILM COATED ORAL at 07:49

## 2024-08-29 RX ADMIN — Medication 1 APPLICATION(S): at 17:04

## 2024-08-29 RX ADMIN — Medication 100 MILLIGRAM(S): at 16:58

## 2024-08-29 RX ADMIN — Medication 2: at 21:13

## 2024-08-29 NOTE — PROGRESS NOTE ADULT - SUBJECTIVE AND OBJECTIVE BOX
[facility] NEPHROLOGY SERVICES, [facility]  NEPHROLOGY AND HYPERTENSION  [address]  [address]  [place] [address]  [phone]    MD YISEL WEEKS MD YELENA ROSENBERG, MD BINNY KOSHY, MD CHRISTOPHER CAPUTO, MD EDWARD BOVER, MD          Patient events noted    MEDICATIONS  (STANDING):  ammonium lactate 12% Lotion 1 Application(s) Topical every 12 hours  apixaban 5 milliGRAM(s) Oral two times a day  AQUAPHOR (petrolatum Ointment) 1 Application(s) Topical daily  atorvastatin 40 milliGRAM(s) Oral at bedtime  cefTRIAXone   IVPB 1000 milliGRAM(s) IV Intermittent every 24 hours  chlorhexidine 2% Cloths 1 Application(s) Topical <User Schedule>  dextrose 5%. 1000 milliLiter(s) (50 mL/Hr) IV Continuous <Continuous>  dextrose 5%. 1000 milliLiter(s) (100 mL/Hr) IV Continuous <Continuous>  dextrose 50% Injectable 12.5 Gram(s) IV Push once  dextrose 50% Injectable 25 Gram(s) IV Push once  dextrose 50% Injectable 25 Gram(s) IV Push once  doxazosin 2 milliGRAM(s) Oral at bedtime  glucagon  Injectable 1 milliGRAM(s) IntraMuscular once  hydrALAZINE 50 milliGRAM(s) Oral every 8 hours  insulin glargine Injectable (LANTUS) 25 Unit(s) SubCutaneous at bedtime  insulin lispro (ADMELOG) corrective regimen sliding scale   SubCutaneous at bedtime  insulin lispro (ADMELOG) corrective regimen sliding scale   SubCutaneous three times a day before meals  insulin lispro Injectable (ADMELOG) 6 Unit(s) SubCutaneous three times a day before meals  melatonin 3 milliGRAM(s) Oral at bedtime  pantoprazole    Tablet 40 milliGRAM(s) Oral before breakfast  sevelamer carbonate 1600 milliGRAM(s) Oral three times a day with meals    MEDICATIONS  (PRN):  acetaminophen     Tablet .. 650 milliGRAM(s) Oral every 6 hours PRN Temp greater or equal to 38C (100.4F), Moderate Pain (4 - 6)  dextrose Oral Gel 15 Gram(s) Oral once PRN Blood Glucose LESS THAN 70 milliGRAM(s)/deciliter  polyethylene glycol 3350 17 Gram(s) Oral daily PRN Constipation      08-28-24 @ 07:01  -  08-29-24 @ 07:00  --------------------------------------------------------  IN: 880 mL / OUT: 1500 mL / NET: -620 mL    08-29-24 @ 07:01  -  08-29-24 @ 20:40  --------------------------------------------------------  IN: 600 mL / OUT: 1500 mL / NET: -900 mL      PHYSICAL EXAM:      T(C): 36.6 (08-29-24 @ 16:07), Max: 37.6 (08-29-24 @ 04:47)  HR: 91 (08-29-24 @ 16:07) (65 - 97)  BP: 115/73 (08-29-24 @ 16:07) (102/66 - 152/89)  RR: 18 (08-29-24 @ 16:07) (16 - 18)  SpO2: 97% (08-29-24 @ 16:07) (94% - 97%)  Wt(kg): --  Lungs clear  Heart S1S2  Abd soft NT ND  Extremities:   tr edema  Skin excoriations healing                                   9.4    10.40 )-----------( 191      ( 29 Aug 2024 12:18 )             29.6     08-29    137  |  102  |  63<H>  ----------------------------<  121<H>  4.7   |  28  |  4.07<H>    Ca    8.3<L>      29 Aug 2024 12:18  Phos  4.3     08-29  Mg     1.5     08-29    TPro  6.8  /  Alb  2.6<L>  /  TBili  0.4  /  DBili  x   /  AST  20  /  ALT  26  /  AlkPhos  59  08-29      LIVER FUNCTIONS - ( 29 Aug 2024 12:18 )  Alb: 2.6 g/dL / Pro: 6.8 gm/dL / ALK PHOS: 59 U/L / ALT: 26 U/L / AST: 20 U/L / GGT: x           Creatinine Trend: 4.07<--, 4.60<--, 5.73<--, 5.72<--, 5.42<--      Assessment     Suspected CKD 5-6;   Severe uremic pruritis with excoriations, r/o superinfection       Plan  HD for today, 3 hours   Replete Mg  Moderated HTN regimen to allow UF with hemodialysis     Augusto Santoyo MD

## 2024-08-29 NOTE — CONSULT NOTE ADULT - SUBJECTIVE AND OBJECTIVE BOX
Interventional Radiology    Evaluate for Procedure: tunneled HD catheter placement    HPI: 74y Male with PMH HTN, HLD, DM, CKD, CHF BIBEMS weakness. Afebrile, VSS. Suspected CKD 5-6; Severe uremic pruritis with excoriations, r/o superinfection. Plan per nephrology to initiate HD this admission. s/p nontunneled HD catheter placement on Monday 8/26. IR now consulted for conversion to permcath.     Allergies: No Known Allergies    Medications (Abx/Cardiac/Anticoagulation/Blood Products)    apixaban: 5 milliGRAM(s) Oral (08-29 @ 05:19)  cefTRIAXone   IVPB: 100 mL/Hr IV Intermittent (08-28 @ 17:15)  doxazosin: 2 milliGRAM(s) Oral (08-28 @ 21:42)  hydrALAZINE: 50 milliGRAM(s) Oral (08-29 @ 05:19)  vancomycin  IVPB: 250 mL/Hr IV Intermittent (08-27 @ 16:43)    Data:    T(C): 36.6  HR: 88  BP: 152/89  RR: 16  SpO2: 96%    -WBC 10.40 / HgB 9.4 / Hct 29.6 / Plt 191  -Na 139 / Cl 108 / BUN 98 / Glucose 79  -K 5.3 / CO2 24 / Cr 4.60  -ALT -- / Alk Phos -- / T.Bili --  -INR -- / PTT 69.3    Radiology: Reviewed    Assessment/Plan:   -74y Male with HTN, HLD, DM, CKD, CHF BIBEMS weakness. Afebrile, VSS. Suspected CKD 5-6; Severe uremic pruritis with excoriations, r/o superinfection. Plan per nephrology to initiate HD this admission. s/p nontunneled HD catheter placement on Monday 8/26. IR now consulted for conversion to permcath.       - case and imaging reviewed  - Will plan for conversion of tempcath to permcath tomorrow  - please place IR procedure order under [facility]  - STAT labs in AM (cbc,coags, bmp, T&S)  - hold eliquis 24 hours prior to procedure.   - NPO after midnight  - d/w primary team, Dr. Santoyo      Interventional Radiology  ------------------------------------  For emergent consults, please call [phone], or call or message on TEAMs.   For non-emergent consults, consults after hours or over the weekend, please place IR Consult order.

## 2024-08-29 NOTE — PROGRESS NOTE ADULT - SUBJECTIVE AND OBJECTIVE BOX
HPI:  73 year old male PMH HTN, HLD, DM, CHF, CKD stage IV, dementia / anxiety BIBEMS accompanied by wife due to weakness. Per wife, pt not feeling well since last night - went to bed prior to his own birthday party. Per EMS, pt complaining of decreased L vision and decreased L hearing as well as noted off balance with ambulation to restroom this AM. Pt states L eye is usually his good eye, and still with better vision in L than R, but not as good as usual. Pt reports chronic itching rash to body x past 3 months, f/u'd with Derm, was told likely 2/2 renal disease. Hearing later normalized in ER.     Note: Has left BKA.  (26 Aug 2024 16:46)  Patient is a 74y old  Male who presents with a chief complaint of Weakness, progression of CKD (30 Aug 2024 19:51)      INTERVAL HPI/OVERNIGHT EVENTS: no acute events     MEDICATIONS  (STANDING):  ammonium lactate 12% Lotion 1 Application(s) Topical every 12 hours  apixaban 5 milliGRAM(s) Oral two times a day  AQUAPHOR (petrolatum Ointment) 1 Application(s) Topical daily  atorvastatin 40 milliGRAM(s) Oral at bedtime  cefepime   IVPB      chlorhexidine 2% Cloths 1 Application(s) Topical <User Schedule>  dextrose 5%. 1000 milliLiter(s) (50 mL/Hr) IV Continuous <Continuous>  dextrose 5%. 1000 milliLiter(s) (100 mL/Hr) IV Continuous <Continuous>  dextrose 50% Injectable 25 Gram(s) IV Push once  dextrose 50% Injectable 12.5 Gram(s) IV Push once  dextrose 50% Injectable 25 Gram(s) IV Push once  doxazosin 2 milliGRAM(s) Oral at bedtime  glucagon  Injectable 1 milliGRAM(s) IntraMuscular once  hydrALAZINE 50 milliGRAM(s) Oral every 8 hours  insulin glargine Injectable (LANTUS) 25 Unit(s) SubCutaneous at bedtime  insulin lispro (ADMELOG) corrective regimen sliding scale   SubCutaneous three times a day before meals  insulin lispro (ADMELOG) corrective regimen sliding scale   SubCutaneous at bedtime  insulin lispro Injectable (ADMELOG) 6 Unit(s) SubCutaneous three times a day before meals  melatonin 3 milliGRAM(s) Oral at bedtime  pantoprazole    Tablet 40 milliGRAM(s) Oral before breakfast  sevelamer carbonate 1600 milliGRAM(s) Oral three times a day with meals    MEDICATIONS  (PRN):  acetaminophen     Tablet .. 650 milliGRAM(s) Oral every 6 hours PRN Temp greater or equal to 38C (100.4F), Moderate Pain (4 - 6)  dextrose Oral Gel 15 Gram(s) Oral once PRN Blood Glucose LESS THAN 70 milliGRAM(s)/deciliter  polyethylene glycol 3350 17 Gram(s) Oral daily PRN Constipation      Allergies    No Known Allergies    Intolerances        REVIEW OF SYSTEMS:  CONSTITUTIONAL: No fever, weight loss, or fatigue  EYES: No eye pain, visual disturbances, or discharge  ENMT:  No difficulty hearing, tinnitus, vertigo; No sinus or throat pain  NECK: No pain or stiffness  BREASTS: No pain, masses, or nipple discharge  RESPIRATORY: No cough, wheezing, chills or hemoptysis; No shortness of breath  CARDIOVASCULAR: No chest pain, palpitations, dizziness, or leg swelling  GASTROINTESTINAL: No abdominal or epigastric pain. No nausea, vomiting, or hematemesis; No diarrhea or constipation. No melena or hematochezia.  GENITOURINARY: No dysuria, frequency, hematuria, or incontinence  NEUROLOGICAL: No headaches, memory loss, loss of strength, numbness, or tremors  SKIN: No itching, burning, rashes, or lesions   LYMPH NODES: No enlarged glands  ENDOCRINE: No heat or cold intolerance; No hair loss  MUSCULOSKELETAL: No joint pain or swelling; No muscle, back, or extremity pain  PSYCHIATRIC: No depression, anxiety, mood swings, or difficulty sleeping  HEME/LYMPH: No easy bruising, or bleeding gums  ALLERGY AND IMMUNOLOGIC: No hives or eczema    Vital Signs Last 24 Hrs  T(C): 36.2 (30 Aug 2024 16:24), Max: 36.7 (30 Aug 2024 04:30)  T(F): 97.2 (30 Aug 2024 16:24), Max: 98 (30 Aug 2024 04:30)  HR: 89 (30 Aug 2024 21:41) (77 - 96)  BP: 128/66 (30 Aug 2024 21:41) (104/60 - 147/68)  BP(mean): --  RR: 18 (30 Aug 2024 16:24) (18 - 18)  SpO2: 98% (30 Aug 2024 16:24) (96% - 99%)        PHYSICAL EXAM:  GENERAL: NAD, well-groomed, well-developed  HEAD:  Atraumatic, Normocephalic  EYES: EOMI, PERRLA, conjunctiva and sclera clear  ENMT: No tonsillar erythema, exudates, or enlargement; Moist mucous membranes, Good dentition, No lesions  NECK: Supple, No JVD, Normal thyroid  NERVOUS SYSTEM:  Alert & Oriented X3, Good concentration; Motor Strength 5/5 B/L upper and lower extremities; DTRs 2+ intact and symmetric  CHEST/LUNG: Clear to ascultation  bilaterally; No rales, rhonchi, wheezing, or rubs  HEART: Regular rate and rhythm; No murmurs, rubs, or gallops  ABDOMEN: Soft, Nontender, Nondistended; Bowel sounds present  EXTREMITIES: left BKA LYMPH: No lymphadenopathy noted  SKIN: No rashes or lesions    LABS:                        10.1   13.30 )-----------( 170      ( 30 Aug 2024 06:26 )             32.2     08-30    138  |  103  |  56<H>  ----------------------------<  117<H>  4.1   |  28  |  3.63<H>    Ca    8.6      30 Aug 2024 06:26  Phos  4.3     08-29  Mg     1.5     08-29    TPro  6.8  /  Alb  2.6<L>  /  TBili  0.4  /  DBili  x   /  AST  20  /  ALT  26  /  AlkPhos  59  08-29    PT/INR - ( 30 Aug 2024 06:26 )   PT: 12.8 sec;   INR: 1.08 ratio         PTT - ( 30 Aug 2024 06:26 )  PTT:33.0 sec  Urinalysis Basic - ( 30 Aug 2024 06:26 )    Color: x / Appearance: x / SG: x / pH: x  Gluc: 117 mg/dL / Ketone: x  / Bili: x / Urobili: x   Blood: x / Protein: x / Nitrite: x   Leuk Esterase: x / RBC: x / WBC x   Sq Epi: x / Non Sq Epi: x / Bacteria: x      CAPILLARY BLOOD GLUCOSE      POCT Blood Glucose.: 240 mg/dL (30 Aug 2024 21:18)  POCT Blood Glucose.: 158 mg/dL (30 Aug 2024 16:19)  POCT Blood Glucose.: 109 mg/dL (30 Aug 2024 11:03)  POCT Blood Glucose.: 114 mg/dL (30 Aug 2024 07:54)      RADIOLOGY & ADDITIONAL TESTS:    Imaging Personally Reviewed:  [ ] YES  [ ] NO    Consultant(s) Notes Reviewed:  [ ] YES  [ ] NO    Care Discussed with Consultants/Other Providers [ ] YES  [ ] NO

## 2024-08-30 LAB
ANION GAP SERPL CALC-SCNC: 7 MMOL/L — SIGNIFICANT CHANGE UP (ref 5–17)
APTT BLD: 33 SEC — SIGNIFICANT CHANGE UP (ref 24.5–35.6)
BUN SERPL-MCNC: 56 MG/DL — HIGH (ref 7–23)
CALCIUM SERPL-MCNC: 8.6 MG/DL — SIGNIFICANT CHANGE UP (ref 8.5–10.1)
CHLORIDE SERPL-SCNC: 103 MMOL/L — SIGNIFICANT CHANGE UP (ref 96–108)
CO2 SERPL-SCNC: 28 MMOL/L — SIGNIFICANT CHANGE UP (ref 22–31)
CREAT SERPL-MCNC: 3.63 MG/DL — HIGH (ref 0.5–1.3)
EGFR: 17 ML/MIN/1.73M2 — LOW
GLUCOSE BLDC GLUCOMTR-MCNC: 109 MG/DL — HIGH (ref 70–99)
GLUCOSE BLDC GLUCOMTR-MCNC: 114 MG/DL — HIGH (ref 70–99)
GLUCOSE BLDC GLUCOMTR-MCNC: 158 MG/DL — HIGH (ref 70–99)
GLUCOSE BLDC GLUCOMTR-MCNC: 240 MG/DL — HIGH (ref 70–99)
GLUCOSE SERPL-MCNC: 117 MG/DL — HIGH (ref 70–99)
HCT VFR BLD CALC: 32.2 % — LOW (ref 39–50)
HGB BLD-MCNC: 10.1 G/DL — LOW (ref 13–17)
INR BLD: 1.08 RATIO — SIGNIFICANT CHANGE UP (ref 0.85–1.18)
MCHC RBC-ENTMCNC: 27.4 PG — SIGNIFICANT CHANGE UP (ref 27–34)
MCHC RBC-ENTMCNC: 31.4 G/DL — LOW (ref 32–36)
MCV RBC AUTO: 87.5 FL — SIGNIFICANT CHANGE UP (ref 80–100)
NRBC # BLD: 0 /100 WBCS — SIGNIFICANT CHANGE UP (ref 0–0)
PLATELET # BLD AUTO: 170 K/UL — SIGNIFICANT CHANGE UP (ref 150–400)
POTASSIUM SERPL-MCNC: 4.1 MMOL/L — SIGNIFICANT CHANGE UP (ref 3.5–5.3)
POTASSIUM SERPL-SCNC: 4.1 MMOL/L — SIGNIFICANT CHANGE UP (ref 3.5–5.3)
PROTHROM AB SERPL-ACNC: 12.8 SEC — SIGNIFICANT CHANGE UP (ref 9.5–13)
RBC # BLD: 3.68 M/UL — LOW (ref 4.2–5.8)
RBC # FLD: 16.1 % — HIGH (ref 10.3–14.5)
SODIUM SERPL-SCNC: 138 MMOL/L — SIGNIFICANT CHANGE UP (ref 135–145)
WBC # BLD: 13.3 K/UL — HIGH (ref 3.8–10.5)
WBC # FLD AUTO: 13.3 K/UL — HIGH (ref 3.8–10.5)

## 2024-08-30 PROCEDURE — 99232 SBSQ HOSP IP/OBS MODERATE 35: CPT

## 2024-08-30 PROCEDURE — 99222 1ST HOSP IP/OBS MODERATE 55: CPT

## 2024-08-30 RX ORDER — CEFEPIME 2 G/1
INJECTION, POWDER, FOR SOLUTION INTRAVENOUS
Refills: 0 | Status: DISCONTINUED | OUTPATIENT
Start: 2024-08-30 | End: 2024-09-11

## 2024-08-30 RX ORDER — CEFEPIME 2 G/1
1000 INJECTION, POWDER, FOR SOLUTION INTRAVENOUS ONCE
Refills: 0 | Status: COMPLETED | OUTPATIENT
Start: 2024-08-30 | End: 2024-08-30

## 2024-08-30 RX ORDER — CEFEPIME 2 G/1
1000 INJECTION, POWDER, FOR SOLUTION INTRAVENOUS EVERY 24 HOURS
Refills: 0 | Status: DISCONTINUED | OUTPATIENT
Start: 2024-08-31 | End: 2024-09-11

## 2024-08-30 RX ADMIN — Medication 3 MILLIGRAM(S): at 21:54

## 2024-08-30 RX ADMIN — Medication 40 MILLIGRAM(S): at 21:53

## 2024-08-30 RX ADMIN — Medication 100 MILLIGRAM(S): at 15:53

## 2024-08-30 RX ADMIN — Medication 2 MILLIGRAM(S): at 21:53

## 2024-08-30 RX ADMIN — ACETAMINOPHEN 650 MILLIGRAM(S): 325 TABLET ORAL at 08:09

## 2024-08-30 RX ADMIN — INSULIN GLARGINE 25 UNIT(S): 100 INJECTION, SOLUTION SUBCUTANEOUS at 21:54

## 2024-08-30 RX ADMIN — SEVELAMER CARBONATE 1600 MILLIGRAM(S): 800 TABLET, FILM COATED ORAL at 17:09

## 2024-08-30 RX ADMIN — ACETAMINOPHEN 650 MILLIGRAM(S): 325 TABLET ORAL at 09:02

## 2024-08-30 RX ADMIN — Medication 1 APPLICATION(S): at 05:27

## 2024-08-30 RX ADMIN — APIXABAN 5 MILLIGRAM(S): 5 TABLET, FILM COATED ORAL at 17:10

## 2024-08-30 RX ADMIN — SEVELAMER CARBONATE 1600 MILLIGRAM(S): 800 TABLET, FILM COATED ORAL at 11:39

## 2024-08-30 RX ADMIN — Medication 40 MILLIGRAM(S): at 05:27

## 2024-08-30 RX ADMIN — PETROLATUM 1 APPLICATION(S): 865 OINTMENT TOPICAL at 12:49

## 2024-08-30 RX ADMIN — Medication 1 APPLICATION(S): at 17:17

## 2024-08-30 RX ADMIN — Medication 2: at 16:50

## 2024-08-30 RX ADMIN — Medication 50 MILLIGRAM(S): at 13:29

## 2024-08-30 RX ADMIN — Medication 50 MILLIGRAM(S): at 05:27

## 2024-08-30 RX ADMIN — CHLORHEXIDINE GLUCONATE 1 APPLICATION(S): 40 SOLUTION TOPICAL at 05:27

## 2024-08-30 NOTE — CONSULT NOTE ADULT - SUBJECTIVE AND OBJECTIVE BOX
[facility]  INFECTIOUS DISEASES   [address] [place] [address]  Tel: [phone]     Fax: [phone]  ==============================================================================  DO Krzysztof Bernard MD Alexandra Gutman, NP   ==============================================================================    ROCCO JC  MRN-[id number]  Male  74y ([birth year])        Patient is a 74y old  Male who presents with a chief complaint of Weakness, progression of CKD (30 Aug 2024 18:04)      HPI:  73 year old male PMH HTN, HLD, DM, CHF, CKD stage IV, dementia / anxiety BIBEMS accompanied by wife due to weakness. Per wife, pt not feeling well since last night - went to bed prior to his own birthday party. Per EMS, pt complaining of decreased L vision and decreased L hearing as well as noted off balance with ambulation to restroom this AM. Pt states L eye is usually his good eye, and still with better vision in L than R, but not as good as usual. Pt reports chronic itching rash to body x past 3 months, f/u'd with Derm, was told likely 2/2 renal disease. Hearing later normalized in ER.     Note: Has left BKA.  (26 Aug 2024 16:46)    found to have severe EMLER needing hemodialysis   has a full body rash   has had confusion   generalized not feeling well  vision and hearing getting worse  no urinary complaints but was confused when came in  was supposed to get permacath but wbc rising   ID consulted for workup and antibiotic management     PAST MEDICAL & SURGICAL HISTORY:  Hypertension, unspecified type      Type 2 diabetes mellitus with other neurologic complication, without long-term current use of insulin      DM (diabetes mellitus)      HTN (hypertension)      HLD (hyperlipidemia)      Afib      Retinopathy      Chronic diastolic congestive heart failure      Chronic kidney disease, unspecified CKD stage      Amputation of leg, traumatic, left, subsequent encounter      S/P BKA (below knee amputation) unilateral, left      S/P hip replacement, left      History of surgery on arm          Allergies  No Known Allergies        ANTIMICROBIALS:  cefTRIAXone   IVPB 1000 every 24 hours      MEDICATIONS  (STANDING):  cefTRIAXone   IVPB   100 mL/Hr IV Intermittent (08-30-24 @ 15:53)   100 mL/Hr IV Intermittent (08-29-24 @ 16:58)   100 mL/Hr IV Intermittent (08-28-24 @ 17:15)   100 mL/Hr IV Intermittent (08-27-24 @ 15:40)    vancomycin  IVPB   250 mL/Hr IV Intermittent (08-27-24 @ 16:43)        OTHER MEDS: MEDICATIONS  (STANDING):  acetaminophen     Tablet .. 650 every 6 hours PRN  apixaban 5 two times a day  atorvastatin 40 at bedtime  dextrose 50% Injectable 12.5 once  dextrose 50% Injectable 25 once  dextrose 50% Injectable 25 once  dextrose Oral Gel 15 once PRN  doxazosin 2 at bedtime  glucagon  Injectable 1 once  hydrALAZINE 50 every 8 hours  insulin glargine Injectable (LANTUS) 25 at bedtime  insulin lispro (ADMELOG) corrective regimen sliding scale  three times a day before meals  insulin lispro (ADMELOG) corrective regimen sliding scale  at bedtime  insulin lispro Injectable (ADMELOG) 6 three times a day before meals  melatonin 3 at bedtime  pantoprazole    Tablet 40 before breakfast  polyethylene glycol 3350 17 daily PRN      SOCIAL HISTORY:     denies toxic habits     FAMILY HISTORY:  No pertinent family history in first degree relatives    Family history of heart failure (Father)        REVIEW OF SYSTEMS  [  ] ROS unobtainable because:    [x  ] All other systems negative except as noted below:	    Constitutional:  [ ] fever [ ] chills  [ ] weight loss  [ ] weakness  Skin:  [x ] rash [ ] phlebitis	  Eyes: [ ] icterus [ ] pain  [ ] discharge	  ENMT: [ ] sore throat  [ ] thrush [ ] ulcers [ ] exudates  Respiratory: [ ] dyspnea [ ] hemoptysis [ ] cough [ ] sputum	  Cardiovascular:  [ ] chest pain [ ] palpitations [ ] edema	  Gastrointestinal:  [ ] nausea [ ] vomiting [ ] diarrhea [ ] constipation [ ] pain	  Genitourinary:  [ ] dysuria [ ] frequency [ ] hematuria [ ] discharge [ ] flank pain  [ ] incontinence  Musculoskeletal:  [ ] myalgias [ ] arthralgias [ ] arthritis  [ ] back pain  Neurological:  [ ] headache [ ] seizures  [x ] confusion/altered mental status  Psychiatric:  [ ] anxiety [ ] depression	  Hematology/Lymphatics:  [ ] lymphadenopathy  Endocrine:  [ ] adrenal [ ] thyroid  Allergic/Immunologic:	 [ ] transplant [ ] seasonal    Vital Signs Last 24 Hrs  T(F): 97.2 (08-30-24 @ 16:24), Max: 99.6 (08-29-24 @ 04:47)    Vital Signs Last 24 Hrs  HR: 77 (08-30-24 @ 16:24) (77 - 96)  BP: 132/65 (08-30-24 @ 16:24) (104/60 - 147/68)  RR: 18 (08-30-24 @ 16:24)  SpO2: 98% (08-30-24 @ 16:24) (96% - 99%)  Wt(kg): --    PHYSICAL EXAM:  Constitutional: non-toxic, no distress  HEAD/EYES: anicteric, no conjunctival injection, dry and crusting around the eyes, vision impaired   ENT:  supple, no thrush,+right sided temp hemodialysis catheter  Cardiovascular:   normal S1, S2, no murmur, no edema  Respiratory:  clear BS bilaterally, no wheezes, no rales  GI:  soft, non-tender, normal bowel sounds  :  no olsen, no CVA tenderness  Musculoskeletal:  no synovitis, normal ROM, left BKA with healthy appearing stump  Neurologic: awake and alert, normal strength, no focal findings  Skin:  full body rash with areas of bullae, shallow ulcers, crusting,   Heme/Onc: no lymphadenopathy   Psychiatric:  awake, alert, appropriate mood        WBC  WBC Count: 13.30 K/uL (08-30 @ 06:26)  WBC Count: 10.40 K/uL (08-29 @ 12:18)  WBC Count: 9.44 K/uL (08-28 @ 06:48)  WBC Count: 8.49 K/uL (08-27 @ 06:36)  WBC Count: 11.91 K/uL (08-26 @ 09:52)      Auto Neutrophil %: 61.6 % (08-28-24 @ 06:48)  Auto Neutrophil #: 5.81 K/uL (08-28-24 @ 06:48)  Auto Neutrophil %: 65.5 % (08-27-24 @ 06:36)  Auto Neutrophil #: 5.56 K/uL (08-27-24 @ 06:36)  Auto Neutrophil %: 92.0 % *H* (08-26-24 @ 09:52)  Auto Neutrophil #: 10.96 K/uL *H* (08-26-24 @ 09:52)    CBC                        10.1   13.30 )-----------( 170      ( 30 Aug 2024 06:26 )             32.2     BMP/CMP  08-30    138  |  103  |  56<H>  ----------------------------<  117<H>  4.1   |  28  |  3.63<H>    Ca    8.6      30 Aug 2024 06:26  Phos  4.3     08-29  Mg     1.5     08-29    TPro  6.8  /  Alb  2.6<L>  /  TBili  0.4  /  DBili  x   /  AST  20  /  ALT  26  /  AlkPhos  59  08-29    Creatinine Trend  Creatinine Trend: 3.63<--, 4.07<--, 4.60<--, 5.73<--, 5.72<--, 5.42<--                  MICROBIOLOGY:  .Blood Blood-Peripheral  08-27-24   No growth at 48 Hours  --  --      .Blood Blood-Peripheral  08-27-24   No growth at 48 Hours  --  --      Clean Catch Clean Catch (Midstream)  08-27-24   >100,000 CFU/ml Enterobacter cloacae complex  --  --          HBsAb <3.0      [08-27-24 @ 06:36]  HBsAg Nonreact      [08-27-24 @ 06:36]  HCV 0.18, Nonreact      [08-27-24 @ 06:36]          RADIOLOGY:      ACC: [id number] EXAM:  CT BRAIN   ORDERED BY: Becca Carvajal     PROCEDURE DATE:  08/26/2024          INTERPRETATION:  INDICATION:  Vision and hearing disturbance. Altered   mental status.  TECHNIQUE:  A non contrast thin section axial CT study of thebrain was   performed from skull base to vertex. Coronal and sagittal reformations   were generated from the axial data.  COMPARISON EXAMINATION:  CT 6/20/2021    FINDINGS:    HEMISPHERES:  Involutional changes are noted with volume loss. No acute   infarct, hemorrhage, or space-occupying lesion identified. No significant   chronic ischemic changes suggested.  VENTRICLES:  Midline with mild ex vacuo enlargement  POSTERIOR FOSSA:  The brain stem and cerebellum are unremarkable.  No CP   angle lesion noted.  EXTRACEREBRAL SPACES:  No subdural or epidural collections are noted.  SKULL BASE AND CALVARIUM:  Appears intact.  No fracture or destructive   lesion is identified.  SINUSES AND MASTOIDS:  Clear.  MISCELLANEOUS:  No orbital or suprasellar abnormality noted.    IMPRESSION:    1)  mild generalized involutional change and volume loss, commensurate   with age. No acute abnormality suggested. No posterior fossa lesion   identified.  2)  clear sinuses and mastoids    If clinically warranted,follow-up MR imaging of the brain may be   considered for further assessment    --- End of Report ---            KNENY CARTER MD; Attending Radiologist  This document has been electronically signed. Aug 26 2024  9:41AM    ACC: [id number] EXAM:  CT CHEST   ORDERED BY: Shreyas Staley     PROCEDURE DATE:  08/26/2024          INTERPRETATION:  CLINICAL INFORMATION: Pulmonary nodules on CT abdomen   and pelvis.    COMPARISON: None.    CONTRAST/COMPLICATIONS:  IV Contrast: None.  Oral Contrast: None.  Complications: None.    PROCEDURE:  CT of the Chest was performed.  Sagittal and coronal reformats were performed.    FINDINGS:    LUNGS AND LARGE AIRWAYS: Patent central airways. Multiple bilateral   pulmonary nodules. For example:  *  Posterior segment of the right upper lobe (series 3, image 254),   measuring 0.5 cm  *  Lateral segment of the right middle lobe (series 3, image 326),   measuring 0.4 cm  *  Posterior basal nodule in the left lower lobe, measuring 0.9 cm   (series 3, image 537)  *  Superior segment of the left lower lobe (series 3, image 314),   measuring 0.7 cm  PLEURA: No pleural effusion.  VESSELS: Right IJ central line with catheter tip terminating in the   superior vena cava. Atherosclerotic calcifications in the aorta and   coronary arteries.  HEART: Heart size is normal. No pericardial effusion.  MEDIASTINUM AND YAW: No lymphadenopathy.  CHEST WALL AND LOWER NECK: Within normal limits.  VISUALIZED UPPER ABDOMEN: Hypodense right thyroid nodule, measuring 0.7   cm.  BONES: Degenerative changes.    IMPRESSION:  Bilateral pulmonary nodules, measuring up to 0.9 cm in the left lower   lobe. Follow-up CT is recommended in 3-6 months to assess for persistence.    Rizwan Sharp MD  This document has been electronically signed. Aug 27 2024  4:17PM      I have personally reviewed the above imaging

## 2024-08-30 NOTE — PROGRESS NOTE ADULT - SUBJECTIVE AND OBJECTIVE BOX
[facility] NEPHROLOGY SERVICES, [facility]  NEPHROLOGY AND HYPERTENSION  [address]  [address]  [place] [address]  [phone]    MD YISEL WEEKS MD YELENA ROSENBERG, MD BINNY KOSHY, MD CHRISTOPHER CAPUTO, MD EDWARD BOVER, MD          Patient events noted    MEDICATIONS  (STANDING):  ammonium lactate 12% Lotion 1 Application(s) Topical every 12 hours  apixaban 5 milliGRAM(s) Oral two times a day  AQUAPHOR (petrolatum Ointment) 1 Application(s) Topical daily  atorvastatin 40 milliGRAM(s) Oral at bedtime  cefTRIAXone   IVPB 1000 milliGRAM(s) IV Intermittent every 24 hours  chlorhexidine 2% Cloths 1 Application(s) Topical <User Schedule>  dextrose 5%. 1000 milliLiter(s) (50 mL/Hr) IV Continuous <Continuous>  dextrose 5%. 1000 milliLiter(s) (100 mL/Hr) IV Continuous <Continuous>  dextrose 50% Injectable 12.5 Gram(s) IV Push once  dextrose 50% Injectable 25 Gram(s) IV Push once  dextrose 50% Injectable 25 Gram(s) IV Push once  doxazosin 2 milliGRAM(s) Oral at bedtime  glucagon  Injectable 1 milliGRAM(s) IntraMuscular once  hydrALAZINE 50 milliGRAM(s) Oral every 8 hours  insulin glargine Injectable (LANTUS) 25 Unit(s) SubCutaneous at bedtime  insulin lispro (ADMELOG) corrective regimen sliding scale   SubCutaneous three times a day before meals  insulin lispro (ADMELOG) corrective regimen sliding scale   SubCutaneous at bedtime  insulin lispro Injectable (ADMELOG) 6 Unit(s) SubCutaneous three times a day before meals  melatonin 3 milliGRAM(s) Oral at bedtime  pantoprazole    Tablet 40 milliGRAM(s) Oral before breakfast  sevelamer carbonate 1600 milliGRAM(s) Oral three times a day with meals    MEDICATIONS  (PRN):  acetaminophen     Tablet .. 650 milliGRAM(s) Oral every 6 hours PRN Temp greater or equal to 38C (100.4F), Moderate Pain (4 - 6)  dextrose Oral Gel 15 Gram(s) Oral once PRN Blood Glucose LESS THAN 70 milliGRAM(s)/deciliter  polyethylene glycol 3350 17 Gram(s) Oral daily PRN Constipation      08-29-24 @ 07:01  -  08-30-24 @ 07:00  --------------------------------------------------------  IN: 600 mL / OUT: 1500 mL / NET: -900 mL    08-30-24 @ 07:01  -  08-30-24 @ 18:04  --------------------------------------------------------  IN: 410 mL / OUT: 500 mL / NET: -90 mL      PHYSICAL EXAM:      T(C): 36.2 (08-30-24 @ 16:24), Max: 36.7 (08-30-24 @ 04:30)  HR: 77 (08-30-24 @ 16:24) (77 - 96)  BP: 132/65 (08-30-24 @ 16:24) (104/60 - 147/68)  RR: 18 (08-30-24 @ 16:24) (18 - 18)  SpO2: 98% (08-30-24 @ 16:24) (96% - 99%)  Wt(kg): --  Lungs clear  Heart S1S2  Abd soft NT ND  Extremities:   tr edema                                    10.1   13.30 )-----------( 170      ( 30 Aug 2024 06:26 )             32.2     08-30    138  |  103  |  56<H>  ----------------------------<  117<H>  4.1   |  28  |  3.63<H>    Ca    8.6      30 Aug 2024 06:26  Phos  4.3     08-29  Mg     1.5     08-29    TPro  6.8  /  Alb  2.6<L>  /  TBili  0.4  /  DBili  x   /  AST  20  /  ALT  26  /  AlkPhos  59  08-29      LIVER FUNCTIONS - ( 29 Aug 2024 12:18 )  Alb: 2.6 g/dL / Pro: 6.8 gm/dL / ALK PHOS: 59 U/L / ALT: 26 U/L / AST: 20 U/L / GGT: x           Creatinine Trend: 3.63<--, 4.07<--, 4.60<--, 5.73<--, 5.72<--, 5.42<--      Assessment     Suspected CKD 5-6;   Severe uremic pruritis with excoriations, r/o superinfection       Plan  HD for tomorrow 3 hours   ID and cardiology eval requested by IR  Perm cath Don Santoyo MD

## 2024-08-30 NOTE — CHART NOTE - NSCHARTNOTEFT_GEN_A_CORE
Interventional Radiology    Patient was planned for permcath today. New WBC 13k this AM, low grade temp 99.6. D/w Dr. De La Cruz, pending ID consult.   Discussed with Dr. Salomon, will hold off on permcath placement at this time, given unclear infectious status, and skin broken down 2/2 uremic pruritis. Patient also not yet ready for discharge.   Discussed with anesthesia Dr. Sanz, requesting cardiac clearance given history of CHF and last TTE in 2021.   Can tentatively plan for permcath placement Wednesday 9/4 if ready for discharge, pending ID and cardiac clearance.     Discussed with Dr. Reji Cazares PA-C   Interventional Radiology   Available on TEAMs

## 2024-08-30 NOTE — PROGRESS NOTE ADULT - ASSESSMENT
73 year old male PMH HTN, HLD, DM, CHF, CKD stage IV, dementia / anxiety BIBEMS accompanied by wife due to weakness. Per wife, pt not feeling well since last night - went to bed prior to his own birthday party. Per EMS, pt complaining of decreased L vision and decreased L hearing as well as noted off balance with ambulation to restroom this AM. Pt states L eye is usually his good eye, and still with better vision in L than R, but not as good as usual. Pt reports chronic itching rash to body x past 3 months, f/u'd with Derm, was told likely 2/2 renal disease. Hearing later normalized in ER.         skin rash a concern should get derm eval   nephrology and ID consults appreciated   now on cefepime   dispo dependent on perm cath and skin improvoment         ##ESRD   ##HyperK  Temp cath placed and patient to initiate Dialysis here 2/2 Uremia and hyperkalemic. Temp cath placed   - C/w Inpatient dialysis   - Will need IR tunneled cath  - Pending O/p Dialysis slot   - Nephro following     ##Hypothermic resolved     ##SSTI  Noted to be hypothermic to 92F. CT abd No evidence of acute inflammatory or obstructive process in the   abdomen and pelvis. UA positive nitrites, LEs.   improved  now on cefepime     ##Type 2 DM  A1c 7; C/w Lantus 25 + 6U w/ meals + SSI  - Hypoglycemia protocol     #Pulm nodule   - CT noted There are scattered nodules at the visualized lung bases measuring up to 6 mm  CT chest reviewed will need follow up in 3-6  months     #HTN  - C/w cardura, hydralazine     #HLD  - C/w statin     #History of DVT  - C/w Eliquis     Diet: renal, CCD  DVT prophylaxis: eliquis   Dispo: off tele   Code Status: FC

## 2024-08-30 NOTE — CONSULT NOTE ADULT - ASSESSMENT
73 year old male PMH HTN, HLD, DM, CHF, CKD stage IV, dementia / anxiety BIBEMS accompanied by wife due to weakness. Per wife, pt not feeling well since last night - went to bed prior to his own birthday party. Per EMS, pt complaining of decreased L vision and decreased L hearing as well as noted off balance with ambulation to restroom this AM. Pt states L eye is usually his good eye, and still with better vision in L than R, but not as good as usual. Pt reports chronic itching rash to body x past 3 months, f/u'd with Derm, was told likely 2/2 renal disease. Hearing later normalized in ER.     Note: Has left BKA.    wbc went from 10->13  right sided hemodialysis catheter looks good  no need for blood cultures   UA with enterobacter   need to change the ceftriaxone to cefepime, the hemodialysis dose 1g q24hrs     found to have severe ELMER needing hemodialysis   has a full body rash   has had confusion   generalized not feeling well  vision and hearing getting worse  no urinary complaints but was confused when came in  was supposed to get permacath but wbc rising     Plan:  stop ceftriaxone   start cefepime 1g q24hrs  do not place permacath  would like Dermatology to see patient   please send HIV, ANCA, ERIN, RA, lupus profile, hep bsAb  can give atarax to avoid the scratching   please have hemodialysis RN change the dressing on the catheter    Discussed plan with Dr Reji De La Cruz, DO  Chief, Infectious Disease at [facility]  Reachable via [employer] Teams or ID office: [phone]  Weekdays: After 5pm, please call [phone] for all inquiries and new consults  Weekends: Message on-call infectious disease physician via teams (see Jil)

## 2024-08-30 NOTE — PROGRESS NOTE ADULT - SUBJECTIVE AND OBJECTIVE BOX
HPI:  73 year old male PMH HTN, HLD, DM, CHF, CKD stage IV, dementia / anxiety BIBEMS accompanied by wife due to weakness. Per wife, pt not feeling well since last night - went to bed prior to his own birthday party. Per EMS, pt complaining of decreased L vision and decreased L hearing as well as noted off balance with ambulation to restroom this AM. Pt states L eye is usually his good eye, and still with better vision in L than R, but not as good as usual. Pt reports chronic itching rash to body x past 3 months, f/u'd with Derm, was told likely 2/2 renal disease. Hearing later normalized in ER.     Note: Has left BKA.  (26 Aug 2024 16:46)  Patient is a 74y old  Male who presents with a chief complaint of Weakness, progression of CKD (30 Aug 2024 19:51)      INTERVAL HPI/OVERNIGHT EVENTS: continues to complain of itiching. permcath cancelled due  to elevated WBC     MEDICATIONS  (STANDING):  ammonium lactate 12% Lotion 1 Application(s) Topical every 12 hours  apixaban 5 milliGRAM(s) Oral two times a day  AQUAPHOR (petrolatum Ointment) 1 Application(s) Topical daily  atorvastatin 40 milliGRAM(s) Oral at bedtime  cefepime   IVPB      cefepime   IVPB 1000 milliGRAM(s) IV Intermittent once  chlorhexidine 2% Cloths 1 Application(s) Topical <User Schedule>  dextrose 5%. 1000 milliLiter(s) (50 mL/Hr) IV Continuous <Continuous>  dextrose 5%. 1000 milliLiter(s) (100 mL/Hr) IV Continuous <Continuous>  dextrose 50% Injectable 25 Gram(s) IV Push once  dextrose 50% Injectable 12.5 Gram(s) IV Push once  dextrose 50% Injectable 25 Gram(s) IV Push once  doxazosin 2 milliGRAM(s) Oral at bedtime  glucagon  Injectable 1 milliGRAM(s) IntraMuscular once  hydrALAZINE 50 milliGRAM(s) Oral every 8 hours  insulin glargine Injectable (LANTUS) 25 Unit(s) SubCutaneous at bedtime  insulin lispro (ADMELOG) corrective regimen sliding scale   SubCutaneous three times a day before meals  insulin lispro (ADMELOG) corrective regimen sliding scale   SubCutaneous at bedtime  insulin lispro Injectable (ADMELOG) 6 Unit(s) SubCutaneous three times a day before meals  melatonin 3 milliGRAM(s) Oral at bedtime  pantoprazole    Tablet 40 milliGRAM(s) Oral before breakfast  sevelamer carbonate 1600 milliGRAM(s) Oral three times a day with meals    MEDICATIONS  (PRN):  acetaminophen     Tablet .. 650 milliGRAM(s) Oral every 6 hours PRN Temp greater or equal to 38C (100.4F), Moderate Pain (4 - 6)  dextrose Oral Gel 15 Gram(s) Oral once PRN Blood Glucose LESS THAN 70 milliGRAM(s)/deciliter  polyethylene glycol 3350 17 Gram(s) Oral daily PRN Constipation      Allergies    No Known Allergies    Intolerances        REVIEW OF SYSTEMS:  CONSTITUTIONAL: fatigue  EYES: No eye pain, visual disturbances, or discharge  ENMT:  No difficulty hearing, tinnitus, vertigo; No sinus or throat pain  NECK: No pain or stiffness  BREASTS: No pain, masses, or nipple discharge  RESPIRATORY: No cough, wheezing, chills or hemoptysis; No shortness of breath  CARDIOVASCULAR: No chest pain, palpitations, dizziness, or leg swelling  GASTROINTESTINAL: No abdominal or epigastric pain. No nausea, vomiting, or hematemesis; No diarrhea or constipation. No melena or hematochezia.  GENITOURINARY: No dysuria, frequency, hematuria, or incontinence  NEUROLOGICAL: No headaches, memory loss, loss of strength, numbness, or tremors  SKIN: rash and itching   LYMPH NODES: No enlarged glands  ENDOCRINE: No heat or cold intolerance; No hair loss  MUSCULOSKELETAL: No joint pain or swelling; No muscle, back, or extremity pain  PSYCHIATRIC: No depression, anxiety, mood swings, or difficulty sleeping  HEME/LYMPH: No easy bruising, or bleeding gums  ALLERGY AND IMMUNOLOGIC: No hives or eczema    Vital Signs Last 24 Hrs  T(C): 36.2 (30 Aug 2024 16:24), Max: 36.7 (30 Aug 2024 04:30)  T(F): 97.2 (30 Aug 2024 16:24), Max: 98 (30 Aug 2024 04:30)  HR: 89 (30 Aug 2024 21:41) (77 - 96)  BP: 128/66 (30 Aug 2024 21:41) (104/60 - 147/68)  RR: 18 (30 Aug 2024 16:24) (18 - 18)  SpO2: 98% (30 Aug 2024 16:24) (96% - 99%)        PHYSICAL EXAM:  GENERAL: NAD,   HEAD:  Atraumatic, Normocephalic  EYES: EOMI, PERRLA, conjunctiva and sclera clear  ENMT: No tonsillar erythema, exudates, or enlargement; Moist mucous membranes, Good dentition, No lesions  NECK: Supple, No JVD, Normal thyroid  NERVOUS SYSTEM:  Alert & Oriented X3, Good concentration; Motor Strength 5/5 B/L upper and lower extremities; DTRs 2+ intact and symmetric  CHEST/LUNG: Clear to ascultation  bilaterally; No rales, rhonchi, wheezing, or rubs  HEART: Regular rate and rhythm; No murmurs, rubs, or gallops  ABDOMEN: Soft, Nontender, Nondistended; Bowel sounds present  EXTREMITIES:  left BKA   LYMPH: No lymphadenopathy noted  SKIN:rash and lesions with itching   LABS:                        10.1   13.30 )-----------( 170      ( 30 Aug 2024 06:26 )             32.2     08-30    138  |  103  |  56<H>  ----------------------------<  117<H>  4.1   |  28  |  3.63<H>    Ca    8.6      30 Aug 2024 06:26  Phos  4.3     08-29  Mg     1.5     08-29    TPro  6.8  /  Alb  2.6<L>  /  TBili  0.4  /  DBili  x   /  AST  20  /  ALT  26  /  AlkPhos  59  08-29    PT/INR - ( 30 Aug 2024 06:26 )   PT: 12.8 sec;   INR: 1.08 ratio         PTT - ( 30 Aug 2024 06:26 )  PTT:33.0 sec  Urinalysis Basic - ( 30 Aug 2024 06:26 )    Color: x / Appearance: x / SG: x / pH: x  Gluc: 117 mg/dL / Ketone: x  / Bili: x / Urobili: x   Blood: x / Protein: x / Nitrite: x   Leuk Esterase: x / RBC: x / WBC x   Sq Epi: x / Non Sq Epi: x / Bacteria: x      CAPILLARY BLOOD GLUCOSE      POCT Blood Glucose.: 240 mg/dL (30 Aug 2024 21:18)  POCT Blood Glucose.: 158 mg/dL (30 Aug 2024 16:19)  POCT Blood Glucose.: 109 mg/dL (30 Aug 2024 11:03)  POCT Blood Glucose.: 114 mg/dL (30 Aug 2024 07:54)      RADIOLOGY & ADDITIONAL TESTS:    Imaging Personally Reviewed:  [ ] YES  [ ] NO    Consultant(s) Notes Reviewed:  [ ] YES  [ ] NO    Care Discussed with Consultants/Other Providers [ ] YES  [ ] NO

## 2024-08-31 LAB
-  AMOXICILLIN/CLAVULANIC ACID: SIGNIFICANT CHANGE UP
-  AMPICILLIN/SULBACTAM: SIGNIFICANT CHANGE UP
-  AMPICILLIN: SIGNIFICANT CHANGE UP
-  AZTREONAM: SIGNIFICANT CHANGE UP
-  CEFAZOLIN: SIGNIFICANT CHANGE UP
-  CEFEPIME: SIGNIFICANT CHANGE UP
-  CEFOXITIN: SIGNIFICANT CHANGE UP
-  CEFTRIAXONE: SIGNIFICANT CHANGE UP
-  CIPROFLOXACIN: SIGNIFICANT CHANGE UP
-  ERTAPENEM: SIGNIFICANT CHANGE UP
-  GENTAMICIN: SIGNIFICANT CHANGE UP
-  IMIPENEM: SIGNIFICANT CHANGE UP
-  LEVOFLOXACIN: SIGNIFICANT CHANGE UP
-  MEROPENEM: SIGNIFICANT CHANGE UP
-  NITROFURANTOIN: SIGNIFICANT CHANGE UP
-  PIPERACILLIN/TAZOBACTAM: SIGNIFICANT CHANGE UP
-  TOBRAMYCIN: SIGNIFICANT CHANGE UP
-  TRIMETHOPRIM/SULFAMETHOXAZOLE: SIGNIFICANT CHANGE UP
ALBUMIN SERPL ELPH-MCNC: 2.3 G/DL — LOW (ref 3.3–5)
ALP SERPL-CCNC: 55 U/L — SIGNIFICANT CHANGE UP (ref 40–120)
ALT FLD-CCNC: 22 U/L — SIGNIFICANT CHANGE UP (ref 12–78)
ANION GAP SERPL CALC-SCNC: 9 MMOL/L — SIGNIFICANT CHANGE UP (ref 5–17)
APTT 50/50 2HOUR INCUB: SIGNIFICANT CHANGE UP SEC (ref 24.5–36.6)
APTT BLD: 32.4 SEC — SIGNIFICANT CHANGE UP (ref 24.5–35.6)
APTT BLD: 36 SEC — HIGH (ref 24.5–35.6)
APTT BLD: SIGNIFICANT CHANGE UP SEC (ref 24.5–36.6)
AST SERPL-CCNC: 17 U/L — SIGNIFICANT CHANGE UP (ref 15–37)
BILIRUB SERPL-MCNC: 0.3 MG/DL — SIGNIFICANT CHANGE UP (ref 0.2–1.2)
BUN SERPL-MCNC: 68 MG/DL — HIGH (ref 7–23)
CALCIUM SERPL-MCNC: 8.3 MG/DL — LOW (ref 8.5–10.1)
CHLORIDE SERPL-SCNC: 104 MMOL/L — SIGNIFICANT CHANGE UP (ref 96–108)
CO2 SERPL-SCNC: 21 MMOL/L — LOW (ref 22–31)
CREAT SERPL-MCNC: 4.36 MG/DL — HIGH (ref 0.5–1.3)
CULTURE RESULTS: ABNORMAL
EGFR: 13 ML/MIN/1.73M2 — LOW
GLUCOSE BLDC GLUCOMTR-MCNC: 119 MG/DL — HIGH (ref 70–99)
GLUCOSE BLDC GLUCOMTR-MCNC: 154 MG/DL — HIGH (ref 70–99)
GLUCOSE BLDC GLUCOMTR-MCNC: 161 MG/DL — HIGH (ref 70–99)
GLUCOSE BLDC GLUCOMTR-MCNC: 272 MG/DL — HIGH (ref 70–99)
GLUCOSE SERPL-MCNC: 215 MG/DL — HIGH (ref 70–99)
HAV IGM SER-ACNC: SIGNIFICANT CHANGE UP
HBV CORE IGM SER-ACNC: SIGNIFICANT CHANGE UP
HBV SURFACE AB SER-ACNC: SIGNIFICANT CHANGE UP
HBV SURFACE AG SER-ACNC: SIGNIFICANT CHANGE UP
HCT VFR BLD CALC: 34.8 % — LOW (ref 39–50)
HCV AB S/CO SERPL IA: 0.14 S/CO — SIGNIFICANT CHANGE UP (ref 0–0.99)
HCV AB SERPL-IMP: SIGNIFICANT CHANGE UP
HGB BLD-MCNC: 10.9 G/DL — LOW (ref 13–17)
HIV 1+2 AB+HIV1 P24 AG SERPL QL IA: SIGNIFICANT CHANGE UP
MAGNESIUM SERPL-MCNC: 1.5 MG/DL — LOW (ref 1.6–2.6)
MCHC RBC-ENTMCNC: 27.9 PG — SIGNIFICANT CHANGE UP (ref 27–34)
MCHC RBC-ENTMCNC: 31.3 G/DL — LOW (ref 32–36)
MCV RBC AUTO: 89 FL — SIGNIFICANT CHANGE UP (ref 80–100)
METHOD TYPE: SIGNIFICANT CHANGE UP
NRBC # BLD: 0 /100 WBCS — SIGNIFICANT CHANGE UP (ref 0–0)
ORGANISM # SPEC MICROSCOPIC CNT: ABNORMAL
ORGANISM # SPEC MICROSCOPIC CNT: SIGNIFICANT CHANGE UP
PAT CTL 2H: SIGNIFICANT CHANGE UP SEC (ref 24.5–36.6)
PHOSPHATE SERPL-MCNC: 5.5 MG/DL — HIGH (ref 2.5–4.5)
PLATELET # BLD AUTO: 168 K/UL — SIGNIFICANT CHANGE UP (ref 150–400)
POTASSIUM SERPL-MCNC: 4.4 MMOL/L — SIGNIFICANT CHANGE UP (ref 3.5–5.3)
POTASSIUM SERPL-SCNC: 4.4 MMOL/L — SIGNIFICANT CHANGE UP (ref 3.5–5.3)
PROT SERPL-MCNC: 6.3 GM/DL — SIGNIFICANT CHANGE UP (ref 6–8.3)
RBC # BLD: 3.91 M/UL — LOW (ref 4.2–5.8)
RBC # FLD: 15.9 % — HIGH (ref 10.3–14.5)
RHEUMATOID FACT SERPL-ACNC: 14 IU/ML — HIGH (ref 0–13)
SODIUM SERPL-SCNC: 134 MMOL/L — LOW (ref 135–145)
SPECIMEN SOURCE: SIGNIFICANT CHANGE UP
WBC # BLD: 16.16 K/UL — HIGH (ref 3.8–10.5)
WBC # FLD AUTO: 16.16 K/UL — HIGH (ref 3.8–10.5)

## 2024-08-31 PROCEDURE — 99232 SBSQ HOSP IP/OBS MODERATE 35: CPT

## 2024-08-31 RX ORDER — HYDROXYZINE HCL 25 MG
25 TABLET ORAL EVERY 6 HOURS
Refills: 0 | Status: COMPLETED | OUTPATIENT
Start: 2024-08-31 | End: 2024-09-02

## 2024-08-31 RX ORDER — HYDROXYZINE HCL 25 MG
25 TABLET ORAL EVERY 6 HOURS
Refills: 0 | Status: DISCONTINUED | OUTPATIENT
Start: 2024-08-31 | End: 2024-08-31

## 2024-08-31 RX ORDER — MAGNESIUM OXIDE TAB 400 MG (240 MG ELEMENTAL MG) 400 (240 MG) MG
400 TAB ORAL ONCE
Refills: 0 | Status: COMPLETED | OUTPATIENT
Start: 2024-08-31 | End: 2024-08-31

## 2024-08-31 RX ADMIN — Medication 6 UNIT(S): at 07:49

## 2024-08-31 RX ADMIN — Medication 2: at 16:52

## 2024-08-31 RX ADMIN — Medication 50 MILLIGRAM(S): at 22:17

## 2024-08-31 RX ADMIN — SEVELAMER CARBONATE 1600 MILLIGRAM(S): 800 TABLET, FILM COATED ORAL at 16:54

## 2024-08-31 RX ADMIN — Medication 2 MILLIGRAM(S): at 22:17

## 2024-08-31 RX ADMIN — Medication 6 UNIT(S): at 13:07

## 2024-08-31 RX ADMIN — SEVELAMER CARBONATE 1600 MILLIGRAM(S): 800 TABLET, FILM COATED ORAL at 13:08

## 2024-08-31 RX ADMIN — Medication 2: at 13:06

## 2024-08-31 RX ADMIN — Medication 1 APPLICATION(S): at 17:28

## 2024-08-31 RX ADMIN — Medication 40 MILLIGRAM(S): at 22:17

## 2024-08-31 RX ADMIN — Medication 6 UNIT(S): at 16:52

## 2024-08-31 RX ADMIN — APIXABAN 5 MILLIGRAM(S): 5 TABLET, FILM COATED ORAL at 06:17

## 2024-08-31 RX ADMIN — PETROLATUM 1 APPLICATION(S): 865 OINTMENT TOPICAL at 13:14

## 2024-08-31 RX ADMIN — Medication 25 MILLIGRAM(S): at 23:22

## 2024-08-31 RX ADMIN — ACETAMINOPHEN 650 MILLIGRAM(S): 325 TABLET ORAL at 00:00

## 2024-08-31 RX ADMIN — Medication 25 MILLIGRAM(S): at 17:28

## 2024-08-31 RX ADMIN — Medication 6: at 07:48

## 2024-08-31 RX ADMIN — CEFEPIME 100 MILLIGRAM(S): 2 INJECTION, POWDER, FOR SOLUTION INTRAVENOUS at 22:16

## 2024-08-31 RX ADMIN — ACETAMINOPHEN 650 MILLIGRAM(S): 325 TABLET ORAL at 11:30

## 2024-08-31 RX ADMIN — CEFEPIME 100 MILLIGRAM(S): 2 INJECTION, POWDER, FOR SOLUTION INTRAVENOUS at 06:17

## 2024-08-31 RX ADMIN — Medication 40 MILLIGRAM(S): at 06:17

## 2024-08-31 RX ADMIN — Medication 3 MILLIGRAM(S): at 22:17

## 2024-08-31 RX ADMIN — INSULIN GLARGINE 25 UNIT(S): 100 INJECTION, SOLUTION SUBCUTANEOUS at 22:18

## 2024-08-31 RX ADMIN — CHLORHEXIDINE GLUCONATE 1 APPLICATION(S): 40 SOLUTION TOPICAL at 06:22

## 2024-08-31 RX ADMIN — APIXABAN 5 MILLIGRAM(S): 5 TABLET, FILM COATED ORAL at 17:30

## 2024-08-31 RX ADMIN — Medication 1 APPLICATION(S): at 06:17

## 2024-08-31 RX ADMIN — ACETAMINOPHEN 650 MILLIGRAM(S): 325 TABLET ORAL at 10:55

## 2024-08-31 RX ADMIN — MAGNESIUM OXIDE TAB 400 MG (240 MG ELEMENTAL MG) 400 MILLIGRAM(S): 400 (240 MG) TAB at 13:06

## 2024-08-31 RX ADMIN — SEVELAMER CARBONATE 1600 MILLIGRAM(S): 800 TABLET, FILM COATED ORAL at 07:49

## 2024-08-31 NOTE — PROGRESS NOTE ADULT - SUBJECTIVE AND OBJECTIVE BOX
[facility] NEPHROLOGY SERVICES, [facility]  NEPHROLOGY AND HYPERTENSION  [address]  [address]  [place] [address]  [phone]    MD YISEL WEEKS MD YELENA ROSENBERG, MD BINNY KOSHY, MD CHRISTOPHER CAPUTO, MD EDWARD BOVER, MD          Patient events noted    MEDICATIONS  (STANDING):  ammonium lactate 12% Lotion 1 Application(s) Topical every 12 hours  apixaban 5 milliGRAM(s) Oral two times a day  AQUAPHOR (petrolatum Ointment) 1 Application(s) Topical daily  atorvastatin 40 milliGRAM(s) Oral at bedtime  cefepime   IVPB 1000 milliGRAM(s) IV Intermittent every 24 hours  cefepime   IVPB      chlorhexidine 2% Cloths 1 Application(s) Topical <User Schedule>  dextrose 5%. 1000 milliLiter(s) (50 mL/Hr) IV Continuous <Continuous>  dextrose 5%. 1000 milliLiter(s) (100 mL/Hr) IV Continuous <Continuous>  dextrose 50% Injectable 12.5 Gram(s) IV Push once  dextrose 50% Injectable 25 Gram(s) IV Push once  dextrose 50% Injectable 25 Gram(s) IV Push once  doxazosin 2 milliGRAM(s) Oral at bedtime  glucagon  Injectable 1 milliGRAM(s) IntraMuscular once  hydrALAZINE 50 milliGRAM(s) Oral every 8 hours  hydrOXYzine hydrochloride 25 milliGRAM(s) Oral every 6 hours  insulin glargine Injectable (LANTUS) 25 Unit(s) SubCutaneous at bedtime  insulin lispro (ADMELOG) corrective regimen sliding scale   SubCutaneous three times a day before meals  insulin lispro (ADMELOG) corrective regimen sliding scale   SubCutaneous at bedtime  insulin lispro Injectable (ADMELOG) 6 Unit(s) SubCutaneous three times a day before meals  melatonin 3 milliGRAM(s) Oral at bedtime  pantoprazole    Tablet 40 milliGRAM(s) Oral before breakfast  sevelamer carbonate 1600 milliGRAM(s) Oral three times a day with meals    MEDICATIONS  (PRN):  acetaminophen     Tablet .. 650 milliGRAM(s) Oral every 6 hours PRN Temp greater or equal to 38C (100.4F), Moderate Pain (4 - 6)  dextrose Oral Gel 15 Gram(s) Oral once PRN Blood Glucose LESS THAN 70 milliGRAM(s)/deciliter  polyethylene glycol 3350 17 Gram(s) Oral daily PRN Constipation      08-30-24 @ 07:01  -  08-31-24 @ 07:00  --------------------------------------------------------  IN: 650 mL / OUT: 1100 mL / NET: -450 mL    08-31-24 @ 07:01  -  08-31-24 @ 21:39  --------------------------------------------------------  IN: 0 mL / OUT: 1000 mL / NET: -1000 mL      PHYSICAL EXAM:      T(C): 37.2 (08-31-24 @ 16:07), Max: 37.2 (08-31-24 @ 16:07)  HR: 75 (08-31-24 @ 16:07) (60 - 95)  BP: 126/70 (08-31-24 @ 16:07) (102/56 - 146/76)  RR: 18 (08-31-24 @ 16:07) (17 - 18)  SpO2: 98% (08-31-24 @ 16:07) (95% - 99%)  Wt(kg): --  Lungs clear  Heart S1S2  Abd soft NT ND  Extremities:   tr edema                                    10.9   16.16 )-----------( 168      ( 31 Aug 2024 06:30 )             34.8     08-31    134<L>  |  104  |  68<H>  ----------------------------<  215<H>  4.4   |  21<L>  |  4.36<H>    Ca    8.3<L>      31 Aug 2024 06:30  Phos  5.5     08-31  Mg     1.5     08-31    TPro  6.3  /  Alb  2.3<L>  /  TBili  0.3  /  DBili  x   /  AST  17  /  ALT  22  /  AlkPhos  55  08-31      LIVER FUNCTIONS - ( 31 Aug 2024 06:30 )  Alb: 2.3 g/dL / Pro: 6.3 gm/dL / ALK PHOS: 55 U/L / ALT: 22 U/L / AST: 17 U/L / GGT: x           Creatinine Trend: 4.36<--, 3.63<--, 4.07<--, 4.60<--, 5.73<--, 5.72<--      Assessment   New ESRD, maintenance     Plan:  HD for today  UF as tolerated  Will follow     Augusto Santoyo MD

## 2024-08-31 NOTE — PROGRESS NOTE ADULT - ASSESSMENT
73 year old male PMH HTN, HLD, DM, CHF, CKD stage IV, dementia / anxiety BIBEMS accompanied by wife due to weakness. Per wife, pt not feeling well since last night - went to bed prior to his own birthday party. Per EMS, pt complaining of decreased L vision and decreased L hearing as well as noted off balance with ambulation to restroom this AM. Pt states L eye is usually his good eye, and still with better vision in L than R, but not as good as usual. Pt reports chronic itching rash to body x past 3 months, f/u'd with Derm, was told likely 2/2 renal disease. Hearing later normalized in ER.         skin rash a concern will  get derm eval  atarax added   nephrology and ID consults appreciated   now on cefepime  but white count continues to ris e   dispo dependent on perm cath and skin improvement    Case discussed at length with wife  at previous derm appointment no biopsy was done felt that skin erruptions were due to uremia          ##ESRD   ##HyperK  Temp cath placed and patient to initiate Dialysis here 2/2 Uremia and hyperkalemic. Temp cath placed   - C/w Inpatient dialysis   - Will need IR tunneled cath  - Pending O/p Dialysis slot   - Nephro following     ##Hypothermic resolved     ##SSTI  Noted to be hypothermic to 92F. CT abd No evidence of acute inflammatory or obstructive process in the   abdomen and pelvis. UA positive nitrites, LEs.   improved  now on cefepime     ##Type 2 DM  A1c 7; C/w Lantus 25 + 6U w/ meals + SSI  - Hypoglycemia protocol     #Pulm nodule   - CT noted There are scattered nodules at the visualized lung bases measuring up to 6 mm  CT chest reviewed will need follow up in 3-6  months     #HTN  - C/w cardura, hydralazine     #HLD  - C/w statin     #History of DVT  - C/w Eliquis     Diet: renal, CCD  DVT prophylaxis: eliquis   Dispo: off tele   Code Status: FC

## 2024-08-31 NOTE — PROGRESS NOTE ADULT - SUBJECTIVE AND OBJECTIVE BOX
Patient is a 74y old  Male who presents with a chief complaint of Weakness, progression of CKD (30 Aug 2024 22:54)      INTERVAL HPI/OVERNIGHT EVENTS: no acute events overnight     MEDICATIONS  (STANDING):  ammonium lactate 12% Lotion 1 Application(s) Topical every 12 hours  apixaban 5 milliGRAM(s) Oral two times a day  AQUAPHOR (petrolatum Ointment) 1 Application(s) Topical daily  atorvastatin 40 milliGRAM(s) Oral at bedtime  cefepime   IVPB      cefepime   IVPB 1000 milliGRAM(s) IV Intermittent every 24 hours  chlorhexidine 2% Cloths 1 Application(s) Topical <User Schedule>  dextrose 5%. 1000 milliLiter(s) (50 mL/Hr) IV Continuous <Continuous>  dextrose 5%. 1000 milliLiter(s) (100 mL/Hr) IV Continuous <Continuous>  dextrose 50% Injectable 12.5 Gram(s) IV Push once  dextrose 50% Injectable 25 Gram(s) IV Push once  dextrose 50% Injectable 25 Gram(s) IV Push once  doxazosin 2 milliGRAM(s) Oral at bedtime  glucagon  Injectable 1 milliGRAM(s) IntraMuscular once  hydrALAZINE 50 milliGRAM(s) Oral every 8 hours  hydrOXYzine hydrochloride 25 milliGRAM(s) Oral every 6 hours  insulin glargine Injectable (LANTUS) 25 Unit(s) SubCutaneous at bedtime  insulin lispro (ADMELOG) corrective regimen sliding scale   SubCutaneous at bedtime  insulin lispro (ADMELOG) corrective regimen sliding scale   SubCutaneous three times a day before meals  insulin lispro Injectable (ADMELOG) 6 Unit(s) SubCutaneous three times a day before meals  melatonin 3 milliGRAM(s) Oral at bedtime  pantoprazole    Tablet 40 milliGRAM(s) Oral before breakfast  sevelamer carbonate 1600 milliGRAM(s) Oral three times a day with meals    MEDICATIONS  (PRN):  acetaminophen     Tablet .. 650 milliGRAM(s) Oral every 6 hours PRN Temp greater or equal to 38C (100.4F), Moderate Pain (4 - 6)  dextrose Oral Gel 15 Gram(s) Oral once PRN Blood Glucose LESS THAN 70 milliGRAM(s)/deciliter  polyethylene glycol 3350 17 Gram(s) Oral daily PRN Constipation      Allergies    No Known Allergies    Intolerances        REVIEW OF SYSTEMS:  CONSTITUTIONAL: No fever, weight loss, or fatigue  EYES: No eye pain, visual disturbances, or discharge  ENMT:  No difficulty hearing, tinnitus, vertigo; No sinus or throat pain  NECK: No pain or stiffness  BREASTS: No pain, masses, or nipple discharge  RESPIRATORY: No cough, wheezing, chills or hemoptysis; No shortness of breath  CARDIOVASCULAR: No chest pain, palpitations, dizziness, or leg swelling  GASTROINTESTINAL: No abdominal or epigastric pain. No nausea, vomiting, or hematemesis; No diarrhea or constipation. No melena or hematochezia.  GENITOURINARY: No dysuria, frequency, hematuria, or incontinence  NEUROLOGICAL: No headaches, memory loss, loss of strength, numbness, or tremors  SKIN: sloughing of skin itchy    LYMPH NODES: No enlarged glands  ENDOCRINE: No heat or cold intolerance; No hair loss  MUSCULOSKELETAL: No joint pain or swelling; No muscle, back, or extremity pain  PSYCHIATRIC: No depression, anxiety, mood swings, or difficulty sleeping  HEME/LYMPH: No easy bruising, or bleeding gums  ALLERGY AND IMMUNOLOGIC: No hives or eczema    Vital Signs Last 24 Hrs  T(C): 37.2 (31 Aug 2024 16:07), Max: 37.2 (31 Aug 2024 16:07)  T(F): 98.9 (31 Aug 2024 16:07), Max: 98.9 (31 Aug 2024 16:07)  HR: 75 (31 Aug 2024 16:07) (60 - 95)  BP: 126/70 (31 Aug 2024 16:07) (102/56 - 146/76)  RR: 18 (31 Aug 2024 16:07) (17 - 18)  SpO2: 98% (31 Aug 2024 16:07) (95% - 99%)    Parameters below as of 31 Aug 2024 16:07  Patient On (Oxygen Delivery Method): room air        PHYSICAL EXAM:  GENERAL: NAD,   HEAD:  Atraumatic, Normocephalic  EYES: EOMI, PERRLA, conjunctiva and sclera clear  ENMT: No tonsillar erythema, exudates, or enlargement; Moist mucous membranes, Good dentition, No lesions  NECK: Supple, No JVD, Normal thyroid  NERVOUS SYSTEM:  Alert & Oriented X3, Good concentration; Motor Strength 5/5 B/L upper and lower extremities; DTRs 2+ intact and symmetric  CHEST/LUNG: Clear to ascultation  bilaterally; No rales, rhonchi, wheezing, or rubs  HEART: Regular rate and rhythm; No murmurs, rubs, or gallops  ABDOMEN: Soft, Nontender, Nondistended; Bowel sounds present  EXTREMITIES:  2+ Peripheral Pulses, No clubbing, cyanosis, or edema  LYMPH: No lymphadenopathy noted  SKIN: significant slough of skin scabs and evidence of scratching   LABS:                        10.9   16.16 )-----------( 168      ( 31 Aug 2024 06:30 )             34.8     08-31    134<L>  |  104  |  68<H>  ----------------------------<  215<H>  4.4   |  21<L>  |  4.36<H>    Ca    8.3<L>      31 Aug 2024 06:30  Phos  5.5     08-31  Mg     1.5     08-31    TPro  6.3  /  Alb  2.3<L>  /  TBili  0.3  /  DBili  x   /  AST  17  /  ALT  22  /  AlkPhos  55  08-31    PT/INR - ( 30 Aug 2024 06:26 )   PT: 12.8 sec;   INR: 1.08 ratio         PTT - ( 31 Aug 2024 06:30 )  PTT:36.0 sec  Urinalysis Basic - ( 31 Aug 2024 06:30 )    Color: x / Appearance: x / SG: x / pH: x  Gluc: 215 mg/dL / Ketone: x  / Bili: x / Urobili: x   Blood: x / Protein: x / Nitrite: x   Leuk Esterase: x / RBC: x / WBC x   Sq Epi: x / Non Sq Epi: x / Bacteria: x      CAPILLARY BLOOD GLUCOSE      POCT Blood Glucose.: 161 mg/dL (31 Aug 2024 16:43)  POCT Blood Glucose.: 154 mg/dL (31 Aug 2024 13:04)  POCT Blood Glucose.: 272 mg/dL (31 Aug 2024 07:28)  POCT Blood Glucose.: 240 mg/dL (30 Aug 2024 21:18)      RADIOLOGY & ADDITIONAL TESTS:    Imaging Personally Reviewed:  [ ] YES  [ ] NO    Consultant(s) Notes Reviewed:  [ ] YES  [ ] NO    Care Discussed with Consultants/Other Providers [ ] YES  [ ] NO

## 2024-09-01 LAB
ALBUMIN SERPL ELPH-MCNC: 2.3 G/DL — LOW (ref 3.3–5)
ALP SERPL-CCNC: 51 U/L — SIGNIFICANT CHANGE UP (ref 40–120)
ALT FLD-CCNC: 20 U/L — SIGNIFICANT CHANGE UP (ref 12–78)
ANION GAP SERPL CALC-SCNC: 10 MMOL/L — SIGNIFICANT CHANGE UP (ref 5–17)
AST SERPL-CCNC: 16 U/L — SIGNIFICANT CHANGE UP (ref 15–37)
B2 GLYCOPROT1 IGA SER QL: <2 U/ML — SIGNIFICANT CHANGE UP
B2 GLYCOPROT1 IGG SER-ACNC: <1.4 U/ML — SIGNIFICANT CHANGE UP
B2 GLYCOPROT1 IGM SER-ACNC: 2 U/ML — SIGNIFICANT CHANGE UP
BILIRUB SERPL-MCNC: 0.3 MG/DL — SIGNIFICANT CHANGE UP (ref 0.2–1.2)
BUN SERPL-MCNC: 51 MG/DL — HIGH (ref 7–23)
CALCIUM SERPL-MCNC: 8.8 MG/DL — SIGNIFICANT CHANGE UP (ref 8.5–10.1)
CARDIOLIPIN IGM SER-MCNC: 1.9 MPL U/ML — SIGNIFICANT CHANGE UP
CARDIOLIPIN IGM SER-MCNC: <1.6 GPL U/ML — SIGNIFICANT CHANGE UP
CHLORIDE SERPL-SCNC: 102 MMOL/L — SIGNIFICANT CHANGE UP (ref 96–108)
CO2 SERPL-SCNC: 28 MMOL/L — SIGNIFICANT CHANGE UP (ref 22–31)
CREAT SERPL-MCNC: 4.19 MG/DL — HIGH (ref 0.5–1.3)
DEPRECATED CARDIOLIPIN IGA SER: <2 APL U/ML — SIGNIFICANT CHANGE UP
EGFR: 14 ML/MIN/1.73M2 — LOW
GLUCOSE BLDC GLUCOMTR-MCNC: 123 MG/DL — HIGH (ref 70–99)
GLUCOSE BLDC GLUCOMTR-MCNC: 125 MG/DL — HIGH (ref 70–99)
GLUCOSE BLDC GLUCOMTR-MCNC: 153 MG/DL — HIGH (ref 70–99)
GLUCOSE BLDC GLUCOMTR-MCNC: 177 MG/DL — HIGH (ref 70–99)
GLUCOSE SERPL-MCNC: 93 MG/DL — SIGNIFICANT CHANGE UP (ref 70–99)
HCT VFR BLD CALC: 33.8 % — LOW (ref 39–50)
HGB BLD-MCNC: 10.7 G/DL — LOW (ref 13–17)
MAGNESIUM SERPL-MCNC: 1.7 MG/DL — SIGNIFICANT CHANGE UP (ref 1.6–2.6)
MCHC RBC-ENTMCNC: 27.7 PG — SIGNIFICANT CHANGE UP (ref 27–34)
MCHC RBC-ENTMCNC: 31.7 G/DL — LOW (ref 32–36)
MCV RBC AUTO: 87.6 FL — SIGNIFICANT CHANGE UP (ref 80–100)
NRBC # BLD: 0 /100 WBCS — SIGNIFICANT CHANGE UP (ref 0–0)
PHOSPHATE SERPL-MCNC: 5.3 MG/DL — HIGH (ref 2.5–4.5)
PLATELET # BLD AUTO: 172 K/UL — SIGNIFICANT CHANGE UP (ref 150–400)
POTASSIUM SERPL-MCNC: 4.4 MMOL/L — SIGNIFICANT CHANGE UP (ref 3.5–5.3)
POTASSIUM SERPL-SCNC: 4.4 MMOL/L — SIGNIFICANT CHANGE UP (ref 3.5–5.3)
PROT SERPL-MCNC: 6.5 GM/DL — SIGNIFICANT CHANGE UP (ref 6–8.3)
RBC # BLD: 3.86 M/UL — LOW (ref 4.2–5.8)
RBC # FLD: 15.9 % — HIGH (ref 10.3–14.5)
SODIUM SERPL-SCNC: 140 MMOL/L — SIGNIFICANT CHANGE UP (ref 135–145)
WBC # BLD: 14.44 K/UL — HIGH (ref 3.8–10.5)
WBC # FLD AUTO: 14.44 K/UL — HIGH (ref 3.8–10.5)

## 2024-09-01 PROCEDURE — 99232 SBSQ HOSP IP/OBS MODERATE 35: CPT

## 2024-09-01 RX ADMIN — Medication 25 MILLIGRAM(S): at 12:53

## 2024-09-01 RX ADMIN — INSULIN GLARGINE 25 UNIT(S): 100 INJECTION, SOLUTION SUBCUTANEOUS at 21:13

## 2024-09-01 RX ADMIN — APIXABAN 5 MILLIGRAM(S): 5 TABLET, FILM COATED ORAL at 17:13

## 2024-09-01 RX ADMIN — Medication 6 UNIT(S): at 11:32

## 2024-09-01 RX ADMIN — Medication 25 MILLIGRAM(S): at 23:59

## 2024-09-01 RX ADMIN — Medication 6 UNIT(S): at 17:12

## 2024-09-01 RX ADMIN — Medication 50 MILLIGRAM(S): at 21:11

## 2024-09-01 RX ADMIN — CHLORHEXIDINE GLUCONATE 1 APPLICATION(S): 40 SOLUTION TOPICAL at 05:16

## 2024-09-01 RX ADMIN — ACETAMINOPHEN 650 MILLIGRAM(S): 325 TABLET ORAL at 18:46

## 2024-09-01 RX ADMIN — ACETAMINOPHEN 650 MILLIGRAM(S): 325 TABLET ORAL at 02:29

## 2024-09-01 RX ADMIN — Medication 2 MILLIGRAM(S): at 21:12

## 2024-09-01 RX ADMIN — APIXABAN 5 MILLIGRAM(S): 5 TABLET, FILM COATED ORAL at 05:15

## 2024-09-01 RX ADMIN — ACETAMINOPHEN 650 MILLIGRAM(S): 325 TABLET ORAL at 17:44

## 2024-09-01 RX ADMIN — Medication 40 MILLIGRAM(S): at 21:12

## 2024-09-01 RX ADMIN — Medication 25 MILLIGRAM(S): at 17:12

## 2024-09-01 RX ADMIN — ACETAMINOPHEN 650 MILLIGRAM(S): 325 TABLET ORAL at 23:59

## 2024-09-01 RX ADMIN — ACETAMINOPHEN 650 MILLIGRAM(S): 325 TABLET ORAL at 08:47

## 2024-09-01 RX ADMIN — Medication 1 APPLICATION(S): at 05:18

## 2024-09-01 RX ADMIN — ACETAMINOPHEN 650 MILLIGRAM(S): 325 TABLET ORAL at 07:47

## 2024-09-01 RX ADMIN — SEVELAMER CARBONATE 1600 MILLIGRAM(S): 800 TABLET, FILM COATED ORAL at 11:32

## 2024-09-01 RX ADMIN — Medication 6 UNIT(S): at 08:11

## 2024-09-01 RX ADMIN — SEVELAMER CARBONATE 1600 MILLIGRAM(S): 800 TABLET, FILM COATED ORAL at 09:10

## 2024-09-01 RX ADMIN — Medication 2: at 08:10

## 2024-09-01 RX ADMIN — SEVELAMER CARBONATE 1600 MILLIGRAM(S): 800 TABLET, FILM COATED ORAL at 17:12

## 2024-09-01 RX ADMIN — Medication 40 MILLIGRAM(S): at 06:09

## 2024-09-01 RX ADMIN — Medication 25 MILLIGRAM(S): at 05:17

## 2024-09-01 RX ADMIN — Medication 2: at 11:31

## 2024-09-01 RX ADMIN — CEFEPIME 100 MILLIGRAM(S): 2 INJECTION, POWDER, FOR SOLUTION INTRAVENOUS at 21:18

## 2024-09-01 RX ADMIN — ACETAMINOPHEN 650 MILLIGRAM(S): 325 TABLET ORAL at 02:48

## 2024-09-01 RX ADMIN — Medication 3 MILLIGRAM(S): at 21:12

## 2024-09-02 LAB
ANION GAP SERPL CALC-SCNC: 7 MMOL/L — SIGNIFICANT CHANGE UP (ref 5–17)
AUTO DIFF PNL BLD: ABNORMAL
BUN SERPL-MCNC: 72 MG/DL — HIGH (ref 7–23)
C-ANCA SER-ACNC: NEGATIVE — SIGNIFICANT CHANGE UP
CALCIUM SERPL-MCNC: 8.2 MG/DL — LOW (ref 8.5–10.1)
CHLORIDE SERPL-SCNC: 101 MMOL/L — SIGNIFICANT CHANGE UP (ref 96–108)
CO2 SERPL-SCNC: 25 MMOL/L — SIGNIFICANT CHANGE UP (ref 22–31)
CREAT SERPL-MCNC: 4.71 MG/DL — HIGH (ref 0.5–1.3)
CULTURE RESULTS: SIGNIFICANT CHANGE UP
CULTURE RESULTS: SIGNIFICANT CHANGE UP
DRVVT RATIO: 0.82 RATIO — SIGNIFICANT CHANGE UP (ref 0–1.21)
DRVVT SCREEN TO CONFIRM RATIO: SIGNIFICANT CHANGE UP
EGFR: 12 ML/MIN/1.73M2 — LOW
GLUCOSE BLDC GLUCOMTR-MCNC: 100 MG/DL — HIGH (ref 70–99)
GLUCOSE BLDC GLUCOMTR-MCNC: 160 MG/DL — HIGH (ref 70–99)
GLUCOSE BLDC GLUCOMTR-MCNC: 176 MG/DL — HIGH (ref 70–99)
GLUCOSE BLDC GLUCOMTR-MCNC: 215 MG/DL — HIGH (ref 70–99)
GLUCOSE SERPL-MCNC: 187 MG/DL — HIGH (ref 70–99)
HCT VFR BLD CALC: 31.3 % — LOW (ref 39–50)
HGB BLD-MCNC: 9.7 G/DL — LOW (ref 13–17)
MAGNESIUM SERPL-MCNC: 1.5 MG/DL — LOW (ref 1.6–2.6)
MCHC RBC-ENTMCNC: 27.2 PG — SIGNIFICANT CHANGE UP (ref 27–34)
MCHC RBC-ENTMCNC: 31 G/DL — LOW (ref 32–36)
MCV RBC AUTO: 87.7 FL — SIGNIFICANT CHANGE UP (ref 80–100)
NORMALIZED SCT PPP-RTO: 1.04 RATIO — SIGNIFICANT CHANGE UP (ref 0–1.16)
NORMALIZED SCT PPP-RTO: SIGNIFICANT CHANGE UP
NRBC # BLD: 0 /100 WBCS — SIGNIFICANT CHANGE UP (ref 0–0)
P-ANCA SER-ACNC: NEGATIVE — SIGNIFICANT CHANGE UP
PHOSPHATE SERPL-MCNC: 5.7 MG/DL — HIGH (ref 2.5–4.5)
PLATELET # BLD AUTO: 144 K/UL — LOW (ref 150–400)
POTASSIUM SERPL-MCNC: 4.5 MMOL/L — SIGNIFICANT CHANGE UP (ref 3.5–5.3)
POTASSIUM SERPL-SCNC: 4.5 MMOL/L — SIGNIFICANT CHANGE UP (ref 3.5–5.3)
RBC # BLD: 3.57 M/UL — LOW (ref 4.2–5.8)
RBC # FLD: 15.7 % — HIGH (ref 10.3–14.5)
SODIUM SERPL-SCNC: 133 MMOL/L — LOW (ref 135–145)
SPECIMEN SOURCE: SIGNIFICANT CHANGE UP
SPECIMEN SOURCE: SIGNIFICANT CHANGE UP
WBC # BLD: 10.12 K/UL — SIGNIFICANT CHANGE UP (ref 3.8–10.5)
WBC # FLD AUTO: 10.12 K/UL — SIGNIFICANT CHANGE UP (ref 3.8–10.5)

## 2024-09-02 PROCEDURE — 99232 SBSQ HOSP IP/OBS MODERATE 35: CPT

## 2024-09-02 RX ADMIN — Medication 40 MILLIGRAM(S): at 07:06

## 2024-09-02 RX ADMIN — Medication 40 MILLIGRAM(S): at 21:26

## 2024-09-02 RX ADMIN — Medication 6 UNIT(S): at 11:36

## 2024-09-02 RX ADMIN — Medication 2 MILLIGRAM(S): at 21:26

## 2024-09-02 RX ADMIN — Medication 6 UNIT(S): at 16:52

## 2024-09-02 RX ADMIN — Medication 50 MILLIGRAM(S): at 21:26

## 2024-09-02 RX ADMIN — ACETAMINOPHEN 650 MILLIGRAM(S): 325 TABLET ORAL at 12:49

## 2024-09-02 RX ADMIN — Medication 2: at 11:36

## 2024-09-02 RX ADMIN — APIXABAN 5 MILLIGRAM(S): 5 TABLET, FILM COATED ORAL at 17:04

## 2024-09-02 RX ADMIN — Medication 25 MILLIGRAM(S): at 07:06

## 2024-09-02 RX ADMIN — CEFEPIME 100 MILLIGRAM(S): 2 INJECTION, POWDER, FOR SOLUTION INTRAVENOUS at 21:56

## 2024-09-02 RX ADMIN — Medication 50 MILLIGRAM(S): at 07:06

## 2024-09-02 RX ADMIN — INSULIN GLARGINE 25 UNIT(S): 100 INJECTION, SOLUTION SUBCUTANEOUS at 21:32

## 2024-09-02 RX ADMIN — Medication 3 MILLIGRAM(S): at 21:26

## 2024-09-02 RX ADMIN — Medication 6 UNIT(S): at 08:41

## 2024-09-02 RX ADMIN — CHLORHEXIDINE GLUCONATE 1 APPLICATION(S): 40 SOLUTION TOPICAL at 07:07

## 2024-09-02 RX ADMIN — SEVELAMER CARBONATE 1600 MILLIGRAM(S): 800 TABLET, FILM COATED ORAL at 08:41

## 2024-09-02 RX ADMIN — SEVELAMER CARBONATE 1600 MILLIGRAM(S): 800 TABLET, FILM COATED ORAL at 11:34

## 2024-09-02 RX ADMIN — Medication 25 MILLIGRAM(S): at 11:34

## 2024-09-02 RX ADMIN — Medication 1 APPLICATION(S): at 07:07

## 2024-09-02 RX ADMIN — ACETAMINOPHEN 650 MILLIGRAM(S): 325 TABLET ORAL at 22:27

## 2024-09-02 RX ADMIN — SEVELAMER CARBONATE 1600 MILLIGRAM(S): 800 TABLET, FILM COATED ORAL at 16:53

## 2024-09-02 RX ADMIN — ACETAMINOPHEN 650 MILLIGRAM(S): 325 TABLET ORAL at 21:32

## 2024-09-02 RX ADMIN — PETROLATUM 1 APPLICATION(S): 865 OINTMENT TOPICAL at 11:38

## 2024-09-02 RX ADMIN — APIXABAN 5 MILLIGRAM(S): 5 TABLET, FILM COATED ORAL at 07:06

## 2024-09-02 RX ADMIN — Medication 1 APPLICATION(S): at 17:05

## 2024-09-02 RX ADMIN — Medication 4: at 08:40

## 2024-09-02 NOTE — PROGRESS NOTE ADULT - SUBJECTIVE AND OBJECTIVE BOX
[facility] NEPHROLOGY SERVICES, [facility]  NEPHROLOGY AND HYPERTENSION  [address]  [address]  [place] [address]  [phone]    MD YISEL WEEKS MD YELENA ROSENBERG, MD BINNY KOSHY, MD CHRISTOPHER CAPUTO, MD EDWARD BOVER, MD          Patient events noted  No distress      MEDICATIONS  (STANDING):  ammonium lactate 12% Lotion 1 Application(s) Topical every 12 hours  apixaban 5 milliGRAM(s) Oral two times a day  AQUAPHOR (petrolatum Ointment) 1 Application(s) Topical daily  atorvastatin 40 milliGRAM(s) Oral at bedtime  cefepime   IVPB      cefepime   IVPB 1000 milliGRAM(s) IV Intermittent every 24 hours  chlorhexidine 2% Cloths 1 Application(s) Topical <User Schedule>  dextrose 5%. 1000 milliLiter(s) (50 mL/Hr) IV Continuous <Continuous>  dextrose 5%. 1000 milliLiter(s) (100 mL/Hr) IV Continuous <Continuous>  dextrose 50% Injectable 12.5 Gram(s) IV Push once  dextrose 50% Injectable 25 Gram(s) IV Push once  dextrose 50% Injectable 25 Gram(s) IV Push once  doxazosin 2 milliGRAM(s) Oral at bedtime  glucagon  Injectable 1 milliGRAM(s) IntraMuscular once  hydrALAZINE 50 milliGRAM(s) Oral every 8 hours  insulin glargine Injectable (LANTUS) 25 Unit(s) SubCutaneous at bedtime  insulin lispro (ADMELOG) corrective regimen sliding scale   SubCutaneous three times a day before meals  insulin lispro (ADMELOG) corrective regimen sliding scale   SubCutaneous at bedtime  insulin lispro Injectable (ADMELOG) 6 Unit(s) SubCutaneous three times a day before meals  melatonin 3 milliGRAM(s) Oral at bedtime  pantoprazole    Tablet 40 milliGRAM(s) Oral before breakfast  sevelamer carbonate 1600 milliGRAM(s) Oral three times a day with meals    MEDICATIONS  (PRN):  acetaminophen     Tablet .. 650 milliGRAM(s) Oral every 6 hours PRN Temp greater or equal to 38C (100.4F), Moderate Pain (4 - 6)  dextrose Oral Gel 15 Gram(s) Oral once PRN Blood Glucose LESS THAN 70 milliGRAM(s)/deciliter  polyethylene glycol 3350 17 Gram(s) Oral daily PRN Constipation      09-01-24 @ 07:01  -  09-02-24 @ 07:00  --------------------------------------------------------  IN: 1200 mL / OUT: 350 mL / NET: 850 mL      PHYSICAL EXAM:      T(C): 34.6 (09-02-24 @ 12:00), Max: 36.3 (09-01-24 @ 16:35)  HR: 72 (09-02-24 @ 16:05) (71 - 82)  BP: 119/68 (09-02-24 @ 16:05) (111/57 - 152/71)  RR: 18 (09-02-24 @ 16:05) (17 - 18)  SpO2: 97% (09-02-24 @ 16:05) (96% - 100%)  Wt(kg): --  Lungs clear  Heart S1S2  Abd soft NT ND  Extremities:   tr edema  Skin diffuse excoriated skin lesions                                   9.7    10.12 )-----------( 144      ( 02 Sep 2024 09:52 )             31.3     09-02    133<L>  |  101  |  72<H>  ----------------------------<  187<H>  4.5   |  25  |  4.71<H>    Ca    8.2<L>      02 Sep 2024 09:52  Phos  5.7     09-02  Mg     1.5     09-02    TPro  6.5  /  Alb  2.3<L>  /  TBili  0.3  /  DBili  x   /  AST  16  /  ALT  20  /  AlkPhos  51  09-01      LIVER FUNCTIONS - ( 01 Sep 2024 05:38 )  Alb: 2.3 g/dL / Pro: 6.5 gm/dL / ALK PHOS: 51 U/L / ALT: 20 U/L / AST: 16 U/L / GGT: x           Creatinine Trend: 4.71<--, 4.19<--, 4.36<--, 3.63<--, 4.07<--, 4.60<--      Assessment   New ESRD, maintenance     Plan:  HD for today  UF as tolerated  Perm cath for Wednesday  Outpatient HD arrangement   ID and Derm   Will follow       Augusto Santoyo MD

## 2024-09-03 LAB
ANA TITR SER: NEGATIVE — SIGNIFICANT CHANGE UP
GLUCOSE BLDC GLUCOMTR-MCNC: 127 MG/DL — HIGH (ref 70–99)
GLUCOSE BLDC GLUCOMTR-MCNC: 139 MG/DL — HIGH (ref 70–99)
GLUCOSE BLDC GLUCOMTR-MCNC: 175 MG/DL — HIGH (ref 70–99)
GLUCOSE BLDC GLUCOMTR-MCNC: 186 MG/DL — HIGH (ref 70–99)

## 2024-09-03 PROCEDURE — 99232 SBSQ HOSP IP/OBS MODERATE 35: CPT

## 2024-09-03 RX ORDER — TRIAMCINOLONE ACETONIDE 1 MG/G
1 CREAM TOPICAL
Refills: 0 | Status: DISCONTINUED | OUTPATIENT
Start: 2024-09-03 | End: 2024-09-03

## 2024-09-03 RX ORDER — TRIAMCINOLONE ACETONIDE 1 MG/G
1 CREAM TOPICAL
Refills: 0 | Status: DISCONTINUED | OUTPATIENT
Start: 2024-09-03 | End: 2024-09-21

## 2024-09-03 RX ADMIN — INSULIN GLARGINE 25 UNIT(S): 100 INJECTION, SOLUTION SUBCUTANEOUS at 21:22

## 2024-09-03 RX ADMIN — TRIAMCINOLONE ACETONIDE 1 APPLICATION(S): 1 CREAM TOPICAL at 20:16

## 2024-09-03 RX ADMIN — TRIAMCINOLONE ACETONIDE 1 APPLICATION(S): 1 CREAM TOPICAL at 17:26

## 2024-09-03 RX ADMIN — APIXABAN 5 MILLIGRAM(S): 5 TABLET, FILM COATED ORAL at 06:38

## 2024-09-03 RX ADMIN — ACETAMINOPHEN 650 MILLIGRAM(S): 325 TABLET ORAL at 06:43

## 2024-09-03 RX ADMIN — Medication 6 UNIT(S): at 12:03

## 2024-09-03 RX ADMIN — Medication 6 UNIT(S): at 17:25

## 2024-09-03 RX ADMIN — ACETAMINOPHEN 650 MILLIGRAM(S): 325 TABLET ORAL at 20:16

## 2024-09-03 RX ADMIN — Medication 50 MILLIGRAM(S): at 06:38

## 2024-09-03 RX ADMIN — Medication 6 UNIT(S): at 08:20

## 2024-09-03 RX ADMIN — Medication 40 MILLIGRAM(S): at 21:22

## 2024-09-03 RX ADMIN — CEFEPIME 100 MILLIGRAM(S): 2 INJECTION, POWDER, FOR SOLUTION INTRAVENOUS at 21:38

## 2024-09-03 RX ADMIN — Medication 2: at 08:19

## 2024-09-03 RX ADMIN — Medication 50 MILLIGRAM(S): at 21:21

## 2024-09-03 RX ADMIN — Medication 2 MILLIGRAM(S): at 21:21

## 2024-09-03 RX ADMIN — PETROLATUM 1 APPLICATION(S): 865 OINTMENT TOPICAL at 11:59

## 2024-09-03 RX ADMIN — SEVELAMER CARBONATE 1600 MILLIGRAM(S): 800 TABLET, FILM COATED ORAL at 17:26

## 2024-09-03 RX ADMIN — ACETAMINOPHEN 650 MILLIGRAM(S): 325 TABLET ORAL at 18:09

## 2024-09-03 RX ADMIN — SEVELAMER CARBONATE 1600 MILLIGRAM(S): 800 TABLET, FILM COATED ORAL at 12:01

## 2024-09-03 RX ADMIN — Medication 1 TABLET(S): at 12:46

## 2024-09-03 RX ADMIN — Medication 40 MILLIGRAM(S): at 06:37

## 2024-09-03 RX ADMIN — Medication 3 MILLIGRAM(S): at 21:22

## 2024-09-03 RX ADMIN — SEVELAMER CARBONATE 1600 MILLIGRAM(S): 800 TABLET, FILM COATED ORAL at 08:21

## 2024-09-03 RX ADMIN — ACETAMINOPHEN 650 MILLIGRAM(S): 325 TABLET ORAL at 08:27

## 2024-09-03 NOTE — PROGRESS NOTE ADULT - SUBJECTIVE AND OBJECTIVE BOX
ROCCO JC  MRN-[id number]  74y (1950)    Follow Up:  Rash    Interval History: The pt was seen and examined earlier, not in acute distress, continues to complain of rash all over his body, pruritus. Pt is afebrile, RA, WBC better yesterday, no new cbc.     PAST MEDICAL & SURGICAL HISTORY:  Hypertension, unspecified type      Type 2 diabetes mellitus with other neurologic complication, without long-term current use of insulin      DM (diabetes mellitus)      HTN (hypertension)      HLD (hyperlipidemia)      Afib      Retinopathy      Chronic diastolic congestive heart failure      Chronic kidney disease, unspecified CKD stage      Amputation of leg, traumatic, left, subsequent encounter      S/P BKA (below knee amputation) unilateral, left      S/P hip replacement, left      History of surgery on arm          ROS:    [ ] Unobtainable because:  [ x] All other systems negative    Constitutional: no fever, no chills  Head: no trauma  Eyes: no vision changes, no eye pain  ENT:  no sore throat, no rhinorrhea  Cardiovascular:  no chest pain, no palpitation  Respiratory:  no SOB, no cough  GI:  no abd pain, no vomiting, no diarrhea  urinary: no dysuria, no hematuria, no flank pain  musculoskeletal:  no joint pain, no joint swelling  skin:  + rash  neurology:  no headache, no seizure, no change in mental status  psych: no anxiety, no depression         Allergies  No Known Allergies        ANTIMICROBIALS:  cefepime   IVPB    cefepime   IVPB 1000 every 24 hours      OTHER MEDS:  acetaminophen     Tablet .. 650 milliGRAM(s) Oral every 6 hours PRN  apixaban 5 milliGRAM(s) Oral two times a day  AQUAPHOR (petrolatum Ointment) 1 Application(s) Topical daily  atorvastatin 40 milliGRAM(s) Oral at bedtime  chlorhexidine 2% Cloths 1 Application(s) Topical <User Schedule>  dextrose 5%. 1000 milliLiter(s) IV Continuous <Continuous>  dextrose 5%. 1000 milliLiter(s) IV Continuous <Continuous>  dextrose 50% Injectable 25 Gram(s) IV Push once  dextrose 50% Injectable 12.5 Gram(s) IV Push once  dextrose 50% Injectable 25 Gram(s) IV Push once  dextrose Oral Gel 15 Gram(s) Oral once PRN  doxazosin 2 milliGRAM(s) Oral at bedtime  glucagon  Injectable 1 milliGRAM(s) IntraMuscular once  hydrALAZINE 50 milliGRAM(s) Oral every 8 hours  insulin glargine Injectable (LANTUS) 25 Unit(s) SubCutaneous at bedtime  insulin lispro (ADMELOG) corrective regimen sliding scale   SubCutaneous at bedtime  insulin lispro (ADMELOG) corrective regimen sliding scale   SubCutaneous three times a day before meals  insulin lispro Injectable (ADMELOG) 6 Unit(s) SubCutaneous three times a day before meals  melatonin 3 milliGRAM(s) Oral at bedtime  Nephro-lee 1 Tablet(s) Oral daily  pantoprazole    Tablet 40 milliGRAM(s) Oral before breakfast  polyethylene glycol 3350 17 Gram(s) Oral daily PRN  sevelamer carbonate 1600 milliGRAM(s) Oral three times a day with meals  triamcinolone 0.1% Ointment 1 Application(s) Topical two times a day      Vital Signs Last 24 Hrs  T(C): 34.2 (03 Sep 2024 13:00), Max: 36.6 (02 Sep 2024 23:38)  T(F): 93.5 (03 Sep 2024 13:00), Max: 97.9 (02 Sep 2024 23:38)  HR: 64 (03 Sep 2024 16:10) (54 - 90)  BP: 152/68 (03 Sep 2024 16:10) (125/69 - 152/68)  BP(mean): --  RR: 18 (03 Sep 2024 16:10) (16 - 18)  SpO2: 98% (03 Sep 2024 16:10) (95% - 99%)    Parameters below as of 03 Sep 2024 11:27  Patient On (Oxygen Delivery Method): room air        Physical Exam:  Constitutional: non-toxic, no distress, RA  HEAD/EYES: anicteric, no conjunctival injection, dry and crusting around the eyes, vision impaired   ENT:  supple, no thrush,+right sided temp hemodialysis catheter - dressing is due to change, spoke with RN  Cardiovascular:   normal S1, S2, no murmur, no edema  Respiratory:  clear BS bilaterally, no wheezes, no rales  GI:  soft, non-tender, normal bowel sounds  :  no olsen, no CVA tenderness  Musculoskeletal:  no synovitis, normal ROM, left BKA with healthy appearing stump  Neurologic: awake and alert, normal strength, no focal findings  Skin:  full body rash with areas of bullae, some diflated, shallow ulcers, crusting,   Heme/Onc: no lymphadenopathy   Psychiatric:  awake, alert, appropriate mood    WBC Count: 10.12 K/uL (09-02 @ 09:52)  WBC Count: 14.44 K/uL (09-01 @ 05:38)  WBC Count: 16.16 K/uL (08-31 @ 06:30)  WBC Count: 13.30 K/uL (08-30 @ 06:26)  WBC Count: 10.40 K/uL (08-29 @ 12:18)  WBC Count: 9.44 K/uL (08-28 @ 06:48)                            9.7    10.12 )-----------( 144      ( 02 Sep 2024 09:52 )             31.3       09-02    133<L>  |  101  |  72<H>  ----------------------------<  187<H>  4.5   |  25  |  4.71<H>    Ca    8.2<L>      02 Sep 2024 09:52  Phos  5.7     09-02  Mg     1.5     09-02        Urinalysis Basic - ( 02 Sep 2024 09:52 )    Color: x / Appearance: x / SG: x / pH: x  Gluc: 187 mg/dL / Ketone: x  / Bili: x / Urobili: x   Blood: x / Protein: x / Nitrite: x   Leuk Esterase: x / RBC: x / WBC x   Sq Epi: x / Non Sq Epi: x / Bacteria: x        Creatinine Trend: 4.71<--, 4.19<--, 4.36<--, 3.63<--, 4.07<--, 4.60<--      MICROBIOLOGY:  v  .Blood Blood-Peripheral  08-27-24   No growth at 5 days  --  --      .Blood Blood-Peripheral  08-27-24   No growth at 5 days  --  --      Clean Catch Clean Catch (Midstream)  08-27-24   >100,000 CFU/ml Enterobacter cloacae complex  --  Enterobacter cloacae complex      RADIOLOGY:     ROCCO JC  MRN-[id number]  74y (1950)    Follow Up:  Rash    Interval History: The pt was seen and examined earlier, not in acute distress, continues to complain of rash all over his body, pruritus. Pt is afebrile, RA, WBC better yesterday, no new cbc.     PAST MEDICAL & SURGICAL HISTORY:  Hypertension, unspecified type      Type 2 diabetes mellitus with other neurologic complication, without long-term current use of insulin      DM (diabetes mellitus)      HTN (hypertension)      HLD (hyperlipidemia)      Afib      Retinopathy      Chronic diastolic congestive heart failure      Chronic kidney disease, unspecified CKD stage      Amputation of leg, traumatic, left, subsequent encounter      S/P BKA (below knee amputation) unilateral, left      S/P hip replacement, left      History of surgery on arm          ROS:    [ ] Unobtainable because:  [ x] All other systems negative    Constitutional: no fever, no chills  Head: no trauma  Eyes: no vision changes, no eye pain  ENT:  no sore throat, no rhinorrhea  Cardiovascular:  no chest pain, no palpitation  Respiratory:  no SOB, no cough  GI:  no abd pain, no vomiting, no diarrhea  urinary: no dysuria, no hematuria, no flank pain  musculoskeletal:  no joint pain, no joint swelling  skin:  + rash  neurology:  no headache, no seizure, no change in mental status  psych: no anxiety, no depression         Allergies  No Known Allergies        ANTIMICROBIALS:  cefepime   IVPB    cefepime   IVPB 1000 every 24 hours      OTHER MEDS:  acetaminophen     Tablet .. 650 milliGRAM(s) Oral every 6 hours PRN  apixaban 5 milliGRAM(s) Oral two times a day  AQUAPHOR (petrolatum Ointment) 1 Application(s) Topical daily  atorvastatin 40 milliGRAM(s) Oral at bedtime  chlorhexidine 2% Cloths 1 Application(s) Topical <User Schedule>  dextrose 5%. 1000 milliLiter(s) IV Continuous <Continuous>  dextrose 5%. 1000 milliLiter(s) IV Continuous <Continuous>  dextrose 50% Injectable 25 Gram(s) IV Push once  dextrose 50% Injectable 12.5 Gram(s) IV Push once  dextrose 50% Injectable 25 Gram(s) IV Push once  dextrose Oral Gel 15 Gram(s) Oral once PRN  doxazosin 2 milliGRAM(s) Oral at bedtime  glucagon  Injectable 1 milliGRAM(s) IntraMuscular once  hydrALAZINE 50 milliGRAM(s) Oral every 8 hours  insulin glargine Injectable (LANTUS) 25 Unit(s) SubCutaneous at bedtime  insulin lispro (ADMELOG) corrective regimen sliding scale   SubCutaneous at bedtime  insulin lispro (ADMELOG) corrective regimen sliding scale   SubCutaneous three times a day before meals  insulin lispro Injectable (ADMELOG) 6 Unit(s) SubCutaneous three times a day before meals  melatonin 3 milliGRAM(s) Oral at bedtime  Nephro-lee 1 Tablet(s) Oral daily  pantoprazole    Tablet 40 milliGRAM(s) Oral before breakfast  polyethylene glycol 3350 17 Gram(s) Oral daily PRN  sevelamer carbonate 1600 milliGRAM(s) Oral three times a day with meals  triamcinolone 0.1% Ointment 1 Application(s) Topical two times a day      Vital Signs Last 24 Hrs  T(C): 34.2 (03 Sep 2024 13:00), Max: 36.6 (02 Sep 2024 23:38)  T(F): 93.5 (03 Sep 2024 13:00), Max: 97.9 (02 Sep 2024 23:38)  HR: 64 (03 Sep 2024 16:10) (54 - 90)  BP: 152/68 (03 Sep 2024 16:10) (125/69 - 152/68)  BP(mean): --  RR: 18 (03 Sep 2024 16:10) (16 - 18)  SpO2: 98% (03 Sep 2024 16:10) (95% - 99%)    Parameters below as of 03 Sep 2024 11:27  Patient On (Oxygen Delivery Method): room air        Physical Exam:  Constitutional: non-toxic, no distress, RA  HEAD/EYES: anicteric, no conjunctival injection, dry and crusting around the eyes, vision impaired   ENT:  supple, no thrush,+right sided temp hemodialysis catheter - dressing is due to change, spoke with RN  Cardiovascular:   normal S1, S2, no murmur, no edema  Respiratory:  clear BS bilaterally, no wheezes, no rales  GI:  soft, non-tender, normal bowel sounds  :  no olsen, no CVA tenderness  Musculoskeletal:  no synovitis, normal ROM, left BKA with healthy appearing stump  Neurologic: awake and alert, normal strength, no focal findings  Skin:  full body rash with areas of bullae, some deflated shallow ulcers, crusting,   Heme/Onc: no lymphadenopathy   Psychiatric:  awake, alert, appropriate mood    WBC Count: 10.12 K/uL (09-02 @ 09:52)  WBC Count: 14.44 K/uL (09-01 @ 05:38)  WBC Count: 16.16 K/uL (08-31 @ 06:30)  WBC Count: 13.30 K/uL (08-30 @ 06:26)  WBC Count: 10.40 K/uL (08-29 @ 12:18)  WBC Count: 9.44 K/uL (08-28 @ 06:48)                            9.7    10.12 )-----------( 144      ( 02 Sep 2024 09:52 )             31.3       09-02    133<L>  |  101  |  72<H>  ----------------------------<  187<H>  4.5   |  25  |  4.71<H>    Ca    8.2<L>      02 Sep 2024 09:52  Phos  5.7     09-02  Mg     1.5     09-02        Urinalysis Basic - ( 02 Sep 2024 09:52 )    Color: x / Appearance: x / SG: x / pH: x  Gluc: 187 mg/dL / Ketone: x  / Bili: x / Urobili: x   Blood: x / Protein: x / Nitrite: x   Leuk Esterase: x / RBC: x / WBC x   Sq Epi: x / Non Sq Epi: x / Bacteria: x        Creatinine Trend: 4.71<--, 4.19<--, 4.36<--, 3.63<--, 4.07<--, 4.60<--      MICROBIOLOGY:  v  .Blood Blood-Peripheral  08-27-24   No growth at 5 days  --  --      .Blood Blood-Peripheral  08-27-24   No growth at 5 days  --  --      Clean Catch Clean Catch (Midstream)  08-27-24   >100,000 CFU/ml Enterobacter cloacae complex  --  Enterobacter cloacae complex      RADIOLOGY:

## 2024-09-03 NOTE — PROGRESS NOTE ADULT - ASSESSMENT
73 year old male PMH HTN, HLD, DM, CHF, CKD stage IV, dementia / anxiety BIBEMS accompanied by wife due to weakness. Per wife, pt not feeling well since last night - went to bed prior to his own birthday party. Per EMS, pt complaining of decreased L vision and decreased L hearing as well as noted off balance with ambulation to restroom this AM. Pt states L eye is usually his good eye, and still with better vision in L than R, but not as good as usual. Pt reports chronic itching rash to body x past 3 months, f/u'd with Derm, was told likely 2/2 renal disease. Hearing later normalized in ER.     Note: Has left BKA.    wbc went from 10->13  right sided hemodialysis catheter looks good  no need for blood cultures   UA with enterobacter   need to change the ceftriaxone to cefepime, the hemodialysis dose 1g q24hrs     found to have severe ELMER needing hemodialysis   has a full body rash   has had confusion   generalized not feeling well  vision and hearing getting worse  no urinary complaints but was confused when came in  was supposed to get permacath but wbc rising     9/3: no fevers, WBC better yesterday 10.12, no new lab work, RA, BCs NGTD, Cr 4.71, had HD yesterday, right IJ temp HD catheter - dressing needs to be changed - spoke with RN. + Rash all over pt's body, cefepime IV continued. There is a chart note from medicine PA - pt was seen by dermatology in June for diffuse bullae on skin, Dr. Barnes. A conversation took place and dermatology recommended to start triamcinolone 0.1% ointment and f/up as op for skin biopsy and further assessment.     Plan:  continue cefepime 1g q24hrs  do not place PermCath, pending ? possibly tomorrow  in favor for Dermatology to see patient while in pt  HIV negative  f/up ANCA, ERIN, RA, lupus profile  please have hemodialysis RN change the dressing on the catheter - spoke with RN  obtain MRSA PCR - pending     Discussed with Dr. De La Cruz  Discussed with Dr. Mendoza 73 year old male PMH HTN, HLD, DM, CHF, CKD stage IV, dementia / anxiety BIBEMS accompanied by wife due to weakness. Per wife, pt not feeling well since last night - went to bed prior to his own birthday party. Per EMS, pt complaining of decreased L vision and decreased L hearing as well as noted off balance with ambulation to restroom this AM. Pt states L eye is usually his good eye, and still with better vision in L than R, but not as good as usual. Pt reports chronic itching rash to body x past 3 months, f/u'd with Derm, was told likely 2/2 renal disease. Hearing later normalized in ER.     Note: Has left BKA.    wbc went from 10->13  right sided hemodialysis catheter looks good  no need for blood cultures   UA with enterobacter   need to change the ceftriaxone to cefepime, the hemodialysis dose 1g q24hrs     found to have severe ELMER needing hemodialysis   has a full body rash   has had confusion   generalized not feeling well  vision and hearing getting worse  no urinary complaints but was confused when came in  was supposed to get permacath but wbc rising     9/3: no fevers, WBC better yesterday 10.12, no new lab work, RA, BCs NGTD, Cr 4.71, had HD yesterday, right IJ temp HD catheter - dressing needs to be changed - spoke with RN. + Rash all over pt's body, cefepime IV continued. There is a chart note from medicine PA - pt was seen by dermatology in June for diffuse bullae on skin, Dr. Barnes. A conversation took place and dermatology recommended to start triamcinolone 0.1% ointment and f/up as op for skin biopsy and further assessment.     Plan:  continue cefepime 1g q24hrs  do not place PermCath, pending ? possibly tomorrow  in favor for Dermatology to see patient while in pt  HIV negative  f/up ANCA, ERIN, RA, lupus profile  please have hemodialysis RN change the dressing on the catheter - spoke with RN  obtain MRSA PCR - pending     Discussed with Dr. De La Cruz  Discussed with Dr. Mendoza

## 2024-09-03 NOTE — DIETITIAN INITIAL EVALUATION ADULT - LITERATURE/VIDEOS GIVEN
dialysis nutrition therapy, portion plate method for consistent CHO intake c RD's contact # for family

## 2024-09-03 NOTE — DIETITIAN INITIAL EVALUATION ADULT - OTHER INFO
Per chart review, pt is on HD, for outpatient HD arrangement, pt c skin rash.  PMH include dementia.  Per pt., he was on glimepiride for DM, self monitored blood Glucose 1  x day c good glycemic control.  Insulin therapy noted in outpatient medication review.  Pt provided c education for dialysis nutrition therapy, portion plate method for consistent CHO intake.

## 2024-09-03 NOTE — CHART NOTE - NSCHARTNOTEFT_GEN_A_CORE
To whom it may concern:    Luís Lacey (Date of Birth 1950) has  been admitted to [facility]  since 8/26/2024 and has had initiation of life saving dialysis. First session 8/27/24. Dialysis is expected to be life long.     Please provide any required accomodation     If there are any questions please feel free to contact me.       [email or web address]   [phone]

## 2024-09-03 NOTE — CONSULT NOTE ADULT - SUBJECTIVE AND OBJECTIVE BOX
Patient is a 74y old  Male who presents with a chief complaint of Weakness, progression of CKD (03 Sep 2024 18:59)       INTERVAL HPI/OVERNIGHT EVENTS:  Patient seen and evaluated at bedside.  Pt is resting comfortable in NAD. Denies N/V/F/C.  Pain rated at X/10    Allergies    No Known Allergies    Intolerances        Vital Signs Last 24 Hrs  T(C): 36.5 (04 Sep 2024 16:33), Max: 36.7 (04 Sep 2024 04:51)  T(F): 97.7 (04 Sep 2024 16:33), Max: 98 (04 Sep 2024 04:51)  HR: 70 (04 Sep 2024 16:33) (70 - 94)  BP: 117/57 (04 Sep 2024 16:33) (104/52 - 165/72)  BP(mean): 98 (04 Sep 2024 14:20) (98 - 98)  RR: 18 (04 Sep 2024 16:33) (16 - 18)  SpO2: 96% (04 Sep 2024 16:33) (96% - 98%)    Parameters below as of 04 Sep 2024 16:33  Patient On (Oxygen Delivery Method): room air        LABS:                        9.7    8.75  )-----------( 155      ( 04 Sep 2024 05:55 )             30.7     09-04    137  |  104  |  80<H>  ----------------------------<  72  5.3   |  29  |  4.67<H>    Ca    8.9      04 Sep 2024 05:55      PT/INR - ( 04 Sep 2024 05:55 )   PT: 11.9 sec;   INR: 1.00 ratio           Urinalysis Basic - ( 04 Sep 2024 05:55 )  Color: x / Appearance: x / SG: x / pH: x  Gluc: 72 mg/dL / Ketone: x  / Bili: x / Urobili: x   Blood: x / Protein: x / Nitrite: x   Leuk Esterase: x / RBC: x / WBC x   Sq Epi: x / Non Sq Epi: x / Bacteria: x      CAPILLARY BLOOD GLUCOSE      POCT Blood Glucose.: 175 mg/dL (04 Sep 2024 17:26)  POCT Blood Glucose.: 105 mg/dL (04 Sep 2024 08:13)  POCT Blood Glucose.: 186 mg/dL (03 Sep 2024 21:01)      Lower Extremity Physical Exam:  Vascular: DP/PT 1/4 B/L, CFT < 3sec x 10, Temp gradient warm to cool B/L  Neurology: Epicritic sensation warm to cool to level of digits, right  Musculoskeletal/Ortho: Left BKA  Skin: Right foot: deep tissue injury and partial thickness eschar of the posterior heel, lateral malleolus, and lateral 5th metatarsal base, no open wounds, no acute signs of infection.       RADIOLOGY & ADDITIONAL TESTS:

## 2024-09-03 NOTE — CONSULT NOTE ADULT - ASSESSMENT
74M presents with right foot deep tissue injuries   - Patient evaluated and seen   - Afebrile, No leukocytosis  - Right foot deep tissue injury and partial thickness eschar of the posterior heel, lateral malleolus, and lateral 5th metatarsal base, no open wounds, no acute signs of infection.   - Z flows should be worn at all times while resting in bed for strict decubitus precaution  - Patient is stable for discharge from a podiatry standpoint. Follow up information and wound care can be found in discharge paperwork.  - Wound care orders placed.  - Discussed with attending.  Area M Indication Text: Tumors in this location are included in Area M (cheek, forehead, scalp, neck, jawline and pretibial skin).  Mohs surgery is indicated for tumors in these anatomic locations.

## 2024-09-03 NOTE — DIETITIAN INITIAL EVALUATION ADULT - OTHER CALCULATIONS
08/26 108.9 kg(header/adm wt.)  09/03, 97.8 kg(daily wt.), wt. loss of 11.1 kg noted since adm, ? wt. accuracy's, some wt. loss likely related to HD  Adjusted IBW for left PVP=957 LBS(178 LBS-.06%)  I/O: 220/1050(-830), voided 550

## 2024-09-03 NOTE — DIETITIAN INITIAL EVALUATION ADULT - ORAL INTAKE PTA/DIET HISTORY
Pt reports good appetite in general.  Diet PTA: low sodium, low potassium, low phosphorus, diabetic.  Per pt., he asks [place] for help with finding about potassium/phosphorus content of food.  Pt's wife/mother in law did the shopping/cooking or had food delivered.  Pt without report of chewing/swallowing difficulty/food allergies/intolerances.

## 2024-09-03 NOTE — DIETITIAN INITIAL EVALUATION ADULT - PERTINENT LABORATORY DATA
09-02    133<L>  |  101  |  72<H>  ----------------------------<  187<H>  4.5   |  25  |  4.71<H>    Ca    8.2<L>      02 Sep 2024 09:52  Phos  5.7     09-02  Mg     1.5     09-02    POCT Blood Glucose.: 175 mg/dL (09-03-24 @ 08:00)  A1C with Estimated Average Glucose Result: 7.0 % <H-good glycemic control>(08-27-24 @ 06:36)

## 2024-09-03 NOTE — PROGRESS NOTE ADULT - SUBJECTIVE AND OBJECTIVE BOX
[facility] NEPHROLOGY SERVICES, [facility]  NEPHROLOGY AND HYPERTENSION  [address]  [address]  [place] [address]  [phone]    MD YISEL WEEKS MD YELENA ROSENBERG, MD BINNY KOSHY, MD CHRISTOPHER CAPUTO, MD EDWARD BOVER, MD          Patient events noted  No acute distress    MEDICATIONS  (STANDING):  apixaban 5 milliGRAM(s) Oral two times a day  AQUAPHOR (petrolatum Ointment) 1 Application(s) Topical daily  atorvastatin 40 milliGRAM(s) Oral at bedtime  cefepime   IVPB 1000 milliGRAM(s) IV Intermittent every 24 hours  cefepime   IVPB      chlorhexidine 2% Cloths 1 Application(s) Topical <User Schedule>  dextrose 5%. 1000 milliLiter(s) (50 mL/Hr) IV Continuous <Continuous>  dextrose 5%. 1000 milliLiter(s) (100 mL/Hr) IV Continuous <Continuous>  dextrose 50% Injectable 25 Gram(s) IV Push once  dextrose 50% Injectable 12.5 Gram(s) IV Push once  dextrose 50% Injectable 25 Gram(s) IV Push once  doxazosin 2 milliGRAM(s) Oral at bedtime  glucagon  Injectable 1 milliGRAM(s) IntraMuscular once  hydrALAZINE 50 milliGRAM(s) Oral every 8 hours  insulin glargine Injectable (LANTUS) 25 Unit(s) SubCutaneous at bedtime  insulin lispro (ADMELOG) corrective regimen sliding scale   SubCutaneous at bedtime  insulin lispro (ADMELOG) corrective regimen sliding scale   SubCutaneous three times a day before meals  insulin lispro Injectable (ADMELOG) 6 Unit(s) SubCutaneous three times a day before meals  melatonin 3 milliGRAM(s) Oral at bedtime  Nephro-lee 1 Tablet(s) Oral daily  pantoprazole    Tablet 40 milliGRAM(s) Oral before breakfast  sevelamer carbonate 1600 milliGRAM(s) Oral three times a day with meals  triamcinolone 0.1% Ointment 1 Application(s) Topical two times a day    MEDICATIONS  (PRN):  acetaminophen     Tablet .. 650 milliGRAM(s) Oral every 6 hours PRN Temp greater or equal to 38C (100.4F), Moderate Pain (4 - 6)  dextrose Oral Gel 15 Gram(s) Oral once PRN Blood Glucose LESS THAN 70 milliGRAM(s)/deciliter  polyethylene glycol 3350 17 Gram(s) Oral daily PRN Constipation      09-02-24 @ 07:01  -  09-03-24 @ 07:00  --------------------------------------------------------  IN: 220 mL / OUT: 1050 mL / NET: -830 mL    09-03-24 @ 07:01  -  09-03-24 @ 19:00  --------------------------------------------------------  IN: 220 mL / OUT: 0 mL / NET: 220 mL      PHYSICAL EXAM:      T(C): 34.2 (09-03-24 @ 18:49), Max: 36.6 (09-02-24 @ 23:38)  HR: 64 (09-03-24 @ 16:10) (54 - 90)  BP: 152/68 (09-03-24 @ 16:10) (125/69 - 152/68)  RR: 18 (09-03-24 @ 16:10) (16 - 18)  SpO2: 98% (09-03-24 @ 16:10) (95% - 99%)  Wt(kg): --  Lungs clear  Heart S1S2  Abd soft NT ND  Extremities:   tr edema  skin diffuse excoriated rash                                  9.7    10.12 )-----------( 144      ( 02 Sep 2024 09:52 )             31.3     09-02    133<L>  |  101  |  72<H>  ----------------------------<  187<H>  4.5   |  25  |  4.71<H>    Ca    8.2<L>      02 Sep 2024 09:52  Phos  5.7     09-02  Mg     1.5     09-02          Creatinine Trend: 4.71<--, 4.19<--, 4.36<--, 3.63<--, 4.07<--, 4.60<--      Assessment   New ESRD, maintenance   Severe excoriated diffuse rash   '  Plan:  HD for tomorrow  UF as tolerated  Perm cath for Wednesday  Outpatient HD arrangement   For perm cath once cleared     Augusto Santoyo MD

## 2024-09-03 NOTE — DIETITIAN INITIAL EVALUATION ADULT - PERTINENT MEDS FT
MEDICATIONS  (STANDING):  ammonium lactate 12% Lotion 1 Application(s) Topical every 12 hours  apixaban 5 milliGRAM(s) Oral two times a day  AQUAPHOR (petrolatum Ointment) 1 Application(s) Topical daily  atorvastatin 40 milliGRAM(s) Oral at bedtime  cefepime   IVPB      cefepime   IVPB 1000 milliGRAM(s) IV Intermittent every 24 hours  chlorhexidine 2% Cloths 1 Application(s) Topical <User Schedule>  dextrose 5%. 1000 milliLiter(s) (50 mL/Hr) IV Continuous <Continuous>  dextrose 5%. 1000 milliLiter(s) (100 mL/Hr) IV Continuous <Continuous>  dextrose 50% Injectable 12.5 Gram(s) IV Push once  dextrose 50% Injectable 25 Gram(s) IV Push once  dextrose 50% Injectable 25 Gram(s) IV Push once  doxazosin 2 milliGRAM(s) Oral at bedtime  glucagon  Injectable 1 milliGRAM(s) IntraMuscular once  hydrALAZINE 50 milliGRAM(s) Oral every 8 hours  insulin glargine Injectable (LANTUS) 25 Unit(s) SubCutaneous at bedtime  insulin lispro (ADMELOG) corrective regimen sliding scale   SubCutaneous at bedtime  insulin lispro (ADMELOG) corrective regimen sliding scale   SubCutaneous three times a day before meals  insulin lispro Injectable (ADMELOG) 6 Unit(s) SubCutaneous three times a day before meals  melatonin 3 milliGRAM(s) Oral at bedtime  pantoprazole    Tablet 40 milliGRAM(s) Oral before breakfast  sevelamer carbonate 1600 milliGRAM(s) Oral three times a day with meals    MEDICATIONS  (PRN):  acetaminophen     Tablet .. 650 milliGRAM(s) Oral every 6 hours PRN Temp greater or equal to 38C (100.4F), Moderate Pain (4 - 6)  dextrose Oral Gel 15 Gram(s) Oral once PRN Blood Glucose LESS THAN 70 milliGRAM(s)/deciliter  polyethylene glycol 3350 17 Gram(s) Oral daily PRN Constipation

## 2024-09-03 NOTE — CHART NOTE - NSCHARTNOTEFT_GEN_A_CORE
Spoke to Dr Saw Barnes [facility] on call regarding progression of patients skin condition. Pt had followed with Dr Clayton office as an outpatient in June for diffuse bullae on skin. During hospital course, the progression of the patients disease was presumed to be related to worsening uremia 2/2 ESRD now requiring dialysis. Topical treatments with Emollients, Lac-Hydrin and PO Atarax have been tried with minimal changes in visual appearance of topical lesions of pruritis, in addition to minimal change following multiple HD sessions. Photos were sent to Dr Barnes with Mr Bethany permission, and he believes that this is a progression of likely underlying skin pathology rather than skin changes related to kidney disease. He recommended starting Triamcinolone 0.1% ointment (which patient had favorable response to earlier this year), and that the patient should follow up as an outpatient for skin biopsy and further assessment.

## 2024-09-04 ENCOUNTER — RESULT REVIEW (OUTPATIENT)
Age: 74
End: 2024-09-04

## 2024-09-04 LAB
ANION GAP SERPL CALC-SCNC: 4 MMOL/L — LOW (ref 5–17)
BUN SERPL-MCNC: 80 MG/DL — HIGH (ref 7–23)
CALCIUM SERPL-MCNC: 8.9 MG/DL — SIGNIFICANT CHANGE UP (ref 8.5–10.1)
CHLORIDE SERPL-SCNC: 104 MMOL/L — SIGNIFICANT CHANGE UP (ref 96–108)
CO2 SERPL-SCNC: 29 MMOL/L — SIGNIFICANT CHANGE UP (ref 22–31)
CREAT SERPL-MCNC: 4.67 MG/DL — HIGH (ref 0.5–1.3)
EGFR: 12 ML/MIN/1.73M2 — LOW
GLUCOSE BLDC GLUCOMTR-MCNC: 105 MG/DL — HIGH (ref 70–99)
GLUCOSE BLDC GLUCOMTR-MCNC: 175 MG/DL — HIGH (ref 70–99)
GLUCOSE BLDC GLUCOMTR-MCNC: 273 MG/DL — HIGH (ref 70–99)
GLUCOSE SERPL-MCNC: 72 MG/DL — SIGNIFICANT CHANGE UP (ref 70–99)
HCT VFR BLD CALC: 30.7 % — LOW (ref 39–50)
HGB BLD-MCNC: 9.7 G/DL — LOW (ref 13–17)
INR BLD: 1 RATIO — SIGNIFICANT CHANGE UP (ref 0.85–1.18)
MCHC RBC-ENTMCNC: 28 PG — SIGNIFICANT CHANGE UP (ref 27–34)
MCHC RBC-ENTMCNC: 31.6 G/DL — LOW (ref 32–36)
MCV RBC AUTO: 88.7 FL — SIGNIFICANT CHANGE UP (ref 80–100)
MRSA PCR RESULT.: SIGNIFICANT CHANGE UP
NRBC # BLD: 0 /100 WBCS — SIGNIFICANT CHANGE UP (ref 0–0)
PLATELET # BLD AUTO: 155 K/UL — SIGNIFICANT CHANGE UP (ref 150–400)
POTASSIUM SERPL-MCNC: 5.3 MMOL/L — SIGNIFICANT CHANGE UP (ref 3.5–5.3)
POTASSIUM SERPL-SCNC: 5.3 MMOL/L — SIGNIFICANT CHANGE UP (ref 3.5–5.3)
PROTHROM AB SERPL-ACNC: 11.9 SEC — SIGNIFICANT CHANGE UP (ref 9.5–13)
RBC # BLD: 3.46 M/UL — LOW (ref 4.2–5.8)
RBC # FLD: 15.7 % — HIGH (ref 10.3–14.5)
S AUREUS DNA NOSE QL NAA+PROBE: SIGNIFICANT CHANGE UP
SODIUM SERPL-SCNC: 137 MMOL/L — SIGNIFICANT CHANGE UP (ref 135–145)
WBC # BLD: 8.75 K/UL — SIGNIFICANT CHANGE UP (ref 3.8–10.5)
WBC # FLD AUTO: 8.75 K/UL — SIGNIFICANT CHANGE UP (ref 3.8–10.5)

## 2024-09-04 PROCEDURE — 99232 SBSQ HOSP IP/OBS MODERATE 35: CPT

## 2024-09-04 PROCEDURE — 93306 TTE W/DOPPLER COMPLETE: CPT | Mod: 26

## 2024-09-04 RX ORDER — BENZOCAINE/MENTHOL 20 %-0.5 %
1 AEROSOL (GRAM) TOPICAL
Refills: 0 | Status: DISCONTINUED | OUTPATIENT
Start: 2024-09-04 | End: 2024-09-21

## 2024-09-04 RX ADMIN — Medication 2 MILLIGRAM(S): at 21:33

## 2024-09-04 RX ADMIN — CHLORHEXIDINE GLUCONATE 1 APPLICATION(S): 40 SOLUTION TOPICAL at 05:45

## 2024-09-04 RX ADMIN — CEFEPIME 100 MILLIGRAM(S): 2 INJECTION, POWDER, FOR SOLUTION INTRAVENOUS at 21:43

## 2024-09-04 RX ADMIN — Medication 3 MILLIGRAM(S): at 21:33

## 2024-09-04 RX ADMIN — Medication 40 MILLIGRAM(S): at 21:33

## 2024-09-04 RX ADMIN — SEVELAMER CARBONATE 1600 MILLIGRAM(S): 800 TABLET, FILM COATED ORAL at 08:24

## 2024-09-04 RX ADMIN — Medication 2: at 17:30

## 2024-09-04 RX ADMIN — TRIAMCINOLONE ACETONIDE 1 APPLICATION(S): 1 CREAM TOPICAL at 17:31

## 2024-09-04 RX ADMIN — Medication 50 MILLIGRAM(S): at 05:40

## 2024-09-04 RX ADMIN — Medication 40 MILLIGRAM(S): at 05:40

## 2024-09-04 RX ADMIN — Medication 2: at 21:32

## 2024-09-04 RX ADMIN — Medication 50 MILLIGRAM(S): at 21:33

## 2024-09-04 RX ADMIN — Medication 50 MILLIGRAM(S): at 14:43

## 2024-09-04 RX ADMIN — ACETAMINOPHEN 650 MILLIGRAM(S): 325 TABLET ORAL at 11:50

## 2024-09-04 RX ADMIN — TRIAMCINOLONE ACETONIDE 1 APPLICATION(S): 1 CREAM TOPICAL at 05:45

## 2024-09-04 RX ADMIN — Medication 6 UNIT(S): at 08:23

## 2024-09-04 RX ADMIN — INSULIN GLARGINE 25 UNIT(S): 100 INJECTION, SOLUTION SUBCUTANEOUS at 21:32

## 2024-09-04 RX ADMIN — Medication 6 UNIT(S): at 17:30

## 2024-09-04 RX ADMIN — SEVELAMER CARBONATE 1600 MILLIGRAM(S): 800 TABLET, FILM COATED ORAL at 17:29

## 2024-09-04 RX ADMIN — ACETAMINOPHEN 650 MILLIGRAM(S): 325 TABLET ORAL at 11:19

## 2024-09-04 RX ADMIN — APIXABAN 5 MILLIGRAM(S): 5 TABLET, FILM COATED ORAL at 17:29

## 2024-09-04 NOTE — DISCHARGE NOTE PROVIDER - NSDCMRMEDTOKEN_GEN_ALL_CORE_FT
acetaminophen 325 mg oral tablet: 2 tab(s) orally every 6 hours, As needed, Temp greater or equal to 38C (100.4F), Mild Pain (1 - 3)  alcohol swabs : Apply topically to affected area 4 times a day   apixaban 5 mg oral tablet: 1 tab(s) orally every 12 hours  atorvastatin 40 mg oral tablet: 1 tab(s) orally once a day (at bedtime)  calcitriol 0.25 mcg oral capsule: 1 cap(s) orally once a day  doxazosin 4 mg oral tablet: 1 tab(s) orally once a day (at bedtime)  fluticasone 50 mcg/inh nasal spray: 1 spray(s) nasal 2 times a day  Freestyle Carlos Eduardo 2 KIT: 1 unit(s) subcutaneous once a day   freestyle carlos eduardo 2 Sensors: 1 each subcutaneous every 14 days  glucometer (per patient&#x27;s insurance): 1 application subcutaneous 4 times a day   HumaLOG KwikPen 100 units/mL injectable solution: 21 unit(s) subcutaneous 3 times a day (with meals)   hydrALAZINE 100 mg oral tablet: 1 tab(s) orally 3 times a day  Insulin Pen Needles, 4mm: 1 application subcutaneously 4 times a day . ** Use with insulin pen **   isosorbide mononitrate 120 mg oral tablet, extended release: 1 tab(s) orally once a day  lancets: 1 application subcutaneously 3 times a day     ICD Code E11.9   Lantus Solostar Pen 100 units/mL subcutaneous solution: 34 unit(s) subcutaneous once a day (at bedtime)   NIFEdipine 60 mg oral tablet, extended release: 1 tab(s) orally once a day  pantoprazole 40 mg oral delayed release tablet: 1 tab(s) orally once a day  predniSONE 20 mg oral tablet: 3 tab(s) orally once a day  sulfamethoxazole-trimethoprim 400 mg-80 mg oral tablet: 1 tab(s) orally 3 times a week (Mon, Wed, Fri)  test strips (per patient&#x27;s insurance): 1 application subcutaneously 3 times a day  . ** Compatible with patient&#x27;s glucometer ** ICD Code E11.9   Vitamin D2 50,000 intl units (1.25 mg) oral capsule: 1 cap(s) orally 2 times a week   acetaminophen 325 mg oral tablet: 2 tab(s) orally every 6 hours, As needed, Temp greater or equal to 38C (100.4F), Mild Pain (1 - 3)  alcohol swabs : Apply topically to affected area 4 times a day   apixaban 5 mg oral tablet: 1 tab(s) orally 2 times a day  atorvastatin 40 mg oral tablet: 1 tab(s) orally once a day (at bedtime)  bacitracin 500 units/g topical ointment: 1 Apply topically to affected area once a day  calamine topical lotion: 1 Apply topically to affected area 2 times a day  calcitriol 0.25 mcg oral capsule: 1 cap(s) orally once a day  doxazosin 4 mg oral tablet: 1 tab(s) orally once a day (at bedtime)  fluticasone 50 mcg/inh nasal spray: 1 spray(s) nasal 2 times a day  Freestyle Carlos Eduardo 2 KIT: 1 unit(s) subcutaneous once a day   freestyle carlos eduardo 2 Sensors: 1 each subcutaneous every 14 days  glucometer (per patient&#x27;s insurance): 1 application subcutaneous 4 times a day   hydrALAZINE 50 mg oral tablet: 1 tab(s) orally every 8 hours  insulin glargine 100 units/mL subcutaneous solution: 25 unit(s) subcutaneous once a day (at bedtime)  insulin lispro 100 units/mL injectable solution: 6 unit(s) injectable 3 times a day (with meals)  Insulin Pen Needles, 4mm: 1 application subcutaneously 4 times a day . ** Use with insulin pen **   lancets: 1 application subcutaneously 3 times a day     ICD Code E11.9   NIFEdipine (Eqv-Adalat CC) 60 mg oral tablet, extended release: 1 tab(s) orally  pantoprazole 40 mg oral delayed release tablet: 1 tab(s) orally once a day  petrolatum topical ointment: 1 Apply topically to affected area once a day  predniSONE 50 mg oral tablet: 2 tab(s) orally once a day  sevelamer carbonate 800 mg oral tablet: 2 tab(s) orally 3 times a day (with meals)  test strips (per patient&#x27;s insurance): 1 application subcutaneously 3 times a day  . ** Compatible with patient&#x27;s glucometer ** ICD Code E11.9   triamcinolone 0.1% topical ointment: 1 Apply topically to affected area 2 times a day  Vitamin D2 50,000 intl units (1.25 mg) oral capsule: 1 cap(s) orally 2 times a week   acetaminophen 325 mg oral tablet: 2 tab(s) orally every 6 hours, As needed, Temp greater or equal to 38C (100.4F), Mild Pain (1 - 3)  alcohol swabs : Apply topically to affected area 4 times a day   apixaban 5 mg oral tablet: 1 tab(s) orally 2 times a day  atorvastatin 40 mg oral tablet: 1 tab(s) orally once a day (at bedtime)  bacitracin 500 units/g topical ointment: 1 Apply topically to affected area once a day  Bactrim  mg-160 mg oral tablet: 1 tab(s) orally once a day  calamine topical lotion: 1 Apply topically to affected area 2 times a day  calcitriol 0.25 mcg oral capsule: 1 cap(s) orally once a day  doxazosin 4 mg oral tablet: 1 tab(s) orally once a day (at bedtime)  fluticasone 50 mcg/inh nasal spray: 1 spray(s) nasal 2 times a day  Freestyle Carlos Eduardo 2 KIT: 1 unit(s) subcutaneous once a day   freestyle carlos eduardo 2 Sensors: 1 each subcutaneous every 14 days  glucometer (per patient&#x27;s insurance): 1 application subcutaneous 4 times a day   hydrALAZINE 50 mg oral tablet: 1 tab(s) orally every 8 hours  insulin glargine 100 units/mL subcutaneous solution: 25 unit(s) subcutaneous once a day (at bedtime)  insulin lispro 100 units/mL injectable solution: 6 unit(s) injectable 3 times a day (with meals)  Insulin Pen Needles, 4mm: 1 application subcutaneously 4 times a day . ** Use with insulin pen **   lancets: 1 application subcutaneously 3 times a day     ICD Code E11.9   NIFEdipine (Eqv-Adalat CC) 60 mg oral tablet, extended release: 1 tab(s) orally once a day  pantoprazole 40 mg oral delayed release tablet: 1 tab(s) orally once a day  petrolatum topical ointment: 1 Apply topically to affected area once a day  predniSONE 50 mg oral tablet: 2 tab(s) orally once a day  sevelamer carbonate 800 mg oral tablet: 2 tab(s) orally 3 times a day (with meals)  test strips (per patient&#x27;s insurance): 1 application subcutaneously 3 times a day  . ** Compatible with patient&#x27;s glucometer ** ICD Code E11.9   triamcinolone 0.1% topical ointment: 1 Apply topically to affected area 2 times a day  Vitamin D2 50,000 intl units (1.25 mg) oral capsule: 1 cap(s) orally 2 times a week

## 2024-09-04 NOTE — DISCHARGE NOTE PROVIDER - PROVIDER TOKENS
PROVIDER:[TOKEN:[[id number]]],PROVIDER:[TOKEN:[[id number]]] PROVIDER:[TOKEN:[[id number]]],PROVIDER:[TOKEN:[[id number]]],PROVIDER:[TOKEN:[[id number]],FOLLOWUP:[1 week]]

## 2024-09-04 NOTE — DISCHARGE NOTE PROVIDER - NSDCFUADDINST_GEN_ALL_CORE_FT
Diet, Consistent Carbohydrate w/Evening Snack:   1200mL Fluid Restriction ([id number])  For patients receiving Renal Replacement - No Protein Restr, No Conc K, No Conc Phos, Low Sodium (RENAL) (09-03-24 @ 11:38) [Active]

## 2024-09-04 NOTE — PROGRESS NOTE ADULT - SUBJECTIVE AND OBJECTIVE BOX
HPI:  73 year old male PMH HTN, HLD, DM, CHF, CKD stage IV, dementia / anxiety BIBEMS accompanied by wife due to weakness. Per wife, pt not feeling well since last night - went to bed prior to his own birthday party. Per EMS, pt complaining of decreased L vision and decreased L hearing as well as noted off balance with ambulation to restroom this AM. Pt states L eye is usually his good eye, and still with better vision in L than R, but not as good as usual. Pt reports chronic itching rash to body x past 3 months, f/u'd with Derm, was told likely 2/2 renal disease. Hearing later normalized in ER.     Note: Has left BKA.  (26 Aug 2024 16:46)  Patient is a 74y old  Male who presents with a chief complaint of Weakness, progression of CKD (04 Sep 2024 21:53)      INTERVAL HPI/OVERNIGHT EVENTS: no acutte vents has been hypothermic     MEDICATIONS  (STANDING):  apixaban 5 milliGRAM(s) Oral two times a day  AQUAPHOR (petrolatum Ointment) 1 Application(s) Topical daily  atorvastatin 40 milliGRAM(s) Oral at bedtime  cefepime   IVPB      cefepime   IVPB 1000 milliGRAM(s) IV Intermittent every 24 hours  chlorhexidine 2% Cloths 1 Application(s) Topical <User Schedule>  dextrose 5%. 1000 milliLiter(s) (50 mL/Hr) IV Continuous <Continuous>  dextrose 5%. 1000 milliLiter(s) (100 mL/Hr) IV Continuous <Continuous>  dextrose 50% Injectable 25 Gram(s) IV Push once  dextrose 50% Injectable 12.5 Gram(s) IV Push once  dextrose 50% Injectable 25 Gram(s) IV Push once  doxazosin 2 milliGRAM(s) Oral at bedtime  glucagon  Injectable 1 milliGRAM(s) IntraMuscular once  hydrALAZINE 50 milliGRAM(s) Oral every 8 hours  insulin glargine Injectable (LANTUS) 25 Unit(s) SubCutaneous at bedtime  insulin lispro (ADMELOG) corrective regimen sliding scale   SubCutaneous three times a day before meals  insulin lispro (ADMELOG) corrective regimen sliding scale   SubCutaneous at bedtime  insulin lispro Injectable (ADMELOG) 6 Unit(s) SubCutaneous three times a day before meals  melatonin 3 milliGRAM(s) Oral at bedtime  Nephro-lee 1 Tablet(s) Oral daily  pantoprazole    Tablet 40 milliGRAM(s) Oral before breakfast  sevelamer carbonate 1600 milliGRAM(s) Oral three times a day with meals  triamcinolone 0.1% Ointment 1 Application(s) Topical two times a day    MEDICATIONS  (PRN):  acetaminophen     Tablet .. 650 milliGRAM(s) Oral every 6 hours PRN Temp greater or equal to 38C (100.4F), Moderate Pain (4 - 6)  benzocaine/menthol Lozenge 1 Lozenge Oral five times a day PRN Mouth Sores  dextrose Oral Gel 15 Gram(s) Oral once PRN Blood Glucose LESS THAN 70 milliGRAM(s)/deciliter  polyethylene glycol 3350 17 Gram(s) Oral daily PRN Constipation      Allergies    No Known Allergies    Intolerances        REVIEW OF SYSTEMS:  redused itching skin lesions     Vital Signs Last 24 Hrs  T(C): 36.4 (05 Sep 2024 04:53), Max: 36.5 (04 Sep 2024 16:33)  T(F): 97.5 (05 Sep 2024 04:53), Max: 97.7 (04 Sep 2024 16:33)  HR: 84 (05 Sep 2024 04:53) (70 - 94)  BP: 170/70 (05 Sep 2024 04:53) (104/52 - 170/70)  BP(mean): 98 (04 Sep 2024 14:20) (98 - 98)  RR: 18 (05 Sep 2024 04:53) (17 - 18)  SpO2: 98% (05 Sep 2024 04:53) (96% - 98%)    Parameters below as of 05 Sep 2024 04:53  Patient On (Oxygen Delivery Method): room air        PHYSICAL EXAM:  GENERAL: NAD, well-groomed, well-developed  HEAD:  Atraumatic, Normocephalic  EYES: EOMI, PERRLA, conjunctiva and sclera clear  ENMT: No tonsillar erythema, exudates, or enlargement; Moist mucous membranes, Good dentition, No lesions  NECK: Supple, No JVD, Normal thyroid  NERVOUS SYSTEM:  Alert & Oriented X3, Good concentration; Motor Strength 5/5 B/L upper and lower extremities; DTRs 2+ intact and symmetric  CHEST/LUNG: Clear to ascultation  bilaterally; No rales, rhonchi, wheezing, or rubs  HEART: Regular rate and rhythm; No murmurs, rubs, or gallops  ABDOMEN: Soft, Nontender, Nondistended; Bowel sounds present  EXTREMITIES:  left BKA right heel ulcer with off loading LYMPH: No lymphadenopathy noted  SKIN slowly healing skin lesions bullae popped   LABS:                        9.7    8.75  )-----------( 155      ( 04 Sep 2024 05:55 )             30.7     09-04    137  |  104  |  80<H>  ----------------------------<  72  5.3   |  29  |  4.67<H>    Ca    8.9      04 Sep 2024 05:55      PT/INR - ( 04 Sep 2024 05:55 )   PT: 11.9 sec;   INR: 1.00 ratio           Urinalysis Basic - ( 04 Sep 2024 05:55 )    Color: x / Appearance: x / SG: x / pH: x  Gluc: 72 mg/dL / Ketone: x  / Bili: x / Urobili: x   Blood: x / Protein: x / Nitrite: x   Leuk Esterase: x / RBC: x / WBC x   Sq Epi: x / Non Sq Epi: x / Bacteria: x      CAPILLARY BLOOD GLUCOSE      POCT Blood Glucose.: 273 mg/dL (04 Sep 2024 21:09)  POCT Blood Glucose.: 175 mg/dL (04 Sep 2024 17:26)  POCT Blood Glucose.: 105 mg/dL (04 Sep 2024 08:13)      RADIOLOGY & ADDITIONAL TESTS:    Imaging Personally Reviewed:  [ ] YES  [ ] NO    Consultant(s) Notes Reviewed:  [ ] YES  [ ] NO    Care Discussed with Consultants/Other Providers [ ] YES  [ ] NO

## 2024-09-04 NOTE — DISCHARGE NOTE PROVIDER - NSDCCPCAREPLAN_GEN_ALL_CORE_FT
PRINCIPAL DISCHARGE DIAGNOSIS  Diagnosis: Uremia  Assessment and Plan of Treatment: pt was started on hemodialysis      SECONDARY DISCHARGE DIAGNOSES  Diagnosis: ESRD on dialysis  Assessment and Plan of Treatment: continue hemodialysis oin schedule    Diagnosis: Skin rash  Assessment and Plan of Treatment: follow up with Dr Garrett for skin biopsy    Diagnosis: Lung nodules  Assessment and Plan of Treatment: repeat ct scan in 6 months please have your promary doctor order ct scan     PRINCIPAL DISCHARGE DIAGNOSIS  Diagnosis: Uremia  Assessment and Plan of Treatment: resolved -pt was started on hemodialysis      SECONDARY DISCHARGE DIAGNOSES  Diagnosis: ESRD on dialysis  Assessment and Plan of Treatment: continue hemodialysis on schedule    Diagnosis: Skin rash  Assessment and Plan of Treatment: follow up with Dr sethi for skin biopsy results   Please follow up with Dr. Shaikh in 1-2 weeks to determine prednisone plan    Diagnosis: Lung nodules  Assessment and Plan of Treatment: repeat ct scan in 6 months please have your promary doctor order ct scan    Diagnosis: Pressure ulcer of right heel  Assessment and Plan of Treatment: wear z flow boots while in bed ,follow up with podiatry     PRINCIPAL DISCHARGE DIAGNOSIS  Diagnosis: Uremia  Assessment and Plan of Treatment: resolved -pt was started on hemodialysis      SECONDARY DISCHARGE DIAGNOSES  Diagnosis: ESRD on dialysis  Assessment and Plan of Treatment: continue hemodialysis on schedule    Diagnosis: Skin rash  Assessment and Plan of Treatment: follow up with Dr sethi for skin biopsy results   Please follow up with Dr. Shaikh in 1-2 weeks to determine prednisone plan  Please take Bactrim once a day while on high dose steroids to prevent infection. Please discuss this with your rheumatologist Dr. Shaikh    Diagnosis: Lung nodules  Assessment and Plan of Treatment: repeat ct scan in 6 months please have your promary doctor order ct scan    Diagnosis: Pressure ulcer of right heel  Assessment and Plan of Treatment: wear z flow boots while in bed ,follow up with podiatry

## 2024-09-04 NOTE — DISCHARGE NOTE PROVIDER - NSDCFUADDAPPT_GEN_ALL_CORE_FT
Podiatry Discharge Instructions:  Follow up: Please follow up with Dr. Waterhouse within 1 week of discharge from the hospital, please call [phone] for appointment and discuss that you recently were seen in the hospital.  Wound Care: Please apply betadine to right foot wounds daily followed by 4x4 guaze, ABD, and Quyen/Kerlix.   Weight bearing: Please weight bear as tolerated in a surgical shoe. Please wear z--flows while resting in bed for pressure ulcer prevention.   Antibiotics: Please continue as instructed. Podiatry Discharge Instructions:  Follow up: Please follow up with Dr. Waterhouse within 1 week of discharge from the hospital, please call [phone] for appointment and discuss that you recently were seen in the hospital.  Wound Care: Please apply betadine to right foot wounds daily followed by 4x4 guaze, ABD, and Quyen/Kerlix.   Weight bearing: Please weight bear as tolerated in a surgical shoe. Please wear z--flows while resting in bed for pressure ulcer prevention.   Antibiotics: Please continue as instructed.    APPTS ARE READY TO BE MADE: [X] YES    Best Family or Patient Contact (if needed):    Additional Information about above appointments (if needed):    1: needs rheumatology appt in 1-2 weeks to determine prednisone taper   2:   3:     Other comments or requests:    Podiatry Discharge Instructions:  Follow up: Please follow up with Dr. Waterhouse within 1 week of discharge from the hospital, please call [phone] for appointment and discuss that you recently were seen in the hospital.  Wound Care: Please apply betadine to right foot wounds daily followed by 4x4 guaze, ABD, and Quyen/Kerlix.   Weight bearing: Please weight bear as tolerated in a surgical shoe. Please wear z--flows while resting in bed for pressure ulcer prevention.   Antibiotics: Please continue as instructed.    APPTS ARE READY TO BE MADE: [X] YES    Best Family or Patient Contact (if needed):    Additional Information about above appointments (if needed):    1: needs rheumatology appt in 1-2 weeks to determine prednisone taper   2:   3:     Other comments or requests:     Patient is being discharged to [facility]. Caregiver will arrange follow up.

## 2024-09-04 NOTE — DISCHARGE NOTE PROVIDER - CARE PROVIDERS DIRECT ADDRESSES
,DirectAddress_Unknown,DirectAddress_Unknown ,DirectAddress_Unknown,DirectAddress_Unknown,[email or web address]

## 2024-09-04 NOTE — PROGRESS NOTE ADULT - SUBJECTIVE AND OBJECTIVE BOX
[facility] NEPHROLOGY SERVICES, [facility]  NEPHROLOGY AND HYPERTENSION  [address]  [address]  [place] [address]  [phone]    MD YISEL WEEKS MD YELENA ROSENBERG, MD BINNY KOSHY, MD CHRISTOPHER CAPUTO, MD EDWARD BOVER, MD          Patient events noted    MEDICATIONS  (STANDING):  apixaban 5 milliGRAM(s) Oral two times a day  AQUAPHOR (petrolatum Ointment) 1 Application(s) Topical daily  atorvastatin 40 milliGRAM(s) Oral at bedtime  cefepime   IVPB 1000 milliGRAM(s) IV Intermittent every 24 hours  cefepime   IVPB      chlorhexidine 2% Cloths 1 Application(s) Topical <User Schedule>  dextrose 5%. 1000 milliLiter(s) (50 mL/Hr) IV Continuous <Continuous>  dextrose 5%. 1000 milliLiter(s) (100 mL/Hr) IV Continuous <Continuous>  dextrose 50% Injectable 12.5 Gram(s) IV Push once  dextrose 50% Injectable 25 Gram(s) IV Push once  dextrose 50% Injectable 25 Gram(s) IV Push once  doxazosin 2 milliGRAM(s) Oral at bedtime  glucagon  Injectable 1 milliGRAM(s) IntraMuscular once  hydrALAZINE 50 milliGRAM(s) Oral every 8 hours  insulin glargine Injectable (LANTUS) 25 Unit(s) SubCutaneous at bedtime  insulin lispro (ADMELOG) corrective regimen sliding scale   SubCutaneous at bedtime  insulin lispro (ADMELOG) corrective regimen sliding scale   SubCutaneous three times a day before meals  insulin lispro Injectable (ADMELOG) 6 Unit(s) SubCutaneous three times a day before meals  melatonin 3 milliGRAM(s) Oral at bedtime  Nephro-lee 1 Tablet(s) Oral daily  pantoprazole    Tablet 40 milliGRAM(s) Oral before breakfast  sevelamer carbonate 1600 milliGRAM(s) Oral three times a day with meals  triamcinolone 0.1% Ointment 1 Application(s) Topical two times a day    MEDICATIONS  (PRN):  acetaminophen     Tablet .. 650 milliGRAM(s) Oral every 6 hours PRN Temp greater or equal to 38C (100.4F), Moderate Pain (4 - 6)  benzocaine/menthol Lozenge 1 Lozenge Oral five times a day PRN Mouth Sores  dextrose Oral Gel 15 Gram(s) Oral once PRN Blood Glucose LESS THAN 70 milliGRAM(s)/deciliter  polyethylene glycol 3350 17 Gram(s) Oral daily PRN Constipation      09-03-24 @ 07:01  -  09-04-24 @ 07:00  --------------------------------------------------------  IN: 220 mL / OUT: 350 mL / NET: -130 mL    09-04-24 @ 07:01  -  09-04-24 @ 21:53  --------------------------------------------------------  IN: 220 mL / OUT: 2000 mL / NET: -1780 mL      PHYSICAL EXAM:      T(C): 36.5 (09-04-24 @ 16:33), Max: 36.7 (09-04-24 @ 04:51)  HR: 70 (09-04-24 @ 16:33) (70 - 94)  BP: 117/57 (09-04-24 @ 16:33) (104/52 - 165/72)  RR: 18 (09-04-24 @ 16:33) (16 - 18)  SpO2: 96% (09-04-24 @ 16:33) (96% - 98%)  Wt(kg): --  Lungs clear  Heart S1S2  Abd soft NT ND  Extremities:   tr edema  skin excoriated rash                                  9.7    8.75  )-----------( 155      ( 04 Sep 2024 05:55 )             30.7     09-04    137  |  104  |  80<H>  ----------------------------<  72  5.3   |  29  |  4.67<H>    Ca    8.9      04 Sep 2024 05:55          Creatinine Trend: 4.67<--, 4.71<--, 4.19<--, 4.36<--, 3.63<--, 4.07<--      Assessment   New ESRD, maintenance    Plan:  HD for today   UF as tolerated  Triamcinolone cream    Augusto Santoyo MD

## 2024-09-04 NOTE — DISCHARGE NOTE PROVIDER - HOSPITAL COURSE
73 year old male PMH HTN, pvd s/p l bka , HLD, DM, CHF, CKD stage IV, dementia / anxiety BIBEMS accompanied by wife due to weakness. Per wife, pt not feeling well since last night - went to bed prior to his own birthday party. Per EMS, pt complaining of decreased L vision and decreased L hearing as well as noted off balance with ambulation to restroom this AM. Pt states L eye is usually his good eye, and still with better vision in L than R, but not as good as usual. ros positive for itching rash to body x past 3 months. Initial work up k+ 5.2 , bun/cr 136/5.4 magnesium <1m ct chest showing small pulmonary nodules, h/h 8.8/27.6, wbc 12 with left shift ,  pt received lokelma x 4 , IR consulted and non tunnelled catheter placed on 9/26 Nephrology consulted and pt received first hemodialysis 9/27 .pt noted with hypothermia on 9/27 ct a/p./chest no infectious source identified ua nitrite positive le positive wbc 10 .pt was started on ceftriaxone and ID consulted with change of abx to Cefepime . urine culture positive E cloacae . Pruritus was treated with atarax wbc continued to rise . Outpatient  dermatologist was consulted via phone and suggested steroid creme follow up in office for biopsy . Patient received full course of cefepime in total recieveing 16 days o f antibiotics , Tunnelled catheter placed 9/9/24 by IR .        73 year old male PMH HTN, pvd s/p l bka , HLD, DM, CHF, CKD stage IV, dementia / anxiety BIBEMS accompanied by wife due to weakness. Per wife, pt not feeling well since last night - went to bed prior to his own birthday party. Per EMS, pt complaining of decreased L vision and decreased L hearing as well as noted off balance with ambulation to restroom this AM. Pt states L eye is usually his good eye, and still with better vision in L than R, but not as good as usual. ros positive for itching rash to body x past 3 months. Initial work up k+ 5.2 , bun/cr 136/5.4 magnesium <1m ct chest showing small pulmonary nodules, h/h 8.8/27.6, wbc 12 with left shift ,  pt received lokelma x 4 , IR consulted and non tunnelled catheter placed on 9/26 Nephrology consulted and pt received first hemodialysis 9/27 .pt noted with hypothermia on 9/27 ct a/p./chest no infectious source identified ua nitrite positive le positive wbc 10 .pt was started on ceftriaxone and ID consulted with change of abx to Cefepime . urine culture positive E cloacae . Pruritus was treated with atarax wbc continued to rise . Outpatient  dermatologist was consulted via phone and suggested steroid creme follow up in office for biopsy . Patient received full course of cefepime in total receiving 16 days o f antibiotics , Tunnelled catheter placed 9/9/24 by IR . patient will continue hemodialysis in the community        73 year old male PMH HTN, pvd s/p l bka , HLD, DM, CHF, CKD stage IV, dementia / anxiety BIBEMS accompanied by wife due to weakness. Per wife, pt not feeling well since last night - went to bed prior to his own birthday party. Per EMS, pt complaining of decreased L vision and decreased L hearing as well as noted off balance with ambulation to restroom this AM. Pt states L eye is usually his good eye, and still with better vision in L than R, but not as good as usual. ros positive for itching rash to body x past 3 months. Initial work up k+ 5.2 , bun/cr 136/5.4 magnesium <1m ct chest showing small pulmonary nodules, h/h 8.8/27.6, wbc 12 with left shift ,  pt received lokelma x 4. IR consulted and non tunnelled catheter placed on 8/26 Nephrology consulted and pt received first hemodialysis 8/27 .pt noted with hypothermia on 8/27 ct a/p./chest no infectious source identified ua nitrite positive le positive wbc 10 .pt was started on ceftriaxone and ID consulted with change of abx to Cefepime . urine culture positive E cloacae . Pruritus was treated with atarax wbc continued to rise . Outpatient  dermatologist was consulted via phone and suggested steroid creme follow up in office for biopsy, however surgery performed biopsy and results pending. Rheumatology was also consulted and patient was started on steroids. All recs were appreciated. Patient is now medically stable for discharge to rehab.     #COVID  - asymptomatic, on RA  - will c/w supportive care w/ robitussin  - Discussed with ID, no role for Rem/Decadron given no SOB and on RA    #Rash  - unknown etiology ?pemphigus vulgaris/bollous phemphigoid given presentation however need bx confirmation   - c/w triamcinolone 0.1% ointment   - Dermatology was unwilling to do bx inpatient, eventually surgery was able to perform bx, f/u final results   - s/p cefepime   - On prednisone daily   - Appreciate rheum recs  - wound PT following     ##ESRD on HD  - Initially temp cath placed and patient to initiated on Dialysis here 2/2 Uremia and hyperkalemic.   - C/w Inpatient dialysis , now s/p tunnel HD catheter  - Nephro following     ##SSTI  Noted to be hypothermic to 92F. CT abd No evidence of acute inflammatory or obstructive process in the   abdomen and pelvis. UA positive nitrites, LEs.   s/p Cefepime  9/17/2024 another episode of hypothermia during HD on yesterday . septic w/u initiated and iD reconsulted to r/o any other infectious etiology --appreciate ID recs, ok for steroids which have been started     ##Type 2 DM  A1c 7; C/w Lantus 25 + 6U w/ meals + SSI  - Hypoglycemia protocol     #Pulm nodule   CT noted There are scattered nodules at the visualized lung bases measuring up to 6 mm  CT chest reviewed will need follow up in 3-6  months       #HTN  - C/w cardura, hydralazine     #HLD  - C/w statin     #History of DVT  - Eliquis     Diet: renal, CCD  DVT prophylaxis: eliquis   Code Status: FC     Biopsy pending             73 year old male PMH HTN, pvd s/p l bka , HLD, DM, CHF, CKD stage IV, dementia / anxiety BIBEMS accompanied by wife due to weakness. Per wife, pt not feeling well since last night - went to bed prior to his own birthday party. Per EMS, pt complaining of decreased L vision and decreased L hearing as well as noted off balance with ambulation to restroom this AM. Pt states L eye is usually his good eye, and still with better vision in L than R, but not as good as usual. ros positive for itching rash to body x past 3 months. Initial work up k+ 5.2 , bun/cr 136/5.4 magnesium <1m ct chest showing small pulmonary nodules, h/h 8.8/27.6, wbc 12 with left shift ,  pt received lokelma x 4. IR consulted and non tunnelled catheter placed on 8/26 Nephrology consulted and pt received first hemodialysis 8/27 .pt noted with hypothermia on 8/27 ct a/p./chest no infectious source identified ua nitrite positive le positive wbc 10 .pt was started on ceftriaxone and ID consulted with change of abx to Cefepime . urine culture positive E cloacae . Pruritus was treated with atarax wbc continued to rise . Outpatient  dermatologist was consulted via phone and suggested steroid creme follow up in office for biopsy, however surgery performed biopsy and results pending. Rheumatology was also consulted and patient was started on steroids. All recs were appreciated. Patient is now medically stable for discharge to rehab.     #COVID  - asymptomatic, on RA  - will c/w supportive care w/ robitussin  - Discussed with ID, no role for Rem/Decadron given no SOB and on RA    #Rash  - unknown etiology ?pemphigus vulgaris/bollous phemphigoid given presentation however need bx confirmation   - c/w triamcinolone 0.1% ointment   - Dermatology was unwilling to do bx inpatient, eventually surgery was able to perform bx, f/u final results   - s/p cefepime   - On prednisone daily 100mg will need to be tapered. Reached out to rheum x 2 re taper plan but have not heard back. Will need to see rheumatology within 1-2 weeks from discharge to determine prednisone taper plan.   - Appreciate rheum recs  - wound PT following     ##ESRD on HD  - Initially temp cath placed and patient to initiated on Dialysis here 2/2 Uremia and hyperkalemic.   - C/w Inpatient dialysis , now s/p tunnel HD catheter  - Nephro following     ##SSTI  Noted to be hypothermic to 92F. CT abd No evidence of acute inflammatory or obstructive process in the   abdomen and pelvis. UA positive nitrites, LEs.   s/p Cefepime  9/17/2024 another episode of hypothermia during HD on yesterday . septic w/u initiated and iD reconsulted to r/o any other infectious etiology --appreciate ID recs, ok for steroids which have been started   9/21 no episodes of hypothermia >24 hours    ##Type 2 DM  A1c 7; C/w Lantus 25 + 6U w/ meals + SSI  - Hypoglycemia protocol   -will discharge with lantus 25 and lispro 6 with meals   -will likely need outpatient adjustments if he continues on steroids     #Pulm nodule   CT noted There are scattered nodules at the visualized lung bases measuring up to 6 mm  CT chest reviewed will need follow up in 3-6  months     #HTN  - C/w cardura, hydralazine   -will resume nifedipine at discharge given elevated BP yesterday     #HLD  - C/w statin     #History of DVT  - Eliquis     Diet: renal, CCD  DVT prophylaxis: eliquis   Code Status: FC     Biopsy pending             73 year old male PMH HTN, pvd s/p l bka , HLD, DM, CHF, CKD stage IV, dementia / anxiety BIBEMS accompanied by wife due to weakness. Per wife, pt not feeling well since last night - went to bed prior to his own birthday party. Per EMS, pt complaining of decreased L vision and decreased L hearing as well as noted off balance with ambulation to restroom this AM. Pt states L eye is usually his good eye, and still with better vision in L than R, but not as good as usual. ros positive for itching rash to body x past 3 months. Initial work up k+ 5.2 , bun/cr 136/5.4 magnesium <1m ct chest showing small pulmonary nodules, h/h 8.8/27.6, wbc 12 with left shift ,  pt received lokelma x 4. IR consulted and non tunnelled catheter placed on 8/26 Nephrology consulted and pt received first hemodialysis 8/27 .pt noted with hypothermia on 8/27 ct a/p./chest no infectious source identified ua nitrite positive le positive wbc 10 .pt was started on ceftriaxone and ID consulted with change of abx to Cefepime . urine culture positive E cloacae . Pruritus was treated with atarax wbc continued to rise . Outpatient  dermatologist was consulted via phone and suggested steroid creme follow up in office for biopsy, however surgery performed biopsy and results pending. Rheumatology was also consulted and patient was started on steroids. All recs were appreciated. Patient is now medically stable for discharge to rehab.     #COVID  - asymptomatic, on RA  - will c/w supportive care w/ robitussin  - Discussed with ID, no role for Rem/Decadron given no SOB and on RA    #Rash  - unknown etiology ?pemphigus vulgaris/bollous phemphigoid given presentation however need bx confirmation   - c/w triamcinolone 0.1% ointment   - Dermatology was unwilling to do bx inpatient, eventually surgery was able to perform bx, f/u final results   - s/p cefepime   - On prednisone daily 100mg will need to be tapered. Reached out to rheum x 2 re taper plan but have not heard back. Will need to see rheumatology within 1-2 weeks from discharge to determine prednisone taper plan.   will discharge with 1 DS Bactrim daily for PJP ppx while on high dose steroids  - Appreciate rheum recs  - wound PT following     ##ESRD on HD  - Initially temp cath placed and patient to initiated on Dialysis here 2/2 Uremia and hyperkalemic.   - C/w Inpatient dialysis , now s/p tunnel HD catheter  - Nephro following     ##SSTI  Noted to be hypothermic to 92F. CT abd No evidence of acute inflammatory or obstructive process in the   abdomen and pelvis. UA positive nitrites, LEs.   s/p Cefepime  9/17/2024 another episode of hypothermia during HD on yesterday . septic w/u initiated and iD reconsulted to r/o any other infectious etiology --appreciate ID recs, ok for steroids which have been started   9/21 no episodes of hypothermia >24 hours    ##Type 2 DM  A1c 7; C/w Lantus 25 + 6U w/ meals + SSI  - Hypoglycemia protocol   -will discharge with lantus 25 and lispro 6 with meals   -will likely need outpatient adjustments if he continues on steroids     #Pulm nodule   CT noted There are scattered nodules at the visualized lung bases measuring up to 6 mm  CT chest reviewed will need follow up in 3-6  months     #HTN  - C/w cardura, hydralazine   -will resume nifedipine at discharge given elevated BP yesterday     #HLD  - C/w statin     #History of DVT  - Eliquis     Diet: renal, CCD  DVT prophylaxis: eliquis   Code Status: FC     Biopsy pending

## 2024-09-04 NOTE — ED ADULT NURSE REASSESSMENT NOTE - SKIN TEMPERATURE MOISTURE
Patient called stating his 9/11 appointment moved to coincide with his 9/48 infusion.  set up ride through insurance provider. Patient states he received denial from [facility] in regards to recent hospitalization.  asked patient to bring letter to appointment tomorrow.     Transportation details:  [facility]  [phone]   ~ 8:30am  Confirmation #[id number]  Call for ride home   warm

## 2024-09-04 NOTE — DISCHARGE NOTE PROVIDER - ATTENDING DISCHARGE PHYSICAL EXAMINATION:
GENERAL: NAD, well-groomed, well-developed  HEAD:  Atraumatic, Normocephalic  EYES: EOMI, sclera non-icteric  ENMT:  Moist mucous membranes,   NECK: Supple,   NERVOUS SYSTEM:  Alert & Oriented to name location and situation;  CHEST/LUNG: coarse breath sounds resolved with coughing  HEART: Regular rate and rhythm; No murmurs, rubs, or gallops  ABDOMEN: Soft, Nontender, Nondistended; Bowel sounds present  EXTREMITIES:  + L BKA  SKIN: diffuse scabbed over lesions BUE and LE, wrapped

## 2024-09-04 NOTE — DISCHARGE NOTE PROVIDER - CARE PROVIDER_API CALL
Saw Barnes  Dermatology  [address]  [place] [address]  Phone: [phone]  Fax: [phone]  Follow Up Time:     Waterhouse, Joseph Cameron  Podiatric Medicine and Surgery  [address] [place] [address]  Phone: [phone]  Fax: [phone]  Follow Up Time:    Saw Barnes  Dermatology  [address] [facility]  [place] [address]  Phone: [phone]  Fax: [phone]  Follow Up Time:     Waterhouse, Joseph Cameron  Podiatric Medicine and Surgery  [facility] [place] [address]  Phone: [phone]  Fax: [phone]  Follow Up Time:     Cedric Shaikh  [facility]  [address] [place] [address]  Phone: [phone]  Fax: [phone]  Follow Up Time: 1 week

## 2024-09-04 NOTE — PROGRESS NOTE ADULT - ASSESSMENT
73 year old male PMH HTN, HLD, DM, CHF, CKD stage IV, dementia / anxiety BIBEMS accompanied by wife due to weakness. Per wife, pt not feeling well since last night - went to bed prior to his own birthday party. Per EMS, pt complaining of decreased L vision and decreased L hearing as well as noted off balance with ambulation to restroom this AM. Pt states L eye is usually his good eye, and still with better vision in L than R, but not as good as usual. Pt reports chronic itching rash to body x past 3 months, f/u'd with Derm, was told likely 2/2 renal disease. Hearing later normalized in ER.         skin rash a concern derm has evaluated virtually on steroid cream biopsy has outpatient   nephrology and ID consults appreciated    on cefepime  white count normalized   dispo dependent on perm cath and skin improvement possible perm cath Friday 9/6 pending ID  clear     Case discussed at length with wife  at previous derm appointment no biopsy was done felt that skin eruption were due to uremia          ##ESRD   ##HyperK  Temp cath placed and patient to initiate Dialysis here 2/2 Uremia and hyperkalemic. Temp cath placed   - C/w Inpatient dialysis   - Will need IR tunneled cath  - Pending O/p Dialysis slot   - Nephro following     ##Hypothermic resolved     ##SSTI  Noted to be hypothermic to 92F. CT abd No evidence of acute inflammatory or obstructive process in the   abdomen and pelvis. UA positive nitrites, LEs.   improved  now on cefepime     ##Type 2 DM  A1c 7; C/w Lantus 25 + 6U w/ meals + SSI  - Hypoglycemia protocol     #Pulm nodule   - CT noted There are scattered nodules at the visualized lung bases measuring up to 6 mm  CT chest reviewed will need follow up in 3-6  months     #HTN  - C/w cardura, hydralazine     #HLD  - C/w statin     #History of DVT  - C/w Eliquis     Diet: renal, CCD  DVT prophylaxis: eliquis   Dispo: off tele   Code Status: NIDA

## 2024-09-05 LAB
ALBUMIN SERPL ELPH-MCNC: 2.2 G/DL — LOW (ref 3.3–5)
ALP SERPL-CCNC: 49 U/L — SIGNIFICANT CHANGE UP (ref 40–120)
ALT FLD-CCNC: 19 U/L — SIGNIFICANT CHANGE UP (ref 12–78)
ANION GAP SERPL CALC-SCNC: 7 MMOL/L — SIGNIFICANT CHANGE UP (ref 5–17)
AST SERPL-CCNC: 16 U/L — SIGNIFICANT CHANGE UP (ref 15–37)
BILIRUB SERPL-MCNC: 0.2 MG/DL — SIGNIFICANT CHANGE UP (ref 0.2–1.2)
BUN SERPL-MCNC: 75 MG/DL — HIGH (ref 7–23)
CALCIUM SERPL-MCNC: 8.9 MG/DL — SIGNIFICANT CHANGE UP (ref 8.5–10.1)
CHLORIDE SERPL-SCNC: 102 MMOL/L — SIGNIFICANT CHANGE UP (ref 96–108)
CO2 SERPL-SCNC: 26 MMOL/L — SIGNIFICANT CHANGE UP (ref 22–31)
CREAT SERPL-MCNC: 4.28 MG/DL — HIGH (ref 0.5–1.3)
EGFR: 14 ML/MIN/1.73M2 — LOW
GLUCOSE BLDC GLUCOMTR-MCNC: 127 MG/DL — HIGH (ref 70–99)
GLUCOSE BLDC GLUCOMTR-MCNC: 204 MG/DL — HIGH (ref 70–99)
GLUCOSE BLDC GLUCOMTR-MCNC: 217 MG/DL — HIGH (ref 70–99)
GLUCOSE BLDC GLUCOMTR-MCNC: 255 MG/DL — HIGH (ref 70–99)
GLUCOSE SERPL-MCNC: 175 MG/DL — HIGH (ref 70–99)
HCT VFR BLD CALC: 29.6 % — LOW (ref 39–50)
HGB BLD-MCNC: 9.2 G/DL — LOW (ref 13–17)
MAGNESIUM SERPL-MCNC: 1.8 MG/DL — SIGNIFICANT CHANGE UP (ref 1.6–2.6)
MCHC RBC-ENTMCNC: 27.5 PG — SIGNIFICANT CHANGE UP (ref 27–34)
MCHC RBC-ENTMCNC: 31.1 G/DL — LOW (ref 32–36)
MCV RBC AUTO: 88.4 FL — SIGNIFICANT CHANGE UP (ref 80–100)
NRBC # BLD: 0 /100 WBCS — SIGNIFICANT CHANGE UP (ref 0–0)
PHOSPHATE SERPL-MCNC: 5.5 MG/DL — HIGH (ref 2.5–4.5)
PLATELET # BLD AUTO: 152 K/UL — SIGNIFICANT CHANGE UP (ref 150–400)
POTASSIUM SERPL-MCNC: 5 MMOL/L — SIGNIFICANT CHANGE UP (ref 3.5–5.3)
POTASSIUM SERPL-SCNC: 5 MMOL/L — SIGNIFICANT CHANGE UP (ref 3.5–5.3)
PROT SERPL-MCNC: 6.7 GM/DL — SIGNIFICANT CHANGE UP (ref 6–8.3)
RBC # BLD: 3.35 M/UL — LOW (ref 4.2–5.8)
RBC # FLD: 15.8 % — HIGH (ref 10.3–14.5)
SODIUM SERPL-SCNC: 135 MMOL/L — SIGNIFICANT CHANGE UP (ref 135–145)
WBC # BLD: 10.71 K/UL — HIGH (ref 3.8–10.5)
WBC # FLD AUTO: 10.71 K/UL — HIGH (ref 3.8–10.5)

## 2024-09-05 PROCEDURE — 99232 SBSQ HOSP IP/OBS MODERATE 35: CPT

## 2024-09-05 RX ADMIN — Medication 50 MILLIGRAM(S): at 17:12

## 2024-09-05 RX ADMIN — ACETAMINOPHEN 650 MILLIGRAM(S): 325 TABLET ORAL at 10:46

## 2024-09-05 RX ADMIN — Medication 50 MILLIGRAM(S): at 22:14

## 2024-09-05 RX ADMIN — Medication 2 MILLIGRAM(S): at 22:15

## 2024-09-05 RX ADMIN — Medication 40 MILLIGRAM(S): at 22:14

## 2024-09-05 RX ADMIN — CHLORHEXIDINE GLUCONATE 1 APPLICATION(S): 40 SOLUTION TOPICAL at 05:31

## 2024-09-05 RX ADMIN — APIXABAN 5 MILLIGRAM(S): 5 TABLET, FILM COATED ORAL at 17:12

## 2024-09-05 RX ADMIN — SEVELAMER CARBONATE 1600 MILLIGRAM(S): 800 TABLET, FILM COATED ORAL at 11:34

## 2024-09-05 RX ADMIN — Medication 6 UNIT(S): at 11:27

## 2024-09-05 RX ADMIN — CEFEPIME 100 MILLIGRAM(S): 2 INJECTION, POWDER, FOR SOLUTION INTRAVENOUS at 22:14

## 2024-09-05 RX ADMIN — PETROLATUM 1 APPLICATION(S): 865 OINTMENT TOPICAL at 11:33

## 2024-09-05 RX ADMIN — SEVELAMER CARBONATE 1600 MILLIGRAM(S): 800 TABLET, FILM COATED ORAL at 17:12

## 2024-09-05 RX ADMIN — Medication 50 MILLIGRAM(S): at 05:31

## 2024-09-05 RX ADMIN — Medication 1 TABLET(S): at 11:28

## 2024-09-05 RX ADMIN — Medication 40 MILLIGRAM(S): at 05:31

## 2024-09-05 RX ADMIN — SEVELAMER CARBONATE 1600 MILLIGRAM(S): 800 TABLET, FILM COATED ORAL at 08:35

## 2024-09-05 RX ADMIN — INSULIN GLARGINE 25 UNIT(S): 100 INJECTION, SOLUTION SUBCUTANEOUS at 22:15

## 2024-09-05 RX ADMIN — Medication 6 UNIT(S): at 17:13

## 2024-09-05 RX ADMIN — TRIAMCINOLONE ACETONIDE 1 APPLICATION(S): 1 CREAM TOPICAL at 05:30

## 2024-09-05 RX ADMIN — APIXABAN 5 MILLIGRAM(S): 5 TABLET, FILM COATED ORAL at 05:31

## 2024-09-05 RX ADMIN — TRIAMCINOLONE ACETONIDE 1 APPLICATION(S): 1 CREAM TOPICAL at 17:42

## 2024-09-05 RX ADMIN — Medication 3 MILLIGRAM(S): at 22:14

## 2024-09-05 RX ADMIN — ACETAMINOPHEN 650 MILLIGRAM(S): 325 TABLET ORAL at 22:14

## 2024-09-05 NOTE — PROGRESS NOTE ADULT - ASSESSMENT
73 year old male PMH HTN, HLD, DM, CHF, CKD stage IV, dementia / anxiety BIBEMS accompanied by wife due to weakness. Per wife, pt not feeling well since last night - went to bed prior to his own birthday party. Per EMS, pt complaining of decreased L vision and decreased L hearing as well as noted off balance with ambulation to restroom this AM. Pt states L eye is usually his good eye, and still with better vision in L than R, but not as good as usual. Pt reports chronic itching rash to body x past 3 months, f/u'd with Derm, was told likely 2/2 renal disease. Hearing later normalized in ER.     Note: Has left BKA.    wbc went from 10->13  right sided hemodialysis catheter looks good  no need for blood cultures   UA with enterobacter   need to change the ceftriaxone to cefepime, the hemodialysis dose 1g q24hrs     found to have severe ELMER needing hemodialysis   has a full body rash   has had confusion   generalized not feeling well  vision and hearing getting worse  no urinary complaints but was confused when came in  was supposed to get permacath but wbc rising     9/3: no fevers, WBC better yesterday 10.12, no new lab work, RA, BCs NGTD, Cr 4.71, had HD yesterday, right IJ temp HD catheter - dressing needs to be changed - spoke with RN. + Rash all over pt's body, cefepime IV continued. There is a chart note from medicine PA - pt was seen by dermatology in June for diffuse bullae on skin, Dr. Barnes. A conversation took place and dermatology recommended to start triamcinolone 0.1% ointment and f/up as op for skin biopsy and further assessment.   Attending addendum:  wbc ok but patient having hypothermic episodes   would hold off on permacath and see if his hypothermia improves  continue cefepime   consider line holiday   start triamcinolone as recommended by dermatology   patient still warrants skin biopsy to determine what exactly this rash is from      9/5: afebrile, RA, WBC slightly elevated 10.71, MRSA PCR negative, BCs NGTD, UC with Enterobacter cloacae, + full body rash, appear more dry on today's exam, RIJ catheter should be removed or dressed with occlusive dressing, new cath placement possibly tomorrow. Cefepime IV continued. Pt needs skin biopsy.     Plan:  continue cefepime 1g q24hrs  PermCath possibly tomorrow, please remove current temporary catheter   in favor for Dermatology to see patient while in pt, needs skin biopsy   HIV negative  please have hemodialysis RN change the dressing on the catheter - spoke with RN  MRSA PCR - negative  follow culture data     Discussed with Dr. De La Cruz  Discussed with Dr. Mendoza   Discussed with RN   73 year old male PMH HTN, HLD, DM, CHF, CKD stage IV, dementia / anxiety BIBEMS accompanied by wife due to weakness. Per wife, pt not feeling well since last night - went to bed prior to his own birthday party. Per EMS, pt complaining of decreased L vision and decreased L hearing as well as noted off balance with ambulation to restroom this AM. Pt states L eye is usually his good eye, and still with better vision in L than R, but not as good as usual. Pt reports chronic itching rash to body x past 3 months, f/u'd with Derm, was told likely 2/2 renal disease. Hearing later normalized in ER.     Note: Has left BKA.    wbc went from 10->13  right sided hemodialysis catheter looks good  no need for blood cultures   UA with enterobacter   need to change the ceftriaxone to cefepime, the hemodialysis dose 1g q24hrs     found to have severe ELMER needing hemodialysis   has a full body rash   has had confusion   generalized not feeling well  vision and hearing getting worse  no urinary complaints but was confused when came in  was supposed to get permacath but wbc rising     9/3: no fevers, WBC better yesterday 10.12, no new lab work, RA, BCs NGTD, Cr 4.71, had HD yesterday, right IJ temp HD catheter - dressing needs to be changed - spoke with RN. + Rash all over pt's body, cefepime IV continued. There is a chart note from medicine PA - pt was seen by dermatology in June for diffuse bullae on skin, Dr. Barnes. A conversation took place and dermatology recommended to start triamcinolone 0.1% ointment and f/up as op for skin biopsy and further assessment.   Attending addendum:  wbc ok but patient having hypothermic episodes   would hold off on permacath and see if his hypothermia improves  continue cefepime   consider line holiday   start triamcinolone as recommended by dermatology   patient still warrants skin biopsy to determine what exactly this rash is from      9/5: afebrile, RA, WBC slightly elevated 10.71, MRSA PCR negative, BCs NGTD, UC with Enterobacter cloacae, + full body rash, appear more dry on today's exam, RIJ catheter should be removed or dressed with occlusive dressing, new cath placement possibly tomorrow. No contraindications to placing permacath tomorrow assuming there is no change in clinical status .   Cefepime IV continued. Pt needs skin biopsy.     Plan:  continue cefepime 1g q24hrs  PermCath possibly tomorrow, please remove current temporary catheter   in favor for Dermatology to see patient while in pt, needs skin biopsy   HIV negative  please have hemodialysis RN change the dressing on the catheter - spoke with RN  MRSA PCR - negative  follow culture data     Discussed with Dr. De La Cruz  Discussed with Dr. Mendoza   Discussed with RN

## 2024-09-05 NOTE — PROGRESS NOTE ADULT - ASSESSMENT
73 year old male PMH HTN, HLD, DM, CHF, CKD stage IV, dementia / anxiety BIBEMS accompanied by wife due to weakness. Per wife, pt not feeling well since last night - went to bed prior to his own birthday party. Per EMS, pt complaining of decreased L vision and decreased L hearing as well as noted off balance with ambulation to restroom this AM. Pt states L eye is usually his good eye, and still with better vision in L than R, but not as good as usual. Pt reports chronic itching rash to body x past 3 months, f/u'd with Derm, was told likely 2/2 renal disease. Hearing later normalized in ER.         skin rash a concern derm has evaluated virtually on steroid cream biopsy has outpatient   nephrology and ID consults appreciated    on cefepime  white count normalized   dispo dependent on perm cath and skin improvement possible perm cath Friday 9/6 clear for permcath by ID     Case discussed at length with wife  at previous derm appointment no biopsy was done felt that skin eruption were due to uremia      73 year old male PMH HTN, HLD, DM, CHF, CKD stage IV, dementia / anxiety BIBEMS accompanied by wife due to weakness. Per wife, pt not feeling well since last night - went to bed prior to his own birthday party. Per EMS, pt complaining of decreased L vision and decreased L hearing as well as noted off balance with ambulation to restroom this AM. Pt states L eye is usually his good eye, and still with better vision in L than R, but not as good as usual. Pt reports chronic itching rash to body x past 3 months, f/u'd with Derm, was told likely 2/2 renal disease. Hearing later normalized in ER.     Note: Has left BKA.    wbc went from 10->13  right sided hemodialysis catheter looks good  no need for blood cultures   UA with enterobacter   need to change the ceftriaxone to cefepime, the hemodialysis dose 1g q24hrs     found to have severe ELMER needing hemodialysis   has a full body rash   has had confusion   generalized not feeling well  vision and hearing getting worse  no urinary complaints but was confused when came in  was supposed to get permacath but wbc rising     9/3: no fevers, WBC better yesterday 10.12, no new lab work, RA, BCs NGTD, Cr 4.71, had HD yesterday, right IJ temp HD catheter - dressing needs to be changed - spoke with RN. + Rash all over pt's body, cefepime IV continued. There is a chart note from medicine PA - pt was seen by dermatology in June for diffuse bullae on skin, Dr. Barnes. A conversation took place and dermatology recommended to start triamcinolone 0.1% ointment and f/up as op for skin biopsy and further assessment.   Attending addendum:  wbc ok but patient having hypothermic episodes   would hold off on permacath and see if his hypothermia improves  continue cefepime   consider line holiday   start triamcinolone as recommended by dermatology   patient still warrants skin biopsy to determine what exactly this rash is from      9/5: afebrile, RA, WBC slightly elevated 10.71, MRSA PCR negative, BCs NGTD, UC with Enterobacter cloacae, + full body rash, appear more dry on today's exam, RIJ catheter should be removed or dressed with occlusive dressing, new cath placement possibly tomorrow. No contraindications to placing permacath tomorrow assuming there is no change in clinical status .   Cefepime IV continued. Pt needs skin biopsy.     Plan:  continue cefepime 1g q24hrs  PermCath possibly tomorrow, please remove current temporary catheter   in favor for Dermatology to see patient while in pt, needs skin biopsy   HIV negative  please have hemodialysis RN change the dressing on the catheter - spoke with RN  MRSA PCR - negative  follow culture data     ##ESRD   ##HyperK  Temp cath placed and patient to initiate Dialysis here 2/2 Uremia and hyperkalemic. Temp cath placed   - C/w Inpatient dialysis   - Will need IR tunneled cath  - Pending O/p Dialysis slot   - Nephro following     ##Hypothermic resolved     ##SSTI  Noted to be hypothermic to 92F. CT abd No evidence of acute inflammatory or obstructive process in the   abdomen and pelvis. UA positive nitrites, LEs.   improved  now on cefepime     ##Type 2 DM  A1c 7; C/w Lantus 25 + 6U w/ meals + SSI  - Hypoglycemia protocol     #Pulm nodule   - CT noted There are scattered nodules at the visualized lung bases measuring up to 6 mm  CT chest reviewed will need follow up in 3-6  months     #HTN  - C/w cardura, hydralazine     #HLD  - C/w statin     #History of DVT  - C/w Eliquis     Diet: renal, CCD  DVT prophylaxis: eliquis   Dispo: off tele   Code Status: FC    73 year old male PMH HTN, HLD, DM, CHF, CKD stage IV, dementia / anxiety BIBEMS accompanied by wife due to weakness. Per wife, pt not feeling well since last night - went to bed prior to his own birthday party. Per EMS, pt complaining of decreased L vision and decreased L hearing as well as noted off balance with ambulation to restroom this AM. Pt states L eye is usually his good eye, and still with better vision in L than R, but not as good as usual. Pt reports chronic itching rash to body x past 3 months, f/u'd with Derm, was told likely 2/2 renal disease. Hearing later normalized in ER.       Echo reviewed medically stable for permcath placmennt   skin rash a concern derm has evaluated virtually on steroid cream biopsy has outpatient   nephrology and ID consults appreciated    on cefepime  white count normalized   dispo dependent on perm cath and skin improvement possible perm cath Friday 9/6 clear for permcath by ID     Case discussed at length with wife  at previous derm appointment no biopsy was done felt that skin eruption were due to uremia      73 year old male PMH HTN, HLD, DM, CHF, CKD stage IV, dementia / anxiety BIBEMS accompanied by wife due to weakness. Per wife, pt not feeling well since last night - went to bed prior to his own birthday party. Per EMS, pt complaining of decreased L vision and decreased L hearing as well as noted off balance with ambulation to restroom this AM. Pt states L eye is usually his good eye, and still with better vision in L than R, but not as good as usual. Pt reports chronic itching rash to body x past 3 months, f/u'd with Derm, was told likely 2/2 renal disease. Hearing later normalized in ER.     Note: Has left BKA.    wbc went from 10->13  right sided hemodialysis catheter looks good  no need for blood cultures   UA with enterobacter   need to change the ceftriaxone to cefepime, the hemodialysis dose 1g q24hrs     found to have severe ELMER needing hemodialysis   has a full body rash   has had confusion   generalized not feeling well  vision and hearing getting worse  no urinary complaints but was confused when came in  was supposed to get permacath but wbc rising     9/3: no fevers, WBC better yesterday 10.12, no new lab work, RA, BCs NGTD, Cr 4.71, had HD yesterday, right IJ temp HD catheter - dressing needs to be changed - spoke with RN. + Rash all over pt's body, cefepime IV continued. There is a chart note from medicine PA - pt was seen by dermatology in June for diffuse bullae on skin, Dr. Barnes. A conversation took place and dermatology recommended to start triamcinolone 0.1% ointment and f/up as op for skin biopsy and further assessment.   Attending addendum:  wbc ok but patient having hypothermic episodes   would hold off on permacath and see if his hypothermia improves  continue cefepime   consider line holiday   start triamcinolone as recommended by dermatology   patient still warrants skin biopsy to determine what exactly this rash is from      9/5: afebrile, RA, WBC slightly elevated 10.71, MRSA PCR negative, BCs NGTD, UC with Enterobacter cloacae, + full body rash, appear more dry on today's exam, RIJ catheter should be removed or dressed with occlusive dressing, new cath placement possibly tomorrow. No contraindications to placing permacath tomorrow assuming there is no change in clinical status .   Cefepime IV continued. Pt needs skin biopsy.     Plan:  continue cefepime 1g q24hrs  PermCath possibly tomorrow, please remove current temporary catheter   in favor for Dermatology to see patient while in pt, needs skin biopsy   HIV negative  please have hemodialysis RN change the dressing on the catheter - spoke with RN  MRSA PCR - negative  follow culture data     ##ESRD   ##HyperK  Temp cath placed and patient to initiate Dialysis here 2/2 Uremia and hyperkalemic. Temp cath placed   - C/w Inpatient dialysis   - Will need IR tunneled cath  - Pending O/p Dialysis slot   - Nephro following     ##Hypothermic resolved     ##SSTI  Noted to be hypothermic to 92F. CT abd No evidence of acute inflammatory or obstructive process in the   abdomen and pelvis. UA positive nitrites, LEs.   improved  now on cefepime     ##Type 2 DM  A1c 7; C/w Lantus 25 + 6U w/ meals + SSI  - Hypoglycemia protocol     #Pulm nodule   - CT noted There are scattered nodules at the visualized lung bases measuring up to 6 mm  CT chest reviewed will need follow up in 3-6  months     #HTN  - C/w cardura, hydralazine     #HLD  - C/w statin     #History of DVT  - C/w Eliquis     Diet: renal, CCD  DVT prophylaxis: eliquis   Dispo: off tele   Code Status: FC

## 2024-09-05 NOTE — PROGRESS NOTE ADULT - SUBJECTIVE AND OBJECTIVE BOX
ROCCO JC  MRN-[id number]  74y (1950)    Follow Up:  Rash    Interval History: The pt was seen and examined earlier, not in acute distress, no new complaints, rash with very slight improvement. Pt is afebrile, RA, WBC 10.71 today.      PAST MEDICAL & SURGICAL HISTORY:  Hypertension, unspecified type      Type 2 diabetes mellitus with other neurologic complication, without long-term current use of insulin      DM (diabetes mellitus)      HTN (hypertension)      HLD (hyperlipidemia)      Afib      Retinopathy      Chronic diastolic congestive heart failure      Chronic kidney disease, unspecified CKD stage      Amputation of leg, traumatic, left, subsequent encounter      S/P BKA (below knee amputation) unilateral, left      S/P hip replacement, left      History of surgery on arm          ROS:    [ ] Unobtainable because:  [x ] All other systems negative    Constitutional: no fever, no chills, fatigued   Head: no trauma  Eyes: no vision changes, no eye pain  ENT:  no sore throat, no rhinorrhea  Cardiovascular:  no chest pain, no palpitation  Respiratory:  no SOB, no cough  GI:  no abd pain, no vomiting, no diarrhea  urinary: no dysuria, no hematuria, no flank pain  musculoskeletal:  no joint pain, no joint swelling  skin:  + rash  neurology:  no headache, no seizure, no change in mental status  psych: no anxiety, no depression         Allergies  No Known Allergies        ANTIMICROBIALS:  cefepime   IVPB    cefepime   IVPB 1000 every 24 hours      OTHER MEDS:  acetaminophen     Tablet .. 650 milliGRAM(s) Oral every 6 hours PRN  apixaban 5 milliGRAM(s) Oral two times a day  AQUAPHOR (petrolatum Ointment) 1 Application(s) Topical daily  atorvastatin 40 milliGRAM(s) Oral at bedtime  benzocaine/menthol Lozenge 1 Lozenge Oral five times a day PRN  chlorhexidine 2% Cloths 1 Application(s) Topical <User Schedule>  dextrose 5%. 1000 milliLiter(s) IV Continuous <Continuous>  dextrose 5%. 1000 milliLiter(s) IV Continuous <Continuous>  dextrose 50% Injectable 12.5 Gram(s) IV Push once  dextrose 50% Injectable 25 Gram(s) IV Push once  dextrose 50% Injectable 25 Gram(s) IV Push once  dextrose Oral Gel 15 Gram(s) Oral once PRN  doxazosin 2 milliGRAM(s) Oral at bedtime  glucagon  Injectable 1 milliGRAM(s) IntraMuscular once  hydrALAZINE 50 milliGRAM(s) Oral every 8 hours  insulin glargine Injectable (LANTUS) 25 Unit(s) SubCutaneous at bedtime  insulin lispro (ADMELOG) corrective regimen sliding scale   SubCutaneous at bedtime  insulin lispro (ADMELOG) corrective regimen sliding scale   SubCutaneous three times a day before meals  insulin lispro Injectable (ADMELOG) 6 Unit(s) SubCutaneous three times a day before meals  melatonin 3 milliGRAM(s) Oral at bedtime  Nephro-lee 1 Tablet(s) Oral daily  pantoprazole    Tablet 40 milliGRAM(s) Oral before breakfast  polyethylene glycol 3350 17 Gram(s) Oral daily PRN  sevelamer carbonate 1600 milliGRAM(s) Oral three times a day with meals  triamcinolone 0.1% Ointment 1 Application(s) Topical two times a day      Vital Signs Last 24 Hrs  T(C): 36.2 (05 Sep 2024 10:47), Max: 36.5 (04 Sep 2024 16:33)  T(F): 97.2 (05 Sep 2024 10:47), Max: 97.7 (04 Sep 2024 16:33)  HR: 65 (05 Sep 2024 10:47) (65 - 91)  BP: 148/63 (05 Sep 2024 10:47) (117/57 - 170/70)  BP(mean): --  RR: 19 (05 Sep 2024 10:47) (18 - 19)  SpO2: 99% (05 Sep 2024 10:47) (96% - 99%)    Parameters below as of 05 Sep 2024 04:53  Patient On (Oxygen Delivery Method): room air        Physical Exam:  Constitutional: non-toxic, no distress, RA  HEAD/EYES: anicteric, no conjunctival injection, dry and crusting around the eyes, vision impaired   ENT:  supple, no thrush,+right sided temp hemodialysis catheter - site needs to be addressed, dressing needs to be changed - discussed with RN  Cardiovascular:   normal S1, S2, no murmur, no edema  Respiratory:  clear BS bilaterally, no wheezes, no rales  GI:  soft, non-tender, normal bowel sounds  :  no olsen, no CVA tenderness  Musculoskeletal:  no synovitis, normal ROM, left BKA with healthy appearing stump  Neurologic: awake and alert, normal strength, no focal findings  Skin:  full body rash, bullae deflated or opened, some deflated shallow ulcers, crusting,   Heme/Onc: no lymphadenopathy   Psychiatric:  calm behavior     WBC Count: 10.71 K/uL (09-05 @ 09:52)  WBC Count: 8.75 K/uL (09-04 @ 05:55)  WBC Count: 10.12 K/uL (09-02 @ 09:52)  WBC Count: 14.44 K/uL (09-01 @ 05:38)  WBC Count: 16.16 K/uL (08-31 @ 06:30)  WBC Count: 13.30 K/uL (08-30 @ 06:26)                            9.2    10.71 )-----------( 152      ( 05 Sep 2024 09:52 )             29.6       09-05    135  |  102  |  75<H>  ----------------------------<  175<H>  5.0   |  26  |  4.28<H>    Ca    8.9      05 Sep 2024 09:52  Phos  5.5     09-05  Mg     1.8     09-05    TPro  6.7  /  Alb  2.2<L>  /  TBili  0.2  /  DBili  x   /  AST  16  /  ALT  19  /  AlkPhos  49  09-05      Urinalysis Basic - ( 05 Sep 2024 09:52 )    Color: x / Appearance: x / SG: x / pH: x  Gluc: 175 mg/dL / Ketone: x  / Bili: x / Urobili: x   Blood: x / Protein: x / Nitrite: x   Leuk Esterase: x / RBC: x / WBC x   Sq Epi: x / Non Sq Epi: x / Bacteria: x        Creatinine Trend: 4.28<--, 4.67<--, 4.71<--, 4.19<--, 4.36<--, 3.63<--      MICROBIOLOGY:  v  .Blood Blood-Peripheral  08-27-24   No growth at 5 days  --  --      .Blood Blood-Peripheral  08-27-24   No growth at 5 days  --  --      Clean Catch Clean Catch (Midstream)  08-27-24   >100,000 CFU/ml Enterobacter cloacae complex  --  Enterobacter cloacae complex        RADIOLOGY:

## 2024-09-05 NOTE — PROGRESS NOTE ADULT - SUBJECTIVE AND OBJECTIVE BOX
HPI:  73 year old male PMH HTN, HLD, DM, CHF, CKD stage IV, dementia / anxiety BIBEMS accompanied by wife due to weakness. Per wife, pt not feeling well since last night - went to bed prior to his own birthday party. Per EMS, pt complaining of decreased L vision and decreased L hearing as well as noted off balance with ambulation to restroom this AM. Pt states L eye is usually his good eye, and still with better vision in L than R, but not as good as usual. Pt reports chronic itching rash to body x past 3 months, f/u'd with Derm, was told likely 2/2 renal disease. Hearing later normalized in ER.     Note: Has left BKA.  (26 Aug 2024 16:46)  Patient is a 74y old  Male who presents with a chief complaint of Weakness, progression of CKD (05 Sep 2024 16:27)      INTERVAL HPI/OVERNIGHT EVENTS: no acute events overnight     MEDICATIONS  (STANDING):  apixaban 5 milliGRAM(s) Oral two times a day  AQUAPHOR (petrolatum Ointment) 1 Application(s) Topical daily  atorvastatin 40 milliGRAM(s) Oral at bedtime  cefepime   IVPB      cefepime   IVPB 1000 milliGRAM(s) IV Intermittent every 24 hours  chlorhexidine 2% Cloths 1 Application(s) Topical <User Schedule>  dextrose 5%. 1000 milliLiter(s) (50 mL/Hr) IV Continuous <Continuous>  dextrose 5%. 1000 milliLiter(s) (100 mL/Hr) IV Continuous <Continuous>  dextrose 50% Injectable 12.5 Gram(s) IV Push once  dextrose 50% Injectable 25 Gram(s) IV Push once  dextrose 50% Injectable 25 Gram(s) IV Push once  doxazosin 2 milliGRAM(s) Oral at bedtime  glucagon  Injectable 1 milliGRAM(s) IntraMuscular once  hydrALAZINE 50 milliGRAM(s) Oral every 8 hours  insulin glargine Injectable (LANTUS) 25 Unit(s) SubCutaneous at bedtime  insulin lispro (ADMELOG) corrective regimen sliding scale   SubCutaneous at bedtime  insulin lispro (ADMELOG) corrective regimen sliding scale   SubCutaneous three times a day before meals  insulin lispro Injectable (ADMELOG) 6 Unit(s) SubCutaneous three times a day before meals  melatonin 3 milliGRAM(s) Oral at bedtime  Nephro-lee 1 Tablet(s) Oral daily  pantoprazole    Tablet 40 milliGRAM(s) Oral before breakfast  sevelamer carbonate 1600 milliGRAM(s) Oral three times a day with meals  triamcinolone 0.1% Ointment 1 Application(s) Topical two times a day    MEDICATIONS  (PRN):  acetaminophen     Tablet .. 650 milliGRAM(s) Oral every 6 hours PRN Temp greater or equal to 38C (100.4F), Moderate Pain (4 - 6)  benzocaine/menthol Lozenge 1 Lozenge Oral five times a day PRN Mouth Sores  dextrose Oral Gel 15 Gram(s) Oral once PRN Blood Glucose LESS THAN 70 milliGRAM(s)/deciliter  polyethylene glycol 3350 17 Gram(s) Oral daily PRN Constipation      Allergies    No Known Allergies    Intolerances        REVIEW OF SYSTEMS:  CONSTITUTIONAL: No fever, weight loss, or fatigue  EYES: No eye pain, visual disturbances, or discharge  ENMT:  No difficulty hearing, tinnitus, vertigo; No sinus or throat pain  NECK: No pain or stiffness  BREASTS: No pain, masses, or nipple discharge  RESPIRATORY: No cough, wheezing, chills or hemoptysis; No shortness of breath  CARDIOVASCULAR: No chest pain, palpitations, dizziness, or leg swelling  GASTROINTESTINAL: No abdominal or epigastric pain. No nausea, vomiting, or hematemesis; No diarrhea or constipation. No melena or hematochezia.  GENITOURINARY: No dysuria, frequency, hematuria, or incontinence  NEUROLOGICAL: No headaches, memory loss, loss of strength, numbness, or tremors  SKIN: No itching, burning, rashes, or lesions   LYMPH NODES: No enlarged glands  ENDOCRINE: No heat or cold intolerance; No hair loss  MUSCULOSKELETAL: No joint pain or swelling; No muscle, back, or extremity pain  PSYCHIATRIC: No depression, anxiety, mood swings, or difficulty sleeping  HEME/LYMPH: No easy bruising, or bleeding gums  ALLERGY AND IMMUNOLOGIC: No hives or eczema    Vital Signs Last 24 Hrs  T(C): 36.6 (06 Sep 2024 04:33), Max: 36.6 (06 Sep 2024 04:33)  T(F): 97.8 (06 Sep 2024 04:33), Max: 97.8 (06 Sep 2024 04:33)  HR: 76 (06 Sep 2024 04:33) (62 - 84)  BP: 145/57 (06 Sep 2024 04:33) (143/60 - 158/70)  RR: 18 (06 Sep 2024 04:33) (18 - 19)  SpO2: 98% (06 Sep 2024 04:33) (93% - 99%)        PHYSICAL EXAM:  GENERAL: NAD, well-groomed, well-developed  HEAD:  Atraumatic, Normocephalic  EYES: right eyey blaindness   ENMT: No tonsillar erythema, exudates, or enlargement; Moist mucous membranes, Good dentition, No lesions  NECK: Supple, No JVD, Normal thyroid  NERVOUS SYSTEM:  Alert & Oriented X3, Good concentration; Motor Strength 5/5 B/L upper and lower extremities; DTRs 2+ intact and symmetric  CHEST/LUNG: Clear to ascultation  bilaterally; No rales, rhonchi, wheezing, or rubs Temporary dialysis catheter right IJ   HEART: Regular rate and rhythm; No murmurs, rubs, or gallops  ABDOMEN: Soft, Nontender, Nondistended; Bowel sounds present  EXTREMITIES:  let BKA   LYMPH: No lymphadenopathy noted  SKIN:  all over body rsah with flaking skin and itching     LABS:                        9.2    10.71 )-----------( 152      ( 05 Sep 2024 09:52 )             29.6     09-05    135  |  102  |  75<H>  ----------------------------<  175<H>  5.0   |  26  |  4.28<H>    Ca    8.9      05 Sep 2024 09:52  Phos  5.5     09-05  Mg     1.8     09-05    TPro  6.7  /  Alb  2.2<L>  /  TBili  0.2  /  DBili  x   /  AST  16  /  ALT  19  /  AlkPhos  49  09-05      Urinalysis Basic - ( 05 Sep 2024 09:52 )    Color: x / Appearance: x / SG: x / pH: x  Gluc: 175 mg/dL / Ketone: x  / Bili: x / Urobili: x   Blood: x / Protein: x / Nitrite: x   Leuk Esterase: x / RBC: x / WBC x   Sq Epi: x / Non Sq Epi: x / Bacteria: x      CAPILLARY BLOOD GLUCOSE      POCT Blood Glucose.: 217 mg/dL (05 Sep 2024 21:11)  POCT Blood Glucose.: 204 mg/dL (05 Sep 2024 16:48)  POCT Blood Glucose.: 255 mg/dL (05 Sep 2024 10:52)  POCT Blood Glucose.: 127 mg/dL (05 Sep 2024 08:29)      RADIOLOGY & ADDITIONAL TESTS:    Imaging Personally Reviewed:  [ ] YES  [ ] NO    Consultant(s) Notes Reviewed:  [ ] YES  [ ] NO    Care Discussed with Consultants/Other Providers [ ] YES  [ ] NO HPI:  73 year old male PMH HTN, HLD, DM, CHF, CKD stage IV, dementia / anxiety BIBEMS accompanied by wife due to weakness. Per wife, pt not feeling well since last night - went to bed prior to his own birthday party. Per EMS, pt complaining of decreased L vision and decreased L hearing as well as noted off balance with ambulation to restroom this AM. Pt states L eye is usually his good eye, and still with better vision in L than R, but not as good as usual. Pt reports chronic itching rash to body x past 3 months, f/u'd with Derm, was told likely 2/2 renal disease. Hearing later normalized in ER.     Note: Has left BKA.  (26 Aug 2024 16:46)  Patient is a 74y old  Male who presents with a chief complaint of Weakness, progression of CKD (05 Sep 2024 16:27)      INTERVAL HPI/OVERNIGHT EVENTS: no acute events overnight     MEDICATIONS  (STANDING):  apixaban 5 milliGRAM(s) Oral two times a day  AQUAPHOR (petrolatum Ointment) 1 Application(s) Topical daily  atorvastatin 40 milliGRAM(s) Oral at bedtime  cefepime   IVPB      cefepime   IVPB 1000 milliGRAM(s) IV Intermittent every 24 hours  chlorhexidine 2% Cloths 1 Application(s) Topical <User Schedule>  dextrose 5%. 1000 milliLiter(s) (50 mL/Hr) IV Continuous <Continuous>  dextrose 5%. 1000 milliLiter(s) (100 mL/Hr) IV Continuous <Continuous>  dextrose 50% Injectable 12.5 Gram(s) IV Push once  dextrose 50% Injectable 25 Gram(s) IV Push once  dextrose 50% Injectable 25 Gram(s) IV Push once  doxazosin 2 milliGRAM(s) Oral at bedtime  glucagon  Injectable 1 milliGRAM(s) IntraMuscular once  hydrALAZINE 50 milliGRAM(s) Oral every 8 hours  insulin glargine Injectable (LANTUS) 25 Unit(s) SubCutaneous at bedtime  insulin lispro (ADMELOG) corrective regimen sliding scale   SubCutaneous at bedtime  insulin lispro (ADMELOG) corrective regimen sliding scale   SubCutaneous three times a day before meals  insulin lispro Injectable (ADMELOG) 6 Unit(s) SubCutaneous three times a day before meals  melatonin 3 milliGRAM(s) Oral at bedtime  Nephro-lee 1 Tablet(s) Oral daily  pantoprazole    Tablet 40 milliGRAM(s) Oral before breakfast  sevelamer carbonate 1600 milliGRAM(s) Oral three times a day with meals  triamcinolone 0.1% Ointment 1 Application(s) Topical two times a day    MEDICATIONS  (PRN):  acetaminophen     Tablet .. 650 milliGRAM(s) Oral every 6 hours PRN Temp greater or equal to 38C (100.4F), Moderate Pain (4 - 6)  benzocaine/menthol Lozenge 1 Lozenge Oral five times a day PRN Mouth Sores  dextrose Oral Gel 15 Gram(s) Oral once PRN Blood Glucose LESS THAN 70 milliGRAM(s)/deciliter  polyethylene glycol 3350 17 Gram(s) Oral daily PRN Constipation      Allergies    No Known Allergies    Intolerances        REVIEW OF SYSTEMS:  CONSTITUTIONAL: No fever, weight loss, or fatigue  EYES: No eye pain, visual disturbances, or discharge  ENMT:  No difficulty hearing, tinnitus, vertigo; No sinus or throat pain  NECK: No pain or stiffness  BREASTS: No pain, masses, or nipple discharge  RESPIRATORY: No cough, wheezing, chills or hemoptysis; No shortness of breath  CARDIOVASCULAR: No chest pain, palpitations, dizziness, or leg swelling  GASTROINTESTINAL: No abdominal or epigastric pain. No nausea, vomiting, or hematemesis; No diarrhea or constipation. No melena or hematochezia.  GENITOURINARY: No dysuria, frequency, hematuria, or incontinence  NEUROLOGICAL: No headaches, memory loss, loss of strength, numbness, or tremors  SKIN: No itching, burning, rashes, or lesions   LYMPH NODES: No enlarged glands  ENDOCRINE: No heat or cold intolerance; No hair loss  MUSCULOSKELETAL: No joint pain or swelling; No muscle, back, or extremity pain  PSYCHIATRIC: No depression, anxiety, mood swings, or difficulty sleeping  HEME/LYMPH: No easy bruising, or bleeding gums  ALLERGY AND IMMUNOLOGIC: No hives or eczema    Vital Signs Last 24 Hrs  T(C): 36.6 (06 Sep 2024 04:33), Max: 36.6 (06 Sep 2024 04:33)  T(F): 97.8 (06 Sep 2024 04:33), Max: 97.8 (06 Sep 2024 04:33)  HR: 76 (06 Sep 2024 04:33) (62 - 84)  BP: 145/57 (06 Sep 2024 04:33) (143/60 - 158/70)  RR: 18 (06 Sep 2024 04:33) (18 - 19)  SpO2: 98% (06 Sep 2024 04:33) (93% - 99%)        PHYSICAL EXAM:  GENERAL: NAD, well-groomed, well-developed  HEAD:  Atraumatic, Normocephalic  EYES: right eyey blaindness   ENMT: No tonsillar erythema, exudates, or enlargement; Moist mucous membranes, Good dentition, No lesions  NECK: Supple, No JVD, Normal thyroid  NERVOUS SYSTEM:  Alert & Oriented X3, Good concentration; Motor Strength 5/5 B/L upper and lower extremities; DTRs 2+ intact and symmetric  CHEST/LUNG: Clear to ascultation  bilaterally; No rales, rhonchi, wheezing, or rubs Temporary dialysis catheter right IJ   HEART: Regular rate and rhythm; No murmurs, rubs, or gallops  ABDOMEN: Soft, Nontender, Nondistended; Bowel sounds present  EXTREMITIES:  let BKA   LYMPH: No lymphadenopathy noted  SKIN:  all over body rsah with flaking skin and itching     LABS:                        9.2    10.71 )-----------( 152      ( 05 Sep 2024 09:52 )             29.6     09-05    135  |  102  |  75<H>  ----------------------------<  175<H>  5.0   |  26  |  4.28<H>    Ca    8.9      05 Sep 2024 09:52  Phos  5.5     09-05  Mg     1.8     09-05    TPro  6.7  /  Alb  2.2<L>  /  TBili  0.2  /  DBili  x   /  AST  16  /  ALT  19  /  AlkPhos  49  09-05      Urinalysis Basic - ( 05 Sep 2024 09:52 )    Color: x / Appearance: x / SG: x / pH: x  Gluc: 175 mg/dL / Ketone: x  / Bili: x / Urobili: x   Blood: x / Protein: x / Nitrite: x   Leuk Esterase: x / RBC: x / WBC x   Sq Epi: x / Non Sq Epi: x / Bacteria: x      CAPILLARY BLOOD GLUCOSE      POCT Blood Glucose.: 217 mg/dL (05 Sep 2024 21:11)  POCT Blood Glucose.: 204 mg/dL (05 Sep 2024 16:48)  POCT Blood Glucose.: 255 mg/dL (05 Sep 2024 10:52)  POCT Blood Glucose.: 127 mg/dL (05 Sep 2024 08:29)      RADIOLOGY & ADDITIONAL TESTS:  < from: TTE Echo Complete w/ Contrast w/ Doppler (09.04.24 @ 16:14) >  CONCLUSIONS:     1. Left ventricular systolicfunction is normal with an ejection   fraction visually estimated at 55 to 60 %.   2. There is mild (grade 1) left ventricular diastolic dysfunction.   3. Mild mitral valve leaflet calcification.   4. There is moderate calcification of the mitral valve annulus.   5. Mild mitral regurgitation.   6. Trileaflet aortic valve with normal systolic excursion. There is mild   thickening and calcification of the aortic valve leaflets. Moderate   annular calcification.   7. Technically difficult image quality.    < end of copied text >    Imaging Personally Reviewed:  [ ] YES  [ ] NO    Consultant(s) Notes Reviewed:  [ ] YES  [ ] NO    Care Discussed with Consultants/Other Providers [ ] YES  [ ] NO

## 2024-09-05 NOTE — PROGRESS NOTE ADULT - TIME BILLING
The necessity of the time spent during the encounter on this date of service was due to:   - Ordering, reviewing, and interpreting labs, testing, and imaging.  - Independently obtaining a review of systems and performing a physical exam  - Reviewing prior hospitalization and where necessary, outpatient records.  - Reviewing consultant recommendations/communicating with consultants  - Counselling and educating patient and family regarding interpretation of aforementioned items and plan of care.    Time-based billing (NON-critical care). Total minutes spent: 38

## 2024-09-06 LAB
GLUCOSE BLDC GLUCOMTR-MCNC: 126 MG/DL — HIGH (ref 70–99)
GLUCOSE BLDC GLUCOMTR-MCNC: 153 MG/DL — HIGH (ref 70–99)
GLUCOSE BLDC GLUCOMTR-MCNC: 200 MG/DL — HIGH (ref 70–99)
GLUCOSE BLDC GLUCOMTR-MCNC: 243 MG/DL — HIGH (ref 70–99)

## 2024-09-06 PROCEDURE — 99232 SBSQ HOSP IP/OBS MODERATE 35: CPT

## 2024-09-06 RX ORDER — EPOETIN ALFA 3000 [IU]/ML
10000 SOLUTION INTRAVENOUS; SUBCUTANEOUS ONCE
Refills: 0 | Status: COMPLETED | OUTPATIENT
Start: 2024-09-06 | End: 2024-09-06

## 2024-09-06 RX ORDER — EPOETIN ALFA 3000 [IU]/ML
10000 SOLUTION INTRAVENOUS; SUBCUTANEOUS ONCE
Refills: 0 | Status: DISCONTINUED | OUTPATIENT
Start: 2024-09-06 | End: 2024-09-06

## 2024-09-06 RX ADMIN — Medication 50 MILLIGRAM(S): at 21:41

## 2024-09-06 RX ADMIN — APIXABAN 5 MILLIGRAM(S): 5 TABLET, FILM COATED ORAL at 05:56

## 2024-09-06 RX ADMIN — Medication 40 MILLIGRAM(S): at 05:56

## 2024-09-06 RX ADMIN — Medication 3 MILLIGRAM(S): at 21:42

## 2024-09-06 RX ADMIN — ACETAMINOPHEN 650 MILLIGRAM(S): 325 TABLET ORAL at 21:42

## 2024-09-06 RX ADMIN — SEVELAMER CARBONATE 1600 MILLIGRAM(S): 800 TABLET, FILM COATED ORAL at 17:13

## 2024-09-06 RX ADMIN — SEVELAMER CARBONATE 1600 MILLIGRAM(S): 800 TABLET, FILM COATED ORAL at 08:07

## 2024-09-06 RX ADMIN — Medication 6 UNIT(S): at 17:15

## 2024-09-06 RX ADMIN — TRIAMCINOLONE ACETONIDE 1 APPLICATION(S): 1 CREAM TOPICAL at 05:58

## 2024-09-06 RX ADMIN — CHLORHEXIDINE GLUCONATE 1 APPLICATION(S): 40 SOLUTION TOPICAL at 05:58

## 2024-09-06 RX ADMIN — Medication 40 MILLIGRAM(S): at 21:43

## 2024-09-06 RX ADMIN — Medication 1 TABLET(S): at 17:35

## 2024-09-06 RX ADMIN — Medication 6 UNIT(S): at 08:10

## 2024-09-06 RX ADMIN — Medication 50 MILLIGRAM(S): at 05:56

## 2024-09-06 RX ADMIN — Medication 2: at 17:14

## 2024-09-06 RX ADMIN — EPOETIN ALFA 10000 UNIT(S): 3000 SOLUTION INTRAVENOUS; SUBCUTANEOUS at 17:35

## 2024-09-06 RX ADMIN — Medication 4: at 08:09

## 2024-09-06 RX ADMIN — TRIAMCINOLONE ACETONIDE 1 APPLICATION(S): 1 CREAM TOPICAL at 18:20

## 2024-09-06 RX ADMIN — INSULIN GLARGINE 25 UNIT(S): 100 INJECTION, SOLUTION SUBCUTANEOUS at 21:43

## 2024-09-06 RX ADMIN — ACETAMINOPHEN 650 MILLIGRAM(S): 325 TABLET ORAL at 11:55

## 2024-09-06 RX ADMIN — Medication 2 MILLIGRAM(S): at 21:41

## 2024-09-06 NOTE — CHART NOTE - NSCHARTNOTEFT_GEN_A_CORE
Patient initially scheduled for tunneled HD catheter placement today however patient was not NPO for procedure.    Will plan for procedure with sedation on Monday 9/9  - NPO past midnight Sunday into Monday   - Please hold Sunday 9/8 PM dose and Monday 9/9 AM dose of eliquis  - Please draw AM labs - CBC/BMP

## 2024-09-06 NOTE — PROGRESS NOTE ADULT - SUBJECTIVE AND OBJECTIVE BOX
Patient is a 74y old  Male who presents with a chief complaint of Weakness, progression of CKD (05 Sep 2024 18:30)       INTERVAL HPI/OVERNIGHT EVENTS:  Patient seen and evaluated at bedside.  Pt is resting comfortable in NAD. Denies N/V/F/C.  Pain rated at X/10    Allergies    No Known Allergies    Intolerances        Vital Signs Last 24 Hrs  T(C): 36.6 (06 Sep 2024 04:33), Max: 36.6 (06 Sep 2024 04:33)  T(F): 97.8 (06 Sep 2024 04:33), Max: 97.8 (06 Sep 2024 04:33)  HR: 76 (06 Sep 2024 04:33) (62 - 84)  BP: 145/57 (06 Sep 2024 04:33) (143/60 - 158/70)  BP(mean): --  RR: 18 (06 Sep 2024 04:33) (18 - 19)  SpO2: 98% (06 Sep 2024 04:33) (93% - 99%)        LABS:                        9.2    10.71 )-----------( 152      ( 05 Sep 2024 09:52 )             29.6     09-05    135  |  102  |  75<H>  ----------------------------<  175<H>  5.0   |  26  |  4.28<H>    Ca    8.9      05 Sep 2024 09:52  Phos  5.5     09-05  Mg     1.8     09-05    TPro  6.7  /  Alb  2.2<L>  /  TBili  0.2  /  DBili  x   /  AST  16  /  ALT  19  /  AlkPhos  49  09-05      Urinalysis Basic - ( 05 Sep 2024 09:52 )    Color: x / Appearance: x / SG: x / pH: x  Gluc: 175 mg/dL / Ketone: x  / Bili: x / Urobili: x   Blood: x / Protein: x / Nitrite: x   Leuk Esterase: x / RBC: x / WBC x   Sq Epi: x / Non Sq Epi: x / Bacteria: x      CAPILLARY BLOOD GLUCOSE      POCT Blood Glucose.: 243 mg/dL (06 Sep 2024 07:41)  POCT Blood Glucose.: 217 mg/dL (05 Sep 2024 21:11)  POCT Blood Glucose.: 204 mg/dL (05 Sep 2024 16:48)  POCT Blood Glucose.: 255 mg/dL (05 Sep 2024 10:52)  POCT Blood Glucose.: 127 mg/dL (05 Sep 2024 08:29)      Lower Extremity Physical Exam:  Vascular: DP/PT 1/4 R, CFT < 3sec x5, Temp gradient warm to cool   Neurology: Epicritic sensation warm to cool to level of digits, right  Musculoskeletal/Ortho: Left BKA  Skin: Right foot: deep tissue injury and partial thickness eschar of the posterior heel, lateral malleolus, and lateral 5th metatarsal base, no open wounds, no acute signs of infection.     RADIOLOGY & ADDITIONAL TESTS:

## 2024-09-06 NOTE — PROGRESS NOTE ADULT - ASSESSMENT
74M presents with right foot deep tissue injuries   - Patient evaluated and seen   - Right foot deep tissue injury and partial thickness eschar of the posterior heel, lateral malleolus, and lateral 5th metatarsal base, no open wounds, no acute signs of infection.   - Z flows should be worn at all times while resting in bed for strict decubitus precaution  - Patient is stable for discharge from a podiatry standpoint. Follow up information and wound care can be found in discharge paperwork.  - Wound care orders continue  - Nails debrided x5 today sterile nail nipper patient tolerated well  - Will follow

## 2024-09-06 NOTE — PROGRESS NOTE ADULT - SUBJECTIVE AND OBJECTIVE BOX
INTERVAL HPI/OVERNIGHT EVENTS:  Pt seen and examined at bedside.     Allergies/Intolerance: No Known Allergies      MEDICATIONS  (STANDING):  apixaban 5 milliGRAM(s) Oral two times a day  AQUAPHOR (petrolatum Ointment) 1 Application(s) Topical daily  atorvastatin 40 milliGRAM(s) Oral at bedtime  cefepime   IVPB      cefepime   IVPB 1000 milliGRAM(s) IV Intermittent every 24 hours  chlorhexidine 2% Cloths 1 Application(s) Topical <User Schedule>  dextrose 5%. 1000 milliLiter(s) (50 mL/Hr) IV Continuous <Continuous>  dextrose 5%. 1000 milliLiter(s) (100 mL/Hr) IV Continuous <Continuous>  dextrose 50% Injectable 25 Gram(s) IV Push once  dextrose 50% Injectable 12.5 Gram(s) IV Push once  dextrose 50% Injectable 25 Gram(s) IV Push once  doxazosin 2 milliGRAM(s) Oral at bedtime  glucagon  Injectable 1 milliGRAM(s) IntraMuscular once  hydrALAZINE 50 milliGRAM(s) Oral every 8 hours  insulin glargine Injectable (LANTUS) 25 Unit(s) SubCutaneous at bedtime  insulin lispro (ADMELOG) corrective regimen sliding scale   SubCutaneous at bedtime  insulin lispro (ADMELOG) corrective regimen sliding scale   SubCutaneous three times a day before meals  insulin lispro Injectable (ADMELOG) 6 Unit(s) SubCutaneous three times a day before meals  melatonin 3 milliGRAM(s) Oral at bedtime  Nephro-lee 1 Tablet(s) Oral daily  pantoprazole    Tablet 40 milliGRAM(s) Oral before breakfast  sevelamer carbonate 1600 milliGRAM(s) Oral three times a day with meals  triamcinolone 0.1% Ointment 1 Application(s) Topical two times a day    MEDICATIONS  (PRN):  acetaminophen     Tablet .. 650 milliGRAM(s) Oral every 6 hours PRN Temp greater or equal to 38C (100.4F), Moderate Pain (4 - 6)  benzocaine/menthol Lozenge 1 Lozenge Oral five times a day PRN Mouth Sores  dextrose Oral Gel 15 Gram(s) Oral once PRN Blood Glucose LESS THAN 70 milliGRAM(s)/deciliter  polyethylene glycol 3350 17 Gram(s) Oral daily PRN Constipation        ROS: all systems reviewed and wnl      PHYSICAL EXAMINATION:  Vital Signs Last 24 Hrs  T(C): 36.4 (06 Sep 2024 09:35), Max: 36.6 (06 Sep 2024 04:33)  T(F): 97.5 (06 Sep 2024 09:35), Max: 97.8 (06 Sep 2024 04:33)  HR: 64 (06 Sep 2024 09:35) (62 - 84)  BP: 149/79 (06 Sep 2024 09:35) (143/60 - 158/70)  BP(mean): --  RR: 17 (06 Sep 2024 09:35) (17 - 19)  SpO2: 97% (06 Sep 2024 09:35) (93% - 98%)    Parameters below as of 06 Sep 2024 09:35  Patient On (Oxygen Delivery Method): room air      CAPILLARY BLOOD GLUCOSE      POCT Blood Glucose.: 243 mg/dL (06 Sep 2024 07:41)  POCT Blood Glucose.: 217 mg/dL (05 Sep 2024 21:11)  POCT Blood Glucose.: 204 mg/dL (05 Sep 2024 16:48)      09-05 @ 07:01  -  09-06 @ 07:00  --------------------------------------------------------  IN: 240 mL / OUT: 0 mL / NET: 240 mL        GENERAL:   NECK: supple, No JVD  CHEST/LUNG: clear to auscultation bilaterally; no rales, rhonchi, or wheezing b/l  HEART: normal S1, S2  ABDOMEN: BS+, soft, ND, NT   EXTREMITIES:  pulses palpable; no clubbing, cyanosis, or edema b/l LEs      LABS:                        9.2    10.71 )-----------( 152      ( 05 Sep 2024 09:52 )             29.6     09-05    135  |  102  |  75<H>  ----------------------------<  175<H>  5.0   |  26  |  4.28<H>    Ca    8.9      05 Sep 2024 09:52  Phos  5.5     09-05  Mg     1.8     09-05    TPro  6.7  /  Alb  2.2<L>  /  TBili  0.2  /  DBili  x   /  AST  16  /  ALT  19  /  AlkPhos  49  09-05      Urinalysis Basic - ( 05 Sep 2024 09:52 )    Color: x / Appearance: x / SG: x / pH: x  Gluc: 175 mg/dL / Ketone: x  / Bili: x / Urobili: x   Blood: x / Protein: x / Nitrite: x   Leuk Esterase: x / RBC: x / WBC x   Sq Epi: x / Non Sq Epi: x / Bacteria: x             INTERVAL HPI/OVERNIGHT EVENTS:  Pt seen and examined at bedside.     Allergies/Intolerance: No Known Allergies      MEDICATIONS  (STANDING):  apixaban 5 milliGRAM(s) Oral two times a day  AQUAPHOR (petrolatum Ointment) 1 Application(s) Topical daily  atorvastatin 40 milliGRAM(s) Oral at bedtime  cefepime   IVPB      cefepime   IVPB 1000 milliGRAM(s) IV Intermittent every 24 hours  chlorhexidine 2% Cloths 1 Application(s) Topical <User Schedule>  dextrose 5%. 1000 milliLiter(s) (50 mL/Hr) IV Continuous <Continuous>  dextrose 5%. 1000 milliLiter(s) (100 mL/Hr) IV Continuous <Continuous>  dextrose 50% Injectable 25 Gram(s) IV Push once  dextrose 50% Injectable 12.5 Gram(s) IV Push once  dextrose 50% Injectable 25 Gram(s) IV Push once  doxazosin 2 milliGRAM(s) Oral at bedtime  glucagon  Injectable 1 milliGRAM(s) IntraMuscular once  hydrALAZINE 50 milliGRAM(s) Oral every 8 hours  insulin glargine Injectable (LANTUS) 25 Unit(s) SubCutaneous at bedtime  insulin lispro (ADMELOG) corrective regimen sliding scale   SubCutaneous at bedtime  insulin lispro (ADMELOG) corrective regimen sliding scale   SubCutaneous three times a day before meals  insulin lispro Injectable (ADMELOG) 6 Unit(s) SubCutaneous three times a day before meals  melatonin 3 milliGRAM(s) Oral at bedtime  Nephro-lee 1 Tablet(s) Oral daily  pantoprazole    Tablet 40 milliGRAM(s) Oral before breakfast  sevelamer carbonate 1600 milliGRAM(s) Oral three times a day with meals  triamcinolone 0.1% Ointment 1 Application(s) Topical two times a day    MEDICATIONS  (PRN):  acetaminophen     Tablet .. 650 milliGRAM(s) Oral every 6 hours PRN Temp greater or equal to 38C (100.4F), Moderate Pain (4 - 6)  benzocaine/menthol Lozenge 1 Lozenge Oral five times a day PRN Mouth Sores  dextrose Oral Gel 15 Gram(s) Oral once PRN Blood Glucose LESS THAN 70 milliGRAM(s)/deciliter  polyethylene glycol 3350 17 Gram(s) Oral daily PRN Constipation        ROS: all systems reviewed and wnl      PHYSICAL EXAMINATION:  Vital Signs Last 24 Hrs  T(C): 36.4 (06 Sep 2024 09:35), Max: 36.6 (06 Sep 2024 04:33)  T(F): 97.5 (06 Sep 2024 09:35), Max: 97.8 (06 Sep 2024 04:33)  HR: 64 (06 Sep 2024 09:35) (62 - 84)  BP: 149/79 (06 Sep 2024 09:35) (143/60 - 158/70)  BP(mean): --  RR: 17 (06 Sep 2024 09:35) (17 - 19)  SpO2: 97% (06 Sep 2024 09:35) (93% - 98%)    Parameters below as of 06 Sep 2024 09:35  Patient On (Oxygen Delivery Method): room air      CAPILLARY BLOOD GLUCOSE      POCT Blood Glucose.: 243 mg/dL (06 Sep 2024 07:41)  POCT Blood Glucose.: 217 mg/dL (05 Sep 2024 21:11)  POCT Blood Glucose.: 204 mg/dL (05 Sep 2024 16:48)      09-05 @ 07:01  -  09-06 @ 07:00  --------------------------------------------------------  IN: 240 mL / OUT: 0 mL / NET: 240 mL        GENERAL: stable, no CP or SOB, no fevers.   NECK: supple, No JVD  CHEST/LUNG: clear to auscultation bilaterally; no rales, rhonchi, or wheezing b/l  HEART: normal S1, S2  ABDOMEN: BS+, soft, ND, NT   EXTREMITIES:  pulses palpable; no clubbing, cyanosis, or edema b/l LEs      LABS:                        9.2    10.71 )-----------( 152      ( 05 Sep 2024 09:52 )             29.6     09-05    135  |  102  |  75<H>  ----------------------------<  175<H>  5.0   |  26  |  4.28<H>    Ca    8.9      05 Sep 2024 09:52  Phos  5.5     09-05  Mg     1.8     09-05    TPro  6.7  /  Alb  2.2<L>  /  TBili  0.2  /  DBili  x   /  AST  16  /  ALT  19  /  AlkPhos  49  09-05      Urinalysis Basic - ( 05 Sep 2024 09:52 )    Color: x / Appearance: x / SG: x / pH: x  Gluc: 175 mg/dL / Ketone: x  / Bili: x / Urobili: x   Blood: x / Protein: x / Nitrite: x   Leuk Esterase: x / RBC: x / WBC x   Sq Epi: x / Non Sq Epi: x / Bacteria: x             INTERVAL HPI/OVERNIGHT EVENTS:  Pt seen and examined at bedside.     Allergies/Intolerance: No Known Allergies      MEDICATIONS  (STANDING):  apixaban 5 milliGRAM(s) Oral two times a day  AQUAPHOR (petrolatum Ointment) 1 Application(s) Topical daily  atorvastatin 40 milliGRAM(s) Oral at bedtime  cefepime   IVPB      cefepime   IVPB 1000 milliGRAM(s) IV Intermittent every 24 hours  chlorhexidine 2% Cloths 1 Application(s) Topical <User Schedule>  dextrose 5%. 1000 milliLiter(s) (50 mL/Hr) IV Continuous <Continuous>  dextrose 5%. 1000 milliLiter(s) (100 mL/Hr) IV Continuous <Continuous>  dextrose 50% Injectable 25 Gram(s) IV Push once  dextrose 50% Injectable 12.5 Gram(s) IV Push once  dextrose 50% Injectable 25 Gram(s) IV Push once  doxazosin 2 milliGRAM(s) Oral at bedtime  glucagon  Injectable 1 milliGRAM(s) IntraMuscular once  hydrALAZINE 50 milliGRAM(s) Oral every 8 hours  insulin glargine Injectable (LANTUS) 25 Unit(s) SubCutaneous at bedtime  insulin lispro (ADMELOG) corrective regimen sliding scale   SubCutaneous at bedtime  insulin lispro (ADMELOG) corrective regimen sliding scale   SubCutaneous three times a day before meals  insulin lispro Injectable (ADMELOG) 6 Unit(s) SubCutaneous three times a day before meals  melatonin 3 milliGRAM(s) Oral at bedtime  Nephro-lee 1 Tablet(s) Oral daily  pantoprazole    Tablet 40 milliGRAM(s) Oral before breakfast  sevelamer carbonate 1600 milliGRAM(s) Oral three times a day with meals  triamcinolone 0.1% Ointment 1 Application(s) Topical two times a day    MEDICATIONS  (PRN):  acetaminophen     Tablet .. 650 milliGRAM(s) Oral every 6 hours PRN Temp greater or equal to 38C (100.4F), Moderate Pain (4 - 6)  benzocaine/menthol Lozenge 1 Lozenge Oral five times a day PRN Mouth Sores  dextrose Oral Gel 15 Gram(s) Oral once PRN Blood Glucose LESS THAN 70 milliGRAM(s)/deciliter  polyethylene glycol 3350 17 Gram(s) Oral daily PRN Constipation        ROS: all systems reviewed and wnl      PHYSICAL EXAMINATION:  Vital Signs Last 24 Hrs  T(C): 36.4 (06 Sep 2024 09:35), Max: 36.6 (06 Sep 2024 04:33)  T(F): 97.5 (06 Sep 2024 09:35), Max: 97.8 (06 Sep 2024 04:33)  HR: 64 (06 Sep 2024 09:35) (62 - 84)  BP: 149/79 (06 Sep 2024 09:35) (143/60 - 158/70)  BP(mean): --  RR: 17 (06 Sep 2024 09:35) (17 - 19)  SpO2: 97% (06 Sep 2024 09:35) (93% - 98%)    Parameters below as of 06 Sep 2024 09:35  Patient On (Oxygen Delivery Method): room air      CAPILLARY BLOOD GLUCOSE      POCT Blood Glucose.: 243 mg/dL (06 Sep 2024 07:41)  POCT Blood Glucose.: 217 mg/dL (05 Sep 2024 21:11)  POCT Blood Glucose.: 204 mg/dL (05 Sep 2024 16:48)      09-05 @ 07:01  -  09-06 @ 07:00  --------------------------------------------------------  IN: 240 mL / OUT: 0 mL / NET: 240 mL        GENERAL: stable, no CP or SOB, no fevers.   NECK: supple, No JVD  CHEST/LUNG: clear to auscultation bilaterally; no rales, rhonchi, or wheezing b/l  HEART: normal S1, S2  ABDOMEN: BS+, soft, ND, NT   EXTREMITIES:  pulses palpable; no clubbing, cyanosis, or edema b/l LEs  has prior left BKA site.        LABS:                        9.2    10.71 )-----------( 152      ( 05 Sep 2024 09:52 )             29.6     09-05    135  |  102  |  75<H>  ----------------------------<  175<H>  5.0   |  26  |  4.28<H>    Ca    8.9      05 Sep 2024 09:52  Phos  5.5     09-05  Mg     1.8     09-05    TPro  6.7  /  Alb  2.2<L>  /  TBili  0.2  /  DBili  x   /  AST  16  /  ALT  19  /  AlkPhos  49  09-05      Urinalysis Basic - ( 05 Sep 2024 09:52 )    Color: x / Appearance: x / SG: x / pH: x  Gluc: 175 mg/dL / Ketone: x  / Bili: x / Urobili: x   Blood: x / Protein: x / Nitrite: x   Leuk Esterase: x / RBC: x / WBC x   Sq Epi: x / Non Sq Epi: x / Bacteria: x

## 2024-09-06 NOTE — CHART NOTE - NSCHARTNOTEFT_GEN_A_CORE
Hospitalist Medicine NP    Requested by Renal team to remove Damon Cath today in lieu of exchange on Monday by IR.   Patient currently on Eliquis 5 mg BID for Afib, with last dose administered this morning.     Will discontinue AC for now. Catheter can be removed in the morning due to current anticoagulation status.   D/w Yarelis Robert [facility]  Medicine [id number]

## 2024-09-06 NOTE — PROGRESS NOTE ADULT - SUBJECTIVE AND OBJECTIVE BOX
[facility] NEPHROLOGY SERVICES, [facility]  NEPHROLOGY AND HYPERTENSION  [address]  [address]  [place] [address]  [phone]    MD YISEL WEEKS MD YELENA ROSENBERG, MD BINNY KOSHY, MD CHRISTOPHER CAPUTO, MD EDWARD BOVER, MD          Patient events noted  No distress  on HD    MEDICATIONS  (STANDING):  AQUAPHOR (petrolatum Ointment) 1 Application(s) Topical daily  atorvastatin 40 milliGRAM(s) Oral at bedtime  cefepime   IVPB 1000 milliGRAM(s) IV Intermittent every 24 hours  cefepime   IVPB      chlorhexidine 2% Cloths 1 Application(s) Topical <User Schedule>  dextrose 5%. 1000 milliLiter(s) (50 mL/Hr) IV Continuous <Continuous>  dextrose 5%. 1000 milliLiter(s) (100 mL/Hr) IV Continuous <Continuous>  dextrose 50% Injectable 12.5 Gram(s) IV Push once  dextrose 50% Injectable 25 Gram(s) IV Push once  dextrose 50% Injectable 25 Gram(s) IV Push once  doxazosin 2 milliGRAM(s) Oral at bedtime  glucagon  Injectable 1 milliGRAM(s) IntraMuscular once  hydrALAZINE 50 milliGRAM(s) Oral every 8 hours  insulin glargine Injectable (LANTUS) 25 Unit(s) SubCutaneous at bedtime  insulin lispro (ADMELOG) corrective regimen sliding scale   SubCutaneous at bedtime  insulin lispro (ADMELOG) corrective regimen sliding scale   SubCutaneous three times a day before meals  insulin lispro Injectable (ADMELOG) 6 Unit(s) SubCutaneous three times a day before meals  melatonin 3 milliGRAM(s) Oral at bedtime  Nephro-lee 1 Tablet(s) Oral daily  pantoprazole    Tablet 40 milliGRAM(s) Oral before breakfast  sevelamer carbonate 1600 milliGRAM(s) Oral three times a day with meals  triamcinolone 0.1% Ointment 1 Application(s) Topical two times a day    MEDICATIONS  (PRN):  acetaminophen     Tablet .. 650 milliGRAM(s) Oral every 6 hours PRN Temp greater or equal to 38C (100.4F), Moderate Pain (4 - 6)  benzocaine/menthol Lozenge 1 Lozenge Oral five times a day PRN Mouth Sores  dextrose Oral Gel 15 Gram(s) Oral once PRN Blood Glucose LESS THAN 70 milliGRAM(s)/deciliter  polyethylene glycol 3350 17 Gram(s) Oral daily PRN Constipation      09-05-24 @ 07:01  -  09-06-24 @ 07:00  --------------------------------------------------------  IN: 240 mL / OUT: 0 mL / NET: 240 mL    09-06-24 @ 07:01  -  09-06-24 @ 18:04  --------------------------------------------------------  IN: 0 mL / OUT: 1000 mL / NET: -1000 mL      PHYSICAL EXAM:      T(C): 36.3 (09-06-24 @ 16:22), Max: 36.6 (09-06-24 @ 04:33)  HR: 66 (09-06-24 @ 16:22) (64 - 84)  BP: 140/57 (09-06-24 @ 16:22) (111/59 - 149/79)  RR: 17 (09-06-24 @ 16:22) (17 - 18)  SpO2: 96% (09-06-24 @ 16:22) (96% - 98%)  Wt(kg): --  HEENT adair exit site poor   Lungs clear  Heart S1S2  Abd soft NT ND  Extremities:   tr edema                                    9.2    10.71 )-----------( 152      ( 05 Sep 2024 09:52 )             29.6     09-05    135  |  102  |  75<H>  ----------------------------<  175<H>  5.0   |  26  |  4.28<H>    Ca    8.9      05 Sep 2024 09:52  Phos  5.5     09-05  Mg     1.8     09-05    TPro  6.7  /  Alb  2.2<L>  /  TBili  0.2  /  DBili  x   /  AST  16  /  ALT  19  /  AlkPhos  49  09-05      LIVER FUNCTIONS - ( 05 Sep 2024 09:52 )  Alb: 2.2 g/dL / Pro: 6.7 gm/dL / ALK PHOS: 49 U/L / ALT: 19 U/L / AST: 16 U/L / GGT: x           Creatinine Trend: 4.28<--, 4.67<--, 4.71<--, 4.19<--, 4.36<--, 3.63<--        Assessment   New ESRD, maintenance    Plan:  HD for today   UF as tolerated  Triamcinolone cream  D/C adair catheter due to exit site incompetence       Augusto Santoyo MD

## 2024-09-06 NOTE — PROGRESS NOTE ADULT - ASSESSMENT
73 year old male PMH HTN, HLD, DM, CHF, CKD stage IV, dementia / anxiety BIBEMS accompanied by wife due to weakness. Per wife, pt not feeling well since last night - went to bed prior to his own birthday party. Per EMS, pt complaining of decreased L vision and decreased L hearing as well as noted off balance with ambulation to restroom this AM. Pt states L eye is usually his good eye, and still with better vision in L than R, but not as good as usual. Pt reports chronic itching rash to body x past 3 months, f/u'd with Derm, was told likely 2/2 renal disease. Hearing later normalized in ER.       Echo reviewed medically stable for permcath placmennt   skin rash a concern derm has evaluated virtually on steroid cream biopsy has outpatient   nephrology and ID consults appreciated    on cefepime  white count normalized   dispo dependent on perm cath and skin improvement possible perm cath Friday 9/6 clear for permcath by ID     Case discussed at length with wife  at previous derm appointment no biopsy was done felt that skin eruption were due to uremia      73 year old male PMH HTN, HLD, DM, CHF, CKD stage IV, dementia / anxiety BIBEMS accompanied by wife due to weakness. Per wife, pt not feeling well since last night - went to bed prior to his own birthday party. Per EMS, pt complaining of decreased L vision and decreased L hearing as well as noted off balance with ambulation to restroom this AM. Pt states L eye is usually his good eye, and still with better vision in L than R, but not as good as usual. Pt reports chronic itching rash to body x past 3 months, f/u'd with Derm, was told likely 2/2 renal disease. Hearing later normalized in ER.     Note: Has left BKA.    wbc went from 10->13  right sided hemodialysis catheter looks good  no need for blood cultures   UA with enterobacter   need to change the ceftriaxone to cefepime, the hemodialysis dose 1g q24hrs     found to have severe ELMER needing hemodialysis   has a full body rash   has had confusion   generalized not feeling well  vision and hearing getting worse  no urinary complaints but was confused when came in  was supposed to get permacath but wbc rising     9/3: no fevers, WBC better yesterday 10.12, no new lab work, RA, BCs NGTD, Cr 4.71, had HD yesterday, right IJ temp HD catheter - dressing needs to be changed - spoke with RN. + Rash all over pt's body, cefepime IV continued. There is a chart note from medicine PA - pt was seen by dermatology in June for diffuse bullae on skin, Dr. Barnes. A conversation took place and dermatology recommended to start triamcinolone 0.1% ointment and f/up as op for skin biopsy and further assessment.   Attending addendum:  wbc ok but patient having hypothermic episodes   would hold off on permacath and see if his hypothermia improves  continue cefepime   consider line holiday   start triamcinolone as recommended by dermatology   patient still warrants skin biopsy to determine what exactly this rash is from      9/5: afebrile, RA, WBC slightly elevated 10.71, MRSA PCR negative, BCs NGTD, UC with Enterobacter cloacae, + full body rash, appear more dry on today's exam, RIJ catheter should be removed or dressed with occlusive dressing, new cath placement possibly tomorrow. No contraindications to placing permacath tomorrow assuming there is no change in clinical status .   Cefepime IV continued. Pt needs skin biopsy.     Plan:  continue cefepime 1g q24hrs  PermCath possibly tomorrow, please remove current temporary catheter   in favor for Dermatology to see patient while in pt, needs skin biopsy   HIV negative  please have hemodialysis RN change the dressing on the catheter - spoke with RN  MRSA PCR - negative  follow culture data     ##ESRD   ##HyperK  Temp cath placed and patient to initiate Dialysis here 2/2 Uremia and hyperkalemic. Temp cath placed   - C/w Inpatient dialysis   - Will need IR tunneled cath  - Pending O/p Dialysis slot   - Nephro following     ##Hypothermic resolved     ##SSTI  Noted to be hypothermic to 92F. CT abd No evidence of acute inflammatory or obstructive process in the   abdomen and pelvis. UA positive nitrites, LEs.   improved  now on cefepime     ##Type 2 DM  A1c 7; C/w Lantus 25 + 6U w/ meals + SSI  - Hypoglycemia protocol     #Pulm nodule   - CT noted There are scattered nodules at the visualized lung bases measuring up to 6 mm  CT chest reviewed will need follow up in 3-6  months     #HTN  - C/w cardura, hydralazine     #HLD  - C/w statin     #History of DVT  - C/w Eliquis     Diet: renal, CCD  DVT prophylaxis: eliquis   Dispo: off tele   Code Status: FC    73 year old male PMH HTN, HLD, DM, CHF, CKD stage IV, dementia / anxiety BIBEMS accompanied by wife due to weakness. Per wife, pt not feeling well since last night - went to bed prior to his own birthday party. Per EMS, pt complaining of decreased L vision and decreased L hearing as well as noted off balance with ambulation to restroom this AM. Pt states L eye is usually his good eye, and still with better vision in L than R, but not as good as usual. Pt reports chronic itching rash to body x past 3 months, f/u'd with Derm, was told likely 2/2 renal disease. Hearing later normalized in ER.       Echo reviewed medically stable for permcath placmennt on Monday.   skin rash a concern derm has evaluated virtually on steroid cream biopsy has outpatient   nephrology and ID consults appreciated. On cefepime  white count normalized   dispo dependent on perm cath and skin improvement possible perm cath Friday 9/6 clear for permcath by ID     Case discussed at length with wife  at previous derm appointment no biopsy was done felt that skin eruption were due to uremia      73 year old male PMH HTN, HLD, DM, CHF, CKD stage IV, dementia / anxiety BIBEMS accompanied by wife due to weakness. Per wife, pt not feeling well since last night - went to bed prior to his own birthday party. Per EMS, pt complaining of decreased L vision and decreased L hearing as well as noted off balance with ambulation to restroom this AM. Pt states L eye is usually his good eye, and still with better vision in L than R, but not as good as usual. Pt reports chronic itching rash to body x past 3 months, f/u'd with Derm, was told likely 2/2 renal disease. Hearing later normalized in ER.     Note: Has left BKA.    wbc went from 10->13  right sided hemodialysis catheter looks good  no need for blood cultures   UA with enterobacter   need to change the ceftriaxone to cefepime, the hemodialysis dose 1g q24hrs     found to have severe ELMER needing hemodialysis   has a full body rash   has had confusion   generalized not feeling well  vision and hearing getting worse  no urinary complaints but was confused when came in  was supposed to get permacath but wbc rising     9/3: no fevers, WBC better yesterday 10.12, no new lab work, RA, BCs NGTD, Cr 4.71, had HD yesterday, right IJ temp HD catheter - dressing needs to be changed - spoke with RN. + Rash all over pt's body, cefepime IV continued. There is a chart note from medicine PA - pt was seen by dermatology in June for diffuse bullae on skin, Dr. Barnes. A conversation took place and dermatology recommended to start triamcinolone 0.1% ointment and f/up as op for skin biopsy and further assessment.   Attending addendum:  wbc ok but patient having hypothermic episodes   would hold off on permacath and see if his hypothermia improves  continue cefepime   consider line holiday   start triamcinolone as recommended by dermatology   patient still warrants skin biopsy to determine what exactly this rash is from      9/5: afebrile, RA, WBC slightly elevated 10.71, MRSA PCR negative, BCs NGTD, UC with Enterobacter cloacae, + full body rash, appear more dry on today's exam, RIJ catheter should be removed or dressed with occlusive dressing, new cath placement possibly tomorrow. No contraindications to placing permacath tomorrow assuming there is no change in clinical status .   Cefepime IV continued. Pt needs skin biopsy.     Plan:  continue cefepime 1g q24hrs  PermCath possibly tomorrow, please remove current temporary catheter   in favor for Dermatology to see patient while in pt, needs skin biopsy   HIV negative  please have hemodialysis RN change the dressing on the catheter - spoke with RN  MRSA PCR - negative  follow culture data     ##ESRD   ##HyperK  Temp cath placed and patient to initiate Dialysis here 2/2 Uremia and hyperkalemic. Temp cath placed   - C/w Inpatient dialysis   - Will need IR tunneled cath  - Pending O/p Dialysis slot   - Nephro following     ##Hypothermic resolved     ##SSTI  Noted to be hypothermic to 92F. CT abd No evidence of acute inflammatory or obstructive process in the   abdomen and pelvis. UA positive nitrites, LEs.   improved  now on cefepime     ##Type 2 DM  A1c 7; C/w Lantus 25 + 6U w/ meals + SSI  - Hypoglycemia protocol     #Pulm nodule   - CT noted There are scattered nodules at the visualized lung bases measuring up to 6 mm  CT chest reviewed will need follow up in 3-6  months     #HTN  - C/w cardura, hydralazine     #HLD  - C/w statin     #History of DVT  - C/w Eliquis     Diet: renal, CCD  DVT prophylaxis: eliquis   Dispo: off tele   Code Status: FC     Addendum:  Hold Eliquis on Sunday 9/08 and 9/09 for perm cath Monday.     73 year old male PMH HTN, HLD, DM, CHF, CKD stage IV, dementia / anxiety BIBEMS accompanied by wife due to weakness. Per wife, pt not feeling well since last night - went to bed prior to his own birthday party. Per EMS, pt complaining of decreased L vision and decreased L hearing as well as noted off balance with ambulation to restroom this AM. Pt states L eye is usually his good eye, and still with better vision in L than R, but not as good as usual. Pt reports chronic itching rash to body x past 3 months, f/u'd with Derm, was told likely 2/2 renal disease. Hearing later normalized in ER.       Echo reviewed medically stable for permcath placmennt on Monday.   skin rash a concern derm has evaluated virtually on steroid cream biopsy has outpatient   nephrology and ID consults appreciated. On cefepime  white count normalized   dispo dependent on perm cath and skin improvement.  Perm cath Monday 9/8 if clear for permcath by ID     Case discussed at length with wife  at previous derm appointment no biopsy was done felt that skin eruption were due to uremia      73 year old male PMH HTN, HLD, DM, CHF, CKD stage IV, dementia / anxiety BIBEMS accompanied by wife due to weakness. Per wife, pt not feeling well since last night - went to bed prior to his own birthday party. Per EMS, pt complaining of decreased L vision and decreased L hearing as well as noted off balance with ambulation to restroom this AM. Pt states L eye is usually his good eye, and still with better vision in L than R, but not as good as usual. Pt reports chronic itching rash to body x past 3 months, f/u'd with Derm, was told likely 2/2 renal disease. Hearing later normalized in ER.     Note: Has left BKA.    wbc went from 10->13  right sided hemodialysis catheter looks good  no need for blood cultures   UA with enterobacter   need to change the ceftriaxone to cefepime, the hemodialysis dose 1g q24hrs     found to have severe ELMER needing hemodialysis   has a full body rash   has had confusion   generalized not feeling well  vision and hearing getting worse  no urinary complaints but was confused when came in  was supposed to get permacath but wbc rising     9/3: no fevers, WBC better yesterday 10.12, no new lab work, RA, BCs NGTD, Cr 4.71, had HD yesterday, right IJ temp HD catheter - dressing needs to be changed - spoke with RN. + Rash all over pt's body, cefepime IV continued. There is a chart note from medicine PA - pt was seen by dermatology in June for diffuse bullae on skin, Dr. Barnes. A conversation took place and dermatology recommended to start triamcinolone 0.1% ointment and f/up as op for skin biopsy and further assessment.   Attending addendum:  wbc ok but patient having hypothermic episodes   would hold off on permacath and see if his hypothermia improves  continue cefepime   consider line holiday   start triamcinolone as recommended by dermatology   patient still warrants skin biopsy to determine what exactly this rash is from      9/5: afebrile, RA, WBC slightly elevated 10.71, MRSA PCR negative, BCs NGTD, UC with Enterobacter cloacae, + full body rash, appear more dry on today's exam, RIJ catheter should be removed or dressed with occlusive dressing, new cath placement possibly tomorrow. No contraindications to placing permacath tomorrow assuming there is no change in clinical status .   Cefepime IV continued. Pt needs skin biopsy.     Plan:  continue cefepime 1g q24hrs  PermCath possibly tomorrow, please remove current temporary catheter   in favor for Dermatology to see patient while in pt, needs skin biopsy   HIV negative  please have hemodialysis RN change the dressing on the catheter - spoke with RN  MRSA PCR - negative  follow culture data     ##ESRD   ##HyperK  Temp cath placed and patient to initiate Dialysis here 2/2 Uremia and hyperkalemic. Temp cath placed   - C/w Inpatient dialysis   - Will need IR tunneled cath  - Pending O/p Dialysis slot   - Nephro following     ##Hypothermic resolved     ##SSTI  Noted to be hypothermic to 92F. CT abd No evidence of acute inflammatory or obstructive process in the   abdomen and pelvis. UA positive nitrites, LEs.   improved  now on cefepime     ##Type 2 DM  A1c 7; C/w Lantus 25 + 6U w/ meals + SSI  - Hypoglycemia protocol     #Pulm nodule   - CT noted There are scattered nodules at the visualized lung bases measuring up to 6 mm  CT chest reviewed will need follow up in 3-6  months     #HTN  - C/w cardura, hydralazine     #HLD  - C/w statin     #History of DVT  - C/w Eliquis     Diet: renal, CCD  DVT prophylaxis: eliquis   Dispo: off tele   Code Status: FC     Addendum:  Hold Eliquis on Sunday 9/08 and 9/09 for perm cath Monday.

## 2024-09-07 LAB
ALBUMIN SERPL ELPH-MCNC: 2.4 G/DL — LOW (ref 3.3–5)
ALP SERPL-CCNC: 53 U/L — SIGNIFICANT CHANGE UP (ref 40–120)
ALT FLD-CCNC: 22 U/L — SIGNIFICANT CHANGE UP (ref 12–78)
ANION GAP SERPL CALC-SCNC: 9 MMOL/L — SIGNIFICANT CHANGE UP (ref 5–17)
AST SERPL-CCNC: 15 U/L — SIGNIFICANT CHANGE UP (ref 15–37)
BILIRUB DIRECT SERPL-MCNC: 0.1 MG/DL — SIGNIFICANT CHANGE UP (ref 0–0.3)
BILIRUB INDIRECT FLD-MCNC: 0.1 MG/DL — LOW (ref 0.2–1)
BILIRUB SERPL-MCNC: 0.2 MG/DL — SIGNIFICANT CHANGE UP (ref 0.2–1.2)
BILIRUB SERPL-MCNC: 0.2 MG/DL — SIGNIFICANT CHANGE UP (ref 0.2–1.2)
BLD GP AB SCN SERPL QL: SIGNIFICANT CHANGE UP
BUN SERPL-MCNC: 75 MG/DL — HIGH (ref 7–23)
CALCIUM SERPL-MCNC: 9.2 MG/DL — SIGNIFICANT CHANGE UP (ref 8.5–10.1)
CHLORIDE SERPL-SCNC: 102 MMOL/L — SIGNIFICANT CHANGE UP (ref 96–108)
CO2 SERPL-SCNC: 25 MMOL/L — SIGNIFICANT CHANGE UP (ref 22–31)
CREAT SERPL-MCNC: 3.96 MG/DL — HIGH (ref 0.5–1.3)
EGFR: 15 ML/MIN/1.73M2 — LOW
GLUCOSE BLDC GLUCOMTR-MCNC: 144 MG/DL — HIGH (ref 70–99)
GLUCOSE BLDC GLUCOMTR-MCNC: 178 MG/DL — HIGH (ref 70–99)
GLUCOSE BLDC GLUCOMTR-MCNC: 186 MG/DL — HIGH (ref 70–99)
GLUCOSE BLDC GLUCOMTR-MCNC: 262 MG/DL — HIGH (ref 70–99)
GLUCOSE SERPL-MCNC: 154 MG/DL — HIGH (ref 70–99)
HCT VFR BLD CALC: 31.5 % — LOW (ref 39–50)
HGB BLD-MCNC: 9.9 G/DL — LOW (ref 13–17)
INR BLD: 0.97 RATIO — SIGNIFICANT CHANGE UP (ref 0.85–1.18)
LACTATE SERPL-SCNC: 0.9 MMOL/L — SIGNIFICANT CHANGE UP (ref 0.7–2)
MAGNESIUM SERPL-MCNC: 1.7 MG/DL — SIGNIFICANT CHANGE UP (ref 1.6–2.6)
MCHC RBC-ENTMCNC: 27.7 PG — SIGNIFICANT CHANGE UP (ref 27–34)
MCHC RBC-ENTMCNC: 31.4 G/DL — LOW (ref 32–36)
MCV RBC AUTO: 88.2 FL — SIGNIFICANT CHANGE UP (ref 80–100)
NRBC # BLD: 0 /100 WBCS — SIGNIFICANT CHANGE UP (ref 0–0)
PLATELET # BLD AUTO: 164 K/UL — SIGNIFICANT CHANGE UP (ref 150–400)
POTASSIUM SERPL-MCNC: 5.1 MMOL/L — SIGNIFICANT CHANGE UP (ref 3.5–5.3)
POTASSIUM SERPL-SCNC: 5.1 MMOL/L — SIGNIFICANT CHANGE UP (ref 3.5–5.3)
PROCALCITONIN SERPL-MCNC: 0.33 NG/ML — HIGH (ref 0.02–0.1)
PROT SERPL-MCNC: 6.8 GM/DL — SIGNIFICANT CHANGE UP (ref 6–8.3)
PROTHROM AB SERPL-ACNC: 11.6 SEC — SIGNIFICANT CHANGE UP (ref 9.5–13)
RBC # BLD: 3.57 M/UL — LOW (ref 4.2–5.8)
RBC # FLD: 15.5 % — HIGH (ref 10.3–14.5)
SODIUM SERPL-SCNC: 136 MMOL/L — SIGNIFICANT CHANGE UP (ref 135–145)
WBC # BLD: 12.08 K/UL — HIGH (ref 3.8–10.5)
WBC # FLD AUTO: 12.08 K/UL — HIGH (ref 3.8–10.5)

## 2024-09-07 PROCEDURE — 99291 CRITICAL CARE FIRST HOUR: CPT | Mod: FS

## 2024-09-07 PROCEDURE — 99232 SBSQ HOSP IP/OBS MODERATE 35: CPT

## 2024-09-07 PROCEDURE — 71045 X-RAY EXAM CHEST 1 VIEW: CPT | Mod: 26

## 2024-09-07 RX ORDER — IPRATROPIUM BROMIDE AND ALBUTEROL SULFATE .5; 3 MG/3ML; MG/3ML
3 SOLUTION RESPIRATORY (INHALATION) ONCE
Refills: 0 | Status: COMPLETED | OUTPATIENT
Start: 2024-09-07 | End: 2024-09-07

## 2024-09-07 RX ADMIN — SEVELAMER CARBONATE 1600 MILLIGRAM(S): 800 TABLET, FILM COATED ORAL at 16:38

## 2024-09-07 RX ADMIN — Medication 2: at 16:38

## 2024-09-07 RX ADMIN — SEVELAMER CARBONATE 1600 MILLIGRAM(S): 800 TABLET, FILM COATED ORAL at 08:50

## 2024-09-07 RX ADMIN — ACETAMINOPHEN 650 MILLIGRAM(S): 325 TABLET ORAL at 16:39

## 2024-09-07 RX ADMIN — IPRATROPIUM BROMIDE AND ALBUTEROL SULFATE 3 MILLILITER(S): .5; 3 SOLUTION RESPIRATORY (INHALATION) at 18:05

## 2024-09-07 RX ADMIN — Medication 40 MILLIGRAM(S): at 21:19

## 2024-09-07 RX ADMIN — TRIAMCINOLONE ACETONIDE 1 APPLICATION(S): 1 CREAM TOPICAL at 17:53

## 2024-09-07 RX ADMIN — Medication 1 TABLET(S): at 11:36

## 2024-09-07 RX ADMIN — Medication 3 MILLIGRAM(S): at 21:19

## 2024-09-07 RX ADMIN — CEFEPIME 100 MILLIGRAM(S): 2 INJECTION, POWDER, FOR SOLUTION INTRAVENOUS at 21:52

## 2024-09-07 RX ADMIN — TRIAMCINOLONE ACETONIDE 1 APPLICATION(S): 1 CREAM TOPICAL at 06:04

## 2024-09-07 RX ADMIN — PETROLATUM 1 APPLICATION(S): 865 OINTMENT TOPICAL at 12:49

## 2024-09-07 RX ADMIN — CHLORHEXIDINE GLUCONATE 1 APPLICATION(S): 40 SOLUTION TOPICAL at 06:17

## 2024-09-07 RX ADMIN — Medication 6 UNIT(S): at 07:34

## 2024-09-07 RX ADMIN — Medication 50 MILLIGRAM(S): at 21:19

## 2024-09-07 RX ADMIN — INSULIN GLARGINE 25 UNIT(S): 100 INJECTION, SOLUTION SUBCUTANEOUS at 21:19

## 2024-09-07 RX ADMIN — Medication 2 MILLIGRAM(S): at 21:19

## 2024-09-07 RX ADMIN — Medication 1 APPLICATION(S): at 17:52

## 2024-09-07 RX ADMIN — ACETAMINOPHEN 650 MILLIGRAM(S): 325 TABLET ORAL at 17:04

## 2024-09-07 RX ADMIN — SEVELAMER CARBONATE 1600 MILLIGRAM(S): 800 TABLET, FILM COATED ORAL at 11:37

## 2024-09-07 RX ADMIN — Medication 6 UNIT(S): at 16:39

## 2024-09-07 RX ADMIN — Medication 40 MILLIGRAM(S): at 06:03

## 2024-09-07 RX ADMIN — Medication 6 UNIT(S): at 11:40

## 2024-09-07 RX ADMIN — ACETAMINOPHEN 650 MILLIGRAM(S): 325 TABLET ORAL at 08:51

## 2024-09-07 RX ADMIN — Medication 50 MILLIGRAM(S): at 13:39

## 2024-09-07 RX ADMIN — CEFEPIME 100 MILLIGRAM(S): 2 INJECTION, POWDER, FOR SOLUTION INTRAVENOUS at 01:21

## 2024-09-07 RX ADMIN — Medication 2: at 21:19

## 2024-09-07 RX ADMIN — Medication 50 MILLIGRAM(S): at 06:03

## 2024-09-07 RX ADMIN — ACETAMINOPHEN 650 MILLIGRAM(S): 325 TABLET ORAL at 07:33

## 2024-09-07 RX ADMIN — Medication 2: at 11:39

## 2024-09-07 NOTE — RAPID RESPONSE TEAM SUMMARY - NSSITUATIONBACKGROUNDRRT_GEN_ALL_CORE
73 year old male PMH HTN, HLD, DM, CHF, CKD stage IV, dementia / anxiety BIBEMS accompanied by wife due to weakness. Per wife, pt not feeling well since last night - went to bed prior to his own birthday party. Per EMS, pt complaining of decreased L vision and decreased L hearing as well as noted off balance with ambulation to restroom this AM. Pt states L eye is usually his good eye, and still with better vision in L than R, but not as good as usual. Pt reports chronic itching rash to body x past 3 months, f/u'd with Derm, was told likely 2/2 renal disease.  RRT called by RN stating that Onesimo catheter is out. Patient profusely bleeding. Hemodynamically stable with /58 and HR 87. No dizziness, SOB or palpitations. Gauze and pressure bag applied. Onesimo catheter was completely out, intact and left at bedside for surgery to assess. Surgery PA at the bedside to evaluate patient. Bleeding stopped.

## 2024-09-07 NOTE — PROGRESS NOTE ADULT - ASSESSMENT
73 year old male PMH HTN, HLD, DM, CHF, CKD stage IV, dementia / anxiety BIBEMS accompanied by wife due to weakness. Per wife, pt not feeling well since last night - went to bed prior to his own birthday party. Per EMS, pt complaining of decreased L vision and decreased L hearing as well as noted off balance with ambulation to restroom this AM. Pt states L eye is usually his good eye, and still with better vision in L than R, but not as good as usual. Pt reports chronic itching rash to body x past 3 months, f/u'd with Derm, was told likely 2/2 renal disease. Hearing later normalized in ER.       Echo reviewed medically stable for permcath placement on Monday.   skin rash a concern derm has evaluated virtually on steroid cream biopsy has outpatient   nephrology and ID consults appreciated. On cefepime  white count normalized   dispo dependent on perm cath and skin improvement.  Perm cath Monday 9/8 if clear for permcath by ID     Case discussed at length with wife  at previous derm appointment no biopsy was done felt that skin eruption were due to uremia      73 year old male PMH HTN, HLD, DM, CHF, CKD stage IV, dementia / anxiety BIBEMS accompanied by wife due to weakness. Per wife, pt not feeling well since last night - went to bed prior to his own birthday party. Per EMS, pt complaining of decreased L vision and decreased L hearing as well as noted off balance with ambulation to restroom this AM. Pt states L eye is usually his good eye, and still with better vision in L than R, but not as good as usual. Pt reports chronic itching rash to body x past 3 months, f/u'd with Derm, was told likely 2/2 renal disease. Hearing later normalized in ER.     Note: Has left BKA.    wbc went from 10->13  right sided hemodialysis catheter looks good  no need for blood cultures   UA with enterobacter   need to change the ceftriaxone to cefepime, the hemodialysis dose 1g q24hrs     found to have severe ELMER needing hemodialysis   has a full body rash   has had confusion   generalized not feeling well  vision and hearing getting worse  no urinary complaints but was confused when came in  was supposed to get permacath but wbc rising     9/3: no fevers, WBC better yesterday 10.12, no new lab work, RA, BCs NGTD, Cr 4.71, had HD yesterday, right IJ temp HD catheter - dressing needs to be changed - spoke with RN. + Rash all over pt's body, cefepime IV continued. There is a chart note from medicine PA - pt was seen by dermatology in June for diffuse bullae on skin, Dr. Barnes. A conversation took place and - dermatology recommended to start triamcinolone 0.1% ointment and f/up as op for skin biopsy and further assessment.   - continue cefepime   consider line holiday   start triamcinolone as recommended by dermatology   patient still warrants skin biopsy to determine what exactly this rash is from      9/5: afebrile, RA, WBC slightly elevated 10.71, MRSA PCR negative, BCs NGTD, UC with Enterobacter cloacae, + full body rash, appear more dry on today's exam, RIJ catheter should be removed or dressed with occlusive dressing, new cath placement possibly tomorrow. No contraindications to placing permacath tomorrow assuming there is no change in clinical status .   Cefepime IV continued. Pt needs skin biopsy.     Plan:  continue cefepime 1g q24hrs  PermCath possibly tomorrow, please remove current temporary catheter   in favor for Dermatology to see patient while in pt, needs skin biopsy   HIV negative  please have hemodialysis RN change the dressing on the catheter - spoke with RN  MRSA PCR - negative  follow culture data     ##ESRD   ##HyperK  Temp cath placed and patient to initiate Dialysis here 2/2 Uremia and hyperkalemic. Temp cath placed   - C/w Inpatient dialysis   - Will need IR tunneled cath  - Pending O/p Dialysis slot   - Nephro following     ##Hypothermic resolved     ##SSTI  Noted to be hypothermic to 92F. CT abd No evidence of acute inflammatory or obstructive process in the   abdomen and pelvis. UA positive nitrites, LEs.   improved  now on cefepime     ##Type 2 DM  A1c 7; C/w Lantus 25 + 6U w/ meals + SSI  - Hypoglycemia protocol     #Pulm nodule   - CT noted There are scattered nodules at the visualized lung bases measuring up to 6 mm  CT chest reviewed will need follow up in 3-6  months     #HTN  - C/w cardura, hydralazine     #HLD  - C/w statin     #History of DVT  - Hold Eliquis     Diet: renal, CCD  DVT prophylaxis: eliquis on hold  Dispo: off tele   Code Status: FC     Addendum:  Hold Eliquis for perm cath Monday.

## 2024-09-07 NOTE — PROGRESS NOTE ADULT - SUBJECTIVE AND OBJECTIVE BOX
[facility] NEPHROLOGY SERVICES, [facility]  NEPHROLOGY AND HYPERTENSION  [address]  [address]  [place] [address]  [phone]    MD YISEL WEEKS MD YELENA ROSENBERG, MD BINNY KOSHY, MD CHRISTOPHER CAPUTO, MD EDWARD BOVER, MD          Patient events noted  Cough and wheezing     MEDICATIONS  (STANDING):  AQUAPHOR (petrolatum Ointment) 1 Application(s) Topical daily  atorvastatin 40 milliGRAM(s) Oral at bedtime  calamine/zinc oxide Lotion 1 Application(s) Topical two times a day  cefepime   IVPB      cefepime   IVPB 1000 milliGRAM(s) IV Intermittent every 24 hours  chlorhexidine 2% Cloths 1 Application(s) Topical <User Schedule>  dextrose 5%. 1000 milliLiter(s) (50 mL/Hr) IV Continuous <Continuous>  dextrose 5%. 1000 milliLiter(s) (100 mL/Hr) IV Continuous <Continuous>  dextrose 50% Injectable 12.5 Gram(s) IV Push once  dextrose 50% Injectable 25 Gram(s) IV Push once  dextrose 50% Injectable 25 Gram(s) IV Push once  doxazosin 2 milliGRAM(s) Oral at bedtime  glucagon  Injectable 1 milliGRAM(s) IntraMuscular once  hydrALAZINE 50 milliGRAM(s) Oral every 8 hours  insulin glargine Injectable (LANTUS) 25 Unit(s) SubCutaneous at bedtime  insulin lispro (ADMELOG) corrective regimen sliding scale   SubCutaneous at bedtime  insulin lispro (ADMELOG) corrective regimen sliding scale   SubCutaneous three times a day before meals  insulin lispro Injectable (ADMELOG) 6 Unit(s) SubCutaneous three times a day before meals  melatonin 3 milliGRAM(s) Oral at bedtime  Nephro-lee 1 Tablet(s) Oral daily  pantoprazole    Tablet 40 milliGRAM(s) Oral before breakfast  sevelamer carbonate 1600 milliGRAM(s) Oral three times a day with meals  triamcinolone 0.1% Ointment 1 Application(s) Topical two times a day    MEDICATIONS  (PRN):  acetaminophen     Tablet .. 650 milliGRAM(s) Oral every 6 hours PRN Temp greater or equal to 38C (100.4F), Moderate Pain (4 - 6)  benzocaine/menthol Lozenge 1 Lozenge Oral five times a day PRN Mouth Sores  dextrose Oral Gel 15 Gram(s) Oral once PRN Blood Glucose LESS THAN 70 milliGRAM(s)/deciliter  polyethylene glycol 3350 17 Gram(s) Oral daily PRN Constipation      09-06-24 @ 07:01  -  09-07-24 @ 07:00  --------------------------------------------------------  IN: 0 mL / OUT: 1300 mL / NET: -1300 mL    09-07-24 @ 07:01  -  09-07-24 @ 19:28  --------------------------------------------------------  IN: 473 mL / OUT: 150 mL / NET: 323 mL      PHYSICAL EXAM:      T(C): 34.4 (09-07-24 @ 17:05), Max: 36.4 (09-06-24 @ 23:43)  HR: 77 (09-07-24 @ 18:11) (65 - 85)  BP: 151/67 (09-07-24 @ 15:45) (96/53 - 167/75)  RR: 18 (09-07-24 @ 15:45) (18 - 18)  SpO2: 98% (09-07-24 @ 18:11) (97% - 100%)  Wt(kg): --  Lungs anterior rhonchi wheezes   Heart S1S2  Abd soft NT ND  Extremities:   tr edema                                    9.9    12.08 )-----------( 164      ( 07 Sep 2024 07:15 )             31.5     09-07    136  |  102  |  75<H>  ----------------------------<  154<H>  5.1   |  25  |  3.96<H>    Ca    9.2      07 Sep 2024 07:15  Mg     1.7     09-07    TPro  6.8  /  Alb  2.4<L>  /  TBili  0.2  /  DBili  0.1  /  AST  15  /  ALT  22  /  AlkPhos  53  09-07      LIVER FUNCTIONS - ( 07 Sep 2024 07:15 )  Alb: 2.4 g/dL / Pro: 6.8 gm/dL / ALK PHOS: 53 U/L / ALT: 22 U/L / AST: 15 U/L / GGT: x           Creatinine Trend: 3.96<--, 4.28<--, 4.67<--, 4.71<--, 4.19<--, 4.36<--          Assessment   New ESRD, maintenance  Catheter dislodgement   Bleeding post     Plan:  Duoneb  CXR  Follow CBC  Triamcinolone cream         Augusto Santoyo MD

## 2024-09-07 NOTE — RAPID RESPONSE TEAM SUMMARY - NSOTHERINTERVENTIONSRRT_GEN_ALL_CORE
Follow-up labs Follow-up labs    Critical Care time: 35 min assessing presenting problems of acute illness that poses high probability of life threatening deterioration or end organ damage/dysfunction.  Medical decision making including Initiating plan of care, reviewing data, reviewing radiology, direct patient bedside evaluation and interpretation of vital signs.

## 2024-09-07 NOTE — PROGRESS NOTE ADULT - SUBJECTIVE AND OBJECTIVE BOX
Patient is a 74y old  Male who presents with a chief complaint of Weakness, progression of CKD (06 Sep 2024 18:04)    INTERVAL HPI/OVERNIGHT EVENTS: No acute events overnight. HD stable. RRT called for piper catheter pulled out. Patient seen by surgery, bleeding stopped.     MEDICATIONS  (STANDING):  AQUAPHOR (petrolatum Ointment) 1 Application(s) Topical daily  atorvastatin 40 milliGRAM(s) Oral at bedtime  cefepime   IVPB      cefepime   IVPB 1000 milliGRAM(s) IV Intermittent every 24 hours  chlorhexidine 2% Cloths 1 Application(s) Topical <User Schedule>  dextrose 5%. 1000 milliLiter(s) (50 mL/Hr) IV Continuous <Continuous>  dextrose 5%. 1000 milliLiter(s) (100 mL/Hr) IV Continuous <Continuous>  dextrose 50% Injectable 12.5 Gram(s) IV Push once  dextrose 50% Injectable 25 Gram(s) IV Push once  dextrose 50% Injectable 25 Gram(s) IV Push once  doxazosin 2 milliGRAM(s) Oral at bedtime  glucagon  Injectable 1 milliGRAM(s) IntraMuscular once  hydrALAZINE 50 milliGRAM(s) Oral every 8 hours  insulin glargine Injectable (LANTUS) 25 Unit(s) SubCutaneous at bedtime  insulin lispro (ADMELOG) corrective regimen sliding scale   SubCutaneous three times a day before meals  insulin lispro (ADMELOG) corrective regimen sliding scale   SubCutaneous at bedtime  insulin lispro Injectable (ADMELOG) 6 Unit(s) SubCutaneous three times a day before meals  melatonin 3 milliGRAM(s) Oral at bedtime  Nephro-lee 1 Tablet(s) Oral daily  pantoprazole    Tablet 40 milliGRAM(s) Oral before breakfast  sevelamer carbonate 1600 milliGRAM(s) Oral three times a day with meals  triamcinolone 0.1% Ointment 1 Application(s) Topical two times a day    MEDICATIONS  (PRN):  acetaminophen     Tablet .. 650 milliGRAM(s) Oral every 6 hours PRN Temp greater or equal to 38C (100.4F), Moderate Pain (4 - 6)  benzocaine/menthol Lozenge 1 Lozenge Oral five times a day PRN Mouth Sores  dextrose Oral Gel 15 Gram(s) Oral once PRN Blood Glucose LESS THAN 70 milliGRAM(s)/deciliter  polyethylene glycol 3350 17 Gram(s) Oral daily PRN Constipation      Allergies    No Known Allergies    Intolerances        REVIEW OF SYSTEMS: all negative with exception of above    Vital Signs Last 24 Hrs  T(C): 36.4 (07 Sep 2024 10:52), Max: 36.4 (06 Sep 2024 13:20)  T(F): 97.6 (07 Sep 2024 10:52), Max: 97.6 (06 Sep 2024 13:20)  HR: 65 (07 Sep 2024 10:52) (65 - 85)  BP: 126/69 (07 Sep 2024 10:52) (96/53 - 167/75)  BP(mean): --  RR: 18 (07 Sep 2024 10:52) (17 - 18)  SpO2: 100% (07 Sep 2024 10:52) (96% - 100%)    Parameters below as of 06 Sep 2024 16:22  Patient On (Oxygen Delivery Method): room air        PHYSICAL EXAM:  GENERAL: stable, no CP or SOB, no fevers.   NECK: supple, No JVD  CHEST/LUNG: clear to auscultation bilaterally; no rales, rhonchi, or wheezing b/l  HEART: normal S1, S2  ABDOMEN: BS+, soft, ND, NT   EXTREMITIES:  pulses palpable; no clubbing, cyanosis, or edema b/l LEs  has prior left BKA site.      LABS:                        9.9    12.08 )-----------( 164      ( 07 Sep 2024 07:15 )             31.5     09-07    136  |  102  |  75<H>  ----------------------------<  154<H>  5.1   |  25  |  3.96<H>    Ca    9.2      07 Sep 2024 07:15  Mg     1.7     09-07    TPro  6.8  /  Alb  2.4<L>  /  TBili  0.2  /  DBili  0.1  /  AST  15  /  ALT  22  /  AlkPhos  53  09-07    PT/INR - ( 07 Sep 2024 12:21 )   PT: 11.6 sec;   INR: 0.97 ratio           Urinalysis Basic - ( 07 Sep 2024 07:15 )    Color: x / Appearance: x / SG: x / pH: x  Gluc: 154 mg/dL / Ketone: x  / Bili: x / Urobili: x   Blood: x / Protein: x / Nitrite: x   Leuk Esterase: x / RBC: x / WBC x   Sq Epi: x / Non Sq Epi: x / Bacteria: x      CAPILLARY BLOOD GLUCOSE      POCT Blood Glucose.: 178 mg/dL (07 Sep 2024 11:36)  POCT Blood Glucose.: 144 mg/dL (07 Sep 2024 06:59)  POCT Blood Glucose.: 200 mg/dL (06 Sep 2024 21:24)  POCT Blood Glucose.: 153 mg/dL (06 Sep 2024 16:52)      RADIOLOGY & ADDITIONAL TESTS:    Imaging Personally Reviewed:  [ ] YES  [ ] NO    Consultant(s) Notes Reviewed:  [ ] YES  [ ] NO    Care Discussed with Consultants/Other Providers [ ] YES  [ ] NO

## 2024-09-08 LAB
ALBUMIN SERPL ELPH-MCNC: 2.2 G/DL — LOW (ref 3.3–5)
ALP SERPL-CCNC: 53 U/L — SIGNIFICANT CHANGE UP (ref 40–120)
ALT FLD-CCNC: 20 U/L — SIGNIFICANT CHANGE UP (ref 12–78)
ANION GAP SERPL CALC-SCNC: 10 MMOL/L — SIGNIFICANT CHANGE UP (ref 5–17)
AST SERPL-CCNC: 12 U/L — LOW (ref 15–37)
BASOPHILS # BLD AUTO: 0.04 K/UL — SIGNIFICANT CHANGE UP (ref 0–0.2)
BASOPHILS NFR BLD AUTO: 0.4 % — SIGNIFICANT CHANGE UP (ref 0–2)
BILIRUB SERPL-MCNC: 0.2 MG/DL — SIGNIFICANT CHANGE UP (ref 0.2–1.2)
BUN SERPL-MCNC: 94 MG/DL — HIGH (ref 7–23)
CALCIUM SERPL-MCNC: 9.1 MG/DL — SIGNIFICANT CHANGE UP (ref 8.5–10.1)
CHLORIDE SERPL-SCNC: 98 MMOL/L — SIGNIFICANT CHANGE UP (ref 96–108)
CO2 SERPL-SCNC: 26 MMOL/L — SIGNIFICANT CHANGE UP (ref 22–31)
CREAT SERPL-MCNC: 5.09 MG/DL — HIGH (ref 0.5–1.3)
EGFR: 11 ML/MIN/1.73M2 — LOW
EOSINOPHIL # BLD AUTO: 0.95 K/UL — HIGH (ref 0–0.5)
EOSINOPHIL NFR BLD AUTO: 8.3 % — HIGH (ref 0–6)
GLUCOSE BLDC GLUCOMTR-MCNC: 129 MG/DL — HIGH (ref 70–99)
GLUCOSE BLDC GLUCOMTR-MCNC: 135 MG/DL — HIGH (ref 70–99)
GLUCOSE BLDC GLUCOMTR-MCNC: 143 MG/DL — HIGH (ref 70–99)
GLUCOSE BLDC GLUCOMTR-MCNC: 234 MG/DL — HIGH (ref 70–99)
GLUCOSE SERPL-MCNC: 100 MG/DL — HIGH (ref 70–99)
HCT VFR BLD CALC: 27.6 % — LOW (ref 39–50)
HGB BLD-MCNC: 9 G/DL — LOW (ref 13–17)
IMM GRANULOCYTES NFR BLD AUTO: 1.3 % — HIGH (ref 0–0.9)
LYMPHOCYTES # BLD AUTO: 0.56 K/UL — LOW (ref 1–3.3)
LYMPHOCYTES # BLD AUTO: 4.9 % — LOW (ref 13–44)
MCHC RBC-ENTMCNC: 27.8 PG — SIGNIFICANT CHANGE UP (ref 27–34)
MCHC RBC-ENTMCNC: 32.6 G/DL — SIGNIFICANT CHANGE UP (ref 32–36)
MCV RBC AUTO: 85.2 FL — SIGNIFICANT CHANGE UP (ref 80–100)
MONOCYTES # BLD AUTO: 1.66 K/UL — HIGH (ref 0–0.9)
MONOCYTES NFR BLD AUTO: 14.5 % — HIGH (ref 2–14)
NEUTROPHILS # BLD AUTO: 8.06 K/UL — HIGH (ref 1.8–7.4)
NEUTROPHILS NFR BLD AUTO: 70.6 % — SIGNIFICANT CHANGE UP (ref 43–77)
NRBC # BLD: 0 /100 WBCS — SIGNIFICANT CHANGE UP (ref 0–0)
PLATELET # BLD AUTO: 178 K/UL — SIGNIFICANT CHANGE UP (ref 150–400)
POTASSIUM SERPL-MCNC: 5.6 MMOL/L — HIGH (ref 3.5–5.3)
POTASSIUM SERPL-SCNC: 5.6 MMOL/L — HIGH (ref 3.5–5.3)
PROT SERPL-MCNC: 6.5 GM/DL — SIGNIFICANT CHANGE UP (ref 6–8.3)
RBC # BLD: 3.24 M/UL — LOW (ref 4.2–5.8)
RBC # FLD: 15.3 % — HIGH (ref 10.3–14.5)
SODIUM SERPL-SCNC: 134 MMOL/L — LOW (ref 135–145)
WBC # BLD: 11.38 K/UL — HIGH (ref 3.8–10.5)
WBC # FLD AUTO: 11.38 K/UL — HIGH (ref 3.8–10.5)

## 2024-09-08 PROCEDURE — 99232 SBSQ HOSP IP/OBS MODERATE 35: CPT

## 2024-09-08 RX ORDER — SODIUM ZIRCONIUM CYCLOSILICATE 5 G/5G
10 POWDER, FOR SUSPENSION ORAL EVERY 8 HOURS
Refills: 0 | Status: COMPLETED | OUTPATIENT
Start: 2024-09-08 | End: 2024-09-08

## 2024-09-08 RX ADMIN — ACETAMINOPHEN 650 MILLIGRAM(S): 325 TABLET ORAL at 22:50

## 2024-09-08 RX ADMIN — SEVELAMER CARBONATE 1600 MILLIGRAM(S): 800 TABLET, FILM COATED ORAL at 17:04

## 2024-09-08 RX ADMIN — SEVELAMER CARBONATE 1600 MILLIGRAM(S): 800 TABLET, FILM COATED ORAL at 11:42

## 2024-09-08 RX ADMIN — Medication 1 APPLICATION(S): at 18:40

## 2024-09-08 RX ADMIN — Medication 1 APPLICATION(S): at 05:24

## 2024-09-08 RX ADMIN — SEVELAMER CARBONATE 1600 MILLIGRAM(S): 800 TABLET, FILM COATED ORAL at 08:43

## 2024-09-08 RX ADMIN — Medication 6 UNIT(S): at 17:04

## 2024-09-08 RX ADMIN — CEFEPIME 100 MILLIGRAM(S): 2 INJECTION, POWDER, FOR SOLUTION INTRAVENOUS at 21:58

## 2024-09-08 RX ADMIN — Medication 2 MILLIGRAM(S): at 21:57

## 2024-09-08 RX ADMIN — Medication 40 MILLIGRAM(S): at 21:58

## 2024-09-08 RX ADMIN — ACETAMINOPHEN 650 MILLIGRAM(S): 325 TABLET ORAL at 00:56

## 2024-09-08 RX ADMIN — Medication 4: at 11:42

## 2024-09-08 RX ADMIN — PETROLATUM 1 APPLICATION(S): 865 OINTMENT TOPICAL at 11:43

## 2024-09-08 RX ADMIN — Medication 50 MILLIGRAM(S): at 13:42

## 2024-09-08 RX ADMIN — Medication 6 UNIT(S): at 11:42

## 2024-09-08 RX ADMIN — SODIUM ZIRCONIUM CYCLOSILICATE 10 GRAM(S): 5 POWDER, FOR SUSPENSION ORAL at 18:40

## 2024-09-08 RX ADMIN — Medication 40 MILLIGRAM(S): at 05:23

## 2024-09-08 RX ADMIN — ACETAMINOPHEN 650 MILLIGRAM(S): 325 TABLET ORAL at 21:57

## 2024-09-08 RX ADMIN — TRIAMCINOLONE ACETONIDE 1 APPLICATION(S): 1 CREAM TOPICAL at 05:24

## 2024-09-08 RX ADMIN — CHLORHEXIDINE GLUCONATE 1 APPLICATION(S): 40 SOLUTION TOPICAL at 05:26

## 2024-09-08 RX ADMIN — Medication 50 MILLIGRAM(S): at 21:57

## 2024-09-08 RX ADMIN — Medication 3 MILLIGRAM(S): at 21:57

## 2024-09-08 RX ADMIN — TRIAMCINOLONE ACETONIDE 1 APPLICATION(S): 1 CREAM TOPICAL at 18:39

## 2024-09-08 RX ADMIN — SODIUM ZIRCONIUM CYCLOSILICATE 10 GRAM(S): 5 POWDER, FOR SUSPENSION ORAL at 10:42

## 2024-09-08 RX ADMIN — Medication 50 MILLIGRAM(S): at 05:24

## 2024-09-08 RX ADMIN — Medication 1 TABLET(S): at 11:42

## 2024-09-08 RX ADMIN — Medication 6 UNIT(S): at 08:43

## 2024-09-08 NOTE — PROGRESS NOTE ADULT - SUBJECTIVE AND OBJECTIVE BOX
[facility] NEPHROLOGY SERVICES, [facility]  NEPHROLOGY AND HYPERTENSION  [address]  [address]  [place] [address]  [phone]    MD YSIEL WEEKS MD YELENA ROSENBERG, MD BINNY KOSHY, MD CHRISTOPHER CAPUTO, MD EDWARD BOVER, MD          Patient events noted    MEDICATIONS  (STANDING):  AQUAPHOR (petrolatum Ointment) 1 Application(s) Topical daily  atorvastatin 40 milliGRAM(s) Oral at bedtime  calamine/zinc oxide Lotion 1 Application(s) Topical two times a day  cefepime   IVPB 1000 milliGRAM(s) IV Intermittent every 24 hours  cefepime   IVPB      chlorhexidine 2% Cloths 1 Application(s) Topical <User Schedule>  dextrose 5%. 1000 milliLiter(s) (50 mL/Hr) IV Continuous <Continuous>  dextrose 5%. 1000 milliLiter(s) (100 mL/Hr) IV Continuous <Continuous>  dextrose 50% Injectable 12.5 Gram(s) IV Push once  dextrose 50% Injectable 25 Gram(s) IV Push once  dextrose 50% Injectable 25 Gram(s) IV Push once  doxazosin 2 milliGRAM(s) Oral at bedtime  glucagon  Injectable 1 milliGRAM(s) IntraMuscular once  hydrALAZINE 50 milliGRAM(s) Oral every 8 hours  insulin glargine Injectable (LANTUS) 25 Unit(s) SubCutaneous at bedtime  insulin lispro (ADMELOG) corrective regimen sliding scale   SubCutaneous three times a day before meals  insulin lispro (ADMELOG) corrective regimen sliding scale   SubCutaneous at bedtime  insulin lispro Injectable (ADMELOG) 6 Unit(s) SubCutaneous three times a day before meals  melatonin 3 milliGRAM(s) Oral at bedtime  Nephro-lee 1 Tablet(s) Oral daily  pantoprazole    Tablet 40 milliGRAM(s) Oral before breakfast  sevelamer carbonate 1600 milliGRAM(s) Oral three times a day with meals  triamcinolone 0.1% Ointment 1 Application(s) Topical two times a day    MEDICATIONS  (PRN):  acetaminophen     Tablet .. 650 milliGRAM(s) Oral every 6 hours PRN Temp greater or equal to 38C (100.4F), Moderate Pain (4 - 6)  benzocaine/menthol Lozenge 1 Lozenge Oral five times a day PRN Mouth Sores  dextrose Oral Gel 15 Gram(s) Oral once PRN Blood Glucose LESS THAN 70 milliGRAM(s)/deciliter  polyethylene glycol 3350 17 Gram(s) Oral daily PRN Constipation      09-07-24 @ 07:01  -  09-08-24 @ 07:00  --------------------------------------------------------  IN: 473 mL / OUT: 150 mL / NET: 323 mL    09-08-24 @ 07:01  -  09-08-24 @ 19:50  --------------------------------------------------------  IN: 0 mL / OUT: 210 mL / NET: -210 mL      PHYSICAL EXAM:      T(C): 36.4 (09-08-24 @ 16:15), Max: 36.7 (09-07-24 @ 23:22)  HR: 61 (09-08-24 @ 16:15) (61 - 88)  BP: 153/72 (09-08-24 @ 16:15) (111/48 - 153/72)  RR: 16 (09-08-24 @ 16:15) (15 - 18)  SpO2: 97% (09-08-24 @ 16:15) (97% - 99%)  Wt(kg): --  Lungs clear  Heart S1S2  Abd soft NT ND  Extremities:   tr edema  SKIN excoriations                                  9.0    11.38 )-----------( 178      ( 08 Sep 2024 05:40 )             27.6     09-08    134<L>  |  98  |  94<H>  ----------------------------<  100<H>  5.6<H>   |  26  |  5.09<H>    Ca    9.1      08 Sep 2024 05:40  Mg     1.7     09-07    TPro  6.5  /  Alb  2.2<L>  /  TBili  0.2  /  DBili  x   /  AST  12<L>  /  ALT  20  /  AlkPhos  53  09-08      LIVER FUNCTIONS - ( 08 Sep 2024 05:40 )  Alb: 2.2 g/dL / Pro: 6.5 gm/dL / ALK PHOS: 53 U/L / ALT: 20 U/L / AST: 12 U/L / GGT: x           Creatinine Trend: 5.09<--, 3.96<--, 4.28<--, 4.67<--, 4.71<--, 4.19<--      Assessment   New ESRD;   For perm cath tomorrow      Plan:  Lokelma 10 g  Perm cath for tomorrow     Augusto Santoyo MD

## 2024-09-08 NOTE — PROGRESS NOTE ADULT - ASSESSMENT
73 year old male PMH HTN, HLD, DM, CHF, CKD stage IV, dementia / anxiety BIBEMS accompanied by wife due to weakness. Per wife, pt not feeling well since last night - went to bed prior to his own birthday party. Per EMS, pt complaining of decreased L vision and decreased L hearing as well as noted off balance with ambulation to restroom this AM. Pt states L eye is usually his good eye, and still with better vision in L than R, but not as good as usual. Pt reports chronic itching rash to body x past 3 months, f/u'd with Derm, was told likely 2/2 renal disease. Hearing later normalized in ER.     Echo reviewed medically stable for permcath placement on Monday.   skin rash a concern derm has evaluated virtually on steroid cream biopsy has outpatient   nephrology and ID consults appreciated. On cefepime  white count normalized   dispo dependent on perm cath and skin improvement.  Perm cath Monday 9/8 if clear for permcath by ID     Case discussed at length with wife at previous derm appointment no biopsy was done felt that skin eruption were due to uremia      73 year old male PMH HTN, HLD, DM, CHF, CKD stage IV, dementia / anxiety BIBEMS accompanied by wife due to weakness. Per wife, pt not feeling well since last night - went to bed prior to his own birthday party. Per EMS, pt complaining of decreased L vision and decreased L hearing as well as noted off balance with ambulation to restroom this AM. Pt states L eye is usually his good eye, and still with better vision in L than R, but not as good as usual. Pt reports chronic itching rash to body x past 3 months, f/u'd with Derm, was told likely 2/2 renal disease. Hearing later normalized in ER.     Note: Has left BKA.    wbc went from 10->13  right sided hemodialysis catheter looks good  no need for blood cultures   UA with enterobacter   need to change the ceftriaxone to cefepime, the hemodialysis dose 1g q24hrs     found to have severe ELMER needing hemodialysis   has a full body rash   has had confusion   generalized not feeling well  vision and hearing getting worse  no urinary complaints but was confused when came in  was supposed to get permacath but wbc rising     9/3: no fevers, WBC better yesterday 10.12, no new lab work, RA, BCs NGTD, Cr 4.71, had HD yesterday, right IJ temp HD catheter - dressing needs to be changed - spoke with RN. + Rash all over pt's body, cefepime IV continued. There is a chart note from medicine PA - pt was seen by dermatology in June for diffuse bullae on skin, Dr. Barnes.  - dermatology recommended to start triamcinolone 0.1% ointment and f/up as op for skin biopsy and further assessment.   - continue cefepime   consider line holiday   start triamcinolone as recommended by dermatology   patient still warrants skin biopsy to determine what exactly this rash is from    9/5: afebrile, RA, WBC slightly elevated 10.71, MRSA PCR negative, BCs NGTD, UC with Enterobacter cloacae, + full body rash, appear more dry on today's exam, RIJ catheter should be removed or dressed with occlusive dressing, new cath placement possibly tomorrow. No contraindications to placing permacath tomorrow assuming there is no change in clinical status .   Cefepime IV continued. Pt needs skin biopsy.     Plan:  continue cefepime 1g q24hrs  PermCath possibly tomorrow, please remove current temporary catheter   in favor for Dermatology to see patient while in pt, needs skin biopsy   HIV negative  please have hemodialysis RN change the dressing on the catheter - spoke with RN  MRSA PCR - negative  follow culture data     ##ESRD   ##HyperK  Temp cath placed and patient to initiate Dialysis here 2/2 Uremia and hyperkalemic. Temp cath placed   - C/w Inpatient dialysis   - Will need IR tunneled cath  - Pending O/p Dialysis slot   - Nephro following     ##Hypothermic resolved     ##SSTI  Noted to be hypothermic to 92F. CT abd No evidence of acute inflammatory or obstructive process in the   abdomen and pelvis. UA positive nitrites, LEs.   improved  now on cefepime     ##Type 2 DM  A1c 7; C/w Lantus 25 + 6U w/ meals + SSI  - Hypoglycemia protocol     #Pulm nodule   - CT noted There are scattered nodules at the visualized lung bases measuring up to 6 mm  CT chest reviewed will need follow up in 3-6  months     #HTN  - C/w cardura, hydralazine     #HLD  - C/w statin     #History of DVT  - Hold Eliquis     Diet: renal, CCD  DVT prophylaxis: eliquis on hold  Dispo: off tele   Code Status:      Addendum:  Hold Eliquis for perm cath Monday.

## 2024-09-08 NOTE — PROGRESS NOTE ADULT - SUBJECTIVE AND OBJECTIVE BOX
Patient is a 74y old  Male who presents with a chief complaint of Weakness, progression of CKD (07 Sep 2024 19:27)      INTERVAL HPI/OVERNIGHT EVENTS: No acute events overnight. HD stable.     MEDICATIONS  (STANDING):  AQUAPHOR (petrolatum Ointment) 1 Application(s) Topical daily  atorvastatin 40 milliGRAM(s) Oral at bedtime  calamine/zinc oxide Lotion 1 Application(s) Topical two times a day  cefepime   IVPB      cefepime   IVPB 1000 milliGRAM(s) IV Intermittent every 24 hours  chlorhexidine 2% Cloths 1 Application(s) Topical <User Schedule>  dextrose 5%. 1000 milliLiter(s) (50 mL/Hr) IV Continuous <Continuous>  dextrose 5%. 1000 milliLiter(s) (100 mL/Hr) IV Continuous <Continuous>  dextrose 50% Injectable 25 Gram(s) IV Push once  dextrose 50% Injectable 12.5 Gram(s) IV Push once  dextrose 50% Injectable 25 Gram(s) IV Push once  doxazosin 2 milliGRAM(s) Oral at bedtime  glucagon  Injectable 1 milliGRAM(s) IntraMuscular once  hydrALAZINE 50 milliGRAM(s) Oral every 8 hours  insulin glargine Injectable (LANTUS) 25 Unit(s) SubCutaneous at bedtime  insulin lispro (ADMELOG) corrective regimen sliding scale   SubCutaneous three times a day before meals  insulin lispro (ADMELOG) corrective regimen sliding scale   SubCutaneous at bedtime  insulin lispro Injectable (ADMELOG) 6 Unit(s) SubCutaneous three times a day before meals  melatonin 3 milliGRAM(s) Oral at bedtime  Nephro-lee 1 Tablet(s) Oral daily  pantoprazole    Tablet 40 milliGRAM(s) Oral before breakfast  sevelamer carbonate 1600 milliGRAM(s) Oral three times a day with meals  sodium zirconium cyclosilicate 10 Gram(s) Oral every 8 hours  triamcinolone 0.1% Ointment 1 Application(s) Topical two times a day    MEDICATIONS  (PRN):  acetaminophen     Tablet .. 650 milliGRAM(s) Oral every 6 hours PRN Temp greater or equal to 38C (100.4F), Moderate Pain (4 - 6)  benzocaine/menthol Lozenge 1 Lozenge Oral five times a day PRN Mouth Sores  dextrose Oral Gel 15 Gram(s) Oral once PRN Blood Glucose LESS THAN 70 milliGRAM(s)/deciliter  polyethylene glycol 3350 17 Gram(s) Oral daily PRN Constipation      Allergies    No Known Allergies    Intolerances        REVIEW OF SYSTEMS: all negative with exception of above    Vital Signs Last 24 Hrs  T(C): 36.4 (08 Sep 2024 11:06), Max: 36.7 (07 Sep 2024 23:22)  T(F): 97.6 (08 Sep 2024 11:06), Max: 98.1 (07 Sep 2024 23:22)  HR: 69 (08 Sep 2024 11:06) (68 - 88)  BP: 111/48 (08 Sep 2024 11:06) (111/48 - 151/67)  BP(mean): --  RR: 18 (08 Sep 2024 11:06) (18 - 18)  SpO2: 97% (08 Sep 2024 11:06) (97% - 99%)    Parameters below as of 07 Sep 2024 18:11  Patient On (Oxygen Delivery Method): room air        PHYSICAL EXAM:  GENERAL: stable, no CP or SOB, no fevers.   NECK: supple, No JVD  CHEST/LUNG: clear to auscultation bilaterally; no rales, rhonchi, or wheezing b/l  HEART: normal S1, S2  ABDOMEN: BS+, soft, ND, NT   EXTREMITIES:  pulses palpable; no clubbing, cyanosis, or edema b/l LEs  has prior left BKA site.      LABS:                        9.0    11.38 )-----------( 178      ( 08 Sep 2024 05:40 )             27.6     09-08    134<L>  |  98  |  94<H>  ----------------------------<  100<H>  5.6<H>   |  26  |  5.09<H>    Ca    9.1      08 Sep 2024 05:40  Mg     1.7     09-07    TPro  6.5  /  Alb  2.2<L>  /  TBili  0.2  /  DBili  x   /  AST  12<L>  /  ALT  20  /  AlkPhos  53  09-08    PT/INR - ( 07 Sep 2024 12:21 )   PT: 11.6 sec;   INR: 0.97 ratio           Urinalysis Basic - ( 08 Sep 2024 05:40 )    Color: x / Appearance: x / SG: x / pH: x  Gluc: 100 mg/dL / Ketone: x  / Bili: x / Urobili: x   Blood: x / Protein: x / Nitrite: x   Leuk Esterase: x / RBC: x / WBC x   Sq Epi: x / Non Sq Epi: x / Bacteria: x      CAPILLARY BLOOD GLUCOSE      POCT Blood Glucose.: 234 mg/dL (08 Sep 2024 11:38)  POCT Blood Glucose.: 129 mg/dL (08 Sep 2024 08:00)  POCT Blood Glucose.: 262 mg/dL (07 Sep 2024 21:08)  POCT Blood Glucose.: 186 mg/dL (07 Sep 2024 15:45)      RADIOLOGY & ADDITIONAL TESTS:    Imaging Personally Reviewed:  [ ] YES  [ ] NO    Consultant(s) Notes Reviewed:  [ ] YES  [ ] NO    Care Discussed with Consultants/Other Providers [ ] YES  [ ] NO

## 2024-09-09 LAB
ALBUMIN SERPL ELPH-MCNC: 2.2 G/DL — LOW (ref 3.3–5)
ALP SERPL-CCNC: 54 U/L — SIGNIFICANT CHANGE UP (ref 40–120)
ALT FLD-CCNC: 21 U/L — SIGNIFICANT CHANGE UP (ref 12–78)
ANION GAP SERPL CALC-SCNC: 11 MMOL/L — SIGNIFICANT CHANGE UP (ref 5–17)
AST SERPL-CCNC: 15 U/L — SIGNIFICANT CHANGE UP (ref 15–37)
BILIRUB SERPL-MCNC: 0.2 MG/DL — SIGNIFICANT CHANGE UP (ref 0.2–1.2)
BUN SERPL-MCNC: 113 MG/DL — HIGH (ref 7–23)
CALCIUM SERPL-MCNC: 9 MG/DL — SIGNIFICANT CHANGE UP (ref 8.5–10.1)
CHLORIDE SERPL-SCNC: 100 MMOL/L — SIGNIFICANT CHANGE UP (ref 96–108)
CO2 SERPL-SCNC: 23 MMOL/L — SIGNIFICANT CHANGE UP (ref 22–31)
CREAT SERPL-MCNC: 5.73 MG/DL — HIGH (ref 0.5–1.3)
EGFR: 10 ML/MIN/1.73M2 — LOW
GLUCOSE BLDC GLUCOMTR-MCNC: 111 MG/DL — HIGH (ref 70–99)
GLUCOSE BLDC GLUCOMTR-MCNC: 117 MG/DL — HIGH (ref 70–99)
GLUCOSE BLDC GLUCOMTR-MCNC: 139 MG/DL — HIGH (ref 70–99)
GLUCOSE BLDC GLUCOMTR-MCNC: 168 MG/DL — HIGH (ref 70–99)
GLUCOSE SERPL-MCNC: 111 MG/DL — HIGH (ref 70–99)
HCT VFR BLD CALC: 28.3 % — LOW (ref 39–50)
HGB BLD-MCNC: 9.2 G/DL — LOW (ref 13–17)
MCHC RBC-ENTMCNC: 27.6 PG — SIGNIFICANT CHANGE UP (ref 27–34)
MCHC RBC-ENTMCNC: 32.5 G/DL — SIGNIFICANT CHANGE UP (ref 32–36)
MCV RBC AUTO: 85 FL — SIGNIFICANT CHANGE UP (ref 80–100)
NRBC # BLD: 0 /100 WBCS — SIGNIFICANT CHANGE UP (ref 0–0)
PLATELET # BLD AUTO: 201 K/UL — SIGNIFICANT CHANGE UP (ref 150–400)
POTASSIUM SERPL-MCNC: 4.9 MMOL/L — SIGNIFICANT CHANGE UP (ref 3.5–5.3)
POTASSIUM SERPL-SCNC: 4.9 MMOL/L — SIGNIFICANT CHANGE UP (ref 3.5–5.3)
PROT SERPL-MCNC: 6.6 GM/DL — SIGNIFICANT CHANGE UP (ref 6–8.3)
RBC # BLD: 3.33 M/UL — LOW (ref 4.2–5.8)
RBC # FLD: 15.4 % — HIGH (ref 10.3–14.5)
SODIUM SERPL-SCNC: 134 MMOL/L — LOW (ref 135–145)
WBC # BLD: 10.9 K/UL — HIGH (ref 3.8–10.5)
WBC # FLD AUTO: 10.9 K/UL — HIGH (ref 3.8–10.5)

## 2024-09-09 PROCEDURE — 36558 INSERT TUNNELED CV CATH: CPT | Mod: LT

## 2024-09-09 PROCEDURE — 99232 SBSQ HOSP IP/OBS MODERATE 35: CPT

## 2024-09-09 PROCEDURE — 77001 FLUOROGUIDE FOR VEIN DEVICE: CPT | Mod: 26

## 2024-09-09 PROCEDURE — 99223 1ST HOSP IP/OBS HIGH 75: CPT

## 2024-09-09 PROCEDURE — 76937 US GUIDE VASCULAR ACCESS: CPT | Mod: 26

## 2024-09-09 RX ORDER — SODIUM CHLORIDE 9 MG/ML
100 INJECTION INTRAMUSCULAR; INTRAVENOUS; SUBCUTANEOUS
Refills: 0 | Status: DISCONTINUED | OUTPATIENT
Start: 2024-09-09 | End: 2024-09-21

## 2024-09-09 RX ORDER — SODIUM CHLORIDE 9 MG/ML
10 INJECTION INTRAMUSCULAR; INTRAVENOUS; SUBCUTANEOUS
Refills: 0 | Status: DISCONTINUED | OUTPATIENT
Start: 2024-09-09 | End: 2024-09-21

## 2024-09-09 RX ADMIN — Medication 6 UNIT(S): at 16:33

## 2024-09-09 RX ADMIN — Medication 50 MILLIGRAM(S): at 22:09

## 2024-09-09 RX ADMIN — TRIAMCINOLONE ACETONIDE 1 APPLICATION(S): 1 CREAM TOPICAL at 06:15

## 2024-09-09 RX ADMIN — Medication 2: at 16:32

## 2024-09-09 RX ADMIN — ACETAMINOPHEN 650 MILLIGRAM(S): 325 TABLET ORAL at 23:11

## 2024-09-09 RX ADMIN — Medication 1 APPLICATION(S): at 06:14

## 2024-09-09 RX ADMIN — INSULIN GLARGINE 25 UNIT(S): 100 INJECTION, SOLUTION SUBCUTANEOUS at 22:10

## 2024-09-09 RX ADMIN — ACETAMINOPHEN 650 MILLIGRAM(S): 325 TABLET ORAL at 22:09

## 2024-09-09 RX ADMIN — CEFEPIME 100 MILLIGRAM(S): 2 INJECTION, POWDER, FOR SOLUTION INTRAVENOUS at 22:12

## 2024-09-09 RX ADMIN — Medication 3 MILLIGRAM(S): at 22:10

## 2024-09-09 RX ADMIN — CHLORHEXIDINE GLUCONATE 1 APPLICATION(S): 40 SOLUTION TOPICAL at 11:23

## 2024-09-09 RX ADMIN — Medication 40 MILLIGRAM(S): at 22:09

## 2024-09-09 RX ADMIN — Medication 2 MILLIGRAM(S): at 22:10

## 2024-09-09 RX ADMIN — SODIUM CHLORIDE 10 MILLILITER(S): 9 INJECTION INTRAMUSCULAR; INTRAVENOUS; SUBCUTANEOUS at 17:54

## 2024-09-09 NOTE — PROGRESS NOTE ADULT - SUBJECTIVE AND OBJECTIVE BOX
ROCCO JC  MRN-[id number]  74y (1950)    Follow Up:  rash, hypothermia    Interval History: The pt was seen and examined earlier, not in acute distress, no new complaints, awake and alert, forgetful. Pt is afebrile, had episodes of hypothermia yesterday, RA, WBC better 10.9. S/P RRT on 9/7 as catheter was dislodged/ auto-discontinued, remained on the unit.      PAST MEDICAL & SURGICAL HISTORY:  Hypertension, unspecified type      Type 2 diabetes mellitus with other neurologic complication, without long-term current use of insulin      DM (diabetes mellitus)      HTN (hypertension)      HLD (hyperlipidemia)      Afib      Retinopathy      Chronic diastolic congestive heart failure      Chronic kidney disease, unspecified CKD stage      Amputation of leg, traumatic, left, subsequent encounter      S/P BKA (below knee amputation) unilateral, left      S/P hip replacement, left      History of surgery on arm          ROS:    [ ] Unobtainable because:  [x ] All other systems negative    Constitutional: no fever, no chills  Head: no trauma  Eyes: no vision changes, no eye pain  ENT:  no sore throat, no rhinorrhea  Cardiovascular:  no chest pain, no palpitation  Respiratory:  no SOB, no cough  GI:  no abd pain, no vomiting, no diarrhea  urinary: no dysuria, no hematuria, no flank pain  musculoskeletal:  no joint pain, no joint swelling  skin:  + rash  neurology:  no headache, no seizure, no change in mental status  psych: no anxiety, no depression         Allergies  No Known Allergies        ANTIMICROBIALS:  cefepime   IVPB 1000 every 24 hours  cefepime   IVPB        OTHER MEDS:  acetaminophen     Tablet .. 650 milliGRAM(s) Oral every 6 hours PRN  AQUAPHOR (petrolatum Ointment) 1 Application(s) Topical daily  atorvastatin 40 milliGRAM(s) Oral at bedtime  benzocaine/menthol Lozenge 1 Lozenge Oral five times a day PRN  calamine/zinc oxide Lotion 1 Application(s) Topical two times a day  chlorhexidine 2% Cloths 1 Application(s) Topical <User Schedule>  dextrose 5%. 1000 milliLiter(s) IV Continuous <Continuous>  dextrose 5%. 1000 milliLiter(s) IV Continuous <Continuous>  dextrose 50% Injectable 25 Gram(s) IV Push once  dextrose 50% Injectable 12.5 Gram(s) IV Push once  dextrose 50% Injectable 25 Gram(s) IV Push once  dextrose Oral Gel 15 Gram(s) Oral once PRN  doxazosin 2 milliGRAM(s) Oral at bedtime  glucagon  Injectable 1 milliGRAM(s) IntraMuscular once  hydrALAZINE 50 milliGRAM(s) Oral every 8 hours  insulin glargine Injectable (LANTUS) 25 Unit(s) SubCutaneous at bedtime  insulin lispro (ADMELOG) corrective regimen sliding scale   SubCutaneous at bedtime  insulin lispro (ADMELOG) corrective regimen sliding scale   SubCutaneous three times a day before meals  insulin lispro Injectable (ADMELOG) 6 Unit(s) SubCutaneous three times a day before meals  melatonin 3 milliGRAM(s) Oral at bedtime  Nephro-lee 1 Tablet(s) Oral daily  pantoprazole    Tablet 40 milliGRAM(s) Oral before breakfast  polyethylene glycol 3350 17 Gram(s) Oral daily PRN  sevelamer carbonate 1600 milliGRAM(s) Oral three times a day with meals  triamcinolone 0.1% Ointment 1 Application(s) Topical two times a day      Vital Signs Last 24 Hrs  T(C): 36.4 (09 Sep 2024 11:15), Max: 36.5 (08 Sep 2024 15:31)  T(F): 97.5 (09 Sep 2024 11:15), Max: 97.7 (08 Sep 2024 15:31)  HR: 70 (09 Sep 2024 11:15) (61 - 88)  BP: 137/69 (09 Sep 2024 11:15) (109/58 - 153/72)  BP(mean): --  RR: 18 (09 Sep 2024 11:15) (16 - 18)  SpO2: 99% (09 Sep 2024 11:15) (95% - 99%)        Physical Exam:  Constitutional: non-toxic, no distress, RA, appears with mild jaundice   HEAD/EYES: anicteric, no conjunctival injection, dry and crusting around the eyes, vision impaired   ENT:  supple, no thrush,+right sided temp hemodialysis catheter - no longer there   Cardiovascular:   normal S1, S2, no murmur, no edema  Respiratory: mildly coarse in upper respiratory tract, + cough, no wheezes, no rales  GI:  soft, non-tender, somewhat distended, normal bowel sounds  :  no olsen, no CVA tenderness  Musculoskeletal:  no synovitis, normal ROM, left BKA with healthy appearing stump  Neurologic: awake and alert, normal strength, no focal findings  Skin:  full body rash, bullae observed on today's exam, some deflated shallow ulcers, crusting,   Heme/Onc: no lymphadenopathy   Psychiatric:  calm behavior     WBC Count: 10.90 K/uL (09-09 @ 06:58)  WBC Count: 11.38 K/uL (09-08 @ 05:40)  WBC Count: 12.08 K/uL (09-07 @ 07:15)  WBC Count: 10.71 K/uL (09-05 @ 09:52)  WBC Count: 8.75 K/uL (09-04 @ 05:55)                            9.2    10.90 )-----------( 201      ( 09 Sep 2024 06:58 )             28.3       09-09    134<L>  |  100  |  113<H>  ----------------------------<  111<H>  4.9   |  23  |  5.73<H>    Ca    9.0      09 Sep 2024 06:58    TPro  6.6  /  Alb  2.2<L>  /  TBili  0.2  /  DBili  x   /  AST  15  /  ALT  21  /  AlkPhos  54  09-09      Urinalysis Basic - ( 09 Sep 2024 06:58 )    Color: x / Appearance: x / SG: x / pH: x  Gluc: 111 mg/dL / Ketone: x  / Bili: x / Urobili: x   Blood: x / Protein: x / Nitrite: x   Leuk Esterase: x / RBC: x / WBC x   Sq Epi: x / Non Sq Epi: x / Bacteria: x        Creatinine Trend: 5.73<--, 5.09<--, 3.96<--, 4.28<--, 4.67<--, 4.71<--      MICROBIOLOGY:  v  .Blood Blood-Peripheral  08-27-24   No growth at 5 days  --  --      .Blood Blood-Peripheral  08-27-24   No growth at 5 days  --  --      Clean Catch Clean Catch (Midstream)  08-27-24   >100,000 CFU/ml Enterobacter cloacae complex  --  Enterobacter cloacae complex    Procalcitonin: 0.33 (09-07-24 @ 07:15)        RADIOLOGY:

## 2024-09-09 NOTE — CONSULT NOTE ADULT - SUBJECTIVE AND OBJECTIVE BOX
Pt sen and examined.  Full consult to follow. HISTORY OF PRESENT ILLNESS:    Patient is a 74y Male    HPI:  73 year old male PMH HTN, HLD, DM, CHF, CKD stage IV, dementia / anxiety BIBEMS accompanied by wife due to weakness. Per wife, pt not feeling well since last night - went to bed prior to his own birthday party. Per EMS, pt complaining of decreased L vision and decreased L hearing as well as noted off balance with ambulation to restroom this AM. Pt states L eye is usually his good eye, and still with better vision in L than R, but not as good as usual. Pt reports chronic itching rash to body x past 3 months, f/u'd with Derm, was told likely 2/2 renal disease. Hearing later normalized in ER.     Note: Has left BKA.  (26 Aug 2024 16:46)    PAST MEDICAL & SURGICAL HISTORY:  Hypertension, unspecified type      Type 2 diabetes mellitus with other neurologic complication, without long-term current use of insulin      DM (diabetes mellitus)      HTN (hypertension)      HLD (hyperlipidemia)      Afib      Retinopathy      Chronic diastolic congestive heart failure      Chronic kidney disease, unspecified CKD stage      Amputation of leg, traumatic, left, subsequent encounter      S/P BKA (below knee amputation) unilateral, left      S/P hip replacement, left      History of surgery on arm          ROS: No fever/chills, wt loss, night sweats, chest pain/dyspnea/cough, oral ulcers, joint pain, alopecia, photosensitivity, dry eye/dry mouth, Raynaud's, dysphagia, focal weakness, or eye symptoms.      MEDICATIONS  (STANDING):  AQUAPHOR (petrolatum Ointment) 1 Application(s) Topical daily  atorvastatin 40 milliGRAM(s) Oral at bedtime  calamine/zinc oxide Lotion 1 Application(s) Topical two times a day  cefepime   IVPB      cefepime   IVPB 1000 milliGRAM(s) IV Intermittent every 24 hours  chlorhexidine 2% Cloths 1 Application(s) Topical <User Schedule>  dextrose 5%. 1000 milliLiter(s) (50 mL/Hr) IV Continuous <Continuous>  dextrose 5%. 1000 milliLiter(s) (100 mL/Hr) IV Continuous <Continuous>  dextrose 50% Injectable 12.5 Gram(s) IV Push once  dextrose 50% Injectable 25 Gram(s) IV Push once  dextrose 50% Injectable 25 Gram(s) IV Push once  doxazosin 2 milliGRAM(s) Oral at bedtime  glucagon  Injectable 1 milliGRAM(s) IntraMuscular once  hydrALAZINE 50 milliGRAM(s) Oral every 8 hours  insulin glargine Injectable (LANTUS) 25 Unit(s) SubCutaneous at bedtime  insulin lispro (ADMELOG) corrective regimen sliding scale   SubCutaneous three times a day before meals  insulin lispro (ADMELOG) corrective regimen sliding scale   SubCutaneous at bedtime  insulin lispro Injectable (ADMELOG) 6 Unit(s) SubCutaneous three times a day before meals  melatonin 3 milliGRAM(s) Oral at bedtime  Nephro-lee 1 Tablet(s) Oral daily  pantoprazole    Tablet 40 milliGRAM(s) Oral before breakfast  sevelamer carbonate 1600 milliGRAM(s) Oral three times a day with meals  triamcinolone 0.1% Ointment 1 Application(s) Topical two times a day    MEDICATIONS  (PRN):  acetaminophen     Tablet .. 650 milliGRAM(s) Oral every 6 hours PRN Temp greater or equal to 38C (100.4F), Moderate Pain (4 - 6)  benzocaine/menthol Lozenge 1 Lozenge Oral five times a day PRN Mouth Sores  dextrose Oral Gel 15 Gram(s) Oral once PRN Blood Glucose LESS THAN 70 milliGRAM(s)/deciliter  polyethylene glycol 3350 17 Gram(s) Oral daily PRN Constipation  sodium chloride 0.9% Bolus. 100 milliLiter(s) IV Bolus every 5 minutes PRN SBP LESS THAN or EQUAL to 100 mmHg  sodium chloride 0.9% lock flush 10 milliLiter(s) IV Push every 1 hour PRN Pre/post blood products, medications, blood draw, and to maintain line patency      Allergies    No Known Allergies    Intolerances        FAMILY HISTORY:  No pertinent family history in first degree relatives    Family history of heart failure (Father)        SOCIAL HISTORY:  Tobacco--   none            Vital Signs Last 24 Hrs  T(C): 36.2 (09 Sep 2024 16:50), Max: 36.5 (08 Sep 2024 23:42)  T(F): 97.2 (09 Sep 2024 16:50), Max: 97.7 (08 Sep 2024 23:42)  HR: 74 (09 Sep 2024 16:50) (68 - 88)  BP: 156/79 (09 Sep 2024 16:50) (109/58 - 156/79)  BP(mean): --  RR: 18 (09 Sep 2024 16:50) (17 - 18)  SpO2: 97% (09 Sep 2024 16:50) (95% - 99%)    Parameters below as of 09 Sep 2024 14:56  Patient On (Oxygen Delivery Method): room air        PHYSICAL EXAM:  General :  NAD  HEENT--  no oral ulcers  Nodes--   LAD  Lungs--  CTA B/L  Heart--  RRR, nlS1 &S2 normal;   Abdomen--  soft, NT, ND +BS  Skin:  diffuse lesions throughout body  Musculoskeletal exam:  No synovitis;  normal ROM in all joints    LABS:                        9.2    10.90 )-----------( 201      ( 09 Sep 2024 06:58 )             28.3     09-09    134<L>  |  100  |  113<H>  ----------------------------<  111<H>  4.9   |  23  |  5.73<H>    Ca    9.0      09 Sep 2024 06:58    TPro  6.6  /  Alb  2.2<L>  /  TBili  0.2  /  DBili  x   /  AST  15  /  ALT  21  /  AlkPhos  54  09-09      Urinalysis Basic - ( 09 Sep 2024 06:58 )    Color: x / Appearance: x / SG: x / pH: x  Gluc: 111 mg/dL / Ketone: x  / Bili: x / Urobili: x   Blood: x / Protein: x / Nitrite: x   Leuk Esterase: x / RBC: x / WBC x   Sq Epi: x / Non Sq Epi: x / Bacteria: x    Anti-Nuclear Antibody in AM (08.31.24 @ 06:30)    Anti Nuclear Factor Titer: Negative: Antinuclear AB (ERIN), IFA Method    Neutrophil Cytoplasmic Antibody (08.31.24 @ 06:30)    Cytoplasmic (c-ANCA) Antibody: Negative   Perinuclear (p-ANCA) Antibody: Negative   Atypical ANCA: Indeterminate Method interference due to ERIN Fluorescence    Rheumatoid Factor Quant, Serum or Plasma in AM (08.31.24 @ 06:30)    Rheumatoid Factor Quant, Serum or Plasma: 14 IU/mL          Lupus Profile (08.31.24 @ 06:30)    Anticardiolipin IgG, Serum: <1.6: Result Interpretation:  <20 GPL-U/mL: Negative  >=20 GPL-U/mL: Positive  Effective 7/16/2024, the assay methodology for anti-cardiolipin IgG  testing was changed from enzyme-linked immunosorbent assay to multiplex  flow immunoassay. Direct comparisonof semi-quantitative values between  two different assay methods is not feasible due to differences in  immunoassay design and antibody epitope recognition. GPL U/mL   Anticardiolipin IgM, Serum: 1.9: Result Interpretation:  <20 MPL-U/mL: Negative  >=20 MPL-U/mL: Positive  Effective 7/16/2024, the assay methodology for anti-cardiolipin IgM  testing was changed from enzyme-linked immunosorbent assay to multiplex  flow immunoassay. Direct comparisonof semi-quantitative values between  two different assay methods is not feasible due to differences in  immunoassay design and antibody epitope recognition. MPL U/mL   Beta 2 Glycoprotein 1 IgA Antibody: <2.0: Result Interpretation:  <20 U/mL: Negative  >=20 U/mL: Positive  Effective 7/16/2024, the assay methodology for Beta-2 Glycoprotein 1 IgA  testing was changed from enzyme-linked immunosorbent assay to multiplex  flow immunoassay. Direct comparison ofsemi-quantitative values between  two different assay methods is not feasible due to differences in  immunoassay design and antibody epitope recognition. U/mL   Beta 2 Glycoprotein 1 IgG Antibody: <1.4: Result Interpretation:  <20 U/mL: Negative  >=20 U/mL: Positive  Effective 7/16/2024, the assay methodology for Beta-2 Glycoprotein 1 IgG  testing was changed from enzyme-linked immunosorbent assay to multiplex  flow immunoassay. Direct comparison ofsemi-quantitative values between  two different assay methods is not feasible due to differences in  immunoassay design and antibody epitope recognition. U/mL   Beta 2 Glycoprotein 1 IgM Antibody: 2.0: Result Interpretation:  <20 U/mL: Negative  >=20 U/mL: Positive  Effective 7/16/2024, the assay methodology for Beta-2 Glycoprotein 1 IgM  testing was changed from enzyme-linked immunosorbent assay to multiplex  flow immunoassay. Direct comparison ofsemi-quantitative values between  two different assay methods is not feasible due to differences in  immunoassay design and antibody epitope recognition. U/mL   Cardioliopin Antibody IgA: <2.0: Result Interpretation:  <20 APL-U/mL: Negative  >=20 APL-U/mL: Positive  Effective 7/16/2024, the assay methodology for anti-cardiolipin IgA  testing was changed from enzyme-linked immunosorbent assay to multiplex  flow immunoassay. Direct comparisonof semi-quantitative values between  two different assay methods is not feasible due to differences in  immunoassay design and antibody epitope recognition. APL U/mL   Activated Partial Thromboplastin Time: 36.0: The recommended therapeutic heparin range (full dose) is 58-99 seconds.  Argatroban range is 1.5 to 3.0 times of the baseline APTT value, not to  exceed 100 seconds.  Routine coagulation results should be interpreted with caution when  taking Factor Xa inhibitors or direct thrombin inhibitors; blood sampling  prior to drug intake is recommended. sec   APTT 100%: 32.4 sec   APTT 50/50 2Hour Incub: N/A: Incubation for 2 hrs at 37 deg C aids in the determination of a  time-dependent inhibitor or, in some cases, the presence of a lupus  anticoagulant. The APTT 50/50 2 HR (PTT 50/50 2H) result can be compared  to the reference range and the APTT 2 HR PATIENT CONTROL (PAT CTL 2H). If  this result is prolonged when compared to the patient control, this  suggests the presence of an inhibitor, either factor-specific or lupus  anticoagulant.  If these two results are similarly prolonged, this could  suggest a lupus anticoagulant. If these two results are within the  reference range, factor deficiency should be evaluated. sec   PAT CTL 2H: N/A sec   APTT 50/50: N/A: If non-correction of mixing study is present, the presence of  anticoagulant inhibitor drugs such as heparin or direct thrombin  inhibitors cannot be excluded. sec   DRVVT Ratio: 0.82 Ratio   DRVVT Interpretation: LA NEG: Either positive Silica Clotting Time (SCT) or positive Diluted Bunny  Viper Venom Time (dRVVT) indicate the presence of Lupus Anticoagulant.  The results should be interpreted with caution when taking Factor Xa  inhibitors or direct thrombin inhibitors (DOACs). Lupus anticoagulant  testing should be performed at least 48 hours after the last dose, and  longer in patients with renal impairment   Silica Clotting Time S/C Ratio: 1.04 Ratio   Silica Clotting Time Interpretation: LA NEG: Either positive Silica Clotting Time (SCT) or positive Diluted Bunny  Viper Venom Time (dRVVT) indicate the presence of Lupus Anticoagulant.  The results should be interpreted with caution when taking heparin, LMW  heparin, Factor Xa inhibitors or direct thrombin inhibitors (DOACs).  Lupus anticoagulant testing should be performed at least 12 hours after  the last dose of heparin or LMW heparin, 48 hours after the last dose of  DOACs, and longer in patients with renal impairment.      RADIOLOGY & ADDITIONAL STUDIES:  < from: CT Chest No Cont (08.26.24 @ 17:37) >  CT CHEST   ORDERED BY: Shreyas Staley     PROCEDURE DATE:  08/26/2024          INTERPRETATION:  CLINICAL INFORMATION: Pulmonary nodules on CT abdomen   and pelvis.    COMPARISON: None.    CONTRAST/COMPLICATIONS:  IV Contrast: None.  Oral Contrast: None.  Complications: None.    PROCEDURE:  CT of the Chest was performed.  Sagittal and coronal reformats were performed.    FINDINGS:    LUNGS AND LARGE AIRWAYS: Patent central airways. Multiple bilateral   pulmonary nodules. For example:  *  Posterior segment of the right upper lobe (series 3, image 254),   measuring 0.5 cm  *  Lateral segment of the right middle lobe (series 3, image 326),   measuring 0.4 cm  *  Posterior basal nodule in the left lower lobe, measuring 0.9 cm   (series 3, image 537)  *  Superior segment of the left lower lobe (series 3, image 314),   measuring 0.7 cm  PLEURA: No pleural effusion.  VESSELS: Right IJ central line with catheter tip terminating in the   superior vena cava. Atherosclerotic calcifications in the aorta and   coronary arteries.  HEART: Heart size is normal. No pericardial effusion.  MEDIASTINUM AND YAW: No lymphadenopathy.  CHEST WALL AND LOWER NECK: Within normal limits.  VISUALIZED UPPER ABDOMEN: Hypodense right thyroid nodule, measuring 0.7   cm.  BONES: Degenerative changes.    IMPRESSION:  Bilateral pulmonary nodules, measuring up to 0.9 cm in the left lower   lobe. Follow-up CT is recommended in 3-6 months to assess for persistence.    --- End of Report ---    < end of copied text >

## 2024-09-09 NOTE — PROGRESS NOTE ADULT - SUBJECTIVE AND OBJECTIVE BOX
[facility] NEPHROLOGY SERVICES, [facility]  NEPHROLOGY AND HYPERTENSION  [address]  [address]  [place] [address]  [phone]    MD YISEL WEEKS MD YELENA ROSENBERG, MD BINNY KOSHY, MD CHRISTOPHER CAPUTO, MD EDWARD BOVER, MD          Patient events noted    MEDICATIONS  (STANDING):  AQUAPHOR (petrolatum Ointment) 1 Application(s) Topical daily  atorvastatin 40 milliGRAM(s) Oral at bedtime  calamine/zinc oxide Lotion 1 Application(s) Topical two times a day  cefepime   IVPB 1000 milliGRAM(s) IV Intermittent every 24 hours  cefepime   IVPB      chlorhexidine 2% Cloths 1 Application(s) Topical <User Schedule>  dextrose 5%. 1000 milliLiter(s) (50 mL/Hr) IV Continuous <Continuous>  dextrose 5%. 1000 milliLiter(s) (100 mL/Hr) IV Continuous <Continuous>  dextrose 50% Injectable 25 Gram(s) IV Push once  dextrose 50% Injectable 12.5 Gram(s) IV Push once  dextrose 50% Injectable 25 Gram(s) IV Push once  doxazosin 2 milliGRAM(s) Oral at bedtime  glucagon  Injectable 1 milliGRAM(s) IntraMuscular once  hydrALAZINE 50 milliGRAM(s) Oral every 8 hours  insulin glargine Injectable (LANTUS) 25 Unit(s) SubCutaneous at bedtime  insulin lispro (ADMELOG) corrective regimen sliding scale   SubCutaneous three times a day before meals  insulin lispro (ADMELOG) corrective regimen sliding scale   SubCutaneous at bedtime  insulin lispro Injectable (ADMELOG) 6 Unit(s) SubCutaneous three times a day before meals  melatonin 3 milliGRAM(s) Oral at bedtime  Nephro-lee 1 Tablet(s) Oral daily  pantoprazole    Tablet 40 milliGRAM(s) Oral before breakfast  sevelamer carbonate 1600 milliGRAM(s) Oral three times a day with meals  triamcinolone 0.1% Ointment 1 Application(s) Topical two times a day    MEDICATIONS  (PRN):  acetaminophen     Tablet .. 650 milliGRAM(s) Oral every 6 hours PRN Temp greater or equal to 38C (100.4F), Moderate Pain (4 - 6)  benzocaine/menthol Lozenge 1 Lozenge Oral five times a day PRN Mouth Sores  dextrose Oral Gel 15 Gram(s) Oral once PRN Blood Glucose LESS THAN 70 milliGRAM(s)/deciliter  polyethylene glycol 3350 17 Gram(s) Oral daily PRN Constipation  sodium chloride 0.9% Bolus. 100 milliLiter(s) IV Bolus every 5 minutes PRN SBP LESS THAN or EQUAL to 100 mmHg  sodium chloride 0.9% lock flush 10 milliLiter(s) IV Push every 1 hour PRN Pre/post blood products, medications, blood draw, and to maintain line patency      09-08-24 @ 07:01  -  09-09-24 @ 07:00  --------------------------------------------------------  IN: 200 mL / OUT: 660 mL / NET: -460 mL      PHYSICAL EXAM:      T(C): 34.1 (09-09-24 @ 14:56), Max: 36.5 (09-08-24 @ 23:42)  HR: 69 (09-09-24 @ 16:28) (68 - 88)  BP: 117/61 (09-09-24 @ 16:28) (109/58 - 137/69)  RR: 18 (09-09-24 @ 16:28) (17 - 18)  SpO2: 95% (09-09-24 @ 16:28) (95% - 99%)  Wt(kg): --  Lungs clear  Heart S1S2  Abd soft NT ND  Extremities:   tr edema                                    9.2    10.90 )-----------( 201      ( 09 Sep 2024 06:58 )             28.3     09-09    134<L>  |  100  |  113<H>  ----------------------------<  111<H>  4.9   |  23  |  5.73<H>    Ca    9.0      09 Sep 2024 06:58    TPro  6.6  /  Alb  2.2<L>  /  TBili  0.2  /  DBili  x   /  AST  15  /  ALT  21  /  AlkPhos  54  09-09      LIVER FUNCTIONS - ( 09 Sep 2024 06:58 )  Alb: 2.2 g/dL / Pro: 6.6 gm/dL / ALK PHOS: 54 U/L / ALT: 21 U/L / AST: 15 U/L / GGT: x           Creatinine Trend: 5.73<--, 5.09<--, 3.96<--, 4.28<--, 4.67<--, 4.71<--  Assessment   New ESRD;   Post perm cath placement      Plan:  HD for today  UF as tolerated           Augusto Santoyo MD

## 2024-09-09 NOTE — CONSULT NOTE ADULT - ASSESSMENT
74 year old male with PMHx as above was admitted for weakness and confusion.  Pt also w/ chronic diffuse rash of unclear etiology (outpt dermatologist felt it was due to renal disease).  Pt also w/ CKD which has now progressed to ESRD.  Pt does not exhibit any obvious signs/symptoms of systemic vasculitis.    - Recommend inpatient derm evaluation for skin biopsy of diffuse lesions.    - HD as per renal.

## 2024-09-09 NOTE — PROGRESS NOTE ADULT - ASSESSMENT
73 year old male PMH HTN, HLD, DM, CHF, CKD stage IV, dementia / anxiety BIBEMS accompanied by wife due to weakness. Per wife, pt not feeling well since last night - went to bed prior to his own birthday party. Per EMS, pt complaining of decreased L vision and decreased L hearing as well as noted off balance with ambulation to restroom this AM. Pt states L eye is usually his good eye, and still with better vision in L than R, but not as good as usual. Pt reports chronic itching rash to body x past 3 months, f/u'd with Derm, was told likely 2/2 renal disease. Hearing later normalized in ER.     Echo reviewed medically stable for permcath placement on Monday.   skin rash a concern derm has evaluated virtually on steroid cream biopsy has outpatient   nephrology and ID consults appreciated. On cefepime  white count normalized   dispo dependent on perm cath and skin improvement.  Perm cath Monday 9/8 if clear for permcath by ID     Case discussed at length with wife at previous derm appointment no biopsy was done felt that skin eruption were due to uremia      73 year old male PMH HTN, HLD, DM, CHF, CKD stage IV, dementia / anxiety BIBEMS accompanied by wife due to weakness. Per wife, pt not feeling well since last night - went to bed prior to his own birthday party. Per EMS, pt complaining of decreased L vision and decreased L hearing as well as noted off balance with ambulation to restroom this AM. Pt states L eye is usually his good eye, and still with better vision in L than R, but not as good as usual. Pt reports chronic itching rash to body x past 3 months, f/u'd with Derm, was told likely 2/2 renal disease. Hearing later normalized in ER.     Note: Has left BKA.    wbc went from 10->13  right sided hemodialysis catheter looks good  no need for blood cultures   UA with enterobacter   need to change the ceftriaxone to cefepime, the hemodialysis dose 1g q24hrs     found to have severe ELMER needing hemodialysis   has a full body rash   has had confusion   generalized not feeling well  vision and hearing getting worse  no urinary complaints but was confused when came in  was supposed to get permacath but wbc rising     9/3: no fevers, WBC better yesterday 10.12, no new lab work, RA, BCs NGTD, Cr 4.71, had HD yesterday, right IJ temp HD catheter - dressing needs to be changed - spoke with RN. + Rash all over pt's body, cefepime IV continued. There is a chart note from medicine PA - pt was seen by dermatology in June for diffuse bullae on skin, Dr. Barnes.  - dermatology recommended to start triamcinolone 0.1% ointment and f/up as op for skin biopsy and further assessment.   - continue cefepime   consider line holiday   start triamcinolone as recommended by dermatology   patient still warrants skin biopsy to determine what exactly this rash is from    9/5: afebrile, RA, WBC slightly elevated 10.71, MRSA PCR negative, BCs NGTD, UC with Enterobacter cloacae, + full body rash, appear more dry on today's exam, RIJ catheter should be removed or dressed with occlusive dressing, new cath placement possibly tomorrow. No contraindications to placing permacath tomorrow assuming there is no change in clinical status .   Cefepime IV continued. Pt needs skin biopsy.     Plan:  - continue cefepime 1g q24hrs  - F/u ID recs  PermCath possibly tomorrow, please remove current temporary catheter   in favor for Dermatology to see patient while in pt, needs skin biopsy   HIV negative  please have hemodialysis RN change the dressing on the catheter - spoke with RN  MRSA PCR - negative  follow culture data     ##ESRD   Temp cath placed and patient to initiate Dialysis here 2/2 Uremia and hyperkalemic. Temp cath placed   - C/w Inpatient dialysis   - Appreciate IR recs- plan for tunneled HD catheter today  - Pending O/p Dialysis slot   - Nephro following     ##SSTI  Noted to be hypothermic to 92F. CT abd No evidence of acute inflammatory or obstructive process in the   abdomen and pelvis. UA positive nitrites, LEs.   improved  now on cefepime     ##Type 2 DM  A1c 7; C/w Lantus 25 + 6U w/ meals + SSI  - Hypoglycemia protocol     #Pulm nodule   - CT noted There are scattered nodules at the visualized lung bases measuring up to 6 mm  CT chest reviewed will need follow up in 3-6  months     #HTN  - C/w cardura, hydralazine     #HLD  - C/w statin     #History of DVT  - Hold Eliquis     Diet: renal, CCD  DVT prophylaxis: eliquis on hold  Dispo: off tele   Code Status: FC

## 2024-09-09 NOTE — PRE PROCEDURE NOTE - PRE PROCEDURE EVALUATION
Interventional Radiology    HPI: 74y Male with HTN, HLD, DM, CKD, CHF BIBEMS weakness. Afebrile, VSS. Suspected CKD 5-6; Severe uremic pruritis with excoriations, r/o superinfection. Plan per nephrology to initiate HD this admission. s/p nontunneled HD catheter placement on Monday 8/26. IR now consulted for permcath placement.     Allergies: No Known Allergies    Medications (Abx/Cardiac/Anticoagulation/Blood Products)    cefepime   IVPB: 100 mL/Hr IV Intermittent (09-08 @ 21:58)  doxazosin: 2 milliGRAM(s) Oral (09-08 @ 21:57)  hydrALAZINE: 50 milliGRAM(s) Oral (09-08 @ 21:57)    Data:    T(C): 36.4  HR: 70  BP: 137/69  RR: 18  SpO2: 99%    Exam  General: No acute distress  Chest: Non labored breathing  Abdomen: Non-distended  Extremities: No swelling, warm    -WBC 10.90 / HgB 9.2 / Hct 28.3 / Plt 201  -Na 134 / Cl 100 /  / Glucose 111  -K 4.9 / CO2 23 / Cr 5.73  -ALT 21 / Alk Phos 54 / T.Bili 0.2  -INR0.97    Imaging:     Plan: 74y Male presents for tunneled HD catheter  - Discussed with ID, per ID cleared for procedure  - Patient with persistent skin breakdown and excoriations covering whole body  - Will attempt permcath placement in area with least breakdown to minimize risk of infection.   -Risks/Benefits/alternatives explained with the patient and/or healthcare proxy and witnessed informed consent obtained.   
Interventional Radiology Pre-Procedure Note    Diagnosis/Indication: Patient is a 73y old male with history of CKD, CHF presents with weakness. Labs significant for LEMER, uremia requiring HD. Plan for HD catheter placement.     PAST MEDICAL & SURGICAL HISTORY:  Hypertension, unspecified type      Type 2 diabetes mellitus with other neurologic complication, without long-term current use of insulin      DM (diabetes mellitus)      HTN (hypertension)      HLD (hyperlipidemia)      Afib      Retinopathy      Chronic diastolic congestive heart failure      Chronic kidney disease, unspecified CKD stage      Amputation of leg, traumatic, left, subsequent encounter      S/P BKA (below knee amputation) unilateral, left      S/P hip replacement, left      History of surgery on arm           Allergies: No Known Allergies      LABS:                        8.8    11.91 )-----------( 182      ( 26 Aug 2024 09:52 )             27.6     08-26    135  |  106  |  136<H>  ----------------------------<  287<H>  6.2<HH>   |  18<L>  |  5.42<H>    Ca    8.7      26 Aug 2024 09:52  Mg     1.0     08-26    TPro  6.9  /  Alb  3.0<L>  /  TBili  0.3  /  DBili  x   /  AST  16  /  ALT  31  /  AlkPhos  53  08-26        Procedure/ risks/ benefits were explained, informed consent obtained from patient, verbalizes understanding.

## 2024-09-09 NOTE — PROGRESS NOTE ADULT - SUBJECTIVE AND OBJECTIVE BOX
Patient is a 74y old  Male who presents with a chief complaint of Weakness, progression of CKD (09 Sep 2024 09:00)    INTERVAL HPI/OVERNIGHT EVENTS: No acute events overnight. HD stable.     MEDICATIONS  (STANDING):  AQUAPHOR (petrolatum Ointment) 1 Application(s) Topical daily  atorvastatin 40 milliGRAM(s) Oral at bedtime  calamine/zinc oxide Lotion 1 Application(s) Topical two times a day  cefepime   IVPB 1000 milliGRAM(s) IV Intermittent every 24 hours  cefepime   IVPB      chlorhexidine 2% Cloths 1 Application(s) Topical <User Schedule>  dextrose 5%. 1000 milliLiter(s) (50 mL/Hr) IV Continuous <Continuous>  dextrose 5%. 1000 milliLiter(s) (100 mL/Hr) IV Continuous <Continuous>  dextrose 50% Injectable 25 Gram(s) IV Push once  dextrose 50% Injectable 12.5 Gram(s) IV Push once  dextrose 50% Injectable 25 Gram(s) IV Push once  doxazosin 2 milliGRAM(s) Oral at bedtime  glucagon  Injectable 1 milliGRAM(s) IntraMuscular once  hydrALAZINE 50 milliGRAM(s) Oral every 8 hours  insulin glargine Injectable (LANTUS) 25 Unit(s) SubCutaneous at bedtime  insulin lispro (ADMELOG) corrective regimen sliding scale   SubCutaneous at bedtime  insulin lispro (ADMELOG) corrective regimen sliding scale   SubCutaneous three times a day before meals  insulin lispro Injectable (ADMELOG) 6 Unit(s) SubCutaneous three times a day before meals  melatonin 3 milliGRAM(s) Oral at bedtime  Nephro-lee 1 Tablet(s) Oral daily  pantoprazole    Tablet 40 milliGRAM(s) Oral before breakfast  sevelamer carbonate 1600 milliGRAM(s) Oral three times a day with meals  triamcinolone 0.1% Ointment 1 Application(s) Topical two times a day    MEDICATIONS  (PRN):  acetaminophen     Tablet .. 650 milliGRAM(s) Oral every 6 hours PRN Temp greater or equal to 38C (100.4F), Moderate Pain (4 - 6)  benzocaine/menthol Lozenge 1 Lozenge Oral five times a day PRN Mouth Sores  dextrose Oral Gel 15 Gram(s) Oral once PRN Blood Glucose LESS THAN 70 milliGRAM(s)/deciliter  polyethylene glycol 3350 17 Gram(s) Oral daily PRN Constipation      Allergies    No Known Allergies    Intolerances        REVIEW OF SYSTEMS: all negative with exception of above    Vital Signs Last 24 Hrs  T(C): 36.4 (09 Sep 2024 11:15), Max: 36.5 (08 Sep 2024 15:31)  T(F): 97.5 (09 Sep 2024 11:15), Max: 97.7 (08 Sep 2024 15:31)  HR: 70 (09 Sep 2024 11:15) (61 - 88)  BP: 137/69 (09 Sep 2024 11:15) (109/58 - 153/72)  BP(mean): --  RR: 18 (09 Sep 2024 11:15) (16 - 18)  SpO2: 99% (09 Sep 2024 11:15) (95% - 99%)        PHYSICAL EXAM:  GENERAL: stable, no CP or SOB, no fevers.   NECK: supple, No JVD  CHEST/LUNG: clear to auscultation bilaterally; no rales, rhonchi, or wheezing b/l  HEART: normal S1, S2  ABDOMEN: BS+, soft, ND, NT   EXTREMITIES:  pulses palpable; no clubbing, cyanosis, or edema b/l LEs  has prior left BKA site.    LABS:                        9.2    10.90 )-----------( 201      ( 09 Sep 2024 06:58 )             28.3     09-09    134<L>  |  100  |  113<H>  ----------------------------<  111<H>  4.9   |  23  |  5.73<H>    Ca    9.0      09 Sep 2024 06:58    TPro  6.6  /  Alb  2.2<L>  /  TBili  0.2  /  DBili  x   /  AST  15  /  ALT  21  /  AlkPhos  54  09-09      Urinalysis Basic - ( 09 Sep 2024 06:58 )    Color: x / Appearance: x / SG: x / pH: x  Gluc: 111 mg/dL / Ketone: x  / Bili: x / Urobili: x   Blood: x / Protein: x / Nitrite: x   Leuk Esterase: x / RBC: x / WBC x   Sq Epi: x / Non Sq Epi: x / Bacteria: x      CAPILLARY BLOOD GLUCOSE      POCT Blood Glucose.: 111 mg/dL (09 Sep 2024 11:23)  POCT Blood Glucose.: 117 mg/dL (09 Sep 2024 07:45)  POCT Blood Glucose.: 135 mg/dL (08 Sep 2024 20:47)  POCT Blood Glucose.: 143 mg/dL (08 Sep 2024 16:30)      RADIOLOGY & ADDITIONAL TESTS:    Imaging Personally Reviewed:  [ ] YES  [ ] NO    Consultant(s) Notes Reviewed:  [ ] YES  [ ] NO    Care Discussed with Consultants/Other Providers [ ] YES  [ ] NO

## 2024-09-09 NOTE — PROCEDURE NOTE - PROCEDURE FINDINGS AND DETAILS
Successful insertion of 14F 28cm tunneled hemodialysis catheter via left external jugular vein, using ultrasound and fluoroscopy guidance. Tip in SVC. Okay to use. No complications. Full report to follow.

## 2024-09-09 NOTE — PROGRESS NOTE ADULT - ASSESSMENT
73 year old male PMH HTN, HLD, DM, CHF, CKD stage IV, dementia / anxiety BIBEMS accompanied by wife due to weakness. Per wife, pt not feeling well since last night - went to bed prior to his own birthday party. Per EMS, pt complaining of decreased L vision and decreased L hearing as well as noted off balance with ambulation to restroom this AM. Pt states L eye is usually his good eye, and still with better vision in L than R, but not as good as usual. Pt reports chronic itching rash to body x past 3 months, f/u'd with Derm, was told likely 2/2 renal disease. Hearing later normalized in ER.     Note: Has left BKA.    wbc went from 10->13  right sided hemodialysis catheter looks good  no need for blood cultures   UA with enterobacter   need to change the ceftriaxone to cefepime, the hemodialysis dose 1g q24hrs     found to have severe ELMER needing hemodialysis   has a full body rash   has had confusion   generalized not feeling well  vision and hearing getting worse  no urinary complaints but was confused when came in  was supposed to get permacath but wbc rising     9/3: no fevers, WBC better yesterday 10.12, no new lab work, RA, BCs NGTD, Cr 4.71, had HD yesterday, right IJ temp HD catheter - dressing needs to be changed - spoke with RN. + Rash all over pt's body, cefepime IV continued. There is a chart note from medicine PA - pt was seen by dermatology in June for diffuse bullae on skin, Dr. Barnes. A conversation took place and dermatology recommended to start triamcinolone 0.1% ointment and f/up as op for skin biopsy and further assessment.   Attending addendum:  wbc ok but patient having hypothermic episodes   would hold off on permacath and see if his hypothermia improves  continue cefepime   consider line holiday   start triamcinolone as recommended by dermatology   patient still warrants skin biopsy to determine what exactly this rash is from      9/5: afebrile, RA, WBC slightly elevated 10.71, MRSA PCR negative, BCs NGTD, UC with Enterobacter cloacae, + full body rash, appear more dry on today's exam, RIJ catheter should be removed or dressed with occlusive dressing, new cath placement possibly tomorrow. No contraindications to placing permacath tomorrow assuming there is no change in clinical status .   Cefepime IV continued. Pt needs skin biopsy.   Attending addendum:  UC with Enterobacter cloacae being covered by cefepime  , + full body rash, appear more dry on today's exam  RIJ catheter ideally should be removed or dressed with occlusive dressing  new permcath placement possibly tomorrow.   No contraindications to placing permacath tomorrow assuming there is no change in clinical status    9/9: hypothermic yesterday, no fevers or hypothermia today, RA, WBC better 10.9, Cr 5.73, no new culture data, prior BCs NGTD, Cefepime IV continued for now, pt is for PermCath placement today. + Full body rash, appears more dry, + somewhat jaundice on today's exam, LFTs ok, Acute hepatitis panel was done before, reviewed.     Plan:  continue cefepime 1g q24hrs  PermCath possibly today   in favor for Dermatology to see patient while in pt, needs skin biopsy   HIV negative  MRSA PCR - negative  follow culture data   rheumatology consult is appreciated, pending recommendations    Discussed with Dr. De La Cruz  Discussed with IR     73 year old male PMH HTN, HLD, DM, CHF, CKD stage IV, dementia / anxiety BIBEMS accompanied by wife due to weakness. Per wife, pt not feeling well since last night - went to bed prior to his own birthday party. Per EMS, pt complaining of decreased L vision and decreased L hearing as well as noted off balance with ambulation to restroom this AM. Pt states L eye is usually his good eye, and still with better vision in L than R, but not as good as usual. Pt reports chronic itching rash to body x past 3 months, f/u'd with Derm, was told likely 2/2 renal disease. Hearing later normalized in ER.     Note: Has left BKA.    wbc went from 10->13  right sided hemodialysis catheter looks good  no need for blood cultures   UA with enterobacter   need to change the ceftriaxone to cefepime, the hemodialysis dose 1g q24hrs     found to have severe ELMER needing hemodialysis   has a full body rash   has had confusion   generalized not feeling well  vision and hearing getting worse  no urinary complaints but was confused when came in  was supposed to get permacath but wbc rising     9/3: no fevers, WBC better yesterday 10.12, no new lab work, RA, BCs NGTD, Cr 4.71, had HD yesterday, right IJ temp HD catheter - dressing needs to be changed - spoke with RN. + Rash all over pt's body, cefepime IV continued. There is a chart note from medicine PA - pt was seen by dermatology in June for diffuse bullae on skin, Dr. Barnes. A conversation took place and dermatology recommended to start triamcinolone 0.1% ointment and f/up as op for skin biopsy and further assessment.   Attending addendum:  wbc ok but patient having hypothermic episodes   would hold off on permacath and see if his hypothermia improves  continue cefepime   consider line holiday   start triamcinolone as recommended by dermatology   patient still warrants skin biopsy to determine what exactly this rash is from      9/5: afebrile, RA, WBC slightly elevated 10.71, MRSA PCR negative, BCs NGTD, UC with Enterobacter cloacae, + full body rash, appear more dry on today's exam, RIJ catheter should be removed or dressed with occlusive dressing, new cath placement possibly tomorrow. No contraindications to placing permacath tomorrow assuming there is no change in clinical status .   Cefepime IV continued. Pt needs skin biopsy.   Attending addendum:  UC with Enterobacter cloacae being covered by cefepime  , + full body rash, appear more dry on today's exam  RIJ catheter ideally should be removed or dressed with occlusive dressing  new permcath placement possibly tomorrow.   No contraindications to placing permacath tomorrow assuming there is no change in clinical status    9/9: hypothermic yesterday, no fevers or hypothermia today, RA, WBC better 10.9, Cr 5.73, no new culture data, prior BCs NGTD, Cefepime IV continued for now, pt is for PermCath placement today. + Full body rash, appears more dry, + somewhat jaundice on today's exam, LFTs ok, Acute hepatitis panel was done before, reviewed.     Plan:  continue cefepime 1g q24hrs, can stop after tomorrows dose   PermCath possibly today   in favor for Dermatology to see patient while in pt, needs skin biopsy   HIV negative  MRSA PCR - negative  follow culture data   rheumatology consult is appreciated, pending recommendations    Discussed with Dr. De La Cruz  Discussed with IR

## 2024-09-10 LAB
ALBUMIN SERPL ELPH-MCNC: 2 G/DL — LOW (ref 3.3–5)
ALP SERPL-CCNC: 57 U/L — SIGNIFICANT CHANGE UP (ref 40–120)
ALT FLD-CCNC: 21 U/L — SIGNIFICANT CHANGE UP (ref 12–78)
ANION GAP SERPL CALC-SCNC: 8 MMOL/L — SIGNIFICANT CHANGE UP (ref 5–17)
AST SERPL-CCNC: 18 U/L — SIGNIFICANT CHANGE UP (ref 15–37)
BILIRUB SERPL-MCNC: 0.2 MG/DL — SIGNIFICANT CHANGE UP (ref 0.2–1.2)
BUN SERPL-MCNC: 57 MG/DL — HIGH (ref 7–23)
CALCIUM SERPL-MCNC: 9.1 MG/DL — SIGNIFICANT CHANGE UP (ref 8.5–10.1)
CHLORIDE SERPL-SCNC: 103 MMOL/L — SIGNIFICANT CHANGE UP (ref 96–108)
CO2 SERPL-SCNC: 25 MMOL/L — SIGNIFICANT CHANGE UP (ref 22–31)
CREAT SERPL-MCNC: 3.68 MG/DL — HIGH (ref 0.5–1.3)
EGFR: 16 ML/MIN/1.73M2 — LOW
GLUCOSE BLDC GLUCOMTR-MCNC: 120 MG/DL — HIGH (ref 70–99)
GLUCOSE BLDC GLUCOMTR-MCNC: 159 MG/DL — HIGH (ref 70–99)
GLUCOSE BLDC GLUCOMTR-MCNC: 165 MG/DL — HIGH (ref 70–99)
GLUCOSE BLDC GLUCOMTR-MCNC: 209 MG/DL — HIGH (ref 70–99)
GLUCOSE SERPL-MCNC: 120 MG/DL — HIGH (ref 70–99)
HCT VFR BLD CALC: 29.7 % — LOW (ref 39–50)
HGB BLD-MCNC: 9.3 G/DL — LOW (ref 13–17)
MCHC RBC-ENTMCNC: 27.4 PG — SIGNIFICANT CHANGE UP (ref 27–34)
MCHC RBC-ENTMCNC: 31.3 G/DL — LOW (ref 32–36)
MCV RBC AUTO: 87.6 FL — SIGNIFICANT CHANGE UP (ref 80–100)
NRBC # BLD: 0 /100 WBCS — SIGNIFICANT CHANGE UP (ref 0–0)
PLATELET # BLD AUTO: 167 K/UL — SIGNIFICANT CHANGE UP (ref 150–400)
POTASSIUM SERPL-MCNC: 4.8 MMOL/L — SIGNIFICANT CHANGE UP (ref 3.5–5.3)
POTASSIUM SERPL-SCNC: 4.8 MMOL/L — SIGNIFICANT CHANGE UP (ref 3.5–5.3)
PROT SERPL-MCNC: 6.4 GM/DL — SIGNIFICANT CHANGE UP (ref 6–8.3)
RBC # BLD: 3.39 M/UL — LOW (ref 4.2–5.8)
RBC # FLD: 15.5 % — HIGH (ref 10.3–14.5)
SODIUM SERPL-SCNC: 136 MMOL/L — SIGNIFICANT CHANGE UP (ref 135–145)
WBC # BLD: 10.85 K/UL — HIGH (ref 3.8–10.5)
WBC # FLD AUTO: 10.85 K/UL — HIGH (ref 3.8–10.5)

## 2024-09-10 PROCEDURE — 99232 SBSQ HOSP IP/OBS MODERATE 35: CPT

## 2024-09-10 RX ORDER — APIXABAN 5 MG/1
5 TABLET, FILM COATED ORAL
Refills: 0 | Status: DISCONTINUED | OUTPATIENT
Start: 2024-09-10 | End: 2024-09-21

## 2024-09-10 RX ADMIN — SEVELAMER CARBONATE 1600 MILLIGRAM(S): 800 TABLET, FILM COATED ORAL at 09:30

## 2024-09-10 RX ADMIN — Medication 6 UNIT(S): at 08:49

## 2024-09-10 RX ADMIN — Medication 6 UNIT(S): at 17:03

## 2024-09-10 RX ADMIN — Medication 2: at 08:48

## 2024-09-10 RX ADMIN — Medication 50 MILLIGRAM(S): at 21:42

## 2024-09-10 RX ADMIN — CHLORHEXIDINE GLUCONATE 1 APPLICATION(S): 40 SOLUTION TOPICAL at 06:20

## 2024-09-10 RX ADMIN — Medication 1 APPLICATION(S): at 17:04

## 2024-09-10 RX ADMIN — Medication 2 MILLIGRAM(S): at 21:42

## 2024-09-10 RX ADMIN — PETROLATUM 1 APPLICATION(S): 865 OINTMENT TOPICAL at 12:02

## 2024-09-10 RX ADMIN — Medication 3 MILLIGRAM(S): at 21:42

## 2024-09-10 RX ADMIN — APIXABAN 5 MILLIGRAM(S): 5 TABLET, FILM COATED ORAL at 17:04

## 2024-09-10 RX ADMIN — SEVELAMER CARBONATE 1600 MILLIGRAM(S): 800 TABLET, FILM COATED ORAL at 17:04

## 2024-09-10 RX ADMIN — Medication 40 MILLIGRAM(S): at 21:42

## 2024-09-10 RX ADMIN — TRIAMCINOLONE ACETONIDE 1 APPLICATION(S): 1 CREAM TOPICAL at 06:20

## 2024-09-10 RX ADMIN — Medication 40 MILLIGRAM(S): at 06:14

## 2024-09-10 RX ADMIN — ACETAMINOPHEN 650 MILLIGRAM(S): 325 TABLET ORAL at 07:10

## 2024-09-10 RX ADMIN — TRIAMCINOLONE ACETONIDE 1 APPLICATION(S): 1 CREAM TOPICAL at 17:03

## 2024-09-10 RX ADMIN — Medication 6 UNIT(S): at 12:02

## 2024-09-10 RX ADMIN — CEFEPIME 100 MILLIGRAM(S): 2 INJECTION, POWDER, FOR SOLUTION INTRAVENOUS at 21:55

## 2024-09-10 RX ADMIN — ACETAMINOPHEN 650 MILLIGRAM(S): 325 TABLET ORAL at 06:13

## 2024-09-10 RX ADMIN — Medication 1 APPLICATION(S): at 06:20

## 2024-09-10 RX ADMIN — Medication 4: at 12:01

## 2024-09-10 RX ADMIN — SEVELAMER CARBONATE 1600 MILLIGRAM(S): 800 TABLET, FILM COATED ORAL at 12:02

## 2024-09-10 RX ADMIN — INSULIN GLARGINE 25 UNIT(S): 100 INJECTION, SOLUTION SUBCUTANEOUS at 21:43

## 2024-09-10 RX ADMIN — Medication 50 MILLIGRAM(S): at 06:13

## 2024-09-10 RX ADMIN — Medication 1 TABLET(S): at 12:02

## 2024-09-10 NOTE — PHYSICAL THERAPY INITIAL EVALUATION ADULT - RANGE OF MOTION EXAMINATION, REHAB EVAL
R ankle not assessed. + dressing intact in Z-boot,/bilateral upper extremity ROM was WFL (within functional limits)/bilateral lower extremity ROM was WFL (within functional limits)

## 2024-09-10 NOTE — CHART NOTE - NSCHARTNOTEFT_GEN_A_CORE
Assessment:  Pt seen sitting up in bed,  forgetfulness noted, reports to like our hospital food.  Pt adm c diagnosis of weakness, ESRD on HD, post perm catheter placement, pulmonary nodule CT noted, c skin rash.  PMH include DM, HTN, HLD, Afib, CHF, CKD, dementia, anxiety, chronic rash to body x 3 month, left BKA, DVT.    Factors impacting intake: [ x] none [ ] nausea  [ ] vomiting [ ] diarrhea [ ] constipation  [ ]chewing problems [ ] swallowing issues  [ ] other:     Diet Prescription: Diet, Consistent Carbohydrate w/Evening Snack:   1200mL Fluid Restriction ([id number])  For patients receiving Renal Replacement - No Protein Restr, No Conc K, No Conc Phos, Low Sodium (RENAL) (09-03-24 @ 11:38)    Pt is aware of nutrition therapy which was reviewed c pt.    Intake: >75% of meals     Current Weight: 09/10, 106 kg, 08/27, 108.9 kg, c wt. loss of 2.9 kg  % Weight Change: 2.6%  09/06, 1+(trace) generalized edema noted    Nutrition focused physical exam conducted; Subcutaneous fat Exam;  [ WNL  ]  Orbital fat pads region,  [ WNL  ]Buccal fat region,  [  WNL ]triceps region, [ -]ribs region.  Muscle Exam; [  Mild ]temples region, [  WNL ]clavicle region, [ WNL  ]shoulder region, [  -]Scapula region, [ WNL  ]Interosseous region, [ Mild ; right side ]thigh region, [  -]Calf region    Pertinent Medications: MEDICATIONS  (STANDING):  AQUAPHOR (petrolatum Ointment) 1 Application(s) Topical daily  atorvastatin 40 milliGRAM(s) Oral at bedtime  calamine/zinc oxide Lotion 1 Application(s) Topical two times a day  cefepime   IVPB      cefepime   IVPB 1000 milliGRAM(s) IV Intermittent every 24 hours  chlorhexidine 2% Cloths 1 Application(s) Topical <User Schedule>  dextrose 5%. 1000 milliLiter(s) (50 mL/Hr) IV Continuous <Continuous>  dextrose 5%. 1000 milliLiter(s) (100 mL/Hr) IV Continuous <Continuous>  dextrose 50% Injectable 25 Gram(s) IV Push once  dextrose 50% Injectable 12.5 Gram(s) IV Push once  dextrose 50% Injectable 25 Gram(s) IV Push once  doxazosin 2 milliGRAM(s) Oral at bedtime  glucagon  Injectable 1 milliGRAM(s) IntraMuscular once  hydrALAZINE 50 milliGRAM(s) Oral every 8 hours  insulin glargine Injectable (LANTUS) 25 Unit(s) SubCutaneous at bedtime  insulin lispro (ADMELOG) corrective regimen sliding scale   SubCutaneous three times a day before meals  insulin lispro (ADMELOG) corrective regimen sliding scale   SubCutaneous at bedtime  insulin lispro Injectable (ADMELOG) 6 Unit(s) SubCutaneous three times a day before meals  melatonin 3 milliGRAM(s) Oral at bedtime  Nephro-lee 1 Tablet(s) Oral daily  pantoprazole    Tablet 40 milliGRAM(s) Oral before breakfast  sevelamer carbonate 1600 milliGRAM(s) Oral three times a day with meals  triamcinolone 0.1% Ointment 1 Application(s) Topical two times a day    MEDICATIONS  (PRN):  acetaminophen     Tablet .. 650 milliGRAM(s) Oral every 6 hours PRN Temp greater or equal to 38C (100.4F), Moderate Pain (4 - 6)  benzocaine/menthol Lozenge 1 Lozenge Oral five times a day PRN Mouth Sores  dextrose Oral Gel 15 Gram(s) Oral once PRN Blood Glucose LESS THAN 70 milliGRAM(s)/deciliter  polyethylene glycol 3350 17 Gram(s) Oral daily PRN Constipation  sodium chloride 0.9% Bolus. 100 milliLiter(s) IV Bolus every 5 minutes PRN SBP LESS THAN or EQUAL to 100 mmHg  sodium chloride 0.9% lock flush 10 milliLiter(s) IV Push every 1 hour PRN Pre/post blood products, medications, blood draw, and to maintain line patency    Pertinent Labs: 09-10 Na136 mmol/L Glu 120 mg/dL<H> K+ 4.8 mmol/L Cr  3.68 mg/dL<H> BUN 57 mg/dL<H> 09-05 Phos 5.5 mg/dL<H> 09-10 Alb 2.0 g/dL<L>09-10 ALT 21 U/L AST 18 U/L Alkaline Phosphatase 57 U/L  08-27-24 @ 06:36 a1c 7.0<H>   CAPILLARY BLOOD GLUCOSE      POCT Blood Glucose.: 165 mg/dL (10 Sep 2024 07:50)  POCT Blood Glucose.: 139 mg/dL (09 Sep 2024 21:19)  POCT Blood Glucose.: 168 mg/dL (09 Sep 2024 16:22)  POCT Blood Glucose.: 111 mg/dL (09 Sep 2024 11:23)    Skin:   Pressure ulcer x 1  1. right heel; unstageable     Estimated Needs:   [x ] no change since previous assessment(09/03)  [ ] recalculated:     Previous Nutrition Diagnosis: ()9/03)  Increased Nutrient Needs: protein  Etiology: increased demand for nutrient  Signs/Symptoms: pressure ulcer, HD  Goal/Expected Outcome: pt to consume >75% of meals; met   Nutrition Diagnosis is [x ] ongoing  [ ] resolved [ ] not applicable       New Nutrition Diagnosis: [ x] not applicable     Interventions:   Recommend  [x] Continue c current diet regimen   [ ] Change Diet To:  [ ] Nutrition Supplement  [ ] Nutrition Support  [ ] Other:     Monitoring and Evaluation:   [ x] PO intake [ x ] Tolerance to diet prescription [ x ] weights [ x ] labs[ x ] follow up per protocol  [ ] other:

## 2024-09-10 NOTE — PHYSICAL THERAPY INITIAL EVALUATION ADULT - LEVEL OF INDEPENDENCE: SIT/STAND, REHAB EVAL
Unable to don prosthetic due to L LE dressing. Attempted sit to stand transfers with Max Ax2 however pt limited clearance of buttocks from bed. Unable to don prosthetic due to L LE dressing. Attempted sit to stand transfers with Max Ax2 however pt unable to clear buttocks from bed.

## 2024-09-10 NOTE — PHYSICAL THERAPY INITIAL EVALUATION ADULT - ADDITIONAL COMMENTS
Pt reports he lives in a house with his wife. Pt has a ramp entrance. Pt reports he was indep with ambulation/ADLS. Reports he was able to don/doff his prosthetic and ambulate short distances with RW.

## 2024-09-10 NOTE — PROGRESS NOTE ADULT - ASSESSMENT
73 year old male PMH HTN, HLD, DM, CHF, CKD stage IV, dementia / anxiety BIBEMS accompanied by wife due to weakness. Per wife, pt not feeling well since last night - went to bed prior to his own birthday party. Per EMS, pt complaining of decreased L vision and decreased L hearing as well as noted off balance with ambulation to restroom this AM. Pt states L eye is usually his good eye, and still with better vision in L than R, but not as good as usual. Pt reports chronic itching rash to body x past 3 months, f/u'd with Derm, was told likely 2/2 renal disease. Hearing later normalized in ER.     S/p permacath placement  skin rash a concern derm has evaluated virtually on steroid cream biopsy has outpatient   nephrology and ID consults appreciated. On cefepime  white count normalized   dispo dependent on perm cath and skin improvement.     Note: Has left BKA.    wbc went from 10->13  right sided hemodialysis catheter looks good  no need for blood cultures   UA with enterobacter   need to change the ceftriaxone to cefepime, the hemodialysis dose 1g q24hrs     found to have severe ELMER needing hemodialysis   has a full body rash   has had confusion   generalized not feeling well  vision and hearing getting worse  no urinary complaints but was confused when came in  was supposed to get permacath but wbc rising     9/3: no fevers, WBC better yesterday 10.12, no new lab work, RA, BCs NGTD, Cr 4.71, had HD yesterday, right IJ temp HD catheter - dressing needs to be changed - spoke with RN. + Rash all over pt's body, cefepime IV continued. There is a chart note from medicine PA - pt was seen by dermatology in June for diffuse bullae on skin, Dr. Barnes.  - c/w triamcinolone 0.1% ointment   - Will reconsult derm for skin bx of diffuse lesions  - continue cefepime   - Appreciate rheum recs    Plan:  in favor for Dermatology to see patient while in pt, needs skin biopsy   HIV negative  please have hemodialysis RN change the dressing on the catheter - spoke with RN  MRSA PCR - negative  follow culture data     ##ESRD   Temp cath placed and patient to initiate Dialysis here 2/2 Uremia and hyperkalemic. Temp cath placed   - C/w Inpatient dialysis   - Appreciate IR recs- s/p tunneled HD catheter today  - Pending O/p Dialysis slot   - Nephro following     ##SSTI  Noted to be hypothermic to 92F. CT abd No evidence of acute inflammatory or obstructive process in the   abdomen and pelvis. UA positive nitrites, LEs.   improved  now on cefepime     ##Type 2 DM  A1c 7; C/w Lantus 25 + 6U w/ meals + SSI  - Hypoglycemia protocol     #Pulm nodule   - CT noted There are scattered nodules at the visualized lung bases measuring up to 6 mm  CT chest reviewed will need follow up in 3-6  months     #HTN  - C/w cardura, hydralazine     #HLD  - C/w statin     #History of DVT  - Eliquis     Diet: renal, CCD  DVT prophylaxis: eliquis  PT jessica- ADRIANNE  Dispo: off tele   Code Status: FC    73 year old male PMH HTN, HLD, DM, CHF, CKD stage IV, dementia / anxiety BIBEMS accompanied by wife due to weakness. Per wife, pt not feeling well since last night - went to bed prior to his own birthday party. Per EMS, pt complaining of decreased L vision and decreased L hearing as well as noted off balance with ambulation to restroom this AM. Pt states L eye is usually his good eye, and still with better vision in L than R, but not as good as usual. Pt reports chronic itching rash to body x past 3 months, f/u'd with Derm, was told likely 2/2 renal disease. Hearing later normalized in ER.     S/p permacath placement  skin rash a concern derm has evaluated virtually on steroid cream biopsy has outpatient   nephrology and ID consults appreciated. On cefepime  white count normalized   dispo dependent on perm cath and skin improvement.     Note: Has left BKA.    wbc went from 10->13  right sided hemodialysis catheter looks good  no need for blood cultures   UA with enterobacter   need to change the ceftriaxone to cefepime, the hemodialysis dose 1g q24hrs     found to have severe ELMER needing hemodialysis   has a full body rash   has had confusion   generalized not feeling well  vision and hearing getting worse  no urinary complaints but was confused when came in  was supposed to get permacath but wbc rising     9/3: no fevers, WBC better yesterday 10.12, no new lab work, RA, BCs NGTD, Cr 4.71, had HD yesterday, right IJ temp HD catheter - dressing needs to be changed - spoke with RN. + Rash all over pt's body, cefepime IV continued. There is a chart note from medicine PA - pt was seen by dermatology in June for diffuse bullae on skin, Dr. Barnes.  - c/w triamcinolone 0.1% ointment   - Discussed with dermatology (Dr. Barbour and Dr. Sarabia), patient will need outpatient dermatology appointment for skin biopsy  - continue cefepime   - Appreciate rheum recs    Plan:  in favor for Dermatology to see patient while in pt, needs skin biopsy   HIV negative  please have hemodialysis RN change the dressing on the catheter - spoke with RN  MRSA PCR - negative  follow culture data     ##ESRD   Temp cath placed and patient to initiate Dialysis here 2/2 Uremia and hyperkalemic. Temp cath placed   - C/w Inpatient dialysis   - Appreciate IR recs- s/p tunneled HD catheter today  - Pending O/p Dialysis slot   - Nephro following     ##SSTI  Noted to be hypothermic to 92F. CT abd No evidence of acute inflammatory or obstructive process in the   abdomen and pelvis. UA positive nitrites, LEs.   improved  now on cefepime     ##Type 2 DM  A1c 7; C/w Lantus 25 + 6U w/ meals + SSI  - Hypoglycemia protocol     #Pulm nodule   - CT noted There are scattered nodules at the visualized lung bases measuring up to 6 mm  CT chest reviewed will need follow up in 3-6  months     #HTN  - C/w cardura, hydralazine     #HLD  - C/w statin     #History of DVT  - Eliquis     Diet: renal, CCD  DVT prophylaxis: eliquis  PT mariella SILVER  Dispo: off tele   Code Status: FC

## 2024-09-10 NOTE — PHYSICAL THERAPY INITIAL EVALUATION ADULT - PRECAUTIONS/LIMITATIONS, REHAB EVAL
Z flows should be worn at all times while resting in bed for strict decubitus precaution/L BKA/fall precautions

## 2024-09-10 NOTE — PROGRESS NOTE ADULT - SUBJECTIVE AND OBJECTIVE BOX
[facility] NEPHROLOGY SERVICES, [facility]  NEPHROLOGY AND HYPERTENSION  [address]  [address]  [place] [address]  [phone]    MD YISEL WEEKS MD YELENA ROSENBERG, MD BINNY KOSHY, MD CHRISTOPHER CAPUTO, MD EDWARD BOVER, MD          Patient events noted    MEDICATIONS  (STANDING):  apixaban 5 milliGRAM(s) Oral two times a day  AQUAPHOR (petrolatum Ointment) 1 Application(s) Topical daily  atorvastatin 40 milliGRAM(s) Oral at bedtime  calamine/zinc oxide Lotion 1 Application(s) Topical two times a day  cefepime   IVPB      cefepime   IVPB 1000 milliGRAM(s) IV Intermittent every 24 hours  chlorhexidine 2% Cloths 1 Application(s) Topical <User Schedule>  dextrose 5%. 1000 milliLiter(s) (50 mL/Hr) IV Continuous <Continuous>  dextrose 5%. 1000 milliLiter(s) (100 mL/Hr) IV Continuous <Continuous>  dextrose 50% Injectable 25 Gram(s) IV Push once  dextrose 50% Injectable 12.5 Gram(s) IV Push once  dextrose 50% Injectable 25 Gram(s) IV Push once  doxazosin 2 milliGRAM(s) Oral at bedtime  glucagon  Injectable 1 milliGRAM(s) IntraMuscular once  hydrALAZINE 50 milliGRAM(s) Oral every 8 hours  insulin glargine Injectable (LANTUS) 25 Unit(s) SubCutaneous at bedtime  insulin lispro (ADMELOG) corrective regimen sliding scale   SubCutaneous three times a day before meals  insulin lispro (ADMELOG) corrective regimen sliding scale   SubCutaneous at bedtime  insulin lispro Injectable (ADMELOG) 6 Unit(s) SubCutaneous three times a day before meals  melatonin 3 milliGRAM(s) Oral at bedtime  Nephro-lee 1 Tablet(s) Oral daily  pantoprazole    Tablet 40 milliGRAM(s) Oral before breakfast  sevelamer carbonate 1600 milliGRAM(s) Oral three times a day with meals  triamcinolone 0.1% Ointment 1 Application(s) Topical two times a day    MEDICATIONS  (PRN):  acetaminophen     Tablet .. 650 milliGRAM(s) Oral every 6 hours PRN Temp greater or equal to 38C (100.4F), Moderate Pain (4 - 6)  benzocaine/menthol Lozenge 1 Lozenge Oral five times a day PRN Mouth Sores  dextrose Oral Gel 15 Gram(s) Oral once PRN Blood Glucose LESS THAN 70 milliGRAM(s)/deciliter  polyethylene glycol 3350 17 Gram(s) Oral daily PRN Constipation  sodium chloride 0.9% Bolus. 100 milliLiter(s) IV Bolus every 5 minutes PRN SBP LESS THAN or EQUAL to 100 mmHg  sodium chloride 0.9% lock flush 10 milliLiter(s) IV Push every 1 hour PRN Pre/post blood products, medications, blood draw, and to maintain line patency      09-09-24 @ 07:01  -  09-10-24 @ 07:00  --------------------------------------------------------  IN: 0 mL / OUT: 400 mL / NET: -400 mL      PHYSICAL EXAM:      T(C): 37.1 (09-10-24 @ 17:00), Max: 37.1 (09-10-24 @ 17:00)  HR: 66 (09-10-24 @ 17:00) (60 - 87)  BP: 131/58 (09-10-24 @ 17:00) (108/54 - 155/71)  RR: 19 (09-10-24 @ 17:00) (16 - 19)  SpO2: 99% (09-10-24 @ 17:00) (96% - 99%)  Wt(kg): --  Lungs clear  Heart S1S2  Abd soft NT ND  Extremities:  1 edema  Skin excoriations                                    9.3    10.85 )-----------( 167      ( 10 Sep 2024 06:35 )             29.7     09-10    136  |  103  |  57<H>  ----------------------------<  120<H>  4.8   |  25  |  3.68<H>    Ca    9.1      10 Sep 2024 06:35    TPro  6.4  /  Alb  2.0<L>  /  TBili  0.2  /  DBili  x   /  AST  18  /  ALT  21  /  AlkPhos  57  09-10      LIVER FUNCTIONS - ( 10 Sep 2024 06:35 )  Alb: 2.0 g/dL / Pro: 6.4 gm/dL / ALK PHOS: 57 U/L / ALT: 21 U/L / AST: 18 U/L / GGT: x           Creatinine Trend: 3.68<--, 5.73<--, 5.09<--, 3.96<--, 4.28<--, 4.67<--      Assessment   ESRD, maintenance  Skin excoriations     Plan:    HD for tomorrow     Augusto Santoyo MD

## 2024-09-10 NOTE — PROGRESS NOTE ADULT - SUBJECTIVE AND OBJECTIVE BOX
Patient is a 74y old  Male who presents with a chief complaint of Weakness, progression of CKD (09 Sep 2024 18:19)    INTERVAL HPI/OVERNIGHT EVENTS: No acute events overnight. HD stable.     MEDICATIONS  (STANDING):  AQUAPHOR (petrolatum Ointment) 1 Application(s) Topical daily  atorvastatin 40 milliGRAM(s) Oral at bedtime  calamine/zinc oxide Lotion 1 Application(s) Topical two times a day  cefepime   IVPB 1000 milliGRAM(s) IV Intermittent every 24 hours  cefepime   IVPB      chlorhexidine 2% Cloths 1 Application(s) Topical <User Schedule>  dextrose 5%. 1000 milliLiter(s) (50 mL/Hr) IV Continuous <Continuous>  dextrose 5%. 1000 milliLiter(s) (100 mL/Hr) IV Continuous <Continuous>  dextrose 50% Injectable 12.5 Gram(s) IV Push once  dextrose 50% Injectable 25 Gram(s) IV Push once  dextrose 50% Injectable 25 Gram(s) IV Push once  doxazosin 2 milliGRAM(s) Oral at bedtime  glucagon  Injectable 1 milliGRAM(s) IntraMuscular once  hydrALAZINE 50 milliGRAM(s) Oral every 8 hours  insulin glargine Injectable (LANTUS) 25 Unit(s) SubCutaneous at bedtime  insulin lispro (ADMELOG) corrective regimen sliding scale   SubCutaneous three times a day before meals  insulin lispro (ADMELOG) corrective regimen sliding scale   SubCutaneous at bedtime  insulin lispro Injectable (ADMELOG) 6 Unit(s) SubCutaneous three times a day before meals  melatonin 3 milliGRAM(s) Oral at bedtime  Nephro-lee 1 Tablet(s) Oral daily  pantoprazole    Tablet 40 milliGRAM(s) Oral before breakfast  sevelamer carbonate 1600 milliGRAM(s) Oral three times a day with meals  triamcinolone 0.1% Ointment 1 Application(s) Topical two times a day    MEDICATIONS  (PRN):  acetaminophen     Tablet .. 650 milliGRAM(s) Oral every 6 hours PRN Temp greater or equal to 38C (100.4F), Moderate Pain (4 - 6)  benzocaine/menthol Lozenge 1 Lozenge Oral five times a day PRN Mouth Sores  dextrose Oral Gel 15 Gram(s) Oral once PRN Blood Glucose LESS THAN 70 milliGRAM(s)/deciliter  polyethylene glycol 3350 17 Gram(s) Oral daily PRN Constipation  sodium chloride 0.9% Bolus. 100 milliLiter(s) IV Bolus every 5 minutes PRN SBP LESS THAN or EQUAL to 100 mmHg  sodium chloride 0.9% lock flush 10 milliLiter(s) IV Push every 1 hour PRN Pre/post blood products, medications, blood draw, and to maintain line patency      Allergies    No Known Allergies    Intolerances        REVIEW OF SYSTEMS: all negative with exception of above    Vital Signs Last 24 Hrs  T(C): 36.4 (10 Sep 2024 11:47), Max: 36.7 (10 Sep 2024 05:27)  T(F): 97.5 (10 Sep 2024 11:47), Max: 98 (10 Sep 2024 05:27)  HR: 73 (10 Sep 2024 13:42) (60 - 87)  BP: 111/63 (10 Sep 2024 13:42) (108/54 - 156/79)  BP(mean): --  RR: 18 (10 Sep 2024 11:47) (16 - 18)  SpO2: 97% (10 Sep 2024 11:47) (95% - 98%)    Parameters below as of 10 Sep 2024 11:47  Patient On (Oxygen Delivery Method): room air    PHYSICAL EXAM:  GENERAL: NAD, well-groomed  NERVOUS SYSTEM:  Alert & Oriented X3, Good concentration; Motor Strength 5/5 B/L upper and lower extremities; DTRs 2+ intact and symmetric  CHEST/LUNG: Clear to percussion bilaterally; No rales, rhonchi, wheezing, or rubs  HEART: Regular rate and rhythm; No murmurs, rubs, or gallops  ABDOMEN: Soft, Nontender, Nondistended; Bowel sounds present  EXTREMITIES:  2+ Peripheral Pulses, No clubbing, cyanosis, or edema    LABS:                        9.3    10.85 )-----------( 167      ( 10 Sep 2024 06:35 )             29.7     09-10    136  |  103  |  57<H>  ----------------------------<  120<H>  4.8   |  25  |  3.68<H>    Ca    9.1      10 Sep 2024 06:35    TPro  6.4  /  Alb  2.0<L>  /  TBili  0.2  /  DBili  x   /  AST  18  /  ALT  21  /  AlkPhos  57  09-10      Urinalysis Basic - ( 10 Sep 2024 06:35 )    Color: x / Appearance: x / SG: x / pH: x  Gluc: 120 mg/dL / Ketone: x  / Bili: x / Urobili: x   Blood: x / Protein: x / Nitrite: x   Leuk Esterase: x / RBC: x / WBC x   Sq Epi: x / Non Sq Epi: x / Bacteria: x      CAPILLARY BLOOD GLUCOSE      POCT Blood Glucose.: 209 mg/dL (10 Sep 2024 11:22)  POCT Blood Glucose.: 165 mg/dL (10 Sep 2024 07:50)  POCT Blood Glucose.: 139 mg/dL (09 Sep 2024 21:19)  POCT Blood Glucose.: 168 mg/dL (09 Sep 2024 16:22)      RADIOLOGY & ADDITIONAL TESTS:    Imaging Personally Reviewed:  [ ] YES  [ ] NO    Consultant(s) Notes Reviewed:  [ ] YES  [ ] NO    Care Discussed with Consultants/Other Providers [ ] YES  [ ] NO

## 2024-09-10 NOTE — PHYSICAL THERAPY INITIAL EVALUATION ADULT - BED MOBILITY TRAINING, PT EVAL
Independent in bed mobility- supine<>sit, rolling side<>side observing proper body mechanics, proper positioning, body alignment and precautions..

## 2024-09-11 LAB
ALBUMIN SERPL ELPH-MCNC: 2.1 G/DL — LOW (ref 3.3–5)
ALP SERPL-CCNC: 58 U/L — SIGNIFICANT CHANGE UP (ref 40–120)
ALT FLD-CCNC: 20 U/L — SIGNIFICANT CHANGE UP (ref 12–78)
ANION GAP SERPL CALC-SCNC: 9 MMOL/L — SIGNIFICANT CHANGE UP (ref 5–17)
AST SERPL-CCNC: 13 U/L — LOW (ref 15–37)
BILIRUB SERPL-MCNC: 0.2 MG/DL — SIGNIFICANT CHANGE UP (ref 0.2–1.2)
BUN SERPL-MCNC: 88 MG/DL — HIGH (ref 7–23)
CALCIUM SERPL-MCNC: 9.2 MG/DL — SIGNIFICANT CHANGE UP (ref 8.5–10.1)
CHLORIDE SERPL-SCNC: 104 MMOL/L — SIGNIFICANT CHANGE UP (ref 96–108)
CO2 SERPL-SCNC: 25 MMOL/L — SIGNIFICANT CHANGE UP (ref 22–31)
CREAT SERPL-MCNC: 4.99 MG/DL — HIGH (ref 0.5–1.3)
EGFR: 11 ML/MIN/1.73M2 — LOW
GLUCOSE BLDC GLUCOMTR-MCNC: 126 MG/DL — HIGH (ref 70–99)
GLUCOSE BLDC GLUCOMTR-MCNC: 153 MG/DL — HIGH (ref 70–99)
GLUCOSE BLDC GLUCOMTR-MCNC: 164 MG/DL — HIGH (ref 70–99)
GLUCOSE BLDC GLUCOMTR-MCNC: 185 MG/DL — HIGH (ref 70–99)
GLUCOSE SERPL-MCNC: 142 MG/DL — HIGH (ref 70–99)
HCT VFR BLD CALC: 28.9 % — LOW (ref 39–50)
HGB BLD-MCNC: 9.2 G/DL — LOW (ref 13–17)
MCHC RBC-ENTMCNC: 27.7 PG — SIGNIFICANT CHANGE UP (ref 27–34)
MCHC RBC-ENTMCNC: 31.8 G/DL — LOW (ref 32–36)
MCV RBC AUTO: 87 FL — SIGNIFICANT CHANGE UP (ref 80–100)
NRBC # BLD: 0 /100 WBCS — SIGNIFICANT CHANGE UP (ref 0–0)
PLATELET # BLD AUTO: 185 K/UL — SIGNIFICANT CHANGE UP (ref 150–400)
POTASSIUM SERPL-MCNC: 4.8 MMOL/L — SIGNIFICANT CHANGE UP (ref 3.5–5.3)
POTASSIUM SERPL-SCNC: 4.8 MMOL/L — SIGNIFICANT CHANGE UP (ref 3.5–5.3)
PROT SERPL-MCNC: 6.6 GM/DL — SIGNIFICANT CHANGE UP (ref 6–8.3)
RBC # BLD: 3.32 M/UL — LOW (ref 4.2–5.8)
RBC # FLD: 15.5 % — HIGH (ref 10.3–14.5)
SODIUM SERPL-SCNC: 138 MMOL/L — SIGNIFICANT CHANGE UP (ref 135–145)
WBC # BLD: 9.2 K/UL — SIGNIFICANT CHANGE UP (ref 3.8–10.5)
WBC # FLD AUTO: 9.2 K/UL — SIGNIFICANT CHANGE UP (ref 3.8–10.5)

## 2024-09-11 PROCEDURE — 99232 SBSQ HOSP IP/OBS MODERATE 35: CPT

## 2024-09-11 RX ORDER — GUAIFENESIN 100 MG/5ML
200 LIQUID ORAL EVERY 6 HOURS
Refills: 0 | Status: COMPLETED | OUTPATIENT
Start: 2024-09-11 | End: 2024-09-14

## 2024-09-11 RX ADMIN — TRIAMCINOLONE ACETONIDE 1 APPLICATION(S): 1 CREAM TOPICAL at 17:00

## 2024-09-11 RX ADMIN — ACETAMINOPHEN 650 MILLIGRAM(S): 325 TABLET ORAL at 18:08

## 2024-09-11 RX ADMIN — ACETAMINOPHEN 650 MILLIGRAM(S): 325 TABLET ORAL at 08:33

## 2024-09-11 RX ADMIN — Medication 50 MILLIGRAM(S): at 05:14

## 2024-09-11 RX ADMIN — Medication 2: at 12:02

## 2024-09-11 RX ADMIN — Medication 1 TABLET(S): at 12:02

## 2024-09-11 RX ADMIN — Medication 2 MILLIGRAM(S): at 21:34

## 2024-09-11 RX ADMIN — SEVELAMER CARBONATE 1600 MILLIGRAM(S): 800 TABLET, FILM COATED ORAL at 12:03

## 2024-09-11 RX ADMIN — ACETAMINOPHEN 650 MILLIGRAM(S): 325 TABLET ORAL at 09:16

## 2024-09-11 RX ADMIN — CHLORHEXIDINE GLUCONATE 1 APPLICATION(S): 40 SOLUTION TOPICAL at 05:15

## 2024-09-11 RX ADMIN — PETROLATUM 1 APPLICATION(S): 865 OINTMENT TOPICAL at 12:03

## 2024-09-11 RX ADMIN — GUAIFENESIN 200 MILLIGRAM(S): 100 LIQUID ORAL at 21:33

## 2024-09-11 RX ADMIN — Medication 3 MILLIGRAM(S): at 21:34

## 2024-09-11 RX ADMIN — SEVELAMER CARBONATE 1600 MILLIGRAM(S): 800 TABLET, FILM COATED ORAL at 16:52

## 2024-09-11 RX ADMIN — SEVELAMER CARBONATE 1600 MILLIGRAM(S): 800 TABLET, FILM COATED ORAL at 07:27

## 2024-09-11 RX ADMIN — APIXABAN 5 MILLIGRAM(S): 5 TABLET, FILM COATED ORAL at 17:00

## 2024-09-11 RX ADMIN — INSULIN GLARGINE 25 UNIT(S): 100 INJECTION, SOLUTION SUBCUTANEOUS at 21:34

## 2024-09-11 RX ADMIN — Medication 6 UNIT(S): at 16:52

## 2024-09-11 RX ADMIN — Medication 1 APPLICATION(S): at 17:00

## 2024-09-11 RX ADMIN — Medication 2: at 07:25

## 2024-09-11 RX ADMIN — Medication 50 MILLIGRAM(S): at 21:34

## 2024-09-11 RX ADMIN — APIXABAN 5 MILLIGRAM(S): 5 TABLET, FILM COATED ORAL at 05:14

## 2024-09-11 RX ADMIN — Medication 40 MILLIGRAM(S): at 05:14

## 2024-09-11 RX ADMIN — Medication 1 APPLICATION(S): at 05:15

## 2024-09-11 RX ADMIN — ACETAMINOPHEN 650 MILLIGRAM(S): 325 TABLET ORAL at 01:48

## 2024-09-11 RX ADMIN — Medication 6 UNIT(S): at 12:02

## 2024-09-11 RX ADMIN — ACETAMINOPHEN 650 MILLIGRAM(S): 325 TABLET ORAL at 19:37

## 2024-09-11 RX ADMIN — Medication 6 UNIT(S): at 07:25

## 2024-09-11 RX ADMIN — TRIAMCINOLONE ACETONIDE 1 APPLICATION(S): 1 CREAM TOPICAL at 05:16

## 2024-09-11 RX ADMIN — Medication 40 MILLIGRAM(S): at 21:34

## 2024-09-11 NOTE — PROGRESS NOTE ADULT - SUBJECTIVE AND OBJECTIVE BOX
ROCCO JC  MRN-[id number]  74y (1950)    Follow Up:  full body rash    Interval History: The pt was seen and examined earlier, not in acute distress, no new complaints, continues having occasional wet cough, + full body rash - denies pruritus but states that it causes discomfort, pt picks on his skin all the time. Pt is afebrile, RA, no WBC.     PAST MEDICAL & SURGICAL HISTORY:  Hypertension, unspecified type      Type 2 diabetes mellitus with other neurologic complication, without long-term current use of insulin      DM (diabetes mellitus)      HTN (hypertension)      HLD (hyperlipidemia)      Afib      Retinopathy      Chronic diastolic congestive heart failure      Chronic kidney disease, unspecified CKD stage      Amputation of leg, traumatic, left, subsequent encounter      S/P BKA (below knee amputation) unilateral, left      S/P hip replacement, left      History of surgery on arm          ROS:    [ ] Unobtainable because:  [x ] All other systems negative    Constitutional: no fever, no chills  Head: no trauma  Eyes: no vision changes, no eye pain  ENT:  no sore throat, no rhinorrhea  Cardiovascular:  no chest pain, no palpitation  Respiratory:  no SOB, + cough  GI:  no abd pain, no vomiting, no diarrhea  urinary: no dysuria, no hematuria, no flank pain  musculoskeletal:  no joint pain, no joint swelling  skin:  + rash  neurology:  no headache, no seizure, no change in mental status  psych: no anxiety, no depression         Allergies  No Known Allergies        ANTIMICROBIALS:      OTHER MEDS:  acetaminophen     Tablet .. 650 milliGRAM(s) Oral every 6 hours PRN  apixaban 5 milliGRAM(s) Oral two times a day  AQUAPHOR (petrolatum Ointment) 1 Application(s) Topical daily  atorvastatin 40 milliGRAM(s) Oral at bedtime  benzocaine/menthol Lozenge 1 Lozenge Oral five times a day PRN  calamine/zinc oxide Lotion 1 Application(s) Topical two times a day  chlorhexidine 2% Cloths 1 Application(s) Topical <User Schedule>  dextrose 5%. 1000 milliLiter(s) IV Continuous <Continuous>  dextrose 5%. 1000 milliLiter(s) IV Continuous <Continuous>  dextrose 50% Injectable 12.5 Gram(s) IV Push once  dextrose 50% Injectable 25 Gram(s) IV Push once  dextrose 50% Injectable 25 Gram(s) IV Push once  dextrose Oral Gel 15 Gram(s) Oral once PRN  doxazosin 2 milliGRAM(s) Oral at bedtime  glucagon  Injectable 1 milliGRAM(s) IntraMuscular once  hydrALAZINE 50 milliGRAM(s) Oral every 8 hours  insulin glargine Injectable (LANTUS) 25 Unit(s) SubCutaneous at bedtime  insulin lispro (ADMELOG) corrective regimen sliding scale   SubCutaneous at bedtime  insulin lispro (ADMELOG) corrective regimen sliding scale   SubCutaneous three times a day before meals  insulin lispro Injectable (ADMELOG) 6 Unit(s) SubCutaneous three times a day before meals  melatonin 3 milliGRAM(s) Oral at bedtime  Nephro-lee 1 Tablet(s) Oral daily  pantoprazole    Tablet 40 milliGRAM(s) Oral before breakfast  polyethylene glycol 3350 17 Gram(s) Oral daily PRN  sevelamer carbonate 1600 milliGRAM(s) Oral three times a day with meals  sodium chloride 0.9% Bolus. 100 milliLiter(s) IV Bolus every 5 minutes PRN  sodium chloride 0.9% lock flush 10 milliLiter(s) IV Push every 1 hour PRN  triamcinolone 0.1% Ointment 1 Application(s) Topical two times a day      Vital Signs Last 24 Hrs  T(C): 36.3 (11 Sep 2024 11:45), Max: 37.1 (10 Sep 2024 17:00)  T(F): 97.3 (11 Sep 2024 11:45), Max: 98.8 (10 Sep 2024 17:00)  HR: 55 (11 Sep 2024 11:45) (55 - 75)  BP: 140/64 (11 Sep 2024 11:45) (111/63 - 185/83)  BP(mean): --  RR: 18 (11 Sep 2024 11:45) (18 - 19)  SpO2: 97% (11 Sep 2024 11:45) (96% - 99%)    Parameters below as of 11 Sep 2024 11:45  Patient On (Oxygen Delivery Method): room air        Physical Exam:  Constitutional: non-toxic, no distress, RA   HEAD/EYES: anicteric, no conjunctival injection, dry and crusting around the eyes, vision impaired   ENT:  supple, no thrush  Cardiovascular:   normal S1, S2, no murmur, no edema, left chest HD catheter - dressings needs to be changed, pt picks on his skin and dressing all the time   Respiratory: diminished at bases b/l, + cough, no wheezes, no rales  GI:  soft, non-tender, somewhat distended, normal bowel sounds  :  no olsen, no CVA tenderness  Musculoskeletal:  no synovitis, normal ROM, left BKA with healthy appearing stump  Neurologic: awake and alert, normal strength, no focal findings  Skin:  full body rash, bullae observed on today's exam, + new fluid filled blisterdas, some deflated shallow ulcers, some crusting   Heme/Onc: no lymphadenopathy   Psychiatric:  calm behavior     WBC Count: 9.20 K/uL (09-11 @ 05:37)  WBC Count: 10.85 K/uL (09-10 @ 06:35)  WBC Count: 10.90 K/uL (09-09 @ 06:58)  WBC Count: 11.38 K/uL (09-08 @ 05:40)  WBC Count: 12.08 K/uL (09-07 @ 07:15)  WBC Count: 10.71 K/uL (09-05 @ 09:52)                            9.2    9.20  )-----------( 185      ( 11 Sep 2024 05:37 )             28.9       09-11    138  |  104  |  88<H>  ----------------------------<  142<H>  4.8   |  25  |  4.99<H>    Ca    9.2      11 Sep 2024 05:37    TPro  6.6  /  Alb  2.1<L>  /  TBili  0.2  /  DBili  x   /  AST  13<L>  /  ALT  20  /  AlkPhos  58  09-11      Urinalysis Basic - ( 11 Sep 2024 05:37 )    Color: x / Appearance: x / SG: x / pH: x  Gluc: 142 mg/dL / Ketone: x  / Bili: x / Urobili: x   Blood: x / Protein: x / Nitrite: x   Leuk Esterase: x / RBC: x / WBC x   Sq Epi: x / Non Sq Epi: x / Bacteria: x        Creatinine Trend: 4.99<--, 3.68<--, 5.73<--, 5.09<--, 3.96<--, 4.28<--      MICROBIOLOGY:  v  .Blood Blood-Peripheral  08-27-24   No growth at 5 days  --  --      .Blood Blood-Peripheral  08-27-24   No growth at 5 days  --  --      Clean Catch Clean Catch (Midstream)  08-27-24   >100,000 CFU/ml Enterobacter cloacae complex  --  Enterobacter cloacae complex    Procalcitonin: 0.33 (09-07-24 @ 07:15)        RADIOLOGY:

## 2024-09-11 NOTE — PROGRESS NOTE ADULT - ASSESSMENT
73 year old male PMH HTN, HLD, DM, CHF, CKD stage IV, dementia / anxiety BIBEMS accompanied by wife due to weakness. Per wife, pt not feeling well since last night - went to bed prior to his own birthday party. Per EMS, pt complaining of decreased L vision and decreased L hearing as well as noted off balance with ambulation to restroom this AM. Pt states L eye is usually his good eye, and still with better vision in L than R, but not as good as usual. Pt reports chronic itching rash to body x past 3 months, f/u'd with Derm, was told likely 2/2 renal disease. Hearing later normalized in ER.     Note: Has left BKA.    wbc went from 10->13  right sided hemodialysis catheter looks good  no need for blood cultures   UA with enterobacter   need to change the ceftriaxone to cefepime, the hemodialysis dose 1g q24hrs     found to have severe ELMER needing hemodialysis   has a full body rash   has had confusion   generalized not feeling well  vision and hearing getting worse  no urinary complaints but was confused when came in  was supposed to get permacath but wbc rising     9/3: no fevers, WBC better yesterday 10.12, no new lab work, RA, BCs NGTD, Cr 4.71, had HD yesterday, right IJ temp HD catheter - dressing needs to be changed - spoke with RN. + Rash all over pt's body, cefepime IV continued. There is a chart note from medicine PA - pt was seen by dermatology in June for diffuse bullae on skin, Dr. Barnes. A conversation took place and dermatology recommended to start triamcinolone 0.1% ointment and f/up as op for skin biopsy and further assessment.   Attending addendum:  wbc ok but patient having hypothermic episodes   would hold off on permacath and see if his hypothermia improves  continue cefepime   consider line holiday   start triamcinolone as recommended by dermatology   patient still warrants skin biopsy to determine what exactly this rash is from      9/5: afebrile, RA, WBC slightly elevated 10.71, MRSA PCR negative, BCs NGTD, UC with Enterobacter cloacae, + full body rash, appear more dry on today's exam, RIJ catheter should be removed or dressed with occlusive dressing, new cath placement possibly tomorrow. No contraindications to placing permacath tomorrow assuming there is no change in clinical status .   Cefepime IV continued. Pt needs skin biopsy.   Attending addendum:  UC with Enterobacter cloacae being covered by cefepime  , + full body rash, appear more dry on today's exam  RIJ catheter ideally should be removed or dressed with occlusive dressing  new permcath placement possibly tomorrow.   No contraindications to placing permacath tomorrow assuming there is no change in clinical status    9/9: hypothermic yesterday, no fevers or hypothermia today, RA, WBC better 10.9, Cr 5.73, no new culture data, prior BCs NGTD, Cefepime IV continued for now, pt is for PermCath placement today. + Full body rash, appears more dry, + somewhat jaundice on today's exam, LFTs ok, Acute hepatitis panel was done before, reviewed.   Attending addendum:  continue antibiotics for one more day   placement of permacath   keep catheter covered, clean dry  and change soiled dressings as often as needed  continue hemodialysis as per renal   Dermatology for possible biopsy, inpatient vs outpatient to the discretion of hospitalist medicine and consulting dermatologist     9/11: no fever, RA, WBC  normalized, pt's rash with new fluid filled blisters, bothersome to the pt, pt keeps picking on his skin leaving small open wounds, left chest new HD catheter - dressing should be changed frequently as the pt picks on it as well. Pt needs to be seen by dermatology as op for skin biopsy. Abx stopped today.     Plan:  stopped cefepime 1g q24hrs, continue monitoring off abx  good hygiene  HD cath dressing should be changed frequently   HD per renal   needs to be seen by Dermatology as op as soon as possible, please, for skin biopsy   HIV negative  MRSA PCR - negative  follow culture data       will discuss with Dr. De La Cruz       73 year old male PMH HTN, HLD, DM, CHF, CKD stage IV, dementia / anxiety BIBEMS accompanied by wife due to weakness. Per wife, pt not feeling well since last night - went to bed prior to his own birthday party. Per EMS, pt complaining of decreased L vision and decreased L hearing as well as noted off balance with ambulation to restroom this AM. Pt states L eye is usually his good eye, and still with better vision in L than R, but not as good as usual. Pt reports chronic itching rash to body x past 3 months, f/u'd with Derm, was told likely 2/2 renal disease. Hearing later normalized in ER.     Note: Has left BKA.    wbc went from 10->13  right sided hemodialysis catheter looks good  no need for blood cultures   UA with enterobacter   need to change the ceftriaxone to cefepime, the hemodialysis dose 1g q24hrs     found to have severe ELMER needing hemodialysis   has a full body rash   has had confusion   generalized not feeling well  vision and hearing getting worse  no urinary complaints but was confused when came in  was supposed to get permacath but wbc rising     9/3: no fevers, WBC better yesterday 10.12, no new lab work, RA, BCs NGTD, Cr 4.71, had HD yesterday, right IJ temp HD catheter - dressing needs to be changed - spoke with RN. + Rash all over pt's body, cefepime IV continued. There is a chart note from medicine PA - pt was seen by dermatology in June for diffuse bullae on skin, Dr. Barnes. A conversation took place and dermatology recommended to start triamcinolone 0.1% ointment and f/up as op for skin biopsy and further assessment.   Attending addendum:  wbc ok but patient having hypothermic episodes   would hold off on permacath and see if his hypothermia improves  continue cefepime   consider line holiday   start triamcinolone as recommended by dermatology   patient still warrants skin biopsy to determine what exactly this rash is from      9/5: afebrile, RA, WBC slightly elevated 10.71, MRSA PCR negative, BCs NGTD, UC with Enterobacter cloacae, + full body rash, appear more dry on today's exam, RIJ catheter should be removed or dressed with occlusive dressing, new cath placement possibly tomorrow. No contraindications to placing permacath tomorrow assuming there is no change in clinical status .   Cefepime IV continued. Pt needs skin biopsy.   Attending addendum:  UC with Enterobacter cloacae being covered by cefepime  , + full body rash, appear more dry on today's exam  RIJ catheter ideally should be removed or dressed with occlusive dressing  new permcath placement possibly tomorrow.   No contraindications to placing permacath tomorrow assuming there is no change in clinical status    9/9: hypothermic yesterday, no fevers or hypothermia today, RA, WBC better 10.9, Cr 5.73, no new culture data, prior BCs NGTD, Cefepime IV continued for now, pt is for PermCath placement today. + Full body rash, appears more dry, + somewhat jaundice on today's exam, LFTs ok, Acute hepatitis panel was done before, reviewed.   Attending addendum:  continue antibiotics for one more day   placement of permacath   keep catheter covered, clean dry  and change soiled dressings as often as needed  continue hemodialysis as per renal   Dermatology for possible biopsy, inpatient vs outpatient to the discretion of hospitalist medicine and consulting dermatologist     9/11: no fever, RA, WBC  normalized, pt's rash with new fluid filled blisters, bothersome to the pt, pt keeps picking on his skin leaving small open wounds, left chest new HD catheter - dressing should be changed frequently as the pt picks on it as well. Pt needs to be seen by dermatology as op for skin biopsy. Abx stopped today.     Plan:  stopped cefepime 1g q24hrs, continue monitoring off abx  good hygiene  HD cath dressing should be changed frequently   HD per renal   needs to be seen by Dermatology as op as soon as possible, please, for skin biopsy   HIV negative  MRSA PCR - negative  follow culture data     signing off. please call with questions

## 2024-09-11 NOTE — PROGRESS NOTE ADULT - ASSESSMENT
73 year old male PMH HTN, HLD, DM, CHF, CKD stage IV, dementia / anxiety BIBEMS accompanied by wife due to weakness. Per wife, pt not feeling well since last night - went to bed prior to his own birthday party. Per EMS, pt complaining of decreased L vision and decreased L hearing as well as noted off balance with ambulation to restroom this AM. Pt states L eye is usually his good eye, and still with better vision in L than R, but not as good as usual. Pt reports chronic itching rash to body x past 3 months, f/u'd with Derm, was told likely 2/2 renal disease. Hearing later normalized in ER.     #SIRS 2/2 unclear etiology  9/3: no fevers, WBC better yesterday 10.12, no new lab work, RA, BCs NGTD, Cr 4.71, had HD yesterday, right IJ temp HD catheter - dressing needs to be changed - spoke with RN. + Rash all over pt's body, cefepime IV continued. There is a chart note from medicine PA - pt was seen by dermatology in June for diffuse bullae on skin, Dr. Barnes.  - c/w triamcinolone 0.1% ointment   - Discussed with dermatology (Dr. Barbour and Dr. Sarabia), patient will need outpatient dermatology appointment for skin biopsy  - s/p cefepime   - Appreciate rheum recs    ##ESRD   Temp cath placed and patient to initiate Dialysis here 2/2 Uremia and hyperkalemic. Temp cath placed   - C/w Inpatient dialysis   - Appreciate IR recs- s/p tunneled HD catheter  - Nephro following     ##SSTI  Noted to be hypothermic to 92F. CT abd No evidence of acute inflammatory or obstructive process in the   abdomen and pelvis. UA positive nitrites, LEs.   s/p Cefepime    ##Type 2 DM  A1c 7; C/w Lantus 25 + 6U w/ meals + SSI  - Hypoglycemia protocol     #Pulm nodule   - CT noted There are scattered nodules at the visualized lung bases measuring up to 6 mm  CT chest reviewed will need follow up in 3-6  months     #HTN  - C/w cardura, hydralazine     #HLD  - C/w statin     #History of DVT  - Eliquis     Diet: renal, CCD  DVT prophylaxis: eliquis  PT eval- ADRIANNE. Will need outpatient appointment with dermatology Dr. Sarabia office- [phone]  Dispo: off tele   Code Status: FC

## 2024-09-11 NOTE — PROGRESS NOTE ADULT - SUBJECTIVE AND OBJECTIVE BOX
Patient is a 74y old  Male who presents with a chief complaint of Weakness, progression of CKD (11 Sep 2024 11:47)    INTERVAL HPI/OVERNIGHT EVENTS: No acute events overnight. HD stable.     MEDICATIONS  (STANDING):  apixaban 5 milliGRAM(s) Oral two times a day  AQUAPHOR (petrolatum Ointment) 1 Application(s) Topical daily  atorvastatin 40 milliGRAM(s) Oral at bedtime  calamine/zinc oxide Lotion 1 Application(s) Topical two times a day  chlorhexidine 2% Cloths 1 Application(s) Topical <User Schedule>  dextrose 5%. 1000 milliLiter(s) (50 mL/Hr) IV Continuous <Continuous>  dextrose 5%. 1000 milliLiter(s) (100 mL/Hr) IV Continuous <Continuous>  dextrose 50% Injectable 25 Gram(s) IV Push once  dextrose 50% Injectable 12.5 Gram(s) IV Push once  dextrose 50% Injectable 25 Gram(s) IV Push once  doxazosin 2 milliGRAM(s) Oral at bedtime  glucagon  Injectable 1 milliGRAM(s) IntraMuscular once  hydrALAZINE 50 milliGRAM(s) Oral every 8 hours  insulin glargine Injectable (LANTUS) 25 Unit(s) SubCutaneous at bedtime  insulin lispro (ADMELOG) corrective regimen sliding scale   SubCutaneous at bedtime  insulin lispro (ADMELOG) corrective regimen sliding scale   SubCutaneous three times a day before meals  insulin lispro Injectable (ADMELOG) 6 Unit(s) SubCutaneous three times a day before meals  melatonin 3 milliGRAM(s) Oral at bedtime  Nephro-lee 1 Tablet(s) Oral daily  pantoprazole    Tablet 40 milliGRAM(s) Oral before breakfast  sevelamer carbonate 1600 milliGRAM(s) Oral three times a day with meals  triamcinolone 0.1% Ointment 1 Application(s) Topical two times a day    MEDICATIONS  (PRN):  acetaminophen     Tablet .. 650 milliGRAM(s) Oral every 6 hours PRN Temp greater or equal to 38C (100.4F), Moderate Pain (4 - 6)  benzocaine/menthol Lozenge 1 Lozenge Oral five times a day PRN Mouth Sores  dextrose Oral Gel 15 Gram(s) Oral once PRN Blood Glucose LESS THAN 70 milliGRAM(s)/deciliter  polyethylene glycol 3350 17 Gram(s) Oral daily PRN Constipation  sodium chloride 0.9% Bolus. 100 milliLiter(s) IV Bolus every 5 minutes PRN SBP LESS THAN or EQUAL to 100 mmHg  sodium chloride 0.9% lock flush 10 milliLiter(s) IV Push every 1 hour PRN Pre/post blood products, medications, blood draw, and to maintain line patency      Allergies    No Known Allergies    Intolerances        REVIEW OF SYSTEMS: all negative with exception of above    Vital Signs Last 24 Hrs  T(C): 36.3 (11 Sep 2024 11:45), Max: 37.1 (10 Sep 2024 17:00)  T(F): 97.3 (11 Sep 2024 11:45), Max: 98.8 (10 Sep 2024 17:00)  HR: 55 (11 Sep 2024 11:45) (55 - 75)  BP: 140/64 (11 Sep 2024 11:45) (127/64 - 185/83)  BP(mean): --  RR: 18 (11 Sep 2024 11:45) (18 - 19)  SpO2: 97% (11 Sep 2024 11:45) (96% - 99%)    Parameters below as of 11 Sep 2024 11:45  Patient On (Oxygen Delivery Method): room air        PHYSICAL EXAM:  GENERAL: NAD, well-groomed  NERVOUS SYSTEM:  Alert & Oriented X3, Good concentration; Motor Strength 5/5 B/L upper and lower extremities; DTRs 2+ intact and symmetric  CHEST/LUNG: Clear to percussion bilaterally; No rales, rhonchi, wheezing, or rubs  HEART: Regular rate and rhythm; No murmurs, rubs, or gallops  ABDOMEN: Soft, Nontender, Nondistended; Bowel sounds present  EXTREMITIES:  2+ Peripheral Pulses, No clubbing, cyanosis, or edema    LABS:                        9.2    9.20  )-----------( 185      ( 11 Sep 2024 05:37 )             28.9     09-11    138  |  104  |  88<H>  ----------------------------<  142<H>  4.8   |  25  |  4.99<H>    Ca    9.2      11 Sep 2024 05:37    TPro  6.6  /  Alb  2.1<L>  /  TBili  0.2  /  DBili  x   /  AST  13<L>  /  ALT  20  /  AlkPhos  58  09-11      Urinalysis Basic - ( 11 Sep 2024 05:37 )    Color: x / Appearance: x / SG: x / pH: x  Gluc: 142 mg/dL / Ketone: x  / Bili: x / Urobili: x   Blood: x / Protein: x / Nitrite: x   Leuk Esterase: x / RBC: x / WBC x   Sq Epi: x / Non Sq Epi: x / Bacteria: x      CAPILLARY BLOOD GLUCOSE      POCT Blood Glucose.: 153 mg/dL (11 Sep 2024 11:22)  POCT Blood Glucose.: 185 mg/dL (11 Sep 2024 07:23)  POCT Blood Glucose.: 159 mg/dL (10 Sep 2024 21:40)  POCT Blood Glucose.: 120 mg/dL (10 Sep 2024 16:35)      RADIOLOGY & ADDITIONAL TESTS:    Imaging Personally Reviewed:  [ ] YES  [ ] NO    Consultant(s) Notes Reviewed:  [ ] YES  [ ] NO    Care Discussed with Consultants/Other Providers [ ] YES  [ ] NO

## 2024-09-11 NOTE — PROGRESS NOTE ADULT - SUBJECTIVE AND OBJECTIVE BOX
[facility] NEPHROLOGY SERVICES, [facility]  NEPHROLOGY AND HYPERTENSION  [address]  [address]  [place] [address]  [phone]    MD YISEL WEEKS MD YELENA ROSENBERG, MD BINNY KOSHY, MD CHRISTOPHER CAPUTO, MD EDWARD BOVER, MD          Patient events noted    MEDICATIONS  (STANDING):  apixaban 5 milliGRAM(s) Oral two times a day  AQUAPHOR (petrolatum Ointment) 1 Application(s) Topical daily  atorvastatin 40 milliGRAM(s) Oral at bedtime  calamine/zinc oxide Lotion 1 Application(s) Topical two times a day  chlorhexidine 2% Cloths 1 Application(s) Topical <User Schedule>  dextrose 5%. 1000 milliLiter(s) (50 mL/Hr) IV Continuous <Continuous>  dextrose 5%. 1000 milliLiter(s) (100 mL/Hr) IV Continuous <Continuous>  dextrose 50% Injectable 12.5 Gram(s) IV Push once  dextrose 50% Injectable 25 Gram(s) IV Push once  dextrose 50% Injectable 25 Gram(s) IV Push once  doxazosin 2 milliGRAM(s) Oral at bedtime  glucagon  Injectable 1 milliGRAM(s) IntraMuscular once  hydrALAZINE 50 milliGRAM(s) Oral every 8 hours  insulin glargine Injectable (LANTUS) 25 Unit(s) SubCutaneous at bedtime  insulin lispro (ADMELOG) corrective regimen sliding scale   SubCutaneous at bedtime  insulin lispro (ADMELOG) corrective regimen sliding scale   SubCutaneous three times a day before meals  insulin lispro Injectable (ADMELOG) 6 Unit(s) SubCutaneous three times a day before meals  melatonin 3 milliGRAM(s) Oral at bedtime  Nephro-lee 1 Tablet(s) Oral daily  pantoprazole    Tablet 40 milliGRAM(s) Oral before breakfast  sevelamer carbonate 1600 milliGRAM(s) Oral three times a day with meals  triamcinolone 0.1% Ointment 1 Application(s) Topical two times a day    MEDICATIONS  (PRN):  acetaminophen     Tablet .. 650 milliGRAM(s) Oral every 6 hours PRN Temp greater or equal to 38C (100.4F), Moderate Pain (4 - 6)  benzocaine/menthol Lozenge 1 Lozenge Oral five times a day PRN Mouth Sores  dextrose Oral Gel 15 Gram(s) Oral once PRN Blood Glucose LESS THAN 70 milliGRAM(s)/deciliter  polyethylene glycol 3350 17 Gram(s) Oral daily PRN Constipation  sodium chloride 0.9% Bolus. 100 milliLiter(s) IV Bolus every 5 minutes PRN SBP LESS THAN or EQUAL to 100 mmHg  sodium chloride 0.9% lock flush 10 milliLiter(s) IV Push every 1 hour PRN Pre/post blood products, medications, blood draw, and to maintain line patency      09-10-24 @ 07:01  -  09-11-24 @ 07:00  --------------------------------------------------------  IN: 720 mL / OUT: 400 mL / NET: 320 mL    09-11-24 @ 07:01  -  09-11-24 @ 19:42  --------------------------------------------------------  IN: 960 mL / OUT: 0 mL / NET: 960 mL      PHYSICAL EXAM:      T(C): 36.3 (09-11-24 @ 16:49), Max: 37.1 (09-10-24 @ 21:46)  HR: 78 (09-11-24 @ 16:49) (55 - 78)  BP: 125/64 (09-11-24 @ 16:49) (111/56 - 185/83)  RR: 17 (09-11-24 @ 16:49) (16 - 18)  SpO2: 96% (09-11-24 @ 16:49) (96% - 98%)  Wt(kg): --  Lungs clear  Heart S1S2  Abd soft NT ND  Extremities:   tr edema  skin excoriations                                   9.2    9.20  )-----------( 185      ( 11 Sep 2024 05:37 )             28.9     09-11    138  |  104  |  88<H>  ----------------------------<  142<H>  4.8   |  25  |  4.99<H>    Ca    9.2      11 Sep 2024 05:37    TPro  6.6  /  Alb  2.1<L>  /  TBili  0.2  /  DBili  x   /  AST  13<L>  /  ALT  20  /  AlkPhos  58  09-11      LIVER FUNCTIONS - ( 11 Sep 2024 05:37 )  Alb: 2.1 g/dL / Pro: 6.6 gm/dL / ALK PHOS: 58 U/L / ALT: 20 U/L / AST: 13 U/L / GGT: x           Creatinine Trend: 4.99<--, 3.68<--, 5.73<--, 5.09<--, 3.96<--, 4.28<--      Assessment   ESRD, maintenance       Plan:  HD for today  UF as tolerated   Will follow     Augusto Santoyo MD

## 2024-09-11 NOTE — PHYSICAL THERAPY INITIAL EVALUATION ADULT - PERTINENT HX OF CURRENT PROBLEM, REHAB EVAL
73 year old male PMH HTN, HLD, DM, CHF, CKD stage IV, dementia / anxiety BIBEMS accompanied by wife due to weakness. Per wife, pt not feeling well since last night - went to bed prior to his own birthday party. Per EMS, pt complaining of decreased L vision and decreased L hearing as well as noted off balance with ambulation to restroom this AM. Pt states L eye is usually his good eye, and still with better vision in L than R, but not as good as usual. Pt reports chronic itching rash to body x past 3 months, f/u'd with Derm, was told likely 2/2 renal disease. Right foot deep tissue injury and partial thickness eschar of the posterior heel, lateral malleolus, and lateral 5th metatarsal base, no open wounds, no acute signs of infection.
73 year old male PMH HTN, HLD, DM, CHF, CKD stage IV, dementia / anxiety BIBEMS accompanied by wife due to weakness. Per wife, pt not feeling well since last night - went to bed prior to his own birthday party. Per EMS, pt complaining of decreased L vision and decreased L hearing as well as noted off balance with ambulation to restroom this AM. Pt states L eye is usually his good eye, and still with better vision in L than R, but not as good as usual. Pt reports chronic itching rash to body x past 3 months, f/u'd with Derm, was told likely 2/2 renal disease.

## 2024-09-11 NOTE — PHYSICAL THERAPY INITIAL EVALUATION ADULT - GENERAL OBSERVATIONS, REHAB EVAL
emr reviewed and events noted to date. Pt for wound care assessment at this time. pt presents with generealized skin tears and ecchymosis to BUE, RLE and LLE. Podiatry team was consulted for wounds to R foot and have made recommendations that nursing is following. Pt presents with partial thickness wound to R medial lower leg and similar smaller wounds to LLE residual limb. All areas were cleaned and redressed. RNs Renetta and Maki at bedside. Wound care P.T. will not be providing daily dressing care to patient. Wound care P.T. can be consulted as needed for wound re-assessments.
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 A&Ox4, forgetful, cooperative.

## 2024-09-12 LAB
GLUCOSE BLDC GLUCOMTR-MCNC: 122 MG/DL — HIGH (ref 70–99)
GLUCOSE BLDC GLUCOMTR-MCNC: 153 MG/DL — HIGH (ref 70–99)
GLUCOSE BLDC GLUCOMTR-MCNC: 156 MG/DL — HIGH (ref 70–99)
GLUCOSE BLDC GLUCOMTR-MCNC: 215 MG/DL — HIGH (ref 70–99)
GLUCOSE BLDC GLUCOMTR-MCNC: 94 MG/DL — SIGNIFICANT CHANGE UP (ref 70–99)
RAPID RVP RESULT: DETECTED
SARS-COV-2 RNA SPEC QL NAA+PROBE: DETECTED

## 2024-09-12 PROCEDURE — 99232 SBSQ HOSP IP/OBS MODERATE 35: CPT

## 2024-09-12 RX ADMIN — Medication 6 UNIT(S): at 08:06

## 2024-09-12 RX ADMIN — ACETAMINOPHEN 650 MILLIGRAM(S): 325 TABLET ORAL at 13:10

## 2024-09-12 RX ADMIN — Medication 2 MILLIGRAM(S): at 22:17

## 2024-09-12 RX ADMIN — ACETAMINOPHEN 650 MILLIGRAM(S): 325 TABLET ORAL at 14:20

## 2024-09-12 RX ADMIN — APIXABAN 5 MILLIGRAM(S): 5 TABLET, FILM COATED ORAL at 18:16

## 2024-09-12 RX ADMIN — Medication 1 APPLICATION(S): at 06:15

## 2024-09-12 RX ADMIN — Medication 50 MILLIGRAM(S): at 22:17

## 2024-09-12 RX ADMIN — Medication 4: at 08:05

## 2024-09-12 RX ADMIN — SEVELAMER CARBONATE 1600 MILLIGRAM(S): 800 TABLET, FILM COATED ORAL at 08:06

## 2024-09-12 RX ADMIN — Medication 40 MILLIGRAM(S): at 22:18

## 2024-09-12 RX ADMIN — SEVELAMER CARBONATE 1600 MILLIGRAM(S): 800 TABLET, FILM COATED ORAL at 13:10

## 2024-09-12 RX ADMIN — Medication 40 MILLIGRAM(S): at 06:13

## 2024-09-12 RX ADMIN — Medication 6 UNIT(S): at 13:18

## 2024-09-12 RX ADMIN — Medication 1 TABLET(S): at 13:10

## 2024-09-12 RX ADMIN — INSULIN GLARGINE 25 UNIT(S): 100 INJECTION, SOLUTION SUBCUTANEOUS at 22:17

## 2024-09-12 RX ADMIN — CHLORHEXIDINE GLUCONATE 1 APPLICATION(S): 40 SOLUTION TOPICAL at 06:15

## 2024-09-12 RX ADMIN — Medication 50 MILLIGRAM(S): at 13:10

## 2024-09-12 RX ADMIN — PETROLATUM 1 APPLICATION(S): 865 OINTMENT TOPICAL at 13:18

## 2024-09-12 RX ADMIN — TRIAMCINOLONE ACETONIDE 1 APPLICATION(S): 1 CREAM TOPICAL at 18:26

## 2024-09-12 RX ADMIN — Medication 1 APPLICATION(S): at 18:26

## 2024-09-12 RX ADMIN — SEVELAMER CARBONATE 1600 MILLIGRAM(S): 800 TABLET, FILM COATED ORAL at 18:16

## 2024-09-12 RX ADMIN — GUAIFENESIN 200 MILLIGRAM(S): 100 LIQUID ORAL at 18:16

## 2024-09-12 RX ADMIN — TRIAMCINOLONE ACETONIDE 1 APPLICATION(S): 1 CREAM TOPICAL at 06:16

## 2024-09-12 RX ADMIN — Medication 3 MILLIGRAM(S): at 22:18

## 2024-09-12 RX ADMIN — APIXABAN 5 MILLIGRAM(S): 5 TABLET, FILM COATED ORAL at 06:13

## 2024-09-12 NOTE — PROGRESS NOTE ADULT - ASSESSMENT
73 year old male PMH HTN, HLD, DM, CHF, CKD stage IV, dementia / anxiety BIBEMS accompanied by wife due to weakness. Per wife, pt not feeling well since last night - went to bed prior to his own birthday party. Per EMS, pt complaining of decreased L vision and decreased L hearing as well as noted off balance with ambulation to restroom this AM. Pt states L eye is usually his good eye, and still with better vision in L than R, but not as good as usual. Pt reports chronic itching rash to body x past 3 months, f/u'd with Derm, was told likely 2/2 renal disease. Hearing later normalized in ER.     #COVID  - asymptomatic, on RA  - will c/w supportive care w/ robitussin  - will need to remain inpatient     #Rash  - c/w triamcinolone 0.1% ointment   - Discussed with dermatology (Dr. Barbour and Dr. Sarabia), patient will need outpatient dermatology appointment for skin biopsy  - s/p cefepime   - Appreciate rheum recs    ##ESRD   Temp cath placed and patient to initiate Dialysis here 2/2 Uremia and hyperkalemic. Temp cath placed   - C/w Inpatient dialysis   - Appreciate IR recs- s/p tunneled HD catheter  - Nephro following     ##SSTI  Noted to be hypothermic to 92F. CT abd No evidence of acute inflammatory or obstructive process in the   abdomen and pelvis. UA positive nitrites, LEs.   s/p Cefepime    ##Type 2 DM  A1c 7; C/w Lantus 25 + 6U w/ meals + SSI  - Hypoglycemia protocol     #Pulm nodule   - CT noted There are scattered nodules at the visualized lung bases measuring up to 6 mm  CT chest reviewed will need follow up in 3-6  months     #HTN  - C/w cardura, hydralazine     #HLD  - C/w statin     #History of DVT  - Eliquis     Diet: renal, CCD  DVT prophylaxis: eliquis  PT eval- ADRIANNE. Will need outpatient appointment with dermatology Dr. Sarabia office- [phone]. Family interested in [address]  Dispo: off tele   Code Status:

## 2024-09-12 NOTE — PROGRESS NOTE ADULT - SUBJECTIVE AND OBJECTIVE BOX
Patient is a 74y old  Male who presents with a chief complaint of Weakness, progression of CKD (11 Sep 2024 19:42)    INTERVAL HPI/OVERNIGHT EVENTS: Patient tested positive for covid overnight. HD stable. Denies SOB.     MEDICATIONS  (STANDING):  apixaban 5 milliGRAM(s) Oral two times a day  AQUAPHOR (petrolatum Ointment) 1 Application(s) Topical daily  atorvastatin 40 milliGRAM(s) Oral at bedtime  calamine/zinc oxide Lotion 1 Application(s) Topical two times a day  chlorhexidine 2% Cloths 1 Application(s) Topical <User Schedule>  dextrose 5%. 1000 milliLiter(s) (50 mL/Hr) IV Continuous <Continuous>  dextrose 5%. 1000 milliLiter(s) (100 mL/Hr) IV Continuous <Continuous>  dextrose 50% Injectable 12.5 Gram(s) IV Push once  dextrose 50% Injectable 25 Gram(s) IV Push once  dextrose 50% Injectable 25 Gram(s) IV Push once  doxazosin 2 milliGRAM(s) Oral at bedtime  glucagon  Injectable 1 milliGRAM(s) IntraMuscular once  hydrALAZINE 50 milliGRAM(s) Oral every 8 hours  insulin glargine Injectable (LANTUS) 25 Unit(s) SubCutaneous at bedtime  insulin lispro (ADMELOG) corrective regimen sliding scale   SubCutaneous three times a day before meals  insulin lispro (ADMELOG) corrective regimen sliding scale   SubCutaneous at bedtime  insulin lispro Injectable (ADMELOG) 6 Unit(s) SubCutaneous three times a day before meals  melatonin 3 milliGRAM(s) Oral at bedtime  Nephro-lee 1 Tablet(s) Oral daily  pantoprazole    Tablet 40 milliGRAM(s) Oral before breakfast  sevelamer carbonate 1600 milliGRAM(s) Oral three times a day with meals  triamcinolone 0.1% Ointment 1 Application(s) Topical two times a day    MEDICATIONS  (PRN):  acetaminophen     Tablet .. 650 milliGRAM(s) Oral every 6 hours PRN Temp greater or equal to 38C (100.4F), Moderate Pain (4 - 6)  benzocaine/menthol Lozenge 1 Lozenge Oral five times a day PRN Mouth Sores  dextrose Oral Gel 15 Gram(s) Oral once PRN Blood Glucose LESS THAN 70 milliGRAM(s)/deciliter  guaiFENesin Oral Liquid (Sugar-Free) 200 milliGRAM(s) Oral every 6 hours PRN Cough  polyethylene glycol 3350 17 Gram(s) Oral daily PRN Constipation  sodium chloride 0.9% Bolus. 100 milliLiter(s) IV Bolus every 5 minutes PRN SBP LESS THAN or EQUAL to 100 mmHg  sodium chloride 0.9% lock flush 10 milliLiter(s) IV Push every 1 hour PRN Pre/post blood products, medications, blood draw, and to maintain line patency      Allergies    No Known Allergies    Intolerances        REVIEW OF SYSTEMS: all negative with exception of above    Vital Signs Last 24 Hrs  T(C): 36.3 (12 Sep 2024 10:53), Max: 36.7 (11 Sep 2024 13:05)  T(F): 97.3 (12 Sep 2024 10:53), Max: 98.1 (11 Sep 2024 13:05)  HR: 76 (12 Sep 2024 04:00) (59 - 82)  BP: 131/73 (12 Sep 2024 10:53) (100/55 - 138/64)  BP(mean): --  RR: 18 (12 Sep 2024 10:53) (16 - 18)  SpO2: 97% (12 Sep 2024 10:53) (96% - 99%)    Parameters below as of 12 Sep 2024 04:00  Patient On (Oxygen Delivery Method): room air      PHYSICAL EXAM:  GENERAL: NAD, well-groomed  NERVOUS SYSTEM:  Alert & Oriented X3, Good concentration; Motor Strength 5/5 B/L upper and lower extremities; DTRs 2+ intact and symmetric  CHEST/LUNG: Clear to percussion bilaterally; No rales, rhonchi, wheezing, or rubs  HEART: Regular rate and rhythm; No murmurs, rubs, or gallops  ABDOMEN: Soft, Nontender, Nondistended; Bowel sounds present  EXTREMITIES:  2+ Peripheral Pulses, No clubbing, cyanosis, or edema    LABS:                        9.2    9.20  )-----------( 185      ( 11 Sep 2024 05:37 )             28.9     09-11    138  |  104  |  88<H>  ----------------------------<  142<H>  4.8   |  25  |  4.99<H>    Ca    9.2      11 Sep 2024 05:37    TPro  6.6  /  Alb  2.1<L>  /  TBili  0.2  /  DBili  x   /  AST  13<L>  /  ALT  20  /  AlkPhos  58  09-11      Urinalysis Basic - ( 11 Sep 2024 05:37 )    Color: x / Appearance: x / SG: x / pH: x  Gluc: 142 mg/dL / Ketone: x  / Bili: x / Urobili: x   Blood: x / Protein: x / Nitrite: x   Leuk Esterase: x / RBC: x / WBC x   Sq Epi: x / Non Sq Epi: x / Bacteria: x      CAPILLARY BLOOD GLUCOSE      POCT Blood Glucose.: 122 mg/dL (12 Sep 2024 11:38)  POCT Blood Glucose.: 156 mg/dL (12 Sep 2024 11:05)  POCT Blood Glucose.: 215 mg/dL (12 Sep 2024 07:35)  POCT Blood Glucose.: 164 mg/dL (11 Sep 2024 21:08)  POCT Blood Glucose.: 126 mg/dL (11 Sep 2024 16:45)      RADIOLOGY & ADDITIONAL TESTS:    Imaging Personally Reviewed:  [ ] YES  [ ] NO    Consultant(s) Notes Reviewed:  [ ] YES  [ ] NO    Care Discussed with Consultants/Other Providers [ ] YES  [ ] NO

## 2024-09-13 LAB
ALBUMIN SERPL ELPH-MCNC: 1.9 G/DL — LOW (ref 3.3–5)
ALP SERPL-CCNC: 55 U/L — SIGNIFICANT CHANGE UP (ref 40–120)
ALT FLD-CCNC: 18 U/L — SIGNIFICANT CHANGE UP (ref 12–78)
ANION GAP SERPL CALC-SCNC: 4 MMOL/L — LOW (ref 5–17)
AST SERPL-CCNC: 13 U/L — LOW (ref 15–37)
BILIRUB SERPL-MCNC: 0.3 MG/DL — SIGNIFICANT CHANGE UP (ref 0.2–1.2)
BUN SERPL-MCNC: 79 MG/DL — HIGH (ref 7–23)
CALCIUM SERPL-MCNC: 8.6 MG/DL — SIGNIFICANT CHANGE UP (ref 8.5–10.1)
CHLORIDE SERPL-SCNC: 102 MMOL/L — SIGNIFICANT CHANGE UP (ref 96–108)
CO2 SERPL-SCNC: 29 MMOL/L — SIGNIFICANT CHANGE UP (ref 22–31)
CREAT SERPL-MCNC: 5.52 MG/DL — HIGH (ref 0.5–1.3)
EGFR: 10 ML/MIN/1.73M2 — LOW
GLUCOSE BLDC GLUCOMTR-MCNC: 111 MG/DL — HIGH (ref 70–99)
GLUCOSE BLDC GLUCOMTR-MCNC: 144 MG/DL — HIGH (ref 70–99)
GLUCOSE BLDC GLUCOMTR-MCNC: 81 MG/DL — SIGNIFICANT CHANGE UP (ref 70–99)
GLUCOSE BLDC GLUCOMTR-MCNC: 81 MG/DL — SIGNIFICANT CHANGE UP (ref 70–99)
GLUCOSE SERPL-MCNC: 68 MG/DL — LOW (ref 70–99)
HCT VFR BLD CALC: 26 % — LOW (ref 39–50)
HGB BLD-MCNC: 8.3 G/DL — LOW (ref 13–17)
MCHC RBC-ENTMCNC: 27.7 PG — SIGNIFICANT CHANGE UP (ref 27–34)
MCHC RBC-ENTMCNC: 31.9 G/DL — LOW (ref 32–36)
MCV RBC AUTO: 86.7 FL — SIGNIFICANT CHANGE UP (ref 80–100)
NRBC # BLD: 0 /100 WBCS — SIGNIFICANT CHANGE UP (ref 0–0)
PLATELET # BLD AUTO: 156 K/UL — SIGNIFICANT CHANGE UP (ref 150–400)
POTASSIUM SERPL-MCNC: 4.9 MMOL/L — SIGNIFICANT CHANGE UP (ref 3.5–5.3)
POTASSIUM SERPL-SCNC: 4.9 MMOL/L — SIGNIFICANT CHANGE UP (ref 3.5–5.3)
PROT SERPL-MCNC: 6.2 GM/DL — SIGNIFICANT CHANGE UP (ref 6–8.3)
RBC # BLD: 3 M/UL — LOW (ref 4.2–5.8)
RBC # FLD: 15.2 % — HIGH (ref 10.3–14.5)
SODIUM SERPL-SCNC: 135 MMOL/L — SIGNIFICANT CHANGE UP (ref 135–145)
WBC # BLD: 8.07 K/UL — SIGNIFICANT CHANGE UP (ref 3.8–10.5)
WBC # FLD AUTO: 8.07 K/UL — SIGNIFICANT CHANGE UP (ref 3.8–10.5)

## 2024-09-13 PROCEDURE — 99232 SBSQ HOSP IP/OBS MODERATE 35: CPT

## 2024-09-13 RX ORDER — EPOETIN ALFA 3000 [IU]/ML
20000 SOLUTION INTRAVENOUS; SUBCUTANEOUS ONCE
Refills: 0 | Status: DISCONTINUED | OUTPATIENT
Start: 2024-09-13 | End: 2024-09-15

## 2024-09-13 RX ADMIN — TRIAMCINOLONE ACETONIDE 1 APPLICATION(S): 1 CREAM TOPICAL at 05:37

## 2024-09-13 RX ADMIN — ACETAMINOPHEN 650 MILLIGRAM(S): 325 TABLET ORAL at 11:45

## 2024-09-13 RX ADMIN — PETROLATUM 1 APPLICATION(S): 865 OINTMENT TOPICAL at 11:33

## 2024-09-13 RX ADMIN — ACETAMINOPHEN 650 MILLIGRAM(S): 325 TABLET ORAL at 23:30

## 2024-09-13 RX ADMIN — Medication 2 MILLIGRAM(S): at 22:09

## 2024-09-13 RX ADMIN — Medication 1 APPLICATION(S): at 05:37

## 2024-09-13 RX ADMIN — SEVELAMER CARBONATE 1600 MILLIGRAM(S): 800 TABLET, FILM COATED ORAL at 11:46

## 2024-09-13 RX ADMIN — Medication 6 UNIT(S): at 17:14

## 2024-09-13 RX ADMIN — SEVELAMER CARBONATE 1600 MILLIGRAM(S): 800 TABLET, FILM COATED ORAL at 08:36

## 2024-09-13 RX ADMIN — APIXABAN 5 MILLIGRAM(S): 5 TABLET, FILM COATED ORAL at 17:14

## 2024-09-13 RX ADMIN — Medication 1 TABLET(S): at 11:33

## 2024-09-13 RX ADMIN — Medication 6 UNIT(S): at 08:37

## 2024-09-13 RX ADMIN — Medication 3 MILLIGRAM(S): at 22:22

## 2024-09-13 RX ADMIN — SEVELAMER CARBONATE 1600 MILLIGRAM(S): 800 TABLET, FILM COATED ORAL at 17:14

## 2024-09-13 RX ADMIN — Medication 1 APPLICATION(S): at 17:14

## 2024-09-13 RX ADMIN — ACETAMINOPHEN 650 MILLIGRAM(S): 325 TABLET ORAL at 12:37

## 2024-09-13 RX ADMIN — Medication 40 MILLIGRAM(S): at 22:22

## 2024-09-13 RX ADMIN — TRIAMCINOLONE ACETONIDE 1 APPLICATION(S): 1 CREAM TOPICAL at 17:17

## 2024-09-13 RX ADMIN — Medication 50 MILLIGRAM(S): at 22:22

## 2024-09-13 RX ADMIN — ACETAMINOPHEN 650 MILLIGRAM(S): 325 TABLET ORAL at 22:22

## 2024-09-13 RX ADMIN — APIXABAN 5 MILLIGRAM(S): 5 TABLET, FILM COATED ORAL at 05:37

## 2024-09-13 RX ADMIN — CHLORHEXIDINE GLUCONATE 1 APPLICATION(S): 40 SOLUTION TOPICAL at 05:37

## 2024-09-13 RX ADMIN — Medication 6 UNIT(S): at 11:35

## 2024-09-13 RX ADMIN — INSULIN GLARGINE 25 UNIT(S): 100 INJECTION, SOLUTION SUBCUTANEOUS at 22:23

## 2024-09-13 RX ADMIN — GUAIFENESIN 200 MILLIGRAM(S): 100 LIQUID ORAL at 17:19

## 2024-09-13 RX ADMIN — Medication 40 MILLIGRAM(S): at 07:45

## 2024-09-13 NOTE — PROGRESS NOTE ADULT - SUBJECTIVE AND OBJECTIVE BOX
Patient is a 74y old  Male who presents with a chief complaint of Weakness, progression of CKD (13 Sep 2024 16:08)    INTERVAL HPI/OVERNIGHT EVENTS: No acute events overnight. HD stable. He endorses dry cough.     MEDICATIONS  (STANDING):  apixaban 5 milliGRAM(s) Oral two times a day  AQUAPHOR (petrolatum Ointment) 1 Application(s) Topical daily  atorvastatin 40 milliGRAM(s) Oral at bedtime  calamine/zinc oxide Lotion 1 Application(s) Topical two times a day  chlorhexidine 2% Cloths 1 Application(s) Topical <User Schedule>  dextrose 5%. 1000 milliLiter(s) (50 mL/Hr) IV Continuous <Continuous>  dextrose 5%. 1000 milliLiter(s) (100 mL/Hr) IV Continuous <Continuous>  dextrose 50% Injectable 25 Gram(s) IV Push once  dextrose 50% Injectable 12.5 Gram(s) IV Push once  dextrose 50% Injectable 25 Gram(s) IV Push once  doxazosin 2 milliGRAM(s) Oral at bedtime  epoetin malik-epbx (RETACRIT) Injectable 47645 Unit(s) SubCutaneous once  glucagon  Injectable 1 milliGRAM(s) IntraMuscular once  hydrALAZINE 50 milliGRAM(s) Oral every 8 hours  insulin glargine Injectable (LANTUS) 25 Unit(s) SubCutaneous at bedtime  insulin lispro (ADMELOG) corrective regimen sliding scale   SubCutaneous three times a day before meals  insulin lispro (ADMELOG) corrective regimen sliding scale   SubCutaneous at bedtime  insulin lispro Injectable (ADMELOG) 6 Unit(s) SubCutaneous three times a day before meals  melatonin 3 milliGRAM(s) Oral at bedtime  Nephro-lee 1 Tablet(s) Oral daily  pantoprazole    Tablet 40 milliGRAM(s) Oral before breakfast  sevelamer carbonate 1600 milliGRAM(s) Oral three times a day with meals  triamcinolone 0.1% Ointment 1 Application(s) Topical two times a day    MEDICATIONS  (PRN):  acetaminophen     Tablet .. 650 milliGRAM(s) Oral every 6 hours PRN Temp greater or equal to 38C (100.4F), Moderate Pain (4 - 6)  benzocaine/menthol Lozenge 1 Lozenge Oral five times a day PRN Mouth Sores  dextrose Oral Gel 15 Gram(s) Oral once PRN Blood Glucose LESS THAN 70 milliGRAM(s)/deciliter  guaiFENesin Oral Liquid (Sugar-Free) 200 milliGRAM(s) Oral every 6 hours PRN Cough  polyethylene glycol 3350 17 Gram(s) Oral daily PRN Constipation  sodium chloride 0.9% Bolus. 100 milliLiter(s) IV Bolus every 5 minutes PRN SBP LESS THAN or EQUAL to 100 mmHg  sodium chloride 0.9% lock flush 10 milliLiter(s) IV Push every 1 hour PRN Pre/post blood products, medications, blood draw, and to maintain line patency      Allergies    No Known Allergies    Intolerances        REVIEW OF SYSTEMS: all negative with exception of above    Vital Signs Last 24 Hrs  T(C): 36.2 (13 Sep 2024 16:53), Max: 36.3 (13 Sep 2024 05:16)  T(F): 97.1 (13 Sep 2024 16:53), Max: 97.3 (13 Sep 2024 05:16)  HR: 74 (13 Sep 2024 16:53) (62 - 84)  BP: 126/71 (13 Sep 2024 16:53) (108/59 - 169/86)  BP(mean): --  RR: 18 (13 Sep 2024 16:53) (17 - 19)  SpO2: 95% (13 Sep 2024 16:53) (95% - 99%)    Parameters below as of 13 Sep 2024 16:53  Patient On (Oxygen Delivery Method): room air        PHYSICAL EXAM:  GENERAL: NAD, well-groomed, well-developed  HEAD:  Atraumatic, Normocephalic  EYES: EOMI, PERRLA, conjunctiva and sclera clear  ENMT: No tonsillar erythema, exudates, or enlargement; Moist mucous membranes, Good dentition, No lesions  NECK: Supple, No JVD, Normal thyroid  NERVOUS SYSTEM:  Alert & Oriented X3, Good concentration; Motor Strength 5/5 B/L upper and lower extremities; DTRs 2+ intact and symmetric  CHEST/LUNG: Clear to percussion bilaterally; No rales, rhonchi, wheezing, or rubs  HEART: Regular rate and rhythm; No murmurs, rubs, or gallops  ABDOMEN: Soft, Nontender, Nondistended; Bowel sounds present  EXTREMITIES:  2+ Peripheral Pulses, No clubbing, cyanosis, or edema  LYMPH: No lymphadenopathy noted  SKIN: No rashes or lesions    LABS:                        8.3    8.07  )-----------( 156      ( 13 Sep 2024 11:10 )             26.0     09-13    135  |  102  |  79<H>  ----------------------------<  68<L>  4.9   |  29  |  5.52<H>    Ca    8.6      13 Sep 2024 11:10    TPro  6.2  /  Alb  1.9<L>  /  TBili  0.3  /  DBili  x   /  AST  13<L>  /  ALT  18  /  AlkPhos  55  09-13      Urinalysis Basic - ( 13 Sep 2024 11:10 )    Color: x / Appearance: x / SG: x / pH: x  Gluc: 68 mg/dL / Ketone: x  / Bili: x / Urobili: x   Blood: x / Protein: x / Nitrite: x   Leuk Esterase: x / RBC: x / WBC x   Sq Epi: x / Non Sq Epi: x / Bacteria: x      CAPILLARY BLOOD GLUCOSE      POCT Blood Glucose.: 81 mg/dL (13 Sep 2024 16:34)  POCT Blood Glucose.: 81 mg/dL (13 Sep 2024 10:54)  POCT Blood Glucose.: 144 mg/dL (13 Sep 2024 07:57)  POCT Blood Glucose.: 153 mg/dL (12 Sep 2024 21:45)      RADIOLOGY & ADDITIONAL TESTS:    Imaging Personally Reviewed:  [ ] YES  [ ] NO    Consultant(s) Notes Reviewed:  [ ] YES  [ ] NO    Care Discussed with Consultants/Other Providers [ ] YES  [ ] NO

## 2024-09-13 NOTE — PROGRESS NOTE ADULT - ASSESSMENT
73 year old male PMH HTN, HLD, DM, CHF, CKD stage IV, dementia / anxiety BIBEMS accompanied by wife due to weakness. Per wife, pt not feeling well since last night - went to bed prior to his own birthday party. Per EMS, pt complaining of decreased L vision and decreased L hearing as well as noted off balance with ambulation to restroom this AM. Pt states L eye is usually his good eye, and still with better vision in L than R, but not as good as usual. Pt reports chronic itching rash to body x past 3 months, f/u'd with Derm, was told likely 2/2 renal disease. Hearing later normalized in ER.     #COVID  - asymptomatic, on RA  - will c/w supportive care w/ robitussin  - Discussed with ID, no role for Rem/Decadron given no SOB and on RA  - will need to remain inpatient     #Rash  - c/w triamcinolone 0.1% ointment   - Discussed with dermatology (Dr. Barbour and Dr. Sarabia), patient will need outpatient dermatology appointment for skin biopsy  - s/p cefepime   - Appreciate rheum recs    ##ESRD   Temp cath placed and patient to initiate Dialysis here 2/2 Uremia and hyperkalemic. Temp cath placed   - C/w Inpatient dialysis   - Appreciate IR recs- s/p tunneled HD catheter  - Nephro following     ##SSTI  Noted to be hypothermic to 92F. CT abd No evidence of acute inflammatory or obstructive process in the   abdomen and pelvis. UA positive nitrites, LEs.   s/p Cefepime    ##Type 2 DM  A1c 7; C/w Lantus 25 + 6U w/ meals + SSI  - Hypoglycemia protocol     #Pulm nodule   - CT noted There are scattered nodules at the visualized lung bases measuring up to 6 mm  CT chest reviewed will need follow up in 3-6  months     #HTN  - C/w cardura, hydralazine     #HLD  - C/w statin     #History of DVT  - Eliquis     Diet: renal, CCD  DVT prophylaxis: eliquis  PT eval- ADRIANNE. Will need outpatient appointment with dermatology Dr. Sarabia office- [phone]. Family interested in [address] [place]  Dispo: off tele   Code Status:

## 2024-09-13 NOTE — PROGRESS NOTE ADULT - SUBJECTIVE AND OBJECTIVE BOX
[facility] NEPHROLOGY SERVICES, [facility]  NEPHROLOGY AND HYPERTENSION  [address]  [address]  [place] [address]  [phone]    MD YISEL WEEKS MD YELENA ROSENBERG, MD BINNY KOSHY, MD CHRISTOPHER CAPUTO, MD EDWARD BOVER, MD          Patient events noted    MEDICATIONS  (STANDING):  apixaban 5 milliGRAM(s) Oral two times a day  AQUAPHOR (petrolatum Ointment) 1 Application(s) Topical daily  atorvastatin 40 milliGRAM(s) Oral at bedtime  calamine/zinc oxide Lotion 1 Application(s) Topical two times a day  chlorhexidine 2% Cloths 1 Application(s) Topical <User Schedule>  dextrose 5%. 1000 milliLiter(s) (50 mL/Hr) IV Continuous <Continuous>  dextrose 5%. 1000 milliLiter(s) (100 mL/Hr) IV Continuous <Continuous>  dextrose 50% Injectable 25 Gram(s) IV Push once  dextrose 50% Injectable 12.5 Gram(s) IV Push once  dextrose 50% Injectable 25 Gram(s) IV Push once  doxazosin 2 milliGRAM(s) Oral at bedtime  glucagon  Injectable 1 milliGRAM(s) IntraMuscular once  hydrALAZINE 50 milliGRAM(s) Oral every 8 hours  insulin glargine Injectable (LANTUS) 25 Unit(s) SubCutaneous at bedtime  insulin lispro (ADMELOG) corrective regimen sliding scale   SubCutaneous at bedtime  insulin lispro (ADMELOG) corrective regimen sliding scale   SubCutaneous three times a day before meals  insulin lispro Injectable (ADMELOG) 6 Unit(s) SubCutaneous three times a day before meals  melatonin 3 milliGRAM(s) Oral at bedtime  Nephro-lee 1 Tablet(s) Oral daily  pantoprazole    Tablet 40 milliGRAM(s) Oral before breakfast  sevelamer carbonate 1600 milliGRAM(s) Oral three times a day with meals  triamcinolone 0.1% Ointment 1 Application(s) Topical two times a day    MEDICATIONS  (PRN):  acetaminophen     Tablet .. 650 milliGRAM(s) Oral every 6 hours PRN Temp greater or equal to 38C (100.4F), Moderate Pain (4 - 6)  benzocaine/menthol Lozenge 1 Lozenge Oral five times a day PRN Mouth Sores  dextrose Oral Gel 15 Gram(s) Oral once PRN Blood Glucose LESS THAN 70 milliGRAM(s)/deciliter  guaiFENesin Oral Liquid (Sugar-Free) 200 milliGRAM(s) Oral every 6 hours PRN Cough  polyethylene glycol 3350 17 Gram(s) Oral daily PRN Constipation  sodium chloride 0.9% Bolus. 100 milliLiter(s) IV Bolus every 5 minutes PRN SBP LESS THAN or EQUAL to 100 mmHg  sodium chloride 0.9% lock flush 10 milliLiter(s) IV Push every 1 hour PRN Pre/post blood products, medications, blood draw, and to maintain line patency      09-12-24 @ 07:01  -  09-13-24 @ 07:00  --------------------------------------------------------  IN: 220 mL / OUT: 500 mL / NET: -280 mL    09-13-24 @ 07:01  -  09-13-24 @ 16:08  --------------------------------------------------------  IN: 1400 mL / OUT: 2300 mL / NET: -900 mL      PHYSICAL EXAM:      T(C): 36.2 (09-13-24 @ 11:15), Max: 36.3 (09-13-24 @ 05:16)  HR: 75 (09-13-24 @ 14:30) (62 - 84)  BP: 108/59 (09-13-24 @ 14:30) (108/59 - 169/86)  RR: 18 (09-13-24 @ 14:30) (17 - 19)  SpO2: 95% (09-13-24 @ 14:30) (95% - 99%)  Wt(kg): --  Lungs clear  Heart S1S2  Abd soft NT ND  Extremities:   tr edema                                    8.3    8.07  )-----------( 156      ( 13 Sep 2024 11:10 )             26.0     09-13    135  |  102  |  79<H>  ----------------------------<  68<L>  4.9   |  29  |  5.52<H>    Ca    8.6      13 Sep 2024 11:10    TPro  6.2  /  Alb  1.9<L>  /  TBili  0.3  /  DBili  x   /  AST  13<L>  /  ALT  18  /  AlkPhos  55  09-13      LIVER FUNCTIONS - ( 13 Sep 2024 11:10 )  Alb: 1.9 g/dL / Pro: 6.2 gm/dL / ALK PHOS: 55 U/L / ALT: 18 U/L / AST: 13 U/L / GGT: x           Creatinine Trend: 5.52<--, 4.99<--, 3.68<--, 5.73<--, 5.09<--, 3.96<--      Assessment   ESRD, maintenance       Plan:  HD for today  UF as tolerated   Will follow     Augusto Santoyo MD

## 2024-09-14 LAB
ALBUMIN SERPL ELPH-MCNC: 1.9 G/DL — LOW (ref 3.3–5)
ALP SERPL-CCNC: 57 U/L — SIGNIFICANT CHANGE UP (ref 40–120)
ALT FLD-CCNC: 19 U/L — SIGNIFICANT CHANGE UP (ref 12–78)
ANION GAP SERPL CALC-SCNC: 6 MMOL/L — SIGNIFICANT CHANGE UP (ref 5–17)
AST SERPL-CCNC: 19 U/L — SIGNIFICANT CHANGE UP (ref 15–37)
BILIRUB SERPL-MCNC: 0.3 MG/DL — SIGNIFICANT CHANGE UP (ref 0.2–1.2)
BUN SERPL-MCNC: 54 MG/DL — HIGH (ref 7–23)
CALCIUM SERPL-MCNC: 9.3 MG/DL — SIGNIFICANT CHANGE UP (ref 8.5–10.1)
CHLORIDE SERPL-SCNC: 100 MMOL/L — SIGNIFICANT CHANGE UP (ref 96–108)
CO2 SERPL-SCNC: 29 MMOL/L — SIGNIFICANT CHANGE UP (ref 22–31)
CREAT SERPL-MCNC: 4.52 MG/DL — HIGH (ref 0.5–1.3)
EGFR: 13 ML/MIN/1.73M2 — LOW
GLUCOSE BLDC GLUCOMTR-MCNC: 107 MG/DL — HIGH (ref 70–99)
GLUCOSE BLDC GLUCOMTR-MCNC: 142 MG/DL — HIGH (ref 70–99)
GLUCOSE BLDC GLUCOMTR-MCNC: 166 MG/DL — HIGH (ref 70–99)
GLUCOSE BLDC GLUCOMTR-MCNC: 73 MG/DL — SIGNIFICANT CHANGE UP (ref 70–99)
GLUCOSE BLDC GLUCOMTR-MCNC: 98 MG/DL — SIGNIFICANT CHANGE UP (ref 70–99)
GLUCOSE SERPL-MCNC: 71 MG/DL — SIGNIFICANT CHANGE UP (ref 70–99)
HCT VFR BLD CALC: 27.9 % — LOW (ref 39–50)
HGB BLD-MCNC: 8.8 G/DL — LOW (ref 13–17)
MCHC RBC-ENTMCNC: 27 PG — SIGNIFICANT CHANGE UP (ref 27–34)
MCHC RBC-ENTMCNC: 31.5 G/DL — LOW (ref 32–36)
MCV RBC AUTO: 85.6 FL — SIGNIFICANT CHANGE UP (ref 80–100)
NRBC # BLD: 0 /100 WBCS — SIGNIFICANT CHANGE UP (ref 0–0)
PLATELET # BLD AUTO: 141 K/UL — LOW (ref 150–400)
POTASSIUM SERPL-MCNC: 4.7 MMOL/L — SIGNIFICANT CHANGE UP (ref 3.5–5.3)
POTASSIUM SERPL-SCNC: 4.7 MMOL/L — SIGNIFICANT CHANGE UP (ref 3.5–5.3)
PROT SERPL-MCNC: 6.5 GM/DL — SIGNIFICANT CHANGE UP (ref 6–8.3)
RBC # BLD: 3.26 M/UL — LOW (ref 4.2–5.8)
RBC # FLD: 15 % — HIGH (ref 10.3–14.5)
SODIUM SERPL-SCNC: 135 MMOL/L — SIGNIFICANT CHANGE UP (ref 135–145)
WBC # BLD: 9.79 K/UL — SIGNIFICANT CHANGE UP (ref 3.8–10.5)
WBC # FLD AUTO: 9.79 K/UL — SIGNIFICANT CHANGE UP (ref 3.8–10.5)

## 2024-09-14 PROCEDURE — 99232 SBSQ HOSP IP/OBS MODERATE 35: CPT

## 2024-09-14 RX ADMIN — INSULIN GLARGINE 25 UNIT(S): 100 INJECTION, SOLUTION SUBCUTANEOUS at 22:25

## 2024-09-14 RX ADMIN — PETROLATUM 1 APPLICATION(S): 865 OINTMENT TOPICAL at 13:14

## 2024-09-14 RX ADMIN — Medication 40 MILLIGRAM(S): at 09:19

## 2024-09-14 RX ADMIN — CHLORHEXIDINE GLUCONATE 1 APPLICATION(S): 40 SOLUTION TOPICAL at 05:22

## 2024-09-14 RX ADMIN — SEVELAMER CARBONATE 1600 MILLIGRAM(S): 800 TABLET, FILM COATED ORAL at 09:18

## 2024-09-14 RX ADMIN — SEVELAMER CARBONATE 1600 MILLIGRAM(S): 800 TABLET, FILM COATED ORAL at 12:26

## 2024-09-14 RX ADMIN — Medication 1 APPLICATION(S): at 17:12

## 2024-09-14 RX ADMIN — Medication 2 MILLIGRAM(S): at 22:24

## 2024-09-14 RX ADMIN — Medication 2: at 17:13

## 2024-09-14 RX ADMIN — Medication 1 TABLET(S): at 12:26

## 2024-09-14 RX ADMIN — ACETAMINOPHEN 650 MILLIGRAM(S): 325 TABLET ORAL at 22:54

## 2024-09-14 RX ADMIN — Medication 6 UNIT(S): at 13:25

## 2024-09-14 RX ADMIN — Medication 50 MILLIGRAM(S): at 22:24

## 2024-09-14 RX ADMIN — Medication 40 MILLIGRAM(S): at 22:24

## 2024-09-14 RX ADMIN — APIXABAN 5 MILLIGRAM(S): 5 TABLET, FILM COATED ORAL at 05:21

## 2024-09-14 RX ADMIN — Medication 50 MILLIGRAM(S): at 13:15

## 2024-09-14 RX ADMIN — Medication 50 MILLIGRAM(S): at 05:21

## 2024-09-14 RX ADMIN — APIXABAN 5 MILLIGRAM(S): 5 TABLET, FILM COATED ORAL at 17:14

## 2024-09-14 RX ADMIN — ACETAMINOPHEN 650 MILLIGRAM(S): 325 TABLET ORAL at 22:24

## 2024-09-14 RX ADMIN — Medication 3 MILLIGRAM(S): at 22:24

## 2024-09-14 RX ADMIN — TRIAMCINOLONE ACETONIDE 1 APPLICATION(S): 1 CREAM TOPICAL at 05:23

## 2024-09-14 RX ADMIN — TRIAMCINOLONE ACETONIDE 1 APPLICATION(S): 1 CREAM TOPICAL at 17:12

## 2024-09-14 RX ADMIN — Medication 6 UNIT(S): at 17:16

## 2024-09-14 RX ADMIN — Medication 1 APPLICATION(S): at 05:21

## 2024-09-14 RX ADMIN — SEVELAMER CARBONATE 1600 MILLIGRAM(S): 800 TABLET, FILM COATED ORAL at 17:14

## 2024-09-14 NOTE — PROGRESS NOTE ADULT - ASSESSMENT
73 year old male PMH HTN, HLD, DM, CHF, CKD stage IV, dementia / anxiety BIBEMS accompanied by wife due to weakness. Per wife, pt not feeling well since last night - went to bed prior to his own birthday party. Per EMS, pt complaining of decreased L vision and decreased L hearing as well as noted off balance with ambulation to restroom this AM. Pt states L eye is usually his good eye, and still with better vision in L than R, but not as good as usual. Pt reports chronic itching rash to body x past 3 months, f/u'd with Derm, was told likely 2/2 renal disease. Hearing later normalized in ER.     #COVID  -per my colleague's documentation " asymptomatic, on RA  - will c/w supportive care w/ robitussin  - Discussed with ID, no role for Rem/Decadron given no SOB and on RA  - will need to remain inpatient "    #Rash  -per my colleague's documentation " c/w triamcinolone 0.1% ointment   - Discussed with dermatology (Dr. Barbour and Dr. Sarabia), patient will need outpatient dermatology appointment for skin biopsy  - s/p cefepime   - Appreciate rheum recs"    ##ESRD   per my colleague's documentation " Temp cath placed and patient to initiate Dialysis here 2/2 Uremia and hyperkalemic. Temp cath placed   - C/w Inpatient dialysis   - Appreciate IR recs- s/p tunneled HD catheter  - Nephro following "    ##SSTI  Noted to be hypothermic to 92F. CT abd No evidence of acute inflammatory or obstructive process in the   abdomen and pelvis. UA positive nitrites, LEs.   s/p Cefepime    ##Type 2 DM  A1c 7; C/w Lantus 25 + 6U w/ meals + SSI  - Hypoglycemia protocol     #Pulm nodule   - CT noted There are scattered nodules at the visualized lung bases measuring up to 6 mm  CT chest reviewed will need follow up in 3-6  months     #HTN  - C/w cardura, hydralazine     #HLD  - C/w statin     #History of DVT  - Eliquis     Diet: renal, CCD  DVT prophylaxis: eliquis  PT eval- ADRIANNE. Will need outpatient appointment with dermatology Dr. Sarabia office- [phone]. Family interested in 5 [place]  Dispo: off tele   Code Status: FC    73 year old male PMH HTN, HLD, DM, CHF, CKD stage IV, dementia / anxiety BIBEMS accompanied by wife due to weakness. Per wife, pt not feeling well since last night - went to bed prior to his own birthday party. Per EMS, pt complaining of decreased L vision and decreased L hearing as well as noted off balance with ambulation to restroom this AM. Pt states L eye is usually his good eye, and still with better vision in L than R, but not as good as usual. Pt reports chronic itching rash to body x past 3 months, f/u'd with Derm, was told likely 2/2 renal disease. Hearing later normalized in ER.     #COVID  -per my colleague's documentation " asymptomatic, on RA  - will c/w supportive care w/ robitussin  - Discussed with ID, no role for Rem/Decadron given no SOB and on RA  - will need to remain inpatient "    #Rash  -per my colleague's documentation " c/w triamcinolone 0.1% ointment   - Discussed with dermatology (Dr. Barbour and Dr. Sarabia), patient will need outpatient dermatology appointment for skin biopsy  - s/p cefepime   - Appreciate rheum recs"    ##ESRD   per my colleague's documentation " Temp cath placed and patient to initiate Dialysis here 2/2 Uremia and hyperkalemic. Temp cath placed   - C/w Inpatient dialysis   - Appreciate IR recs- s/p tunneled HD catheter  - Nephro following "    ##SSTI  Noted to be hypothermic to 92F. CT abd No evidence of acute inflammatory or obstructive process in the   abdomen and pelvis. UA positive nitrites, LEs.   s/p Cefepime    ##Type 2 DM  A1c 7; C/w Lantus 25 + 6U w/ meals + SSI  - Hypoglycemia protocol     #Pulm nodule   -per my colleague's documentation " CT noted There are scattered nodules at the visualized lung bases measuring up to 6 mm  CT chest reviewed will need follow up in 3-6  months "      #HTN  - C/w cardura, hydralazine     #HLD  - C/w statin     #History of DVT  - Eliquis     Diet: renal, CCD  DVT prophylaxis: eliquis  PT eval- ADRIANNE. Will need outpatient appointment with dermatology Dr. Sarabia office- [phone]. Family interested in [address] [place]  Dispo: off tele   Code Status: FC    73 year old male PMH HTN, HLD, DM, CHF, CKD stage IV, dementia / anxiety BIBEMS accompanied by wife due to weakness. Per wife, pt not feeling well since last night - went to bed prior to his own birthday party. Per EMS, pt complaining of decreased L vision and decreased L hearing as well as noted off balance with ambulation to restroom this AM. Pt states L eye is usually his good eye, and still with better vision in L than R, but not as good as usual. Pt reports chronic itching rash to body x past 3 months, f/u'd with Derm, was told likely 2/2 renal disease. Hearing later normalized in ER.     #COVID  -per my colleague's documentation " asymptomatic, on RA  - will c/w supportive care w/ robitussin  - Discussed with ID, no role for Rem/Decadron given no SOB and on RA  - will need to remain inpatient "    #Rash  -per my colleague's documentation " c/w triamcinolone 0.1% ointment   - Discussed with dermatology (Dr. Barbour and Dr. Sarabia), patient will need outpatient dermatology appointment for skin biopsy  - s/p cefepime   - Appreciate rheum recs"    ##ESRD   per my colleague's documentation " Temp cath placed and patient to initiate Dialysis here 2/2 Uremia and hyperkalemic. Temp cath placed   - C/w Inpatient dialysis   - Appreciate IR recs- s/p tunneled HD catheter  - Nephro following "    ##SSTI  Noted to be hypothermic to 92F. CT abd No evidence of acute inflammatory or obstructive process in the   abdomen and pelvis. UA positive nitrites, LEs.   s/p Cefepime    ##Type 2 DM  A1c 7; C/w Lantus 25 + 6U w/ meals + SSI  - Hypoglycemia protocol     #Pulm nodule   -per my colleague's documentation " CT noted There are scattered nodules at the visualized lung bases measuring up to 6 mm  CT chest reviewed will need follow up in 3-6  months "      #HTN  - C/w cardura, hydralazine     #HLD  - C/w statin     #History of DVT  - Eliquis     Diet: renal, CCD  DVT prophylaxis: eliquis  per my colleague's documentation "PT eval- ADRIANNE. Will need outpatient appointment with dermatology Dr. Sarabia office- [phone]. Family interested in [address] [place]"  Dispo: to STR  Code Status: FC

## 2024-09-14 NOTE — PROGRESS NOTE ADULT - SUBJECTIVE AND OBJECTIVE BOX
Patient is a 74y old  Male who presents with a chief complaint of Weakness, progression of CKD (13 Sep 2024 16:08)    INTERVAL HPI/OVERNIGHT EVENTS: No acute events overnight.      Allergies    No Known Allergies    Intolerances    Vital Signs Last 24 Hrs  T(C): 36.7 (14 Sep 2024 10:46), Max: 36.8 (14 Sep 2024 00:14)  T(F): 98 (14 Sep 2024 10:46), Max: 98.2 (14 Sep 2024 00:14)  HR: 60 (14 Sep 2024 10:46) (60 - 85)  BP: 115/64 (14 Sep 2024 10:46) (100/50 - 153/68)  BP(mean): --  RR: 18 (14 Sep 2024 10:46) (17 - 18)  SpO2: 95% (14 Sep 2024 10:46) (95% - 97%)    Parameters below as of 14 Sep 2024 10:46  Patient On (Oxygen Delivery Method): room air        PHYSICAL EXAM:  GENERAL: NAD, well-groomed, well-developed  HEAD:  Atraumatic, Normocephalic  EYES: EOMI, PERRLA, conjunctiva and sclera clear  ENMT: No tonsillar erythema, exudates, or enlargement; Moist mucous membranes, Good dentition, No lesions  NECK: Supple,   NERVOUS SYSTEM:  Alert & demented   CHEST/LUNG: Clear to percussion bilaterally; No rales, rhonchi, wheezing, or rubs  HEART: Regular rate and rhythm; No murmurs, rubs, or gallops  ABDOMEN: Soft, Nontender, Nondistended; Bowel sounds present  EXTREMITIES:  2+ Peripheral Pulses, No clubbing, cyanosis, or edema    SKIN: No rashes or lesions    LABS:                                   8.8    9.79  )-----------( 141      ( 14 Sep 2024 07:58 )             27.9   09-14    135  |  100  |  54<H>  ----------------------------<  71  4.7   |  29  |  4.52<H>    Ca    9.3      14 Sep 2024 07:58    TPro  6.5  /  Alb  1.9<L>  /  TBili  0.3  /  DBili  x   /  AST  19  /  ALT  19  /  AlkPhos  57  09-14    Urinalysis Basic - ( 13 Sep 2024 11:10 )    CAPILLARY BLOOD GLUCOSE    CAPILLARY BLOOD GLUCOSE      POCT Blood Glucose.: 142 mg/dL (14 Sep 2024 11:10)  POCT Blood Glucose.: 73 mg/dL (14 Sep 2024 07:42)  POCT Blood Glucose.: 111 mg/dL (13 Sep 2024 21:50)  POCT Blood Glucose.: 81 mg/dL (13 Sep 2024 16:34)      RADIOLOGY & ADDITIONAL TESTS:    Imaging Personally Reviewed:  [ ] YES  [ ] NO    Consultant(s) Notes Reviewed:  [ ] YES  [ ] NO    Care Discussed with Consultants/Other Providers [ ] YES  [ ] NO Patient is a 74y old  Male who presents with a chief complaint of Weakness, progression of CKD (13 Sep 2024 16:08)    INTERVAL HPI/OVERNIGHT EVENTS: No acute events overnight.    MEDICATIONS  (STANDING):  apixaban 5 milliGRAM(s) Oral two times a day  AQUAPHOR (petrolatum Ointment) 1 Application(s) Topical daily  atorvastatin 40 milliGRAM(s) Oral at bedtime  calamine/zinc oxide Lotion 1 Application(s) Topical two times a day  chlorhexidine 2% Cloths 1 Application(s) Topical <User Schedule>  dextrose 5%. 1000 milliLiter(s) (50 mL/Hr) IV Continuous <Continuous>  dextrose 5%. 1000 milliLiter(s) (100 mL/Hr) IV Continuous <Continuous>  dextrose 50% Injectable 12.5 Gram(s) IV Push once  dextrose 50% Injectable 25 Gram(s) IV Push once  dextrose 50% Injectable 25 Gram(s) IV Push once  doxazosin 2 milliGRAM(s) Oral at bedtime  epoetin malik-epbx (RETACRIT) Injectable 41051 Unit(s) SubCutaneous once  glucagon  Injectable 1 milliGRAM(s) IntraMuscular once  hydrALAZINE 50 milliGRAM(s) Oral every 8 hours  insulin glargine Injectable (LANTUS) 25 Unit(s) SubCutaneous at bedtime  insulin lispro (ADMELOG) corrective regimen sliding scale   SubCutaneous at bedtime  insulin lispro (ADMELOG) corrective regimen sliding scale   SubCutaneous three times a day before meals  insulin lispro Injectable (ADMELOG) 6 Unit(s) SubCutaneous three times a day before meals  melatonin 3 milliGRAM(s) Oral at bedtime  melatonin 3 milliGRAM(s) Oral at bedtime  Nephro-lee 1 Tablet(s) Oral daily  pantoprazole    Tablet 40 milliGRAM(s) Oral before breakfast  sevelamer carbonate 1600 milliGRAM(s) Oral three times a day with meals  triamcinolone 0.1% Ointment 1 Application(s) Topical two times a day    MEDICATIONS  (PRN):  acetaminophen     Tablet .. 650 milliGRAM(s) Oral every 6 hours PRN Temp greater or equal to 38C (100.4F), Moderate Pain (4 - 6)  benzocaine/menthol Lozenge 1 Lozenge Oral five times a day PRN Mouth Sores  dextrose Oral Gel 15 Gram(s) Oral once PRN Blood Glucose LESS THAN 70 milliGRAM(s)/deciliter  guaiFENesin Oral Liquid (Sugar-Free) 200 milliGRAM(s) Oral every 6 hours PRN Cough  polyethylene glycol 3350 17 Gram(s) Oral daily PRN Constipation  sodium chloride 0.9% Bolus. 100 milliLiter(s) IV Bolus every 5 minutes PRN SBP LESS THAN or EQUAL to 100 mmHg  sodium chloride 0.9% lock flush 10 milliLiter(s) IV Push every 1 hour PRN Pre/post blood products, medications, blood draw, and to maintain line patency    Allergies    No Known Allergies    Intolerances    Vital Signs Last 24 Hrs  T(C): 36.7 (14 Sep 2024 10:46), Max: 36.8 (14 Sep 2024 00:14)  T(F): 98 (14 Sep 2024 10:46), Max: 98.2 (14 Sep 2024 00:14)  HR: 60 (14 Sep 2024 10:46) (60 - 85)  BP: 115/64 (14 Sep 2024 10:46) (100/50 - 153/68)  BP(mean): --  RR: 18 (14 Sep 2024 10:46) (17 - 18)  SpO2: 95% (14 Sep 2024 10:46) (95% - 97%)    Parameters below as of 14 Sep 2024 10:46  Patient On (Oxygen Delivery Method): room air        PHYSICAL EXAM:  GENERAL: NAD, well-groomed, well-developed  HEAD:  Atraumatic, Normocephalic  EYES: EOMI, PERRLA, conjunctiva and sclera clear  ENMT: No tonsillar erythema, exudates, or enlargement; Moist mucous membranes, Good dentition, No lesions  NECK: Supple,   NERVOUS SYSTEM:  Alert & demented   CHEST/LUNG: Clear to percussion bilaterally; No rales, rhonchi, wheezing, or rubs  HEART: Regular rate and rhythm; No murmurs, rubs, or gallops  ABDOMEN: Soft, Nontender, Nondistended; Bowel sounds present  EXTREMITIES:  2+ Peripheral Pulses, No clubbing, cyanosis, or edema  left bka  SKIN: skin is covered  with varying  stages of ulcers, ulcerations ruptured bullae, excoriations, on arms, torso  and legs  LABS:                                   8.8    9.79  )-----------( 141      ( 14 Sep 2024 07:58 )             27.9   09-14    135  |  100  |  54<H>  ----------------------------<  71  4.7   |  29  |  4.52<H>    Ca    9.3      14 Sep 2024 07:58    TPro  6.5  /  Alb  1.9<L>  /  TBili  0.3  /  DBili  x   /  AST  19  /  ALT  19  /  AlkPhos  57  09-14    Urinalysis Basic - ( 13 Sep 2024 11:10 )    CAPILLARY BLOOD GLUCOSE    CAPILLARY BLOOD GLUCOSE      POCT Blood Glucose.: 142 mg/dL (14 Sep 2024 11:10)  POCT Blood Glucose.: 73 mg/dL (14 Sep 2024 07:42)  POCT Blood Glucose.: 111 mg/dL (13 Sep 2024 21:50)  POCT Blood Glucose.: 81 mg/dL (13 Sep 2024 16:34)      RADIOLOGY & ADDITIONAL TESTS:    Imaging Personally Reviewed:  [ ] YES  [ ] NO    Consultant(s) Notes Reviewed:  [ ] YES  [ ] NO    Care Discussed with Consultants/Other Providers [ ] YES  [ ] NO

## 2024-09-15 LAB
GLUCOSE BLDC GLUCOMTR-MCNC: 167 MG/DL — HIGH (ref 70–99)
GLUCOSE BLDC GLUCOMTR-MCNC: 173 MG/DL — HIGH (ref 70–99)
GLUCOSE BLDC GLUCOMTR-MCNC: 185 MG/DL — HIGH (ref 70–99)
GLUCOSE BLDC GLUCOMTR-MCNC: 186 MG/DL — HIGH (ref 70–99)
GLUCOSE BLDC GLUCOMTR-MCNC: 196 MG/DL — HIGH (ref 70–99)
GLUCOSE BLDC GLUCOMTR-MCNC: 75 MG/DL — SIGNIFICANT CHANGE UP (ref 70–99)

## 2024-09-15 PROCEDURE — 99232 SBSQ HOSP IP/OBS MODERATE 35: CPT

## 2024-09-15 RX ORDER — EPOETIN ALFA 3000 [IU]/ML
10000 SOLUTION INTRAVENOUS; SUBCUTANEOUS
Refills: 0 | Status: DISCONTINUED | OUTPATIENT
Start: 2024-09-15 | End: 2024-09-21

## 2024-09-15 RX ADMIN — Medication 2 MILLIGRAM(S): at 23:24

## 2024-09-15 RX ADMIN — Medication 40 MILLIGRAM(S): at 23:20

## 2024-09-15 RX ADMIN — SEVELAMER CARBONATE 1600 MILLIGRAM(S): 800 TABLET, FILM COATED ORAL at 17:16

## 2024-09-15 RX ADMIN — Medication 6 UNIT(S): at 17:14

## 2024-09-15 RX ADMIN — Medication 2: at 12:29

## 2024-09-15 RX ADMIN — APIXABAN 5 MILLIGRAM(S): 5 TABLET, FILM COATED ORAL at 06:47

## 2024-09-15 RX ADMIN — SEVELAMER CARBONATE 1600 MILLIGRAM(S): 800 TABLET, FILM COATED ORAL at 09:40

## 2024-09-15 RX ADMIN — INSULIN GLARGINE 25 UNIT(S): 100 INJECTION, SOLUTION SUBCUTANEOUS at 23:19

## 2024-09-15 RX ADMIN — Medication 3 MILLIGRAM(S): at 23:19

## 2024-09-15 RX ADMIN — TRIAMCINOLONE ACETONIDE 1 APPLICATION(S): 1 CREAM TOPICAL at 17:17

## 2024-09-15 RX ADMIN — ACETAMINOPHEN 650 MILLIGRAM(S): 325 TABLET ORAL at 23:29

## 2024-09-15 RX ADMIN — SEVELAMER CARBONATE 1600 MILLIGRAM(S): 800 TABLET, FILM COATED ORAL at 12:27

## 2024-09-15 RX ADMIN — Medication 1 APPLICATION(S): at 06:46

## 2024-09-15 RX ADMIN — Medication 40 MILLIGRAM(S): at 09:40

## 2024-09-15 RX ADMIN — Medication 50 MILLIGRAM(S): at 23:24

## 2024-09-15 RX ADMIN — APIXABAN 5 MILLIGRAM(S): 5 TABLET, FILM COATED ORAL at 17:15

## 2024-09-15 RX ADMIN — Medication 50 MILLIGRAM(S): at 06:46

## 2024-09-15 RX ADMIN — Medication 2: at 17:14

## 2024-09-15 RX ADMIN — PETROLATUM 1 APPLICATION(S): 865 OINTMENT TOPICAL at 12:26

## 2024-09-15 RX ADMIN — Medication 1 APPLICATION(S): at 17:13

## 2024-09-15 RX ADMIN — Medication 50 MILLIGRAM(S): at 14:06

## 2024-09-15 RX ADMIN — Medication 1 TABLET(S): at 12:27

## 2024-09-15 RX ADMIN — CHLORHEXIDINE GLUCONATE 1 APPLICATION(S): 40 SOLUTION TOPICAL at 06:46

## 2024-09-15 RX ADMIN — TRIAMCINOLONE ACETONIDE 1 APPLICATION(S): 1 CREAM TOPICAL at 06:46

## 2024-09-15 NOTE — PROGRESS NOTE ADULT - SUBJECTIVE AND OBJECTIVE BOX
Patient is a 74y old  Male who presents with a chief complaint of Weakness, progression of CKD (13 Sep 2024 16:08)    INTERVAL HPI/OVERNIGHT EVENTS: No acute events overnight.      MEDICATIONS  (STANDING):  apixaban 5 milliGRAM(s) Oral two times a day  AQUAPHOR (petrolatum Ointment) 1 Application(s) Topical daily  atorvastatin 40 milliGRAM(s) Oral at bedtime  calamine/zinc oxide Lotion 1 Application(s) Topical two times a day  chlorhexidine 2% Cloths 1 Application(s) Topical <User Schedule>  dextrose 5%. 1000 milliLiter(s) (50 mL/Hr) IV Continuous <Continuous>  dextrose 5%. 1000 milliLiter(s) (100 mL/Hr) IV Continuous <Continuous>  dextrose 50% Injectable 25 Gram(s) IV Push once  dextrose 50% Injectable 12.5 Gram(s) IV Push once  dextrose 50% Injectable 25 Gram(s) IV Push once  doxazosin 2 milliGRAM(s) Oral at bedtime  epoetin malik-epbx (RETACRIT) Injectable 44390 Unit(s) SubCutaneous once  glucagon  Injectable 1 milliGRAM(s) IntraMuscular once  hydrALAZINE 50 milliGRAM(s) Oral every 8 hours  insulin glargine Injectable (LANTUS) 25 Unit(s) SubCutaneous at bedtime  insulin lispro (ADMELOG) corrective regimen sliding scale   SubCutaneous at bedtime  insulin lispro (ADMELOG) corrective regimen sliding scale   SubCutaneous three times a day before meals  insulin lispro Injectable (ADMELOG) 6 Unit(s) SubCutaneous three times a day before meals  melatonin 3 milliGRAM(s) Oral at bedtime  Nephro-lee 1 Tablet(s) Oral daily  pantoprazole    Tablet 40 milliGRAM(s) Oral before breakfast  sevelamer carbonate 1600 milliGRAM(s) Oral three times a day with meals  triamcinolone 0.1% Ointment 1 Application(s) Topical two times a day    MEDICATIONS  (PRN):  acetaminophen     Tablet .. 650 milliGRAM(s) Oral every 6 hours PRN Temp greater or equal to 38C (100.4F), Moderate Pain (4 - 6)  benzocaine/menthol Lozenge 1 Lozenge Oral five times a day PRN Mouth Sores  dextrose Oral Gel 15 Gram(s) Oral once PRN Blood Glucose LESS THAN 70 milliGRAM(s)/deciliter  guaiFENesin Oral Liquid (Sugar-Free) 200 milliGRAM(s) Oral every 6 hours PRN Cough  polyethylene glycol 3350 17 Gram(s) Oral daily PRN Constipation  sodium chloride 0.9% Bolus. 100 milliLiter(s) IV Bolus every 5 minutes PRN SBP LESS THAN or EQUAL to 100 mmHg  sodium chloride 0.9% lock flush 10 milliLiter(s) IV Push every 1 hour PRN Pre/post blood products, medications, blood draw, and to maintain line patency  Allergies    No Known Allergies    Intolerances    Vital Signs Last 24 Hrs  T(C): 36.2 (15 Sep 2024 06:27), Max: 36.7 (14 Sep 2024 10:46)  T(F): 97.2 (15 Sep 2024 06:27), Max: 98 (14 Sep 2024 10:46)  HR: 60 (15 Sep 2024 06:27) (60 - 77)  BP: 136/60 (15 Sep 2024 06:27) (115/64 - 164/71)  BP(mean): --  RR: 18 (15 Sep 2024 06:27) (18 - 19)  SpO2: 95% (15 Sep 2024 06:27) (95% - 97%)    Parameters below as of 15 Sep 2024 07:11  Patient On (Oxygen Delivery Method): room air        PHYSICAL EXAM:  GENERAL: NAD, well-groomed, well-developed  HEAD:  Atraumatic, Normocephalic  EYES: EOMI, PERRLA, conjunctiva and sclera clear  ENMT: No tonsillar erythema, exudates, or enlargement; Moist mucous membranes, Good dentition, No lesions  NECK: Supple,   NERVOUS SYSTEM:  Alert & demented   CHEST/LUNG: Clear to percussion bilaterally; No rales, rhonchi, wheezing, or rubs  HEART: Regular rate and rhythm; No murmurs, rubs, or gallops  ABDOMEN: Soft, Nontender, Nondistended; Bowel sounds present  EXTREMITIES:  2+ Peripheral Pulses, No clubbing, cyanosis, or edema  left bka  SKIN: skin is covered  with varying  stages of ulcers, ulcerations ruptured bullae, excoriations, on arms, torso  and legs  LABS:                                     8.8    9.79  )-----------( 141      ( 14 Sep 2024 07:58 )             27.9   09-14    135  |  100  |  54<H>  ----------------------------<  71  4.7   |  29  |  4.52<H>    Ca    9.3      14 Sep 2024 07:58    TPro  6.5  /  Alb  1.9<L>  /  TBili  0.3  /  DBili  x   /  AST  19  /  ALT  19  /  AlkPhos  57  09-14    Urinalysis Basic - ( 13 Sep 2024 11:10 )      CAPILLARY BLOOD GLUCOSE      POCT Blood Glucose.: 75 mg/dL (15 Sep 2024 08:24)  POCT Blood Glucose.: 107 mg/dL (14 Sep 2024 21:50)  POCT Blood Glucose.: 166 mg/dL (14 Sep 2024 16:31)  POCT Blood Glucose.: 98 mg/dL (14 Sep 2024 13:17)  POCT Blood Glucose.: 142 mg/dL (14 Sep 2024 11:10)    RADIOLOGY & ADDITIONAL TESTS:    Imaging Personally Reviewed:  [ ] YES  [ ] NO    Consultant(s) Notes Reviewed:  [ ] YES  [ ] NO    Care Discussed with Consultants/Other Providers [ ] YES  [ ] NO

## 2024-09-15 NOTE — PROGRESS NOTE ADULT - ASSESSMENT
73 year old male PMH HTN, HLD, DM, CHF, CKD stage IV, dementia / anxiety BIBEMS accompanied by wife due to weakness. Per wife, pt not feeling well since last night - went to bed prior to his own birthday party. Per EMS, pt complaining of decreased L vision and decreased L hearing as well as noted off balance with ambulation to restroom this AM. Pt states L eye is usually his good eye, and still with better vision in L than R, but not as good as usual. Pt reports chronic itching rash to body x past 3 months, f/u'd with Derm, was told likely 2/2 renal disease. Hearing later normalized in ER.     #COVID  -per my colleague's documentation " asymptomatic, on RA  - will c/w supportive care w/ robitussin  - Discussed with ID, no role for Rem/Decadron given no SOB and on RA  - will need to remain inpatient "    #Rash  -per my colleague's documentation " c/w triamcinolone 0.1% ointment   - Discussed with dermatology (Dr. Barbour and Dr. Sarabia), patient will need outpatient dermatology appointment for skin biopsy  - s/p cefepime   - Appreciate rheum recs"  9/15/2024 asked wound pt to come back to revaluate right upper extrmity    ##ESRD   per my colleague's documentation " Temp cath placed and patient to initiate Dialysis here 2/2 Uremia and hyperkalemic. Temp cath placed   - C/w Inpatient dialysis   - Appreciate IR recs- s/p tunneled HD catheter  - Nephro following "    ##SSTI  Noted to be hypothermic to 92F. CT abd No evidence of acute inflammatory or obstructive process in the   abdomen and pelvis. UA positive nitrites, LEs.   s/p Cefepime    ##Type 2 DM  A1c 7; C/w Lantus 25 + 6U w/ meals + SSI  - Hypoglycemia protocol     #Pulm nodule   -per my colleague's documentation " CT noted There are scattered nodules at the visualized lung bases measuring up to 6 mm  CT chest reviewed will need follow up in 3-6  months "      #HTN  - C/w cardura, hydralazine     #HLD  - C/w statin     #History of DVT  - Eliquis     Diet: renal, CCD  DVT prophylaxis: eliquis  per my colleague's documentation "PT eval- ADRIANNE. Will need outpatient appointment with dermatology Dr. Sarabia office- [phone]. Family interested in [address] [facility]"  Dispo: to STR  Code Status: FC

## 2024-09-16 LAB
ALBUMIN SERPL ELPH-MCNC: 2 G/DL — LOW (ref 3.3–5)
ANION GAP SERPL CALC-SCNC: 11 MMOL/L — SIGNIFICANT CHANGE UP (ref 5–17)
ANISOCYTOSIS BLD QL: SLIGHT — SIGNIFICANT CHANGE UP
APTT BLD: 37.5 SEC — HIGH (ref 24.5–35.6)
BASOPHILS # BLD AUTO: 0 K/UL — SIGNIFICANT CHANGE UP (ref 0–0.2)
BASOPHILS NFR BLD AUTO: 0 % — SIGNIFICANT CHANGE UP (ref 0–2)
BUN SERPL-MCNC: 93 MG/DL — HIGH (ref 7–23)
CALCIUM SERPL-MCNC: 8.9 MG/DL — SIGNIFICANT CHANGE UP (ref 8.5–10.1)
CHLORIDE SERPL-SCNC: 101 MMOL/L — SIGNIFICANT CHANGE UP (ref 96–108)
CO2 SERPL-SCNC: 24 MMOL/L — SIGNIFICANT CHANGE UP (ref 22–31)
CREAT SERPL-MCNC: 6.72 MG/DL — HIGH (ref 0.5–1.3)
DACRYOCYTES BLD QL SMEAR: SLIGHT — SIGNIFICANT CHANGE UP
EGFR: 8 ML/MIN/1.73M2 — LOW
ELLIPTOCYTES BLD QL SMEAR: SLIGHT — SIGNIFICANT CHANGE UP
EOSINOPHIL # BLD AUTO: 0.5 K/UL — SIGNIFICANT CHANGE UP (ref 0–0.5)
EOSINOPHIL NFR BLD AUTO: 5 % — SIGNIFICANT CHANGE UP (ref 0–6)
GLUCOSE BLDC GLUCOMTR-MCNC: 145 MG/DL — HIGH (ref 70–99)
GLUCOSE BLDC GLUCOMTR-MCNC: 155 MG/DL — HIGH (ref 70–99)
GLUCOSE BLDC GLUCOMTR-MCNC: 87 MG/DL — SIGNIFICANT CHANGE UP (ref 70–99)
GLUCOSE BLDC GLUCOMTR-MCNC: 97 MG/DL — SIGNIFICANT CHANGE UP (ref 70–99)
GLUCOSE SERPL-MCNC: 154 MG/DL — HIGH (ref 70–99)
HAV IGM SER-ACNC: SIGNIFICANT CHANGE UP
HBV CORE IGM SER-ACNC: SIGNIFICANT CHANGE UP
HBV SURFACE AB SER-ACNC: SIGNIFICANT CHANGE UP
HBV SURFACE AG SER-ACNC: SIGNIFICANT CHANGE UP
HCT VFR BLD CALC: 30.1 % — LOW (ref 39–50)
HCV AB S/CO SERPL IA: 0.14 S/CO — SIGNIFICANT CHANGE UP (ref 0–0.99)
HCV AB SERPL-IMP: SIGNIFICANT CHANGE UP
HGB BLD-MCNC: 9.5 G/DL — LOW (ref 13–17)
HYPOCHROMIA BLD QL: SLIGHT — SIGNIFICANT CHANGE UP
INR BLD: 1.25 RATIO — HIGH (ref 0.85–1.18)
LACTATE SERPL-SCNC: 0.9 MMOL/L — SIGNIFICANT CHANGE UP (ref 0.7–2)
LYMPHOCYTES # BLD AUTO: 0.7 K/UL — LOW (ref 1–3.3)
LYMPHOCYTES # BLD AUTO: 7 % — LOW (ref 13–44)
MACROCYTES BLD QL: SLIGHT — SIGNIFICANT CHANGE UP
MANUAL SMEAR VERIFICATION: SIGNIFICANT CHANGE UP
MCHC RBC-ENTMCNC: 27.1 PG — SIGNIFICANT CHANGE UP (ref 27–34)
MCHC RBC-ENTMCNC: 31.6 G/DL — LOW (ref 32–36)
MCV RBC AUTO: 86 FL — SIGNIFICANT CHANGE UP (ref 80–100)
MICROCYTES BLD QL: SLIGHT — SIGNIFICANT CHANGE UP
MONOCYTES # BLD AUTO: 0.7 K/UL — SIGNIFICANT CHANGE UP (ref 0–0.9)
MONOCYTES NFR BLD AUTO: 7 % — SIGNIFICANT CHANGE UP (ref 2–14)
NEUTROPHILS # BLD AUTO: 8.04 K/UL — HIGH (ref 1.8–7.4)
NEUTROPHILS NFR BLD AUTO: 79 % — HIGH (ref 43–77)
NEUTS BAND # BLD: 2 % — SIGNIFICANT CHANGE UP (ref 0–8)
NRBC # BLD: 0 /100 WBCS — SIGNIFICANT CHANGE UP (ref 0–0)
NRBC # BLD: SIGNIFICANT CHANGE UP /100 WBCS (ref 0–0)
OVALOCYTES BLD QL SMEAR: SIGNIFICANT CHANGE UP
PHOSPHATE SERPL-MCNC: 5.8 MG/DL — HIGH (ref 2.5–4.5)
PLAT MORPH BLD: NORMAL — SIGNIFICANT CHANGE UP
PLATELET # BLD AUTO: 173 K/UL — SIGNIFICANT CHANGE UP (ref 150–400)
POTASSIUM SERPL-MCNC: 5.2 MMOL/L — SIGNIFICANT CHANGE UP (ref 3.5–5.3)
POTASSIUM SERPL-SCNC: 5.2 MMOL/L — SIGNIFICANT CHANGE UP (ref 3.5–5.3)
PROTHROM AB SERPL-ACNC: 14.8 SEC — HIGH (ref 9.5–13)
RBC # BLD: 3.5 M/UL — LOW (ref 4.2–5.8)
RBC # FLD: 14.9 % — HIGH (ref 10.3–14.5)
RBC BLD AUTO: ABNORMAL
SCHISTOCYTES BLD QL AUTO: SLIGHT — SIGNIFICANT CHANGE UP
SODIUM SERPL-SCNC: 136 MMOL/L — SIGNIFICANT CHANGE UP (ref 135–145)
WBC # BLD: 9.93 K/UL — SIGNIFICANT CHANGE UP (ref 3.8–10.5)
WBC # FLD AUTO: 9.93 K/UL — SIGNIFICANT CHANGE UP (ref 3.8–10.5)

## 2024-09-16 PROCEDURE — 99232 SBSQ HOSP IP/OBS MODERATE 35: CPT

## 2024-09-16 PROCEDURE — 99233 SBSQ HOSP IP/OBS HIGH 50: CPT

## 2024-09-16 PROCEDURE — 99223 1ST HOSP IP/OBS HIGH 75: CPT | Mod: 25

## 2024-09-16 RX ORDER — SODIUM ZIRCONIUM CYCLOSILICATE 5 G/5G
5 POWDER, FOR SUSPENSION ORAL ONCE
Refills: 0 | Status: COMPLETED | OUTPATIENT
Start: 2024-09-16 | End: 2024-09-16

## 2024-09-16 RX ORDER — BACITRACIN 500 UNIT/G
1 OINTMENT (GRAM) TOPICAL DAILY
Refills: 0 | Status: DISCONTINUED | OUTPATIENT
Start: 2024-09-16 | End: 2024-09-21

## 2024-09-16 RX ORDER — MIDODRINE HYDROCHLORIDE 5 MG/1
10 TABLET ORAL ONCE
Refills: 0 | Status: COMPLETED | OUTPATIENT
Start: 2024-09-16 | End: 2024-09-16

## 2024-09-16 RX ADMIN — Medication 2: at 12:35

## 2024-09-16 RX ADMIN — APIXABAN 5 MILLIGRAM(S): 5 TABLET, FILM COATED ORAL at 07:16

## 2024-09-16 RX ADMIN — Medication 1 APPLICATION(S): at 19:30

## 2024-09-16 RX ADMIN — SEVELAMER CARBONATE 1600 MILLIGRAM(S): 800 TABLET, FILM COATED ORAL at 19:17

## 2024-09-16 RX ADMIN — Medication 50 MILLIGRAM(S): at 07:16

## 2024-09-16 RX ADMIN — ACETAMINOPHEN 650 MILLIGRAM(S): 325 TABLET ORAL at 00:25

## 2024-09-16 RX ADMIN — Medication 1 APPLICATION(S): at 23:21

## 2024-09-16 RX ADMIN — SEVELAMER CARBONATE 1600 MILLIGRAM(S): 800 TABLET, FILM COATED ORAL at 08:40

## 2024-09-16 RX ADMIN — Medication 3 MILLIGRAM(S): at 23:11

## 2024-09-16 RX ADMIN — Medication 40 MILLIGRAM(S): at 23:12

## 2024-09-16 RX ADMIN — Medication 2 MILLIGRAM(S): at 23:11

## 2024-09-16 RX ADMIN — EPOETIN ALFA 10000 UNIT(S): 3000 SOLUTION INTRAVENOUS; SUBCUTANEOUS at 17:17

## 2024-09-16 RX ADMIN — TRIAMCINOLONE ACETONIDE 1 APPLICATION(S): 1 CREAM TOPICAL at 06:47

## 2024-09-16 RX ADMIN — Medication 50 MILLIGRAM(S): at 23:12

## 2024-09-16 RX ADMIN — MIDODRINE HYDROCHLORIDE 10 MILLIGRAM(S): 5 TABLET ORAL at 17:17

## 2024-09-16 RX ADMIN — PETROLATUM 1 APPLICATION(S): 865 OINTMENT TOPICAL at 12:36

## 2024-09-16 RX ADMIN — Medication 40 MILLIGRAM(S): at 07:16

## 2024-09-16 RX ADMIN — ACETAMINOPHEN 650 MILLIGRAM(S): 325 TABLET ORAL at 23:11

## 2024-09-16 RX ADMIN — Medication 6 UNIT(S): at 12:35

## 2024-09-16 RX ADMIN — TRIAMCINOLONE ACETONIDE 1 APPLICATION(S): 1 CREAM TOPICAL at 23:13

## 2024-09-16 RX ADMIN — Medication 1 APPLICATION(S): at 07:18

## 2024-09-16 RX ADMIN — Medication 1 TABLET(S): at 12:30

## 2024-09-16 RX ADMIN — SEVELAMER CARBONATE 1600 MILLIGRAM(S): 800 TABLET, FILM COATED ORAL at 12:30

## 2024-09-16 RX ADMIN — APIXABAN 5 MILLIGRAM(S): 5 TABLET, FILM COATED ORAL at 19:27

## 2024-09-16 RX ADMIN — Medication 6 UNIT(S): at 08:40

## 2024-09-16 NOTE — PROGRESS NOTE ADULT - SUBJECTIVE AND OBJECTIVE BOX
[facility] NEPHROLOGY SERVICES, [facility]  NEPHROLOGY AND HYPERTENSION  [address]  [address]  [place] [address]  [phone]    MD YISEL WEEKS MD YELENA ROSENBERG, MD BINNY KOSHY, MD CHRISTOPHER CAPUTO, MD EDWARD BOVER, MD          Patient events noted    MEDICATIONS  (STANDING):  apixaban 5 milliGRAM(s) Oral two times a day  AQUAPHOR (petrolatum Ointment) 1 Application(s) Topical daily  atorvastatin 40 milliGRAM(s) Oral at bedtime  calamine/zinc oxide Lotion 1 Application(s) Topical two times a day  chlorhexidine 2% Cloths 1 Application(s) Topical <User Schedule>  dextrose 5%. 1000 milliLiter(s) (50 mL/Hr) IV Continuous <Continuous>  dextrose 5%. 1000 milliLiter(s) (100 mL/Hr) IV Continuous <Continuous>  dextrose 50% Injectable 25 Gram(s) IV Push once  dextrose 50% Injectable 12.5 Gram(s) IV Push once  dextrose 50% Injectable 25 Gram(s) IV Push once  doxazosin 2 milliGRAM(s) Oral at bedtime  epoetin malik-epbx (RETACRIT) Injectable 44758 Unit(s) IV Push <User Schedule>  glucagon  Injectable 1 milliGRAM(s) IntraMuscular once  hydrALAZINE 50 milliGRAM(s) Oral every 8 hours  insulin glargine Injectable (LANTUS) 25 Unit(s) SubCutaneous at bedtime  insulin lispro (ADMELOG) corrective regimen sliding scale   SubCutaneous at bedtime  insulin lispro (ADMELOG) corrective regimen sliding scale   SubCutaneous three times a day before meals  insulin lispro Injectable (ADMELOG) 6 Unit(s) SubCutaneous three times a day before meals  melatonin 3 milliGRAM(s) Oral at bedtime  Nephro-lee 1 Tablet(s) Oral daily  pantoprazole    Tablet 40 milliGRAM(s) Oral before breakfast  sevelamer carbonate 1600 milliGRAM(s) Oral three times a day with meals  triamcinolone 0.1% Ointment 1 Application(s) Topical two times a day    MEDICATIONS  (PRN):  acetaminophen     Tablet .. 650 milliGRAM(s) Oral every 6 hours PRN Temp greater or equal to 38C (100.4F), Moderate Pain (4 - 6)  benzocaine/menthol Lozenge 1 Lozenge Oral five times a day PRN Mouth Sores  dextrose Oral Gel 15 Gram(s) Oral once PRN Blood Glucose LESS THAN 70 milliGRAM(s)/deciliter  guaiFENesin Oral Liquid (Sugar-Free) 200 milliGRAM(s) Oral every 6 hours PRN Cough  polyethylene glycol 3350 17 Gram(s) Oral daily PRN Constipation  sodium chloride 0.9% Bolus. 100 milliLiter(s) IV Bolus every 5 minutes PRN SBP LESS THAN or EQUAL to 100 mmHg  sodium chloride 0.9% lock flush 10 milliLiter(s) IV Push every 1 hour PRN Pre/post blood products, medications, blood draw, and to maintain line patency      PHYSICAL EXAM:      T(C): 37.2 (09-16-24 @ 11:14), Max: 37.2 (09-16-24 @ 11:14)  HR: 58 (09-16-24 @ 11:14) (58 - 74)  BP: 152/66 (09-16-24 @ 11:14) (131/61 - 168/71)  RR: 17 (09-16-24 @ 11:14) (17 - 18)  SpO2: 99% (09-16-24 @ 11:14) (96% - 99%)  Wt(kg): --  Lungs clear  Heart S1S2  Abd soft NT ND  Extremities:   tr edema  Ski excoriations                                     9.5    9.93  )-----------( 173      ( 16 Sep 2024 06:20 )             30.1     09-16    136  |  101  |  93<H>  ----------------------------<  154<H>  5.2   |  24  |  6.72<H>    Ca    8.9      16 Sep 2024 06:20  Phos  5.8     09-16    TPro  x   /  Alb  2.0<L>  /  TBili  x   /  DBili  x   /  AST  x   /  ALT  x   /  AlkPhos  x   09-16      LIVER FUNCTIONS - ( 16 Sep 2024 06:20 )  Alb: 2.0 g/dL / Pro: x     / ALK PHOS: x     / ALT: x     / AST: x     / GGT: x           Creatinine Trend: 6.72<--, 4.52<--, 5.52<--, 4.99<--, 3.68<--, 5.73<--      Assessment   New ESRD  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

## 2024-09-16 NOTE — CONSULT NOTE ADULT - SUBJECTIVE AND OBJECTIVE BOX
Chief Complaint:  Patient is a 74y old  Male who presents with a chief complaint of Weakness, progression of CKD (16 Sep 2024 10:45)      HPI:  74 year old male PMH HTN, HLD, DM, CHF, CKD stage IV, dementia / anxiety BIBEMS accompanied by wife due to weakness. Per wife, pt not feeling well since last night - went to bed prior to his own birthday party. Per EMS, pt complaining of decreased L vision and decreased L hearing as well as noted off balance with ambulation to restroom this AM. Pt states L eye is usually his good eye, and still with better vision in L than R, but not as good as usual. Pt reports chronic itching rash to body x past 3 months, f/u'd with Derm, was told likely 2/2 renal disease. Hearing later normalized in ER.     Note: Has left BKA.  (26 Aug 2024 16:46)      Interval Hx:     73 Y/O male with a PMHx of HTN, HLD, CHF, CKD, afib on eliquis was BIBEMS to hospital for weakness. Patient states he has had a pruritic skin rash for the past 5 weeks to which he saw a Dermatologist outpatient. No intervention was implemented at this time. Over the past several weeks, patient states the pruritus has been worsening leading to open wounds and ulcerations. Denies fever, chills, N/V/D, abdominal pain.       PMH/PSH:PAST MEDICAL & SURGICAL HISTORY:  Hypertension, unspecified type      Type 2 diabetes mellitus with other neurologic complication, without long-term current use of insulin      DM (diabetes mellitus)      HTN (hypertension)      HLD (hyperlipidemia)      Afib      Retinopathy      Chronic diastolic congestive heart failure      Chronic kidney disease, unspecified CKD stage      Amputation of leg, traumatic, left, subsequent encounter      S/P BKA (below knee amputation) unilateral, left      S/P hip replacement, left      History of surgery on arm          Allergies:  No Known Allergies      Medications:  acetaminophen     Tablet .. 650 milliGRAM(s) Oral every 6 hours PRN  apixaban 5 milliGRAM(s) Oral two times a day  AQUAPHOR (petrolatum Ointment) 1 Application(s) Topical daily  atorvastatin 40 milliGRAM(s) Oral at bedtime  benzocaine/menthol Lozenge 1 Lozenge Oral five times a day PRN  calamine/zinc oxide Lotion 1 Application(s) Topical two times a day  chlorhexidine 2% Cloths 1 Application(s) Topical <User Schedule>  dextrose 5%. 1000 milliLiter(s) IV Continuous <Continuous>  dextrose 5%. 1000 milliLiter(s) IV Continuous <Continuous>  dextrose 50% Injectable 12.5 Gram(s) IV Push once  dextrose 50% Injectable 25 Gram(s) IV Push once  dextrose 50% Injectable 25 Gram(s) IV Push once  dextrose Oral Gel 15 Gram(s) Oral once PRN  doxazosin 2 milliGRAM(s) Oral at bedtime  epoetin malik-epbx (RETACRIT) Injectable 52657 Unit(s) IV Push <User Schedule>  glucagon  Injectable 1 milliGRAM(s) IntraMuscular once  guaiFENesin Oral Liquid (Sugar-Free) 200 milliGRAM(s) Oral every 6 hours PRN  hydrALAZINE 50 milliGRAM(s) Oral every 8 hours  insulin glargine Injectable (LANTUS) 25 Unit(s) SubCutaneous at bedtime  insulin lispro (ADMELOG) corrective regimen sliding scale   SubCutaneous at bedtime  insulin lispro (ADMELOG) corrective regimen sliding scale   SubCutaneous three times a day before meals  insulin lispro Injectable (ADMELOG) 6 Unit(s) SubCutaneous three times a day before meals  melatonin 3 milliGRAM(s) Oral at bedtime  Nephro-lee 1 Tablet(s) Oral daily  pantoprazole    Tablet 40 milliGRAM(s) Oral before breakfast  polyethylene glycol 3350 17 Gram(s) Oral daily PRN  sevelamer carbonate 1600 milliGRAM(s) Oral three times a day with meals  sodium chloride 0.9% Bolus. 100 milliLiter(s) IV Bolus every 5 minutes PRN  sodium chloride 0.9% lock flush 10 milliLiter(s) IV Push every 1 hour PRN  triamcinolone 0.1% Ointment 1 Application(s) Topical two times a day      Review of Systems:  Negative except for HPI    Relevant Family History:   FAMILY HISTORY:  No pertinent family history in first degree relatives    Family history of heart failure (Father)        Relevant Social History: Alcohol ( -) , Tobacco ( -) , Illicit drugs (- )     Physical Exam:    Vital Signs:  Vital Signs Last 24 Hrs  T(C): 36.1 (16 Sep 2024 05:25), Max: 36.8 (15 Sep 2024 17:47)  T(F): 97 (16 Sep 2024 05:25), Max: 98.3 (15 Sep 2024 17:47)  HR: 68 (16 Sep 2024 05:25) (68 - 74)  BP: 131/61 (16 Sep 2024 05:25) (131/61 - 168/71)  BP(mean): --  RR: 17 (16 Sep 2024 05:25) (17 - 18)  SpO2: 96% (16 Sep 2024 05:25) (96% - 97%)    Parameters below as of 16 Sep 2024 05:25  Patient On (Oxygen Delivery Method): room air        General:  Appears stated age, scratching skin   HEENT:  NC/AT,  conjunctivae clear and pink, no thyromegaly  Chest:  Full & symmetric excursion, no increased effort  Cardiovascular:  Regular rhythm  Abdomen:  Soft, non tender, non distended, normoactive bowel sounds,  no masses   Extremities:  No cyanosis, clubbing or edema  Skin:  Multiple superficial wounds in various stages of healing on bilateral upper extremities, lower extremities, chest, abdomen, and bilateral lower extremities   Neuro/Psych:  Alert, oriented, no asterixis, no tremor, no encephalopathy    Laboratory:                          9.5    9.93  )-----------( 173      ( 16 Sep 2024 06:20 )             30.1     09-16    136  |  101  |  93<H>  ----------------------------<  154<H>  5.2   |  24  |  6.72<H>    Ca    8.9      16 Sep 2024 06:20  Phos  5.8     09-16    TPro  x   /  Alb  2.0<L>  /  TBili  x   /  DBili  x   /  AST  x   /  ALT  x   /  AlkPhos  x   09-16    LIVER FUNCTIONS - ( 16 Sep 2024 06:20 )  Alb: 2.0 g/dL / Pro: x     / ALK PHOS: x     / ALT: x     / AST: x     / GGT: x           PT/INR - ( 16 Sep 2024 10:40 )   PT: 14.8 sec;   INR: 1.25 ratio         PTT - ( 16 Sep 2024 10:40 )  PTT:37.5 sec  Urinalysis Basic - ( 16 Sep 2024 06:20 )    Color: x / Appearance: x / SG: x / pH: x  Gluc: 154 mg/dL / Ketone: x  / Bili: x / Urobili: x   Blood: x / Protein: x / Nitrite: x   Leuk Esterase: x / RBC: x / WBC x   Sq Epi: x / Non Sq Epi: x / Bacteria: x          Intake and Output      Imaging:

## 2024-09-16 NOTE — CONSULT NOTE ADULT - ASSESSMENT
75 Y/O male with a PMHx of HTN, HLD, DM, CHF, CKD, afib on eliquis with PSHx of L BKA admitted to hospital for worsening CKD requiring HD. Surgery consulted to evaluate for skin biopsy. Patient stated he was evaluated as outpatient dermatologist for pruritus 3 weeks ago to which no intervention was implemented at this time. Inpatient dermatology consult placed with recommendations for outpatient evaluation and skin biopsy. Patient scratching skin throughout patient interview.   Afebrile with VSS.       PLAN:     - Will plan to do skin biopsy at bedside  - Continue care per primary team   - Discussed with Dr. Simms  75 Y/O male with a PMHx of HTN, HLD, DM, CHF, CKD, afib on eliquis with PSHx of L BKA admitted to hospital for worsening CKD requiring HD. Surgery consulted to evaluate for skin biopsy. Patient stated he was evaluated as outpatient dermatologist for pruritus 3 weeks ago to which no intervention was implemented at this time. Inpatient dermatology consult placed with recommendations for outpatient evaluation and skin biopsy. Patient scratching skin throughout patient interview.   Afebrile with VSS.       PLAN:     - Skin biopsy performed at bedside  - Will F/U pathology   - Local wound care per RN   - Continue care per primary team   - Discussed with Dr. Simms

## 2024-09-16 NOTE — PROGRESS NOTE ADULT - ASSESSMENT
74 year old male with PMHx as above was admitted for weakness and confusion.  Pt also w/ chronic diffuse rash of unclear etiology - ?bullous pemphigoid (outpt dermatologist felt it was due to renal disease).  Pt also w/ CKD which has now progressed to ESRD.  Pt does not exhibit any obvious signs/symptoms of systemic vasculitis.    - f/u skin bx.    - Can start trial of steroids - prednisone 60mg PO daily for now.    - HD as per renal.

## 2024-09-16 NOTE — PROGRESS NOTE ADULT - SUBJECTIVE AND OBJECTIVE BOX
INTERVAL HPI/OVERNIGHT EVENTS:  Feeling the same.  Still w/ diffuse rash, unchanged.    MEDICATIONS  (STANDING):  apixaban 5 milliGRAM(s) Oral two times a day  AQUAPHOR (petrolatum Ointment) 1 Application(s) Topical daily  atorvastatin 40 milliGRAM(s) Oral at bedtime  bacitracin   Ointment 1 Application(s) Topical daily  calamine/zinc oxide Lotion 1 Application(s) Topical two times a day  chlorhexidine 2% Cloths 1 Application(s) Topical <User Schedule>  dextrose 5%. 1000 milliLiter(s) (50 mL/Hr) IV Continuous <Continuous>  dextrose 5%. 1000 milliLiter(s) (100 mL/Hr) IV Continuous <Continuous>  dextrose 50% Injectable 25 Gram(s) IV Push once  dextrose 50% Injectable 12.5 Gram(s) IV Push once  dextrose 50% Injectable 25 Gram(s) IV Push once  doxazosin 2 milliGRAM(s) Oral at bedtime  epoetin malik-epbx (RETACRIT) Injectable 65421 Unit(s) IV Push <User Schedule>  glucagon  Injectable 1 milliGRAM(s) IntraMuscular once  hydrALAZINE 50 milliGRAM(s) Oral every 8 hours  insulin glargine Injectable (LANTUS) 25 Unit(s) SubCutaneous at bedtime  insulin lispro (ADMELOG) corrective regimen sliding scale   SubCutaneous at bedtime  insulin lispro (ADMELOG) corrective regimen sliding scale   SubCutaneous three times a day before meals  insulin lispro Injectable (ADMELOG) 6 Unit(s) SubCutaneous three times a day before meals  melatonin 3 milliGRAM(s) Oral at bedtime  Nephro-lee 1 Tablet(s) Oral daily  pantoprazole    Tablet 40 milliGRAM(s) Oral before breakfast  sevelamer carbonate 1600 milliGRAM(s) Oral three times a day with meals  triamcinolone 0.1% Ointment 1 Application(s) Topical two times a day    MEDICATIONS  (PRN):  acetaminophen     Tablet .. 650 milliGRAM(s) Oral every 6 hours PRN Temp greater or equal to 38C (100.4F), Moderate Pain (4 - 6)  benzocaine/menthol Lozenge 1 Lozenge Oral five times a day PRN Mouth Sores  dextrose Oral Gel 15 Gram(s) Oral once PRN Blood Glucose LESS THAN 70 milliGRAM(s)/deciliter  guaiFENesin Oral Liquid (Sugar-Free) 200 milliGRAM(s) Oral every 6 hours PRN Cough  polyethylene glycol 3350 17 Gram(s) Oral daily PRN Constipation  sodium chloride 0.9% Bolus. 100 milliLiter(s) IV Bolus every 5 minutes PRN SBP LESS THAN or EQUAL to 100 mmHg  sodium chloride 0.9% lock flush 10 milliLiter(s) IV Push every 1 hour PRN Pre/post blood products, medications, blood draw, and to maintain line patency      Allergies    No Known Allergies      Vital Signs Last 24 Hrs  T(C): 35.8 (16 Sep 2024 23:15), Max: 37.2 (16 Sep 2024 11:14)  T(F): 96.5 (16 Sep 2024 23:15), Max: 99 (16 Sep 2024 11:14)  HR: 92 (16 Sep 2024 23:15) (52 - 92)  BP: 127/66 (16 Sep 2024 23:15) (98/56 - 152/66)  BP(mean): --  RR: 18 (16 Sep 2024 23:15) (16 - 18)  SpO2: 95% (16 Sep 2024 23:15) (94% - 99%)    Parameters below as of 16 Sep 2024 23:15  Patient On (Oxygen Delivery Method): room air        PHYSICAL EXAM:  General :  NAD  HEENT--  no oral ulcers  Nodes--   LAD  Lungs--  CTA B/L  Heart--  RRR, nlS1 &S2 normal;   Abdomen--  soft, NT, ND +BS  Skin:  diffuse lesions throughout body - appears like ruptured bullae  Musculoskeletal exam:  No synovitis;  normal ROM in all joints        LABS:                        9.5    9.93  )-----------( 173      ( 16 Sep 2024 06:20 )             30.1     09-16    136  |  101  |  93<H>  ----------------------------<  154<H>  5.2   |  24  |  6.72<H>    Ca    8.9      16 Sep 2024 06:20  Phos  5.8     09-16    TPro  x   /  Alb  2.0<L>  /  TBili  x   /  DBili  x   /  AST  x   /  ALT  x   /  AlkPhos  x   09-16    PT/INR - ( 16 Sep 2024 10:40 )   PT: 14.8 sec;   INR: 1.25 ratio         PTT - ( 16 Sep 2024 10:40 )  PTT:37.5 sec  Urinalysis Basic - ( 16 Sep 2024 06:20 )    Color: x / Appearance: x / SG: x / pH: x  Gluc: 154 mg/dL / Ketone: x  / Bili: x / Urobili: x   Blood: x / Protein: x / Nitrite: x   Leuk Esterase: x / RBC: x / WBC x   Sq Epi: x / Non Sq Epi: x / Bacteria: x          RADIOLOGY & ADDITIONAL TESTS:

## 2024-09-16 NOTE — CONSULT NOTE ADULT - REASON FOR ADMISSION
Weakness, progression of CKD

## 2024-09-16 NOTE — CONSULT NOTE ADULT - CONSULT REQUESTED DATE/TIME
16-Sep-2024 11:22
26-Aug-2024 14:19
30-Aug-2024 19:51
09-Sep-2024 09:01
29-Aug-2024 14:24
03-Sep-2024 17:41
26-Aug-2024 15:08

## 2024-09-16 NOTE — PROGRESS NOTE ADULT - ASSESSMENT
73 year old male PMH HTN, HLD, DM, CHF, CKD stage IV, dementia / anxiety BIBEMS accompanied by wife due to weakness. Per wife, pt not feeling well since last night - went to bed prior to his own birthday party. Per EMS, pt complaining of decreased L vision and decreased L hearing as well as noted off balance with ambulation to restroom this AM. Pt states L eye is usually his good eye, and still with better vision in L than R, but not as good as usual. Pt reports chronic itching rash to body x past 3 months, f/u'd with Derm, was told likely 2/2 renal disease. Hearing later normalized in ER.     #COVID  -per my colleague's documentation " asymptomatic, on RA  - will c/w supportive care w/ robitussin  - Discussed with ID, no role for Rem/Decadron given no SOB and on RA  - will need to remain inpatient "    #Rash  -per my colleague's documentation " c/w triamcinolone 0.1% ointment   - Discussed with dermatology (Dr. Barbour and Dr. Sarabia), patient will need outpatient dermatology appointment for skin biopsy  - s/p cefepime   - Appreciate rheum recs"  9/15/2024 asked wound pt to come back to revaluate right upper extremity      9/16/2024	had a long d/w wife who voiced concern of not speaking with a doctor since Dr. Mendoza had the patient over 8 days ago.    I answered all questions and concerns to the best of my ability    I advised  her that I would reach out to rheumatology Dr. Shaikh again as he had sen the patient earlier in this admission.   i was able to get Dr. mccrary to agree to perform skin biopsy ion today . updated wife and she is pleased  can trial  steroids after skin biopsy for presumed bullous pemphigoid 1mg/kg prednisone daily   ##ESRD   per my colleague's documentation " Temp cath placed and patient to initiate Dialysis here 2/2 Uremia and hyperkalemic. Temp cath placed   - C/w Inpatient dialysis   - Appreciate IR recs- s/p tunneled HD catheter  - Nephro following "    ##SSTI  Noted to be hypothermic to 92F. CT abd No evidence of acute inflammatory or obstructive process in the   abdomen and pelvis. UA positive nitrites, LEs.   s/p Cefepime    ##Type 2 DM  A1c 7; C/w Lantus 25 + 6U w/ meals + SSI  - Hypoglycemia protocol     #Pulm nodule   -per my colleague's documentation " CT noted There are scattered nodules at the visualized lung bases measuring up to 6 mm  CT chest reviewed will need follow up in 3-6  months "      #HTN  - C/w cardura, hydralazine     #HLD  - C/w statin     #History of DVT  - Eliquis     Diet: renal, CCD  DVT prophylaxis: eliquis   Dispo: to obtain skin biopsy in house by general surgery and start steroids after  Code Status: FC

## 2024-09-16 NOTE — CONSULT NOTE ADULT - CONSULT REASON
r/o vasculitis
CKD 5   edema  Uremic pruritis
tunneled HD catheter
Right foot pressure wounds
wbc elevated  skin lesions
nontunneled HD catheter placement
skin biopsy

## 2024-09-16 NOTE — PROGRESS NOTE ADULT - SUBJECTIVE AND OBJECTIVE BOX
Patient is a 74y old  Male who presents with a chief complaint of Weakness, progression of CKD (13 Sep 2024 16:08)    INTERVAL HPI/OVERNIGHT EVENTS: No acute events overnight.    MEDICATIONS  (STANDING):  apixaban 5 milliGRAM(s) Oral two times a day  AQUAPHOR (petrolatum Ointment) 1 Application(s) Topical daily  atorvastatin 40 milliGRAM(s) Oral at bedtime  calamine/zinc oxide Lotion 1 Application(s) Topical two times a day  chlorhexidine 2% Cloths 1 Application(s) Topical <User Schedule>  dextrose 5%. 1000 milliLiter(s) (50 mL/Hr) IV Continuous <Continuous>  dextrose 5%. 1000 milliLiter(s) (100 mL/Hr) IV Continuous <Continuous>  dextrose 50% Injectable 12.5 Gram(s) IV Push once  dextrose 50% Injectable 25 Gram(s) IV Push once  dextrose 50% Injectable 25 Gram(s) IV Push once  doxazosin 2 milliGRAM(s) Oral at bedtime  epoetin malik-epbx (RETACRIT) Injectable 01271 Unit(s) IV Push <User Schedule>  glucagon  Injectable 1 milliGRAM(s) IntraMuscular once  hydrALAZINE 50 milliGRAM(s) Oral every 8 hours  insulin glargine Injectable (LANTUS) 25 Unit(s) SubCutaneous at bedtime  insulin lispro (ADMELOG) corrective regimen sliding scale   SubCutaneous three times a day before meals  insulin lispro (ADMELOG) corrective regimen sliding scale   SubCutaneous at bedtime  insulin lispro Injectable (ADMELOG) 6 Unit(s) SubCutaneous three times a day before meals  melatonin 3 milliGRAM(s) Oral at bedtime  Nephro-lee 1 Tablet(s) Oral daily  pantoprazole    Tablet 40 milliGRAM(s) Oral before breakfast  sevelamer carbonate 1600 milliGRAM(s) Oral three times a day with meals  triamcinolone 0.1% Ointment 1 Application(s) Topical two times a day    MEDICATIONS  (PRN):  acetaminophen     Tablet .. 650 milliGRAM(s) Oral every 6 hours PRN Temp greater or equal to 38C (100.4F), Moderate Pain (4 - 6)  benzocaine/menthol Lozenge 1 Lozenge Oral five times a day PRN Mouth Sores  dextrose Oral Gel 15 Gram(s) Oral once PRN Blood Glucose LESS THAN 70 milliGRAM(s)/deciliter  guaiFENesin Oral Liquid (Sugar-Free) 200 milliGRAM(s) Oral every 6 hours PRN Cough  polyethylene glycol 3350 17 Gram(s) Oral daily PRN Constipation  sodium chloride 0.9% Bolus. 100 milliLiter(s) IV Bolus every 5 minutes PRN SBP LESS THAN or EQUAL to 100 mmHg  sodium chloride 0.9% lock flush 10 milliLiter(s) IV Push every 1 hour PRN Pre/post blood products, medications, blood draw, and to maintain line patency      Allergies    No Known Allergies    Intolerances  Vital Signs Last 24 Hrs  T(C): 36.1 (16 Sep 2024 05:25), Max: 36.8 (15 Sep 2024 10:47)  T(F): 97 (16 Sep 2024 05:25), Max: 98.3 (15 Sep 2024 17:47)  HR: 68 (16 Sep 2024 05:25) (62 - 74)  BP: 131/61 (16 Sep 2024 05:25) (131/61 - 168/71)  BP(mean): --  RR: 17 (16 Sep 2024 05:25) (17 - 18)  SpO2: 96% (16 Sep 2024 05:25) (96% - 97%)    Parameters below as of 16 Sep 2024 05:25  Patient On (Oxygen Delivery Method): room air          PHYSICAL EXAM:  GENERAL: NAD, well-groomed, well-developed  HEAD:  Atraumatic, Normocephalic  EYES: EOMI, PERRLA, conjunctiva and sclera clear  ENMT: No tonsillar erythema, exudates, or enlargement; Moist mucous membranes, Good dentition, No lesions  NECK: Supple,   NERVOUS SYSTEM:  Alert & demented   CHEST/LUNG: Clear to percussion bilaterally; No rales, rhonchi, wheezing, or rubs  HEART: Regular rate and rhythm; No murmurs, rubs, or gallops  ABDOMEN: Soft, Nontender, Nondistended; Bowel sounds present  EXTREMITIES:  2+ Peripheral Pulses, No clubbing, cyanosis, or edema  left bka  SKIN: skin is covered  with varying  stages of ulcers, ulcerations ruptured bullae, excoriations, on arms, torso  and legs  LABS:                                            9.5    9.93  )-----------( 173      ( 16 Sep 2024 06:20 )             30.1   09-16    136  |  101  |  93<H>  ----------------------------<  154<H>  5.2   |  24  |  6.72<H>    Ca    8.9      16 Sep 2024 06:20  Phos  5.8     09-16    TPro  x   /  Alb  2.0<L>  /  TBili  x   /  DBili  x   /  AST  x   /  ALT  x   /  AlkPhos  x   09-16      CAPILLARY BLOOD GLUCOSE  CAPILLARY BLOOD GLUCOSE      POCT Blood Glucose.: 145 mg/dL (16 Sep 2024 08:18)  POCT Blood Glucose.: 196 mg/dL (15 Sep 2024 23:15)  POCT Blood Glucose.: 167 mg/dL (15 Sep 2024 21:55)  POCT Blood Glucose.: 173 mg/dL (15 Sep 2024 16:30)  POCT Blood Glucose.: 185 mg/dL (15 Sep 2024 12:21)  POCT Blood Glucose.: 186 mg/dL (15 Sep 2024 11:13)    RADIOLOGY & ADDITIONAL TESTS:    Imaging Personally Reviewed:  [ ] YES  [ ] NO    Consultant(s) Notes Reviewed:  [ ] YES  [ ] NO    Care Discussed with Consultants/Other Providers [ ] YES  [ ] NO

## 2024-09-16 NOTE — CHART NOTE - NSCHARTNOTEFT_GEN_A_CORE
called to beside for pt with hypothermia   patient receiving hemodialysis noted with temperature of 94.0 rectally   chart review reveals pt with temperature of 93.3 on 9/12 at 23:30 hours and 95.8 at 500 am   pt seen and examined at bedside c/o cough with mucous production   vitals 119/95 , 58,18, 94,7 axillary , 93.0 rectal saturation 97% on room air   general adult male on back receiving hd via left acw perm-catheter   heent ncat  skin multiple skin tears lesions on arms upper and lower rle and left calf area abdomen chest and b/l upper extremities, limited back/ buttock exam as pt on hemodialysis , s/p punch biopsy today rle ,acw has areas of weeping ,left acw cathter site has skin weeping at this site with some crusted light brown at edges of catheter no edema non erythema at site, non tender to light palpation   lungs limited exam lungs clear  cvs-[id number] rr no mrg  abdomens soft bs+  ext rle dp pt 2+ no edema  lle s/p bka distal stump clean                           9.5    9.93  )-----------( 173      ( 16 Sep 2024 06:20 )             30.1      n=79%       imp hypothermia however would like repeat rectal temp when pot off hd.   similar episode 9/12   ros positive for cough however pt with covid   warming blanket with rectal probe  culture blood when finished hd   cxr   monitor temperature with repeat rectal temperature when hemodialysis finished

## 2024-09-16 NOTE — PROCEDURE NOTE - ADDITIONAL PROCEDURE DETAILS
RLE anesthetized with 1% lidocaine. Punch biopsy obtained from RLE. Silver nitrate stick utilized for hemostasis. Dry gauze and kerlex applied to area. Patient tolerated procedure well.
Successful insertion of 14F 15cm nontunneled hemodialysis catheter via right internal jugular vein, using ultrasound and fluoroscopy guidance. Tip in SVC. Okay to use. No complications. Full report to follow.

## 2024-09-17 LAB
GLUCOSE BLDC GLUCOMTR-MCNC: 114 MG/DL — HIGH (ref 70–99)
GLUCOSE BLDC GLUCOMTR-MCNC: 128 MG/DL — HIGH (ref 70–99)
GLUCOSE BLDC GLUCOMTR-MCNC: 155 MG/DL — HIGH (ref 70–99)
GLUCOSE BLDC GLUCOMTR-MCNC: 219 MG/DL — HIGH (ref 70–99)
GLUCOSE BLDC GLUCOMTR-MCNC: 89 MG/DL — SIGNIFICANT CHANGE UP (ref 70–99)

## 2024-09-17 PROCEDURE — 99232 SBSQ HOSP IP/OBS MODERATE 35: CPT

## 2024-09-17 PROCEDURE — 71045 X-RAY EXAM CHEST 1 VIEW: CPT | Mod: 26

## 2024-09-17 RX ORDER — PREDNISONE 10 MG
100 TABLET, DOSE PACK ORAL DAILY
Refills: 0 | Status: DISCONTINUED | OUTPATIENT
Start: 2024-09-17 | End: 2024-09-21

## 2024-09-17 RX ADMIN — TRIAMCINOLONE ACETONIDE 1 APPLICATION(S): 1 CREAM TOPICAL at 19:04

## 2024-09-17 RX ADMIN — TRIAMCINOLONE ACETONIDE 1 APPLICATION(S): 1 CREAM TOPICAL at 06:28

## 2024-09-17 RX ADMIN — SEVELAMER CARBONATE 1600 MILLIGRAM(S): 800 TABLET, FILM COATED ORAL at 19:06

## 2024-09-17 RX ADMIN — PETROLATUM 1 APPLICATION(S): 865 OINTMENT TOPICAL at 13:30

## 2024-09-17 RX ADMIN — Medication 50 MILLIGRAM(S): at 06:29

## 2024-09-17 RX ADMIN — Medication 1 APPLICATION(S): at 19:10

## 2024-09-17 RX ADMIN — Medication 3 MILLIGRAM(S): at 22:54

## 2024-09-17 RX ADMIN — Medication 50 MILLIGRAM(S): at 13:37

## 2024-09-17 RX ADMIN — SEVELAMER CARBONATE 1600 MILLIGRAM(S): 800 TABLET, FILM COATED ORAL at 13:37

## 2024-09-17 RX ADMIN — Medication 1 TABLET(S): at 13:37

## 2024-09-17 RX ADMIN — Medication 40 MILLIGRAM(S): at 22:54

## 2024-09-17 RX ADMIN — Medication 4: at 19:07

## 2024-09-17 RX ADMIN — ACETAMINOPHEN 650 MILLIGRAM(S): 325 TABLET ORAL at 22:56

## 2024-09-17 RX ADMIN — Medication 6 UNIT(S): at 19:06

## 2024-09-17 RX ADMIN — Medication 1 APPLICATION(S): at 13:36

## 2024-09-17 RX ADMIN — INSULIN GLARGINE 25 UNIT(S): 100 INJECTION, SOLUTION SUBCUTANEOUS at 22:53

## 2024-09-17 RX ADMIN — Medication 40 MILLIGRAM(S): at 08:44

## 2024-09-17 RX ADMIN — Medication 1 APPLICATION(S): at 06:30

## 2024-09-17 RX ADMIN — Medication 6 UNIT(S): at 08:44

## 2024-09-17 RX ADMIN — SEVELAMER CARBONATE 1600 MILLIGRAM(S): 800 TABLET, FILM COATED ORAL at 08:44

## 2024-09-17 RX ADMIN — APIXABAN 5 MILLIGRAM(S): 5 TABLET, FILM COATED ORAL at 19:03

## 2024-09-17 RX ADMIN — ACETAMINOPHEN 650 MILLIGRAM(S): 325 TABLET ORAL at 00:11

## 2024-09-17 RX ADMIN — CHLORHEXIDINE GLUCONATE 1 APPLICATION(S): 40 SOLUTION TOPICAL at 06:28

## 2024-09-17 RX ADMIN — Medication 2 MILLIGRAM(S): at 22:54

## 2024-09-17 RX ADMIN — Medication 50 MILLIGRAM(S): at 22:54

## 2024-09-17 RX ADMIN — APIXABAN 5 MILLIGRAM(S): 5 TABLET, FILM COATED ORAL at 06:29

## 2024-09-17 RX ADMIN — Medication 100 MILLIGRAM(S): at 19:04

## 2024-09-17 NOTE — PROGRESS NOTE ADULT - ASSESSMENT
73 year old male PMH HTN, HLD, DM, CHF, CKD stage IV, dementia / anxiety BIBEMS accompanied by wife due to weakness. Per wife, pt not feeling well since last night - went to bed prior to his own birthday party. Per EMS, pt complaining of decreased L vision and decreased L hearing as well as noted off balance with ambulation to restroom this AM. Pt states L eye is usually his good eye, and still with better vision in L than R, but not as good as usual. Pt reports chronic itching rash to body x past 3 months, f/u'd with Derm, was told likely 2/2 renal disease. Hearing later normalized in ER.     Note: Has left BKA.    wbc went from 10->13  right sided hemodialysis catheter looks good  no need for blood cultures   UA with enterobacter   need to change the ceftriaxone to cefepime, the hemodialysis dose 1g q24hrs     found to have severe ELMER needing hemodialysis   has a full body rash   has had confusion   generalized not feeling well  vision and hearing getting worse  no urinary complaints but was confused when came in  was supposed to get permacath but wbc rising     9/3: no fevers, WBC better yesterday 10.12, no new lab work, RA, BCs NGTD, Cr 4.71, had HD yesterday, right IJ temp HD catheter - dressing needs to be changed - spoke with RN. + Rash all over pt's body, cefepime IV continued. There is a chart note from medicine PA - pt was seen by dermatology in June for diffuse bullae on skin, Dr. Barnes. A conversation took place and dermatology recommended to start triamcinolone 0.1% ointment and f/up as op for skin biopsy and further assessment.   Attending addendum:  wbc ok but patient having hypothermic episodes   would hold off on permacath and see if his hypothermia improves  continue cefepime   consider line holiday   start triamcinolone as recommended by dermatology   patient still warrants skin biopsy to determine what exactly this rash is from      9/5: afebrile, RA, WBC slightly elevated 10.71, MRSA PCR negative, BCs NGTD, UC with Enterobacter cloacae, + full body rash, appear more dry on today's exam, RIJ catheter should be removed or dressed with occlusive dressing, new cath placement possibly tomorrow. No contraindications to placing permacath tomorrow assuming there is no change in clinical status .   Cefepime IV continued. Pt needs skin biopsy.   Attending addendum:  UC with Enterobacter cloacae being covered by cefepime  , + full body rash, appear more dry on today's exam  RIJ catheter ideally should be removed or dressed with occlusive dressing  new permcath placement possibly tomorrow.   No contraindications to placing permacath tomorrow assuming there is no change in clinical status    9/9: hypothermic yesterday, no fevers or hypothermia today, RA, WBC better 10.9, Cr 5.73, no new culture data, prior BCs NGTD, Cefepime IV continued for now, pt is for PermCath placement today. + Full body rash, appears more dry, + somewhat jaundice on today's exam, LFTs ok, Acute hepatitis panel was done before, reviewed.   Attending addendum:  continue antibiotics for one more day   placement of permacath   keep catheter covered, clean dry  and change soiled dressings as often as needed  continue hemodialysis as per renal   Dermatology for possible biopsy, inpatient vs outpatient to the discretion of hospitalist medicine and consulting dermatologist     9/11: no fever, RA, WBC  normalized, pt's rash with new fluid filled blisters, bothersome to the pt, pt keeps picking on his skin leaving small open wounds, left chest new HD catheter - dressing should be changed frequently as the pt picks on it as well. Pt needs to be seen by dermatology as op for skin biopsy. Abx stopped today.   Attending addendum:  stopped cefepime 1g q24hrs, continue monitoring off abx  good hygiene  HD cath dressing should be changed frequently   HD per renal   needs to be seen by Dermatology as op as soon as possible, please, for skin biopsy   HIV negative  MRSA PCR - negative  follow culture data     9/17: + COVID 19 since 9/11, O2 demand did not increase, no role for remdesivir and decadron. Continues having episodes of hypothermia, on and off, no leukocytosis according to yesterday's lab work, no new culture data available, no evidence of new skin infection on today's exam, no symptoms of cellulitis, no phlebitis, HD catheter site is not inflamed. Pt was hypothermic yesterday evening. Pt's rash is unchanged, skin biopsy is being performed today. Pt is monitored off abx.     Plan:  continue monitoring off abx  2 sets of surveillance BCs are pending result  awaiting for skin biopsy  good hygiene  HD cath dressing should be changed frequently   HD per renal   surgical input is appreciated   HIV negative  MRSA PCR - negative  follow culture data     discussed with Dr. De La Cruz  discussed with Dr. Quispe         73 year old male PMH HTN, HLD, DM, CHF, CKD stage IV, dementia / anxiety BIBEMS accompanied by wife due to weakness. Per wife, pt not feeling well since last night - went to bed prior to his own birthday party. Per EMS, pt complaining of decreased L vision and decreased L hearing as well as noted off balance with ambulation to restroom this AM. Pt states L eye is usually his good eye, and still with better vision in L than R, but not as good as usual. Pt reports chronic itching rash to body x past 3 months, f/u'd with Derm, was told likely 2/2 renal disease. Hearing later normalized in ER.     Note: Has left BKA.    wbc went from 10->13  right sided hemodialysis catheter looks good  no need for blood cultures   UA with enterobacter   need to change the ceftriaxone to cefepime, the hemodialysis dose 1g q24hrs     found to have severe ELMER needing hemodialysis   has a full body rash   has had confusion   generalized not feeling well  vision and hearing getting worse  no urinary complaints but was confused when came in  was supposed to get permacath but wbc rising     9/3: no fevers, WBC better yesterday 10.12, no new lab work, RA, BCs NGTD, Cr 4.71, had HD yesterday, right IJ temp HD catheter - dressing needs to be changed - spoke with RN. + Rash all over pt's body, cefepime IV continued. There is a chart note from medicine PA - pt was seen by dermatology in June for diffuse bullae on skin, Dr. Barnes. A conversation took place and dermatology recommended to start triamcinolone 0.1% ointment and f/up as op for skin biopsy and further assessment.   Attending addendum:  wbc ok but patient having hypothermic episodes   would hold off on permacath and see if his hypothermia improves  continue cefepime   consider line holiday   start triamcinolone as recommended by dermatology   patient still warrants skin biopsy to determine what exactly this rash is from      9/5: afebrile, RA, WBC slightly elevated 10.71, MRSA PCR negative, BCs NGTD, UC with Enterobacter cloacae, + full body rash, appear more dry on today's exam, RIJ catheter should be removed or dressed with occlusive dressing, new cath placement possibly tomorrow. No contraindications to placing permacath tomorrow assuming there is no change in clinical status .   Cefepime IV continued. Pt needs skin biopsy.   Attending addendum:  UC with Enterobacter cloacae being covered by cefepime  , + full body rash, appear more dry on today's exam  RIJ catheter ideally should be removed or dressed with occlusive dressing  new permcath placement possibly tomorrow.   No contraindications to placing permacath tomorrow assuming there is no change in clinical status    9/9: hypothermic yesterday, no fevers or hypothermia today, RA, WBC better 10.9, Cr 5.73, no new culture data, prior BCs NGTD, Cefepime IV continued for now, pt is for PermCath placement today. + Full body rash, appears more dry, + somewhat jaundice on today's exam, LFTs ok, Acute hepatitis panel was done before, reviewed.   Attending addendum:  continue antibiotics for one more day   placement of permacath   keep catheter covered, clean dry  and change soiled dressings as often as needed  continue hemodialysis as per renal   Dermatology for possible biopsy, inpatient vs outpatient to the discretion of hospitalist medicine and consulting dermatologist     9/11: no fever, RA, WBC  normalized, pt's rash with new fluid filled blisters, bothersome to the pt, pt keeps picking on his skin leaving small open wounds, left chest new HD catheter - dressing should be changed frequently as the pt picks on it as well. Pt needs to be seen by dermatology as op for skin biopsy. Abx stopped today.   Attending addendum:  stopped cefepime 1g q24hrs, continue monitoring off abx  good hygiene  HD cath dressing should be changed frequently   HD per renal   needs to be seen by Dermatology as op as soon as possible, please, for skin biopsy   HIV negative  MRSA PCR - negative  follow culture data     9/17: + COVID 19 since 9/11, O2 demand did not increase, no role for remdesivir and decadron. Continues having episodes of hypothermia, on and off, no leukocytosis according to yesterday's lab work, no new culture data available, no symptoms of cellulitis, no phlebitis, though the neck could be developing cellulitis, HD catheter site is not inflamed. Pt was hypothermic yesterday evening. Pt's rash is unchanged, skin biopsy is being performed today. Pt is monitored off abx.     Plan:  continue monitoring off abx  2 sets of surveillance BCs are pending result  awaiting for skin biopsy, one in formalin and one in Endy solution for direct immunofluorescence   good hygiene  HD cath dressing should be changed frequently   HD per renal   surgical input is appreciated   HIV negative  MRSA PCR - negative  follow culture data     discussed with Dr. De La Cruz  discussed with Dr. Quispe

## 2024-09-17 NOTE — PROGRESS NOTE ADULT - SUBJECTIVE AND OBJECTIVE BOX
Patient is a 74y old  Male who presents with a chief complaint of Weakness, progression of CKD (13 Sep 2024 16:08)    INTERVAL HPI/OVERNIGHT EVENTS:    yesterday during HD became hypothermic septic w/u initiated      MEDICATIONS  (STANDING):  apixaban 5 milliGRAM(s) Oral two times a day  AQUAPHOR (petrolatum Ointment) 1 Application(s) Topical daily  atorvastatin 40 milliGRAM(s) Oral at bedtime  bacitracin   Ointment 1 Application(s) Topical daily  calamine/zinc oxide Lotion 1 Application(s) Topical two times a day  chlorhexidine 2% Cloths 1 Application(s) Topical <User Schedule>  dextrose 5%. 1000 milliLiter(s) (50 mL/Hr) IV Continuous <Continuous>  dextrose 5%. 1000 milliLiter(s) (100 mL/Hr) IV Continuous <Continuous>  dextrose 50% Injectable 25 Gram(s) IV Push once  dextrose 50% Injectable 12.5 Gram(s) IV Push once  dextrose 50% Injectable 25 Gram(s) IV Push once  doxazosin 2 milliGRAM(s) Oral at bedtime  epoetin malik-epbx (RETACRIT) Injectable 77337 Unit(s) IV Push <User Schedule>  glucagon  Injectable 1 milliGRAM(s) IntraMuscular once  hydrALAZINE 50 milliGRAM(s) Oral every 8 hours  insulin glargine Injectable (LANTUS) 25 Unit(s) SubCutaneous at bedtime  insulin lispro (ADMELOG) corrective regimen sliding scale   SubCutaneous three times a day before meals  insulin lispro (ADMELOG) corrective regimen sliding scale   SubCutaneous at bedtime  insulin lispro Injectable (ADMELOG) 6 Unit(s) SubCutaneous three times a day before meals  melatonin 3 milliGRAM(s) Oral at bedtime  Nephro-lee 1 Tablet(s) Oral daily  pantoprazole    Tablet 40 milliGRAM(s) Oral before breakfast  sevelamer carbonate 1600 milliGRAM(s) Oral three times a day with meals  triamcinolone 0.1% Ointment 1 Application(s) Topical two times a day    MEDICATIONS  (PRN):  acetaminophen     Tablet .. 650 milliGRAM(s) Oral every 6 hours PRN Temp greater or equal to 38C (100.4F), Moderate Pain (4 - 6)  benzocaine/menthol Lozenge 1 Lozenge Oral five times a day PRN Mouth Sores  dextrose Oral Gel 15 Gram(s) Oral once PRN Blood Glucose LESS THAN 70 milliGRAM(s)/deciliter  guaiFENesin Oral Liquid (Sugar-Free) 200 milliGRAM(s) Oral every 6 hours PRN Cough  polyethylene glycol 3350 17 Gram(s) Oral daily PRN Constipation  sodium chloride 0.9% Bolus. 100 milliLiter(s) IV Bolus every 5 minutes PRN SBP LESS THAN or EQUAL to 100 mmHg  sodium chloride 0.9% lock flush 10 milliLiter(s) IV Push every 1 hour PRN Pre/post blood products, medications, blood draw, and to maintain line patency    Allergies    No Known Allergies    Intolerances    Vital Signs Last 24 Hrs  T(C): 36.3 (17 Sep 2024 05:49), Max: 37.2 (16 Sep 2024 11:14)  T(F): 97.4 (17 Sep 2024 05:49), Max: 99 (16 Sep 2024 11:14)  HR: 81 (17 Sep 2024 05:49) (52 - 92)  BP: 133/77 (17 Sep 2024 05:49) (98/56 - 152/66)  BP(mean): --  RR: 18 (17 Sep 2024 05:49) (16 - 18)  SpO2: 97% (17 Sep 2024 05:49) (94% - 99%)    Parameters below as of 17 Sep 2024 05:49  Patient On (Oxygen Delivery Method): room air          PHYSICAL EXAM:  GENERAL: NAD, well-groomed, well-developed  HEAD:  Atraumatic, Normocephalic  EYES: EOMI, PERRLA, conjunctiva and sclera clear  ENMT: No tonsillar erythema, exudates, or enlargement; Moist mucous membranes, Good dentition, No lesions  NECK: Supple,   NERVOUS SYSTEM:  Alert & demented   CHEST/LUNG: Clear to percussion bilaterally; No rales, rhonchi, wheezing, or rubs  HEART: Regular rate and rhythm; No murmurs, rubs, or gallops  ABDOMEN: Soft, Nontender, Nondistended; Bowel sounds present  EXTREMITIES:  2+ Peripheral Pulses, No clubbing, cyanosis, or edema  left bka  SKIN: skin is covered  with varying  stages of ulcers, ulcerations ruptured bullae, excoriations, on arms, torso  and legs  LABS:                RADIOLOGY & ADDITIONAL TESTS:      Imaging Personally Reviewed:  [ ] YES  [ ] NO    Consultant(s) Notes Reviewed:  [ ] YES  [ ] NO    Care Discussed with Consultants/Other Providers [ ] YES  [ ] NO

## 2024-09-17 NOTE — PHARMACOTHERAPY INTERVENTION NOTE - INTERVENTION TYPE RECOOMEND
Dose Optimization/Non-renal Dose Adjustment
Therapy Recommended - Drug indicated but not ordered
Therapy Recommended - Additional therapy

## 2024-09-17 NOTE — PROGRESS NOTE ADULT - SUBJECTIVE AND OBJECTIVE BOX
ROCCO JC  MRN-[id number]  74y (1950)    Follow Up:  rash, hypothermia, covid 19    Interval History: The pt was seen and examined earlier, not in acute distress, awake and alert, overall appropriate, complains of being tired, complains of on and off cough, rash with no change. Pt is afebrile, RA, no WBC according to yesterday's cbc.     PAST MEDICAL & SURGICAL HISTORY:  Hypertension, unspecified type      Type 2 diabetes mellitus with other neurologic complication, without long-term current use of insulin      DM (diabetes mellitus)      HTN (hypertension)      HLD (hyperlipidemia)      Afib      Retinopathy      Chronic diastolic congestive heart failure      Chronic kidney disease, unspecified CKD stage      Amputation of leg, traumatic, left, subsequent encounter      S/P BKA (below knee amputation) unilateral, left      S/P hip replacement, left      History of surgery on arm          ROS:    [ ] Unobtainable because:  [ x] All other systems negative    Constitutional: no fever, no chills  Head: no trauma  Eyes: no vision changes, no eye pain  ENT:  no sore throat, no rhinorrhea  Cardiovascular:  no chest pain, no palpitation  Respiratory:  no SOB, + cough  GI:  no abd pain, no vomiting, no diarrhea  urinary: no dysuria, no hematuria, no flank pain  musculoskeletal:  no joint pain, no joint swelling  skin:  + rash  neurology:  no headache, no seizure, no change in mental status  psych: no anxiety, no depression         Allergies  No Known Allergies        ANTIMICROBIALS:      OTHER MEDS:  acetaminophen     Tablet .. 650 milliGRAM(s) Oral every 6 hours PRN  apixaban 5 milliGRAM(s) Oral two times a day  AQUAPHOR (petrolatum Ointment) 1 Application(s) Topical daily  atorvastatin 40 milliGRAM(s) Oral at bedtime  bacitracin   Ointment 1 Application(s) Topical daily  benzocaine/menthol Lozenge 1 Lozenge Oral five times a day PRN  calamine/zinc oxide Lotion 1 Application(s) Topical two times a day  chlorhexidine 2% Cloths 1 Application(s) Topical <User Schedule>  dextrose 5%. 1000 milliLiter(s) IV Continuous <Continuous>  dextrose 5%. 1000 milliLiter(s) IV Continuous <Continuous>  dextrose 50% Injectable 25 Gram(s) IV Push once  dextrose 50% Injectable 12.5 Gram(s) IV Push once  dextrose 50% Injectable 25 Gram(s) IV Push once  dextrose Oral Gel 15 Gram(s) Oral once PRN  doxazosin 2 milliGRAM(s) Oral at bedtime  epoetin malik-epbx (RETACRIT) Injectable 71886 Unit(s) IV Push <User Schedule>  glucagon  Injectable 1 milliGRAM(s) IntraMuscular once  guaiFENesin Oral Liquid (Sugar-Free) 200 milliGRAM(s) Oral every 6 hours PRN  hydrALAZINE 50 milliGRAM(s) Oral every 8 hours  insulin glargine Injectable (LANTUS) 25 Unit(s) SubCutaneous at bedtime  insulin lispro (ADMELOG) corrective regimen sliding scale   SubCutaneous at bedtime  insulin lispro (ADMELOG) corrective regimen sliding scale   SubCutaneous three times a day before meals  insulin lispro Injectable (ADMELOG) 6 Unit(s) SubCutaneous three times a day before meals  melatonin 3 milliGRAM(s) Oral at bedtime  Nephro-lee 1 Tablet(s) Oral daily  pantoprazole    Tablet 40 milliGRAM(s) Oral before breakfast  polyethylene glycol 3350 17 Gram(s) Oral daily PRN  sevelamer carbonate 1600 milliGRAM(s) Oral three times a day with meals  sodium chloride 0.9% Bolus. 100 milliLiter(s) IV Bolus every 5 minutes PRN  sodium chloride 0.9% lock flush 10 milliLiter(s) IV Push every 1 hour PRN  triamcinolone 0.1% Ointment 1 Application(s) Topical two times a day      Vital Signs Last 24 Hrs  T(C): 36.2 (17 Sep 2024 10:49), Max: 36.4 (16 Sep 2024 19:30)  T(F): 97.2 (17 Sep 2024 10:49), Max: 97.6 (16 Sep 2024 19:30)  HR: 86 (17 Sep 2024 10:49) (52 - 92)  BP: 114/62 (17 Sep 2024 10:49) (98/56 - 133/77)  BP(mean): --  RR: 18 (17 Sep 2024 10:49) (16 - 18)  SpO2: 96% (17 Sep 2024 10:49) (94% - 97%)    Parameters below as of 17 Sep 2024 10:49  Patient On (Oxygen Delivery Method): room air        Physical Exam:  Constitutional: non-toxic, no distress, RA   HEAD/EYES: anicteric, no conjunctival injection, dry and crusting around the eyes with improvement, vision impaired   ENT:  supple, no thrush  Cardiovascular:   normal S1, S2, no murmur, no edema, left chest HD catheter - dressings needs to be changed, pt picks on his skin and dressing all the time   Respiratory: diminished at bases b/l, no wheezes, no rales  GI:  soft, non-tender, normal bowel sounds, no guarding, no rebound   :  no olsen, no CVA tenderness  Musculoskeletal:  no synovitis, normal ROM, left BKA stump is well healed   Neurologic: awake and alert, normal strength, no focal findings  Skin:  full body rash, + a couple blisters observed on pt's stump, overall many wounds are crusted, new open blisters are covered with dressings, LUE and RLE, left stump   Heme/Onc: no lymphadenopathy   Psychiatric:  calm behavior     WBC Count: 9.93 K/uL (09-16 @ 06:20)  WBC Count: 9.79 K/uL (09-14 @ 07:58)  WBC Count: 8.07 K/uL (09-13 @ 11:10)  WBC Count: 9.20 K/uL (09-11 @ 05:37)                            9.5    9.93  )-----------( 173      ( 16 Sep 2024 06:20 )             30.1       09-16    136  |  101  |  93[H]  ----------------------------<  154[H]  5.2   |  24  |  6.72[H]    Ca    8.9      16 Sep 2024 06:20  Phos  5.8     09-16    TPro  x   /  Alb  2.0[L]  /  TBili  x   /  DBili  x   /  AST  x   /  ALT  x   /  AlkPhos  x   09-16      Urinalysis Basic - ( 16 Sep 2024 06:20 )    Color: x / Appearance: x / SG: x / pH: x  Gluc: 154 mg/dL / Ketone: x  / Bili: x / Urobili: x   Blood: x / Protein: x / Nitrite: x   Leuk Esterase: x / RBC: x / WBC x   Sq Epi: x / Non Sq Epi: x / Bacteria: x        Creatinine Trend: 6.72<--, 4.52<--, 5.52<--, 4.99<--, 3.68<--, 5.73<--  Lactate, Blood: 0.9 mmol/L (09-16-24 @ 20:30)      MICROBIOLOGY:  v  .Blood Blood-Peripheral  08-27-24   No growth at 5 days  --  --      .Blood Blood-Peripheral  08-27-24   No growth at 5 days  --  --      Clean Catch Clean Catch (Midstream)  08-27-24   >100,000 CFU/ml Enterobacter cloacae complex  --  Enterobacter cloacae complex    Rapid RVP Result: Detected (09-11 @ 21:30)      SARS-CoV-2: Detected (11 Sep 2024 21:30)    RADIOLOGY:     ROCCO JC  MRN-[id number]  74y (1950)    Follow Up:  rash, hypothermia, covid 19    Interval History: The pt was seen and examined earlier, not in acute distress, awake and alert, overall appropriate, complains of being tired, complains of on and off cough, rash with no change. Pt is afebrile, RA, no WBC according to yesterday's cbc. episode of hypothermia resolved     PAST MEDICAL & SURGICAL HISTORY:  Hypertension, unspecified type      Type 2 diabetes mellitus with other neurologic complication, without long-term current use of insulin      DM (diabetes mellitus)      HTN (hypertension)      HLD (hyperlipidemia)      Afib      Retinopathy      Chronic diastolic congestive heart failure      Chronic kidney disease, unspecified CKD stage      Amputation of leg, traumatic, left, subsequent encounter      S/P BKA (below knee amputation) unilateral, left      S/P hip replacement, left      History of surgery on arm          ROS:    [ ] Unobtainable because:  [ x] All other systems negative    Constitutional: no fever, no chills  Head: no trauma  Eyes: no vision changes, no eye pain  ENT:  no sore throat, no rhinorrhea  Cardiovascular:  no chest pain, no palpitation  Respiratory:  no SOB, + cough  GI:  no abd pain, no vomiting, no diarrhea  urinary: no dysuria, no hematuria, no flank pain  musculoskeletal:  no joint pain, no joint swelling  skin:  + rash  neurology:  no headache, no seizure, no change in mental status  psych: no anxiety, no depression         Allergies  No Known Allergies        ANTIMICROBIALS:      OTHER MEDS:  acetaminophen     Tablet .. 650 milliGRAM(s) Oral every 6 hours PRN  apixaban 5 milliGRAM(s) Oral two times a day  AQUAPHOR (petrolatum Ointment) 1 Application(s) Topical daily  atorvastatin 40 milliGRAM(s) Oral at bedtime  bacitracin   Ointment 1 Application(s) Topical daily  benzocaine/menthol Lozenge 1 Lozenge Oral five times a day PRN  calamine/zinc oxide Lotion 1 Application(s) Topical two times a day  chlorhexidine 2% Cloths 1 Application(s) Topical <User Schedule>  dextrose 5%. 1000 milliLiter(s) IV Continuous <Continuous>  dextrose 5%. 1000 milliLiter(s) IV Continuous <Continuous>  dextrose 50% Injectable 25 Gram(s) IV Push once  dextrose 50% Injectable 12.5 Gram(s) IV Push once  dextrose 50% Injectable 25 Gram(s) IV Push once  dextrose Oral Gel 15 Gram(s) Oral once PRN  doxazosin 2 milliGRAM(s) Oral at bedtime  epoetin malik-epbx (RETACRIT) Injectable 67767 Unit(s) IV Push <User Schedule>  glucagon  Injectable 1 milliGRAM(s) IntraMuscular once  guaiFENesin Oral Liquid (Sugar-Free) 200 milliGRAM(s) Oral every 6 hours PRN  hydrALAZINE 50 milliGRAM(s) Oral every 8 hours  insulin glargine Injectable (LANTUS) 25 Unit(s) SubCutaneous at bedtime  insulin lispro (ADMELOG) corrective regimen sliding scale   SubCutaneous at bedtime  insulin lispro (ADMELOG) corrective regimen sliding scale   SubCutaneous three times a day before meals  insulin lispro Injectable (ADMELOG) 6 Unit(s) SubCutaneous three times a day before meals  melatonin 3 milliGRAM(s) Oral at bedtime  Nephro-lee 1 Tablet(s) Oral daily  pantoprazole    Tablet 40 milliGRAM(s) Oral before breakfast  polyethylene glycol 3350 17 Gram(s) Oral daily PRN  sevelamer carbonate 1600 milliGRAM(s) Oral three times a day with meals  sodium chloride 0.9% Bolus. 100 milliLiter(s) IV Bolus every 5 minutes PRN  sodium chloride 0.9% lock flush 10 milliLiter(s) IV Push every 1 hour PRN  triamcinolone 0.1% Ointment 1 Application(s) Topical two times a day      Vital Signs Last 24 Hrs  T(C): 36.2 (17 Sep 2024 10:49), Max: 36.4 (16 Sep 2024 19:30)  T(F): 97.2 (17 Sep 2024 10:49), Max: 97.6 (16 Sep 2024 19:30)  HR: 86 (17 Sep 2024 10:49) (52 - 92)  BP: 114/62 (17 Sep 2024 10:49) (98/56 - 133/77)  BP(mean): --  RR: 18 (17 Sep 2024 10:49) (16 - 18)  SpO2: 96% (17 Sep 2024 10:49) (94% - 97%)    Parameters below as of 17 Sep 2024 10:49  Patient On (Oxygen Delivery Method): room air        Physical Exam:  Constitutional: non-toxic, no distress, RA   HEAD/EYES: anicteric, no conjunctival injection, dry and crusting around the eyes with improvement, vision impaired   ENT:  supple, no thrush  Cardiovascular:   normal S1, S2, no murmur, no edema, left chest HD catheter - dressings needs to be changed, pt picks on his skin and dressing   Respiratory: diminished at bases b/l, no wheezes, no rales  GI:  soft, non-tender, normal bowel sounds, no guarding, no rebound   :  no olsen, no CVA tenderness  Musculoskeletal:  no synovitis, normal ROM, left BKA stump is well healed   Neurologic: awake and alert, normal strength, no focal findings  Skin:  full body rash, + a couple blisters observed on pt's stump, overall many wounds are crusted, new open blisters are covered with dressings, LUE and RLE, left stump. There is an area of concern on the chest leading up to the right neck where former line was placed with several blisters and some redness but no tenderness. this could be the beginning of cellulitis   Heme/Onc: no lymphadenopathy   Psychiatric:  calm behavior     WBC Count: 9.93 K/uL (09-16 @ 06:20)  WBC Count: 9.79 K/uL (09-14 @ 07:58)  WBC Count: 8.07 K/uL (09-13 @ 11:10)  WBC Count: 9.20 K/uL (09-11 @ 05:37)                            9.5    9.93  )-----------( 173      ( 16 Sep 2024 06:20 )             30.1       09-16    136  |  101  |  93[H]  ----------------------------<  154[H]  5.2   |  24  |  6.72[H]    Ca    8.9      16 Sep 2024 06:20  Phos  5.8     09-16    TPro  x   /  Alb  2.0[L]  /  TBili  x   /  DBili  x   /  AST  x   /  ALT  x   /  AlkPhos  x   09-16      Urinalysis Basic - ( 16 Sep 2024 06:20 )    Color: x / Appearance: x / SG: x / pH: x  Gluc: 154 mg/dL / Ketone: x  / Bili: x / Urobili: x   Blood: x / Protein: x / Nitrite: x   Leuk Esterase: x / RBC: x / WBC x   Sq Epi: x / Non Sq Epi: x / Bacteria: x        Creatinine Trend: 6.72<--, 4.52<--, 5.52<--, 4.99<--, 3.68<--, 5.73<--  Lactate, Blood: 0.9 mmol/L (09-16-24 @ 20:30)      MICROBIOLOGY:  v  .Blood Blood-Peripheral  08-27-24   No growth at 5 days  --  --      .Blood Blood-Peripheral  08-27-24   No growth at 5 days  --  --      Clean Catch Clean Catch (Midstream)  08-27-24   >100,000 CFU/ml Enterobacter cloacae complex  --  Enterobacter cloacae complex    Rapid RVP Result: Detected (09-11 @ 21:30)      SARS-CoV-2: Detected (11 Sep 2024 21:30)    RADIOLOGY:

## 2024-09-17 NOTE — PROGRESS NOTE ADULT - ASSESSMENT
73 year old male PMH HTN, HLD, DM, CHF, CKD stage IV, dementia / anxiety BIBEMS accompanied by wife due to weakness. Per wife, pt not feeling well since last night - went to bed prior to his own birthday party. Per EMS, pt complaining of decreased L vision and decreased L hearing as well as noted off balance with ambulation to restroom this AM. Pt states L eye is usually his good eye, and still with better vision in L than R, but not as good as usual. Pt reports chronic itching rash to body x past 3 months, f/u'd with Derm, was told likely 2/2 renal disease. Hearing later normalized in ER.     #COVID  -per my colleague's documentation " asymptomatic, on RA  - will c/w supportive care w/ robitussin  - Discussed with ID, no role for Rem/Decadron given no SOB and on RA  - will need to remain inpatient "    #Rash  -per my colleague's documentation " c/w triamcinolone 0.1% ointment   - Discussed with dermatology (Dr. Barbour and Dr. Sarabia), patient will need outpatient dermatology appointment for skin biopsy  - s/p cefepime   - Appreciate rheum recs"  9/15/2024 asked wound pt to come back to revaluate right upper extremity      9/16/2024	had a long d/w wife who voiced concern of not speaking with a doctor since Dr. Mendoza had the patient over 8 days ago.    I answered all questions and concerns to the best of my ability    I advised  her that I would reach out to rheumatology Dr. Shaikh again as he had sen the patient earlier in this admission.   i was able to get Dr. mccrary to agree to perform skin biopsy ion today . updated wife and she is pleased  can trial  steroids after skin biopsy for presumed bullous pemphigoid 1mg/kg prednisone daily   9/17/2024 skin biopsy was done but need to be repeatd on today ans specimen was placed in incoorect solution . will be done today by Dr. mccrary - f/u results of the one  dated today     wound Pt reccs updated and appreciated     ##ESRD   per my colleague's documentation " Temp cath placed and patient to initiate Dialysis here 2/2 Uremia and hyperkalemic. Temp cath placed   - C/w Inpatient dialysis   - Appreciate IR recs- s/p tunneled HD catheter  - Nephro following "    ##SSTI  Noted to be hypothermic to 92F. CT abd No evidence of acute inflammatory or obstructive process in the   abdomen and pelvis. UA positive nitrites, LEs.   s/p Cefepime  9/17/2024 another episode of hypothermia during HD on yesterday . septic w/u initiated and iD reconsulted as need to ensure no infection prior to initiating  prednisone for skin lesions     ##Type 2 DM  A1c 7; C/w Lantus 25 + 6U w/ meals + SSI  - Hypoglycemia protocol     #Pulm nodule   -per my colleague's documentation " CT noted There are scattered nodules at the visualized lung bases measuring up to 6 mm  CT chest reviewed will need follow up in 3-6  months "      #HTN  - C/w cardura, hydralazine     #HLD  - C/w statin     #History of DVT  - Eliquis     Diet: renal, CCD  DVT prophylaxis: eliquis   Dispo: to obtain skin biopsy in house by general surgery and possibly  start steroids after ID evaluation see above  Code Status: FC

## 2024-09-17 NOTE — PROGRESS NOTE ADULT - NS ATTEND AMEND GEN_ALL_CORE FT
I have reviewed all pertinent clinical information and agree with the NP's note.  Any new labs, recent cultures, new imaging (if applicable) and vitals have been reviewed today.  All necessary adjustments to management have been made.  Agree with the above assessment and plan.    UC with Enterobacter cloacae being covered by cefepime  , + full body rash, appear more dry on today's exam  RIJ catheter ideally should be removed or dressed with occlusive dressing  new permcath placement possibly tomorrow.   No contraindications to placing permacath tomorrow assuming there is no change in clinical status    Discussed plan with Dr Reji De La Cruz, DO  Chief, Infectious Disease at [facility]  Reachable via Microsoft Teams or ID office: [phone]  Weekdays: After 5pm, please call [phone] for all inquiries and new consults  Weekends: Message on-call infectious disease physician via teams (see Jil)
I have reviewed all pertinent clinical information and agree with the NP's note.  Any new labs, recent cultures, new imaging (if applicable) and vitals have been reviewed today.  All necessary adjustments to management have been made.  Agree with the above assessment and plan.    hypothermia is an ongoing issue that didn't just start yesterday   if blood cultures are negative please do not send blood cultures again with episodes of hypothermia without discussion from infectious disease team  if positive, would suspect it could be from developing cellulitis on the trunk. 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  Reachable via Microsoft Teams or ID office: [phone]  Weekdays: After 5pm, please call [phone] for all inquiries and new consults  Weekends: Message on-call infectious disease physician via teams (see Jil)
I have reviewed all pertinent clinical information and agree with the NP's note.  Any new labs, recent cultures, new imaging (if applicable) and vitals have been reviewed today.  All necessary adjustments to management have been made.  Agree with the above assessment and plan.    stopped cefepime 1g q24hrs, continue monitoring off abx  good hygiene  HD cath dressing should be changed frequently   HD per renal   needs to be seen by Dermatology as op as soon as possible, please, for skin biopsy   HIV negative  MRSA PCR - negative  follow culture data     signing off. please call with questions       Discussed plan with patients primary team.    Antolin De La Cruz DO  Chief, Infectious Disease at [facility]  Reachable via Microsoft Teams or ID office: [phone]  Weekdays: After 5pm, please call [phone] for all inquiries and new consults  Weekends: Message on-call infectious disease physician via teams (see Jil)
I have reviewed all pertinent clinical information and agree with the NP's note.  Any new labs, recent cultures, new imaging (if applicable) and vitals have been reviewed today.  All necessary adjustments to management have been made.  Agree with the above assessment and plan.    continue antibiotics for one more day   placement of permacath   keep catheter covered, clean dry  and change soiled dressings as often as needed  continue hemodialysis as per renal   Dermatology for possible biopsy, inpatient vs outpatient to the discretion of hospitalist medicine and consulting dermatologist     Discussed plan with patients primary team.    Antolin De La Cruz, DO  Chief, Infectious Disease at [facility]  Reachable via Microsoft Teams or ID office: [phone]  Weekdays: After 5pm, please call [phone] for all inquiries and new consults  Weekends: Message on-call infectious disease physician via teams (see Jil)
I have reviewed all pertinent clinical information and agree with the NP's note.  Any new labs, recent cultures, new imaging (if applicable) and vitals have been reviewed today.  All necessary adjustments to management have been made.  Agree with the above assessment and plan.    wbc ok but patient having hypothermic episodes   would hold off on permacath and see if his hypothermia improves  continue cefepime   consider line holiday   start triamcinolone as recommended by dermatology   patient still warrants skin biopsy to determine what exactly this rash is from    Discussed plan with Dr Reji De La Cruz, DO  Chief, Infectious Disease at [facility]  Reachable via Microsoft Teams or ID office: [phone]  Weekdays: After 5pm, please call [phone] for all inquiries and new consults  Weekends: Message on-call infectious disease physician via teams (leonides Ley)

## 2024-09-17 NOTE — PROGRESS NOTE ADULT - SUBJECTIVE AND OBJECTIVE BOX
Pt seen and examined at bedside with Dr. Simms, no acute issues overnight.     Vital Signs Last 24 Hrs  T(C): 36.2 (17 Sep 2024 10:49), Max: 36.4 (16 Sep 2024 19:30)  T(F): 97.2 (17 Sep 2024 10:49), Max: 97.6 (16 Sep 2024 19:30)  HR: 86 (17 Sep 2024 10:49) (52 - 92)  BP: 114/62 (17 Sep 2024 10:49) (98/56 - 133/77)  BP(mean): --  RR: 18 (17 Sep 2024 10:49) (16 - 18)  SpO2: 96% (17 Sep 2024 10:49) (94% - 97%)    Parameters below as of 17 Sep 2024 10:49  Patient On (Oxygen Delivery Method): room air      I&O's Summary    16 Sep 2024 07:01  -  17 Sep 2024 07:00  --------------------------------------------------------  IN: 0 mL / OUT: 1000 mL / NET: -1000 mL      PHYSICAL EXAM:  General:  Appears stated age, scratching skin   HEENT:  NC/AT,  conjunctivae clear and pink, no thyromegaly  Chest:  Full & symmetric excursion, no increased effort  Cardiovascular:  Regular rhythm  Abdomen:  Soft, non tender, non distended, normoactive bowel sounds,  no masses   Extremities:  No cyanosis, clubbing or edema  Skin: Multiple superficial wounds in various stages of healing on bilateral upper extremities, lower extremities, chest, abdomen, and bilateral lower extremities. Skin Punch biopsy taken from right side of abdomen    LABS:                        9.5    9.93  )-----------( 173      ( 16 Sep 2024 06:20 )             30.1     09-16    136  |  101  |  93[H]  ----------------------------<  154[H]  5.2   |  24  |  6.72[H]    Ca    8.9      16 Sep 2024 06:20  Phos  5.8     09-16    TPro  x   /  Alb  2.0[L]  /  TBili  x   /  DBili  x   /  AST  x   /  ALT  x   /  AlkPhos  x   09-16    PT/INR - ( 16 Sep 2024 10:40 )   PT: 14.8 sec;   INR: 1.25 ratio         PTT - ( 16 Sep 2024 10:40 )  PTT:37.5 sec  Urinalysis Basic - ( 16 Sep 2024 06:20 )    Color: x / Appearance: x / SG: x / pH: x  Gluc: 154 mg/dL / Ketone: x  / Bili: x / Urobili: x   Blood: x / Protein: x / Nitrite: x   Leuk Esterase: x / RBC: x / WBC x   Sq Epi: x / Non Sq Epi: x / Bacteria: x      75 Y/O male with a PMHx of HTN, HLD, DM, CHF, CKD, afib on eliquis with PSHx of L BKA admitted to hospital for worsening CKD requiring HD. Surgery consulted to evaluate for skin biopsy. Patient stated he was evaluated as outpatient dermatologist for pruritus 3 weeks ago to which no intervention was implemented at this time. Inpatient dermatology consult placed with recommendations for outpatient evaluation and skin biopsy  Afebrile with VSS.     PLAN:   - Skin biopsy performed at bedside  - Will F/U pathology   - Local wound care per RN   - Continue care per primary team   - Discussed with Dr. Simms at bedside

## 2024-09-17 NOTE — PROGRESS NOTE ADULT - NS ATTEND OPT1 GEN_ALL_CORE
I independently performed the documented:
I attest my time as attending is greater than 50% of the total combined time spent on qualifying patient care activities by the PA/NP and attending.
I independently performed the documented:

## 2024-09-17 NOTE — CHART NOTE - NSCHARTNOTEFT_GEN_A_CORE
Pt sleeping at time of visit. Per chart, pt with PMH of HTN, HLD, T2DM, CHF, CKD stage IV, dementia presented for weakness; here with COVID-19 (asymptomatic on room air). Also with rash (skin biopsy taken, awaiting results), ESRD initiated on HD here 2/2 uremia and hyperkalemia (most recent session yesterday 09/16), hypothermia, & pulmonary nodule(to followup outpatient).    Factors impacting intake: [x] none [ ] nausea  [ ] vomiting [ ] diarrhea [ ] constipation  [ ]chewing problems [ ] swallowing issues  [ ] other:     Diet Prescription: Diet, Consistent Carbohydrate w/Evening Snack:   1200mL Fluid Restriction ([id number])  For patients receiving Renal Replacement - No Protein Restr, No Conc K, No Conc Phos, Low Sodium (RENAL) (09-03-24 @ 11:38)    Intake: % for documented meals as per flow sheets; observed pt ate >75% of breakfast this AM(09/17)    Current Weight: 96.3kg(09/16), 108.9kg(08/27)  % Weight Change: 11.6% decrease x 3 weeks; likely at least partially due to fluid losses from HD  1+ edema b/l arms as per flow sheets  Physical appearance: Pt sleeping at time of visit; only mild temporal wasting observed    Pertinent Medications: MEDICATIONS  (STANDING):  apixaban 5 milliGRAM(s) Oral two times a day  AQUAPHOR (petrolatum Ointment) 1 Application(s) Topical daily  atorvastatin 40 milliGRAM(s) Oral at bedtime  bacitracin   Ointment 1 Application(s) Topical daily  calamine/zinc oxide Lotion 1 Application(s) Topical two times a day  chlorhexidine 2% Cloths 1 Application(s) Topical <User Schedule>  dextrose 5%. 1000 milliLiter(s) (50 mL/Hr) IV Continuous <Continuous>  dextrose 5%. 1000 milliLiter(s) (100 mL/Hr) IV Continuous <Continuous>  dextrose 50% Injectable 12.5 Gram(s) IV Push once  dextrose 50% Injectable 25 Gram(s) IV Push once  dextrose 50% Injectable 25 Gram(s) IV Push once  doxazosin 2 milliGRAM(s) Oral at bedtime  epoetin malik-epbx (RETACRIT) Injectable 20204 Unit(s) IV Push <User Schedule>  glucagon  Injectable 1 milliGRAM(s) IntraMuscular once  hydrALAZINE 50 milliGRAM(s) Oral every 8 hours  insulin glargine Injectable (LANTUS) 25 Unit(s) SubCutaneous at bedtime  insulin lispro (ADMELOG) corrective regimen sliding scale   SubCutaneous three times a day before meals  insulin lispro (ADMELOG) corrective regimen sliding scale   SubCutaneous at bedtime  insulin lispro Injectable (ADMELOG) 6 Unit(s) SubCutaneous three times a day before meals  melatonin 3 milliGRAM(s) Oral at bedtime  Nephro-lee 1 Tablet(s) Oral daily  pantoprazole    Tablet 40 milliGRAM(s) Oral before breakfast  sevelamer carbonate 1600 milliGRAM(s) Oral three times a day with meals  triamcinolone 0.1% Ointment 1 Application(s) Topical two times a day    MEDICATIONS  (PRN):  acetaminophen     Tablet .. 650 milliGRAM(s) Oral every 6 hours PRN Temp greater or equal to 38C (100.4F), Moderate Pain (4 - 6)  benzocaine/menthol Lozenge 1 Lozenge Oral five times a day PRN Mouth Sores  dextrose Oral Gel 15 Gram(s) Oral once PRN Blood Glucose LESS THAN 70 milliGRAM(s)/deciliter  guaiFENesin Oral Liquid (Sugar-Free) 200 milliGRAM(s) Oral every 6 hours PRN Cough  polyethylene glycol 3350 17 Gram(s) Oral daily PRN Constipation  sodium chloride 0.9% Bolus. 100 milliLiter(s) IV Bolus every 5 minutes PRN SBP LESS THAN or EQUAL to 100 mmHg  sodium chloride 0.9% lock flush 10 milliLiter(s) IV Push every 1 hour PRN Pre/post blood products, medications, blood draw, and to maintain line patency    Pertinent Labs: 09-16 Na136 mmol/L Glu 154 mg/dL[H] K+ 5.2 mmol/L Cr  6.72 mg/dL[H] BUN 93 mg/dL[H] 09-16 Phos 5.8 mg/dL[H] 09-16 Alb 2.0 g/dL[L]    POCT Blood Glucose.: 89 mg/dL (17 Sep 2024 11:23)  POCT Blood Glucose.: 114 mg/dL (17 Sep 2024 08:07)  POCT Blood Glucose.: 87 mg/dL (16 Sep 2024 21:52)  POCT Blood Glucose.: 97 mg/dL (16 Sep 2024 19:02)  POCT Blood Glucose.: 155 mg/dL (16 Sep 2024 12:33)    Skin: unstageable pressure ulcer; R heel  as per flow sheets    Estimated Needs:   [x] no change since previous assessment on 09/03  [ ] recalculated:     Previous Nutrition Diagnosis:   [x] Increased Nutrient Needs: protein  Etiology: increased demand for nutrient  Signs/Symptoms: pressure ulcer, HD    Goal/Expected Outcome: pt to consume >75% of meals - not consistently met    Nutrition Diagnosis is [x] ongoing  [ ] resolved [ ] not applicable     New Nutrition Diagnosis: [x] not applicable       Interventions:   Recommend  [ ] Change Diet To:  [x] Nutrition Supplement: add Nepro x 1/day (provides 425 kcal, 19 g protein)  [ ] Nutrition Support  [x] Other: consider liberalizing fluid restriction to 1500ml    Monitoring and Evaluation:   [ x ] PO intake [ x ] Tolerance to diet prescription [ x ] weights [ x ] labs[ x ] follow up per protocol  [ ] other: Pt sleeping at time of visit. Per chart, pt with PMH of HTN, HLD, T2DM, CHF, CKD stage IV, dementia presented for weakness; here with COVID-19 (asymptomatic on room air). Also with rash (skin biopsy taken, awaiting results), ESRD initiated on HD here 2/2 uremia and hyperkalemia (most recent session yesterday 09/16), hypothermia, & pulmonary nodule(to followup outpatient).    Factors impacting intake: [x] none [ ] nausea  [ ] vomiting [ ] diarrhea [ ] constipation  [ ]chewing problems [ ] swallowing issues  [ ] other:     Diet Prescription: Diet, Consistent Carbohydrate w/Evening Snack:   1200mL Fluid Restriction ([id number])  For patients receiving Renal Replacement - No Protein Restr, No Conc K, No Conc Phos, Low Sodium (RENAL) (09-03-24 @ 11:38)    Intake: % for documented meals as per flow sheets; observed pt ate >75% of breakfast this AM(09/17)    Current Weight: 96.3kg(09/16), 108.9kg(08/27)  % Weight Change: 11.6% decrease x 3 weeks; likely at least partially due to fluid losses from HD  1+ edema b/l arms as per flow sheets  Physical appearance: Pt sleeping at time of visit; only mild temporal wasting observed    Pertinent Medications: MEDICATIONS  (STANDING):  apixaban 5 milliGRAM(s) Oral two times a day  AQUAPHOR (petrolatum Ointment) 1 Application(s) Topical daily  atorvastatin 40 milliGRAM(s) Oral at bedtime  bacitracin   Ointment 1 Application(s) Topical daily  calamine/zinc oxide Lotion 1 Application(s) Topical two times a day  chlorhexidine 2% Cloths 1 Application(s) Topical <User Schedule>  dextrose 5%. 1000 milliLiter(s) (50 mL/Hr) IV Continuous <Continuous>  dextrose 5%. 1000 milliLiter(s) (100 mL/Hr) IV Continuous <Continuous>  dextrose 50% Injectable 12.5 Gram(s) IV Push once  dextrose 50% Injectable 25 Gram(s) IV Push once  dextrose 50% Injectable 25 Gram(s) IV Push once  doxazosin 2 milliGRAM(s) Oral at bedtime  epoetin malik-epbx (RETACRIT) Injectable 98527 Unit(s) IV Push <User Schedule>  glucagon  Injectable 1 milliGRAM(s) IntraMuscular once  hydrALAZINE 50 milliGRAM(s) Oral every 8 hours  insulin glargine Injectable (LANTUS) 25 Unit(s) SubCutaneous at bedtime  insulin lispro (ADMELOG) corrective regimen sliding scale   SubCutaneous three times a day before meals  insulin lispro (ADMELOG) corrective regimen sliding scale   SubCutaneous at bedtime  insulin lispro Injectable (ADMELOG) 6 Unit(s) SubCutaneous three times a day before meals  melatonin 3 milliGRAM(s) Oral at bedtime  Nephro-lee 1 Tablet(s) Oral daily  pantoprazole    Tablet 40 milliGRAM(s) Oral before breakfast  sevelamer carbonate 1600 milliGRAM(s) Oral three times a day with meals  triamcinolone 0.1% Ointment 1 Application(s) Topical two times a day    MEDICATIONS  (PRN):  acetaminophen     Tablet .. 650 milliGRAM(s) Oral every 6 hours PRN Temp greater or equal to 38C (100.4F), Moderate Pain (4 - 6)  benzocaine/menthol Lozenge 1 Lozenge Oral five times a day PRN Mouth Sores  dextrose Oral Gel 15 Gram(s) Oral once PRN Blood Glucose LESS THAN 70 milliGRAM(s)/deciliter  guaiFENesin Oral Liquid (Sugar-Free) 200 milliGRAM(s) Oral every 6 hours PRN Cough  polyethylene glycol 3350 17 Gram(s) Oral daily PRN Constipation  sodium chloride 0.9% Bolus. 100 milliLiter(s) IV Bolus every 5 minutes PRN SBP LESS THAN or EQUAL to 100 mmHg  sodium chloride 0.9% lock flush 10 milliLiter(s) IV Push every 1 hour PRN Pre/post blood products, medications, blood draw, and to maintain line patency    Pertinent Labs: 09-16 Na136 mmol/L Glu 154 mg/dL[H] K+ 5.2 mmol/L Cr  6.72 mg/dL[H] BUN 93 mg/dL[H] 09-16 Phos 5.8 mg/dL[H] 09-16 Alb 2.0 g/dL[L]  08-27 HgbA1c 7.0%    POCT Blood Glucose.: 89 mg/dL (17 Sep 2024 11:23)  POCT Blood Glucose.: 114 mg/dL (17 Sep 2024 08:07)  POCT Blood Glucose.: 87 mg/dL (16 Sep 2024 21:52)  POCT Blood Glucose.: 97 mg/dL (16 Sep 2024 19:02)  POCT Blood Glucose.: 155 mg/dL (16 Sep 2024 12:33)    Skin: unstageable pressure ulcer; R heel  as per flow sheets    Estimated Needs:   [x] no change since previous assessment on 09/03  [ ] recalculated:     Previous Nutrition Diagnosis:   [x] Increased Nutrient Needs: protein  Etiology: increased demand for nutrient  Signs/Symptoms: pressure ulcer, HD    Goal/Expected Outcome: pt to consume >75% of meals - not consistently met    Nutrition Diagnosis is [x] ongoing  [ ] resolved [ ] not applicable     New Nutrition Diagnosis: [x] not applicable       Interventions:   Recommend  [ ] Change Diet To:  [x] Nutrition Supplement: add Nepro x 1/day (provides 425 kcal, 19 g protein)  [ ] Nutrition Support  [x] Other: consider liberalizing fluid restriction to 1500ml    Monitoring and Evaluation:   [ x ] PO intake [ x ] Tolerance to diet prescription [ x ] weights [ x ] labs[ x ] follow up per protocol  [ ] other:

## 2024-09-18 LAB
ALBUMIN SERPL ELPH-MCNC: 1.9 G/DL — LOW (ref 3.3–5)
ALP SERPL-CCNC: 65 U/L — SIGNIFICANT CHANGE UP (ref 40–120)
ALT FLD-CCNC: 19 U/L — SIGNIFICANT CHANGE UP (ref 12–78)
ANION GAP SERPL CALC-SCNC: 8 MMOL/L — SIGNIFICANT CHANGE UP (ref 5–17)
APPEARANCE UR: CLEAR — SIGNIFICANT CHANGE UP
AST SERPL-CCNC: 14 U/L — LOW (ref 15–37)
BACTERIA # UR AUTO: ABNORMAL /HPF
BILIRUB SERPL-MCNC: 0.3 MG/DL — SIGNIFICANT CHANGE UP (ref 0.2–1.2)
BILIRUB UR-MCNC: NEGATIVE — SIGNIFICANT CHANGE UP
BUN SERPL-MCNC: 82 MG/DL — HIGH (ref 7–23)
CALCIUM SERPL-MCNC: 8.6 MG/DL — SIGNIFICANT CHANGE UP (ref 8.5–10.1)
CHLORIDE SERPL-SCNC: 101 MMOL/L — SIGNIFICANT CHANGE UP (ref 96–108)
CO2 SERPL-SCNC: 27 MMOL/L — SIGNIFICANT CHANGE UP (ref 22–31)
COLOR SPEC: YELLOW — SIGNIFICANT CHANGE UP
CREAT SERPL-MCNC: 6.41 MG/DL — HIGH (ref 0.5–1.3)
DIFF PNL FLD: NEGATIVE — SIGNIFICANT CHANGE UP
EGFR: 8 ML/MIN/1.73M2 — LOW
GLUCOSE BLDC GLUCOMTR-MCNC: 176 MG/DL — HIGH (ref 70–99)
GLUCOSE BLDC GLUCOMTR-MCNC: 194 MG/DL — HIGH (ref 70–99)
GLUCOSE BLDC GLUCOMTR-MCNC: 264 MG/DL — HIGH (ref 70–99)
GLUCOSE BLDC GLUCOMTR-MCNC: 281 MG/DL — HIGH (ref 70–99)
GLUCOSE SERPL-MCNC: 318 MG/DL — HIGH (ref 70–99)
GLUCOSE UR QL: 100 MG/DL
HBV CORE AB SER-ACNC: SIGNIFICANT CHANGE UP
HCT VFR BLD CALC: 28 % — LOW (ref 39–50)
HGB BLD-MCNC: 8.8 G/DL — LOW (ref 13–17)
KETONES UR-MCNC: NEGATIVE MG/DL — SIGNIFICANT CHANGE UP
LEUKOCYTE ESTERASE UR-ACNC: NEGATIVE — SIGNIFICANT CHANGE UP
MCHC RBC-ENTMCNC: 27.4 PG — SIGNIFICANT CHANGE UP (ref 27–34)
MCHC RBC-ENTMCNC: 31.4 G/DL — LOW (ref 32–36)
MCV RBC AUTO: 87.2 FL — SIGNIFICANT CHANGE UP (ref 80–100)
NITRITE UR-MCNC: NEGATIVE — SIGNIFICANT CHANGE UP
NRBC # BLD: 0 /100 WBCS — SIGNIFICANT CHANGE UP (ref 0–0)
PH UR: 7 — SIGNIFICANT CHANGE UP (ref 5–8)
PLATELET # BLD AUTO: 174 K/UL — SIGNIFICANT CHANGE UP (ref 150–400)
POTASSIUM SERPL-MCNC: 5.1 MMOL/L — SIGNIFICANT CHANGE UP (ref 3.5–5.3)
POTASSIUM SERPL-SCNC: 5.1 MMOL/L — SIGNIFICANT CHANGE UP (ref 3.5–5.3)
PROT SERPL-MCNC: 6.7 GM/DL — SIGNIFICANT CHANGE UP (ref 6–8.3)
PROT UR-MCNC: 100 MG/DL
RBC # BLD: 3.21 M/UL — LOW (ref 4.2–5.8)
RBC # FLD: 14.6 % — HIGH (ref 10.3–14.5)
RBC CASTS # UR COMP ASSIST: 5 /HPF — HIGH (ref 0–4)
SODIUM SERPL-SCNC: 136 MMOL/L — SIGNIFICANT CHANGE UP (ref 135–145)
SP GR SPEC: 1.01 — SIGNIFICANT CHANGE UP (ref 1–1.03)
UROBILINOGEN FLD QL: 0.2 MG/DL — SIGNIFICANT CHANGE UP (ref 0.2–1)
WBC # BLD: 7.39 K/UL — SIGNIFICANT CHANGE UP (ref 3.8–10.5)
WBC # FLD AUTO: 7.39 K/UL — SIGNIFICANT CHANGE UP (ref 3.8–10.5)
WBC UR QL: 5 /HPF — SIGNIFICANT CHANGE UP (ref 0–5)

## 2024-09-18 PROCEDURE — 99233 SBSQ HOSP IP/OBS HIGH 50: CPT

## 2024-09-18 PROCEDURE — 99232 SBSQ HOSP IP/OBS MODERATE 35: CPT

## 2024-09-18 RX ADMIN — PETROLATUM 1 APPLICATION(S): 865 OINTMENT TOPICAL at 12:13

## 2024-09-18 RX ADMIN — Medication 2: at 08:29

## 2024-09-18 RX ADMIN — Medication 40 MILLIGRAM(S): at 08:29

## 2024-09-18 RX ADMIN — SEVELAMER CARBONATE 1600 MILLIGRAM(S): 800 TABLET, FILM COATED ORAL at 12:10

## 2024-09-18 RX ADMIN — INSULIN GLARGINE 25 UNIT(S): 100 INJECTION, SOLUTION SUBCUTANEOUS at 23:12

## 2024-09-18 RX ADMIN — TRIAMCINOLONE ACETONIDE 1 APPLICATION(S): 1 CREAM TOPICAL at 18:51

## 2024-09-18 RX ADMIN — APIXABAN 5 MILLIGRAM(S): 5 TABLET, FILM COATED ORAL at 17:08

## 2024-09-18 RX ADMIN — ACETAMINOPHEN 650 MILLIGRAM(S): 325 TABLET ORAL at 07:13

## 2024-09-18 RX ADMIN — Medication 1 APPLICATION(S): at 17:12

## 2024-09-18 RX ADMIN — SEVELAMER CARBONATE 1600 MILLIGRAM(S): 800 TABLET, FILM COATED ORAL at 17:09

## 2024-09-18 RX ADMIN — Medication 100 MILLIGRAM(S): at 07:12

## 2024-09-18 RX ADMIN — Medication 2 MILLIGRAM(S): at 23:14

## 2024-09-18 RX ADMIN — Medication 1 APPLICATION(S): at 12:15

## 2024-09-18 RX ADMIN — TRIAMCINOLONE ACETONIDE 1 APPLICATION(S): 1 CREAM TOPICAL at 07:21

## 2024-09-18 RX ADMIN — Medication 2: at 23:13

## 2024-09-18 RX ADMIN — SEVELAMER CARBONATE 1600 MILLIGRAM(S): 800 TABLET, FILM COATED ORAL at 08:29

## 2024-09-18 RX ADMIN — Medication 50 MILLIGRAM(S): at 23:13

## 2024-09-18 RX ADMIN — Medication 3 MILLIGRAM(S): at 23:26

## 2024-09-18 RX ADMIN — Medication 40 MILLIGRAM(S): at 23:13

## 2024-09-18 RX ADMIN — EPOETIN ALFA 10000 UNIT(S): 3000 SOLUTION INTRAVENOUS; SUBCUTANEOUS at 12:38

## 2024-09-18 RX ADMIN — APIXABAN 5 MILLIGRAM(S): 5 TABLET, FILM COATED ORAL at 07:12

## 2024-09-18 RX ADMIN — Medication 6: at 11:08

## 2024-09-18 RX ADMIN — Medication 6 UNIT(S): at 08:30

## 2024-09-18 RX ADMIN — Medication 2: at 17:09

## 2024-09-18 RX ADMIN — Medication 6 UNIT(S): at 12:09

## 2024-09-18 RX ADMIN — Medication 1 TABLET(S): at 12:10

## 2024-09-18 RX ADMIN — CHLORHEXIDINE GLUCONATE 1 APPLICATION(S): 40 SOLUTION TOPICAL at 07:11

## 2024-09-18 RX ADMIN — Medication 6 UNIT(S): at 17:11

## 2024-09-18 RX ADMIN — Medication 1 APPLICATION(S): at 07:16

## 2024-09-18 NOTE — PROGRESS NOTE ADULT - SUBJECTIVE AND OBJECTIVE BOX
[facility] NEPHROLOGY SERVICES, [facility]  NEPHROLOGY AND HYPERTENSION  [address]  [address]  [place] [address]  [phone]    MD YISEL WEEKS MD YELENA ROSENBERG, MD BINNY KOSHY, MD CHRISTOPHER CAPUTO, MD EDWARD BOVER, MD          Patient events noted    MEDICATIONS  (STANDING):  apixaban 5 milliGRAM(s) Oral two times a day  AQUAPHOR (petrolatum Ointment) 1 Application(s) Topical daily  atorvastatin 40 milliGRAM(s) Oral at bedtime  bacitracin   Ointment 1 Application(s) Topical daily  calamine/zinc oxide Lotion 1 Application(s) Topical two times a day  chlorhexidine 2% Cloths 1 Application(s) Topical <User Schedule>  dextrose 5%. 1000 milliLiter(s) (50 mL/Hr) IV Continuous <Continuous>  dextrose 5%. 1000 milliLiter(s) (100 mL/Hr) IV Continuous <Continuous>  dextrose 50% Injectable 25 Gram(s) IV Push once  dextrose 50% Injectable 12.5 Gram(s) IV Push once  dextrose 50% Injectable 25 Gram(s) IV Push once  doxazosin 2 milliGRAM(s) Oral at bedtime  epoetin malik-epbx (RETACRIT) Injectable 31557 Unit(s) IV Push <User Schedule>  glucagon  Injectable 1 milliGRAM(s) IntraMuscular once  hydrALAZINE 50 milliGRAM(s) Oral every 8 hours  insulin glargine Injectable (LANTUS) 25 Unit(s) SubCutaneous at bedtime  insulin lispro (ADMELOG) corrective regimen sliding scale   SubCutaneous at bedtime  insulin lispro (ADMELOG) corrective regimen sliding scale   SubCutaneous three times a day before meals  insulin lispro Injectable (ADMELOG) 6 Unit(s) SubCutaneous three times a day before meals  melatonin 3 milliGRAM(s) Oral at bedtime  Nephro-lee 1 Tablet(s) Oral daily  pantoprazole    Tablet 40 milliGRAM(s) Oral before breakfast  predniSONE   Tablet 100 milliGRAM(s) Oral daily  sevelamer carbonate 1600 milliGRAM(s) Oral three times a day with meals  triamcinolone 0.1% Ointment 1 Application(s) Topical two times a day    MEDICATIONS  (PRN):  acetaminophen     Tablet .. 650 milliGRAM(s) Oral every 6 hours PRN Temp greater or equal to 38C (100.4F), Moderate Pain (4 - 6)  benzocaine/menthol Lozenge 1 Lozenge Oral five times a day PRN Mouth Sores  dextrose Oral Gel 15 Gram(s) Oral once PRN Blood Glucose LESS THAN 70 milliGRAM(s)/deciliter  guaiFENesin Oral Liquid (Sugar-Free) 200 milliGRAM(s) Oral every 6 hours PRN Cough  polyethylene glycol 3350 17 Gram(s) Oral daily PRN Constipation  sodium chloride 0.9% Bolus. 100 milliLiter(s) IV Bolus every 5 minutes PRN SBP LESS THAN or EQUAL to 100 mmHg  sodium chloride 0.9% lock flush 10 milliLiter(s) IV Push every 1 hour PRN Pre/post blood products, medications, blood draw, and to maintain line patency      09-17-24 @ 07:01  -  09-18-24 @ 07:00  --------------------------------------------------------  IN: 0 mL / OUT: 150 mL / NET: -150 mL    09-18-24 @ 07:01  -  09-18-24 @ 17:23  --------------------------------------------------------  IN: 0 mL / OUT: 2000 mL / NET: -2000 mL      PHYSICAL EXAM:      T(C): 36.4 (09-18-24 @ 16:48), Max: 36.5 (09-18-24 @ 12:25)  HR: 88 (09-18-24 @ 16:48) (73 - 88)  BP: 161/69 (09-18-24 @ 16:48) (113/64 - 161/69)  RR: 18 (09-18-24 @ 16:48) (17 - 18)  SpO2: 96% (09-18-24 @ 16:48) (95% - 96%)  Wt(kg): --  Lungs clear  Heart S1S2  Abd soft NT ND  Extremities:   tr edema                                    8.8    7.39  )-----------( 174      ( 18 Sep 2024 12:35 )             28.0     09-18    136  |  101  |  82[H]  ----------------------------<  318[H]  5.1   |  27  |  6.41[H]    Ca    8.6      18 Sep 2024 12:35    TPro  6.7  /  Alb  1.9[L]  /  TBili  0.3  /  DBili  x   /  AST  14[L]  /  ALT  19  /  AlkPhos  65  09-18      LIVER FUNCTIONS - ( 18 Sep 2024 12:35 )  Alb: 1.9 g/dL / Pro: 6.7 gm/dL / ALK PHOS: 65 U/L / ALT: 19 U/L / AST: 14 U/L / GGT: x           Creatinine Trend: 6.41<--, 6.72<--, 4.52<--, 5.52<--, 4.99<--, 3.68<--      Assessment   ESRD, maintenance    Plan:  HD for today  UF as tolerated  Will follow     Augusto Santoyo MD

## 2024-09-18 NOTE — PROGRESS NOTE ADULT - SUBJECTIVE AND OBJECTIVE BOX
INTERVAL HPI/OVERNIGHT EVENTS:  Rash slowly improving / not as painful    MEDICATIONS  (STANDING):  apixaban 5 milliGRAM(s) Oral two times a day  AQUAPHOR (petrolatum Ointment) 1 Application(s) Topical daily  atorvastatin 40 milliGRAM(s) Oral at bedtime  bacitracin   Ointment 1 Application(s) Topical daily  calamine/zinc oxide Lotion 1 Application(s) Topical two times a day  chlorhexidine 2% Cloths 1 Application(s) Topical <User Schedule>  dextrose 5%. 1000 milliLiter(s) (50 mL/Hr) IV Continuous <Continuous>  dextrose 5%. 1000 milliLiter(s) (100 mL/Hr) IV Continuous <Continuous>  dextrose 50% Injectable 25 Gram(s) IV Push once  dextrose 50% Injectable 12.5 Gram(s) IV Push once  dextrose 50% Injectable 25 Gram(s) IV Push once  doxazosin 2 milliGRAM(s) Oral at bedtime  epoetin malik-epbx (RETACRIT) Injectable 15671 Unit(s) IV Push <User Schedule>  glucagon  Injectable 1 milliGRAM(s) IntraMuscular once  hydrALAZINE 50 milliGRAM(s) Oral every 8 hours  insulin glargine Injectable (LANTUS) 25 Unit(s) SubCutaneous at bedtime  insulin lispro (ADMELOG) corrective regimen sliding scale   SubCutaneous three times a day before meals  insulin lispro (ADMELOG) corrective regimen sliding scale   SubCutaneous at bedtime  insulin lispro Injectable (ADMELOG) 6 Unit(s) SubCutaneous three times a day before meals  melatonin 3 milliGRAM(s) Oral at bedtime  Nephro-lee 1 Tablet(s) Oral daily  pantoprazole    Tablet 40 milliGRAM(s) Oral before breakfast  predniSONE   Tablet 100 milliGRAM(s) Oral daily  sevelamer carbonate 1600 milliGRAM(s) Oral three times a day with meals  triamcinolone 0.1% Ointment 1 Application(s) Topical two times a day    MEDICATIONS  (PRN):  acetaminophen     Tablet .. 650 milliGRAM(s) Oral every 6 hours PRN Temp greater or equal to 38C (100.4F), Moderate Pain (4 - 6)  benzocaine/menthol Lozenge 1 Lozenge Oral five times a day PRN Mouth Sores  dextrose Oral Gel 15 Gram(s) Oral once PRN Blood Glucose LESS THAN 70 milliGRAM(s)/deciliter  guaiFENesin Oral Liquid (Sugar-Free) 200 milliGRAM(s) Oral every 6 hours PRN Cough  polyethylene glycol 3350 17 Gram(s) Oral daily PRN Constipation  sodium chloride 0.9% Bolus. 100 milliLiter(s) IV Bolus every 5 minutes PRN SBP LESS THAN or EQUAL to 100 mmHg  sodium chloride 0.9% lock flush 10 milliLiter(s) IV Push every 1 hour PRN Pre/post blood products, medications, blood draw, and to maintain line patency      Allergies    No Known Allergies      Vital Signs Last 24 Hrs  T(C): 36.4 (18 Sep 2024 16:48), Max: 36.5 (18 Sep 2024 12:25)  T(F): 97.6 (18 Sep 2024 16:48), Max: 97.7 (18 Sep 2024 12:25)  HR: 88 (18 Sep 2024 16:48) (73 - 88)  BP: 161/69 (18 Sep 2024 16:48) (113/64 - 161/69)  BP(mean): --  RR: 18 (18 Sep 2024 16:48) (17 - 18)  SpO2: 96% (18 Sep 2024 16:48) (95% - 96%)    Parameters below as of 18 Sep 2024 16:48  Patient On (Oxygen Delivery Method): room air        PHYSICAL EXAM:  General :  NAD  HEENT--  no oral ulcers  Nodes--   LAD  Lungs--  CTA B/L  Heart--  RRR, nlS1 &S2 normal;   Abdomen--  soft, NT, ND +BS  Skin:  diffuse lesions throughout body - appears like ruptured bullae;  some lesions appear to be drying up  Musculoskeletal exam:  No synovitis;  normal ROM in all joints        LABS:                        8.8    7.39  )-----------( 174      ( 18 Sep 2024 12:35 )             28.0     09-18    136  |  101  |  82[H]  ----------------------------<  318[H]  5.1   |  27  |  6.41[H]    Ca    8.6      18 Sep 2024 12:35    TPro  6.7  /  Alb  1.9[L]  /  TBili  0.3  /  DBili  x   /  AST  14[L]  /  ALT  19  /  AlkPhos  65  09-18      Urinalysis Basic - ( 18 Sep 2024 12:35 )    Color: x / Appearance: x / SG: x / pH: x  Gluc: 318 mg/dL / Ketone: x  / Bili: x / Urobili: x   Blood: x / Protein: x / Nitrite: x   Leuk Esterase: x / RBC: x / WBC x   Sq Epi: x / Non Sq Epi: x / Bacteria: x          RADIOLOGY & ADDITIONAL TESTS:

## 2024-09-18 NOTE — PROGRESS NOTE ADULT - SUBJECTIVE AND OBJECTIVE BOX
Patient is a 74y old  Male who presents with a chief complaint of Weakness, progression of CKD (17 Sep 2024 14:32)      INTERVAL HPI/OVERNIGHT EVENTS: Overnight no acute events. Seen in isolation room getting dialysis.     MEDICATIONS  (STANDING):  apixaban 5 milliGRAM(s) Oral two times a day  AQUAPHOR (petrolatum Ointment) 1 Application(s) Topical daily  atorvastatin 40 milliGRAM(s) Oral at bedtime  bacitracin   Ointment 1 Application(s) Topical daily  calamine/zinc oxide Lotion 1 Application(s) Topical two times a day  chlorhexidine 2% Cloths 1 Application(s) Topical <User Schedule>  dextrose 5%. 1000 milliLiter(s) (50 mL/Hr) IV Continuous <Continuous>  dextrose 5%. 1000 milliLiter(s) (100 mL/Hr) IV Continuous <Continuous>  dextrose 50% Injectable 12.5 Gram(s) IV Push once  dextrose 50% Injectable 25 Gram(s) IV Push once  dextrose 50% Injectable 25 Gram(s) IV Push once  doxazosin 2 milliGRAM(s) Oral at bedtime  epoetin malik-epbx (RETACRIT) Injectable 17786 Unit(s) IV Push <User Schedule>  glucagon  Injectable 1 milliGRAM(s) IntraMuscular once  hydrALAZINE 50 milliGRAM(s) Oral every 8 hours  insulin glargine Injectable (LANTUS) 25 Unit(s) SubCutaneous at bedtime  insulin lispro (ADMELOG) corrective regimen sliding scale   SubCutaneous at bedtime  insulin lispro (ADMELOG) corrective regimen sliding scale   SubCutaneous three times a day before meals  insulin lispro Injectable (ADMELOG) 6 Unit(s) SubCutaneous three times a day before meals  melatonin 3 milliGRAM(s) Oral at bedtime  Nephro-lee 1 Tablet(s) Oral daily  pantoprazole    Tablet 40 milliGRAM(s) Oral before breakfast  predniSONE   Tablet 100 milliGRAM(s) Oral daily  sevelamer carbonate 1600 milliGRAM(s) Oral three times a day with meals  triamcinolone 0.1% Ointment 1 Application(s) Topical two times a day    MEDICATIONS  (PRN):  acetaminophen     Tablet .. 650 milliGRAM(s) Oral every 6 hours PRN Temp greater or equal to 38C (100.4F), Moderate Pain (4 - 6)  benzocaine/menthol Lozenge 1 Lozenge Oral five times a day PRN Mouth Sores  dextrose Oral Gel 15 Gram(s) Oral once PRN Blood Glucose LESS THAN 70 milliGRAM(s)/deciliter  guaiFENesin Oral Liquid (Sugar-Free) 200 milliGRAM(s) Oral every 6 hours PRN Cough  polyethylene glycol 3350 17 Gram(s) Oral daily PRN Constipation  sodium chloride 0.9% Bolus. 100 milliLiter(s) IV Bolus every 5 minutes PRN SBP LESS THAN or EQUAL to 100 mmHg  sodium chloride 0.9% lock flush 10 milliLiter(s) IV Push every 1 hour PRN Pre/post blood products, medications, blood draw, and to maintain line patency      Allergies    No Known Allergies    Intolerances        REVIEW OF SYSTEMS:  ROS negative unless stated otherwise.    Vital Signs Last 24 Hrs  T(C): 36.5 (18 Sep 2024 12:25), Max: 36.5 (18 Sep 2024 12:25)  T(F): 97.7 (18 Sep 2024 12:25), Max: 97.7 (18 Sep 2024 12:25)  HR: 85 (18 Sep 2024 12:25) (73 - 88)  BP: 122/72 (18 Sep 2024 12:25) (113/64 - 143/67)  BP(mean): --  RR: 18 (18 Sep 2024 12:25) (17 - 18)  SpO2: 95% (18 Sep 2024 12:25) (95% - 96%)    Parameters below as of 18 Sep 2024 12:25  Patient On (Oxygen Delivery Method): room air        PHYSICAL EXAM:  GENERAL: NAD   HEAD:  Atraumatic, Normocephalic  EYES: EOMI   ENMT:  Moist mucous membranes   NECK: Supple,   NERVOUS SYSTEM:  Alert & demented   CHEST/LUNG: Clear to percussion bilaterally   HEART: Regular rate and rhythm   ABDOMEN: Soft, Nontender, Nondistended   EXTREMITIES:  2+ Peripheral Pulses   left bka  SKIN: skin is covered  with varying  stages of ulcers, ulcerations ruptured bullae, excoriations, on arms, torso  and legs    LABS:                        8.8    7.39  )-----------( 174      ( 18 Sep 2024 12:35 )             28.0     09-18    136  |  101  |  82[H]  ----------------------------<  318[H]  5.1   |  27  |  6.41[H]    Ca    8.6      18 Sep 2024 12:35    TPro  6.7  /  Alb  1.9[L]  /  TBili  0.3  /  DBili  x   /  AST  14[L]  /  ALT  19  /  AlkPhos  65  09-18      Urinalysis Basic - ( 18 Sep 2024 12:35 )    Color: x / Appearance: x / SG: x / pH: x  Gluc: 318 mg/dL / Ketone: x  / Bili: x / Urobili: x   Blood: x / Protein: x / Nitrite: x   Leuk Esterase: x / RBC: x / WBC x   Sq Epi: x / Non Sq Epi: x / Bacteria: x      CAPILLARY BLOOD GLUCOSE      POCT Blood Glucose.: 264 mg/dL (18 Sep 2024 11:01)  POCT Blood Glucose.: 194 mg/dL (18 Sep 2024 08:13)  POCT Blood Glucose.: 128 mg/dL (17 Sep 2024 21:55)  POCT Blood Glucose.: 219 mg/dL (17 Sep 2024 19:02)  POCT Blood Glucose.: 155 mg/dL (17 Sep 2024 17:20)      RADIOLOGY & ADDITIONAL TESTS:    Imaging Personally Reviewed:  [ X] YES  [ ] NO    Consultant(s) Notes Reviewed:  [ X] YES  [ ] NO    Care Discussed with Consultants/Other Providers [X ] YES  [ ] NO

## 2024-09-18 NOTE — PROGRESS NOTE ADULT - ASSESSMENT
74 year old male with PMHx as above was admitted for weakness and confusion.  Pt also w/ chronic diffuse rash of unclear etiology - ?bullous pemphigoid (outpt dermatologist felt it was due to renal disease).  Pt also w/ CKD which has now progressed to ESRD.  Pt does not exhibit any obvious signs/symptoms of systemic vasculitis.    - f/u skin bx.    - Continue prednisone 1 mg/kg PO daily for now.    - HD as per renal.

## 2024-09-18 NOTE — PROGRESS NOTE ADULT - ASSESSMENT
73 year old male PMH HTN, HLD, DM, CHF, CKD stage IV, dementia / anxiety BIBEMS accompanied by wife due to weakness. Per wife, pt not feeling well since last night - went to bed prior to his own birthday party. Per EMS, pt complaining of decreased L vision and decreased L hearing as well as noted off balance with ambulation to restroom this AM. Pt states L eye is usually his good eye, and still with better vision in L than R, but not as good as usual. Pt reports chronic itching rash to body x past 3 months, f/u'd with Derm, was told likely 2/2 renal disease. Hearing later normalized in ER.     #COVID  - asymptomatic, on RA  - will c/w supportive care w/ robitussin  - Discussed with ID, no role for Rem/Decadron given no SOB and on RA    #Rash  - unknown etiology ?pemphigus vulgaris/bollous phemphigoid given presentation however need bx confirmation   - c/w triamcinolone 0.1% ointment   - Dermatology was unwilling to do bx inpatient, eventually surgery was able to perform bx, f/u final results   - s/p cefepime   - started on prednisone 60mg daily   - Appreciate rheum recs  - wound PT following     ##ESRD on HD  - Initially temp cath placed and patient to initiated on Dialysis here 2/2 Uremia and hyperkalemic.   - C/w Inpatient dialysis , now s/p tunnel HD catheter  - Nephro following     ##SSTI  Noted to be hypothermic to 92F. CT abd No evidence of acute inflammatory or obstructive process in the   abdomen and pelvis. UA positive nitrites, LEs.   s/p Cefepime  9/17/2024 another episode of hypothermia during HD on yesterday . septic w/u initiated and iD reconsulted to r/o any other infectious etiology --appreciate ID recs, ok for steroids which have been started     ##Type 2 DM  A1c 7; C/w Lantus 25 + 6U w/ meals + SSI  - Hypoglycemia protocol     #Pulm nodule   CT noted There are scattered nodules at the visualized lung bases measuring up to 6 mm  CT chest reviewed will need follow up in 3-6  months       #HTN  - C/w cardura, hydralazine     #HLD  - C/w statin     #History of DVT  - Eliquis     Diet: renal, CCD  DVT prophylaxis: eliquis   Code Status: FC

## 2024-09-19 LAB
ANION GAP SERPL CALC-SCNC: 7 MMOL/L — SIGNIFICANT CHANGE UP (ref 5–17)
BUN SERPL-MCNC: 55 MG/DL — HIGH (ref 7–23)
CALCIUM SERPL-MCNC: 8.9 MG/DL — SIGNIFICANT CHANGE UP (ref 8.5–10.1)
CHLORIDE SERPL-SCNC: 103 MMOL/L — SIGNIFICANT CHANGE UP (ref 96–108)
CO2 SERPL-SCNC: 28 MMOL/L — SIGNIFICANT CHANGE UP (ref 22–31)
CREAT SERPL-MCNC: 4.47 MG/DL — HIGH (ref 0.5–1.3)
EGFR: 13 ML/MIN/1.73M2 — LOW
GLUCOSE BLDC GLUCOMTR-MCNC: 152 MG/DL — HIGH (ref 70–99)
GLUCOSE BLDC GLUCOMTR-MCNC: 313 MG/DL — HIGH (ref 70–99)
GLUCOSE BLDC GLUCOMTR-MCNC: 324 MG/DL — HIGH (ref 70–99)
GLUCOSE BLDC GLUCOMTR-MCNC: 328 MG/DL — HIGH (ref 70–99)
GLUCOSE SERPL-MCNC: 142 MG/DL — HIGH (ref 70–99)
HCT VFR BLD CALC: 29.7 % — LOW (ref 39–50)
HGB BLD-MCNC: 9.3 G/DL — LOW (ref 13–17)
MCHC RBC-ENTMCNC: 27.4 PG — SIGNIFICANT CHANGE UP (ref 27–34)
MCHC RBC-ENTMCNC: 31.3 G/DL — LOW (ref 32–36)
MCV RBC AUTO: 87.4 FL — SIGNIFICANT CHANGE UP (ref 80–100)
NRBC # BLD: 0 /100 WBCS — SIGNIFICANT CHANGE UP (ref 0–0)
PLATELET # BLD AUTO: 213 K/UL — SIGNIFICANT CHANGE UP (ref 150–400)
POTASSIUM SERPL-MCNC: 4.7 MMOL/L — SIGNIFICANT CHANGE UP (ref 3.5–5.3)
POTASSIUM SERPL-SCNC: 4.7 MMOL/L — SIGNIFICANT CHANGE UP (ref 3.5–5.3)
RBC # BLD: 3.4 M/UL — LOW (ref 4.2–5.8)
RBC # FLD: 14.6 % — HIGH (ref 10.3–14.5)
SODIUM SERPL-SCNC: 138 MMOL/L — SIGNIFICANT CHANGE UP (ref 135–145)
WBC # BLD: 10.76 K/UL — HIGH (ref 3.8–10.5)
WBC # FLD AUTO: 10.76 K/UL — HIGH (ref 3.8–10.5)

## 2024-09-19 PROCEDURE — 99232 SBSQ HOSP IP/OBS MODERATE 35: CPT

## 2024-09-19 RX ADMIN — APIXABAN 5 MILLIGRAM(S): 5 TABLET, FILM COATED ORAL at 18:50

## 2024-09-19 RX ADMIN — Medication 1 APPLICATION(S): at 06:44

## 2024-09-19 RX ADMIN — Medication 50 MILLIGRAM(S): at 15:51

## 2024-09-19 RX ADMIN — CHLORHEXIDINE GLUCONATE 1 APPLICATION(S): 40 SOLUTION TOPICAL at 06:43

## 2024-09-19 RX ADMIN — Medication 1 APPLICATION(S): at 12:14

## 2024-09-19 RX ADMIN — Medication 3 MILLIGRAM(S): at 21:33

## 2024-09-19 RX ADMIN — PETROLATUM 1 APPLICATION(S): 865 OINTMENT TOPICAL at 12:09

## 2024-09-19 RX ADMIN — TRIAMCINOLONE ACETONIDE 1 APPLICATION(S): 1 CREAM TOPICAL at 18:59

## 2024-09-19 RX ADMIN — Medication 8: at 12:03

## 2024-09-19 RX ADMIN — Medication 6 UNIT(S): at 09:04

## 2024-09-19 RX ADMIN — Medication 1 APPLICATION(S): at 18:50

## 2024-09-19 RX ADMIN — Medication 50 MILLIGRAM(S): at 06:44

## 2024-09-19 RX ADMIN — Medication 4: at 21:32

## 2024-09-19 RX ADMIN — Medication 40 MILLIGRAM(S): at 21:33

## 2024-09-19 RX ADMIN — Medication 8: at 16:45

## 2024-09-19 RX ADMIN — Medication 40 MILLIGRAM(S): at 06:44

## 2024-09-19 RX ADMIN — SEVELAMER CARBONATE 1600 MILLIGRAM(S): 800 TABLET, FILM COATED ORAL at 12:08

## 2024-09-19 RX ADMIN — Medication 6 UNIT(S): at 16:46

## 2024-09-19 RX ADMIN — APIXABAN 5 MILLIGRAM(S): 5 TABLET, FILM COATED ORAL at 06:47

## 2024-09-19 RX ADMIN — Medication 100 MILLIGRAM(S): at 06:44

## 2024-09-19 RX ADMIN — INSULIN GLARGINE 25 UNIT(S): 100 INJECTION, SOLUTION SUBCUTANEOUS at 21:32

## 2024-09-19 RX ADMIN — Medication 2: at 09:05

## 2024-09-19 RX ADMIN — SEVELAMER CARBONATE 1600 MILLIGRAM(S): 800 TABLET, FILM COATED ORAL at 16:47

## 2024-09-19 RX ADMIN — Medication 2 MILLIGRAM(S): at 21:33

## 2024-09-19 RX ADMIN — SEVELAMER CARBONATE 1600 MILLIGRAM(S): 800 TABLET, FILM COATED ORAL at 09:04

## 2024-09-19 RX ADMIN — TRIAMCINOLONE ACETONIDE 1 APPLICATION(S): 1 CREAM TOPICAL at 06:46

## 2024-09-19 RX ADMIN — Medication 1 TABLET(S): at 12:07

## 2024-09-19 RX ADMIN — Medication 50 MILLIGRAM(S): at 21:33

## 2024-09-19 RX ADMIN — Medication 6 UNIT(S): at 12:03

## 2024-09-19 NOTE — PROGRESS NOTE ADULT - ASSESSMENT
A/P    73 year old male PMH HTN, HLD, DM, CHF, CKD stage IV, dementia / anxiety BIBEMS accompanied by wife due to weakness. Per wife, pt not feeling well since last night - went to bed prior to his own birthday party. Per EMS, pt complaining of decreased L vision and decreased L hearing as well as noted off balance with ambulation to restroom this AM. Pt states L eye is usually his good eye, and still with better vision in L than R, but not as good as usual. Pt reports chronic itching rash to body x past 3 months, f/u'd with Derm, was told likely 2/2 renal disease. Hearing later normalized in ER.     #COVID  - asymptomatic, on RA  - will c/w supportive care w/ robitussin  - Discussed with ID, no role for Rem/Decadron given no SOB and on RA    #Rash  - unknown etiology ?pemphigus vulgaris/bollous phemphigoid given presentation however need bx confirmation   - c/w triamcinolone 0.1% ointment   - Dermatology was unwilling to do bx inpatient, eventually surgery was able to perform bx, f/u final results   - s/p cefepime   - started on prednisone 60mg daily   - Appreciate rheum recs  - wound PT following     ##ESRD on HD  - Initially temp cath placed and patient to initiated on Dialysis here 2/2 Uremia and hyperkalemic.   - C/w Inpatient dialysis , now s/p tunnel HD catheter  - Nephro following     ##SSTI  Noted to be hypothermic to 92F. CT abd No evidence of acute inflammatory or obstructive process in the   abdomen and pelvis. UA positive nitrites, LEs.   s/p Cefepime  9/17/2024 another episode of hypothermia during HD on yesterday . septic w/u initiated and iD reconsulted to r/o any other infectious etiology --appreciate ID recs, ok for steroids which have been started     ##Type 2 DM  A1c 7; C/w Lantus 25 + 6U w/ meals + SSI  - Hypoglycemia protocol     #Pulm nodule   CT noted There are scattered nodules at the visualized lung bases measuring up to 6 mm  CT chest reviewed will need follow up in 3-6  months       #HTN  - C/w cardura, hydralazine     #HLD  - C/w statin     #History of DVT  - Eliquis     Diet: renal, CCD  DVT prophylaxis: eliquis   Code Status: NIDA Jay MD

## 2024-09-19 NOTE — PROGRESS NOTE ADULT - SUBJECTIVE AND OBJECTIVE BOX
Patient is a 74y old  Male who presents with a chief complaint of Weakness, progression of CKD (18 Sep 2024 22:10)      INTERVAL HPI/OVERNIGHT EVENTS: No acute events overnight, afebrile.     MEDICATIONS  (STANDING):  apixaban 5 milliGRAM(s) Oral two times a day  AQUAPHOR (petrolatum Ointment) 1 Application(s) Topical daily  atorvastatin 40 milliGRAM(s) Oral at bedtime  bacitracin   Ointment 1 Application(s) Topical daily  calamine/zinc oxide Lotion 1 Application(s) Topical two times a day  chlorhexidine 2% Cloths 1 Application(s) Topical <User Schedule>  dextrose 5%. 1000 milliLiter(s) (50 mL/Hr) IV Continuous <Continuous>  dextrose 5%. 1000 milliLiter(s) (100 mL/Hr) IV Continuous <Continuous>  dextrose 50% Injectable 12.5 Gram(s) IV Push once  dextrose 50% Injectable 25 Gram(s) IV Push once  dextrose 50% Injectable 25 Gram(s) IV Push once  doxazosin 2 milliGRAM(s) Oral at bedtime  epoetin malik-epbx (RETACRIT) Injectable 17974 Unit(s) IV Push <User Schedule>  glucagon  Injectable 1 milliGRAM(s) IntraMuscular once  hydrALAZINE 50 milliGRAM(s) Oral every 8 hours  insulin glargine Injectable (LANTUS) 25 Unit(s) SubCutaneous at bedtime  insulin lispro (ADMELOG) corrective regimen sliding scale   SubCutaneous at bedtime  insulin lispro (ADMELOG) corrective regimen sliding scale   SubCutaneous three times a day before meals  insulin lispro Injectable (ADMELOG) 6 Unit(s) SubCutaneous three times a day before meals  melatonin 3 milliGRAM(s) Oral at bedtime  Nephro-lee 1 Tablet(s) Oral daily  pantoprazole    Tablet 40 milliGRAM(s) Oral before breakfast  predniSONE   Tablet 100 milliGRAM(s) Oral daily  sevelamer carbonate 1600 milliGRAM(s) Oral three times a day with meals  triamcinolone 0.1% Ointment 1 Application(s) Topical two times a day    MEDICATIONS  (PRN):  acetaminophen     Tablet .. 650 milliGRAM(s) Oral every 6 hours PRN Temp greater or equal to 38C (100.4F), Moderate Pain (4 - 6)  benzocaine/menthol Lozenge 1 Lozenge Oral five times a day PRN Mouth Sores  dextrose Oral Gel 15 Gram(s) Oral once PRN Blood Glucose LESS THAN 70 milliGRAM(s)/deciliter  guaiFENesin Oral Liquid (Sugar-Free) 200 milliGRAM(s) Oral every 6 hours PRN Cough  polyethylene glycol 3350 17 Gram(s) Oral daily PRN Constipation  sodium chloride 0.9% Bolus. 100 milliLiter(s) IV Bolus every 5 minutes PRN SBP LESS THAN or EQUAL to 100 mmHg  sodium chloride 0.9% lock flush 10 milliLiter(s) IV Push every 1 hour PRN Pre/post blood products, medications, blood draw, and to maintain line patency      Allergies    No Known Allergies    Intolerances        REVIEW OF SYSTEMS:  CONSTITUTIONAL: +ve for fatigue  EYES: No eye pain, visual disturbances, or discharge  ENMT:  No difficulty hearing, tinnitus, vertigo; No sinus or throat pain  NECK: No pain or stiffness      Vital Signs Last 24 Hrs  T(C): 36.7 (18 Sep 2024 21:01), Max: 36.7 (18 Sep 2024 21:01)  T(F): 98.1 (18 Sep 2024 21:01), Max: 98.1 (18 Sep 2024 21:01)  HR: 88 (18 Sep 2024 21:01) (79 - 88)  BP: 147/78 (18 Sep 2024 21:01) (122/72 - 161/69)  BP(mean): --  RR: 18 (18 Sep 2024 21:01) (18 - 18)  SpO2: 97% (18 Sep 2024 21:01) (95% - 97%)    Parameters below as of 18 Sep 2024 21:01  Patient On (Oxygen Delivery Method): room air        PHYSICAL EXAM:    HEAD:  Atraumatic, Normocephalic  EYES: EOMI, PERRLA, conjunctiva and sclera clear  ENMT: No tonsillar erythema, exudates, or enlargement; Moist mucous membranes, Good dentition, No lesions  NECK: Supple, No JVD, Normal thyroid      LABS:                        9.3    10.76 )-----------( 213      ( 19 Sep 2024 06:35 )             29.7     09-19    138  |  103  |  55[H]  ----------------------------<  142[H]  4.7   |  28  |  4.47[H]    Ca    8.9      19 Sep 2024 06:35    TPro  6.7  /  Alb  1.9[L]  /  TBili  0.3  /  DBili  x   /  AST  14[L]  /  ALT  19  /  AlkPhos  65  09-18      Urinalysis Basic - ( 19 Sep 2024 06:35 )    Color: x / Appearance: x / SG: x / pH: x  Gluc: 142 mg/dL / Ketone: x  / Bili: x / Urobili: x   Blood: x / Protein: x / Nitrite: x   Leuk Esterase: x / RBC: x / WBC x   Sq Epi: x / Non Sq Epi: x / Bacteria: x      CAPILLARY BLOOD GLUCOSE      POCT Blood Glucose.: 152 mg/dL (19 Sep 2024 08:10)  POCT Blood Glucose.: 281 mg/dL (18 Sep 2024 22:24)  POCT Blood Glucose.: 176 mg/dL (18 Sep 2024 16:37)  POCT Blood Glucose.: 264 mg/dL (18 Sep 2024 11:01)

## 2024-09-20 LAB
GLUCOSE BLDC GLUCOMTR-MCNC: 150 MG/DL — HIGH (ref 70–99)
GLUCOSE BLDC GLUCOMTR-MCNC: 239 MG/DL — HIGH (ref 70–99)
GLUCOSE BLDC GLUCOMTR-MCNC: 259 MG/DL — HIGH (ref 70–99)
GLUCOSE BLDC GLUCOMTR-MCNC: 343 MG/DL — HIGH (ref 70–99)
GLUCOSE BLDC GLUCOMTR-MCNC: 383 MG/DL — HIGH (ref 70–99)

## 2024-09-20 PROCEDURE — 99232 SBSQ HOSP IP/OBS MODERATE 35: CPT

## 2024-09-20 PROCEDURE — 99231 SBSQ HOSP IP/OBS SF/LOW 25: CPT

## 2024-09-20 RX ADMIN — Medication 6 UNIT(S): at 11:48

## 2024-09-20 RX ADMIN — ACETAMINOPHEN 650 MILLIGRAM(S): 325 TABLET ORAL at 22:47

## 2024-09-20 RX ADMIN — SEVELAMER CARBONATE 1600 MILLIGRAM(S): 800 TABLET, FILM COATED ORAL at 11:49

## 2024-09-20 RX ADMIN — CHLORHEXIDINE GLUCONATE 1 APPLICATION(S): 40 SOLUTION TOPICAL at 05:15

## 2024-09-20 RX ADMIN — Medication 100 MILLIGRAM(S): at 05:16

## 2024-09-20 RX ADMIN — TRIAMCINOLONE ACETONIDE 1 APPLICATION(S): 1 CREAM TOPICAL at 05:15

## 2024-09-20 RX ADMIN — Medication 40 MILLIGRAM(S): at 22:47

## 2024-09-20 RX ADMIN — APIXABAN 5 MILLIGRAM(S): 5 TABLET, FILM COATED ORAL at 17:52

## 2024-09-20 RX ADMIN — INSULIN GLARGINE 25 UNIT(S): 100 INJECTION, SOLUTION SUBCUTANEOUS at 22:54

## 2024-09-20 RX ADMIN — Medication 6 UNIT(S): at 17:50

## 2024-09-20 RX ADMIN — Medication 50 MILLIGRAM(S): at 05:16

## 2024-09-20 RX ADMIN — Medication 1 APPLICATION(S): at 05:15

## 2024-09-20 RX ADMIN — Medication 10: at 11:48

## 2024-09-20 RX ADMIN — EPOETIN ALFA 10000 UNIT(S): 3000 SOLUTION INTRAVENOUS; SUBCUTANEOUS at 15:45

## 2024-09-20 RX ADMIN — APIXABAN 5 MILLIGRAM(S): 5 TABLET, FILM COATED ORAL at 05:16

## 2024-09-20 RX ADMIN — Medication 50 MILLIGRAM(S): at 22:47

## 2024-09-20 RX ADMIN — Medication 4: at 17:49

## 2024-09-20 RX ADMIN — Medication 2: at 23:47

## 2024-09-20 RX ADMIN — Medication 50 MILLIGRAM(S): at 18:03

## 2024-09-20 RX ADMIN — TRIAMCINOLONE ACETONIDE 1 APPLICATION(S): 1 CREAM TOPICAL at 17:50

## 2024-09-20 RX ADMIN — Medication 1 APPLICATION(S): at 17:52

## 2024-09-20 RX ADMIN — Medication 3 MILLIGRAM(S): at 22:48

## 2024-09-20 RX ADMIN — GUAIFENESIN 200 MILLIGRAM(S): 100 LIQUID ORAL at 18:02

## 2024-09-20 RX ADMIN — SEVELAMER CARBONATE 1600 MILLIGRAM(S): 800 TABLET, FILM COATED ORAL at 17:51

## 2024-09-20 RX ADMIN — Medication 1 TABLET(S): at 11:49

## 2024-09-20 RX ADMIN — SEVELAMER CARBONATE 1600 MILLIGRAM(S): 800 TABLET, FILM COATED ORAL at 09:33

## 2024-09-20 NOTE — PROGRESS NOTE ADULT - SUBJECTIVE AND OBJECTIVE BOX
Patient seen and examined at bedside with no complaints.   Denies pain, nausea/ vomiting, chills, SOB, chest pain, calf tenderness.          Vital Signs Last 24 Hrs  T(F): 97.5 (09-20-24 @ 11:22), Max: 97.9 (09-20-24 @ 00:08)  HR: 83 (09-20-24 @ 11:22)  BP: 175/91 (09-20-24 @ 11:22)  RR: 17 (09-20-24 @ 11:22)  SpO2: 96% (09-20-24 @ 11:22)  Wt(kg): --   CAPILLARY BLOOD GLUCOSE      POCT Blood Glucose.: 383 mg/dL (20 Sep 2024 11:12)      GENERAL: Alert, NAD  CHEST/LUNG: Respirations non labored, good inspiratory effort  HEART: S1S2  HEENT: NCAT, EOMI, conjunctiva clear   ABDOMEN: Nondistended, + bowel sounds, nontender  EXTREMITIES:  No calf tenderness, no edema  SKIN: Multiple superficial wounds in various stages of healing on bilateral upper extremities, lower extremities, chest, abdomen, and bilateral lower extremities; Skin Punch biopsy taken from right side of abdomen    I&O's Detail    19 Sep 2024 07:01  -  20 Sep 2024 07:00  --------------------------------------------------------  IN:  Total IN: 0 mL    OUT:    Voided (mL): 450 mL  Total OUT: 450 mL    Total NET: -450 mL          LABS:                        9.3    10.76 )-----------( 213      ( 19 Sep 2024 06:35 )             29.7     09-19    138  |  103  |  55[H]  ----------------------------<  142[H]  4.7   |  28  |  4.47[H]    Ca    8.9      19 Sep 2024 06:35    TPro  6.7  /  Alb  1.9[L]  /  TBili  0.3  /  DBili  x   /  AST  14[L]  /  ALT  19  /  AlkPhos  65  09-18

## 2024-09-20 NOTE — PHARMACOTHERAPY INTERVENTION NOTE - COMMENTS
K+5.2 (09/16); During rounds, recommended to monitor potassium and treat as warranted
Last potassium was 09/16 (5.2); recommended to obtain new BMP. PA will order labs accordingly. Thank you!
PMH of COVID+; patient is setup for possible discharged. Patient was counseled on medications while in hospital as well as calamine/triamcinolone combination including but not limited to use, s/e. Patient did not have any follow up questions during this encounter. Thank you!
Recommended initiation of chlorhexidine cloths because patient is receiving hemodialysis. 
Spoke to PA and recommended to lower dose of prednisone. PA says Attending MD wants 1mg/kg daily of prednisone. Suggested 0.5mg/kg/daily. PA wants to continue order as is and will discuss with Attending.

## 2024-09-20 NOTE — PROGRESS NOTE ADULT - ASSESSMENT
74 year old male with PMHx as above was admitted for weakness and confusion.  Pt also w/ chronic diffuse rash of unclear etiology - ?bullous pemphigoid vs pemphigus vulgaris vs other bullous disease.  Pt also w/ CKD which has now progressed to ESRD.  Pt does not exhibit any obvious signs/symptoms of systemic vasculitis.  Lesions appear to be improving.    - f/u skin bx.    - Continue prednisone 1 mg/kg PO daily for now.    - HD as per renal.

## 2024-09-20 NOTE — PROGRESS NOTE ADULT - ASSESSMENT
A/P    73 year old male PMH HTN, HLD, DM, CHF, CKD stage IV, dementia / anxiety BIBEMS accompanied by wife due to weakness. Per wife, pt not feeling well since last night - went to bed prior to his own birthday party. Per EMS, pt complaining of decreased L vision and decreased L hearing as well as noted off balance with ambulation to restroom this AM. Pt states L eye is usually his good eye, and still with better vision in L than R, but not as good as usual. Pt reports chronic itching rash to body x past 3 months, f/u'd with Derm, was told likely 2/2 renal disease. Hearing later normalized in ER.     #COVID  - asymptomatic, on RA  - will c/w supportive care w/ robitussin  - Discussed with ID, no role for Rem/Decadron given no SOB and on RA    #Rash  - unknown etiology ?pemphigus vulgaris/bollous phemphigoid given presentation however need bx confirmation   - c/w triamcinolone 0.1% ointment   - Dermatology was unwilling to do bx inpatient, eventually surgery was able to perform bx, f/u final results   - s/p cefepime   - On prednisone daily   - Appreciate rheum recs  - wound PT following     ##ESRD on HD  - Initially temp cath placed and patient to initiated on Dialysis here 2/2 Uremia and hyperkalemic.   - C/w Inpatient dialysis , now s/p tunnel HD catheter  - Nephro following     ##SSTI  Noted to be hypothermic to 92F. CT abd No evidence of acute inflammatory or obstructive process in the   abdomen and pelvis. UA positive nitrites, LEs.   s/p Cefepime  9/17/2024 another episode of hypothermia during HD on yesterday . septic w/u initiated and iD reconsulted to r/o any other infectious etiology --appreciate ID recs, ok for steroids which have been started     ##Type 2 DM  A1c 7; C/w Lantus 25 + 6U w/ meals + SSI  - Hypoglycemia protocol     #Pulm nodule   CT noted There are scattered nodules at the visualized lung bases measuring up to 6 mm  CT chest reviewed will need follow up in 3-6  months       #HTN  - C/w cardura, hydralazine     #HLD  - C/w statin     #History of DVT  - Eliquis     Diet: renal, CCD  DVT prophylaxis: eliquis   Code Status: FC     Biopsy pending    Aized MD Pierre

## 2024-09-20 NOTE — PROGRESS NOTE ADULT - ASSESSMENT
75 Y/O male with a PMHx of HTN, HLD, DM, CHF, CKD, afib on eliquis with PSHx of L BKA admitted to hospital for worsening CKD requiring HD. Surgery consulted to evaluate for skin biopsy. Patient stated he was evaluated as outpatient dermatologist for pruritus 3 weeks ago to which no intervention was implemented at this time. Inpatient dermatology consult placed with recommendations for outpatient evaluation and skin biopsy  Afebrile with VSS.   S/P skin punch biopsy on 9/17.       PLAN:     - NO further surgical intervention needed; please reconsult PRN; will sign off   - Local wound care per RN   - Continue care per primary team   - Discussed with Dr. Simms

## 2024-09-20 NOTE — PROGRESS NOTE ADULT - SUBJECTIVE AND OBJECTIVE BOX
[facility] NEPHROLOGY SERVICES, [facility]  NEPHROLOGY AND HYPERTENSION  [address]  [address]  [place] [address]  [phone]    MD YISEL WEEKS MD YELENA ROSENBERG, MD BINNY KOSHY, MD CHRISTOPHER CAPUTO, MD EDWARD BOVER, MD          Patient events noted  No distress      MEDICATIONS  (STANDING):  apixaban 5 milliGRAM(s) Oral two times a day  AQUAPHOR (petrolatum Ointment) 1 Application(s) Topical daily  atorvastatin 40 milliGRAM(s) Oral at bedtime  bacitracin   Ointment 1 Application(s) Topical daily  calamine/zinc oxide Lotion 1 Application(s) Topical two times a day  chlorhexidine 2% Cloths 1 Application(s) Topical <User Schedule>  dextrose 5%. 1000 milliLiter(s) (50 mL/Hr) IV Continuous <Continuous>  dextrose 5%. 1000 milliLiter(s) (100 mL/Hr) IV Continuous <Continuous>  dextrose 50% Injectable 25 Gram(s) IV Push once  dextrose 50% Injectable 12.5 Gram(s) IV Push once  dextrose 50% Injectable 25 Gram(s) IV Push once  doxazosin 2 milliGRAM(s) Oral at bedtime  epoetin malik-epbx (RETACRIT) Injectable 53702 Unit(s) IV Push <User Schedule>  glucagon  Injectable 1 milliGRAM(s) IntraMuscular once  hydrALAZINE 50 milliGRAM(s) Oral every 8 hours  insulin glargine Injectable (LANTUS) 25 Unit(s) SubCutaneous at bedtime  insulin lispro (ADMELOG) corrective regimen sliding scale   SubCutaneous three times a day before meals  insulin lispro (ADMELOG) corrective regimen sliding scale   SubCutaneous at bedtime  insulin lispro Injectable (ADMELOG) 6 Unit(s) SubCutaneous three times a day before meals  melatonin 3 milliGRAM(s) Oral at bedtime  Nephro-lee 1 Tablet(s) Oral daily  pantoprazole    Tablet 40 milliGRAM(s) Oral before breakfast  predniSONE   Tablet 100 milliGRAM(s) Oral daily  sevelamer carbonate 1600 milliGRAM(s) Oral three times a day with meals  triamcinolone 0.1% Ointment 1 Application(s) Topical two times a day    MEDICATIONS  (PRN):  acetaminophen     Tablet .. 650 milliGRAM(s) Oral every 6 hours PRN Temp greater or equal to 38C (100.4F), Moderate Pain (4 - 6)  benzocaine/menthol Lozenge 1 Lozenge Oral five times a day PRN Mouth Sores  dextrose Oral Gel 15 Gram(s) Oral once PRN Blood Glucose LESS THAN 70 milliGRAM(s)/deciliter  guaiFENesin Oral Liquid (Sugar-Free) 200 milliGRAM(s) Oral every 6 hours PRN Cough  polyethylene glycol 3350 17 Gram(s) Oral daily PRN Constipation  sodium chloride 0.9% Bolus. 100 milliLiter(s) IV Bolus every 5 minutes PRN SBP LESS THAN or EQUAL to 100 mmHg  sodium chloride 0.9% lock flush 10 milliLiter(s) IV Push every 1 hour PRN Pre/post blood products, medications, blood draw, and to maintain line patency      09-19-24 @ 07:01  -  09-20-24 @ 07:00  --------------------------------------------------------  IN: 0 mL / OUT: 450 mL / NET: -450 mL      PHYSICAL EXAM:      T(C): 36.3 (09-20-24 @ 12:50), Max: 36.6 (09-20-24 @ 00:08)  HR: 90 (09-20-24 @ 12:50) (83 - 94)  BP: 144/87 (09-20-24 @ 12:50) (144/87 - 175/91)  RR: 19 (09-20-24 @ 12:50) (17 - 19)  SpO2: 94% (09-20-24 @ 12:50) (94% - 97%)  Wt(kg): --  Lungs clear  Heart S1S2  Abd soft NT ND  Extremities:   tr edema  Skin excoriations                                       9.3    10.76 )-----------( 213      ( 19 Sep 2024 06:35 )             29.7     09-19    138  |  103  |  55[H]    ----------------------------<  142[H]  4.7   |  28  |  4.47[H]    Ca    8.9      19 Sep 2024 06:35          Creatinine Trend: 4.47<--, 6.41<--, 6.72<--, 4.52<--, 5.52<--, 4.99<--      Assessment   ESRD, maintenance    Plan:  HD for today  UF as tolerated  Discharge planning     Augusto Santoyo MD

## 2024-09-20 NOTE — PROGRESS NOTE ADULT - SUBJECTIVE AND OBJECTIVE BOX
Patient is a 74y old  Male who presents with a chief complaint of Weakness, progression of CKD (19 Sep 2024 09:57)      INTERVAL HPI/OVERNIGHT EVENTS: No acute events overnight.     MEDICATIONS  (STANDING):  apixaban 5 milliGRAM(s) Oral two times a day  AQUAPHOR (petrolatum Ointment) 1 Application(s) Topical daily  atorvastatin 40 milliGRAM(s) Oral at bedtime  bacitracin   Ointment 1 Application(s) Topical daily  calamine/zinc oxide Lotion 1 Application(s) Topical two times a day  chlorhexidine 2% Cloths 1 Application(s) Topical <User Schedule>  dextrose 5%. 1000 milliLiter(s) (50 mL/Hr) IV Continuous <Continuous>  dextrose 5%. 1000 milliLiter(s) (100 mL/Hr) IV Continuous <Continuous>  dextrose 50% Injectable 25 Gram(s) IV Push once  dextrose 50% Injectable 12.5 Gram(s) IV Push once  dextrose 50% Injectable 25 Gram(s) IV Push once  doxazosin 2 milliGRAM(s) Oral at bedtime  epoetin malik-epbx (RETACRIT) Injectable 09738 Unit(s) IV Push <User Schedule>  glucagon  Injectable 1 milliGRAM(s) IntraMuscular once  hydrALAZINE 50 milliGRAM(s) Oral every 8 hours  insulin glargine Injectable (LANTUS) 25 Unit(s) SubCutaneous at bedtime  insulin lispro (ADMELOG) corrective regimen sliding scale   SubCutaneous three times a day before meals  insulin lispro (ADMELOG) corrective regimen sliding scale   SubCutaneous at bedtime  insulin lispro Injectable (ADMELOG) 6 Unit(s) SubCutaneous three times a day before meals  melatonin 3 milliGRAM(s) Oral at bedtime  Nephro-lee 1 Tablet(s) Oral daily  pantoprazole    Tablet 40 milliGRAM(s) Oral before breakfast  predniSONE   Tablet 100 milliGRAM(s) Oral daily  sevelamer carbonate 1600 milliGRAM(s) Oral three times a day with meals  triamcinolone 0.1% Ointment 1 Application(s) Topical two times a day    MEDICATIONS  (PRN):  acetaminophen     Tablet .. 650 milliGRAM(s) Oral every 6 hours PRN Temp greater or equal to 38C (100.4F), Moderate Pain (4 - 6)  benzocaine/menthol Lozenge 1 Lozenge Oral five times a day PRN Mouth Sores  dextrose Oral Gel 15 Gram(s) Oral once PRN Blood Glucose LESS THAN 70 milliGRAM(s)/deciliter  guaiFENesin Oral Liquid (Sugar-Free) 200 milliGRAM(s) Oral every 6 hours PRN Cough  polyethylene glycol 3350 17 Gram(s) Oral daily PRN Constipation  sodium chloride 0.9% Bolus. 100 milliLiter(s) IV Bolus every 5 minutes PRN SBP LESS THAN or EQUAL to 100 mmHg  sodium chloride 0.9% lock flush 10 milliLiter(s) IV Push every 1 hour PRN Pre/post blood products, medications, blood draw, and to maintain line patency      Allergies    No Known Allergies    Intolerances        REVIEW OF SYSTEMS:  CONSTITUTIONAL: +ve for fatigue  EYES: No eye pain, visual disturbances, or discharge  ENMT:  No difficulty hearing, tinnitus, vertigo; No sinus or throat pain  NECK: No pain or stiffness      Vital Signs Last 24 Hrs  T(C): 36.5 (20 Sep 2024 05:27), Max: 36.6 (20 Sep 2024 00:08)  T(F): 97.7 (20 Sep 2024 05:27), Max: 97.9 (20 Sep 2024 00:08)  HR: 83 (20 Sep 2024 05:27) (83 - 94)  BP: 171/87 (20 Sep 2024 05:27) (155/90 - 171/87)  BP(mean): --  RR: 18 (20 Sep 2024 05:27) (17 - 18)  SpO2: 97% (20 Sep 2024 05:27) (96% - 97%)    Parameters below as of 20 Sep 2024 05:27  Patient On (Oxygen Delivery Method): room air        PHYSICAL EXAM:    HEAD:  Atraumatic, Normocephalic  EYES: EOMI, PERRLA, conjunctiva and sclera clear  ENMT: No tonsillar erythema, exudates, or enlargement; Moist mucous membranes, Good dentition, No lesions  NECK: Supple, No JVD, Normal thyroid      LABS:                        9.3    10.76 )-----------( 213      ( 19 Sep 2024 06:35 )             29.7     09-19    138  |  103  |  55[H]  ----------------------------<  142[H]  4.7   |  28  |  4.47[H]    Ca    8.9      19 Sep 2024 06:35    TPro  6.7  /  Alb  1.9[L]  /  TBili  0.3  /  DBili  x   /  AST  14[L]  /  ALT  19  /  AlkPhos  65  09-18      Urinalysis Basic - ( 19 Sep 2024 06:35 )    Color: x / Appearance: x / SG: x / pH: x  Gluc: 142 mg/dL / Ketone: x  / Bili: x / Urobili: x   Blood: x / Protein: x / Nitrite: x   Leuk Esterase: x / RBC: x / WBC x   Sq Epi: x / Non Sq Epi: x / Bacteria: x      CAPILLARY BLOOD GLUCOSE      POCT Blood Glucose.: 150 mg/dL (20 Sep 2024 07:45)  POCT Blood Glucose.: 313 mg/dL (19 Sep 2024 21:28)  POCT Blood Glucose.: 328 mg/dL (19 Sep 2024 16:39)

## 2024-09-20 NOTE — PROGRESS NOTE ADULT - SUBJECTIVE AND OBJECTIVE BOX
INTERVAL HPI/OVERNIGHT EVENTS:  No new complaints.    MEDICATIONS  (STANDING):  apixaban 5 milliGRAM(s) Oral two times a day  AQUAPHOR (petrolatum Ointment) 1 Application(s) Topical daily  atorvastatin 40 milliGRAM(s) Oral at bedtime  bacitracin   Ointment 1 Application(s) Topical daily  calamine/zinc oxide Lotion 1 Application(s) Topical two times a day  chlorhexidine 2% Cloths 1 Application(s) Topical <User Schedule>  dextrose 5%. 1000 milliLiter(s) (50 mL/Hr) IV Continuous <Continuous>  dextrose 5%. 1000 milliLiter(s) (100 mL/Hr) IV Continuous <Continuous>  dextrose 50% Injectable 12.5 Gram(s) IV Push once  dextrose 50% Injectable 25 Gram(s) IV Push once  dextrose 50% Injectable 25 Gram(s) IV Push once  doxazosin 2 milliGRAM(s) Oral at bedtime  epoetin malik-epbx (RETACRIT) Injectable 44735 Unit(s) IV Push <User Schedule>  glucagon  Injectable 1 milliGRAM(s) IntraMuscular once  hydrALAZINE 50 milliGRAM(s) Oral every 8 hours  insulin glargine Injectable (LANTUS) 25 Unit(s) SubCutaneous at bedtime  insulin lispro (ADMELOG) corrective regimen sliding scale   SubCutaneous three times a day before meals  insulin lispro (ADMELOG) corrective regimen sliding scale   SubCutaneous at bedtime  insulin lispro Injectable (ADMELOG) 6 Unit(s) SubCutaneous three times a day before meals  melatonin 3 milliGRAM(s) Oral at bedtime  Nephro-lee 1 Tablet(s) Oral daily  pantoprazole    Tablet 40 milliGRAM(s) Oral before breakfast  predniSONE   Tablet 100 milliGRAM(s) Oral daily  sevelamer carbonate 1600 milliGRAM(s) Oral three times a day with meals  triamcinolone 0.1% Ointment 1 Application(s) Topical two times a day    MEDICATIONS  (PRN):  acetaminophen     Tablet .. 650 milliGRAM(s) Oral every 6 hours PRN Temp greater or equal to 38C (100.4F), Moderate Pain (4 - 6)  benzocaine/menthol Lozenge 1 Lozenge Oral five times a day PRN Mouth Sores  dextrose Oral Gel 15 Gram(s) Oral once PRN Blood Glucose LESS THAN 70 milliGRAM(s)/deciliter  guaiFENesin Oral Liquid (Sugar-Free) 200 milliGRAM(s) Oral every 6 hours PRN Cough  polyethylene glycol 3350 17 Gram(s) Oral daily PRN Constipation  sodium chloride 0.9% Bolus. 100 milliLiter(s) IV Bolus every 5 minutes PRN SBP LESS THAN or EQUAL to 100 mmHg  sodium chloride 0.9% lock flush 10 milliLiter(s) IV Push every 1 hour PRN Pre/post blood products, medications, blood draw, and to maintain line patency      Allergies    No Known Allergies      Vital Signs Last 24 Hrs  T(C): 36.3 (20 Sep 2024 12:50), Max: 36.6 (20 Sep 2024 00:08)  T(F): 97.4 (20 Sep 2024 12:50), Max: 97.9 (20 Sep 2024 00:08)  HR: 90 (20 Sep 2024 12:50) (83 - 94)  BP: 144/87 (20 Sep 2024 12:50) (144/87 - 175/91)  BP(mean): --  RR: 19 (20 Sep 2024 12:50) (17 - 19)  SpO2: 94% (20 Sep 2024 12:50) (94% - 97%)    Parameters below as of 20 Sep 2024 12:50  Patient On (Oxygen Delivery Method): room air        PHYSICAL EXAM:  General :  NAD  HEENT--  no oral ulcers  Nodes--   LAD  Lungs--  CTA B/L  Heart--  RRR, nlS1 &S2 normal;   Abdomen--  soft, NT, ND +BS  Skin:  diffuse lesions throughout body - appears like ruptured bullae;  some lesions appear to be drying up  Musculoskeletal exam:  No synovitis;  normal ROM in all joints        LABS:                        9.3    10.76 )-----------( 213      ( 19 Sep 2024 06:35 )             29.7     09-19    138  |  103  |  55[H]  ----------------------------<  142[H]  4.7   |  28  |  4.47[H]    Ca    8.9      19 Sep 2024 06:35        Urinalysis Basic - ( 19 Sep 2024 06:35 )    Color: x / Appearance: x / SG: x / pH: x  Gluc: 142 mg/dL / Ketone: x  / Bili: x / Urobili: x   Blood: x / Protein: x / Nitrite: x   Leuk Esterase: x / RBC: x / WBC x   Sq Epi: x / Non Sq Epi: x / Bacteria: x          RADIOLOGY & ADDITIONAL TESTS:

## 2024-09-21 ENCOUNTER — TRANSCRIPTION ENCOUNTER (OUTPATIENT)
Age: 74
End: 2024-09-21

## 2024-09-21 VITALS
TEMPERATURE: 98 F | SYSTOLIC BLOOD PRESSURE: 166 MMHG | DIASTOLIC BLOOD PRESSURE: 82 MMHG | HEART RATE: 81 BPM | RESPIRATION RATE: 18 BRPM | OXYGEN SATURATION: 98 %

## 2024-09-21 LAB
GLUCOSE BLDC GLUCOMTR-MCNC: 130 MG/DL — HIGH (ref 70–99)
GLUCOSE BLDC GLUCOMTR-MCNC: 187 MG/DL — HIGH (ref 70–99)

## 2024-09-21 PROCEDURE — 99239 HOSP IP/OBS DSCHRG MGMT >30: CPT

## 2024-09-21 RX ORDER — BACITRACIN 500 UNIT/G
1 OINTMENT (GRAM) TOPICAL
Qty: 0 | Refills: 0 | DISCHARGE
Start: 2024-09-21

## 2024-09-21 RX ORDER — TRIAMCINOLONE ACETONIDE 1 MG/G
1 CREAM TOPICAL
Qty: 0 | Refills: 0 | DISCHARGE
Start: 2024-09-21

## 2024-09-21 RX ORDER — HYDRALAZINE HCL 50 MG
1 TABLET ORAL
Qty: 0 | Refills: 0 | DISCHARGE
Start: 2024-09-21

## 2024-09-21 RX ORDER — APIXABAN 5 MG/1
1 TABLET, FILM COATED ORAL
Qty: 0 | Refills: 0 | DISCHARGE
Start: 2024-09-21

## 2024-09-21 RX ORDER — PETROLATUM 865 MG/G
1 OINTMENT TOPICAL
Qty: 0 | Refills: 0 | DISCHARGE
Start: 2024-09-21

## 2024-09-21 RX ORDER — INSULIN GLARGINE 100 [IU]/ML
25 INJECTION, SOLUTION SUBCUTANEOUS
Qty: 0 | Refills: 0 | DISCHARGE
Start: 2024-09-21

## 2024-09-21 RX ORDER — SEVELAMER CARBONATE 800 MG/1
2 TABLET, FILM COATED ORAL
Qty: 0 | Refills: 0 | DISCHARGE
Start: 2024-09-21

## 2024-09-21 RX ORDER — NIFEDIPINE 60 MG/1
1 TABLET, FILM COATED, EXTENDED RELEASE ORAL
Qty: 30 | Refills: 0
Start: 2024-09-21

## 2024-09-21 RX ORDER — SULFAMETHOXAZOLE AND TRIMETHOPRIM 800; 160 MG/1; MG/1
1 TABLET ORAL
Qty: 30 | Refills: 0
Start: 2024-09-21

## 2024-09-21 RX ORDER — PREDNISONE 10 MG
2 TABLET, DOSE PACK ORAL
Qty: 0 | Refills: 0 | DISCHARGE
Start: 2024-09-21

## 2024-09-21 RX ADMIN — ACETAMINOPHEN 650 MILLIGRAM(S): 325 TABLET ORAL at 00:14

## 2024-09-21 RX ADMIN — APIXABAN 5 MILLIGRAM(S): 5 TABLET, FILM COATED ORAL at 06:40

## 2024-09-21 RX ADMIN — Medication 100 MILLIGRAM(S): at 06:40

## 2024-09-21 RX ADMIN — Medication 1 APPLICATION(S): at 06:38

## 2024-09-21 RX ADMIN — TRIAMCINOLONE ACETONIDE 1 APPLICATION(S): 1 CREAM TOPICAL at 06:59

## 2024-09-21 RX ADMIN — Medication 6 UNIT(S): at 08:38

## 2024-09-21 RX ADMIN — Medication 2 MILLIGRAM(S): at 00:22

## 2024-09-21 RX ADMIN — Medication 50 MILLIGRAM(S): at 06:40

## 2024-09-21 RX ADMIN — CHLORHEXIDINE GLUCONATE 1 APPLICATION(S): 40 SOLUTION TOPICAL at 06:39

## 2024-09-21 RX ADMIN — Medication 40 MILLIGRAM(S): at 06:39

## 2024-09-21 RX ADMIN — SEVELAMER CARBONATE 1600 MILLIGRAM(S): 800 TABLET, FILM COATED ORAL at 08:38

## 2024-09-21 NOTE — DISCHARGE NOTE NURSING/CASE MANAGEMENT/SOCIAL WORK - PATIENT PORTAL LINK FT
You can access the FollowMyHealth Patient Portal offered by [facility] by registering at the following website: [email or web address]. By joining [employer]’s FollowMyHealth portal, you will also be able to view your health information using other applications (apps) compatible with our system.

## 2024-09-21 NOTE — PROGRESS NOTE ADULT - PROVIDER SPECIALTY LIST ADULT
Hospitalist
Infectious Disease
Nephrology
Rheumatology
Surgery
Hospitalist
Infectious Disease
Infectious Disease
Nephrology
Rheumatology
Hospitalist
Infectious Disease
Nephrology
Surgery
Hospitalist
Infectious Disease
Podiatry
Rheumatology
Hospitalist

## 2024-09-21 NOTE — PROGRESS NOTE ADULT - SUBJECTIVE AND OBJECTIVE BOX
Patient is a 74y old  Male who presents with a chief complaint of Weakness, progression of CKD (20 Sep 2024 15:35)      INTERVAL HPI/OVERNIGHT EVENTS:  -nothing acute overnight vitals stable   -patient seen and examined     MEDICATIONS  (STANDING):  apixaban 5 milliGRAM(s) Oral two times a day  AQUAPHOR (petrolatum Ointment) 1 Application(s) Topical daily  atorvastatin 40 milliGRAM(s) Oral at bedtime  bacitracin   Ointment 1 Application(s) Topical daily  calamine/zinc oxide Lotion 1 Application(s) Topical two times a day  chlorhexidine 2% Cloths 1 Application(s) Topical <User Schedule>  dextrose 5%. 1000 milliLiter(s) (50 mL/Hr) IV Continuous <Continuous>  dextrose 5%. 1000 milliLiter(s) (100 mL/Hr) IV Continuous <Continuous>  dextrose 50% Injectable 12.5 Gram(s) IV Push once  dextrose 50% Injectable 25 Gram(s) IV Push once  dextrose 50% Injectable 25 Gram(s) IV Push once  doxazosin 2 milliGRAM(s) Oral at bedtime  epoetin malik-epbx (RETACRIT) Injectable 42158 Unit(s) IV Push <User Schedule>  glucagon  Injectable 1 milliGRAM(s) IntraMuscular once  hydrALAZINE 50 milliGRAM(s) Oral every 8 hours  insulin glargine Injectable (LANTUS) 25 Unit(s) SubCutaneous at bedtime  insulin lispro (ADMELOG) corrective regimen sliding scale   SubCutaneous at bedtime  insulin lispro (ADMELOG) corrective regimen sliding scale   SubCutaneous three times a day before meals  insulin lispro Injectable (ADMELOG) 6 Unit(s) SubCutaneous three times a day before meals  melatonin 3 milliGRAM(s) Oral at bedtime  Nephro-lee 1 Tablet(s) Oral daily  pantoprazole    Tablet 40 milliGRAM(s) Oral before breakfast  predniSONE   Tablet 100 milliGRAM(s) Oral daily  sevelamer carbonate 1600 milliGRAM(s) Oral three times a day with meals  triamcinolone 0.1% Ointment 1 Application(s) Topical two times a day    MEDICATIONS  (PRN):  acetaminophen     Tablet .. 650 milliGRAM(s) Oral every 6 hours PRN Temp greater or equal to 38C (100.4F), Moderate Pain (4 - 6)  benzocaine/menthol Lozenge 1 Lozenge Oral five times a day PRN Mouth Sores  dextrose Oral Gel 15 Gram(s) Oral once PRN Blood Glucose LESS THAN 70 milliGRAM(s)/deciliter  polyethylene glycol 3350 17 Gram(s) Oral daily PRN Constipation  sodium chloride 0.9% Bolus. 100 milliLiter(s) IV Bolus every 5 minutes PRN SBP LESS THAN or EQUAL to 100 mmHg  sodium chloride 0.9% lock flush 10 milliLiter(s) IV Push every 1 hour PRN Pre/post blood products, medications, blood draw, and to maintain line patency      Allergies    No Known Allergies    Intolerances        REVIEW OF SYSTEMS:  No CP, SOB, abdominal pain, constipation, nausea, vomiting, LE edema, orthopnea    Vital Signs Last 24 Hrs  T(C): 36.6 (21 Sep 2024 04:41), Max: 37.1 (20 Sep 2024 22:42)  T(F): 97.8 (21 Sep 2024 04:41), Max: 98.7 (20 Sep 2024 22:42)  HR: 78 (21 Sep 2024 04:41) (77 - 90)  BP: 170/79 (21 Sep 2024 04:41) (104/64 - 179/77)  BP(mean): --  RR: 17 (21 Sep 2024 04:41) (17 - 19)  SpO2: 94% (21 Sep 2024 04:41) (94% - 97%)    Parameters below as of 21 Sep 2024 04:41  Patient On (Oxygen Delivery Method): room air        PHYSICAL EXAM:  GENERAL: NAD, well-groomed, well-developed  HEAD:  Atraumatic, Normocephalic  EYES: EOMI, sclera non-icteric  ENMT:  Moist mucous membranes, Good dentition,  NECK: Supple, No JVD, Normal thyroid  NERVOUS SYSTEM:  Alert & Oriented X3, Good concentration; Motor Strength 5/5 B/L upper and lower extremities  CHEST/LUNG: Clear to percussion bilaterally; No rales, rhonchi, wheezing, or rubs  HEART: Regular rate and rhythm; No murmurs, rubs, or gallops  ABDOMEN: Soft, Nontender, Nondistended; Bowel sounds present  EXTREMITIES:  2+ Peripheral Pulses, No clubbing, cyanosis, or edema  LYMPH: No lymphadenopathy   SKIN: No rashes or lesions      LABS:              CAPILLARY BLOOD GLUCOSE      POCT Blood Glucose.: 130 mg/dL (21 Sep 2024 07:45)  POCT Blood Glucose.: 259 mg/dL (20 Sep 2024 23:32)  POCT Blood Glucose.: 343 mg/dL (20 Sep 2024 21:33)  POCT Blood Glucose.: 239 mg/dL (20 Sep 2024 17:22)  POCT Blood Glucose.: 383 mg/dL (20 Sep 2024 11:12)      RADIOLOGY & ADDITIONAL TESTS:    Imaging Personally Reviewed:  [ X] YES  [ ] NO    Consultant(s) Notes Reviewed:  [ X] YES  [ ] NO    Care Discussed with Consultants/Other Providers [X ] YES  [ ] NO

## 2024-09-21 NOTE — DISCHARGE NOTE NURSING/CASE MANAGEMENT/SOCIAL WORK - NSDCFUADDAPPT_GEN_ALL_CORE_FT
Podiatry Discharge Instructions:  Follow up: Please follow up with Dr. Waterhouse within 1 week of discharge from the hospital, please call [phone] for appointment and discuss that you recently were seen in the hospital.  Wound Care: Please apply betadine to right foot wounds daily followed by 4x4 guaze, ABD, and Quyen/Kerlix.   Weight bearing: Please weight bear as tolerated in a surgical shoe. Please wear z--flows while resting in bed for pressure ulcer prevention.   Antibiotics: Please continue as instructed.

## 2024-09-21 NOTE — PROGRESS NOTE ADULT - REASON FOR ADMISSION
Weakness, progression of CKD

## 2024-09-22 LAB
CULTURE RESULTS: SIGNIFICANT CHANGE UP
CULTURE RESULTS: SIGNIFICANT CHANGE UP
SPECIMEN SOURCE: SIGNIFICANT CHANGE UP
SPECIMEN SOURCE: SIGNIFICANT CHANGE UP

## 2024-09-23 LAB
SURGICAL PATHOLOGY STUDY: SIGNIFICANT CHANGE UP
SURGICAL PATHOLOGY STUDY: SIGNIFICANT CHANGE UP

## 2024-09-24 DIAGNOSIS — Z79.01 LONG TERM (CURRENT) USE OF ANTICOAGULANTS: ICD-10-CM

## 2024-09-24 DIAGNOSIS — E83.42 HYPOMAGNESEMIA: ICD-10-CM

## 2024-09-24 DIAGNOSIS — Z96.642 PRESENCE OF LEFT ARTIFICIAL HIP JOINT: ICD-10-CM

## 2024-09-24 DIAGNOSIS — N17.9 ACUTE KIDNEY FAILURE, UNSPECIFIED: ICD-10-CM

## 2024-09-24 DIAGNOSIS — R68.0 HYPOTHERMIA, NOT ASSOCIATED WITH LOW ENVIRONMENTAL TEMPERATURE: ICD-10-CM

## 2024-09-24 DIAGNOSIS — Z79.899 OTHER LONG TERM (CURRENT) DRUG THERAPY: ICD-10-CM

## 2024-09-24 DIAGNOSIS — L12.0 BULLOUS PEMPHIGOID: ICD-10-CM

## 2024-09-24 DIAGNOSIS — B96.89 OTHER SPECIFIED BACTERIAL AGENTS AS THE CAUSE OF DISEASES CLASSIFIED ELSEWHERE: ICD-10-CM

## 2024-09-24 DIAGNOSIS — Z79.52 LONG TERM (CURRENT) USE OF SYSTEMIC STEROIDS: ICD-10-CM

## 2024-09-24 DIAGNOSIS — U07.1 COVID-19: ICD-10-CM

## 2024-09-24 DIAGNOSIS — Z79.4 LONG TERM (CURRENT) USE OF INSULIN: ICD-10-CM

## 2024-09-24 DIAGNOSIS — H54.3 UNQUALIFIED VISUAL LOSS, BOTH EYES: ICD-10-CM

## 2024-09-24 DIAGNOSIS — Z89.512 ACQUIRED ABSENCE OF LEFT LEG BELOW KNEE: ICD-10-CM

## 2024-09-24 DIAGNOSIS — R41.0 DISORIENTATION, UNSPECIFIED: ICD-10-CM

## 2024-09-24 DIAGNOSIS — E11.49 TYPE 2 DIABETES MELLITUS WITH OTHER DIABETIC NEUROLOGICAL COMPLICATION: ICD-10-CM

## 2024-09-24 DIAGNOSIS — I48.91 UNSPECIFIED ATRIAL FIBRILLATION: ICD-10-CM

## 2024-09-24 DIAGNOSIS — R91.8 OTHER NONSPECIFIC ABNORMAL FINDING OF LUNG FIELD: ICD-10-CM

## 2024-09-24 DIAGNOSIS — E11.319 TYPE 2 DIABETES MELLITUS WITH UNSPECIFIED DIABETIC RETINOPATHY WITHOUT MACULAR EDEMA: ICD-10-CM

## 2024-09-24 DIAGNOSIS — I13.2 HYPERTENSIVE HEART AND CHRONIC KIDNEY DISEASE WITH HEART FAILURE AND WITH STAGE 5 CHRONIC KIDNEY DISEASE, OR END STAGE RENAL DISEASE: ICD-10-CM

## 2024-09-24 DIAGNOSIS — I50.32 CHRONIC DIASTOLIC (CONGESTIVE) HEART FAILURE: ICD-10-CM

## 2024-09-24 DIAGNOSIS — L89.896 PRESSURE-INDUCED DEEP TISSUE DAMAGE OF OTHER SITE: ICD-10-CM

## 2024-09-24 DIAGNOSIS — E78.5 HYPERLIPIDEMIA, UNSPECIFIED: ICD-10-CM

## 2024-09-24 DIAGNOSIS — G47.00 INSOMNIA, UNSPECIFIED: ICD-10-CM

## 2024-09-24 DIAGNOSIS — Z79.2 LONG TERM (CURRENT) USE OF ANTIBIOTICS: ICD-10-CM

## 2024-09-24 DIAGNOSIS — Z86.718 PERSONAL HISTORY OF OTHER VENOUS THROMBOSIS AND EMBOLISM: ICD-10-CM

## 2024-09-24 DIAGNOSIS — L89.619 PRESSURE ULCER OF RIGHT HEEL, UNSPECIFIED STAGE: ICD-10-CM

## 2024-09-24 DIAGNOSIS — E11.22 TYPE 2 DIABETES MELLITUS WITH DIABETIC CHRONIC KIDNEY DISEASE: ICD-10-CM

## 2024-09-24 DIAGNOSIS — E87.5 HYPERKALEMIA: ICD-10-CM

## 2024-09-24 LAB
-  PIPERACILLIN/TAZOBACTAM: SIGNIFICANT CHANGE UP
METHOD TYPE: SIGNIFICANT CHANGE UP

## 2024-09-25 LAB
-  AMIKACIN: SIGNIFICANT CHANGE UP
-  AMPICILLIN/SULBACTAM: SIGNIFICANT CHANGE UP
-  CEFEPIME: SIGNIFICANT CHANGE UP
-  CEFTAZIDIME: SIGNIFICANT CHANGE UP
-  CEFTRIAXONE: SIGNIFICANT CHANGE UP
-  CIPROFLOXACIN: SIGNIFICANT CHANGE UP
-  GENTAMICIN: SIGNIFICANT CHANGE UP
-  IMIPENEM: SIGNIFICANT CHANGE UP
-  LEVOFLOXACIN: SIGNIFICANT CHANGE UP
-  MEROPENEM: SIGNIFICANT CHANGE UP
-  TOBRAMYCIN: SIGNIFICANT CHANGE UP
-  TRIMETHOPRIM/SULFAMETHOXAZOLE: SIGNIFICANT CHANGE UP
CULTURE RESULTS: ABNORMAL
METHOD TYPE: SIGNIFICANT CHANGE UP
ORGANISM # SPEC MICROSCOPIC CNT: ABNORMAL
ORGANISM # SPEC MICROSCOPIC CNT: ABNORMAL
ORGANISM # SPEC MICROSCOPIC CNT: SIGNIFICANT CHANGE UP
SPECIMEN SOURCE: SIGNIFICANT CHANGE UP

## 2024-09-26 LAB
DESMOGLEIN 1 AB SER-ACNC: <2 RU/ML — SIGNIFICANT CHANGE UP
DESMOGLEIN 3 AB SER-ACNC: <2 RU/ML — SIGNIFICANT CHANGE UP

## 2024-10-11 ENCOUNTER — APPOINTMENT (OUTPATIENT)
Dept: RHEUMATOLOGY | Facility: CLINIC | Age: 74
End: 2024-10-11
Payer: MEDICARE

## 2024-10-11 VITALS
OXYGEN SATURATION: 95 % | BODY MASS INDEX: 17.34 KG/M2 | HEIGHT: 72 IN | SYSTOLIC BLOOD PRESSURE: 120 MMHG | DIASTOLIC BLOOD PRESSURE: 74 MMHG | HEART RATE: 99 BPM | WEIGHT: 128 LBS

## 2024-10-11 DIAGNOSIS — L98.9 DISORDER OF THE SKIN AND SUBCUTANEOUS TISSUE, UNSPECIFIED: ICD-10-CM

## 2024-10-11 DIAGNOSIS — R76.8 OTHER SPECIFIED ABNORMAL IMMUNOLOGICAL FINDINGS IN SERUM: ICD-10-CM

## 2024-10-11 DIAGNOSIS — Z79.52 LONG TERM (CURRENT) USE OF SYSTEMIC STEROIDS: ICD-10-CM

## 2024-10-11 PROCEDURE — 99215 OFFICE O/P EST HI 40 MIN: CPT

## 2025-03-04 NOTE — PHYSICAL THERAPY INITIAL EVALUATION ADULT - NS ASR RISK AREAS PT EVAL
[______] : HCP: [unfilled] [FreeTextEntry1] : 88yoM with:   # Stage IV Prostate Cancer - On Eligard, Xgeva. Med Onc follow up.  Recent CT imaging redemonstrates widespread sclerotic metastases.   # Abdominopelvic pain - ? neoplasm related - uncertain etiology - recommend continuing tylenol at a dose of 650mg and using this up to every 6 hours PRN.  pt's daughter put him back on gabapentin 300mg TID, explained that this could be a contributor to his worsened fatigue however she seems to be doubtful of this.   # Fatigue - Ddx includes: anemia, advancing malignancy, infection, medication. blood work and urine study did not identify any plausible cause for patient's fatigue.  Discussed how he may be exhibiting signs of advancing malignancy.   # b/l LE edema -  Advise patient stop amlodipine 2.5mg as his BP has been low and it may be contributing to the edema.   #  Encounter for palliative care- emotional support provided. HCP form on file.  No MOLST on file, need to discuss at follow up.   Follow up 2 weeks, call sooner with questions or issues. fall
